# Patient Record
Sex: MALE | Race: WHITE | Employment: FULL TIME | URBAN - NONMETROPOLITAN AREA
[De-identification: names, ages, dates, MRNs, and addresses within clinical notes are randomized per-mention and may not be internally consistent; named-entity substitution may affect disease eponyms.]

---

## 2017-03-07 ENCOUNTER — OFFICE VISIT (OUTPATIENT)
Dept: PRIMARY CARE CLINIC | Age: 37
End: 2017-03-07
Payer: COMMERCIAL

## 2017-03-07 VITALS
RESPIRATION RATE: 16 BRPM | BODY MASS INDEX: 41.75 KG/M2 | DIASTOLIC BLOOD PRESSURE: 78 MMHG | OXYGEN SATURATION: 96 % | WEIGHT: 315 LBS | SYSTOLIC BLOOD PRESSURE: 136 MMHG | HEIGHT: 73 IN | HEART RATE: 80 BPM

## 2017-03-07 DIAGNOSIS — E66.01 MORBID OBESITY, UNSPECIFIED OBESITY TYPE (HCC): ICD-10-CM

## 2017-03-07 DIAGNOSIS — Z13.220 LIPID SCREENING: ICD-10-CM

## 2017-03-07 DIAGNOSIS — K42.9 UMBILICAL HERNIA WITHOUT OBSTRUCTION AND WITHOUT GANGRENE: Primary | ICD-10-CM

## 2017-03-07 DIAGNOSIS — Z23 NEED FOR TDAP VACCINATION: ICD-10-CM

## 2017-03-07 LAB
ALBUMIN SERPL-MCNC: 3.9 G/DL (ref 3.5–5.2)
ALP BLD-CCNC: 76 U/L (ref 40–130)
ALT SERPL-CCNC: 30 U/L (ref 5–41)
ANION GAP SERPL CALCULATED.3IONS-SCNC: 16 MMOL/L (ref 7–19)
AST SERPL-CCNC: 20 U/L (ref 5–40)
BILIRUB SERPL-MCNC: 0.3 MG/DL (ref 0.2–1.2)
BUN BLDV-MCNC: 13 MG/DL (ref 6–20)
CALCIUM SERPL-MCNC: 8.9 MG/DL (ref 8.6–10)
CHLORIDE BLD-SCNC: 103 MMOL/L (ref 98–111)
CHOLESTEROL, TOTAL: 175 MG/DL (ref 160–199)
CO2: 23 MMOL/L (ref 22–29)
CREAT SERPL-MCNC: 0.7 MG/DL (ref 0.5–1.2)
GFR NON-AFRICAN AMERICAN: >60
GLOBULIN: 3.2 G/DL
GLUCOSE BLD-MCNC: 110 MG/DL (ref 74–109)
HDLC SERPL-MCNC: 37 MG/DL (ref 55–121)
LDL CHOLESTEROL CALCULATED: 105 MG/DL
POTASSIUM SERPL-SCNC: 4.1 MMOL/L (ref 3.5–5)
SODIUM BLD-SCNC: 142 MMOL/L (ref 136–145)
TOTAL PROTEIN: 7.1 G/DL (ref 6.6–8.7)
TRIGL SERPL-MCNC: 165 MG/DL (ref 150–199)
TSH SERPL DL<=0.05 MIU/L-ACNC: 1.83 UIU/ML (ref 0.27–4.2)

## 2017-03-07 PROCEDURE — 90715 TDAP VACCINE 7 YRS/> IM: CPT | Performed by: FAMILY MEDICINE

## 2017-03-07 PROCEDURE — 90471 IMMUNIZATION ADMIN: CPT | Performed by: FAMILY MEDICINE

## 2017-03-07 PROCEDURE — 99213 OFFICE O/P EST LOW 20 MIN: CPT | Performed by: FAMILY MEDICINE

## 2017-03-07 ASSESSMENT — ENCOUNTER SYMPTOMS
COLOR CHANGE: 0
ABDOMINAL PAIN: 1
SHORTNESS OF BREATH: 0
COUGH: 0

## 2017-03-13 ENCOUNTER — OFFICE VISIT (OUTPATIENT)
Dept: SURGERY | Age: 37
End: 2017-03-13
Payer: COMMERCIAL

## 2017-03-13 VITALS
BODY MASS INDEX: 42.66 KG/M2 | HEIGHT: 72 IN | WEIGHT: 315 LBS | DIASTOLIC BLOOD PRESSURE: 74 MMHG | SYSTOLIC BLOOD PRESSURE: 124 MMHG | TEMPERATURE: 97.8 F

## 2017-03-13 DIAGNOSIS — K42.9 UMBILICAL HERNIA WITHOUT OBSTRUCTION AND WITHOUT GANGRENE: Primary | ICD-10-CM

## 2017-03-13 PROCEDURE — 99212 OFFICE O/P EST SF 10 MIN: CPT | Performed by: SURGERY

## 2017-03-15 ENCOUNTER — TELEPHONE (OUTPATIENT)
Dept: PRIMARY CARE CLINIC | Age: 37
End: 2017-03-15

## 2017-03-20 ASSESSMENT — ENCOUNTER SYMPTOMS
BACK PAIN: 0
ABDOMINAL PAIN: 1
DIARRHEA: 0
EYE PAIN: 0
COUGH: 0
ABDOMINAL DISTENTION: 0
SHORTNESS OF BREATH: 0
SORE THROAT: 0
NAUSEA: 0

## 2017-06-19 ENCOUNTER — OFFICE VISIT (OUTPATIENT)
Dept: PRIMARY CARE CLINIC | Age: 37
End: 2017-06-19
Payer: COMMERCIAL

## 2017-06-19 VITALS
WEIGHT: 315 LBS | BODY MASS INDEX: 41.75 KG/M2 | HEIGHT: 73 IN | SYSTOLIC BLOOD PRESSURE: 134 MMHG | TEMPERATURE: 97.9 F | HEART RATE: 69 BPM | OXYGEN SATURATION: 96 % | DIASTOLIC BLOOD PRESSURE: 82 MMHG

## 2017-06-19 DIAGNOSIS — K42.9 UMBILICAL HERNIA WITHOUT OBSTRUCTION AND WITHOUT GANGRENE: Primary | ICD-10-CM

## 2017-06-19 DIAGNOSIS — E66.01 MORBID OBESITY, UNSPECIFIED OBESITY TYPE (HCC): ICD-10-CM

## 2017-06-19 PROCEDURE — 99213 OFFICE O/P EST LOW 20 MIN: CPT | Performed by: FAMILY MEDICINE

## 2017-06-19 ASSESSMENT — ENCOUNTER SYMPTOMS
COUGH: 0
SHORTNESS OF BREATH: 0
ABDOMINAL PAIN: 0

## 2017-09-19 ENCOUNTER — OFFICE VISIT (OUTPATIENT)
Dept: PRIMARY CARE CLINIC | Age: 37
End: 2017-09-19
Payer: COMMERCIAL

## 2017-09-19 VITALS
WEIGHT: 315 LBS | OXYGEN SATURATION: 93 % | BODY MASS INDEX: 42.66 KG/M2 | TEMPERATURE: 97.7 F | HEIGHT: 72 IN | DIASTOLIC BLOOD PRESSURE: 82 MMHG | HEART RATE: 78 BPM | SYSTOLIC BLOOD PRESSURE: 124 MMHG

## 2017-09-19 DIAGNOSIS — E66.01 MORBID OBESITY, UNSPECIFIED OBESITY TYPE (HCC): Primary | ICD-10-CM

## 2017-09-19 PROCEDURE — 99213 OFFICE O/P EST LOW 20 MIN: CPT | Performed by: FAMILY MEDICINE

## 2017-09-19 ASSESSMENT — ENCOUNTER SYMPTOMS
COUGH: 0
SHORTNESS OF BREATH: 0

## 2017-12-19 ENCOUNTER — OFFICE VISIT (OUTPATIENT)
Dept: PRIMARY CARE CLINIC | Age: 37
End: 2017-12-19
Payer: COMMERCIAL

## 2017-12-19 VITALS
BODY MASS INDEX: 41.75 KG/M2 | OXYGEN SATURATION: 95 % | SYSTOLIC BLOOD PRESSURE: 136 MMHG | DIASTOLIC BLOOD PRESSURE: 82 MMHG | HEART RATE: 60 BPM | WEIGHT: 315 LBS | TEMPERATURE: 97.4 F | HEIGHT: 73 IN

## 2017-12-19 DIAGNOSIS — K42.9 UMBILICAL HERNIA WITHOUT OBSTRUCTION AND WITHOUT GANGRENE: ICD-10-CM

## 2017-12-19 DIAGNOSIS — E66.01 MORBID OBESITY, UNSPECIFIED OBESITY TYPE (HCC): Primary | ICD-10-CM

## 2017-12-19 PROCEDURE — 99213 OFFICE O/P EST LOW 20 MIN: CPT | Performed by: FAMILY MEDICINE

## 2017-12-19 ASSESSMENT — ENCOUNTER SYMPTOMS
COUGH: 0
SHORTNESS OF BREATH: 0

## 2017-12-19 NOTE — PROGRESS NOTES
Helena Cali is a 44 y.o. male    Chief Complaint   Patient presents with    Follow-up     3 months       HPI  Helena Cali is to the office for evaluation morbid obesity. Patient has been continuing to lose weight with diet. Patient is trying to lose about 60 pounds prior to any surgery for an umbilical hernia repair. presents to the office for evaluation of weight loss. He is currently down 40 pounds. Wt Readings from Last 3 Encounters:   12/19/17 (!) 346 lb 9.6 oz (157.2 kg)   09/19/17 (!) 361 lb 3.2 oz (163.8 kg)   06/19/17 (!) 366 lb 6.4 oz (166.2 kg)       Review of Systems   Constitutional: Negative for chills and fever. Respiratory: Negative for cough and shortness of breath. Cardiovascular: Negative for chest pain and leg swelling. Prior to Admission medications    Not on File       Past Medical History:   Diagnosis Date    Arthritis     both wrist    Bronchitis     10 yrs ago       Past Surgical History:   Procedure Laterality Date    FRACTURE SURGERY      both wrists    HEMORRHOID SURGERY N/A 6/24/2016    HEMORRHOID INCISION AND DRAINAGE performed by Camryn Gomez MD at [address]      left wrist.  Pt was a child       Social History     Social History    Marital status: Single     Spouse name: N/A    Number of children: N/A    Years of education: N/A     Social History Main Topics    Smoking status: Never Smoker    Smokeless tobacco: Never Used    Alcohol use No    Drug use: No    Sexual activity: Not Asked     Other Topics Concern    None     Social History Narrative    None       Physical Exam   Constitutional: He is oriented to person, place, and time. He appears well-developed and well-nourished. No distress. HENT:   Head: Normocephalic and atraumatic. Right Ear: External ear normal.   Left Ear: External ear normal.   Eyes: Conjunctivae are normal. No scleral icterus. Neck: Normal range of motion. Neck supple.    Cardiovascular: Normal

## 2018-01-28 ENCOUNTER — APPOINTMENT (OUTPATIENT)
Dept: CT IMAGING | Age: 38
End: 2018-01-28
Payer: COMMERCIAL

## 2018-01-28 ENCOUNTER — HOSPITAL ENCOUNTER (EMERGENCY)
Age: 38
Discharge: HOME | End: 2018-01-28
Payer: COMMERCIAL

## 2018-01-28 VITALS
HEIGHT: 73 IN | BODY MASS INDEX: 41.75 KG/M2 | OXYGEN SATURATION: 94 % | SYSTOLIC BLOOD PRESSURE: 127 MMHG | RESPIRATION RATE: 16 BRPM | TEMPERATURE: 97.4 F | WEIGHT: 315 LBS | HEART RATE: 64 BPM | DIASTOLIC BLOOD PRESSURE: 79 MMHG

## 2018-01-28 DIAGNOSIS — N20.9 CALCULUS OF UPPER URINARY TRACT: Primary | ICD-10-CM

## 2018-01-28 DIAGNOSIS — N23 RENAL COLIC: ICD-10-CM

## 2018-01-28 LAB
ALBUMIN SERPL-MCNC: 3.9 G/DL (ref 3.5–5.2)
ALP BLD-CCNC: 72 U/L (ref 40–130)
ALT SERPL-CCNC: 18 U/L (ref 5–41)
ANION GAP SERPL CALCULATED.3IONS-SCNC: 11 MMOL/L (ref 7–19)
AST SERPL-CCNC: 12 U/L (ref 5–40)
BASOPHILS ABSOLUTE: 0.1 K/UL (ref 0–0.2)
BASOPHILS RELATIVE PERCENT: 0.6 % (ref 0–1)
BILIRUB SERPL-MCNC: 0.3 MG/DL (ref 0.2–1.2)
BILIRUBIN URINE: NEGATIVE
BLOOD, URINE: NEGATIVE
BUN BLDV-MCNC: 15 MG/DL (ref 6–20)
CALCIUM SERPL-MCNC: 8.8 MG/DL (ref 8.6–10)
CHLORIDE BLD-SCNC: 104 MMOL/L (ref 98–111)
CLARITY: CLEAR
CO2: 27 MMOL/L (ref 22–29)
COLOR: YELLOW
CREAT SERPL-MCNC: 0.8 MG/DL (ref 0.5–1.2)
EOSINOPHILS ABSOLUTE: 0.2 K/UL (ref 0–0.6)
EOSINOPHILS RELATIVE PERCENT: 2.2 % (ref 0–5)
GFR NON-AFRICAN AMERICAN: >60
GLUCOSE BLD-MCNC: 109 MG/DL (ref 74–109)
GLUCOSE URINE: NEGATIVE MG/DL
HCT VFR BLD CALC: 47.3 % (ref 42–52)
HEMOGLOBIN: 15 G/DL (ref 14–18)
KETONES, URINE: NEGATIVE MG/DL
LACTIC ACID: 1.4 MMOL/L (ref 0.5–1.9)
LEUKOCYTE ESTERASE, URINE: NEGATIVE
LYMPHOCYTES ABSOLUTE: 2 K/UL (ref 1.1–4.5)
LYMPHOCYTES RELATIVE PERCENT: 21.1 % (ref 20–40)
MCH RBC QN AUTO: 27.8 PG (ref 27–31)
MCHC RBC AUTO-ENTMCNC: 31.7 G/DL (ref 33–37)
MCV RBC AUTO: 87.6 FL (ref 80–94)
MONOCYTES ABSOLUTE: 0.7 K/UL (ref 0–0.9)
MONOCYTES RELATIVE PERCENT: 7 % (ref 0–10)
NEUTROPHILS ABSOLUTE: 6.4 K/UL (ref 1.5–7.5)
NEUTROPHILS RELATIVE PERCENT: 68.8 % (ref 50–65)
NITRITE, URINE: NEGATIVE
PDW BLD-RTO: 13.3 % (ref 11.5–14.5)
PH UA: 5.5
PLATELET # BLD: 219 K/UL (ref 130–400)
PMV BLD AUTO: 9.7 FL (ref 9.4–12.4)
POTASSIUM SERPL-SCNC: 3.9 MMOL/L (ref 3.5–5)
PROTEIN UA: NEGATIVE MG/DL
RBC # BLD: 5.4 M/UL (ref 4.7–6.1)
SODIUM BLD-SCNC: 142 MMOL/L (ref 136–145)
SPECIFIC GRAVITY UA: >1.045
TOTAL PROTEIN: 6.4 G/DL (ref 6.6–8.7)
UROBILINOGEN, URINE: 0.2 E.U./DL
WBC # BLD: 9.3 K/UL (ref 4.8–10.8)

## 2018-01-28 PROCEDURE — 99284 EMERGENCY DEPT VISIT MOD MDM: CPT | Performed by: NURSE PRACTITIONER

## 2018-01-28 PROCEDURE — 96375 TX/PRO/DX INJ NEW DRUG ADDON: CPT

## 2018-01-28 PROCEDURE — 83605 ASSAY OF LACTIC ACID: CPT

## 2018-01-28 PROCEDURE — 6360000004 HC RX CONTRAST MEDICATION: Performed by: NURSE PRACTITIONER

## 2018-01-28 PROCEDURE — 74177 CT ABD & PELVIS W/CONTRAST: CPT

## 2018-01-28 PROCEDURE — 80053 COMPREHEN METABOLIC PANEL: CPT

## 2018-01-28 PROCEDURE — 81003 URINALYSIS AUTO W/O SCOPE: CPT

## 2018-01-28 PROCEDURE — 36415 COLL VENOUS BLD VENIPUNCTURE: CPT

## 2018-01-28 PROCEDURE — 85025 COMPLETE CBC W/AUTO DIFF WBC: CPT

## 2018-01-28 PROCEDURE — 6360000002 HC RX W HCPCS: Performed by: NURSE PRACTITIONER

## 2018-01-28 PROCEDURE — 2580000003 HC RX 258: Performed by: NURSE PRACTITIONER

## 2018-01-28 PROCEDURE — 99284 EMERGENCY DEPT VISIT MOD MDM: CPT

## 2018-01-28 PROCEDURE — 96374 THER/PROPH/DIAG INJ IV PUSH: CPT

## 2018-01-28 RX ORDER — HYDROCODONE BITARTRATE AND ACETAMINOPHEN 7.5; 325 MG/1; MG/1
1 TABLET ORAL EVERY 6 HOURS PRN
Qty: 12 TABLET | Refills: 0 | Status: SHIPPED | OUTPATIENT
Start: 2018-01-28 | End: 2018-01-31

## 2018-01-28 RX ORDER — TAMSULOSIN HYDROCHLORIDE 0.4 MG/1
0.4 CAPSULE ORAL DAILY
Qty: 10 CAPSULE | Refills: 0 | Status: ON HOLD | OUTPATIENT
Start: 2018-01-28 | End: 2018-02-13

## 2018-01-28 RX ORDER — ONDANSETRON 2 MG/ML
4 INJECTION INTRAMUSCULAR; INTRAVENOUS ONCE
Status: COMPLETED | OUTPATIENT
Start: 2018-01-28 | End: 2018-01-28

## 2018-01-28 RX ORDER — 0.9 % SODIUM CHLORIDE 0.9 %
1000 INTRAVENOUS SOLUTION INTRAVENOUS ONCE
Status: COMPLETED | OUTPATIENT
Start: 2018-01-28 | End: 2018-01-28

## 2018-01-28 RX ORDER — ONDANSETRON 4 MG/1
4 TABLET, ORALLY DISINTEGRATING ORAL EVERY 8 HOURS PRN
Qty: 15 TABLET | Refills: 0 | Status: SHIPPED | OUTPATIENT
Start: 2018-01-28 | End: 2018-02-27

## 2018-01-28 RX ORDER — MORPHINE SULFATE 4 MG/ML
2 INJECTION, SOLUTION INTRAMUSCULAR; INTRAVENOUS ONCE
Status: COMPLETED | OUTPATIENT
Start: 2018-01-28 | End: 2018-01-28

## 2018-01-28 RX ADMIN — IOPAMIDOL 90 ML: 755 INJECTION, SOLUTION INTRAVENOUS at 12:01

## 2018-01-28 RX ADMIN — ONDANSETRON 4 MG: 2 INJECTION INTRAMUSCULAR; INTRAVENOUS at 11:38

## 2018-01-28 RX ADMIN — SODIUM CHLORIDE 1000 ML: 9 INJECTION, SOLUTION INTRAVENOUS at 11:38

## 2018-01-28 RX ADMIN — Medication 2 MG: at 11:39

## 2018-01-28 ASSESSMENT — ENCOUNTER SYMPTOMS
BACK PAIN: 0
VOMITING: 0
RESPIRATORY NEGATIVE: 1
NAUSEA: 1
ABDOMINAL PAIN: 1

## 2018-01-28 ASSESSMENT — PAIN SCALES - GENERAL
PAINLEVEL_OUTOF10: 7
PAINLEVEL_OUTOF10: 7

## 2018-01-28 NOTE — ED PROVIDER NOTES
of a cardiologist.        RADIOLOGY:   Non-plain film images such as CT, Ultrasound and MRI are read by the radiologist. Plain radiographic images are visualized and preliminarily interpreted by the emergency physician with the below findings:        Interpretation per the Radiologist below, if available at the time of this note:    CT ABDOMEN PELVIS W IV CONTRAST Additional Contrast? None   Final Result   1. Moderate right hydronephrosis based on obstructing stone(s) at the   right UPJ. Signed by Dr Bhavya Arcos on 1/28/2018 12:38 PM            ED BEDSIDE ULTRASOUND:   Performed by ED Physician - none    LABS:  Labs Reviewed   CBC WITH AUTO DIFFERENTIAL - Abnormal; Notable for the following:        Result Value    MCHC 31.7 (*)     Neutrophils % 68.8 (*)     All other components within normal limits   COMPREHENSIVE METABOLIC PANEL - Abnormal; Notable for the following: Total Protein 6.4 (*)     All other components within normal limits   URINALYSIS    Narrative:     CHEMISTRY SPECIMEN HEMOLYZED. NOTIFIED [facility] ED TO RECOLLECT.   01/28/2018  11:55 CC   LACTIC ACID, PLASMA    Narrative:     CHEMISTRY SPECIMEN HEMOLYZED. NOTIFIED [facility] ED TO RECOLLECT.   01/28/2018  11:55 CC       All other labs were within normal range or not returned as of this dictation. RE-ASSESSMENT     Pt pain is controlled, he remains hydrated without vomiting and does not appear to have infected urinary tract. Return precautions were discussed with patient. EMERGENCY DEPARTMENT COURSE and DIFFERENTIAL DIAGNOSIS/MDM:   Vitals:    Vitals:    01/28/18 1102 01/28/18 1318   BP: 134/72 127/79   Pulse: 65 64   Resp: 20 16   Temp: 97.4 °F (36.3 °C)    TempSrc: Oral    SpO2: 94%    Weight: (!) 339 lb (153.8 kg)    Height: 6' 1\" (1.854 m)        MDM      CONSULTS:  None    PROCEDURES:  Unless otherwise noted below, none     Procedures    FINAL IMPRESSION      1. Calculus of upper urinary tract    2.  Renal colic

## 2018-01-28 NOTE — ED TRIAGE NOTES
Pt to ED with c/o lower abdominal/groin pain. Pt reports slight swelling to lower abdomen with pain radiating into groin.  Pt denies known injury but reports heavy lifting recently on job

## 2018-01-30 ENCOUNTER — OFFICE VISIT (OUTPATIENT)
Dept: UROLOGY | Age: 38
End: 2018-01-30
Payer: COMMERCIAL

## 2018-01-30 ENCOUNTER — HOSPITAL ENCOUNTER (OUTPATIENT)
Dept: GENERAL RADIOLOGY | Age: 38
Discharge: HOME | End: 2018-01-30
Payer: COMMERCIAL

## 2018-01-30 VITALS
DIASTOLIC BLOOD PRESSURE: 73 MMHG | TEMPERATURE: 97.3 F | SYSTOLIC BLOOD PRESSURE: 144 MMHG | WEIGHT: 315 LBS | BODY MASS INDEX: 41.75 KG/M2 | HEIGHT: 73 IN

## 2018-01-30 DIAGNOSIS — N20.1 RIGHT URETERAL STONE: Primary | ICD-10-CM

## 2018-01-30 DIAGNOSIS — N20.1 RIGHT URETERAL STONE: ICD-10-CM

## 2018-01-30 LAB
APPEARANCE FLUID: NORMAL
BILIRUBIN, POC: NORMAL
BLOOD URINE, POC: NORMAL
CLARITY, POC: CLEAR
COLOR, POC: YELLOW
GLUCOSE URINE, POC: NORMAL
KETONES, POC: NORMAL
LEUKOCYTE EST, POC: NORMAL
NITRITE, POC: NORMAL
PH, POC: 6.5
PROTEIN, POC: NORMAL
SPECIFIC GRAVITY, POC: 1.02
UROBILINOGEN, POC: 0.2

## 2018-01-30 PROCEDURE — 99202 OFFICE O/P NEW SF 15 MIN: CPT | Performed by: PHYSICIAN ASSISTANT

## 2018-01-30 PROCEDURE — 81003 URINALYSIS AUTO W/O SCOPE: CPT | Performed by: PHYSICIAN ASSISTANT

## 2018-01-30 PROCEDURE — 74018 RADEX ABDOMEN 1 VIEW: CPT

## 2018-01-30 ASSESSMENT — ENCOUNTER SYMPTOMS
BLURRED VISION: 0
SINUS PAIN: 0
BACK PAIN: 0
EYE PAIN: 0
WHEEZING: 0
SHORTNESS OF BREATH: 0
ABDOMINAL PAIN: 0
VOMITING: 0

## 2018-01-30 NOTE — PROGRESS NOTES
Alea Gant is a 43 y.o. male who presents today   Chief Complaint   Patient presents with    New Patient     right ureteral stone     Urolithiasis  Patient complains of right abdominal pain without radiation to the groin and testicles. Onset of symptoms was abrupt 2 days ago with stable course since that time. Patient describes the pain as colicky, and was severe in the ER. States today has had almost no pain. The patient has had no nausea and no vomiting and no diaphoresis. There has been no fever or chills. The patient is not complaining of dysuria or frequency. Has had just one other stone episode several years ago. CT scan done at [facility] ER revealed a 1.2 cm x 5 mm stone in the right proximal ureter. Labs done on the 28th revealed normal creatinine no elevation in white blood cell count and his urinalysis was normal. I sent patient for KUB the stone seen in the right proximal ureter is clearly seen on the KUB today. Past Medical History:   Diagnosis Date    Arthritis     both wrist    Bronchitis     10 yrs ago       Past Surgical History:   Procedure Laterality Date    FRACTURE SURGERY      both wrists    HEMORRHOID SURGERY N/A 6/24/2016    HEMORRHOID INCISION AND DRAINAGE performed by Hussein Morris MD at [address] [facility]      left wrist.  Pt was a child       Current Outpatient Prescriptions   Medication Sig Dispense Refill    HYDROcodone-acetaminophen (NORCO) 7.5-325 MG per tablet Take 1 tablet by mouth every 6 hours as needed for Pain for up to 3 days. 12 tablet 0    tamsulosin (FLOMAX) 0.4 MG capsule Take 1 capsule by mouth daily for 10 days 10 capsule 0    ondansetron (ZOFRAN ODT) 4 MG disintegrating tablet Take 1 tablet by mouth every 8 hours as needed for Nausea or Vomiting 15 tablet 0     No current facility-administered medications for this visit.         No Known Allergies    Social History     Social History    Marital status:      Spouse name: N/A    Number of children: N/A    Years of education: N/A     Social History Main Topics    Smoking status: Never Smoker    Smokeless tobacco: Never Used    Alcohol use No    Drug use: No    Sexual activity: Not Asked     Other Topics Concern    None     Social History Narrative    None       Family History   Problem Relation Age of Onset    Cancer Mother 46     colon cancer    Cancer Father      luekemia    Diabetes Father     Other Maternal Grandmother      copd    Other Maternal Grandfather      copd    Heart Disease Paternal Grandmother        REVIEW OF SYSTEMS:  Review of Systems   Constitutional: Negative for malaise/fatigue and weight loss. HENT: Negative for nosebleeds and sinus pain. Eyes: Negative for blurred vision and pain. Respiratory: Negative for shortness of breath and wheezing. Cardiovascular: Negative for palpitations and leg swelling. Gastrointestinal: Negative for abdominal pain and vomiting. Genitourinary: Positive for flank pain and frequency. Negative for dysuria, hematuria and urgency. Musculoskeletal: Negative for back pain and myalgias. Skin: Negative for itching and rash. Neurological: Negative for tremors and sensory change. Endo/Heme/Allergies: Negative for environmental allergies and polydipsia. Psychiatric/Behavioral: Negative for hallucinations. The patient is not nervous/anxious. PHYSICAL EXAM:  BP (!) 144/73   Temp 97.3 °F (36.3 °C) (Temporal)   Ht 6' 1\" (1.854 m)   Wt (!) 357 lb (161.9 kg)   BMI 47.10 kg/m²   Physical Exam   Constitutional: No distress. HENT:   Mouth/Throat: No oropharyngeal exudate. Eyes: Left eye exhibits no discharge. No scleral icterus. Neck: No JVD present. No tracheal deviation present. No thyromegaly present. Cardiovascular: Exam reveals no gallop and no friction rub. Pulmonary/Chest: No stridor. He has no rales. Abdominal: He exhibits no distension and no mass. There is no rebound and no guarding.

## 2018-01-31 ENCOUNTER — PREP FOR PROCEDURE (OUTPATIENT)
Dept: UROLOGY | Age: 38
End: 2018-01-31

## 2018-02-12 ENCOUNTER — HOSPITAL ENCOUNTER (OUTPATIENT)
Dept: PREADMISSION TESTING | Age: 38
Discharge: HOME | End: 2018-02-16
Payer: COMMERCIAL

## 2018-02-12 VITALS — WEIGHT: 315 LBS | HEIGHT: 73 IN | BODY MASS INDEX: 41.75 KG/M2

## 2018-02-12 LAB
ANION GAP SERPL CALCULATED.3IONS-SCNC: 10 MMOL/L (ref 7–19)
APTT: 28.6 SEC (ref 26–36.2)
BASOPHILS ABSOLUTE: 0.1 K/UL (ref 0–0.2)
BASOPHILS RELATIVE PERCENT: 0.7 % (ref 0–1)
BUN BLDV-MCNC: 10 MG/DL (ref 6–20)
CALCIUM SERPL-MCNC: 9.6 MG/DL (ref 8.6–10)
CHLORIDE BLD-SCNC: 103 MMOL/L (ref 98–111)
CO2: 29 MMOL/L (ref 22–29)
COLLAGEN EPINEPHRINE TIME: 171 SEC (ref 84–176)
CREAT SERPL-MCNC: 0.7 MG/DL (ref 0.5–1.2)
EKG P AXIS: 44 DEGREES
EKG P-R INTERVAL: 190 MS
EKG Q-T INTERVAL: 398 MS
EKG QRS DURATION: 102 MS
EKG QTC CALCULATION (BAZETT): 405 MS
EKG T AXIS: 37 DEGREES
EOSINOPHILS ABSOLUTE: 0.3 K/UL (ref 0–0.6)
EOSINOPHILS RELATIVE PERCENT: 3.2 % (ref 0–5)
GFR NON-AFRICAN AMERICAN: >60
GLUCOSE BLD-MCNC: 88 MG/DL (ref 74–109)
HCT VFR BLD CALC: 48.3 % (ref 42–52)
HEMOGLOBIN: 15.3 G/DL (ref 14–18)
INR BLD: 1.11 (ref 0.88–1.18)
LYMPHOCYTES ABSOLUTE: 1.8 K/UL (ref 1.1–4.5)
LYMPHOCYTES RELATIVE PERCENT: 22 % (ref 20–40)
MCH RBC QN AUTO: 28.1 PG (ref 27–31)
MCHC RBC AUTO-ENTMCNC: 31.7 G/DL (ref 33–37)
MCV RBC AUTO: 88.6 FL (ref 80–94)
MONOCYTES ABSOLUTE: 0.7 K/UL (ref 0–0.9)
MONOCYTES RELATIVE PERCENT: 8.2 % (ref 0–10)
NEUTROPHILS ABSOLUTE: 5.3 K/UL (ref 1.5–7.5)
NEUTROPHILS RELATIVE PERCENT: 65.7 % (ref 50–65)
PDW BLD-RTO: 13.5 % (ref 11.5–14.5)
PLATELET # BLD: 230 K/UL (ref 130–400)
PLATELET FUNCTION INTERPRETATION: NORMAL
PMV BLD AUTO: 9.6 FL (ref 9.4–12.4)
POTASSIUM SERPL-SCNC: 4.1 MMOL/L (ref 3.5–5)
PROTHROMBIN TIME: 14.2 SEC (ref 12–14.6)
RBC # BLD: 5.45 M/UL (ref 4.7–6.1)
SODIUM BLD-SCNC: 142 MMOL/L (ref 136–145)
WBC # BLD: 8.1 K/UL (ref 4.8–10.8)

## 2018-02-12 PROCEDURE — 93005 ELECTROCARDIOGRAM TRACING: CPT

## 2018-02-12 PROCEDURE — 85730 THROMBOPLASTIN TIME PARTIAL: CPT

## 2018-02-12 PROCEDURE — 85025 COMPLETE CBC W/AUTO DIFF WBC: CPT

## 2018-02-12 PROCEDURE — 85610 PROTHROMBIN TIME: CPT

## 2018-02-12 PROCEDURE — 80048 BASIC METABOLIC PNL TOTAL CA: CPT

## 2018-02-12 PROCEDURE — 85576 BLOOD PLATELET AGGREGATION: CPT

## 2018-02-12 RX ORDER — CIPROFLOXACIN 2 MG/ML
400 INJECTION, SOLUTION INTRAVENOUS ONCE
Status: CANCELLED | OUTPATIENT
Start: 2018-02-13

## 2018-02-13 ENCOUNTER — HOSPITAL ENCOUNTER (OUTPATIENT)
Age: 38
Setting detail: OUTPATIENT SURGERY
Discharge: HOME | End: 2018-02-13
Attending: UROLOGY | Admitting: UROLOGY
Payer: COMMERCIAL

## 2018-02-13 ENCOUNTER — APPOINTMENT (OUTPATIENT)
Dept: GENERAL RADIOLOGY | Age: 38
End: 2018-02-13
Attending: UROLOGY
Payer: COMMERCIAL

## 2018-02-13 ENCOUNTER — ANESTHESIA EVENT (OUTPATIENT)
Dept: OPERATING ROOM | Age: 38
End: 2018-02-13
Payer: COMMERCIAL

## 2018-02-13 ENCOUNTER — ANESTHESIA (OUTPATIENT)
Dept: OPERATING ROOM | Age: 38
End: 2018-02-13
Payer: COMMERCIAL

## 2018-02-13 VITALS
TEMPERATURE: 98 F | SYSTOLIC BLOOD PRESSURE: 116 MMHG | OXYGEN SATURATION: 91 % | RESPIRATION RATE: 10 BRPM | DIASTOLIC BLOOD PRESSURE: 83 MMHG

## 2018-02-13 VITALS
BODY MASS INDEX: 41.75 KG/M2 | HEART RATE: 62 BPM | TEMPERATURE: 97.2 F | OXYGEN SATURATION: 90 % | SYSTOLIC BLOOD PRESSURE: 126 MMHG | RESPIRATION RATE: 20 BRPM | WEIGHT: 315 LBS | DIASTOLIC BLOOD PRESSURE: 75 MMHG | HEIGHT: 73 IN

## 2018-02-13 DIAGNOSIS — N20.1 RIGHT URETERAL CALCULUS: Primary | ICD-10-CM

## 2018-02-13 LAB
HCT VFR BLD CALC: 50.6 % (ref 42–52)
HEMOGLOBIN: 15.9 G/DL (ref 14–18)

## 2018-02-13 PROCEDURE — 6360000002 HC RX W HCPCS: Performed by: NURSE ANESTHETIST, CERTIFIED REGISTERED

## 2018-02-13 PROCEDURE — 85014 HEMATOCRIT: CPT

## 2018-02-13 PROCEDURE — 36415 COLL VENOUS BLD VENIPUNCTURE: CPT

## 2018-02-13 PROCEDURE — 7100000001 HC PACU RECOVERY - ADDTL 15 MIN: Performed by: UROLOGY

## 2018-02-13 PROCEDURE — 7100000011 HC PHASE II RECOVERY - ADDTL 15 MIN: Performed by: UROLOGY

## 2018-02-13 PROCEDURE — 6360000002 HC RX W HCPCS: Performed by: UROLOGY

## 2018-02-13 PROCEDURE — 2580000003 HC RX 258: Performed by: ANESTHESIOLOGY

## 2018-02-13 PROCEDURE — 3700000001 HC ADD 15 MINUTES (ANESTHESIA): Performed by: UROLOGY

## 2018-02-13 PROCEDURE — 2500000003 HC RX 250 WO HCPCS: Performed by: NURSE ANESTHETIST, CERTIFIED REGISTERED

## 2018-02-13 PROCEDURE — 7100000000 HC PACU RECOVERY - FIRST 15 MIN: Performed by: UROLOGY

## 2018-02-13 PROCEDURE — 74018 RADEX ABDOMEN 1 VIEW: CPT

## 2018-02-13 PROCEDURE — 7100000010 HC PHASE II RECOVERY - FIRST 15 MIN: Performed by: UROLOGY

## 2018-02-13 PROCEDURE — 6370000000 HC RX 637 (ALT 250 FOR IP): Performed by: UROLOGY

## 2018-02-13 PROCEDURE — 50590 FRAGMENTING OF KIDNEY STONE: CPT | Performed by: UROLOGY

## 2018-02-13 PROCEDURE — 85018 HEMOGLOBIN: CPT

## 2018-02-13 PROCEDURE — 3700000000 HC ANESTHESIA ATTENDED CARE: Performed by: UROLOGY

## 2018-02-13 RX ORDER — MORPHINE SULFATE 4 MG/ML
2 INJECTION, SOLUTION INTRAMUSCULAR; INTRAVENOUS EVERY 5 MIN PRN
Status: DISCONTINUED | OUTPATIENT
Start: 2018-02-13 | End: 2018-02-13 | Stop reason: HOSPADM

## 2018-02-13 RX ORDER — SODIUM CHLORIDE 0.9 % (FLUSH) 0.9 %
10 SYRINGE (ML) INJECTION PRN
Status: DISCONTINUED | OUTPATIENT
Start: 2018-02-13 | End: 2018-02-13 | Stop reason: HOSPADM

## 2018-02-13 RX ORDER — HYDROMORPHONE HCL 110MG/55ML
0.25 PATIENT CONTROLLED ANALGESIA SYRINGE INTRAVENOUS EVERY 5 MIN PRN
Status: DISCONTINUED | OUTPATIENT
Start: 2018-02-13 | End: 2018-02-13 | Stop reason: HOSPADM

## 2018-02-13 RX ORDER — SUCCINYLCHOLINE CHLORIDE 20 MG/ML
INJECTION INTRAMUSCULAR; INTRAVENOUS PRN
Status: DISCONTINUED | OUTPATIENT
Start: 2018-02-13 | End: 2018-02-13 | Stop reason: SDUPTHER

## 2018-02-13 RX ORDER — OXYCODONE HYDROCHLORIDE AND ACETAMINOPHEN 5; 325 MG/1; MG/1
2 TABLET ORAL EVERY 4 HOURS PRN
Status: DISCONTINUED | OUTPATIENT
Start: 2018-02-13 | End: 2018-02-13 | Stop reason: HOSPADM

## 2018-02-13 RX ORDER — MORPHINE SULFATE 4 MG/ML
2 INJECTION, SOLUTION INTRAMUSCULAR; INTRAVENOUS EVERY 4 HOURS PRN
Status: DISCONTINUED | OUTPATIENT
Start: 2018-02-13 | End: 2018-02-13 | Stop reason: HOSPADM

## 2018-02-13 RX ORDER — METOCLOPRAMIDE HYDROCHLORIDE 5 MG/ML
10 INJECTION INTRAMUSCULAR; INTRAVENOUS
Status: DISCONTINUED | OUTPATIENT
Start: 2018-02-13 | End: 2018-02-13 | Stop reason: HOSPADM

## 2018-02-13 RX ORDER — SODIUM CHLORIDE 0.9 % (FLUSH) 0.9 %
10 SYRINGE (ML) INJECTION EVERY 12 HOURS SCHEDULED
Status: DISCONTINUED | OUTPATIENT
Start: 2018-02-13 | End: 2018-02-13 | Stop reason: HOSPADM

## 2018-02-13 RX ORDER — LABETALOL HYDROCHLORIDE 5 MG/ML
5 INJECTION, SOLUTION INTRAVENOUS EVERY 10 MIN PRN
Status: DISCONTINUED | OUTPATIENT
Start: 2018-02-13 | End: 2018-02-13 | Stop reason: HOSPADM

## 2018-02-13 RX ORDER — PROMETHAZINE HYDROCHLORIDE 25 MG/ML
6.25 INJECTION, SOLUTION INTRAMUSCULAR; INTRAVENOUS
Status: DISCONTINUED | OUTPATIENT
Start: 2018-02-13 | End: 2018-02-13 | Stop reason: HOSPADM

## 2018-02-13 RX ORDER — FENTANYL CITRATE 50 UG/ML
25 INJECTION, SOLUTION INTRAMUSCULAR; INTRAVENOUS
Status: DISCONTINUED | OUTPATIENT
Start: 2018-02-13 | End: 2018-02-13 | Stop reason: HOSPADM

## 2018-02-13 RX ORDER — MIDAZOLAM HYDROCHLORIDE 1 MG/ML
2 INJECTION INTRAMUSCULAR; INTRAVENOUS
Status: DISCONTINUED | OUTPATIENT
Start: 2018-02-13 | End: 2018-02-13 | Stop reason: HOSPADM

## 2018-02-13 RX ORDER — FENTANYL CITRATE 50 UG/ML
50 INJECTION, SOLUTION INTRAMUSCULAR; INTRAVENOUS
Status: DISCONTINUED | OUTPATIENT
Start: 2018-02-13 | End: 2018-02-13 | Stop reason: HOSPADM

## 2018-02-13 RX ORDER — ROCURONIUM BROMIDE 10 MG/ML
INJECTION, SOLUTION INTRAVENOUS PRN
Status: DISCONTINUED | OUTPATIENT
Start: 2018-02-13 | End: 2018-02-13 | Stop reason: SDUPTHER

## 2018-02-13 RX ORDER — DIPHENHYDRAMINE HYDROCHLORIDE 50 MG/ML
12.5 INJECTION INTRAMUSCULAR; INTRAVENOUS
Status: DISCONTINUED | OUTPATIENT
Start: 2018-02-13 | End: 2018-02-13 | Stop reason: HOSPADM

## 2018-02-13 RX ORDER — ONDANSETRON 2 MG/ML
INJECTION INTRAMUSCULAR; INTRAVENOUS PRN
Status: DISCONTINUED | OUTPATIENT
Start: 2018-02-13 | End: 2018-02-13 | Stop reason: SDUPTHER

## 2018-02-13 RX ORDER — HYDROMORPHONE HCL 110MG/55ML
0.5 PATIENT CONTROLLED ANALGESIA SYRINGE INTRAVENOUS EVERY 5 MIN PRN
Status: DISCONTINUED | OUTPATIENT
Start: 2018-02-13 | End: 2018-02-13 | Stop reason: HOSPADM

## 2018-02-13 RX ORDER — LIDOCAINE HYDROCHLORIDE 10 MG/ML
1 INJECTION, SOLUTION EPIDURAL; INFILTRATION; INTRACAUDAL; PERINEURAL
Status: DISCONTINUED | OUTPATIENT
Start: 2018-02-13 | End: 2018-02-13 | Stop reason: HOSPADM

## 2018-02-13 RX ORDER — TAMSULOSIN HYDROCHLORIDE 0.4 MG/1
0.4 CAPSULE ORAL ONCE
Status: COMPLETED | OUTPATIENT
Start: 2018-02-13 | End: 2018-02-13

## 2018-02-13 RX ORDER — DEXAMETHASONE SODIUM PHOSPHATE 4 MG/ML
INJECTION, SOLUTION INTRA-ARTICULAR; INTRALESIONAL; INTRAMUSCULAR; INTRAVENOUS; SOFT TISSUE PRN
Status: DISCONTINUED | OUTPATIENT
Start: 2018-02-13 | End: 2018-02-13 | Stop reason: SDUPTHER

## 2018-02-13 RX ORDER — HYDRALAZINE HYDROCHLORIDE 20 MG/ML
5 INJECTION INTRAMUSCULAR; INTRAVENOUS EVERY 10 MIN PRN
Status: DISCONTINUED | OUTPATIENT
Start: 2018-02-13 | End: 2018-02-13 | Stop reason: HOSPADM

## 2018-02-13 RX ORDER — PROPOFOL 10 MG/ML
INJECTION, EMULSION INTRAVENOUS PRN
Status: DISCONTINUED | OUTPATIENT
Start: 2018-02-13 | End: 2018-02-13 | Stop reason: SDUPTHER

## 2018-02-13 RX ORDER — SODIUM CHLORIDE, SODIUM LACTATE, POTASSIUM CHLORIDE, CALCIUM CHLORIDE 600; 310; 30; 20 MG/100ML; MG/100ML; MG/100ML; MG/100ML
INJECTION, SOLUTION INTRAVENOUS CONTINUOUS
Status: DISCONTINUED | OUTPATIENT
Start: 2018-02-13 | End: 2018-02-13 | Stop reason: HOSPADM

## 2018-02-13 RX ORDER — SODIUM CHLORIDE 9 MG/ML
INJECTION, SOLUTION INTRAVENOUS CONTINUOUS
Status: DISCONTINUED | OUTPATIENT
Start: 2018-02-13 | End: 2018-02-13 | Stop reason: HOSPADM

## 2018-02-13 RX ORDER — OXYCODONE HYDROCHLORIDE AND ACETAMINOPHEN 5; 325 MG/1; MG/1
1 TABLET ORAL EVERY 4 HOURS PRN
Qty: 30 TABLET | Refills: 0 | Status: SHIPPED | OUTPATIENT
Start: 2018-02-13 | End: 2018-02-20

## 2018-02-13 RX ORDER — SCOLOPAMINE TRANSDERMAL SYSTEM 1 MG/1
1 PATCH, EXTENDED RELEASE TRANSDERMAL
Status: DISCONTINUED | OUTPATIENT
Start: 2018-02-13 | End: 2018-02-13 | Stop reason: HOSPADM

## 2018-02-13 RX ORDER — LIDOCAINE HYDROCHLORIDE 10 MG/ML
INJECTION, SOLUTION INFILTRATION; PERINEURAL PRN
Status: DISCONTINUED | OUTPATIENT
Start: 2018-02-13 | End: 2018-02-13 | Stop reason: SDUPTHER

## 2018-02-13 RX ORDER — TAMSULOSIN HYDROCHLORIDE 0.4 MG/1
0.4 CAPSULE ORAL DAILY
Qty: 14 CAPSULE | Refills: 1 | Status: SHIPPED | OUTPATIENT
Start: 2018-02-13 | End: 2018-02-27

## 2018-02-13 RX ORDER — ONDANSETRON 2 MG/ML
4 INJECTION INTRAMUSCULAR; INTRAVENOUS EVERY 4 HOURS PRN
Status: DISCONTINUED | OUTPATIENT
Start: 2018-02-13 | End: 2018-02-13 | Stop reason: HOSPADM

## 2018-02-13 RX ORDER — MEPERIDINE HYDROCHLORIDE 50 MG/ML
12.5 INJECTION INTRAMUSCULAR; INTRAVENOUS; SUBCUTANEOUS EVERY 5 MIN PRN
Status: DISCONTINUED | OUTPATIENT
Start: 2018-02-13 | End: 2018-02-13 | Stop reason: HOSPADM

## 2018-02-13 RX ORDER — MIDAZOLAM HYDROCHLORIDE 1 MG/ML
INJECTION INTRAMUSCULAR; INTRAVENOUS PRN
Status: DISCONTINUED | OUTPATIENT
Start: 2018-02-13 | End: 2018-02-13 | Stop reason: SDUPTHER

## 2018-02-13 RX ORDER — CIPROFLOXACIN 2 MG/ML
400 INJECTION, SOLUTION INTRAVENOUS ONCE
Status: COMPLETED | OUTPATIENT
Start: 2018-02-13 | End: 2018-02-13

## 2018-02-13 RX ORDER — MORPHINE SULFATE 4 MG/ML
4 INJECTION, SOLUTION INTRAMUSCULAR; INTRAVENOUS EVERY 5 MIN PRN
Status: DISCONTINUED | OUTPATIENT
Start: 2018-02-13 | End: 2018-02-13 | Stop reason: HOSPADM

## 2018-02-13 RX ORDER — FENTANYL CITRATE 50 UG/ML
INJECTION, SOLUTION INTRAMUSCULAR; INTRAVENOUS PRN
Status: DISCONTINUED | OUTPATIENT
Start: 2018-02-13 | End: 2018-02-13 | Stop reason: SDUPTHER

## 2018-02-13 RX ORDER — MORPHINE SULFATE 10 MG/ML
INJECTION, SOLUTION INTRAMUSCULAR; INTRAVENOUS PRN
Status: DISCONTINUED | OUTPATIENT
Start: 2018-02-13 | End: 2018-02-13 | Stop reason: SDUPTHER

## 2018-02-13 RX ADMIN — MORPHINE SULFATE 4 MG: 10 INJECTION INTRAMUSCULAR; INTRAVENOUS; SUBCUTANEOUS at 09:37

## 2018-02-13 RX ADMIN — LIDOCAINE HYDROCHLORIDE 5 ML: 10 INJECTION, SOLUTION INFILTRATION; PERINEURAL at 08:08

## 2018-02-13 RX ADMIN — SODIUM CHLORIDE, SODIUM LACTATE, POTASSIUM CHLORIDE, AND CALCIUM CHLORIDE: 600; 310; 30; 20 INJECTION, SOLUTION INTRAVENOUS at 07:00

## 2018-02-13 RX ADMIN — ROCURONIUM BROMIDE 5 MG: 10 INJECTION INTRAVENOUS at 08:13

## 2018-02-13 RX ADMIN — Medication 120 MG: at 08:08

## 2018-02-13 RX ADMIN — CIPROFLOXACIN 400 MG: 2 INJECTION INTRAVENOUS at 08:14

## 2018-02-13 RX ADMIN — DEXAMETHASONE SODIUM PHOSPHATE 4 MG: 4 INJECTION, SOLUTION INTRAMUSCULAR; INTRAVENOUS at 08:24

## 2018-02-13 RX ADMIN — ROCURONIUM BROMIDE 5 MG: 10 INJECTION INTRAVENOUS at 08:08

## 2018-02-13 RX ADMIN — FENTANYL CITRATE 100 MCG: 50 INJECTION, SOLUTION INTRAMUSCULAR; INTRAVENOUS at 08:07

## 2018-02-13 RX ADMIN — MORPHINE SULFATE 2 MG: 10 INJECTION INTRAMUSCULAR; INTRAVENOUS; SUBCUTANEOUS at 09:07

## 2018-02-13 RX ADMIN — SODIUM CHLORIDE, SODIUM LACTATE, POTASSIUM CHLORIDE, AND CALCIUM CHLORIDE: 600; 310; 30; 20 INJECTION, SOLUTION INTRAVENOUS at 08:48

## 2018-02-13 RX ADMIN — MORPHINE SULFATE 4 MG: 10 INJECTION INTRAMUSCULAR; INTRAVENOUS; SUBCUTANEOUS at 08:21

## 2018-02-13 RX ADMIN — PROPOFOL 170 MG: 10 INJECTION, EMULSION INTRAVENOUS at 08:08

## 2018-02-13 RX ADMIN — TAMSULOSIN HYDROCHLORIDE 0.4 MG: 0.4 CAPSULE ORAL at 10:06

## 2018-02-13 RX ADMIN — ONDANSETRON HYDROCHLORIDE 4 MG: 2 SOLUTION INTRAMUSCULAR; INTRAVENOUS at 09:20

## 2018-02-13 RX ADMIN — MIDAZOLAM HYDROCHLORIDE 2 MG: 1 INJECTION, SOLUTION INTRAMUSCULAR; INTRAVENOUS at 08:00

## 2018-02-13 ASSESSMENT — PAIN - FUNCTIONAL ASSESSMENT: PAIN_FUNCTIONAL_ASSESSMENT: 0-10

## 2018-02-13 ASSESSMENT — ENCOUNTER SYMPTOMS: SHORTNESS OF BREATH: 0

## 2018-02-13 ASSESSMENT — PAIN SCALES - GENERAL
PAINLEVEL_OUTOF10: 0
PAINLEVEL_OUTOF10: 1

## 2018-02-13 NOTE — ANESTHESIA PRE PROCEDURE
Department of Anesthesiology  Preprocedure Note       Name:  Atif Alas   Age:  43 y.o.  :  1980                                          MRN:  [id number]         Date:  2018      Surgeon: Yifan Ordonez):  Cornelio Feliciano MD    Procedure: Procedure(s): HEMORRHOID INCISION AND DRAINAGE    Medications prior to admission:   Prior to Admission medications    Medication Sig Start Date End Date Taking? Authorizing Provider   tamsulosin (FLOMAX) 0.4 MG capsule Take 1 capsule by mouth daily for 10 days 18  VANDANA Enciso   ondansetron (ZOFRAN ODT) 4 MG disintegrating tablet Take 1 tablet by mouth every 8 hours as needed for Nausea or Vomiting 18   VANDANA Enciso       Current medications:    No current facility-administered medications for this visit. No current outpatient prescriptions on file. Facility-Administered Medications Ordered in Other Visits   Medication Dose Route Frequency Provider Last Rate Last Dose    ciprofloxacin (CIPRO) IVPB 400 mg  400 mg Intravenous Once Cornelio Feliciano MD           Allergies:  No Known Allergies    Problem List:    Patient Active Problem List   Diagnosis Code    Thrombosed external hemorrhoid K64.5       Past Medical History:        Diagnosis Date    Arthritis     both wrist    Bronchitis     10 yrs ago    Kidney stone        Past Surgical History:        Procedure Laterality Date    FRACTURE SURGERY      both wrists    HEMORRHOID SURGERY N/A 2016    HEMORRHOID INCISION AND DRAINAGE performed by Prosper Dinero MD at [address]      left wrist.  Pt was a child       Social History:    Social History   Substance Use Topics    Smoking status: Never Smoker    Smokeless tobacco: Never Used    Alcohol use No                                Counseling given: Not Answered      Vital Signs (Current): There were no vitals filed for this visit.                                            BP Readings from Last 3

## 2018-02-13 NOTE — BRIEF OP NOTE
Urology Brief Op Note    Patient Name: Marilee Nicole    : 1980    MRN: [id number]    Pre-operative Diagnosis: RIGHT PROXIMAL URETERAL STONE    Post-operative Diagnosis: Same     Procedure: Procedure(s):right ureter  ESWL EXTRACORPEAL SHOCK WAVE LITHOTRIPSY    Anesthesia: General    Surgeon: Diana Joshi MD    Assistants: Scrub Person First: Karen Felipe    Estimated Blood Loss: * No values recorded between 2018  8:02 AM and 2018  2:51 AM *    Complications: none    Findings: 4,000 sw pwr 1-9 good fragmentation    Specimens:  * No specimens in log *    Disposition/Plan: To PACU in stable condition. Then to [facility] out patient.       Diana Joshi MD  2018  9:59 AM

## 2018-02-13 NOTE — H&P
needed for Nausea or Vomiting 15 tablet 0      No current facility-administered medications for this visit.             No Known Allergies     Social History   Social History      Social History    Marital status:        Spouse name: N/A    Number of children: N/A    Years of education: N/A           Social History Main Topics    Smoking status: Never Smoker    Smokeless tobacco: Never Used    Alcohol use No    Drug use: No    Sexual activity: Not Asked           Other Topics Concern    None          Social History Narrative    None            Family History          Family History   Problem Relation Age of Onset    Cancer Mother 46       colon cancer    Cancer Father         luekemia    Diabetes Father      Other Maternal Grandmother         copd    Other Maternal Grandfather         copd    Heart Disease Paternal Grandmother              REVIEW OF SYSTEMS:  Review of Systems   Constitutional: Negative for malaise/fatigue and weight loss. HENT: Negative for nosebleeds and sinus pain. Eyes: Negative for blurred vision and pain. Respiratory: Negative for shortness of breath and wheezing. Cardiovascular: Negative for palpitations and leg swelling. Gastrointestinal: Negative for abdominal pain and vomiting. Genitourinary: Positive for flank pain and frequency. Negative for dysuria, hematuria and urgency. Musculoskeletal: Negative for back pain and myalgias. Skin: Negative for itching and rash. Neurological: Negative for tremors and sensory change. Endo/Heme/Allergies: Negative for environmental allergies and polydipsia. Psychiatric/Behavioral: Negative for hallucinations. The patient is not nervous/anxious.          PHYSICAL EXAM:  BP (!) 144/73   Temp 97.3 °F (36.3 °C) (Temporal)   Ht 6' 1\" (1.854 m)   Wt (!) 357 lb (161.9 kg)   BMI 47.10 kg/m²   Physical Exam   Constitutional: No distress. HENT:   Mouth/Throat: No oropharyngeal exudate.    Eyes: Left eye exhibits no

## 2018-02-27 ENCOUNTER — OFFICE VISIT (OUTPATIENT)
Dept: UROLOGY | Age: 38
End: 2018-02-27
Payer: COMMERCIAL

## 2018-02-27 ENCOUNTER — HOSPITAL ENCOUNTER (OUTPATIENT)
Dept: GENERAL RADIOLOGY | Age: 38
Discharge: HOME | End: 2018-02-27
Payer: COMMERCIAL

## 2018-02-27 VITALS — TEMPERATURE: 97.1 F | HEIGHT: 72 IN | BODY MASS INDEX: 42.66 KG/M2 | WEIGHT: 315 LBS

## 2018-02-27 DIAGNOSIS — N20.1 RIGHT URETERAL CALCULUS: Primary | ICD-10-CM

## 2018-02-27 DIAGNOSIS — N20.1 RIGHT URETERAL CALCULUS: ICD-10-CM

## 2018-02-27 LAB
APPEARANCE FLUID: NORMAL
BILIRUBIN, POC: NORMAL
BLOOD URINE, POC: NORMAL
CLARITY, POC: CLEAR
COLOR, POC: YELLOW
GLUCOSE URINE, POC: NORMAL
KETONES, POC: NORMAL
LEUKOCYTE EST, POC: NORMAL
NITRITE, POC: NORMAL
PH, POC: 5.5
PROTEIN, POC: NORMAL
SPECIFIC GRAVITY, POC: 1.03
UROBILINOGEN, POC: 0.2

## 2018-02-27 PROCEDURE — 99024 POSTOP FOLLOW-UP VISIT: CPT | Performed by: PHYSICIAN ASSISTANT

## 2018-02-27 PROCEDURE — 74018 RADEX ABDOMEN 1 VIEW: CPT

## 2018-02-27 PROCEDURE — 81003 URINALYSIS AUTO W/O SCOPE: CPT | Performed by: PHYSICIAN ASSISTANT

## 2018-02-27 RX ORDER — TAMSULOSIN HYDROCHLORIDE 0.4 MG/1
0.4 CAPSULE ORAL DAILY
Qty: 30 CAPSULE | Refills: 0 | Status: SHIPPED | OUTPATIENT
Start: 2018-02-27 | End: 2018-04-10 | Stop reason: ALTCHOICE

## 2018-02-27 ASSESSMENT — ENCOUNTER SYMPTOMS
DOUBLE VISION: 0
BACK PAIN: 1
SHORTNESS OF BREATH: 0
SORE THROAT: 0
HEARTBURN: 0
WHEEZING: 0
NAUSEA: 0
BLURRED VISION: 0

## 2018-02-27 NOTE — PROGRESS NOTES
Onset    Cancer Mother 46     colon cancer    Cancer Father      luekemia    Diabetes Father     Other Maternal Grandmother      copd    Other Maternal Grandfather      copd    Heart Disease Paternal Grandmother        REVIEW OF SYSTEMS:  Review of Systems   Constitutional: Negative for chills and fever. HENT: Negative for congestion and sore throat. Eyes: Negative for blurred vision and double vision. Respiratory: Negative for shortness of breath and wheezing. Cardiovascular: Negative for chest pain and palpitations. Gastrointestinal: Negative for heartburn and nausea. Genitourinary: Positive for flank pain. Negative for dysuria, frequency, hematuria and urgency. Musculoskeletal: Positive for back pain. Negative for falls and neck pain. Skin: Negative for itching and rash. Neurological: Negative for dizziness and tingling. Endo/Heme/Allergies: Does not bruise/bleed easily. Psychiatric/Behavioral: The patient does not have insomnia. PHYSICAL EXAM:  Temp 97.1 °F (36.2 °C) (Temporal)   Ht 6' (1.829 m)   Wt (!) 348 lb (157.9 kg)   BMI 47.20 kg/m²   Physical Exam   Constitutional: No distress. HENT:   Mouth/Throat: No oropharyngeal exudate. Eyes: Left eye exhibits no discharge. No scleral icterus. Neck: No JVD present. No tracheal deviation present. No thyromegaly present. Cardiovascular: Exam reveals no gallop and no friction rub. Pulmonary/Chest: No stridor. He has no rales. Abdominal: He exhibits no distension and no mass. There is no rebound and no guarding. Musculoskeletal: He exhibits no edema. Neurological: No cranial nerve deficit. He exhibits normal muscle tone. Coordination normal.   Skin: He is not diaphoretic. No pallor.            DATA:  U/A:    Lab Results   Component Value Date    NITRITE neg 02/27/2018    COLORU yellow 02/27/2018    COLORU YELLOW 01/28/2018    PROTEINU neg 02/27/2018    PROTEINU Negative 01/28/2018    PHUR 5.5 02/27/2018    PHUR 5.5 2018    MUCUS RARE 05/15/2011    BACTERIA 1+ 2011    CLARITYU clear 2018    CLARITYU Clear 2018    SPECGRAV 1.030 2018    SPECGRAV >1.045 2018    LEUKOCYTESUR neg 2018    LEUKOCYTESUR Negative 2018    UROBILINOGEN 0.2 2018    BILIRUBINUR neg 2018    BILIRUBINUR NEGATIVE 2011    BLOODU neg 2018    BLOODU Negative 2018    GLUCOSEU neg 2018    GLUCOSEU Negative 2018           Imagin. Right ureteral calculus  There is a fragment of the originally treated stone still in the box or right ureter. The radiologist measures is 8.9 mm however I feel actual stone is approximately 5 mm. He is doing well from a symptom standpoint he passed multiple fragments so I believe the bulk of the stone has fragmented based on the KUB findings I offered to re-treat with ESWL however he would like to give it more time and see if he could pass any more fragments. He will return in 1 week with KUB. I gave him prescription for tamsulosin to continue. Orders Placed This Encounter   Procedures    XR ABDOMEN (KUB) (SINGLE AP VIEW)     Standing Status:   Future     Standing Expiration Date:   2019     Order Specific Question:   Reason for exam:     Answer:   right proximal ureteral stone.  POCT Urinalysis no Micro     Orders Placed This Encounter   Medications    tamsulosin (FLOMAX) 0.4 MG capsule     Sig: Take 1 capsule by mouth daily     Dispense:  30 capsule     Refill:  0       Plan:  Follow-up one week with KUB.

## 2018-03-07 ENCOUNTER — OFFICE VISIT (OUTPATIENT)
Dept: UROLOGY | Age: 38
End: 2018-03-07
Payer: COMMERCIAL

## 2018-03-07 ENCOUNTER — HOSPITAL ENCOUNTER (OUTPATIENT)
Dept: GENERAL RADIOLOGY | Age: 38
Discharge: HOME | End: 2018-03-07
Payer: COMMERCIAL

## 2018-03-07 VITALS
WEIGHT: 315 LBS | SYSTOLIC BLOOD PRESSURE: 138 MMHG | BODY MASS INDEX: 41.75 KG/M2 | HEART RATE: 100 BPM | HEIGHT: 73 IN | DIASTOLIC BLOOD PRESSURE: 81 MMHG

## 2018-03-07 DIAGNOSIS — N20.1 RIGHT URETERAL CALCULUS: ICD-10-CM

## 2018-03-07 DIAGNOSIS — N20.1 RIGHT URETERAL CALCULUS: Primary | ICD-10-CM

## 2018-03-07 LAB
APPEARANCE FLUID: NORMAL
BILIRUBIN, POC: NORMAL
BLOOD URINE, POC: NORMAL
CLARITY, POC: CLEAR
COLOR, POC: YELLOW
GLUCOSE URINE, POC: NORMAL
KETONES, POC: NORMAL
LEUKOCYTE EST, POC: NORMAL
NITRITE, POC: NORMAL
PH, POC: 7
PROTEIN, POC: NORMAL
SPECIFIC GRAVITY, POC: 1.02
UROBILINOGEN, POC: 0.2

## 2018-03-07 PROCEDURE — 99024 POSTOP FOLLOW-UP VISIT: CPT | Performed by: PHYSICIAN ASSISTANT

## 2018-03-07 PROCEDURE — 74018 RADEX ABDOMEN 1 VIEW: CPT

## 2018-03-07 PROCEDURE — 81003 URINALYSIS AUTO W/O SCOPE: CPT | Performed by: PHYSICIAN ASSISTANT

## 2018-03-07 ASSESSMENT — ENCOUNTER SYMPTOMS
BLURRED VISION: 0
WHEEZING: 0
HEARTBURN: 0
SORE THROAT: 0
NAUSEA: 0
BACK PAIN: 1
SHORTNESS OF BREATH: 0
DOUBLE VISION: 0

## 2018-03-12 ENCOUNTER — HOSPITAL ENCOUNTER (OUTPATIENT)
Dept: PREADMISSION TESTING | Age: 38
Discharge: HOME | End: 2018-03-16
Payer: COMMERCIAL

## 2018-03-12 VITALS — WEIGHT: 315 LBS | BODY MASS INDEX: 41.75 KG/M2 | HEIGHT: 73 IN

## 2018-03-12 LAB
ANION GAP SERPL CALCULATED.3IONS-SCNC: 13 MMOL/L (ref 7–19)
APTT: 28.4 SEC (ref 26–36.2)
BASOPHILS ABSOLUTE: 0.1 K/UL (ref 0–0.2)
BASOPHILS RELATIVE PERCENT: 0.6 % (ref 0–1)
BUN BLDV-MCNC: 15 MG/DL (ref 6–20)
CALCIUM SERPL-MCNC: 9.2 MG/DL (ref 8.6–10)
CHLORIDE BLD-SCNC: 105 MMOL/L (ref 98–111)
CO2: 24 MMOL/L (ref 22–29)
COLLAGEN EPINEPHRINE TIME: 116 SEC (ref 84–176)
CREAT SERPL-MCNC: 0.7 MG/DL (ref 0.5–1.2)
EOSINOPHILS ABSOLUTE: 0.3 K/UL (ref 0–0.6)
EOSINOPHILS RELATIVE PERCENT: 3.8 % (ref 0–5)
GFR NON-AFRICAN AMERICAN: >60
GLUCOSE BLD-MCNC: 100 MG/DL (ref 74–109)
HCT VFR BLD CALC: 46.3 % (ref 42–52)
HEMOGLOBIN: 14.7 G/DL (ref 14–18)
INR BLD: 1.04 (ref 0.88–1.18)
LYMPHOCYTES ABSOLUTE: 2.1 K/UL (ref 1.1–4.5)
LYMPHOCYTES RELATIVE PERCENT: 26.4 % (ref 20–40)
MCH RBC QN AUTO: 28.2 PG (ref 27–31)
MCHC RBC AUTO-ENTMCNC: 31.7 G/DL (ref 33–37)
MCV RBC AUTO: 88.9 FL (ref 80–94)
MONOCYTES ABSOLUTE: 0.7 K/UL (ref 0–0.9)
MONOCYTES RELATIVE PERCENT: 9 % (ref 0–10)
NEUTROPHILS ABSOLUTE: 4.9 K/UL (ref 1.5–7.5)
NEUTROPHILS RELATIVE PERCENT: 59.8 % (ref 50–65)
PDW BLD-RTO: 13.7 % (ref 11.5–14.5)
PLATELET # BLD: 216 K/UL (ref 130–400)
PLATELET FUNCTION INTERPRETATION: NORMAL
PMV BLD AUTO: 9.7 FL (ref 9.4–12.4)
POTASSIUM SERPL-SCNC: 4.1 MMOL/L (ref 3.5–5)
PROTHROMBIN TIME: 13.5 SEC (ref 12–14.6)
RBC # BLD: 5.21 M/UL (ref 4.7–6.1)
SODIUM BLD-SCNC: 142 MMOL/L (ref 136–145)
WBC # BLD: 8.1 K/UL (ref 4.8–10.8)

## 2018-03-12 PROCEDURE — 85610 PROTHROMBIN TIME: CPT

## 2018-03-12 PROCEDURE — 85576 BLOOD PLATELET AGGREGATION: CPT

## 2018-03-12 PROCEDURE — 80048 BASIC METABOLIC PNL TOTAL CA: CPT

## 2018-03-12 PROCEDURE — 85025 COMPLETE CBC W/AUTO DIFF WBC: CPT

## 2018-03-12 PROCEDURE — 85730 THROMBOPLASTIN TIME PARTIAL: CPT

## 2018-03-12 RX ORDER — CIPROFLOXACIN 2 MG/ML
400 INJECTION, SOLUTION INTRAVENOUS ONCE
Status: DISCONTINUED | OUTPATIENT
Start: 2018-03-13 | End: 2018-03-12

## 2018-03-13 ENCOUNTER — ANESTHESIA EVENT (OUTPATIENT)
Dept: OPERATING ROOM | Age: 38
End: 2018-03-13
Payer: COMMERCIAL

## 2018-03-13 ENCOUNTER — APPOINTMENT (OUTPATIENT)
Dept: GENERAL RADIOLOGY | Age: 38
End: 2018-03-13
Attending: UROLOGY
Payer: COMMERCIAL

## 2018-03-13 ENCOUNTER — ANESTHESIA (OUTPATIENT)
Dept: OPERATING ROOM | Age: 38
End: 2018-03-13
Payer: COMMERCIAL

## 2018-03-13 ENCOUNTER — HOSPITAL ENCOUNTER (OUTPATIENT)
Age: 38
Setting detail: OUTPATIENT SURGERY
Discharge: HOME | End: 2018-03-13
Attending: UROLOGY | Admitting: UROLOGY
Payer: COMMERCIAL

## 2018-03-13 VITALS
DIASTOLIC BLOOD PRESSURE: 90 MMHG | HEIGHT: 73 IN | SYSTOLIC BLOOD PRESSURE: 135 MMHG | BODY MASS INDEX: 41.75 KG/M2 | TEMPERATURE: 98.1 F | WEIGHT: 315 LBS | HEART RATE: 66 BPM | RESPIRATION RATE: 15 BRPM | OXYGEN SATURATION: 94 %

## 2018-03-13 VITALS
SYSTOLIC BLOOD PRESSURE: 112 MMHG | DIASTOLIC BLOOD PRESSURE: 65 MMHG | RESPIRATION RATE: 21 BRPM | OXYGEN SATURATION: 96 %

## 2018-03-13 DIAGNOSIS — N20.1 RIGHT URETERAL CALCULUS: Primary | ICD-10-CM

## 2018-03-13 LAB
HCT VFR BLD CALC: 50.2 % (ref 42–52)
HEMOGLOBIN: 16 G/DL (ref 14–18)

## 2018-03-13 PROCEDURE — 3700000000 HC ANESTHESIA ATTENDED CARE: Performed by: UROLOGY

## 2018-03-13 PROCEDURE — 6370000000 HC RX 637 (ALT 250 FOR IP): Performed by: UROLOGY

## 2018-03-13 PROCEDURE — 74018 RADEX ABDOMEN 1 VIEW: CPT

## 2018-03-13 PROCEDURE — 3700000001 HC ADD 15 MINUTES (ANESTHESIA): Performed by: UROLOGY

## 2018-03-13 PROCEDURE — 50590 FRAGMENTING OF KIDNEY STONE: CPT | Performed by: UROLOGY

## 2018-03-13 PROCEDURE — 7100000000 HC PACU RECOVERY - FIRST 15 MIN: Performed by: UROLOGY

## 2018-03-13 PROCEDURE — 6360000002 HC RX W HCPCS: Performed by: UROLOGY

## 2018-03-13 PROCEDURE — 7100000010 HC PHASE II RECOVERY - FIRST 15 MIN: Performed by: UROLOGY

## 2018-03-13 PROCEDURE — 85014 HEMATOCRIT: CPT

## 2018-03-13 PROCEDURE — 7100000011 HC PHASE II RECOVERY - ADDTL 15 MIN: Performed by: UROLOGY

## 2018-03-13 PROCEDURE — 85018 HEMOGLOBIN: CPT

## 2018-03-13 PROCEDURE — 2500000003 HC RX 250 WO HCPCS: Performed by: NURSE ANESTHETIST, CERTIFIED REGISTERED

## 2018-03-13 PROCEDURE — 2580000003 HC RX 258: Performed by: UROLOGY

## 2018-03-13 PROCEDURE — 6360000002 HC RX W HCPCS: Performed by: NURSE ANESTHETIST, CERTIFIED REGISTERED

## 2018-03-13 PROCEDURE — 7100000001 HC PACU RECOVERY - ADDTL 15 MIN: Performed by: UROLOGY

## 2018-03-13 PROCEDURE — 36415 COLL VENOUS BLD VENIPUNCTURE: CPT

## 2018-03-13 RX ORDER — ROCURONIUM BROMIDE 10 MG/ML
INJECTION, SOLUTION INTRAVENOUS PRN
Status: DISCONTINUED | OUTPATIENT
Start: 2018-03-13 | End: 2018-03-13 | Stop reason: SDUPTHER

## 2018-03-13 RX ORDER — HYDROMORPHONE HCL 110MG/55ML
0.5 PATIENT CONTROLLED ANALGESIA SYRINGE INTRAVENOUS EVERY 5 MIN PRN
Status: DISCONTINUED | OUTPATIENT
Start: 2018-03-13 | End: 2018-03-13 | Stop reason: HOSPADM

## 2018-03-13 RX ORDER — HYDRALAZINE HYDROCHLORIDE 20 MG/ML
5 INJECTION INTRAMUSCULAR; INTRAVENOUS EVERY 10 MIN PRN
Status: DISCONTINUED | OUTPATIENT
Start: 2018-03-13 | End: 2018-03-13 | Stop reason: HOSPADM

## 2018-03-13 RX ORDER — MORPHINE SULFATE 4 MG/ML
4 INJECTION, SOLUTION INTRAMUSCULAR; INTRAVENOUS EVERY 5 MIN PRN
Status: DISCONTINUED | OUTPATIENT
Start: 2018-03-13 | End: 2018-03-13 | Stop reason: HOSPADM

## 2018-03-13 RX ORDER — DIPHENHYDRAMINE HYDROCHLORIDE 50 MG/ML
12.5 INJECTION INTRAMUSCULAR; INTRAVENOUS
Status: DISCONTINUED | OUTPATIENT
Start: 2018-03-13 | End: 2018-03-13 | Stop reason: HOSPADM

## 2018-03-13 RX ORDER — CIPROFLOXACIN 2 MG/ML
400 INJECTION, SOLUTION INTRAVENOUS ONCE
Status: COMPLETED | OUTPATIENT
Start: 2018-03-13 | End: 2018-03-13

## 2018-03-13 RX ORDER — PROPOFOL 10 MG/ML
INJECTION, EMULSION INTRAVENOUS PRN
Status: DISCONTINUED | OUTPATIENT
Start: 2018-03-13 | End: 2018-03-13 | Stop reason: SDUPTHER

## 2018-03-13 RX ORDER — ONDANSETRON 2 MG/ML
4 INJECTION INTRAMUSCULAR; INTRAVENOUS EVERY 4 HOURS PRN
Status: DISCONTINUED | OUTPATIENT
Start: 2018-03-13 | End: 2018-03-13 | Stop reason: HOSPADM

## 2018-03-13 RX ORDER — HYDROMORPHONE HCL 110MG/55ML
0.25 PATIENT CONTROLLED ANALGESIA SYRINGE INTRAVENOUS EVERY 5 MIN PRN
Status: DISCONTINUED | OUTPATIENT
Start: 2018-03-13 | End: 2018-03-13 | Stop reason: HOSPADM

## 2018-03-13 RX ORDER — ONDANSETRON 2 MG/ML
INJECTION INTRAMUSCULAR; INTRAVENOUS PRN
Status: DISCONTINUED | OUTPATIENT
Start: 2018-03-13 | End: 2018-03-13 | Stop reason: SDUPTHER

## 2018-03-13 RX ORDER — MORPHINE SULFATE 4 MG/ML
2 INJECTION, SOLUTION INTRAMUSCULAR; INTRAVENOUS EVERY 5 MIN PRN
Status: DISCONTINUED | OUTPATIENT
Start: 2018-03-13 | End: 2018-03-13 | Stop reason: HOSPADM

## 2018-03-13 RX ORDER — PROMETHAZINE HYDROCHLORIDE 25 MG/ML
6.25 INJECTION, SOLUTION INTRAMUSCULAR; INTRAVENOUS
Status: DISCONTINUED | OUTPATIENT
Start: 2018-03-13 | End: 2018-03-13 | Stop reason: HOSPADM

## 2018-03-13 RX ORDER — SODIUM CHLORIDE, SODIUM LACTATE, POTASSIUM CHLORIDE, CALCIUM CHLORIDE 600; 310; 30; 20 MG/100ML; MG/100ML; MG/100ML; MG/100ML
INJECTION, SOLUTION INTRAVENOUS CONTINUOUS
Status: DISCONTINUED | OUTPATIENT
Start: 2018-03-13 | End: 2018-03-13 | Stop reason: HOSPADM

## 2018-03-13 RX ORDER — LABETALOL HYDROCHLORIDE 5 MG/ML
5 INJECTION, SOLUTION INTRAVENOUS EVERY 10 MIN PRN
Status: DISCONTINUED | OUTPATIENT
Start: 2018-03-13 | End: 2018-03-13 | Stop reason: HOSPADM

## 2018-03-13 RX ORDER — OXYCODONE HYDROCHLORIDE AND ACETAMINOPHEN 5; 325 MG/1; MG/1
1 TABLET ORAL EVERY 4 HOURS PRN
Qty: 30 TABLET | Refills: 0 | Status: SHIPPED | OUTPATIENT
Start: 2018-03-13 | End: 2018-03-20

## 2018-03-13 RX ORDER — TAMSULOSIN HYDROCHLORIDE 0.4 MG/1
0.4 CAPSULE ORAL ONCE
Status: COMPLETED | OUTPATIENT
Start: 2018-03-13 | End: 2018-03-13

## 2018-03-13 RX ORDER — MIDAZOLAM HYDROCHLORIDE 1 MG/ML
INJECTION INTRAMUSCULAR; INTRAVENOUS PRN
Status: DISCONTINUED | OUTPATIENT
Start: 2018-03-13 | End: 2018-03-13 | Stop reason: SDUPTHER

## 2018-03-13 RX ORDER — METOCLOPRAMIDE HYDROCHLORIDE 5 MG/ML
10 INJECTION INTRAMUSCULAR; INTRAVENOUS
Status: DISCONTINUED | OUTPATIENT
Start: 2018-03-13 | End: 2018-03-13 | Stop reason: HOSPADM

## 2018-03-13 RX ORDER — ENALAPRILAT 2.5 MG/2ML
1.25 INJECTION INTRAVENOUS
Status: DISCONTINUED | OUTPATIENT
Start: 2018-03-13 | End: 2018-03-13 | Stop reason: HOSPADM

## 2018-03-13 RX ORDER — DEXAMETHASONE SODIUM PHOSPHATE 10 MG/ML
INJECTION INTRAMUSCULAR; INTRAVENOUS PRN
Status: DISCONTINUED | OUTPATIENT
Start: 2018-03-13 | End: 2018-03-13 | Stop reason: SDUPTHER

## 2018-03-13 RX ORDER — FENTANYL CITRATE 50 UG/ML
INJECTION, SOLUTION INTRAMUSCULAR; INTRAVENOUS PRN
Status: DISCONTINUED | OUTPATIENT
Start: 2018-03-13 | End: 2018-03-13 | Stop reason: SDUPTHER

## 2018-03-13 RX ORDER — MEPERIDINE HYDROCHLORIDE 50 MG/ML
12.5 INJECTION INTRAMUSCULAR; INTRAVENOUS; SUBCUTANEOUS EVERY 5 MIN PRN
Status: DISCONTINUED | OUTPATIENT
Start: 2018-03-13 | End: 2018-03-13 | Stop reason: HOSPADM

## 2018-03-13 RX ORDER — SODIUM CHLORIDE 9 MG/ML
INJECTION, SOLUTION INTRAVENOUS CONTINUOUS
Status: DISCONTINUED | OUTPATIENT
Start: 2018-03-13 | End: 2018-03-13 | Stop reason: HOSPADM

## 2018-03-13 RX ORDER — OXYCODONE HYDROCHLORIDE AND ACETAMINOPHEN 5; 325 MG/1; MG/1
2 TABLET ORAL EVERY 4 HOURS PRN
Status: DISCONTINUED | OUTPATIENT
Start: 2018-03-13 | End: 2018-03-13 | Stop reason: HOSPADM

## 2018-03-13 RX ORDER — LIDOCAINE HYDROCHLORIDE 10 MG/ML
INJECTION, SOLUTION EPIDURAL; INFILTRATION; INTRACAUDAL; PERINEURAL PRN
Status: DISCONTINUED | OUTPATIENT
Start: 2018-03-13 | End: 2018-03-13 | Stop reason: SDUPTHER

## 2018-03-13 RX ORDER — MORPHINE SULFATE 4 MG/ML
4 INJECTION, SOLUTION INTRAMUSCULAR; INTRAVENOUS EVERY 4 HOURS PRN
Status: DISCONTINUED | OUTPATIENT
Start: 2018-03-13 | End: 2018-03-13 | Stop reason: HOSPADM

## 2018-03-13 RX ADMIN — LIDOCAINE HYDROCHLORIDE 50 MG: 10 INJECTION, SOLUTION EPIDURAL; INFILTRATION; INTRACAUDAL; PERINEURAL at 10:02

## 2018-03-13 RX ADMIN — CIPROFLOXACIN 400 MG: 2 INJECTION INTRAVENOUS at 10:03

## 2018-03-13 RX ADMIN — ROCURONIUM BROMIDE 30 MG: 10 INJECTION INTRAVENOUS at 10:07

## 2018-03-13 RX ADMIN — PROPOFOL 200 MG: 10 INJECTION, EMULSION INTRAVENOUS at 10:02

## 2018-03-13 RX ADMIN — TAMSULOSIN HYDROCHLORIDE 0.4 MG: 0.4 CAPSULE ORAL at 11:49

## 2018-03-13 RX ADMIN — SUGAMMADEX 300 MG: 100 INJECTION, SOLUTION INTRAVENOUS at 11:20

## 2018-03-13 RX ADMIN — SODIUM CHLORIDE, SODIUM LACTATE, POTASSIUM CHLORIDE, AND CALCIUM CHLORIDE: 600; 310; 30; 20 INJECTION, SOLUTION INTRAVENOUS at 09:10

## 2018-03-13 RX ADMIN — MIDAZOLAM HYDROCHLORIDE 2 MG: 1 INJECTION, SOLUTION INTRAMUSCULAR; INTRAVENOUS at 09:55

## 2018-03-13 RX ADMIN — ONDANSETRON HYDROCHLORIDE 4 MG: 2 SOLUTION INTRAMUSCULAR; INTRAVENOUS at 10:51

## 2018-03-13 RX ADMIN — OXYCODONE HYDROCHLORIDE AND ACETAMINOPHEN 2 TABLET: 5; 325 TABLET ORAL at 12:40

## 2018-03-13 RX ADMIN — DEXAMETHASONE SODIUM PHOSPHATE 10 MG: 10 INJECTION INTRAMUSCULAR; INTRAVENOUS at 10:12

## 2018-03-13 RX ADMIN — FENTANYL CITRATE 100 MCG: 50 INJECTION, SOLUTION INTRAMUSCULAR; INTRAVENOUS at 10:00

## 2018-03-13 RX ADMIN — ROCURONIUM BROMIDE 20 MG: 10 INJECTION INTRAVENOUS at 11:13

## 2018-03-13 ASSESSMENT — PAIN SCALES - GENERAL
PAINLEVEL_OUTOF10: 6
PAINLEVEL_OUTOF10: 3
PAINLEVEL_OUTOF10: 0

## 2018-03-13 ASSESSMENT — PAIN DESCRIPTION - PAIN TYPE: TYPE: ACUTE PAIN

## 2018-03-13 ASSESSMENT — PAIN DESCRIPTION - DESCRIPTORS: DESCRIPTORS: ACHING

## 2018-03-13 ASSESSMENT — ENCOUNTER SYMPTOMS: SHORTNESS OF BREATH: 0

## 2018-03-13 ASSESSMENT — PAIN DESCRIPTION - LOCATION: LOCATION: ABDOMEN

## 2018-03-13 NOTE — H&P
Progress Notes  Encounter Date: 3/7/2018  Josefina Lorenzo PA-C   Physician Assistant   Expand All Collapse All    []Hide copied text  Marquez Machuca is a 43 y.o. male who presents today        Chief Complaint   Patient presents with    Follow-up       I'm here for a f/u for a right ureteral calculus with KUB      Follow up ESWL:  Patient is a 65-year-old gentleman who was found to have a large oval stone in the proximal right ureter. He had ESWL 02/13/2018. He was treated with about 4000 shocks initially and the stone was treated at a rate of 6 up to level of 8 for 1500 shock waves and there was some fragmentation of the stone. The stone appeared to fragment and spread out after the procedure. He has some mild pain in his back however he is doing much better But has had occasional increased pain on the right side worries had to take pain medicine.  The last office visit is KUB showed there was some remaining fragments in the right proximal ureter I gave him a little time to see if these would pass however on the KUB today there is unchanged approximate 5 mm stone and a 2 mm stone above that.     Past Medical History   Past Medical History:   Diagnosis Date    Arthritis       both wrist    Bronchitis       10 yrs ago    Kidney stone              Past Surgical History         Past Surgical History:   Procedure Laterality Date    FRACTURE SURGERY         both wrists    HEMORRHOID SURGERY N/A 6/24/2016     HEMORRHOID INCISION AND DRAINAGE performed by Vilma Cullen MD at [address] [place] [address] ESWL Right 2/13/2018     ESWL EXTRACORPEAL SHOCK WAVE LITHOTRIPSY performed by Troy Denise MD at [address] [place] [address] left wrist.  Pt was a child            Current Facility-Administered Medications          Current Outpatient Prescriptions   Medication Sig Dispense Refill    tamsulosin (FLOMAX) 0.4 MG capsule Take 1 capsule by mouth daily 30 capsule 0      No current Exam reveals no gallop and no friction rub. Pulmonary/Chest: No stridor. He has no rales. Abdominal: He exhibits no distension and no mass. There is no rebound and no guarding. Musculoskeletal: He exhibits no edema. Neurological: No cranial nerve deficit. He exhibits normal muscle tone. Coordination normal.   Skin: He is not diaphoretic. No pallor.               DATA:  U/A:          Lab Results   Component Value Date     NITRITE neg 2018     COLORU yellow 2018     COLORU YELLOW 2018     PROTEINU neg 2018     PROTEINU Negative 2018     PHUR 5.5 2018     PHUR 5.5 2018     MUCUS RARE 05/15/2011     BACTERIA 1+ 2011     CLARITYU clear 2018     CLARITYU Clear 2018     SPECGRAV 1.030 2018     SPECGRAV >1.045 2018     LEUKOCYTESUR neg 2018     LEUKOCYTESUR Negative 2018     UROBILINOGEN 0.2 2018     BILIRUBINUR neg 2018     BILIRUBINUR NEGATIVE 2011     BLOODU neg 2018     BLOODU Negative 2018     GLUCOSEU neg 2018     GLUCOSEU Negative 2018             Imagin. Right ureteral calculus  There is still unchanged stone fragments in the right proximal ureter on the KUB today. He has had to take pain medicine on a few occasions since I saw him last he is doing fine today. After discussion I think he needs a repeat ESWL which we will schedule him for. There there is a 4.98 mm stone and adjacent to that is a 3 mm stone. I again went over medications he should not take prior to having ESWL which include aspirin Aleve ibuprofen or any anti-inflammatory medicine. She also avoid Excedrin Migraine.  Can take his oral pain medicine or Tylenol.        No orders of the defined types were placed in this encounter.       Encounter Medications    No orders of the defined types were placed in this encounter.         Plan:  We'll schedule patient for right ESWL of the remaining right proximal ureteral stones. Office Visit on 3/7/2018            Revision History            Detailed Report           Note shared with patient   Progress Notes Info     Author Note Status Last Update User   Randal Prado PA-C Signed Randal Prado PA-C   Last Update Date/Time: 3/7/2018 11:38 AM   Chart Review Routing History     Routing history could not be found for this note. This is because the note has never been routed or because communication record creation was suppressed.

## 2018-03-14 ENCOUNTER — TELEPHONE (OUTPATIENT)
Dept: UROLOGY | Age: 38
End: 2018-03-14

## 2018-03-19 ENCOUNTER — TELEPHONE (OUTPATIENT)
Dept: UROLOGY | Age: 38
End: 2018-03-19

## 2018-03-19 NOTE — TELEPHONE ENCOUNTER
Patient wife called wanting to know about paper work she had dropped of for patients release from work for [employer]. Said we were supposed to fax them and they had not been received. Please call.

## 2018-03-20 ENCOUNTER — OFFICE VISIT (OUTPATIENT)
Dept: PRIMARY CARE CLINIC | Age: 38
End: 2018-03-20
Payer: COMMERCIAL

## 2018-03-20 VITALS
OXYGEN SATURATION: 96 % | HEART RATE: 74 BPM | WEIGHT: 315 LBS | HEIGHT: 73 IN | BODY MASS INDEX: 41.75 KG/M2 | TEMPERATURE: 97.4 F | DIASTOLIC BLOOD PRESSURE: 78 MMHG | SYSTOLIC BLOOD PRESSURE: 126 MMHG

## 2018-03-20 DIAGNOSIS — K42.9 UMBILICAL HERNIA WITHOUT OBSTRUCTION AND WITHOUT GANGRENE: Primary | ICD-10-CM

## 2018-03-20 DIAGNOSIS — E66.01 MORBID OBESITY, UNSPECIFIED OBESITY TYPE (HCC): ICD-10-CM

## 2018-03-20 PROCEDURE — 99213 OFFICE O/P EST LOW 20 MIN: CPT | Performed by: FAMILY MEDICINE

## 2018-03-20 ASSESSMENT — ENCOUNTER SYMPTOMS
ABDOMINAL PAIN: 1
COUGH: 0
SHORTNESS OF BREATH: 0
COLOR CHANGE: 0

## 2018-03-20 ASSESSMENT — PATIENT HEALTH QUESTIONNAIRE - PHQ9
SUM OF ALL RESPONSES TO PHQ9 QUESTIONS 1 & 2: 0
2. FEELING DOWN, DEPRESSED OR HOPELESS: 0
1. LITTLE INTEREST OR PLEASURE IN DOING THINGS: 0
SUM OF ALL RESPONSES TO PHQ QUESTIONS 1-9: 0

## 2018-03-27 ENCOUNTER — HOSPITAL ENCOUNTER (OUTPATIENT)
Dept: GENERAL RADIOLOGY | Age: 38
Discharge: HOME | End: 2018-03-27
Payer: COMMERCIAL

## 2018-03-27 ENCOUNTER — OFFICE VISIT (OUTPATIENT)
Dept: UROLOGY | Age: 38
End: 2018-03-27
Payer: COMMERCIAL

## 2018-03-27 VITALS
HEIGHT: 73 IN | SYSTOLIC BLOOD PRESSURE: 130 MMHG | WEIGHT: 315 LBS | DIASTOLIC BLOOD PRESSURE: 79 MMHG | TEMPERATURE: 96.4 F | BODY MASS INDEX: 41.75 KG/M2 | HEART RATE: 73 BPM

## 2018-03-27 DIAGNOSIS — N20.1 RIGHT URETERAL CALCULUS: ICD-10-CM

## 2018-03-27 DIAGNOSIS — N20.1 RIGHT URETERAL CALCULUS: Primary | ICD-10-CM

## 2018-03-27 LAB
APPEARANCE FLUID: CLEAR
BILIRUBIN, POC: NORMAL
BLOOD URINE, POC: 7
CLARITY, POC: NORMAL
COLOR, POC: NORMAL
GLUCOSE URINE, POC: NORMAL
KETONES, POC: 1.02
LEUKOCYTE EST, POC: NORMAL
NITRITE, POC: NORMAL
PH, POC: NORMAL
PROTEIN, POC: NORMAL
SPECIFIC GRAVITY, POC: NORMAL
UROBILINOGEN, POC: 0.2

## 2018-03-27 PROCEDURE — 99024 POSTOP FOLLOW-UP VISIT: CPT | Performed by: PHYSICIAN ASSISTANT

## 2018-03-27 PROCEDURE — 74018 RADEX ABDOMEN 1 VIEW: CPT

## 2018-03-27 PROCEDURE — 81003 URINALYSIS AUTO W/O SCOPE: CPT | Performed by: PHYSICIAN ASSISTANT

## 2018-03-27 ASSESSMENT — ENCOUNTER SYMPTOMS
VOMITING: 0
BACK PAIN: 0
WHEEZING: 0
EYE PAIN: 0
BLURRED VISION: 0
SINUS PAIN: 0
ABDOMINAL PAIN: 0
SHORTNESS OF BREATH: 0

## 2018-03-27 NOTE — PROGRESS NOTES
Atif Alas is a 43 y.o. male who presents today   Chief Complaint   Patient presents with    Nephrolithiasis     Had ESWL     Follow-up ESWL right proximal ureteral stone:  Patient is a 50-year-old male who had undergone lithotripsy 02/13/2018. KUB showed stone was partially fragmented there is still about a 5 mm fragment remaining in the right proximal ureter so he had repeat ESWL 03/13/18. The op note reported good fragmentation. The patient states he passed several multiple small fragments. He has no symptoms no pain fever chills or gross hematuria. He got KUB prior to the appointment today. Past Medical History:   Diagnosis Date    Arthritis     both wrist    Bronchitis     10 yrs ago    Kidney stone        Past Surgical History:   Procedure Laterality Date    FRACTURE SURGERY      both wrists    HEMORRHOID SURGERY N/A 6/24/2016    HEMORRHOID INCISION AND DRAINAGE performed by Prosper Dinero MD at [address] [place] [address] ESWL Right 2/13/2018    ESWL EXTRACORPEAL SHOCK WAVE LITHOTRIPSY performed by Cornelio Feliciano MD at [address] [place] [address] ESWL Right 3/13/2018    ESWL [address] LITHOTRIPSY performed by Cornelio Feliciano MD at [address]      left wrist.  Pt was a child       Current Outpatient Prescriptions   Medication Sig Dispense Refill    tamsulosin (FLOMAX) 0.4 MG capsule Take 1 capsule by mouth daily 30 capsule 0     No current facility-administered medications for this visit.         No Known Allergies    Social History     Social History    Marital status:      Spouse name: N/A    Number of children: N/A    Years of education: N/A     Social History Main Topics    Smoking status: Never Smoker    Smokeless tobacco: Never Used    Alcohol use No    Drug use: No    Sexual activity: Not Asked     Other Topics Concern    None     Social History Narrative    None       Family History   Problem Relation Age of Onset

## 2018-04-10 ENCOUNTER — OFFICE VISIT (OUTPATIENT)
Dept: SURGERY | Age: 38
End: 2018-04-10
Payer: COMMERCIAL

## 2018-04-10 VITALS
BODY MASS INDEX: 41.75 KG/M2 | HEIGHT: 73 IN | WEIGHT: 315 LBS | DIASTOLIC BLOOD PRESSURE: 74 MMHG | TEMPERATURE: 97.9 F | SYSTOLIC BLOOD PRESSURE: 126 MMHG

## 2018-04-10 DIAGNOSIS — K42.9 UMBILICAL HERNIA WITHOUT OBSTRUCTION AND WITHOUT GANGRENE: Primary | ICD-10-CM

## 2018-04-10 PROCEDURE — 99214 OFFICE O/P EST MOD 30 MIN: CPT | Performed by: PHYSICIAN ASSISTANT

## 2018-04-20 ENCOUNTER — HOSPITAL ENCOUNTER (OUTPATIENT)
Dept: PREADMISSION TESTING | Age: 38
Discharge: HOME | End: 2018-04-24
Payer: COMMERCIAL

## 2018-04-20 VITALS — WEIGHT: 315 LBS | HEIGHT: 73 IN | BODY MASS INDEX: 41.75 KG/M2

## 2018-04-20 PROCEDURE — 87070 CULTURE OTHR SPECIMN AEROBIC: CPT

## 2018-04-21 LAB — MRSA CULTURE ONLY: NORMAL

## 2018-04-30 ENCOUNTER — PREP FOR PROCEDURE (OUTPATIENT)
Dept: SURGERY | Age: 38
End: 2018-04-30

## 2018-04-30 RX ORDER — SODIUM CHLORIDE 0.9 % (FLUSH) 0.9 %
10 SYRINGE (ML) INJECTION PRN
Status: CANCELLED | OUTPATIENT
Start: 2018-04-30

## 2018-04-30 RX ORDER — SODIUM CHLORIDE 0.9 % (FLUSH) 0.9 %
10 SYRINGE (ML) INJECTION EVERY 12 HOURS SCHEDULED
Status: CANCELLED | OUTPATIENT
Start: 2018-04-30

## 2018-04-30 RX ORDER — SODIUM CHLORIDE, SODIUM LACTATE, POTASSIUM CHLORIDE, CALCIUM CHLORIDE 600; 310; 30; 20 MG/100ML; MG/100ML; MG/100ML; MG/100ML
INJECTION, SOLUTION INTRAVENOUS CONTINUOUS
Status: CANCELLED | OUTPATIENT
Start: 2018-04-30

## 2018-04-30 ASSESSMENT — ENCOUNTER SYMPTOMS
SHORTNESS OF BREATH: 0
NAUSEA: 0
ABDOMINAL DISTENTION: 0
DIARRHEA: 0
CONSTIPATION: 0
ALLERGIC/IMMUNOLOGIC NEGATIVE: 1
APNEA: 0
WHEEZING: 0
BACK PAIN: 1
VOMITING: 0
ABDOMINAL PAIN: 1
EYES NEGATIVE: 1

## 2018-05-07 ENCOUNTER — ANESTHESIA (OUTPATIENT)
Dept: OPERATING ROOM | Age: 38
End: 2018-05-07
Payer: COMMERCIAL

## 2018-05-07 ENCOUNTER — HOSPITAL ENCOUNTER (OUTPATIENT)
Age: 38
Setting detail: OUTPATIENT SURGERY
Discharge: HOME | End: 2018-05-07
Attending: SURGERY | Admitting: SURGERY
Payer: COMMERCIAL

## 2018-05-07 ENCOUNTER — ANESTHESIA EVENT (OUTPATIENT)
Dept: OPERATING ROOM | Age: 38
End: 2018-05-07
Payer: COMMERCIAL

## 2018-05-07 VITALS
TEMPERATURE: 97.2 F | OXYGEN SATURATION: 95 % | RESPIRATION RATE: 11 BRPM | DIASTOLIC BLOOD PRESSURE: 92 MMHG | SYSTOLIC BLOOD PRESSURE: 124 MMHG

## 2018-05-07 VITALS
BODY MASS INDEX: 41.75 KG/M2 | RESPIRATION RATE: 16 BRPM | HEART RATE: 71 BPM | OXYGEN SATURATION: 99 % | SYSTOLIC BLOOD PRESSURE: 142 MMHG | DIASTOLIC BLOOD PRESSURE: 78 MMHG | TEMPERATURE: 97.8 F | HEIGHT: 73 IN | WEIGHT: 315 LBS

## 2018-05-07 PROBLEM — K42.9 UMBILICAL HERNIA WITHOUT OBSTRUCTION AND WITHOUT GANGRENE: Status: ACTIVE | Noted: 2018-05-07

## 2018-05-07 PROCEDURE — 7100000011 HC PHASE II RECOVERY - ADDTL 15 MIN: Performed by: SURGERY

## 2018-05-07 PROCEDURE — 2500000003 HC RX 250 WO HCPCS: Performed by: NURSE ANESTHETIST, CERTIFIED REGISTERED

## 2018-05-07 PROCEDURE — 2720000010 HC SURG SUPPLY STERILE: Performed by: SURGERY

## 2018-05-07 PROCEDURE — 6360000002 HC RX W HCPCS: Performed by: NURSE ANESTHETIST, CERTIFIED REGISTERED

## 2018-05-07 PROCEDURE — 2580000003 HC RX 258: Performed by: PHYSICIAN ASSISTANT

## 2018-05-07 PROCEDURE — 3700000001 HC ADD 15 MINUTES (ANESTHESIA): Performed by: SURGERY

## 2018-05-07 PROCEDURE — 49652 PR LAP, VENTRAL HERNIA REPAIR,REDUCIBLE: CPT | Performed by: SURGERY

## 2018-05-07 PROCEDURE — 99999 PR OFFICE/OUTPT VISIT,PROCEDURE ONLY: CPT | Performed by: PHYSICIAN ASSISTANT

## 2018-05-07 PROCEDURE — 7100000001 HC PACU RECOVERY - ADDTL 15 MIN: Performed by: SURGERY

## 2018-05-07 PROCEDURE — 3700000000 HC ANESTHESIA ATTENDED CARE: Performed by: SURGERY

## 2018-05-07 PROCEDURE — 2720000001 HC MISC SURG SUPPLY STERILE $51-500: Performed by: SURGERY

## 2018-05-07 PROCEDURE — 3600000014 HC SURGERY LEVEL 4 ADDTL 15MIN: Performed by: SURGERY

## 2018-05-07 PROCEDURE — 6370000000 HC RX 637 (ALT 250 FOR IP): Performed by: SURGERY

## 2018-05-07 PROCEDURE — 3600000004 HC SURGERY LEVEL 4 BASE: Performed by: SURGERY

## 2018-05-07 PROCEDURE — 7100000010 HC PHASE II RECOVERY - FIRST 15 MIN: Performed by: SURGERY

## 2018-05-07 PROCEDURE — 7100000000 HC PACU RECOVERY - FIRST 15 MIN: Performed by: SURGERY

## 2018-05-07 PROCEDURE — C1781 MESH (IMPLANTABLE): HCPCS | Performed by: SURGERY

## 2018-05-07 PROCEDURE — 2500000003 HC RX 250 WO HCPCS: Performed by: SURGERY

## 2018-05-07 DEVICE — MESH HERN W12XL15CM OPN TISS SEPARATING PHYSIOMESH: Type: IMPLANTABLE DEVICE | Site: ABDOMEN | Status: FUNCTIONAL

## 2018-05-07 RX ORDER — MIDAZOLAM HYDROCHLORIDE 1 MG/ML
2 INJECTION INTRAMUSCULAR; INTRAVENOUS
Status: DISCONTINUED | OUTPATIENT
Start: 2018-05-07 | End: 2018-05-07 | Stop reason: HOSPADM

## 2018-05-07 RX ORDER — MEPERIDINE HYDROCHLORIDE 50 MG/ML
12.5 INJECTION INTRAMUSCULAR; INTRAVENOUS; SUBCUTANEOUS EVERY 5 MIN PRN
Status: DISCONTINUED | OUTPATIENT
Start: 2018-05-07 | End: 2018-05-07 | Stop reason: HOSPADM

## 2018-05-07 RX ORDER — HYDRALAZINE HYDROCHLORIDE 20 MG/ML
5 INJECTION INTRAMUSCULAR; INTRAVENOUS EVERY 10 MIN PRN
Status: DISCONTINUED | OUTPATIENT
Start: 2018-05-07 | End: 2018-05-07 | Stop reason: HOSPADM

## 2018-05-07 RX ORDER — FENTANYL CITRATE 50 UG/ML
INJECTION, SOLUTION INTRAMUSCULAR; INTRAVENOUS PRN
Status: DISCONTINUED | OUTPATIENT
Start: 2018-05-07 | End: 2018-05-07 | Stop reason: SDUPTHER

## 2018-05-07 RX ORDER — PROMETHAZINE HYDROCHLORIDE 25 MG/ML
6.25 INJECTION, SOLUTION INTRAMUSCULAR; INTRAVENOUS
Status: DISCONTINUED | OUTPATIENT
Start: 2018-05-07 | End: 2018-05-07 | Stop reason: HOSPADM

## 2018-05-07 RX ORDER — ROCURONIUM BROMIDE 10 MG/ML
INJECTION, SOLUTION INTRAVENOUS PRN
Status: DISCONTINUED | OUTPATIENT
Start: 2018-05-07 | End: 2018-05-07 | Stop reason: SDUPTHER

## 2018-05-07 RX ORDER — SODIUM CHLORIDE 0.9 % (FLUSH) 0.9 %
10 SYRINGE (ML) INJECTION EVERY 12 HOURS SCHEDULED
Status: DISCONTINUED | OUTPATIENT
Start: 2018-05-07 | End: 2018-05-07 | Stop reason: HOSPADM

## 2018-05-07 RX ORDER — HYDROCODONE BITARTRATE AND ACETAMINOPHEN 5; 325 MG/1; MG/1
1-2 TABLET ORAL EVERY 4 HOURS PRN
Qty: 30 TABLET | Refills: 0 | Status: SHIPPED | OUTPATIENT
Start: 2018-05-07 | End: 2018-05-21

## 2018-05-07 RX ORDER — SCOLOPAMINE TRANSDERMAL SYSTEM 1 MG/1
1 PATCH, EXTENDED RELEASE TRANSDERMAL ONCE
Status: DISCONTINUED | OUTPATIENT
Start: 2018-05-07 | End: 2018-05-07 | Stop reason: HOSPADM

## 2018-05-07 RX ORDER — MIDAZOLAM HYDROCHLORIDE 1 MG/ML
INJECTION INTRAMUSCULAR; INTRAVENOUS PRN
Status: DISCONTINUED | OUTPATIENT
Start: 2018-05-07 | End: 2018-05-07 | Stop reason: SDUPTHER

## 2018-05-07 RX ORDER — HYDROMORPHONE HCL IN 0.9% NACL 0.5 MG/ML
0.5 SYRINGE (ML) INTRAVENOUS EVERY 5 MIN PRN
Status: DISCONTINUED | OUTPATIENT
Start: 2018-05-07 | End: 2018-05-07 | Stop reason: HOSPADM

## 2018-05-07 RX ORDER — PROPOFOL 10 MG/ML
INJECTION, EMULSION INTRAVENOUS PRN
Status: DISCONTINUED | OUTPATIENT
Start: 2018-05-07 | End: 2018-05-07 | Stop reason: SDUPTHER

## 2018-05-07 RX ORDER — DEXAMETHASONE SODIUM PHOSPHATE 10 MG/ML
INJECTION INTRAMUSCULAR; INTRAVENOUS PRN
Status: DISCONTINUED | OUTPATIENT
Start: 2018-05-07 | End: 2018-05-07 | Stop reason: SDUPTHER

## 2018-05-07 RX ORDER — LIDOCAINE HYDROCHLORIDE 10 MG/ML
1 INJECTION, SOLUTION EPIDURAL; INFILTRATION; INTRACAUDAL; PERINEURAL
Status: DISCONTINUED | OUTPATIENT
Start: 2018-05-07 | End: 2018-05-07 | Stop reason: HOSPADM

## 2018-05-07 RX ORDER — MORPHINE SULFATE 4 MG/ML
4 INJECTION, SOLUTION INTRAMUSCULAR; INTRAVENOUS
Status: DISCONTINUED | OUTPATIENT
Start: 2018-05-07 | End: 2018-05-07 | Stop reason: HOSPADM

## 2018-05-07 RX ORDER — HYDROCODONE BITARTRATE AND ACETAMINOPHEN 5; 325 MG/1; MG/1
2 TABLET ORAL PRN
Status: COMPLETED | OUTPATIENT
Start: 2018-05-07 | End: 2018-05-07

## 2018-05-07 RX ORDER — SODIUM CHLORIDE, SODIUM LACTATE, POTASSIUM CHLORIDE, CALCIUM CHLORIDE 600; 310; 30; 20 MG/100ML; MG/100ML; MG/100ML; MG/100ML
INJECTION, SOLUTION INTRAVENOUS CONTINUOUS
Status: DISCONTINUED | OUTPATIENT
Start: 2018-05-07 | End: 2018-05-07 | Stop reason: HOSPADM

## 2018-05-07 RX ORDER — SODIUM CHLORIDE 0.9 % (FLUSH) 0.9 %
10 SYRINGE (ML) INJECTION PRN
Status: DISCONTINUED | OUTPATIENT
Start: 2018-05-07 | End: 2018-05-07 | Stop reason: HOSPADM

## 2018-05-07 RX ORDER — ENALAPRILAT 2.5 MG/2ML
1.25 INJECTION INTRAVENOUS
Status: DISCONTINUED | OUTPATIENT
Start: 2018-05-07 | End: 2018-05-07 | Stop reason: HOSPADM

## 2018-05-07 RX ORDER — MORPHINE SULFATE 4 MG/ML
4 INJECTION, SOLUTION INTRAMUSCULAR; INTRAVENOUS EVERY 5 MIN PRN
Status: DISCONTINUED | OUTPATIENT
Start: 2018-05-07 | End: 2018-05-07 | Stop reason: HOSPADM

## 2018-05-07 RX ORDER — HYDROMORPHONE HCL IN 0.9% NACL 0.5 MG/ML
0.25 SYRINGE (ML) INTRAVENOUS EVERY 5 MIN PRN
Status: DISCONTINUED | OUTPATIENT
Start: 2018-05-07 | End: 2018-05-07 | Stop reason: HOSPADM

## 2018-05-07 RX ORDER — MORPHINE SULFATE 4 MG/ML
2 INJECTION, SOLUTION INTRAMUSCULAR; INTRAVENOUS EVERY 5 MIN PRN
Status: DISCONTINUED | OUTPATIENT
Start: 2018-05-07 | End: 2018-05-07 | Stop reason: HOSPADM

## 2018-05-07 RX ORDER — LABETALOL HYDROCHLORIDE 5 MG/ML
5 INJECTION, SOLUTION INTRAVENOUS EVERY 10 MIN PRN
Status: DISCONTINUED | OUTPATIENT
Start: 2018-05-07 | End: 2018-05-07 | Stop reason: HOSPADM

## 2018-05-07 RX ORDER — METOCLOPRAMIDE HYDROCHLORIDE 5 MG/ML
10 INJECTION INTRAMUSCULAR; INTRAVENOUS
Status: DISCONTINUED | OUTPATIENT
Start: 2018-05-07 | End: 2018-05-07 | Stop reason: HOSPADM

## 2018-05-07 RX ORDER — KETOROLAC TROMETHAMINE 30 MG/ML
INJECTION, SOLUTION INTRAMUSCULAR; INTRAVENOUS PRN
Status: DISCONTINUED | OUTPATIENT
Start: 2018-05-07 | End: 2018-05-07 | Stop reason: SDUPTHER

## 2018-05-07 RX ORDER — FENTANYL CITRATE 50 UG/ML
50 INJECTION, SOLUTION INTRAMUSCULAR; INTRAVENOUS
Status: DISCONTINUED | OUTPATIENT
Start: 2018-05-07 | End: 2018-05-07 | Stop reason: HOSPADM

## 2018-05-07 RX ORDER — DIPHENHYDRAMINE HYDROCHLORIDE 50 MG/ML
12.5 INJECTION INTRAMUSCULAR; INTRAVENOUS
Status: DISCONTINUED | OUTPATIENT
Start: 2018-05-07 | End: 2018-05-07 | Stop reason: HOSPADM

## 2018-05-07 RX ORDER — LIDOCAINE HYDROCHLORIDE 10 MG/ML
INJECTION, SOLUTION EPIDURAL; INFILTRATION; INTRACAUDAL; PERINEURAL PRN
Status: DISCONTINUED | OUTPATIENT
Start: 2018-05-07 | End: 2018-05-07 | Stop reason: SDUPTHER

## 2018-05-07 RX ORDER — HYDROCODONE BITARTRATE AND ACETAMINOPHEN 5; 325 MG/1; MG/1
1 TABLET ORAL PRN
Status: COMPLETED | OUTPATIENT
Start: 2018-05-07 | End: 2018-05-07

## 2018-05-07 RX ORDER — BUPIVACAINE HYDROCHLORIDE 2.5 MG/ML
INJECTION, SOLUTION INFILTRATION; PERINEURAL PRN
Status: DISCONTINUED | OUTPATIENT
Start: 2018-05-07 | End: 2018-05-07 | Stop reason: HOSPADM

## 2018-05-07 RX ORDER — ONDANSETRON 2 MG/ML
INJECTION INTRAMUSCULAR; INTRAVENOUS PRN
Status: DISCONTINUED | OUTPATIENT
Start: 2018-05-07 | End: 2018-05-07 | Stop reason: SDUPTHER

## 2018-05-07 RX ADMIN — SODIUM CHLORIDE, SODIUM LACTATE, POTASSIUM CHLORIDE, AND CALCIUM CHLORIDE: 600; 310; 30; 20 INJECTION, SOLUTION INTRAVENOUS at 13:36

## 2018-05-07 RX ADMIN — KETOROLAC TROMETHAMINE 30 MG: 30 INJECTION, SOLUTION INTRAMUSCULAR at 14:45

## 2018-05-07 RX ADMIN — ROCURONIUM BROMIDE 50 MG: 10 INJECTION INTRAVENOUS at 13:40

## 2018-05-07 RX ADMIN — ROCURONIUM BROMIDE 50 MG: 10 INJECTION INTRAVENOUS at 14:10

## 2018-05-07 RX ADMIN — PROPOFOL 200 MG: 10 INJECTION, EMULSION INTRAVENOUS at 13:40

## 2018-05-07 RX ADMIN — DEXAMETHASONE SODIUM PHOSPHATE 10 MG: 10 INJECTION INTRAMUSCULAR; INTRAVENOUS at 13:45

## 2018-05-07 RX ADMIN — MIDAZOLAM HYDROCHLORIDE 2 MG: 1 INJECTION, SOLUTION INTRAMUSCULAR; INTRAVENOUS at 13:36

## 2018-05-07 RX ADMIN — FENTANYL CITRATE 100 MCG: 50 INJECTION, SOLUTION INTRAMUSCULAR; INTRAVENOUS at 13:40

## 2018-05-07 RX ADMIN — SODIUM CHLORIDE, SODIUM LACTATE, POTASSIUM CHLORIDE, AND CALCIUM CHLORIDE: 600; 310; 30; 20 INJECTION, SOLUTION INTRAVENOUS at 10:25

## 2018-05-07 RX ADMIN — FENTANYL CITRATE 50 MCG: 50 INJECTION, SOLUTION INTRAMUSCULAR; INTRAVENOUS at 14:35

## 2018-05-07 RX ADMIN — LIDOCAINE HYDROCHLORIDE 5 ML: 10 INJECTION, SOLUTION EPIDURAL; INFILTRATION; INTRACAUDAL; PERINEURAL at 13:40

## 2018-05-07 RX ADMIN — SODIUM CHLORIDE, SODIUM LACTATE, POTASSIUM CHLORIDE, AND CALCIUM CHLORIDE: 600; 310; 30; 20 INJECTION, SOLUTION INTRAVENOUS at 14:30

## 2018-05-07 RX ADMIN — HYDROCODONE BITARTRATE AND ACETAMINOPHEN 2 TABLET: 5; 325 TABLET ORAL at 15:58

## 2018-05-07 RX ADMIN — ONDANSETRON HYDROCHLORIDE 4 MG: 2 SOLUTION INTRAMUSCULAR; INTRAVENOUS at 14:52

## 2018-05-07 RX ADMIN — SUGAMMADEX 200 MG: 100 INJECTION, SOLUTION INTRAVENOUS at 14:55

## 2018-05-07 RX ADMIN — HYDROMORPHONE HYDROCHLORIDE 0.5 MG: 1 INJECTION, SOLUTION INTRAMUSCULAR; INTRAVENOUS; SUBCUTANEOUS at 15:00

## 2018-05-07 ASSESSMENT — PAIN DESCRIPTION - PROGRESSION: CLINICAL_PROGRESSION: GRADUALLY IMPROVING

## 2018-05-07 ASSESSMENT — LIFESTYLE VARIABLES: SMOKING_STATUS: 0

## 2018-05-07 ASSESSMENT — PAIN DESCRIPTION - PAIN TYPE
TYPE: SURGICAL PAIN
TYPE: SURGICAL PAIN

## 2018-05-07 ASSESSMENT — PAIN SCALES - GENERAL
PAINLEVEL_OUTOF10: 0
PAINLEVEL_OUTOF10: 9
PAINLEVEL_OUTOF10: 6

## 2018-05-07 ASSESSMENT — PAIN DESCRIPTION - LOCATION: LOCATION: ABDOMEN

## 2018-05-07 ASSESSMENT — PAIN DESCRIPTION - DESCRIPTORS: DESCRIPTORS: DULL;SORE

## 2018-05-24 ENCOUNTER — OFFICE VISIT (OUTPATIENT)
Dept: SURGERY | Age: 38
End: 2018-05-24

## 2018-05-24 VITALS
SYSTOLIC BLOOD PRESSURE: 108 MMHG | BODY MASS INDEX: 41.75 KG/M2 | TEMPERATURE: 98.7 F | DIASTOLIC BLOOD PRESSURE: 68 MMHG | WEIGHT: 315 LBS | HEIGHT: 73 IN

## 2018-05-24 DIAGNOSIS — Z87.19 S/P HERNIA SURGERY: Primary | ICD-10-CM

## 2018-05-24 DIAGNOSIS — Z98.890 S/P HERNIA SURGERY: Primary | ICD-10-CM

## 2018-05-24 PROCEDURE — 99024 POSTOP FOLLOW-UP VISIT: CPT | Performed by: PHYSICIAN ASSISTANT

## 2018-06-19 ENCOUNTER — OFFICE VISIT (OUTPATIENT)
Dept: SURGERY | Age: 38
End: 2018-06-19

## 2018-06-19 VITALS
BODY MASS INDEX: 41.75 KG/M2 | HEIGHT: 73 IN | TEMPERATURE: 97.4 F | WEIGHT: 315 LBS | SYSTOLIC BLOOD PRESSURE: 116 MMHG | DIASTOLIC BLOOD PRESSURE: 76 MMHG

## 2018-06-19 DIAGNOSIS — Z87.19 S/P HERNIA SURGERY: Primary | ICD-10-CM

## 2018-06-19 DIAGNOSIS — Z98.890 S/P HERNIA SURGERY: Primary | ICD-10-CM

## 2018-06-19 PROCEDURE — 99024 POSTOP FOLLOW-UP VISIT: CPT | Performed by: PHYSICIAN ASSISTANT

## 2018-06-27 ENCOUNTER — HOSPITAL ENCOUNTER (OUTPATIENT)
Dept: GENERAL RADIOLOGY | Age: 38
Discharge: HOME | End: 2018-06-27
Payer: COMMERCIAL

## 2018-06-27 ENCOUNTER — OFFICE VISIT (OUTPATIENT)
Dept: UROLOGY | Age: 38
End: 2018-06-27
Payer: COMMERCIAL

## 2018-06-27 VITALS — WEIGHT: 315 LBS | TEMPERATURE: 97.7 F | BODY MASS INDEX: 41.75 KG/M2 | HEIGHT: 73 IN

## 2018-06-27 DIAGNOSIS — N20.1 RIGHT URETERAL CALCULUS: ICD-10-CM

## 2018-06-27 DIAGNOSIS — N20.1 RIGHT URETERAL CALCULUS: Primary | ICD-10-CM

## 2018-06-27 LAB
APPEARANCE FLUID: CLEAR
BILIRUBIN, POC: NORMAL
BLOOD URINE, POC: NORMAL
CLARITY, POC: NORMAL
COLOR, POC: NORMAL
GLUCOSE URINE, POC: NORMAL
KETONES, POC: NORMAL
LEUKOCYTE EST, POC: NORMAL
NITRITE, POC: NORMAL
PH, POC: 6
PROTEIN, POC: NORMAL
SPECIFIC GRAVITY, POC: 1.03
UROBILINOGEN, POC: 0.2

## 2018-06-27 PROCEDURE — 74018 RADEX ABDOMEN 1 VIEW: CPT

## 2018-06-27 PROCEDURE — 81003 URINALYSIS AUTO W/O SCOPE: CPT | Performed by: PHYSICIAN ASSISTANT

## 2018-06-27 PROCEDURE — 99213 OFFICE O/P EST LOW 20 MIN: CPT | Performed by: PHYSICIAN ASSISTANT

## 2018-06-27 ASSESSMENT — ENCOUNTER SYMPTOMS
SHORTNESS OF BREATH: 0
NAUSEA: 0
BLURRED VISION: 0
HEARTBURN: 0
SORE THROAT: 0
DOUBLE VISION: 0
WHEEZING: 0

## 2018-07-10 ENCOUNTER — HOSPITAL ENCOUNTER (OUTPATIENT)
Dept: CT IMAGING | Age: 38
Discharge: HOME | End: 2018-07-10
Payer: COMMERCIAL

## 2018-07-10 DIAGNOSIS — N20.1 RIGHT URETERAL CALCULUS: ICD-10-CM

## 2018-07-10 PROCEDURE — 74176 CT ABD & PELVIS W/O CONTRAST: CPT

## 2018-07-17 ENCOUNTER — HOSPITAL ENCOUNTER (OUTPATIENT)
Dept: GENERAL RADIOLOGY | Age: 38
Discharge: HOME | End: 2018-07-17
Payer: COMMERCIAL

## 2018-07-17 ENCOUNTER — OFFICE VISIT (OUTPATIENT)
Dept: UROLOGY | Age: 38
End: 2018-07-17
Payer: COMMERCIAL

## 2018-07-17 VITALS
DIASTOLIC BLOOD PRESSURE: 78 MMHG | TEMPERATURE: 97.3 F | WEIGHT: 315 LBS | HEIGHT: 73 IN | SYSTOLIC BLOOD PRESSURE: 136 MMHG | BODY MASS INDEX: 41.75 KG/M2 | HEART RATE: 90 BPM | OXYGEN SATURATION: 99 %

## 2018-07-17 DIAGNOSIS — R10.9 RIGHT FLANK PAIN: ICD-10-CM

## 2018-07-17 DIAGNOSIS — N20.1 RIGHT URETERAL CALCULUS: ICD-10-CM

## 2018-07-17 DIAGNOSIS — N20.1 RIGHT URETERAL CALCULUS: Primary | ICD-10-CM

## 2018-07-17 PROCEDURE — 99214 OFFICE O/P EST MOD 30 MIN: CPT | Performed by: PHYSICIAN ASSISTANT

## 2018-07-17 PROCEDURE — 74018 RADEX ABDOMEN 1 VIEW: CPT

## 2018-07-17 RX ORDER — HYDROCODONE BITARTRATE AND ACETAMINOPHEN 7.5; 325 MG/1; MG/1
1 TABLET ORAL EVERY 8 HOURS PRN
Qty: 21 TABLET | Refills: 0 | Status: SHIPPED | OUTPATIENT
Start: 2018-07-17 | End: 2018-07-24

## 2018-07-17 ASSESSMENT — ENCOUNTER SYMPTOMS
EYE PAIN: 0
BACK PAIN: 0
BLURRED VISION: 0
VOMITING: 0
SINUS PAIN: 0
SHORTNESS OF BREATH: 0
ABDOMINAL PAIN: 0
WHEEZING: 0

## 2018-07-23 ENCOUNTER — HOSPITAL ENCOUNTER (OUTPATIENT)
Dept: PREADMISSION TESTING | Age: 38
Discharge: HOME | End: 2018-07-27
Payer: COMMERCIAL

## 2018-07-23 VITALS — HEIGHT: 73 IN | BODY MASS INDEX: 41.75 KG/M2 | WEIGHT: 315 LBS

## 2018-07-23 LAB
ANION GAP SERPL CALCULATED.3IONS-SCNC: 12 MMOL/L (ref 7–19)
APTT: 28.4 SEC (ref 26–36.2)
BASOPHILS ABSOLUTE: 0.1 K/UL (ref 0–0.2)
BASOPHILS RELATIVE PERCENT: 0.7 % (ref 0–1)
BUN BLDV-MCNC: 9 MG/DL (ref 6–20)
CALCIUM SERPL-MCNC: 9.2 MG/DL (ref 8.6–10)
CHLORIDE BLD-SCNC: 106 MMOL/L (ref 98–111)
CO2: 25 MMOL/L (ref 22–29)
COLLAGEN ADENOSINE-5'-DIPHOSPHATE (ADP) TIME: 81 SEC (ref 51–124)
COLLAGEN EPINEPHRINE TIME: 228 SEC (ref 84–176)
CREAT SERPL-MCNC: 0.7 MG/DL (ref 0.5–1.2)
EOSINOPHILS ABSOLUTE: 0.3 K/UL (ref 0–0.6)
EOSINOPHILS RELATIVE PERCENT: 3.7 % (ref 0–5)
GFR NON-AFRICAN AMERICAN: >60
GLUCOSE BLD-MCNC: 95 MG/DL (ref 74–109)
HCT VFR BLD CALC: 47.9 % (ref 42–52)
HEMOGLOBIN: 15.3 G/DL (ref 14–18)
INR BLD: 1.11 (ref 0.88–1.18)
LYMPHOCYTES ABSOLUTE: 1.8 K/UL (ref 1.1–4.5)
LYMPHOCYTES RELATIVE PERCENT: 24.2 % (ref 20–40)
MCH RBC QN AUTO: 28.5 PG (ref 27–31)
MCHC RBC AUTO-ENTMCNC: 31.9 G/DL (ref 33–37)
MCV RBC AUTO: 89.2 FL (ref 80–94)
MONOCYTES ABSOLUTE: 0.5 K/UL (ref 0–0.9)
MONOCYTES RELATIVE PERCENT: 7.2 % (ref 0–10)
NEUTROPHILS ABSOLUTE: 4.7 K/UL (ref 1.5–7.5)
NEUTROPHILS RELATIVE PERCENT: 63.5 % (ref 50–65)
PDW BLD-RTO: 13.8 % (ref 11.5–14.5)
PLATELET # BLD: 228 K/UL (ref 130–400)
PLATELET FUNCTION INTERPRETATION: ABNORMAL
PMV BLD AUTO: 10 FL (ref 9.4–12.4)
POTASSIUM SERPL-SCNC: 4 MMOL/L (ref 3.5–5)
PROTHROMBIN TIME: 14.2 SEC (ref 12–14.6)
RBC # BLD: 5.37 M/UL (ref 4.7–6.1)
SODIUM BLD-SCNC: 143 MMOL/L (ref 136–145)
WBC # BLD: 7.5 K/UL (ref 4.8–10.8)

## 2018-07-23 PROCEDURE — 85576 BLOOD PLATELET AGGREGATION: CPT

## 2018-07-23 PROCEDURE — 85730 THROMBOPLASTIN TIME PARTIAL: CPT

## 2018-07-23 PROCEDURE — 85610 PROTHROMBIN TIME: CPT

## 2018-07-23 PROCEDURE — 85025 COMPLETE CBC W/AUTO DIFF WBC: CPT

## 2018-07-23 PROCEDURE — 80048 BASIC METABOLIC PNL TOTAL CA: CPT

## 2018-07-23 PROCEDURE — 93005 ELECTROCARDIOGRAM TRACING: CPT

## 2018-07-23 RX ORDER — CIPROFLOXACIN 2 MG/ML
400 INJECTION, SOLUTION INTRAVENOUS ONCE
Status: CANCELLED | OUTPATIENT
Start: 2018-07-24

## 2018-07-24 ENCOUNTER — ANESTHESIA (OUTPATIENT)
Dept: OPERATING ROOM | Age: 38
End: 2018-07-24
Payer: COMMERCIAL

## 2018-07-24 ENCOUNTER — APPOINTMENT (OUTPATIENT)
Dept: GENERAL RADIOLOGY | Age: 38
End: 2018-07-24
Attending: UROLOGY
Payer: COMMERCIAL

## 2018-07-24 ENCOUNTER — TELEPHONE (OUTPATIENT)
Dept: UROLOGY | Age: 38
End: 2018-07-24

## 2018-07-24 ENCOUNTER — HOSPITAL ENCOUNTER (OUTPATIENT)
Age: 38
Setting detail: OUTPATIENT SURGERY
Discharge: HOME | End: 2018-07-24
Attending: UROLOGY | Admitting: UROLOGY
Payer: COMMERCIAL

## 2018-07-24 ENCOUNTER — ANESTHESIA EVENT (OUTPATIENT)
Dept: OPERATING ROOM | Age: 38
End: 2018-07-24
Payer: COMMERCIAL

## 2018-07-24 VITALS
SYSTOLIC BLOOD PRESSURE: 127 MMHG | TEMPERATURE: 96.5 F | RESPIRATION RATE: 19 BRPM | OXYGEN SATURATION: 94 % | DIASTOLIC BLOOD PRESSURE: 80 MMHG

## 2018-07-24 VITALS
BODY MASS INDEX: 41.75 KG/M2 | RESPIRATION RATE: 14 BRPM | OXYGEN SATURATION: 91 % | WEIGHT: 315 LBS | TEMPERATURE: 97 F | HEIGHT: 73 IN | HEART RATE: 72 BPM | SYSTOLIC BLOOD PRESSURE: 152 MMHG | DIASTOLIC BLOOD PRESSURE: 76 MMHG

## 2018-07-24 DIAGNOSIS — N13.2 HYDRONEPHROSIS WITH RENAL AND URETERAL CALCULUS OBSTRUCTION: ICD-10-CM

## 2018-07-24 DIAGNOSIS — N20.1 RIGHT URETERAL CALCULUS: ICD-10-CM

## 2018-07-24 DIAGNOSIS — G89.18 POSTOPERATIVE PAIN: Primary | ICD-10-CM

## 2018-07-24 PROBLEM — N13.5 URETERAL OBSTRUCTION, RIGHT: Status: ACTIVE | Noted: 2018-07-24

## 2018-07-24 LAB
COLLAGEN EPINEPHRINE TIME: 132 SEC (ref 84–176)
HCT VFR BLD CALC: 52.4 % (ref 42–52)
HEMOGLOBIN: 15.9 G/DL (ref 14–18)
PLATELET FUNCTION INTERPRETATION: NORMAL

## 2018-07-24 PROCEDURE — 52351 CYSTOURETERO & OR PYELOSCOPE: CPT | Performed by: UROLOGY

## 2018-07-24 PROCEDURE — 3700000000 HC ANESTHESIA ATTENDED CARE: Performed by: UROLOGY

## 2018-07-24 PROCEDURE — C1887 CATHETER, GUIDING: HCPCS | Performed by: UROLOGY

## 2018-07-24 PROCEDURE — 6360000002 HC RX W HCPCS

## 2018-07-24 PROCEDURE — 7100000000 HC PACU RECOVERY - FIRST 15 MIN: Performed by: UROLOGY

## 2018-07-24 PROCEDURE — 50590 FRAGMENTING OF KIDNEY STONE: CPT | Performed by: UROLOGY

## 2018-07-24 PROCEDURE — 6360000002 HC RX W HCPCS: Performed by: ANESTHESIOLOGY

## 2018-07-24 PROCEDURE — 2720000000 HC MISC SURG SUPPLY STERILE $0-50: Performed by: UROLOGY

## 2018-07-24 PROCEDURE — 52332 CYSTOSCOPY AND TREATMENT: CPT | Performed by: UROLOGY

## 2018-07-24 PROCEDURE — 7100000001 HC PACU RECOVERY - ADDTL 15 MIN: Performed by: UROLOGY

## 2018-07-24 PROCEDURE — C1769 GUIDE WIRE: HCPCS | Performed by: UROLOGY

## 2018-07-24 PROCEDURE — 6370000000 HC RX 637 (ALT 250 FOR IP): Performed by: UROLOGY

## 2018-07-24 PROCEDURE — 74018 RADEX ABDOMEN 1 VIEW: CPT

## 2018-07-24 PROCEDURE — 36415 COLL VENOUS BLD VENIPUNCTURE: CPT

## 2018-07-24 PROCEDURE — 3700000001 HC ADD 15 MINUTES (ANESTHESIA): Performed by: UROLOGY

## 2018-07-24 PROCEDURE — 2709999900 HC NON-CHARGEABLE SUPPLY: Performed by: UROLOGY

## 2018-07-24 PROCEDURE — 85014 HEMATOCRIT: CPT

## 2018-07-24 PROCEDURE — 85018 HEMOGLOBIN: CPT

## 2018-07-24 PROCEDURE — 7100000010 HC PHASE II RECOVERY - FIRST 15 MIN: Performed by: UROLOGY

## 2018-07-24 PROCEDURE — 2580000003 HC RX 258: Performed by: ANESTHESIOLOGY

## 2018-07-24 PROCEDURE — C1758 CATHETER, URETERAL: HCPCS | Performed by: UROLOGY

## 2018-07-24 PROCEDURE — 85576 BLOOD PLATELET AGGREGATION: CPT

## 2018-07-24 PROCEDURE — C2617 STENT, NON-COR, TEM W/O DEL: HCPCS | Performed by: UROLOGY

## 2018-07-24 PROCEDURE — 2500000003 HC RX 250 WO HCPCS

## 2018-07-24 PROCEDURE — 6370000000 HC RX 637 (ALT 250 FOR IP): Performed by: ANESTHESIOLOGY

## 2018-07-24 PROCEDURE — 7100000011 HC PHASE II RECOVERY - ADDTL 15 MIN: Performed by: UROLOGY

## 2018-07-24 PROCEDURE — 99999 PR OFFICE/OUTPT VISIT,PROCEDURE ONLY: CPT | Performed by: UROLOGY

## 2018-07-24 PROCEDURE — C1726 CATH, BAL DIL, NON-VASCULAR: HCPCS | Performed by: UROLOGY

## 2018-07-24 PROCEDURE — 3600000014 HC SURGERY LEVEL 4 ADDTL 15MIN: Performed by: UROLOGY

## 2018-07-24 PROCEDURE — 3600000004 HC SURGERY LEVEL 4 BASE: Performed by: UROLOGY

## 2018-07-24 PROCEDURE — 6360000002 HC RX W HCPCS: Performed by: UROLOGY

## 2018-07-24 DEVICE — URETERAL STENT
Type: IMPLANTABLE DEVICE | Site: URETER | Status: FUNCTIONAL
Brand: POLARIS™ ULTRA

## 2018-07-24 RX ORDER — SUCCINYLCHOLINE CHLORIDE 20 MG/ML
INJECTION INTRAMUSCULAR; INTRAVENOUS PRN
Status: DISCONTINUED | OUTPATIENT
Start: 2018-07-24 | End: 2018-07-24 | Stop reason: SDUPTHER

## 2018-07-24 RX ORDER — MIDAZOLAM HYDROCHLORIDE 1 MG/ML
2 INJECTION INTRAMUSCULAR; INTRAVENOUS
Status: DISCONTINUED | OUTPATIENT
Start: 2018-07-24 | End: 2018-07-24 | Stop reason: HOSPADM

## 2018-07-24 RX ORDER — HYDROMORPHONE HCL IN 0.9% NACL 0.5 MG/ML
0.25 SYRINGE (ML) INTRAVENOUS EVERY 5 MIN PRN
Status: DISCONTINUED | OUTPATIENT
Start: 2018-07-24 | End: 2018-07-24 | Stop reason: HOSPADM

## 2018-07-24 RX ORDER — SODIUM CHLORIDE 0.9 % (FLUSH) 0.9 %
10 SYRINGE (ML) INJECTION EVERY 12 HOURS SCHEDULED
Status: DISCONTINUED | OUTPATIENT
Start: 2018-07-24 | End: 2018-07-24 | Stop reason: HOSPADM

## 2018-07-24 RX ORDER — MORPHINE SULFATE 4 MG/ML
4 INJECTION, SOLUTION INTRAMUSCULAR; INTRAVENOUS
Status: DISCONTINUED | OUTPATIENT
Start: 2018-07-24 | End: 2018-07-24 | Stop reason: HOSPADM

## 2018-07-24 RX ORDER — PROPOFOL 10 MG/ML
INJECTION, EMULSION INTRAVENOUS PRN
Status: DISCONTINUED | OUTPATIENT
Start: 2018-07-24 | End: 2018-07-24 | Stop reason: SDUPTHER

## 2018-07-24 RX ORDER — TAMSULOSIN HYDROCHLORIDE 0.4 MG/1
0.4 CAPSULE ORAL DAILY
Qty: 14 CAPSULE | Refills: 1 | Status: ON HOLD | OUTPATIENT
Start: 2018-07-24 | End: 2018-08-07

## 2018-07-24 RX ORDER — ONDANSETRON 2 MG/ML
4 INJECTION INTRAMUSCULAR; INTRAVENOUS EVERY 4 HOURS PRN
Status: DISCONTINUED | OUTPATIENT
Start: 2018-07-24 | End: 2018-07-24 | Stop reason: HOSPADM

## 2018-07-24 RX ORDER — HYDRALAZINE HYDROCHLORIDE 20 MG/ML
5 INJECTION INTRAMUSCULAR; INTRAVENOUS EVERY 10 MIN PRN
Status: DISCONTINUED | OUTPATIENT
Start: 2018-07-24 | End: 2018-07-24 | Stop reason: HOSPADM

## 2018-07-24 RX ORDER — DIPHENHYDRAMINE HYDROCHLORIDE 50 MG/ML
12.5 INJECTION INTRAMUSCULAR; INTRAVENOUS
Status: DISCONTINUED | OUTPATIENT
Start: 2018-07-24 | End: 2018-07-24 | Stop reason: HOSPADM

## 2018-07-24 RX ORDER — SODIUM CHLORIDE, SODIUM LACTATE, POTASSIUM CHLORIDE, CALCIUM CHLORIDE 600; 310; 30; 20 MG/100ML; MG/100ML; MG/100ML; MG/100ML
INJECTION, SOLUTION INTRAVENOUS CONTINUOUS
Status: DISCONTINUED | OUTPATIENT
Start: 2018-07-24 | End: 2018-07-24 | Stop reason: HOSPADM

## 2018-07-24 RX ORDER — HYDROMORPHONE HCL IN 0.9% NACL 0.5 MG/ML
0.5 SYRINGE (ML) INTRAVENOUS EVERY 5 MIN PRN
Status: DISCONTINUED | OUTPATIENT
Start: 2018-07-24 | End: 2018-07-24 | Stop reason: HOSPADM

## 2018-07-24 RX ORDER — TAMSULOSIN HYDROCHLORIDE 0.4 MG/1
0.4 CAPSULE ORAL ONCE
Status: COMPLETED | OUTPATIENT
Start: 2018-07-24 | End: 2018-07-24

## 2018-07-24 RX ORDER — CIPROFLOXACIN 500 MG/1
500 TABLET, FILM COATED ORAL 2 TIMES DAILY
Qty: 6 TABLET | Refills: 0 | Status: SHIPPED | OUTPATIENT
Start: 2018-07-24 | End: 2018-07-27

## 2018-07-24 RX ORDER — FENTANYL CITRATE 50 UG/ML
INJECTION, SOLUTION INTRAMUSCULAR; INTRAVENOUS PRN
Status: DISCONTINUED | OUTPATIENT
Start: 2018-07-24 | End: 2018-07-24 | Stop reason: SDUPTHER

## 2018-07-24 RX ORDER — MIDAZOLAM HYDROCHLORIDE 1 MG/ML
INJECTION INTRAMUSCULAR; INTRAVENOUS PRN
Status: DISCONTINUED | OUTPATIENT
Start: 2018-07-24 | End: 2018-07-24 | Stop reason: SDUPTHER

## 2018-07-24 RX ORDER — DEXAMETHASONE SODIUM PHOSPHATE 10 MG/ML
INJECTION INTRAMUSCULAR; INTRAVENOUS PRN
Status: DISCONTINUED | OUTPATIENT
Start: 2018-07-24 | End: 2018-07-24 | Stop reason: SDUPTHER

## 2018-07-24 RX ORDER — SCOLOPAMINE TRANSDERMAL SYSTEM 1 MG/1
1 PATCH, EXTENDED RELEASE TRANSDERMAL ONCE
Status: DISCONTINUED | OUTPATIENT
Start: 2018-07-24 | End: 2018-07-24 | Stop reason: HOSPADM

## 2018-07-24 RX ORDER — LIDOCAINE HYDROCHLORIDE 10 MG/ML
1 INJECTION, SOLUTION EPIDURAL; INFILTRATION; INTRACAUDAL; PERINEURAL
Status: DISCONTINUED | OUTPATIENT
Start: 2018-07-24 | End: 2018-07-24 | Stop reason: HOSPADM

## 2018-07-24 RX ORDER — LABETALOL HYDROCHLORIDE 5 MG/ML
5 INJECTION, SOLUTION INTRAVENOUS EVERY 10 MIN PRN
Status: DISCONTINUED | OUTPATIENT
Start: 2018-07-24 | End: 2018-07-24 | Stop reason: HOSPADM

## 2018-07-24 RX ORDER — SODIUM CHLORIDE 0.9 % (FLUSH) 0.9 %
10 SYRINGE (ML) INJECTION PRN
Status: DISCONTINUED | OUTPATIENT
Start: 2018-07-24 | End: 2018-07-24 | Stop reason: HOSPADM

## 2018-07-24 RX ORDER — PHENAZOPYRIDINE HYDROCHLORIDE 100 MG/1
100 TABLET, FILM COATED ORAL 3 TIMES DAILY PRN
Qty: 30 TABLET | Refills: 1 | Status: ON HOLD | OUTPATIENT
Start: 2018-07-24 | End: 2018-08-07

## 2018-07-24 RX ORDER — OXYCODONE HYDROCHLORIDE AND ACETAMINOPHEN 5; 325 MG/1; MG/1
2 TABLET ORAL EVERY 4 HOURS PRN
Status: DISCONTINUED | OUTPATIENT
Start: 2018-07-24 | End: 2018-07-24 | Stop reason: HOSPADM

## 2018-07-24 RX ORDER — MORPHINE SULFATE 1 MG/ML
2 INJECTION, SOLUTION EPIDURAL; INTRATHECAL; INTRAVENOUS EVERY 5 MIN PRN
Status: DISCONTINUED | OUTPATIENT
Start: 2018-07-24 | End: 2018-07-24 | Stop reason: HOSPADM

## 2018-07-24 RX ORDER — PHENAZOPYRIDINE HYDROCHLORIDE 100 MG/1
100 TABLET, FILM COATED ORAL ONCE
Status: COMPLETED | OUTPATIENT
Start: 2018-07-24 | End: 2018-07-24

## 2018-07-24 RX ORDER — ROCURONIUM BROMIDE 10 MG/ML
INJECTION, SOLUTION INTRAVENOUS PRN
Status: DISCONTINUED | OUTPATIENT
Start: 2018-07-24 | End: 2018-07-24 | Stop reason: SDUPTHER

## 2018-07-24 RX ORDER — ATROPA BELLADONNA AND OPIUM 16.2; 6 MG/1; MG/1
30 SUPPOSITORY RECTAL ONCE
Status: COMPLETED | OUTPATIENT
Start: 2018-07-24 | End: 2018-07-24

## 2018-07-24 RX ORDER — MEPERIDINE HYDROCHLORIDE 50 MG/ML
12.5 INJECTION INTRAMUSCULAR; INTRAVENOUS; SUBCUTANEOUS EVERY 5 MIN PRN
Status: DISCONTINUED | OUTPATIENT
Start: 2018-07-24 | End: 2018-07-24 | Stop reason: HOSPADM

## 2018-07-24 RX ORDER — ONDANSETRON 2 MG/ML
INJECTION INTRAMUSCULAR; INTRAVENOUS PRN
Status: DISCONTINUED | OUTPATIENT
Start: 2018-07-24 | End: 2018-07-24 | Stop reason: SDUPTHER

## 2018-07-24 RX ORDER — GLYCOPYRROLATE 0.2 MG/ML
INJECTION INTRAMUSCULAR; INTRAVENOUS PRN
Status: DISCONTINUED | OUTPATIENT
Start: 2018-07-24 | End: 2018-07-24 | Stop reason: SDUPTHER

## 2018-07-24 RX ORDER — CIPROFLOXACIN 2 MG/ML
400 INJECTION, SOLUTION INTRAVENOUS ONCE
Status: COMPLETED | OUTPATIENT
Start: 2018-07-24 | End: 2018-07-24

## 2018-07-24 RX ORDER — LIDOCAINE HYDROCHLORIDE 10 MG/ML
INJECTION, SOLUTION EPIDURAL; INFILTRATION; INTRACAUDAL; PERINEURAL PRN
Status: DISCONTINUED | OUTPATIENT
Start: 2018-07-24 | End: 2018-07-24 | Stop reason: SDUPTHER

## 2018-07-24 RX ORDER — PROMETHAZINE HYDROCHLORIDE 25 MG/ML
6.25 INJECTION, SOLUTION INTRAMUSCULAR; INTRAVENOUS
Status: DISCONTINUED | OUTPATIENT
Start: 2018-07-24 | End: 2018-07-24 | Stop reason: HOSPADM

## 2018-07-24 RX ORDER — FENTANYL CITRATE 50 UG/ML
50 INJECTION, SOLUTION INTRAMUSCULAR; INTRAVENOUS
Status: DISCONTINUED | OUTPATIENT
Start: 2018-07-24 | End: 2018-07-24 | Stop reason: HOSPADM

## 2018-07-24 RX ORDER — HYDROMORPHONE HCL IN 0.9% NACL 0.5 MG/ML
1 SYRINGE (ML) INTRAVENOUS
Status: DISCONTINUED | OUTPATIENT
Start: 2018-07-24 | End: 2018-07-24 | Stop reason: HOSPADM

## 2018-07-24 RX ORDER — OXYCODONE AND ACETAMINOPHEN 10; 325 MG/1; MG/1
1 TABLET ORAL EVERY 4 HOURS PRN
Qty: 30 TABLET | Refills: 0 | Status: SHIPPED | OUTPATIENT
Start: 2018-07-24 | End: 2018-08-03

## 2018-07-24 RX ORDER — SODIUM CHLORIDE 9 MG/ML
INJECTION, SOLUTION INTRAVENOUS CONTINUOUS
Status: DISCONTINUED | OUTPATIENT
Start: 2018-07-24 | End: 2018-07-24 | Stop reason: HOSPADM

## 2018-07-24 RX ORDER — MORPHINE SULFATE 1 MG/ML
4 INJECTION, SOLUTION EPIDURAL; INTRATHECAL; INTRAVENOUS EVERY 5 MIN PRN
Status: DISCONTINUED | OUTPATIENT
Start: 2018-07-24 | End: 2018-07-24 | Stop reason: HOSPADM

## 2018-07-24 RX ORDER — METOCLOPRAMIDE HYDROCHLORIDE 5 MG/ML
10 INJECTION INTRAMUSCULAR; INTRAVENOUS
Status: DISCONTINUED | OUTPATIENT
Start: 2018-07-24 | End: 2018-07-24 | Stop reason: HOSPADM

## 2018-07-24 RX ADMIN — FENTANYL CITRATE 50 MCG: 50 INJECTION INTRAMUSCULAR; INTRAVENOUS at 09:33

## 2018-07-24 RX ADMIN — ONDANSETRON HYDROCHLORIDE 4 MG: 2 INJECTION, SOLUTION INTRAMUSCULAR; INTRAVENOUS at 08:10

## 2018-07-24 RX ADMIN — FENTANYL CITRATE 100 MCG: 50 INJECTION INTRAMUSCULAR; INTRAVENOUS at 07:53

## 2018-07-24 RX ADMIN — ATROPA BELLADONNA AND OPIUM 60 MG: 16.2; 6 SUPPOSITORY RECTAL at 11:22

## 2018-07-24 RX ADMIN — MEPERIDINE HYDROCHLORIDE 50 MG: 50 INJECTION, SOLUTION INTRAMUSCULAR; INTRAVENOUS; SUBCUTANEOUS at 11:18

## 2018-07-24 RX ADMIN — ROCURONIUM BROMIDE 20 MG: 10 INJECTION INTRAVENOUS at 09:21

## 2018-07-24 RX ADMIN — LIDOCAINE HYDROCHLORIDE 50 MG: 10 INJECTION, SOLUTION EPIDURAL; INFILTRATION; INTRACAUDAL; PERINEURAL at 07:57

## 2018-07-24 RX ADMIN — Medication 140 MG: at 07:57

## 2018-07-24 RX ADMIN — GLYCOPYRROLATE 0.2 MG: 0.2 INJECTION, SOLUTION INTRAMUSCULAR; INTRAVENOUS at 09:41

## 2018-07-24 RX ADMIN — CIPROFLOXACIN 400 MG: 2 INJECTION INTRAVENOUS at 08:05

## 2018-07-24 RX ADMIN — PROPOFOL 50 MG: 10 INJECTION, EMULSION INTRAVENOUS at 10:13

## 2018-07-24 RX ADMIN — NEOSTIGMINE METHYLSULFATE 1 MG: 1 INJECTION, SOLUTION INTRAMUSCULAR; INTRAVENOUS; SUBCUTANEOUS at 09:50

## 2018-07-24 RX ADMIN — SODIUM CHLORIDE, SODIUM LACTATE, POTASSIUM CHLORIDE, AND CALCIUM CHLORIDE: 600; 310; 30; 20 INJECTION, SOLUTION INTRAVENOUS at 06:50

## 2018-07-24 RX ADMIN — OXYCODONE HYDROCHLORIDE AND ACETAMINOPHEN 2 TABLET: 5; 325 TABLET ORAL at 12:05

## 2018-07-24 RX ADMIN — PROPOFOL 160 MG: 10 INJECTION, EMULSION INTRAVENOUS at 07:57

## 2018-07-24 RX ADMIN — GLYCOPYRROLATE 0.2 MG: 0.2 INJECTION, SOLUTION INTRAMUSCULAR; INTRAVENOUS at 09:55

## 2018-07-24 RX ADMIN — FENTANYL CITRATE 50 MCG: 50 INJECTION INTRAMUSCULAR; INTRAVENOUS at 10:25

## 2018-07-24 RX ADMIN — PHENAZOPYRIDINE HYDROCHLORIDE 100 MG: 100 TABLET ORAL at 11:42

## 2018-07-24 RX ADMIN — NEOSTIGMINE METHYLSULFATE 1 MG: 1 INJECTION, SOLUTION INTRAMUSCULAR; INTRAVENOUS; SUBCUTANEOUS at 09:46

## 2018-07-24 RX ADMIN — TAMSULOSIN HYDROCHLORIDE 0.4 MG: 0.4 CAPSULE ORAL at 11:42

## 2018-07-24 RX ADMIN — FENTANYL CITRATE 50 MCG: 50 INJECTION INTRAMUSCULAR; INTRAVENOUS at 10:12

## 2018-07-24 RX ADMIN — DEXAMETHASONE SODIUM PHOSPHATE 10 MG: 10 INJECTION INTRAMUSCULAR; INTRAVENOUS at 08:10

## 2018-07-24 RX ADMIN — NEOSTIGMINE METHYLSULFATE 1 MG: 1 INJECTION, SOLUTION INTRAMUSCULAR; INTRAVENOUS; SUBCUTANEOUS at 09:43

## 2018-07-24 RX ADMIN — ROCURONIUM BROMIDE 30 MG: 10 INJECTION INTRAVENOUS at 08:07

## 2018-07-24 RX ADMIN — MIDAZOLAM 2 MG: 1 INJECTION INTRAMUSCULAR; INTRAVENOUS at 07:49

## 2018-07-24 RX ADMIN — GLYCOPYRROLATE 0.2 MG: 0.2 INJECTION, SOLUTION INTRAMUSCULAR; INTRAVENOUS at 09:48

## 2018-07-24 RX ADMIN — SODIUM CHLORIDE, SODIUM LACTATE, POTASSIUM CHLORIDE, AND CALCIUM CHLORIDE: 600; 310; 30; 20 INJECTION, SOLUTION INTRAVENOUS at 10:22

## 2018-07-24 ASSESSMENT — PAIN SCALES - GENERAL
PAINLEVEL_OUTOF10: 3
PAINLEVEL_OUTOF10: 9

## 2018-07-24 ASSESSMENT — PAIN - FUNCTIONAL ASSESSMENT: PAIN_FUNCTIONAL_ASSESSMENT: 0-10

## 2018-07-24 ASSESSMENT — LIFESTYLE VARIABLES: SMOKING_STATUS: 0

## 2018-07-24 ASSESSMENT — ENCOUNTER SYMPTOMS: SHORTNESS OF BREATH: 0

## 2018-07-24 NOTE — OP NOTE
stone  fragments and was not having significant pain until 06/27/2018 where a KUB  and subsequently, a CT scan showed two stones that were adjacent to each  other at the level of L3 transverse process, thought to be residual stone  fragments from his previous shock wave. Therefore, we elected to attempt  to treat him again with shock wave lithotripsy. The risks and  complications of the procedure were discussed with him including the risk  of failure to fragment the stone and need for subsequent adjuvant or repeat  procedures. We also discussed that if the stones do not fragment well that  I will have to put up a stent and then we will have to approach these  endoscopically at a later date, after some dilation with the stent. He  understood and agreed to proceed. DESCRIPTION OF PROCEDURE:  The patient was brought to the operating room,  underwent general anesthetic. He was placed on the [facility] lithotripsy unit  in the supine position. Coupling was performed with the shock head. Biplanar fluoroscopy was used, the stones could be seen on the margin of  the psoas muscle, at L3. They were focused on the focal point. The  patient received the maximum amount of shock waves, which was 4000 shock  waves up to a power level of 9. There was really no change in the stones  whatsoever. At this point, I felt that a stent was necessary, so he was placed in  lithotomy position. His genitalia was prepped and draped in routine  sterile fashion. A 22-Georgian cystoscope was inserted in the meatus and  this is advanced under direct vision into the patient's bladder. The right  ureteral orifice identified. This was intubated with a 0.035 Sensor tip  guidewire and this was advanced under fluoroscopic guidance up to the level  of the stone. I could not get the guidewire to go past the stone. Therefore, I did ureteral catheterization and passed ureteral catheter over  the guidewire up to the level of the stone.   This see a dilated kidney with severe hydronephrosis. I unfortunately,  because of where the stone was, I wanted to make sure that I had not  extravasated the ureter, so I pulled the catheter back down to the ureter,  injected contrast, there was no extravasation but once I had done this, I  could not get the guidewire to go back up again, so I eventually was able  to manipulate this with a Glidewire and get a Glidewire up into the kidney. The 70 cm 5-Citizen of [place] catheter was too short to go through the curvature to  straighten out the guidewire, so I had a use a 4-Citizen of [place] angiographic angled  catheter and I was able to get this catheter up over the Glidewire up into  the renal pelvis again. At this point then I removed the wire and  confirmed that I had hydronephrotic drip through the angiographic catheter  and I injected contrast, indeed I was within the lumen of the renal pelvis. I then replaced the Glidewire with the Sensor tip guidewire since it was a  little bit more stiff and this was coiled up in the renal pelvis. The  angiographic catheter was removed. The angiographic catheter was removed. Then over the guidewire, which was now still coiled up in the renal pelvis,  I was able to pass a 5-Citizen of [place] by 28 cm right double-J ureteral stent. The  proximal end coiled in the renal pelvis and the distal end coiled in the  bladder. I did not leave a string on the end of the stent. At this point,  the patient's bladder was emptied, the scope was removed, and the procedure  was terminated. He will need at least 2 weeks of passive dilation with the stent in the  hopes that we can get up to the stone with a flexible ureteroscope and  perform laser lithotripsy to relieve the obstruction from this impacted  stone.   This will also allow, hopefully, some of the edema to resolve so  the stone can be easily visualized and allow any trauma that occurred to  heal.        Pattie Liriano MD    D: 07/24/2018 11:46:33      T: 07/24/2018 11:51:40     [email or web address]  Job#: [id number]     Doc#: [id number]    CC:

## 2018-07-24 NOTE — H&P
Current Facility-Administered Medications          Current Outpatient Prescriptions   Medication Sig Dispense Refill    HYDROcodone-acetaminophen (NORCO) 7.5-325 MG per tablet Take 1 tablet by mouth every 8 hours as needed for Pain for up to 7 days. . 21 tablet 0      No current facility-administered medications for this visit.             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 HENT: Negative for nosebleeds and sinus pain. Eyes: Negative for blurred vision and pain. Respiratory: Negative for shortness of breath and wheezing. Cardiovascular: Negative for palpitations and leg swelling. Gastrointestinal: Negative for abdominal pain and vomiting. Genitourinary: Negative for dysuria, flank pain, frequency, hematuria and urgency. Musculoskeletal: Negative for back pain and myalgias. Skin: Negative for itching and rash. Neurological: Negative for tremors and sensory change. Endo/Heme/Allergies: Negative for environmental allergies and polydipsia. Psychiatric/Behavioral: Negative for hallucinations. The patient is not nervous/anxious.       PHYSICAL EXAM:  /78 (Site: Left Arm, Position: Sitting, Cuff Size: Medium Adult)   Pulse 90   Temp 97.3 °F (36.3 °C) (Temporal)   Ht 6' 1\" (1.854 m)   Wt (!) 363 lb (164.7 kg)   SpO2 99%   BMI 47.89 kg/m²   Physical Exam   Constitutional: No distress. HENT:   Mouth/Throat: No oropharyngeal exudate. Eyes: Left eye exhibits no discharge. No scleral icterus. Neck: No JVD present. No tracheal deviation present. No thyromegaly present. Cardiovascular: Exam reveals no gallop and no friction rub. Pulmonary/Chest: No stridor. He has no rales. Abdominal: He exhibits no distension and no mass. There is no rebound and no guarding. Musculoskeletal: He exhibits no edema. Neurological: No cranial nerve deficit. He exhibits normal muscle tone. Coordination normal.   Skin: He is not diaphoretic. No pallor.               DATA:  U/A:          Lab Results   Component Value Date     NITRITE neg 06/27/2018     COLORU yellow 03/07/2018     COLORU YELLOW 01/28/2018     PROTEINU neg 06/27/2018     PROTEINU Negative 01/28/2018     PHUR 6.0 06/27/2018     PHUR 5.5 01/28/2018     MUCUS RARE 05/15/2011     BACTERIA 1+ 08/14/2011     CLARITYU neg 03/27/2018     CLARITYU Clear 01/28/2018     SPECGRAV 1.030 06/27/2018     SPECGRAV >1.045 01/28/2018     LEUKOCYTESUR neg 06/27/2018     LEUKOCYTESUR Negative 01/28/2018     UROBILINOGEN 0.2 01/28/2018     BILIRUBINUR neg 06/27/2018     BILIRUBINUR NEGATIVE 08/14/2011     BLOODU trace--intact 06/27/2018     BLOODU Negative 01/28/2018     GLUCOSEU neg 06/27/2018     GLUCOSEU Negative 01/28/2018            Imaging:  Findings:   Chest   Incidental scanning through the lower chest demonstrates minimal areas   of atelectasis. Several calcified or partially calcified granulomas   noted. Abdomen   Hepatic steatosis. Mild splenomegaly, greatest axial dimension of the   spleen is 14 cm. Gallbladder is present. No adrenal nodule. No   pancreatic abnormality.    There are

## 2018-07-26 LAB
EKG P AXIS: 53 DEGREES
EKG P-R INTERVAL: 180 MS
EKG Q-T INTERVAL: 372 MS
EKG QRS DURATION: 98 MS
EKG QTC CALCULATION (BAZETT): 401 MS
EKG T AXIS: 48 DEGREES

## 2018-07-31 ENCOUNTER — TELEPHONE (OUTPATIENT)
Dept: UROLOGY | Age: 38
End: 2018-07-31

## 2018-08-02 ENCOUNTER — TELEPHONE (OUTPATIENT)
Dept: UROLOGY | Age: 38
End: 2018-08-02

## 2018-08-07 ENCOUNTER — ANESTHESIA EVENT (OUTPATIENT)
Dept: OPERATING ROOM | Age: 38
End: 2018-08-07
Payer: COMMERCIAL

## 2018-08-07 ENCOUNTER — APPOINTMENT (OUTPATIENT)
Dept: GENERAL RADIOLOGY | Age: 38
End: 2018-08-07
Attending: UROLOGY
Payer: COMMERCIAL

## 2018-08-07 ENCOUNTER — ANESTHESIA (OUTPATIENT)
Dept: OPERATING ROOM | Age: 38
End: 2018-08-07
Payer: COMMERCIAL

## 2018-08-07 ENCOUNTER — HOSPITAL ENCOUNTER (OUTPATIENT)
Age: 38
Setting detail: OUTPATIENT SURGERY
Discharge: HOME | End: 2018-08-07
Attending: UROLOGY | Admitting: UROLOGY
Payer: COMMERCIAL

## 2018-08-07 VITALS
DIASTOLIC BLOOD PRESSURE: 62 MMHG | OXYGEN SATURATION: 97 % | RESPIRATION RATE: 18 BRPM | SYSTOLIC BLOOD PRESSURE: 118 MMHG

## 2018-08-07 VITALS
BODY MASS INDEX: 41.75 KG/M2 | HEART RATE: 83 BPM | OXYGEN SATURATION: 99 % | HEIGHT: 73 IN | TEMPERATURE: 98 F | SYSTOLIC BLOOD PRESSURE: 147 MMHG | DIASTOLIC BLOOD PRESSURE: 86 MMHG | WEIGHT: 315 LBS | RESPIRATION RATE: 16 BRPM

## 2018-08-07 DIAGNOSIS — N20.1 RIGHT URETERAL CALCULUS: Primary | ICD-10-CM

## 2018-08-07 DIAGNOSIS — G89.18 POSTOPERATIVE PAIN: ICD-10-CM

## 2018-08-07 PROCEDURE — 6360000002 HC RX W HCPCS: Performed by: ANESTHESIOLOGY

## 2018-08-07 PROCEDURE — 6360000002 HC RX W HCPCS: Performed by: UROLOGY

## 2018-08-07 PROCEDURE — 6370000000 HC RX 637 (ALT 250 FOR IP): Performed by: ANESTHESIOLOGY

## 2018-08-07 PROCEDURE — 3600000004 HC SURGERY LEVEL 4 BASE: Performed by: UROLOGY

## 2018-08-07 PROCEDURE — 6360000002 HC RX W HCPCS: Performed by: NURSE ANESTHETIST, CERTIFIED REGISTERED

## 2018-08-07 PROCEDURE — 7100000011 HC PHASE II RECOVERY - ADDTL 15 MIN: Performed by: UROLOGY

## 2018-08-07 PROCEDURE — 2500000003 HC RX 250 WO HCPCS: Performed by: NURSE ANESTHETIST, CERTIFIED REGISTERED

## 2018-08-07 PROCEDURE — 7100000010 HC PHASE II RECOVERY - FIRST 15 MIN: Performed by: UROLOGY

## 2018-08-07 PROCEDURE — 2580000003 HC RX 258: Performed by: ANESTHESIOLOGY

## 2018-08-07 PROCEDURE — 3600000014 HC SURGERY LEVEL 4 ADDTL 15MIN: Performed by: UROLOGY

## 2018-08-07 PROCEDURE — C1773 RET DEV, INSERTABLE: HCPCS | Performed by: UROLOGY

## 2018-08-07 PROCEDURE — C1894 INTRO/SHEATH, NON-LASER: HCPCS | Performed by: UROLOGY

## 2018-08-07 PROCEDURE — 3209999900 FLUORO FOR SURGICAL PROCEDURES

## 2018-08-07 PROCEDURE — 3700000000 HC ANESTHESIA ATTENDED CARE: Performed by: UROLOGY

## 2018-08-07 PROCEDURE — 7100000000 HC PACU RECOVERY - FIRST 15 MIN: Performed by: UROLOGY

## 2018-08-07 PROCEDURE — C1769 GUIDE WIRE: HCPCS | Performed by: UROLOGY

## 2018-08-07 PROCEDURE — C2617 STENT, NON-COR, TEM W/O DEL: HCPCS | Performed by: UROLOGY

## 2018-08-07 PROCEDURE — 2709999900 HC NON-CHARGEABLE SUPPLY: Performed by: UROLOGY

## 2018-08-07 PROCEDURE — 6370000000 HC RX 637 (ALT 250 FOR IP): Performed by: UROLOGY

## 2018-08-07 PROCEDURE — 88300 SURGICAL PATH GROSS: CPT

## 2018-08-07 PROCEDURE — C1758 CATHETER, URETERAL: HCPCS | Performed by: UROLOGY

## 2018-08-07 PROCEDURE — 7100000001 HC PACU RECOVERY - ADDTL 15 MIN: Performed by: UROLOGY

## 2018-08-07 PROCEDURE — 82360 CALCULUS ASSAY QUANT: CPT

## 2018-08-07 PROCEDURE — 3700000001 HC ADD 15 MINUTES (ANESTHESIA): Performed by: UROLOGY

## 2018-08-07 PROCEDURE — 52356 CYSTO/URETERO W/LITHOTRIPSY: CPT | Performed by: UROLOGY

## 2018-08-07 PROCEDURE — 2720000010 HC SURG SUPPLY STERILE: Performed by: UROLOGY

## 2018-08-07 DEVICE — URETERAL STENT
Type: IMPLANTABLE DEVICE | Site: URETER | Status: FUNCTIONAL
Brand: POLARIS™ ULTRA

## 2018-08-07 RX ORDER — SODIUM CHLORIDE, SODIUM LACTATE, POTASSIUM CHLORIDE, CALCIUM CHLORIDE 600; 310; 30; 20 MG/100ML; MG/100ML; MG/100ML; MG/100ML
INJECTION, SOLUTION INTRAVENOUS CONTINUOUS
Status: DISCONTINUED | OUTPATIENT
Start: 2018-08-07 | End: 2018-08-07 | Stop reason: HOSPADM

## 2018-08-07 RX ORDER — HYDROMORPHONE HCL IN 0.9% NACL 0.5 MG/ML
0.25 SYRINGE (ML) INTRAVENOUS EVERY 5 MIN PRN
Status: DISCONTINUED | OUTPATIENT
Start: 2018-08-07 | End: 2018-08-07 | Stop reason: HOSPADM

## 2018-08-07 RX ORDER — LABETALOL HYDROCHLORIDE 5 MG/ML
5 INJECTION, SOLUTION INTRAVENOUS EVERY 10 MIN PRN
Status: DISCONTINUED | OUTPATIENT
Start: 2018-08-07 | End: 2018-08-07 | Stop reason: HOSPADM

## 2018-08-07 RX ORDER — CIPROFLOXACIN 2 MG/ML
400 INJECTION, SOLUTION INTRAVENOUS ONCE
Status: COMPLETED | OUTPATIENT
Start: 2018-08-07 | End: 2018-08-07

## 2018-08-07 RX ORDER — ONDANSETRON 2 MG/ML
INJECTION INTRAMUSCULAR; INTRAVENOUS PRN
Status: DISCONTINUED | OUTPATIENT
Start: 2018-08-07 | End: 2018-08-07 | Stop reason: SDUPTHER

## 2018-08-07 RX ORDER — MIDAZOLAM HYDROCHLORIDE 1 MG/ML
INJECTION INTRAMUSCULAR; INTRAVENOUS PRN
Status: DISCONTINUED | OUTPATIENT
Start: 2018-08-07 | End: 2018-08-07 | Stop reason: SDUPTHER

## 2018-08-07 RX ORDER — SODIUM CHLORIDE 9 MG/ML
INJECTION, SOLUTION INTRAVENOUS CONTINUOUS
Status: DISCONTINUED | OUTPATIENT
Start: 2018-08-07 | End: 2018-08-07 | Stop reason: HOSPADM

## 2018-08-07 RX ORDER — FENTANYL CITRATE 50 UG/ML
50 INJECTION, SOLUTION INTRAMUSCULAR; INTRAVENOUS
Status: DISCONTINUED | OUTPATIENT
Start: 2018-08-07 | End: 2018-08-07 | Stop reason: HOSPADM

## 2018-08-07 RX ORDER — ONDANSETRON 2 MG/ML
4 INJECTION INTRAMUSCULAR; INTRAVENOUS EVERY 4 HOURS PRN
Status: DISCONTINUED | OUTPATIENT
Start: 2018-08-07 | End: 2018-08-07 | Stop reason: HOSPADM

## 2018-08-07 RX ORDER — FENTANYL CITRATE 50 UG/ML
INJECTION, SOLUTION INTRAMUSCULAR; INTRAVENOUS PRN
Status: DISCONTINUED | OUTPATIENT
Start: 2018-08-07 | End: 2018-08-07 | Stop reason: SDUPTHER

## 2018-08-07 RX ORDER — LIDOCAINE HYDROCHLORIDE 10 MG/ML
INJECTION, SOLUTION EPIDURAL; INFILTRATION; INTRACAUDAL; PERINEURAL PRN
Status: DISCONTINUED | OUTPATIENT
Start: 2018-08-07 | End: 2018-08-07 | Stop reason: SDUPTHER

## 2018-08-07 RX ORDER — MORPHINE SULFATE 4 MG/ML
2 INJECTION, SOLUTION INTRAMUSCULAR; INTRAVENOUS EVERY 4 HOURS PRN
Status: DISCONTINUED | OUTPATIENT
Start: 2018-08-07 | End: 2018-08-07 | Stop reason: HOSPADM

## 2018-08-07 RX ORDER — DIPHENHYDRAMINE HYDROCHLORIDE 50 MG/ML
12.5 INJECTION INTRAMUSCULAR; INTRAVENOUS
Status: DISCONTINUED | OUTPATIENT
Start: 2018-08-07 | End: 2018-08-07 | Stop reason: HOSPADM

## 2018-08-07 RX ORDER — LIDOCAINE HYDROCHLORIDE 10 MG/ML
1 INJECTION, SOLUTION EPIDURAL; INFILTRATION; INTRACAUDAL; PERINEURAL
Status: DISCONTINUED | OUTPATIENT
Start: 2018-08-07 | End: 2018-08-07 | Stop reason: HOSPADM

## 2018-08-07 RX ORDER — TAMSULOSIN HYDROCHLORIDE 0.4 MG/1
0.4 CAPSULE ORAL ONCE
Status: COMPLETED | OUTPATIENT
Start: 2018-08-07 | End: 2018-08-07

## 2018-08-07 RX ORDER — PROMETHAZINE HYDROCHLORIDE 25 MG/ML
6.25 INJECTION, SOLUTION INTRAMUSCULAR; INTRAVENOUS
Status: DISCONTINUED | OUTPATIENT
Start: 2018-08-07 | End: 2018-08-07 | Stop reason: HOSPADM

## 2018-08-07 RX ORDER — ENALAPRILAT 2.5 MG/2ML
1.25 INJECTION INTRAVENOUS
Status: DISCONTINUED | OUTPATIENT
Start: 2018-08-07 | End: 2018-08-07 | Stop reason: HOSPADM

## 2018-08-07 RX ORDER — SULFAMETHOXAZOLE AND TRIMETHOPRIM 800; 160 MG/1; MG/1
1 TABLET ORAL 2 TIMES DAILY
Qty: 20 TABLET | Refills: 0 | Status: SHIPPED | OUTPATIENT
Start: 2018-08-07 | End: 2018-08-17

## 2018-08-07 RX ORDER — HYDROCODONE BITARTRATE AND ACETAMINOPHEN 7.5; 325 MG/1; MG/1
1 TABLET ORAL EVERY 8 HOURS PRN
COMMUNITY
End: 2018-09-26

## 2018-08-07 RX ORDER — ATROPA BELLADONNA AND OPIUM 16.2; 6 MG/1; MG/1
SUPPOSITORY RECTAL PRN
Status: DISCONTINUED | OUTPATIENT
Start: 2018-08-07 | End: 2018-08-07 | Stop reason: HOSPADM

## 2018-08-07 RX ORDER — MIDAZOLAM HYDROCHLORIDE 1 MG/ML
2 INJECTION INTRAMUSCULAR; INTRAVENOUS
Status: DISCONTINUED | OUTPATIENT
Start: 2018-08-07 | End: 2018-08-07 | Stop reason: HOSPADM

## 2018-08-07 RX ORDER — OXYCODONE HYDROCHLORIDE AND ACETAMINOPHEN 5; 325 MG/1; MG/1
2 TABLET ORAL EVERY 4 HOURS PRN
Status: DISCONTINUED | OUTPATIENT
Start: 2018-08-07 | End: 2018-08-07 | Stop reason: HOSPADM

## 2018-08-07 RX ORDER — SCOLOPAMINE TRANSDERMAL SYSTEM 1 MG/1
1 PATCH, EXTENDED RELEASE TRANSDERMAL ONCE
Status: DISCONTINUED | OUTPATIENT
Start: 2018-08-07 | End: 2018-08-07 | Stop reason: HOSPADM

## 2018-08-07 RX ORDER — OXYCODONE HYDROCHLORIDE AND ACETAMINOPHEN 5; 325 MG/1; MG/1
1 TABLET ORAL EVERY 4 HOURS PRN
Qty: 30 TABLET | Refills: 0 | Status: SHIPPED | OUTPATIENT
Start: 2018-08-07 | End: 2018-08-14

## 2018-08-07 RX ORDER — PHENAZOPYRIDINE HYDROCHLORIDE 100 MG/1
100 TABLET, FILM COATED ORAL ONCE
Status: COMPLETED | OUTPATIENT
Start: 2018-08-07 | End: 2018-08-07

## 2018-08-07 RX ORDER — PHENAZOPYRIDINE HYDROCHLORIDE 100 MG/1
100 TABLET, FILM COATED ORAL 3 TIMES DAILY PRN
Qty: 30 TABLET | Refills: 1 | Status: SHIPPED | OUTPATIENT
Start: 2018-08-07 | End: 2018-09-26

## 2018-08-07 RX ORDER — APREPITANT 40 MG/1
40 CAPSULE ORAL ONCE
Status: COMPLETED | OUTPATIENT
Start: 2018-08-07 | End: 2018-08-07

## 2018-08-07 RX ORDER — SULFAMETHOXAZOLE AND TRIMETHOPRIM 800; 160 MG/1; MG/1
1 TABLET ORAL 2 TIMES DAILY
Qty: 20 TABLET | Refills: 0 | Status: SHIPPED | OUTPATIENT
Start: 2018-08-07 | End: 2018-08-07

## 2018-08-07 RX ORDER — FENTANYL CITRATE 50 UG/ML
25 INJECTION, SOLUTION INTRAMUSCULAR; INTRAVENOUS
Status: DISCONTINUED | OUTPATIENT
Start: 2018-08-07 | End: 2018-08-07 | Stop reason: HOSPADM

## 2018-08-07 RX ORDER — SODIUM CHLORIDE 0.9 % (FLUSH) 0.9 %
10 SYRINGE (ML) INJECTION PRN
Status: DISCONTINUED | OUTPATIENT
Start: 2018-08-07 | End: 2018-08-07 | Stop reason: HOSPADM

## 2018-08-07 RX ORDER — DEXAMETHASONE SODIUM PHOSPHATE 10 MG/ML
INJECTION INTRAMUSCULAR; INTRAVENOUS PRN
Status: DISCONTINUED | OUTPATIENT
Start: 2018-08-07 | End: 2018-08-07 | Stop reason: SDUPTHER

## 2018-08-07 RX ORDER — KETOROLAC TROMETHAMINE 30 MG/ML
15 INJECTION, SOLUTION INTRAMUSCULAR; INTRAVENOUS ONCE
Status: DISCONTINUED | OUTPATIENT
Start: 2018-08-07 | End: 2018-08-07 | Stop reason: HOSPADM

## 2018-08-07 RX ORDER — HYDRALAZINE HYDROCHLORIDE 20 MG/ML
5 INJECTION INTRAMUSCULAR; INTRAVENOUS EVERY 10 MIN PRN
Status: DISCONTINUED | OUTPATIENT
Start: 2018-08-07 | End: 2018-08-07 | Stop reason: HOSPADM

## 2018-08-07 RX ORDER — SUCCINYLCHOLINE CHLORIDE 20 MG/ML
INJECTION INTRAMUSCULAR; INTRAVENOUS PRN
Status: DISCONTINUED | OUTPATIENT
Start: 2018-08-07 | End: 2018-08-07 | Stop reason: SDUPTHER

## 2018-08-07 RX ORDER — PROPOFOL 10 MG/ML
INJECTION, EMULSION INTRAVENOUS PRN
Status: DISCONTINUED | OUTPATIENT
Start: 2018-08-07 | End: 2018-08-07 | Stop reason: SDUPTHER

## 2018-08-07 RX ORDER — TAMSULOSIN HYDROCHLORIDE 0.4 MG/1
0.4 CAPSULE ORAL DAILY
Qty: 14 CAPSULE | Refills: 1 | Status: SHIPPED | OUTPATIENT
Start: 2018-08-07 | End: 2018-09-26

## 2018-08-07 RX ORDER — ROCURONIUM BROMIDE 10 MG/ML
INJECTION, SOLUTION INTRAVENOUS PRN
Status: DISCONTINUED | OUTPATIENT
Start: 2018-08-07 | End: 2018-08-07 | Stop reason: SDUPTHER

## 2018-08-07 RX ORDER — MEPERIDINE HYDROCHLORIDE 50 MG/ML
12.5 INJECTION INTRAMUSCULAR; INTRAVENOUS; SUBCUTANEOUS EVERY 5 MIN PRN
Status: DISCONTINUED | OUTPATIENT
Start: 2018-08-07 | End: 2018-08-07 | Stop reason: HOSPADM

## 2018-08-07 RX ORDER — TAMSULOSIN HYDROCHLORIDE 0.4 MG/1
0.4 CAPSULE ORAL DAILY
Qty: 14 CAPSULE | Refills: 1 | Status: SHIPPED | OUTPATIENT
Start: 2018-08-07 | End: 2018-08-07 | Stop reason: HOSPADM

## 2018-08-07 RX ORDER — SODIUM CHLORIDE 0.9 % (FLUSH) 0.9 %
10 SYRINGE (ML) INJECTION EVERY 12 HOURS SCHEDULED
Status: DISCONTINUED | OUTPATIENT
Start: 2018-08-07 | End: 2018-08-07 | Stop reason: HOSPADM

## 2018-08-07 RX ORDER — HYDROMORPHONE HCL IN 0.9% NACL 0.5 MG/ML
0.5 SYRINGE (ML) INTRAVENOUS EVERY 5 MIN PRN
Status: DISCONTINUED | OUTPATIENT
Start: 2018-08-07 | End: 2018-08-07 | Stop reason: HOSPADM

## 2018-08-07 RX ORDER — METOCLOPRAMIDE HYDROCHLORIDE 5 MG/ML
10 INJECTION INTRAMUSCULAR; INTRAVENOUS
Status: DISCONTINUED | OUTPATIENT
Start: 2018-08-07 | End: 2018-08-07 | Stop reason: HOSPADM

## 2018-08-07 RX ORDER — MORPHINE SULFATE 1 MG/ML
4 INJECTION, SOLUTION EPIDURAL; INTRATHECAL; INTRAVENOUS EVERY 5 MIN PRN
Status: DISCONTINUED | OUTPATIENT
Start: 2018-08-07 | End: 2018-08-07 | Stop reason: HOSPADM

## 2018-08-07 RX ORDER — MORPHINE SULFATE 1 MG/ML
2 INJECTION, SOLUTION EPIDURAL; INTRATHECAL; INTRAVENOUS EVERY 5 MIN PRN
Status: DISCONTINUED | OUTPATIENT
Start: 2018-08-07 | End: 2018-08-07 | Stop reason: HOSPADM

## 2018-08-07 RX ADMIN — ROCURONIUM BROMIDE 5 MG: 10 INJECTION INTRAVENOUS at 15:29

## 2018-08-07 RX ADMIN — SUGAMMADEX 500 MG: 100 INJECTION, SOLUTION INTRAVENOUS at 16:25

## 2018-08-07 RX ADMIN — LIDOCAINE HYDROCHLORIDE 50 MG: 10 INJECTION, SOLUTION EPIDURAL; INFILTRATION; INTRACAUDAL; PERINEURAL at 15:32

## 2018-08-07 RX ADMIN — PROPOFOL 200 MG: 10 INJECTION, EMULSION INTRAVENOUS at 15:32

## 2018-08-07 RX ADMIN — CIPROFLOXACIN 400 MG: 2 INJECTION INTRAVENOUS at 15:37

## 2018-08-07 RX ADMIN — TAMSULOSIN HYDROCHLORIDE 0.4 MG: 0.4 CAPSULE ORAL at 17:03

## 2018-08-07 RX ADMIN — OXYCODONE HYDROCHLORIDE AND ACETAMINOPHEN 2 TABLET: 5; 325 TABLET ORAL at 17:03

## 2018-08-07 RX ADMIN — DEXAMETHASONE SODIUM PHOSPHATE 10 MG: 10 INJECTION INTRAMUSCULAR; INTRAVENOUS at 15:38

## 2018-08-07 RX ADMIN — ROCURONIUM BROMIDE 20 MG: 10 INJECTION INTRAVENOUS at 16:23

## 2018-08-07 RX ADMIN — SODIUM CHLORIDE, POTASSIUM CHLORIDE, SODIUM LACTATE AND CALCIUM CHLORIDE: 600; 310; 30; 20 INJECTION, SOLUTION INTRAVENOUS at 14:08

## 2018-08-07 RX ADMIN — APREPITANT 40 MG: 40 CAPSULE ORAL at 14:08

## 2018-08-07 RX ADMIN — FENTANYL CITRATE 50 MCG: 50 INJECTION INTRAMUSCULAR; INTRAVENOUS at 16:07

## 2018-08-07 RX ADMIN — PHENAZOPYRIDINE HYDROCHLORIDE 100 MG: 100 TABLET ORAL at 17:04

## 2018-08-07 RX ADMIN — ONDANSETRON HYDROCHLORIDE 4 MG: 2 INJECTION, SOLUTION INTRAMUSCULAR; INTRAVENOUS at 16:25

## 2018-08-07 RX ADMIN — FENTANYL CITRATE 50 MCG: 50 INJECTION INTRAMUSCULAR; INTRAVENOUS at 15:53

## 2018-08-07 RX ADMIN — ROCURONIUM BROMIDE 45 MG: 10 INJECTION INTRAVENOUS at 15:40

## 2018-08-07 RX ADMIN — MIDAZOLAM 2 MG: 1 INJECTION INTRAMUSCULAR; INTRAVENOUS at 15:23

## 2018-08-07 RX ADMIN — Medication 200 MG: at 15:32

## 2018-08-07 ASSESSMENT — LIFESTYLE VARIABLES: SMOKING_STATUS: 0

## 2018-08-07 ASSESSMENT — PAIN DESCRIPTION - PROGRESSION: CLINICAL_PROGRESSION: NOT CHANGED

## 2018-08-07 ASSESSMENT — PAIN DESCRIPTION - FREQUENCY: FREQUENCY: CONTINUOUS

## 2018-08-07 ASSESSMENT — PAIN DESCRIPTION - DESCRIPTORS: DESCRIPTORS: ACHING

## 2018-08-07 ASSESSMENT — PAIN SCALES - GENERAL
PAINLEVEL_OUTOF10: 8
PAINLEVEL_OUTOF10: 5

## 2018-08-07 ASSESSMENT — PAIN - FUNCTIONAL ASSESSMENT: PAIN_FUNCTIONAL_ASSESSMENT: 0-10

## 2018-08-07 ASSESSMENT — PAIN DESCRIPTION - PAIN TYPE: TYPE: SURGICAL PAIN;CHRONIC PAIN

## 2018-08-07 ASSESSMENT — ENCOUNTER SYMPTOMS: SHORTNESS OF BREATH: 0

## 2018-08-07 ASSESSMENT — PAIN DESCRIPTION - ONSET: ONSET: AWAKENED FROM SLEEP

## 2018-08-07 NOTE — ANESTHESIA PRE PROCEDURE
EXTRACORPEAL SHOCK WAVE LITHOTRIPSY performed by Isabella Najera MD at [address] [place] [address] ESWL Right 3/13/2018    ESWL [address] LITHOTRIPSY performed by Isabella Najera MD at [address] [place] [address] ESWL Right 7/24/2018    ESWL [address] LITHOTRIPSY performed by Isabella Najera MD at [facility] [address], [address] N/A 0/4/1784    HERNIA UMBILICAL REPAIR LAPAROSCOPIC performed by Antonio Rocha MD at [address]      left wrist.  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                                Counseling given: Not Answered      Vital Signs (Current):   Vitals:    08/03/18 1114 08/07/18 1318   BP:  135/75   Pulse:  87   Resp:  12   Temp:  98.2 °F (36.8 °C)   TempSrc:  Tympanic   SpO2:  95%   Weight: (!) 358 lb (162.4 kg) (!) 358 lb (162.4 kg)   Height: 6' 1\" (1.854 m) 6' 1\" (1.854 m)                                              BP Readings from Last 3 Encounters:   08/07/18 135/75   07/24/18 (!) 152/76   07/24/18 127/80       NPO Status: Time of last liquid consumption: 2300                        Time of last solid consumption: 2300                        Date of last liquid consumption: 08/06/18                        Date of last solid food consumption: 08/06/18    BMI:   Wt Readings from Last 3 Encounters:   08/07/18 (!) 358 lb (162.4 kg)   07/23/18 (!) 358 lb (162.4 kg)   07/24/18 (!) 358 lb (162.4 kg)     Body mass index is 47.23 kg/m².     CBC:   Lab Results   Component Value Date    WBC 7.5 07/23/2018    RBC 5.37 07/23/2018    HGB 15.9 07/24/2018    HCT 52.4 07/24/2018    MCV 89.2 07/23/2018    RDW 13.8 07/23/2018     07/23/2018       CMP:   Lab Results   Component Value Date     07/23/2018     08/14/2011    K 4.0 07/23/2018    K 4.5 08/14/2011     07/23/2018     08/14/2011    CO2 25 07/23/2018    BUN 9 07/23/2018    CREATININE 0.7 07/23/2018    CREATININE 0.8 08/14/2011    LABGLOM >60 07/23/2018    GLUCOSE 95 07/23/2018    PROT 6.4 01/28/2018    CALCIUM 9.2 07/23/2018    BILITOT 0.3 01/28/2018    ALKPHOS 72 01/28/2018    AST 12 01/28/2018    ALT 18 01/28/2018       POC Tests: No results for input(s): POCGLU, POCNA, POCK, POCCL, POCBUN, POCHEMO, POCHCT in the last 72 hours. Coags:   Lab Results   Component Value Date    PROTIME 14.2 07/23/2018    INR 1.11 07/23/2018    APTT 28.4 07/23/2018       HCG (If Applicable): No results found for: PREGTESTUR, PREGSERUM, HCG, HCGQUANT     ABGs: No results found for: PHART, PO2ART, BKW8VEC, TTR7IES, BEART, U2PCEBPO     Type & Screen (If Applicable):  No results found for: LABABO, [address]    Anesthesia Evaluation  Patient summary reviewed and Nursing notes reviewed no history of anesthetic complications:   Airway: Mallampati: II  TM distance: >3 FB   Neck ROM: full  Mouth opening: > = 3 FB Dental: normal exam         Pulmonary:Negative Pulmonary ROS and normal exam  breath sounds clear to auscultation  (+) sleep apnea:      (-) shortness of breath and not a current smoker          Patient did not smoke on day of surgery. Cardiovascular:        (-) hypertension, CAD,  angina and  CHF    NYHA Classification: I  ECG reviewed  Rhythm: regular  Rate: normal           Beta Blocker:  Not on Beta Blocker         Neuro/Psych:   Negative Neuro/Psych ROS     (-) seizures, CVA and depression/anxiety            GI/Hepatic/Renal: Neg GI/Hepatic/Renal ROS  (+) renal disease: kidney stones, morbid obesity     (-) hiatal hernia and GERD       Endo/Other: Negative Endo/Other ROS             Pt had PAT visit. Abdominal:       Abdomen: soft.     Vascular:                                          Anesthesia Plan      general     ASA 3     (Iv zofran within 30 min of closing

## 2018-08-07 NOTE — H&P (VIEW-ONLY)
PHYSICAL EXAM:  /78 (Site: Left Arm, Position: Sitting, Cuff Size: Medium Adult)   Pulse 90   Temp 97.3 °F (36.3 °C) (Temporal)   Ht 6' 1\" (1.854 m)   Wt (!) 363 lb (164.7 kg)   SpO2 99%   BMI 47.89 kg/m²   Physical Exam   Constitutional: No distress. HENT:   Mouth/Throat: No oropharyngeal exudate. Eyes: Left eye exhibits no discharge. No scleral icterus. Neck: No JVD present. No tracheal deviation present. No thyromegaly present. Cardiovascular: Exam reveals no gallop and no friction rub. Pulmonary/Chest: No stridor. He has no rales. Abdominal: He exhibits no distension and no mass. There is no rebound and no guarding. Musculoskeletal: He exhibits no edema. Neurological: No cranial nerve deficit. He exhibits normal muscle tone. Coordination normal.   Skin: He is not diaphoretic. No pallor.               DATA:  U/A:          Lab Results   Component Value Date     NITRITE neg 06/27/2018     COLORU yellow 03/07/2018     COLORU YELLOW 01/28/2018     PROTEINU neg 06/27/2018     PROTEINU Negative 01/28/2018     PHUR 6.0 06/27/2018     PHUR 5.5 01/28/2018     MUCUS RARE 05/15/2011     BACTERIA 1+ 08/14/2011     CLARITYU neg 03/27/2018     CLARITYU Clear 01/28/2018     SPECGRAV 1.030 06/27/2018     SPECGRAV >1.045 01/28/2018     LEUKOCYTESUR neg 06/27/2018     LEUKOCYTESUR Negative 01/28/2018     UROBILINOGEN 0.2 01/28/2018     BILIRUBINUR neg 06/27/2018     BILIRUBINUR NEGATIVE 08/14/2011     BLOODU trace--intact 06/27/2018     BLOODU Negative 01/28/2018     GLUCOSEU neg 06/27/2018     GLUCOSEU Negative 01/28/2018            Imaging:  Findings:   Chest   Incidental scanning through the lower chest demonstrates minimal areas   of atelectasis. Several calcified or partially calcified granulomas   noted. Abdomen   Hepatic steatosis. Mild splenomegaly, greatest axial dimension of the   spleen is 14 cm. Gallbladder is present. No adrenal nodule. No   pancreatic abnormality.    There are no left-sided urinary tract stones. There is severe right   hydronephrosis with 2 adjacent stones in the proximal right ureter   (essentially at the UPJ). Aggregate dimension is 8 x 5 mm. There is a   4 mm stone in the inferior major calyx of the right kidney. No bowel obstruction or acute inflammatory process. Colonic   diverticulosis without diverticulitis. Normal caliber abdominal aorta. Normal appendix. No significant lymphadenopathy. There is an enlarged   portacaval lymph node however unchanged from 2011 therefore benign. Pelvis   Urinary bladder nondistended without intraluminal stone. Prostate   calcification. Tiny fat-containing inguinal hernias. Degenerative changes throughout the spine.      FINDINGS: 2 calcifications are stable in position along the right L3   transverse process. Each calcification measuring approximately 3 mm. These correspond to proximal right ureteral stones based on CT exam of   7/10/2018. A 3-4 mm punctate nonobstructing stone is seen in the   inferior pole of the right kidney. No new pathologic aspirations are   visualized. There is nonobstructive bowel gas pattern. There is no   organomegaly.            1. Right ureteral calculus  There are 2 adjacent 3-4 mm calcification seen lateral to the transverse process of L3. These are amenable to ESWL done this would be his 3rd shockwave lithotripsy hopefully this will break the stones up completely he has been having more pain this past week. We'll schedule him for ESWL this coming Thursday.     2. Right flank pain  Pain related to #1.  His pain is very calm increased in severity this past week we'll go ahead and give him something for pain up until he gets his ESWL        No orders of the defined types were placed in this encounter.     Encounter Medications         Orders Placed This Encounter   Medications    HYDROcodone-acetaminophen (NORCO) 7.5-325 MG per tablet       Sig: Take 1 tablet by mouth every 8 hours as needed for Pain for up to 7 days. .       Dispense:  21 tablet       Refill:  0         Plan: Will be scheduled for right ESWL this Thursday with Dr. Filiberto Hensley.                Office Visit on 7/17/2018            Revision History            Detailed Report           Note shared with patient   Progress Notes Info     Author Note Status Last Update User   Romana Floras, PA-C Signed Romana Floras, PA-C   Last Update Date/Time: 7/17/2018 11:45 AM   Chart Review Routing History     Routing history could not be found for this note. This is because the note has never been routed or because communication record creation was suppressed.

## 2018-08-08 NOTE — OP NOTE
pelvis and distal end coiled in  the bladder. We will leave the stent in place for 10 days and we will have  him see Silvia Michaels, my PA, for stent removal.  The patient does have a  string. He will follow up to see me in 6 weeks with a renal ultrasound.         Miroslava Conner MD    D: 08/07/2018 17:45:02      T: 08/07/2018 17:47:04     [email or web address]  Job#: [id number]     Doc#: [id number]    CC:

## 2018-08-13 LAB
CALCULI COMPOSITION: NORMAL
MASS: 14 MG
STONE DESCRIPTION: NORMAL
STONE NUMBER: 2
STONE SIZE: NORMAL MM

## 2018-08-16 ENCOUNTER — OFFICE VISIT (OUTPATIENT)
Dept: UROLOGY | Age: 38
End: 2018-08-16
Payer: COMMERCIAL

## 2018-08-16 VITALS — WEIGHT: 315 LBS | BODY MASS INDEX: 41.75 KG/M2 | HEIGHT: 73 IN | TEMPERATURE: 98.7 F

## 2018-08-16 DIAGNOSIS — N20.1 RIGHT URETERAL CALCULUS: ICD-10-CM

## 2018-08-16 DIAGNOSIS — Z46.6 ENCOUNTER FOR REMOVAL OF URETERAL STENT: Primary | ICD-10-CM

## 2018-08-16 LAB
BACTERIA URINE, POC: ABNORMAL
BILIRUBIN URINE: 1 MG/DL
BLOOD, URINE: POSITIVE
CASTS URINE, POC: ABNORMAL
CLARITY: ABNORMAL
COLOR: ABNORMAL
CRYSTALS URINE, POC: ABNORMAL
EPI CELLS URINE, POC: ABNORMAL
GLUCOSE URINE: ABNORMAL
KETONES, URINE: NEGATIVE
LEUKOCYTE EST, POC: POSITIVE
NITRITE, URINE: POSITIVE
PH UA: 5 (ref 4.5–8)
PROTEIN UA: POSITIVE
RBC URINE, POC: ABNORMAL
SPECIFIC GRAVITY UA: 1.03 (ref 1–1.03)
UROBILINOGEN, URINE: NORMAL
WBC URINE, POC: ABNORMAL
YEAST URINE, POC: ABNORMAL

## 2018-08-16 PROCEDURE — 99024 POSTOP FOLLOW-UP VISIT: CPT | Performed by: NURSE PRACTITIONER

## 2018-08-16 PROCEDURE — 81001 URINALYSIS AUTO W/SCOPE: CPT | Performed by: NURSE PRACTITIONER

## 2018-08-16 ASSESSMENT — ENCOUNTER SYMPTOMS
SHORTNESS OF BREATH: 0
COUGH: 0

## 2018-08-16 NOTE — PROGRESS NOTES
rate.    Pulmonary/Chest: Effort normal. No respiratory distress. Abdominal: Soft. Musculoskeletal: Normal range of motion. Neurological: He is alert and oriented to person, place, and time. Skin: Skin is warm and dry. Psychiatric: He has a normal mood and affect. His behavior is normal. Judgment and thought content normal.   Vitals reviewed. Temp 98.7 °F (37.1 °C) (Temporal)   Ht 6' 1\" (1.854 m)   Wt (!) 359 lb (162.8 kg)   BMI 47.36 kg/m²       DATA:    Results for orders placed or performed in visit on 08/16/18   POCT Urinalysis Dipstick w/ Micro (Auto)   Result Value Ref Range    Color, UA Orange     Clarity, UA Cloudy (A) Clear    Glucose, Ur neg     Bilirubin Urine 1 mg/dL    Ketones, Urine Negative     Specific Gravity, UA 1.030 1.005 - 1.030    Blood, Urine Positive     pH, UA 5.0 4.5 - 8.0    Protein, UA Positive (A) Negative    Nitrite, Urine Positive     Leukocytes, UA positive     Urobilinogen, Urine Normal     rbc urine, poc      wbc urine, poc      bacteria urine, poc      yeast urine, poc      casts urine, poc      epi cells urine, poc      crystals urine, poc           Assessment/ Plan       Diagnosis Orders   1. Encounter for removal of ureteral stent     2. Right ureteral calculus  POCT Urinalysis Dipstick w/ Micro (Auto)   Ureteral stent removed without difficulty, patient is to follow up after renal ultrasound with Dr. Eyad aKpadia. Discussed use, benefit, and side effects of prescribed medications. All patient questions answered. Pt voiced understanding. Patient agreed with treatment plan. Follow up With Dr. Eyad Kapadia in four weeks.     Electronically signed by VANDANA Castaneda on 8/16/2018 at 5:17 PM

## 2018-08-28 ENCOUNTER — TELEPHONE (OUTPATIENT)
Dept: UROLOGY | Age: 38
End: 2018-08-28

## 2018-09-25 ENCOUNTER — HOSPITAL ENCOUNTER (OUTPATIENT)
Dept: ULTRASOUND IMAGING | Age: 38
Discharge: HOME | End: 2018-09-25
Payer: COMMERCIAL

## 2018-09-25 DIAGNOSIS — N20.1 RIGHT URETERAL CALCULUS: ICD-10-CM

## 2018-09-25 PROCEDURE — 76770 US EXAM ABDO BACK WALL COMP: CPT

## 2018-09-26 ENCOUNTER — OFFICE VISIT (OUTPATIENT)
Dept: UROLOGY | Age: 38
End: 2018-09-26
Payer: COMMERCIAL

## 2018-09-26 ENCOUNTER — OFFICE VISIT (OUTPATIENT)
Dept: PRIMARY CARE CLINIC | Age: 38
End: 2018-09-26
Payer: COMMERCIAL

## 2018-09-26 VITALS
TEMPERATURE: 97.5 F | DIASTOLIC BLOOD PRESSURE: 82 MMHG | OXYGEN SATURATION: 96 % | WEIGHT: 315 LBS | BODY MASS INDEX: 42.66 KG/M2 | HEART RATE: 82 BPM | SYSTOLIC BLOOD PRESSURE: 120 MMHG | HEIGHT: 72 IN

## 2018-09-26 VITALS — BODY MASS INDEX: 41.75 KG/M2 | HEIGHT: 73 IN | WEIGHT: 315 LBS | TEMPERATURE: 97.6 F

## 2018-09-26 DIAGNOSIS — M25.512 ACUTE PAIN OF BOTH SHOULDERS: Primary | ICD-10-CM

## 2018-09-26 DIAGNOSIS — M25.511 ACUTE PAIN OF BOTH SHOULDERS: Primary | ICD-10-CM

## 2018-09-26 DIAGNOSIS — Z23 NEEDS FLU SHOT: ICD-10-CM

## 2018-09-26 DIAGNOSIS — E66.01 MORBID OBESITY, UNSPECIFIED OBESITY TYPE (HCC): ICD-10-CM

## 2018-09-26 DIAGNOSIS — N20.1 RIGHT URETERAL CALCULUS: Primary | ICD-10-CM

## 2018-09-26 LAB
BACTERIA URINE, POC: ABNORMAL
BILIRUBIN URINE: 0 MG/DL
BLOOD, URINE: POSITIVE
CASTS URINE, POC: ABNORMAL
CLARITY: ABNORMAL
COLOR: YELLOW
CRYSTALS URINE, POC: ABNORMAL
EPI CELLS URINE, POC: ABNORMAL
GLUCOSE URINE: 100
KETONES, URINE: NEGATIVE
LEUKOCYTE EST, POC: ABNORMAL
NITRITE, URINE: POSITIVE
PH UA: 7 (ref 4.5–8)
PROTEIN UA: NEGATIVE
RBC URINE, POC: ABNORMAL
SPECIFIC GRAVITY UA: 1.01 (ref 1–1.03)
UROBILINOGEN, URINE: NORMAL
WBC URINE, POC: ABNORMAL
YEAST URINE, POC: ABNORMAL

## 2018-09-26 PROCEDURE — 99024 POSTOP FOLLOW-UP VISIT: CPT | Performed by: UROLOGY

## 2018-09-26 PROCEDURE — 90686 IIV4 VACC NO PRSV 0.5 ML IM: CPT | Performed by: FAMILY MEDICINE

## 2018-09-26 PROCEDURE — 81000 URINALYSIS NONAUTO W/SCOPE: CPT | Performed by: UROLOGY

## 2018-09-26 PROCEDURE — 99213 OFFICE O/P EST LOW 20 MIN: CPT | Performed by: FAMILY MEDICINE

## 2018-09-26 PROCEDURE — 90471 IMMUNIZATION ADMIN: CPT | Performed by: FAMILY MEDICINE

## 2018-09-26 RX ORDER — MELOXICAM 15 MG/1
15 TABLET ORAL DAILY
Qty: 30 TABLET | Refills: 3 | Status: SHIPPED | OUTPATIENT
Start: 2018-09-26 | End: 2019-03-27 | Stop reason: SDUPTHER

## 2018-09-26 ASSESSMENT — ENCOUNTER SYMPTOMS
HEARTBURN: 0
COUGH: 0
WHEEZING: 0
SORE THROAT: 0
BLURRED VISION: 0
SHORTNESS OF BREATH: 0
SHORTNESS OF BREATH: 0
DOUBLE VISION: 0
NAUSEA: 0

## 2018-09-26 NOTE — LETTER
[facility]  [address] [place] [address] Drive, [address]  [address] [place] [address] Road  Phone: [phone]  Fax: [phone]    Brad Reyes MD        September 26, 2018     Daiana Reilly MD  [facility] [address] [place] [address] [facility] [address]      Patient: Neyda Peoples   MR Number: [id number]   YOB: 1980   Date of Visit: 9/26/2018       Dear Provider: Thank you for referring Lenny Johnson to me for evaluation. Below are the relevant portions of my assessment and plan of care. If you have questions, please do not hesitate to call me. I look forward to following [facility] along with you.     Sincerely,        Brad Reyes MD

## 2018-09-26 NOTE — PROGRESS NOTES
Marquez Rubio is a 43 y.o. male    Chief Complaint   Patient presents with    Follow-up     6 months    Obesity       HPI  Marquez Rubio presents to the office for follow-up of morbid obesity. Patient states he has not been following his diet as previously. Patient has noticed an increase in his weight. Patient is not exercising on a regular basis. Patient also complains of bilateral shoulder pain. Patient currently works at [employer] and does a lot of heavy lifting. Patient states his shoulder pain occurs occasionally. Patient will usually take ibuprofen for pain relief. This is been an ongoing issue for the past several months. Patient denies any decreased range of motion. Review of Systems   Constitutional: Negative for chills and fever. Respiratory: Negative for cough and shortness of breath. Cardiovascular: Negative for chest pain and leg swelling. Prior to Admission medications    Medication Sig Start Date End Date Taking?  Authorizing Provider   meloxicam (MOBIC) 15 MG tablet Take 1 tablet by mouth daily 9/26/18  Yes Raenell Fothergill, MD       Past Medical History:   Diagnosis Date    Arthritis     both wrist    Bronchitis     10 yrs ago    Kidney stone     recurrent    Sleep apnea     untested    Umbilical hernia        Past Surgical History:   Procedure Laterality Date    CYSTOSCOPY Right 7/24/2018    CYSTOSCOPY/ URETEROSCOPY; INSERTION DOUBLE J STENT/  URETERAL performed by Alva Leija MD at [address] [place] [address]      both wrists    HEMORRHOID SURGERY N/A 6/24/2016    HEMORRHOID INCISION AND DRAINAGE performed by Yasmin Guadalupe MD at [address] [place] [address] W/LITHOTRIPSY Right 8/7/2018    URETEROSCOPY LASER LITHOTRIPSY STONE EXTRACTION performed by Alva Leija MD at [facility] Right 8/7/2018    STENT PLACEMENT performed by Alva Leija MD at [address] [place] [address] ESWL Right

## 2018-09-26 NOTE — PROGRESS NOTES
Tra Koo is a 43 y.o. male who presents today   Chief Complaint   Patient presents with    Follow-up     I'm here for my 6 week post op f/u with renal US for kidney stones     Ureteral calculus:  Patient is here today for a ureteral calculus which was first noted approximately 2 month(s) ago. Patient had a right proximal ureteral calculus that failed ESWL subscleral require right ureteroscopy laser lithotripsy and stent placement done 08/07/18. His stent was removed on August 16 he now comes in for follow-up ultrasound he's doing well. Location: right upper ureter  Recently the ureteral calculus symptoms: are improving  Current medical Rx for ureteral calculus: none  Flank pain? No, bilateral  Hematuria? None  Passed recent calculus?  No  Personal History of Kidney Stones: yes - he says he'll have these in spurts incurring over last 4-5 years  Stone composition: calcium oxalate and calcium phosphate  Family History of Kidney Stones: no    Lab Results   Component Value Date    CALCIUM 9.2 07/23/2018    PHOS 3.1 06/26/2016     Lab Results   Component Value Date     07/23/2018    K 4.0 07/23/2018     07/23/2018    CO2 25 07/23/2018    BUN 9 07/23/2018    CREATININE 0.7 07/23/2018           Past Medical History:   Diagnosis Date    Arthritis     both wrist    Bronchitis     10 yrs ago    Kidney stone     recurrent    Sleep apnea     untested    Umbilical hernia        Past Surgical History:   Procedure Laterality Date    CYSTOSCOPY Right 7/24/2018    CYSTOSCOPY/ URETEROSCOPY; INSERTION DOUBLE J STENT/  URETERAL performed by Zena Luna MD at [address] [place] [address]      both wrists    HEMORRHOID SURGERY N/A 6/24/2016    HEMORRHOID INCISION AND DRAINAGE performed by Mariya Irby MD at [place] [address] CYSTO/URETERO/PYELOSCOPY W/LITHOTRIPSY Right 8/7/2018    URETEROSCOPY LASER LITHOTRIPSY STONE EXTRACTION performed by Zena Luna MD at [facility] palpitations. Gastrointestinal: Negative for heartburn and nausea. Genitourinary: Negative for dysuria, flank pain, frequency, hematuria and urgency. Musculoskeletal: Negative for falls and neck pain. Skin: Negative for itching and rash. Neurological: Negative for dizziness and tingling. Endo/Heme/Allergies: Does not bruise/bleed easily. Psychiatric/Behavioral: The patient does not have insomnia. PHYSICAL EXAM:  Temp 97.6 °F (36.4 °C) (Temporal)   Ht 6' 1\" (1.854 m)   Wt (!) 370 lb (167.8 kg)   BMI 48.82 kg/m²   Physical Exam   Constitutional: He is oriented to person, place, and time. He appears well-developed and well-nourished. No distress. HENT:   Head: Normocephalic and atraumatic. Nose: Nose normal.   Eyes: Pupils are equal, round, and reactive to light. Conjunctivae and EOM are normal. No scleral icterus. Neck: Normal range of motion. Neck supple. No tracheal deviation present. Cardiovascular: Normal rate, regular rhythm and intact distal pulses. Pulmonary/Chest: Effort normal and breath sounds normal. No stridor. He exhibits no tenderness. Abdominal: Soft. Bowel sounds are normal. He exhibits no distension and no mass. There is no tenderness. Musculoskeletal: Normal range of motion. He exhibits no edema or tenderness. Lymphadenopathy:     He has no cervical adenopathy. Neurological: He is alert and oriented to person, place, and time. Skin: Skin is warm and dry. No erythema. Psychiatric: He has a normal mood and affect.  His behavior is normal. Judgment normal.           DATA:  BMP:    Lab Results   Component Value Date     07/23/2018     08/14/2011    K 4.0 07/23/2018    K 4.5 08/14/2011     07/23/2018     08/14/2011    CO2 25 07/23/2018    BUN 9 07/23/2018    LABALBU 3.9 01/28/2018    CREATININE 0.7 07/23/2018    CREATININE 0.8 08/14/2011    CALCIUM 9.2 07/23/2018    LABGLOM >60 07/23/2018    GLUCOSE 95 07/23/2018     Results for orders placed or performed in visit on 09/26/18   POC Urine with Microscopic   Result Value Ref Range    Color, UA Yellow     Clarity, UA Cloudy (A) Clear    Glucose, Ur 100     Bilirubin Urine 0 mg/dL    Ketones, Urine Negative     Specific Gravity, UA 1.015 1.005 - 1.030    Blood, Urine Positive     pH, UA 7.0 4.5 - 8.0    Protein, UA Negative Negative    Nitrite, Urine Positive     Leukocytes, UA small     Urobilinogen, Urine Normal     rbc urine, poc      wbc urine, poc      bacteria urine, poc      yeast urine, poc      casts urine, poc      epi cells urine, poc      crystals urine, poc          IMAGING:   Follow-up renal ultrasound done 09/25/18 shows normal kidneys bilaterally no hydronephrosis no stones no masses    1. Right ureteral calculus  Resolved. Patient doing well he'll follow-up as needed he was given a stone diet sheet  - POC Urine with Microscopic      Orders Placed This Encounter   Procedures    POC Urine with Microscopic        Return if symptoms worsen or fail to improve.

## 2018-10-25 ENCOUNTER — OFFICE VISIT (OUTPATIENT)
Dept: PRIMARY CARE CLINIC | Age: 38
End: 2018-10-25
Payer: COMMERCIAL

## 2018-10-25 VITALS
DIASTOLIC BLOOD PRESSURE: 85 MMHG | HEIGHT: 72 IN | OXYGEN SATURATION: 97 % | BODY MASS INDEX: 42.66 KG/M2 | WEIGHT: 315 LBS | TEMPERATURE: 97 F | HEART RATE: 78 BPM | SYSTOLIC BLOOD PRESSURE: 139 MMHG

## 2018-10-25 DIAGNOSIS — M79.642 BILATERAL HAND PAIN: ICD-10-CM

## 2018-10-25 DIAGNOSIS — M79.89 SWELLING OF BOTH HANDS: ICD-10-CM

## 2018-10-25 DIAGNOSIS — G56.03 BILATERAL CARPAL TUNNEL SYNDROME: Primary | ICD-10-CM

## 2018-10-25 DIAGNOSIS — M79.641 BILATERAL HAND PAIN: ICD-10-CM

## 2018-10-25 PROCEDURE — 99213 OFFICE O/P EST LOW 20 MIN: CPT | Performed by: NURSE PRACTITIONER

## 2018-10-25 RX ORDER — METHYLPREDNISOLONE 4 MG/1
TABLET ORAL
Qty: 1 KIT | Refills: 0 | Status: SHIPPED | OUTPATIENT
Start: 2018-10-25 | End: 2018-10-31

## 2018-10-25 NOTE — PROGRESS NOTES
LITHOTRIPSY performed by Tanja Bergeron MD at [facility] ESWL Right 7/24/2018    ESWL [address] LITHOTRIPSY performed by Tanja Bergeron MD at [address], [address] N/A 6/8/1953    HERNIA UMBILICAL REPAIR LAPAROSCOPIC performed by Gill Walker MD at [address]      left wrist.  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 Alcohol use No        Current Outpatient Prescriptions   Medication Sig Dispense Refill    methylPREDNISolone (MEDROL DOSEPACK) 4 MG tablet Take by mouth. 1 kit 0    meloxicam (MOBIC) 15 MG tablet Take 1 tablet by mouth daily (Patient taking differently: Take 15 mg by mouth daily as needed ) 30 tablet 3     No current facility-administered medications for this visit. No Known Allergies    Family History   Problem Relation Age of Onset   [facility] Cancer Mother 46        colon cancer    Cancer Father         luekemia    Diabetes Father     Other Maternal Grandmother         copd    Other Maternal Grandfather         copd    Heart Disease Paternal Grandmother              Review of Systems   Constitutional: Negative for fever. Musculoskeletal: Positive for arthralgias and joint swelling. Swollen hands, pain in hands   Neurological: Positive for numbness (in hands- and tingling). Objective:     Physical Exam   Constitutional: He is oriented to person, place, and time. He appears well-developed and well-nourished. HENT:   Head: Normocephalic and atraumatic. Eyes: Pupils are equal, round, and reactive to light. EOM are normal.   Neck: Normal range of motion. Neck supple. Cardiovascular: Normal rate, regular rhythm, normal heart sounds and intact distal pulses. No murmur heard. Pulmonary/Chest: Effort normal and breath sounds normal. No respiratory distress.

## 2018-12-04 ENCOUNTER — OFFICE VISIT (OUTPATIENT)
Dept: PRIMARY CARE CLINIC | Age: 38
End: 2018-12-04
Payer: COMMERCIAL

## 2018-12-04 ENCOUNTER — TELEPHONE (OUTPATIENT)
Dept: PRIMARY CARE CLINIC | Age: 38
End: 2018-12-04

## 2018-12-04 VITALS
OXYGEN SATURATION: 98 % | BODY MASS INDEX: 42.66 KG/M2 | SYSTOLIC BLOOD PRESSURE: 132 MMHG | WEIGHT: 315 LBS | HEART RATE: 75 BPM | DIASTOLIC BLOOD PRESSURE: 76 MMHG | HEIGHT: 72 IN | TEMPERATURE: 97.1 F

## 2018-12-04 DIAGNOSIS — R20.0 NUMBNESS OF FINGERS OF BOTH HANDS: Primary | ICD-10-CM

## 2018-12-04 DIAGNOSIS — R20.0 NUMBNESS OF FINGERS OF BOTH HANDS: ICD-10-CM

## 2018-12-04 LAB
MAGNESIUM: 2.1 MG/DL (ref 1.6–2.6)
TSH REFLEX FT4: 1.52 UIU/ML (ref 0.35–5.5)
VITAMIN B-12: 317 PG/ML (ref 211–946)

## 2018-12-04 PROCEDURE — 99213 OFFICE O/P EST LOW 20 MIN: CPT | Performed by: NURSE PRACTITIONER

## 2018-12-04 NOTE — PROGRESS NOTES
exhibits normal range of motion and no tenderness. Arms:  Lymphadenopathy:     He has no cervical adenopathy. Neurological: He is alert and oriented to person, place, and time. Phalens positive, tinels negative. No radiation of pain, numbness or tingling up either arm. Skin: Skin is warm and dry. No rash noted. Psychiatric: He has a normal mood and affect. His behavior is normal. Thought content normal.   Nursing note and vitals reviewed. /76   Pulse 75   Temp 97.1 °F (36.2 °C) (Temporal)   Ht 6' (1.829 m)   Wt (!) 383 lb (173.7 kg)   SpO2 98%   BMI 51.94 kg/m²     Assessment:      Diagnosis Orders   1. Numbness of fingers of both hands  Vitamin B12    TSH WITH REFLEX TO FT4    Magnesium    Nerve conduction test    EMG       No results found for this visit on 12/04/18. Plan:     Labs ordered. Will call patient with results. EMG/NCT ordered to determine neuropathy, carpal tunnel etiology. Return if symptoms worsen or fail to improve, for after NCS/EMG. Orders Placed This Encounter   Procedures    Vitamin B12     Standing Status:   Future     Number of Occurrences:   1     Standing Expiration Date:   12/4/2019    TSH WITH REFLEX TO FT4     Standing Status:   Future     Number of Occurrences:   1     Standing Expiration Date:   12/4/2019    Magnesium     Standing Status:   Future     Number of Occurrences:   1     Standing Expiration Date:   12/4/2019    Nerve conduction test     Standing Status:   Future     Standing Expiration Date:   2/2/2019    EMG     Standing Status:   Future     Number of Occurrences:   1     Standing Expiration Date:   2/2/2019     Order Specific Question:   Which body part? Answer:   Upper extremities, bilateral       No orders of the defined types were placed in this encounter. Patient offered educational materials - see patient instructions. Discussed use, benefit, and side effectsof prescribed medications.   All patient questions answered. Pt voiced understanding. Reviewed health maintenance. Instructed to continue current medications, diet and exercise. Patient agreed with treatment plan. Followup as directed.            Electronically signed by VANDANA Claros on 12/6/2018 at 12:46 PM

## 2018-12-05 ENCOUNTER — HOSPITAL ENCOUNTER (OUTPATIENT)
Dept: NEUROLOGY | Age: 38
Discharge: HOME | End: 2018-12-05
Payer: COMMERCIAL

## 2018-12-05 DIAGNOSIS — R20.0 NUMBNESS OF FINGERS OF BOTH HANDS: ICD-10-CM

## 2018-12-05 PROCEDURE — 95911 NRV CNDJ TEST 9-10 STUDIES: CPT

## 2018-12-05 PROCEDURE — 95886 MUSC TEST DONE W/N TEST COMP: CPT

## 2018-12-05 PROCEDURE — 95911 NRV CNDJ TEST 9-10 STUDIES: CPT | Performed by: PSYCHIATRY & NEUROLOGY

## 2018-12-05 PROCEDURE — 95886 MUSC TEST DONE W/N TEST COMP: CPT | Performed by: PSYCHIATRY & NEUROLOGY

## 2018-12-06 ENCOUNTER — TELEPHONE (OUTPATIENT)
Dept: PRIMARY CARE CLINIC | Age: 38
End: 2018-12-06

## 2019-01-11 ENCOUNTER — OFFICE VISIT (OUTPATIENT)
Dept: URGENT CARE | Age: 39
End: 2019-01-11
Payer: COMMERCIAL

## 2019-01-11 ENCOUNTER — HOSPITAL ENCOUNTER (OUTPATIENT)
Dept: GENERAL RADIOLOGY | Age: 39
Discharge: HOME | End: 2019-01-11
Payer: COMMERCIAL

## 2019-01-11 VITALS
HEIGHT: 73 IN | TEMPERATURE: 97.6 F | OXYGEN SATURATION: 96 % | RESPIRATION RATE: 20 BRPM | HEART RATE: 73 BPM | BODY MASS INDEX: 41.75 KG/M2 | WEIGHT: 315 LBS | SYSTOLIC BLOOD PRESSURE: 137 MMHG | DIASTOLIC BLOOD PRESSURE: 84 MMHG

## 2019-01-11 DIAGNOSIS — N39.0 URINARY TRACT INFECTION WITH HEMATURIA, SITE UNSPECIFIED: Primary | ICD-10-CM

## 2019-01-11 DIAGNOSIS — M54.42 ACUTE MIDLINE LOW BACK PAIN WITH BILATERAL SCIATICA: ICD-10-CM

## 2019-01-11 DIAGNOSIS — R31.9 URINARY TRACT INFECTION WITH HEMATURIA, SITE UNSPECIFIED: Primary | ICD-10-CM

## 2019-01-11 DIAGNOSIS — M54.41 ACUTE MIDLINE LOW BACK PAIN WITH BILATERAL SCIATICA: ICD-10-CM

## 2019-01-11 LAB
APPEARANCE FLUID: ABNORMAL
BILIRUBIN, POC: NEGATIVE
BLOOD URINE, POC: ABNORMAL
CLARITY, POC: CLEAR
COLOR, POC: YELLOW
GLUCOSE URINE, POC: NEGATIVE
KETONES, POC: NEGATIVE
LEUKOCYTE EST, POC: ABNORMAL
NITRITE, POC: POSITIVE
PH, POC: 6
PROTEIN, POC: NEGATIVE
SPECIFIC GRAVITY, POC: >=1.03
UROBILINOGEN, POC: 0.2

## 2019-01-11 PROCEDURE — 81002 URINALYSIS NONAUTO W/O SCOPE: CPT | Performed by: NURSE PRACTITIONER

## 2019-01-11 PROCEDURE — 99213 OFFICE O/P EST LOW 20 MIN: CPT | Performed by: NURSE PRACTITIONER

## 2019-01-11 PROCEDURE — 74018 RADEX ABDOMEN 1 VIEW: CPT

## 2019-01-11 RX ORDER — SULFAMETHOXAZOLE AND TRIMETHOPRIM 800; 160 MG/1; MG/1
1 TABLET ORAL 2 TIMES DAILY
Qty: 20 TABLET | Refills: 0 | Status: SHIPPED | OUTPATIENT
Start: 2019-01-11 | End: 2019-01-14 | Stop reason: ALTCHOICE

## 2019-01-11 RX ORDER — PHENAZOPYRIDINE HYDROCHLORIDE 100 MG/1
100 TABLET, FILM COATED ORAL 3 TIMES DAILY PRN
Qty: 12 TABLET | Refills: 0 | Status: SHIPPED | OUTPATIENT
Start: 2019-01-11 | End: 2019-07-11 | Stop reason: ALTCHOICE

## 2019-01-11 ASSESSMENT — ENCOUNTER SYMPTOMS
RESPIRATORY NEGATIVE: 1
BACK PAIN: 1
GASTROINTESTINAL NEGATIVE: 1

## 2019-01-13 LAB
ORGANISM: ABNORMAL
URINE CULTURE, ROUTINE: ABNORMAL
URINE CULTURE, ROUTINE: ABNORMAL

## 2019-01-14 ENCOUNTER — HOSPITAL ENCOUNTER (OUTPATIENT)
Dept: CT IMAGING | Age: 39
Discharge: HOME | End: 2019-01-14
Payer: COMMERCIAL

## 2019-01-14 ENCOUNTER — OFFICE VISIT (OUTPATIENT)
Dept: PRIMARY CARE CLINIC | Age: 39
End: 2019-01-14
Payer: COMMERCIAL

## 2019-01-14 VITALS
SYSTOLIC BLOOD PRESSURE: 120 MMHG | HEART RATE: 80 BPM | DIASTOLIC BLOOD PRESSURE: 82 MMHG | HEIGHT: 72 IN | TEMPERATURE: 97.6 F | BODY MASS INDEX: 42.66 KG/M2 | WEIGHT: 315 LBS | OXYGEN SATURATION: 90 %

## 2019-01-14 DIAGNOSIS — R10.9 LEFT FLANK PAIN: Primary | ICD-10-CM

## 2019-01-14 DIAGNOSIS — Z87.442 HISTORY OF KIDNEY STONES: ICD-10-CM

## 2019-01-14 DIAGNOSIS — N30.00 ACUTE CYSTITIS WITHOUT HEMATURIA: ICD-10-CM

## 2019-01-14 DIAGNOSIS — R10.9 LEFT FLANK PAIN: ICD-10-CM

## 2019-01-14 PROCEDURE — 99213 OFFICE O/P EST LOW 20 MIN: CPT | Performed by: FAMILY MEDICINE

## 2019-01-14 PROCEDURE — 74150 CT ABDOMEN W/O CONTRAST: CPT

## 2019-01-14 RX ORDER — NITROFURANTOIN 25; 75 MG/1; MG/1
100 CAPSULE ORAL 2 TIMES DAILY
Qty: 20 CAPSULE | Refills: 0 | Status: SHIPPED | OUTPATIENT
Start: 2019-01-14 | End: 2019-01-24

## 2019-01-14 RX ORDER — NITROFURANTOIN 25; 75 MG/1; MG/1
100 CAPSULE ORAL 2 TIMES DAILY
Qty: 20 CAPSULE | Refills: 0 | Status: SHIPPED | OUTPATIENT
Start: 2019-01-14 | End: 2019-01-14

## 2019-01-14 ASSESSMENT — ENCOUNTER SYMPTOMS
SHORTNESS OF BREATH: 0
COUGH: 0
BACK PAIN: 1

## 2019-01-15 ENCOUNTER — TELEPHONE (OUTPATIENT)
Dept: PRIMARY CARE CLINIC | Age: 39
End: 2019-01-15

## 2019-03-27 ENCOUNTER — OFFICE VISIT (OUTPATIENT)
Dept: PRIMARY CARE CLINIC | Age: 39
End: 2019-03-27
Payer: COMMERCIAL

## 2019-03-27 VITALS
DIASTOLIC BLOOD PRESSURE: 88 MMHG | HEIGHT: 72 IN | HEART RATE: 84 BPM | OXYGEN SATURATION: 96 % | BODY MASS INDEX: 42.66 KG/M2 | WEIGHT: 315 LBS | SYSTOLIC BLOOD PRESSURE: 138 MMHG | RESPIRATION RATE: 21 BRPM | TEMPERATURE: 98.4 F

## 2019-03-27 DIAGNOSIS — M25.511 ACUTE PAIN OF BOTH SHOULDERS: ICD-10-CM

## 2019-03-27 DIAGNOSIS — M25.512 ACUTE PAIN OF BOTH SHOULDERS: ICD-10-CM

## 2019-03-27 DIAGNOSIS — E66.01 MORBID OBESITY, UNSPECIFIED OBESITY TYPE (HCC): Primary | ICD-10-CM

## 2019-03-27 PROCEDURE — 99213 OFFICE O/P EST LOW 20 MIN: CPT | Performed by: FAMILY MEDICINE

## 2019-03-27 RX ORDER — MELOXICAM 15 MG/1
15 TABLET ORAL DAILY
Qty: 30 TABLET | Refills: 3 | Status: SHIPPED | OUTPATIENT
Start: 2019-03-27 | End: 2020-01-06 | Stop reason: SDUPTHER

## 2019-03-27 ASSESSMENT — PATIENT HEALTH QUESTIONNAIRE - PHQ9
SUM OF ALL RESPONSES TO PHQ9 QUESTIONS 1 & 2: 0
2. FEELING DOWN, DEPRESSED OR HOPELESS: 0
SUM OF ALL RESPONSES TO PHQ QUESTIONS 1-9: 0
1. LITTLE INTEREST OR PLEASURE IN DOING THINGS: 0
SUM OF ALL RESPONSES TO PHQ QUESTIONS 1-9: 0

## 2019-03-27 ASSESSMENT — ENCOUNTER SYMPTOMS
COUGH: 0
SHORTNESS OF BREATH: 0
COLOR CHANGE: 0

## 2019-04-03 ENCOUNTER — TELEPHONE (OUTPATIENT)
Dept: PRIMARY CARE CLINIC | Age: 39
End: 2019-04-03

## 2019-04-03 NOTE — TELEPHONE ENCOUNTER
Patients wife called and reports forms from [employer] from January are not filled out where patient was approved,they told him \" said he didn't need to be off work\"  Reviewed and forwarded to Es Patel

## 2019-04-16 ENCOUNTER — TELEPHONE (OUTPATIENT)
Dept: PRIMARY CARE CLINIC | Age: 39
End: 2019-04-16

## 2019-04-16 NOTE — TELEPHONE ENCOUNTER
Pt's wife called stated that she was wondering if anything had been done with the [facility] paperwork.  Please contact pt's wife and advise

## 2019-05-01 ENCOUNTER — TELEPHONE (OUTPATIENT)
Dept: PRIMARY CARE CLINIC | Age: 39
End: 2019-05-01

## 2019-05-01 NOTE — TELEPHONE ENCOUNTER
Pt called stated that the company called stating that his [facility] paperwork is not completed corrected.  Advised pt that the company can contact the office to see which section is not filled out correctly

## 2019-07-08 ENCOUNTER — APPOINTMENT (OUTPATIENT)
Dept: GENERAL RADIOLOGY | Age: 39
End: 2019-07-08
Payer: COMMERCIAL

## 2019-07-08 ENCOUNTER — APPOINTMENT (OUTPATIENT)
Dept: CT IMAGING | Age: 39
End: 2019-07-08
Payer: COMMERCIAL

## 2019-07-08 ENCOUNTER — HOSPITAL ENCOUNTER (OUTPATIENT)
Age: 39
Setting detail: OBSERVATION
Discharge: HOME | End: 2019-07-09
Attending: EMERGENCY MEDICINE | Admitting: HOSPITALIST
Payer: COMMERCIAL

## 2019-07-08 DIAGNOSIS — N39.0 URINARY TRACT INFECTION WITHOUT HEMATURIA, SITE UNSPECIFIED: ICD-10-CM

## 2019-07-08 DIAGNOSIS — R53.1 GENERAL WEAKNESS: Primary | ICD-10-CM

## 2019-07-08 PROBLEM — R40.4 TRANSIENT ALTERATION OF AWARENESS: Status: ACTIVE | Noted: 2019-07-08

## 2019-07-08 LAB
ALBUMIN SERPL-MCNC: 3.6 G/DL (ref 3.5–5.2)
ALP BLD-CCNC: 82 U/L (ref 40–130)
ALT SERPL-CCNC: 33 U/L (ref 5–41)
ANION GAP SERPL CALCULATED.3IONS-SCNC: 11 MMOL/L (ref 7–19)
AST SERPL-CCNC: 23 U/L (ref 5–40)
BACTERIA: ABNORMAL /HPF
BILIRUB SERPL-MCNC: <0.2 MG/DL (ref 0.2–1.2)
BILIRUBIN URINE: NEGATIVE
BLOOD, URINE: NEGATIVE
BUN BLDV-MCNC: 10 MG/DL (ref 6–20)
CALCIUM SERPL-MCNC: 8.9 MG/DL (ref 8.6–10)
CHLORIDE BLD-SCNC: 104 MMOL/L (ref 98–111)
CLARITY: ABNORMAL
CO2: 25 MMOL/L (ref 22–29)
COLOR: YELLOW
CREAT SERPL-MCNC: 0.6 MG/DL (ref 0.5–1.2)
EPITHELIAL CELLS, UA: 1 /HPF (ref 0–5)
GFR NON-AFRICAN AMERICAN: >60
GLUCOSE BLD-MCNC: 131 MG/DL (ref 74–109)
GLUCOSE URINE: NEGATIVE MG/DL
HCT VFR BLD CALC: 45.6 % (ref 42–52)
HEMOGLOBIN: 14.8 G/DL (ref 14–18)
HYALINE CASTS: 17 /HPF (ref 0–8)
KETONES, URINE: NEGATIVE MG/DL
LEUKOCYTE ESTERASE, URINE: ABNORMAL
MCH RBC QN AUTO: 29.4 PG (ref 27–31)
MCHC RBC AUTO-ENTMCNC: 32.5 G/DL (ref 33–37)
MCV RBC AUTO: 90.7 FL (ref 80–94)
NITRITE, URINE: POSITIVE
PDW BLD-RTO: 13.8 % (ref 11.5–14.5)
PH UA: 6 (ref 5–8)
PLATELET # BLD: 188 K/UL (ref 130–400)
PMV BLD AUTO: 9.7 FL (ref 9.4–12.4)
POTASSIUM SERPL-SCNC: 4.3 MMOL/L (ref 3.5–5)
PROTEIN UA: NEGATIVE MG/DL
RBC # BLD: 5.03 M/UL (ref 4.7–6.1)
RBC UA: 1 /HPF (ref 0–4)
SODIUM BLD-SCNC: 140 MMOL/L (ref 136–145)
SPECIFIC GRAVITY UA: 1.03 (ref 1–1.03)
TOTAL CK: 145 U/L (ref 39–308)
TOTAL PROTEIN: 6.8 G/DL (ref 6.6–8.7)
TROPONIN: <0.01 NG/ML (ref 0–0.03)
URINE REFLEX TO CULTURE: YES
UROBILINOGEN, URINE: 0.2 E.U./DL
WBC # BLD: 11.7 K/UL (ref 4.8–10.8)
WBC UA: 31 /HPF (ref 0–5)

## 2019-07-08 PROCEDURE — 87086 URINE CULTURE/COLONY COUNT: CPT

## 2019-07-08 PROCEDURE — G0378 HOSPITAL OBSERVATION PER HR: HCPCS

## 2019-07-08 PROCEDURE — 96372 THER/PROPH/DIAG INJ SC/IM: CPT

## 2019-07-08 PROCEDURE — 87040 BLOOD CULTURE FOR BACTERIA: CPT

## 2019-07-08 PROCEDURE — 84484 ASSAY OF TROPONIN QUANT: CPT

## 2019-07-08 PROCEDURE — 2580000003 HC RX 258: Performed by: EMERGENCY MEDICINE

## 2019-07-08 PROCEDURE — 6360000002 HC RX W HCPCS: Performed by: INTERNAL MEDICINE

## 2019-07-08 PROCEDURE — 82550 ASSAY OF CK (CPK): CPT

## 2019-07-08 PROCEDURE — 99220 PR INITIAL OBSERVATION CARE/DAY 70 MINUTES: CPT | Performed by: INTERNAL MEDICINE

## 2019-07-08 PROCEDURE — 87186 SC STD MICRODIL/AGAR DIL: CPT

## 2019-07-08 PROCEDURE — 70450 CT HEAD/BRAIN W/O DYE: CPT

## 2019-07-08 PROCEDURE — 96365 THER/PROPH/DIAG IV INF INIT: CPT

## 2019-07-08 PROCEDURE — 36415 COLL VENOUS BLD VENIPUNCTURE: CPT

## 2019-07-08 PROCEDURE — 80053 COMPREHEN METABOLIC PANEL: CPT

## 2019-07-08 PROCEDURE — 85027 COMPLETE CBC AUTOMATED: CPT

## 2019-07-08 PROCEDURE — 71046 X-RAY EXAM CHEST 2 VIEWS: CPT

## 2019-07-08 PROCEDURE — 99285 EMERGENCY DEPT VISIT HI MDM: CPT | Performed by: EMERGENCY MEDICINE

## 2019-07-08 PROCEDURE — 93005 ELECTROCARDIOGRAM TRACING: CPT

## 2019-07-08 PROCEDURE — 81001 URINALYSIS AUTO W/SCOPE: CPT

## 2019-07-08 PROCEDURE — 99285 EMERGENCY DEPT VISIT HI MDM: CPT

## 2019-07-08 PROCEDURE — 2580000003 HC RX 258: Performed by: INTERNAL MEDICINE

## 2019-07-08 PROCEDURE — 6360000002 HC RX W HCPCS: Performed by: EMERGENCY MEDICINE

## 2019-07-08 RX ORDER — SODIUM CHLORIDE 9 MG/ML
INJECTION, SOLUTION INTRAVENOUS CONTINUOUS
Status: DISCONTINUED | OUTPATIENT
Start: 2019-07-08 | End: 2019-07-09 | Stop reason: HOSPADM

## 2019-07-08 RX ORDER — ACETAMINOPHEN 325 MG/1
650 TABLET ORAL EVERY 4 HOURS PRN
Status: DISCONTINUED | OUTPATIENT
Start: 2019-07-08 | End: 2019-07-09 | Stop reason: HOSPADM

## 2019-07-08 RX ORDER — ONDANSETRON 2 MG/ML
4 INJECTION INTRAMUSCULAR; INTRAVENOUS EVERY 6 HOURS PRN
Status: DISCONTINUED | OUTPATIENT
Start: 2019-07-08 | End: 2019-07-09 | Stop reason: HOSPADM

## 2019-07-08 RX ORDER — 0.9 % SODIUM CHLORIDE 0.9 %
1000 INTRAVENOUS SOLUTION INTRAVENOUS ONCE
Status: COMPLETED | OUTPATIENT
Start: 2019-07-08 | End: 2019-07-08

## 2019-07-08 RX ORDER — SODIUM CHLORIDE 0.9 % (FLUSH) 0.9 %
10 SYRINGE (ML) INJECTION PRN
Status: DISCONTINUED | OUTPATIENT
Start: 2019-07-08 | End: 2019-07-09 | Stop reason: HOSPADM

## 2019-07-08 RX ORDER — SODIUM CHLORIDE 0.9 % (FLUSH) 0.9 %
10 SYRINGE (ML) INJECTION EVERY 12 HOURS SCHEDULED
Status: DISCONTINUED | OUTPATIENT
Start: 2019-07-08 | End: 2019-07-09 | Stop reason: HOSPADM

## 2019-07-08 RX ADMIN — ENOXAPARIN SODIUM 40 MG: 40 INJECTION SUBCUTANEOUS at 22:01

## 2019-07-08 RX ADMIN — MEROPENEM 1 G: 1 INJECTION, POWDER, FOR SOLUTION INTRAVENOUS at 18:42

## 2019-07-08 RX ADMIN — Medication 10 ML: at 22:01

## 2019-07-08 RX ADMIN — SODIUM CHLORIDE: 9 INJECTION, SOLUTION INTRAVENOUS at 22:00

## 2019-07-08 RX ADMIN — SODIUM CHLORIDE 1000 ML: 9 INJECTION, SOLUTION INTRAVENOUS at 16:17

## 2019-07-08 ASSESSMENT — PAIN SCALES - GENERAL: PAINLEVEL_OUTOF10: 0

## 2019-07-08 ASSESSMENT — VISUAL ACUITY
OU: 20
OS: 20
OD: 20

## 2019-07-08 ASSESSMENT — ENCOUNTER SYMPTOMS
EYE PAIN: 0
DIARRHEA: 0
VOMITING: 0
ABDOMINAL PAIN: 0
SHORTNESS OF BREATH: 0

## 2019-07-08 NOTE — ED NOTES
Bed: 19  Expected date:   Expected time:   Means of arrival:   Comments:  roel Christie RN  07/08/19 2428

## 2019-07-08 NOTE — ED PROVIDER NOTES
Previous Medications    MELOXICAM (MOBIC) 15 MG TABLET    Take 1 tablet by mouth daily    PHENAZOPYRIDINE (PYRIDIUM) 100 MG TABLET    Take 1 tablet by mouth 3 times daily as needed for Pain       ALLERGIES     Patient has no known allergies.     FAMILY HISTORY       Family History   Problem Relation Age of Onset    Cancer Mother 46        colon cancer    Cancer Father         luekemia    Diabetes Father     Other Maternal Grandmother         copd    Other Maternal Grandfather         copd    Heart Disease Paternal Grandmother           SOCIAL HISTORY       Social History     Socioeconomic History    Marital status:      Spouse name: None    Number of children: None    Years of education: None    Highest education level: None   Occupational History    None   Social Needs    Financial resource strain: None    Food insecurity:     Worry: None     Inability: None    Transportation needs:     Medical: None     Non-medical: None   Tobacco Use    Smoking status: Never Smoker    Smokeless tobacco: Never Used    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[facility]   Substance and Sexual Activity    Alcohol use: No    Drug use: No    Sexual activity: None   Lifestyle    Physical activity:     Days per week: None     Minutes per session: None    Stress: None   Relationships    Social connections:     Talks on phone: None     Gets together: None     Attends Mandaeism service: None     Active member of club or organization: None     Attends meetings of clubs or organizations: None     Relationship status: None    Intimate partner violence:     Fear of current or ex partner: None     Emotionally abused: None     Physically abused: None     Forced sexual activity: None   Other Topics Concern    None   Social History Narrative    None       SCREENINGS    Dejuan Coma Scale  Eye Opening: Spontaneous  Best Verbal Response: Oriented  Best Motor Response: Obeys commands  [place] Coma Scale Score: 15

## 2019-07-09 VITALS
DIASTOLIC BLOOD PRESSURE: 86 MMHG | OXYGEN SATURATION: 93 % | TEMPERATURE: 98.2 F | SYSTOLIC BLOOD PRESSURE: 133 MMHG | BODY MASS INDEX: 42.66 KG/M2 | HEART RATE: 70 BPM | RESPIRATION RATE: 22 BRPM | WEIGHT: 315 LBS | HEIGHT: 72 IN

## 2019-07-09 LAB
ANION GAP SERPL CALCULATED.3IONS-SCNC: 12 MMOL/L (ref 7–19)
BASOPHILS ABSOLUTE: 0.1 K/UL (ref 0–0.2)
BASOPHILS RELATIVE PERCENT: 0.8 % (ref 0–1)
BUN BLDV-MCNC: 7 MG/DL (ref 6–20)
CALCIUM SERPL-MCNC: 8.3 MG/DL (ref 8.6–10)
CHLORIDE BLD-SCNC: 105 MMOL/L (ref 98–111)
CO2: 21 MMOL/L (ref 22–29)
CREAT SERPL-MCNC: 0.6 MG/DL (ref 0.5–1.2)
EOSINOPHILS ABSOLUTE: 0.4 K/UL (ref 0–0.6)
EOSINOPHILS RELATIVE PERCENT: 4.5 % (ref 0–5)
GFR NON-AFRICAN AMERICAN: >60
GLUCOSE BLD-MCNC: 183 MG/DL (ref 74–109)
HCT VFR BLD CALC: 45.9 % (ref 42–52)
HEMOGLOBIN: 14.5 G/DL (ref 14–18)
LYMPHOCYTES ABSOLUTE: 2.1 K/UL (ref 1.1–4.5)
LYMPHOCYTES RELATIVE PERCENT: 26 % (ref 20–40)
MCH RBC QN AUTO: 29.2 PG (ref 27–31)
MCHC RBC AUTO-ENTMCNC: 31.6 G/DL (ref 33–37)
MCV RBC AUTO: 92.5 FL (ref 80–94)
MONOCYTES ABSOLUTE: 0.4 K/UL (ref 0–0.9)
MONOCYTES RELATIVE PERCENT: 5.1 % (ref 0–10)
NEUTROPHILS ABSOLUTE: 5.2 K/UL (ref 1.5–7.5)
NEUTROPHILS RELATIVE PERCENT: 63.1 % (ref 50–65)
PDW BLD-RTO: 14.1 % (ref 11.5–14.5)
PLATELET # BLD: 195 K/UL (ref 130–400)
PMV BLD AUTO: 9.7 FL (ref 9.4–12.4)
POTASSIUM REFLEX MAGNESIUM: 3.9 MMOL/L (ref 3.5–5)
RBC # BLD: 4.96 M/UL (ref 4.7–6.1)
SODIUM BLD-SCNC: 138 MMOL/L (ref 136–145)
WBC # BLD: 8.2 K/UL (ref 4.8–10.8)

## 2019-07-09 PROCEDURE — 80048 BASIC METABOLIC PNL TOTAL CA: CPT

## 2019-07-09 PROCEDURE — 2580000003 HC RX 258: Performed by: INTERNAL MEDICINE

## 2019-07-09 PROCEDURE — 85025 COMPLETE CBC W/AUTO DIFF WBC: CPT

## 2019-07-09 PROCEDURE — 99217 PR OBSERVATION CARE DISCHARGE MANAGEMENT: CPT | Performed by: HOSPITALIST

## 2019-07-09 PROCEDURE — 96366 THER/PROPH/DIAG IV INF ADDON: CPT

## 2019-07-09 PROCEDURE — 36415 COLL VENOUS BLD VENIPUNCTURE: CPT

## 2019-07-09 PROCEDURE — 6360000002 HC RX W HCPCS: Performed by: INTERNAL MEDICINE

## 2019-07-09 PROCEDURE — G0378 HOSPITAL OBSERVATION PER HR: HCPCS

## 2019-07-09 RX ORDER — CEFDINIR 300 MG/1
300 CAPSULE ORAL 2 TIMES DAILY
Qty: 14 CAPSULE | Refills: 0 | Status: SHIPPED | OUTPATIENT
Start: 2019-07-09 | End: 2019-07-11

## 2019-07-09 RX ADMIN — MEROPENEM 1 G: 1 INJECTION, POWDER, FOR SOLUTION INTRAVENOUS at 03:44

## 2019-07-09 RX ADMIN — SODIUM CHLORIDE: 9 INJECTION, SOLUTION INTRAVENOUS at 08:55

## 2019-07-09 RX ADMIN — MEROPENEM 1 G: 1 INJECTION, POWDER, FOR SOLUTION INTRAVENOUS at 10:47

## 2019-07-09 NOTE — H&P
rate and rhythm, S1, S2 normal, no murmur  Abdomen:soft, non-tender, non-distended, +BS, no guarding/rebound  Extremities: No edema, no cyanosis, no deformities  Skin: Skin color, texture, turgor normal. No rashes or lesions  Lymphatic: No palpable lymph node enlargment  Neurologic: Alert and oriented X 3, CN II-XII intact, no focal deficits   Psychiatric: Normal mood/affect      Diagnostic Data:  CBC:  Recent Labs     07/08/19  1557   WBC 11.7*   HGB 14.8   HCT 45.6        BMP:  Recent Labs     07/08/19  1557      K 4.3      CO2 25   BUN 10   CREATININE 0.6   CALCIUM 8.9     Recent Labs     07/08/19  1557   AST 23   ALT 33   BILITOT <0.2   ALKPHOS 82     Cardiac Enzymes:   Recent Labs     07/08/19  1557   CKTOTAL 145   TROPONINI <0.01     Urinalysis:  Lab Results   Component Value Date    NITRU POSITIVE 07/08/2019    WBCUA 31 07/08/2019    BACTERIA 4+ 07/08/2019    RBCUA 1 07/08/2019    BLOODU Negative 07/08/2019    SPECGRAV 1.026 07/08/2019    GLUCOSEU Negative 07/08/2019     RAD:    CT Head WO Contrast [[id number]] Resulted: 07/08/19 1700     Order Status: Completed Updated: 07/08/19 1702     Narrative:       Examination. CT HEAD WO CONTRAST  History: Onset of slurred speech which has resolved. DLP: 855 mGycm. The CT scan of the head is performed without intravenous contrast  enhancement. The images are acquired in axial plane with subsequent reconstruction  in coronal and sagittal plane. There is no previous study for comparison. There is no evidence of mass or midline shift. There is no evidence of  intracranial hemorrhage or hematoma. The ventricles, the basal cistern  and the cortical sulci are normal. There is a normal gray-white matter  differentiation. The images reviewed in bone window show no acute bony abnormality. The  visualized paranasal sinuses and mastoid air cells are normal.     Impression:       A negative unenhanced CT scan of the head.   The above findings are recorded

## 2019-07-09 NOTE — PROGRESS NOTES
Gustavo Perez arrived to room # 313. Presented with: UTI  Mental Status: Patient is oriented, alert, coherent, logical, thought processes intact and able to concentrate and follow conversation. Vitals:    07/08/19 1924   BP: 134/87   Pulse: 66   Resp: 18   Temp: 97.4 °F (36.3 °C)   SpO2: 98%     Patient safety contract and falls prevention contract reviewed with patient Yes. Oriented Patient and Family to room. Call light within reach. Yes.   Needs, issues or concerns expressed at this time: no.      Electronically signed by Cristy Espino RN on 7/8/2019 at 7:35 PM

## 2019-07-09 NOTE — DISCHARGE SUMMARY
Physician Discharge Summary     Patient ID:  Clyde Cervantes  [id number]  69 y.o.  1980    Admit date: 7/8/2019    Discharge date and time: 7/9/2-10     Admitting Physician: [employer], MD     Discharge Physician: [employer] MD    Discharge Diagnoses:     E coli UTI - on Omnicef- follow up cultures as OP  Acute encephalopathy due to infection -resolved     Discharged Condition: good    Indication for Admission: [address] Course:     36 yo male with history of ESBL uti presented with confusion which occurred after pt was outside for sometime working on a window unit for his house. Reportedly tried to face time his wife and appeared confused and was slurring some words. This all resolved when pt taken out of the heat. In ER pt was found to have wbc of 11k and abnormal u/a. He was started on IV Merrem. Today he feels good and wants to go home. His urine cultures grew E coli. Final cultures are pending and he will follow on them with his PCP in 1-2 days OP. Consults: none    Significant Diagnostic Studies:     CT Head WO Contrast [[id number]] Resulted: 07/08/19 1700      Order Status: Completed Updated: 07/08/19 1702     Narrative:       Examination. CT HEAD WO CONTRAST  History: Onset of slurred speech which has resolved. DLP: 855 mGycm. The CT scan of the head is performed without intravenous contrast  enhancement. The images are acquired in axial plane with subsequent reconstruction  in coronal and sagittal plane. There is no previous study for comparison. There is no evidence of mass or midline shift. There is no evidence of  intracranial hemorrhage or hematoma. The ventricles, the basal cistern  and the cortical sulci are normal. There is a normal gray-white matter  differentiation. The images reviewed in bone window show no acute bony abnormality.  The  visualized paranasal sinuses and mastoid air cells are normal.     Impression:       A negative unenhanced CT scan of the head.  The above findings are recorded on a digital voice clip in PACS. Signed by Dr Tiara Paez on 7/8/2019 5:00 PM     XR CHEST STANDARD (2 VW) [[id number]] Resulted: 07/08/19 1639     Order Status: Completed Specimen: Chest Updated: 07/08/19 1641     Narrative:       HISTORY: Generalized haziness, near syncope   CXR: 2 views of the chest are obtained. COMPARISON: 9/8/2014. FINDINGS:   There is a diminished level of inspiration with no focal consolidation  or edema. The cardiomediastinal contours are normal. There is no  pleural effusion or pneumothorax. Mild degenerative change of the  thoracic spine.     Impression:       1. No acute radiographic cardiopulmonary process. Signed by Dr Jaz Fraser on 7/8/2019 4:39 PM             Discharge Exam:      Objective:   Vitals: /86   Pulse 70   Temp 98.2 °F (36.8 °C) (Axillary)   Resp 22   Ht 6' (1.829 m)   Wt (!) 375 lb 5 oz (170.2 kg)   SpO2 93%   BMI 50.90 kg/m²   General appearance: alert, appears stated age and cooperative  Skin: Skin color, texture, turgor normal.   HEENT: Head: Normocephalic, no lesions, without obvious abnormality.   Neck: no adenopathy, no carotid bruit, no JVD and supple, symmetrical, trachea midline  Lungs: clear to auscultation bilaterally  Heart: regular rate and rhythm, S1, S2 normal, no murmur, click, rub or gallop  Abdomen: soft, non-tender; bowel sounds normal; no masses,  no organomegaly  Extremities: extremities normal, atraumatic, no cyanosis or edema  Lymphatic: No significant lymph node enlargement papable  Neurologic: Mental status: Alert, oriented, thought content appropriate    Discharge Medications:       Owen Schwartz   Home Medication Instructions CJO:[id number]    Printed on:07/09/19 1645   Medication Information                      cefdinir (OMNICEF) 300 MG capsule  Take 1 capsule by mouth 2 times daily for 7 days             meloxicam (MOBIC) 15 MG tablet  Take 1 tablet by mouth daily

## 2019-07-09 NOTE — PROGRESS NOTES
4 Eyes Skin Assessment    Jae Koenig is being assessed upon: Admission    I agree that I, Antwan Rico, along with [employer] (either 2 RN's or 1 LPN and 1 RN) have performed a thorough Head to Toe Skin Assessment on the patient. ALL assessment sites listed below have been assessed. Areas assessed by both nurses:     [x]   Head, Face, and Ears   [x]   Shoulders, Back, and Chest  [x]   Arms, Elbows, and Hands   [x]   Coccyx, Sacrum, and Ischium  [x]   Legs, Feet, and Heels    Does the Patient have Skin Breakdown?  No    Kailash Prevention initiated: No  Wound Care Orders initiated: No    [facility] nurse consulted for Pressure Injury (Stage 3,4, Unstageable, DTI, NWPT, and Complex wounds) and New or Established Ostomies: No        Primary Nurse eSignature: Antwan Rico RN on 7/8/2019 at 10:30 PM      Co-Signer eSignature: Electronically signed by Balta Clemente LPN on 8/3/09 at 79:07 PM

## 2019-07-10 LAB
EKG P AXIS: 59 DEGREES
EKG P-R INTERVAL: 186 MS
EKG Q-T INTERVAL: 388 MS
EKG QRS DURATION: 104 MS
EKG QTC CALCULATION (BAZETT): 409 MS
EKG T AXIS: 48 DEGREES

## 2019-07-11 ENCOUNTER — OFFICE VISIT (OUTPATIENT)
Dept: PRIMARY CARE CLINIC | Age: 39
End: 2019-07-11
Payer: COMMERCIAL

## 2019-07-11 VITALS
SYSTOLIC BLOOD PRESSURE: 130 MMHG | HEART RATE: 70 BPM | HEIGHT: 72 IN | BODY MASS INDEX: 42.66 KG/M2 | OXYGEN SATURATION: 96 % | DIASTOLIC BLOOD PRESSURE: 68 MMHG | TEMPERATURE: 98.8 F | WEIGHT: 315 LBS

## 2019-07-11 DIAGNOSIS — N39.0 URINARY TRACT INFECTION WITH HEMATURIA, SITE UNSPECIFIED: Primary | ICD-10-CM

## 2019-07-11 DIAGNOSIS — R73.9 HYPERGLYCEMIA: ICD-10-CM

## 2019-07-11 DIAGNOSIS — R31.9 URINARY TRACT INFECTION WITH HEMATURIA, SITE UNSPECIFIED: Primary | ICD-10-CM

## 2019-07-11 DIAGNOSIS — E66.01 MORBID OBESITY, UNSPECIFIED OBESITY TYPE (HCC): ICD-10-CM

## 2019-07-11 LAB
ORGANISM: ABNORMAL
URINE CULTURE, ROUTINE: ABNORMAL
URINE CULTURE, ROUTINE: ABNORMAL

## 2019-07-11 PROCEDURE — 1111F DSCHRG MED/CURRENT MED MERGE: CPT | Performed by: FAMILY MEDICINE

## 2019-07-11 PROCEDURE — 99495 TRANSJ CARE MGMT MOD F2F 14D: CPT | Performed by: FAMILY MEDICINE

## 2019-07-11 RX ORDER — NITROFURANTOIN 25; 75 MG/1; MG/1
100 CAPSULE ORAL 2 TIMES DAILY
Qty: 20 CAPSULE | Refills: 0 | Status: SHIPPED | OUTPATIENT
Start: 2019-07-11 | End: 2019-07-21

## 2019-07-11 ASSESSMENT — ENCOUNTER SYMPTOMS
SHORTNESS OF BREATH: 0
COUGH: 0

## 2019-07-13 LAB
BLOOD CULTURE, ROUTINE: NORMAL
CULTURE, BLOOD 2: NORMAL

## 2019-07-18 DIAGNOSIS — R73.9 HYPERGLYCEMIA: ICD-10-CM

## 2019-07-18 LAB
ALBUMIN SERPL-MCNC: 4.2 G/DL (ref 3.5–5.2)
ALP BLD-CCNC: 92 U/L (ref 40–130)
ALT SERPL-CCNC: 27 U/L (ref 5–41)
ANION GAP SERPL CALCULATED.3IONS-SCNC: 9 MMOL/L (ref 7–19)
AST SERPL-CCNC: 16 U/L (ref 5–40)
BILIRUB SERPL-MCNC: 0.3 MG/DL (ref 0.2–1.2)
BUN BLDV-MCNC: 11 MG/DL (ref 6–20)
CALCIUM SERPL-MCNC: 9.3 MG/DL (ref 8.6–10)
CHLORIDE BLD-SCNC: 107 MMOL/L (ref 98–111)
CO2: 29 MMOL/L (ref 22–29)
CREAT SERPL-MCNC: 0.7 MG/DL (ref 0.5–1.2)
GFR NON-AFRICAN AMERICAN: >60
GLUCOSE BLD-MCNC: 124 MG/DL (ref 74–109)
HBA1C MFR BLD: 6.4 % (ref 4–6)
POTASSIUM SERPL-SCNC: 4.4 MMOL/L (ref 3.5–5)
SODIUM BLD-SCNC: 145 MMOL/L (ref 136–145)
TOTAL PROTEIN: 7.4 G/DL (ref 6.6–8.7)

## 2019-07-24 ENCOUNTER — TELEPHONE (OUTPATIENT)
Dept: PRIMARY CARE CLINIC | Age: 39
End: 2019-07-24

## 2019-08-07 PROBLEM — N39.0 UTI (URINARY TRACT INFECTION): Status: RESOLVED | Noted: 2019-07-08 | Resolved: 2019-08-07

## 2019-08-15 ENCOUNTER — OFFICE VISIT (OUTPATIENT)
Dept: PRIMARY CARE CLINIC | Age: 39
End: 2019-08-15
Payer: COMMERCIAL

## 2019-08-15 VITALS
DIASTOLIC BLOOD PRESSURE: 82 MMHG | SYSTOLIC BLOOD PRESSURE: 124 MMHG | BODY MASS INDEX: 42.66 KG/M2 | OXYGEN SATURATION: 96 % | TEMPERATURE: 97.2 F | HEART RATE: 82 BPM | HEIGHT: 72 IN | WEIGHT: 315 LBS

## 2019-08-15 DIAGNOSIS — R73.03 PREDIABETES: Primary | ICD-10-CM

## 2019-08-15 DIAGNOSIS — M19.90 ARTHRITIS: ICD-10-CM

## 2019-08-15 DIAGNOSIS — G56.03 BILATERAL CARPAL TUNNEL SYNDROME: ICD-10-CM

## 2019-08-15 LAB — HBA1C MFR BLD: 6.4 %

## 2019-08-15 PROCEDURE — 83036 HEMOGLOBIN GLYCOSYLATED A1C: CPT | Performed by: NURSE PRACTITIONER

## 2019-08-15 PROCEDURE — 99214 OFFICE O/P EST MOD 30 MIN: CPT | Performed by: NURSE PRACTITIONER

## 2019-08-15 ASSESSMENT — ENCOUNTER SYMPTOMS
RESPIRATORY NEGATIVE: 1
EYES NEGATIVE: 1
GASTROINTESTINAL NEGATIVE: 1

## 2019-08-15 NOTE — PROGRESS NOTES
performed by Rita Sharma MD at [facility] ESWL Right 7/24/2018    ESWL EXTRACORPEAL SHOCK WAVE LITHOTRIPSY performed by Rita Sharma MD at [facility] [address] [facility], [address] N/A 0/0/0244    HERNIA UMBILICAL REPAIR LAPAROSCOPIC performed by Aleah Anderson MD at [address]      left wrist.  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[address]   Substance Use Topics    Alcohol use: No        Current Outpatient Medications   Medication Sig Dispense Refill    metFORMIN (GLUCOPHAGE) 500 MG tablet Take 1 tablet by mouth 2 times daily (with meals) 180 tablet 1    meloxicam (MOBIC) 15 MG tablet Take 1 tablet by mouth daily 30 tablet 3     No current facility-administered medications for this visit. No Known Allergies    Family History   Problem Relation Age of Onset   [facility] Cancer Mother 46        colon cancer    Cancer Father         luekemia    Diabetes Father     Other Maternal Grandmother         copd    Other Maternal Grandfather         copd    Heart Disease Paternal Grandmother          Subjective:      Review of Systems   Constitutional: Negative. HENT: Negative. Eyes: Negative. Respiratory: Negative. Cardiovascular: Negative. Gastrointestinal: Negative. Endocrine: Negative. Genitourinary: Negative. Musculoskeletal: Negative. Skin: Negative. Neurological: Negative. Hematological: Negative. Psychiatric/Behavioral: Negative. Objective:     Physical Exam   Constitutional: He is oriented to person, place, and time. Vital signs are normal. He appears well-developed and well-nourished. HENT:   Head: Normocephalic and atraumatic.    Right Ear: Hearing, tympanic membrane, external ear and ear canal normal.   Left Ear: Hearing, tympanic membrane, external ear and ear canal normal.   Nose:

## 2019-09-23 ENCOUNTER — OFFICE VISIT (OUTPATIENT)
Dept: PRIMARY CARE CLINIC | Age: 39
End: 2019-09-23
Payer: COMMERCIAL

## 2019-09-23 VITALS — SYSTOLIC BLOOD PRESSURE: 124 MMHG | HEART RATE: 85 BPM | DIASTOLIC BLOOD PRESSURE: 62 MMHG | OXYGEN SATURATION: 94 %

## 2019-09-23 DIAGNOSIS — M54.32 BILATERAL SCIATICA: Primary | ICD-10-CM

## 2019-09-23 DIAGNOSIS — E66.01 MORBID OBESITY (HCC): ICD-10-CM

## 2019-09-23 DIAGNOSIS — M54.31 BILATERAL SCIATICA: Primary | ICD-10-CM

## 2019-09-23 PROCEDURE — 99213 OFFICE O/P EST LOW 20 MIN: CPT | Performed by: NURSE PRACTITIONER

## 2019-09-23 PROCEDURE — 96372 THER/PROPH/DIAG INJ SC/IM: CPT | Performed by: NURSE PRACTITIONER

## 2019-09-23 PROCEDURE — 99401 PREV MED CNSL INDIV APPRX 15: CPT | Performed by: NURSE PRACTITIONER

## 2019-09-23 RX ORDER — METHYLPREDNISOLONE ACETATE 80 MG/ML
80 INJECTION, SUSPENSION INTRA-ARTICULAR; INTRALESIONAL; INTRAMUSCULAR; SOFT TISSUE ONCE
Status: COMPLETED | OUTPATIENT
Start: 2019-09-23 | End: 2019-09-23

## 2019-09-23 RX ORDER — NAPROXEN 500 MG/1
500 TABLET ORAL 2 TIMES DAILY PRN
Qty: 60 TABLET | Refills: 0 | Status: SHIPPED | OUTPATIENT
Start: 2019-09-23 | End: 2019-11-15

## 2019-09-23 RX ORDER — TIZANIDINE 4 MG/1
4 TABLET ORAL NIGHTLY PRN
Qty: 10 TABLET | Refills: 0 | Status: SHIPPED | OUTPATIENT
Start: 2019-09-23 | End: 2020-06-10 | Stop reason: ALTCHOICE

## 2019-09-23 RX ADMIN — METHYLPREDNISOLONE ACETATE 80 MG: 80 INJECTION, SUSPENSION INTRA-ARTICULAR; INTRALESIONAL; INTRAMUSCULAR; SOFT TISSUE at 09:03

## 2019-09-23 ASSESSMENT — ENCOUNTER SYMPTOMS
COLOR CHANGE: 0
NAUSEA: 0
RHINORRHEA: 0
VOICE CHANGE: 0
PHOTOPHOBIA: 0
SHORTNESS OF BREATH: 0
BACK PAIN: 1
COUGH: 0
VOMITING: 0

## 2019-09-23 NOTE — PATIENT INSTRUCTIONS
1. Steroid injection today. 2. Tizanidine as needed for back pain/muscle spasms. 3. Naproxen 500 mg twice daily; take with food. 4. Follow-up if symptoms worsen or fail to improve. 5. Diet and weight loss.

## 2019-09-23 NOTE — PROGRESS NOTES
1 tablet by mouth daily 30 tablet 3     Current Facility-Administered Medications   Medication Dose Route Frequency Provider Last Rate Last Dose    methylPREDNISolone acetate (DEPO-MEDROL) injection 80 mg  80 mg Intramuscular Once Nav He, APRNANCY         No Known Allergies    Health Maintenance   Topic Date Due    HIV screen  01/04/1994    Flu vaccine (1) 09/01/2019    A1C test (Diabetic or Prediabetic)  08/15/2020    Lipid screen  03/07/2022    DTaP/Tdap/Td vaccine (2 - Td) 03/07/2027    Pneumococcal 0-64 years Vaccine  Aged Out       Subjective:      Review of Systems   Constitutional: Negative for chills and fever. HENT: Negative for ear pain, hearing loss, rhinorrhea and voice change. Eyes: Negative for photophobia and visual disturbance. Respiratory: Negative for cough and shortness of breath. Cardiovascular: Negative for chest pain and palpitations. Gastrointestinal: Negative for nausea and vomiting. Endocrine: Negative. Negative for cold intolerance and heat intolerance. Genitourinary: Negative for difficulty urinating and flank pain. Musculoskeletal: Positive for back pain. Negative for neck pain. Skin: Negative for color change and rash. Allergic/Immunologic: Negative for environmental allergies and food allergies. Neurological: Negative for dizziness, speech difficulty and headaches. Hematological: Does not bruise/bleed easily. Psychiatric/Behavioral: Negative for sleep disturbance and suicidal ideas. Objective:     Physical Exam   Constitutional: He is oriented to person, place, and time. He appears well-developed and well-nourished. Morbid obesity   HENT:   Head: Atraumatic. Right Ear: External ear normal.   Left Ear: External ear normal.   Nose: Nose normal.   Mouth/Throat: Oropharynx is clear and moist.   Eyes: Pupils are equal, round, and reactive to light. Conjunctivae are normal.   Neck: Normal range of motion. Neck supple.    Cardiovascular:

## 2019-11-15 ENCOUNTER — OFFICE VISIT (OUTPATIENT)
Dept: PRIMARY CARE CLINIC | Age: 39
End: 2019-11-15
Payer: COMMERCIAL

## 2019-11-15 VITALS
SYSTOLIC BLOOD PRESSURE: 138 MMHG | DIASTOLIC BLOOD PRESSURE: 88 MMHG | WEIGHT: 315 LBS | HEIGHT: 72 IN | OXYGEN SATURATION: 96 % | RESPIRATION RATE: 17 BRPM | HEART RATE: 71 BPM | BODY MASS INDEX: 42.66 KG/M2 | TEMPERATURE: 97.7 F

## 2019-11-15 DIAGNOSIS — M54.32 BILATERAL SCIATICA: ICD-10-CM

## 2019-11-15 DIAGNOSIS — B07.9 WART ON THUMB: ICD-10-CM

## 2019-11-15 DIAGNOSIS — R73.03 PREDIABETES: Primary | ICD-10-CM

## 2019-11-15 DIAGNOSIS — M54.31 BILATERAL SCIATICA: ICD-10-CM

## 2019-11-15 DIAGNOSIS — M19.90 ARTHRITIS: ICD-10-CM

## 2019-11-15 LAB — HBA1C MFR BLD: 5.9 %

## 2019-11-15 PROCEDURE — 99214 OFFICE O/P EST MOD 30 MIN: CPT | Performed by: NURSE PRACTITIONER

## 2019-11-15 PROCEDURE — 83036 HEMOGLOBIN GLYCOSYLATED A1C: CPT | Performed by: NURSE PRACTITIONER

## 2019-11-15 ASSESSMENT — ENCOUNTER SYMPTOMS
GASTROINTESTINAL NEGATIVE: 1
EYES NEGATIVE: 1
RESPIRATORY NEGATIVE: 1
BACK PAIN: 1

## 2020-03-23 ENCOUNTER — HOSPITAL ENCOUNTER (EMERGENCY)
Age: 40
Discharge: HOME | End: 2020-03-24
Attending: EMERGENCY MEDICINE
Payer: COMMERCIAL

## 2020-03-23 LAB
ALBUMIN SERPL-MCNC: 4.3 G/DL (ref 3.5–5.2)
ALP BLD-CCNC: 100 U/L (ref 40–130)
ALT SERPL-CCNC: 37 U/L (ref 5–41)
AMYLASE: 24 U/L (ref 28–100)
ANION GAP SERPL CALCULATED.3IONS-SCNC: 12 MMOL/L (ref 7–19)
AST SERPL-CCNC: 33 U/L (ref 5–40)
BACTERIA: ABNORMAL /HPF
BASOPHILS ABSOLUTE: 0.1 K/UL (ref 0–0.2)
BASOPHILS RELATIVE PERCENT: 0.7 % (ref 0–1)
BILIRUB SERPL-MCNC: <0.2 MG/DL (ref 0.2–1.2)
BILIRUBIN URINE: NEGATIVE
BLOOD, URINE: NEGATIVE
BUN BLDV-MCNC: 10 MG/DL (ref 6–20)
CALCIUM SERPL-MCNC: 9.3 MG/DL (ref 8.6–10)
CHLORIDE BLD-SCNC: 103 MMOL/L (ref 98–111)
CLARITY: ABNORMAL
CO2: 26 MMOL/L (ref 22–29)
COLOR: YELLOW
CREAT SERPL-MCNC: 0.8 MG/DL (ref 0.5–1.2)
EOSINOPHILS ABSOLUTE: 0.4 K/UL (ref 0–0.6)
EOSINOPHILS RELATIVE PERCENT: 3.9 % (ref 0–5)
EPITHELIAL CELLS, UA: 1 /HPF (ref 0–5)
GFR NON-AFRICAN AMERICAN: >60
GLUCOSE BLD-MCNC: 112 MG/DL (ref 74–109)
GLUCOSE URINE: NEGATIVE MG/DL
HCT VFR BLD CALC: 50 % (ref 42–52)
HEMOGLOBIN: 16.4 G/DL (ref 14–18)
HYALINE CASTS: 5 /HPF (ref 0–8)
IMMATURE GRANULOCYTES #: 0 K/UL
KETONES, URINE: NEGATIVE MG/DL
LEUKOCYTE ESTERASE, URINE: NEGATIVE
LIPASE: 18 U/L (ref 13–60)
LYMPHOCYTES ABSOLUTE: 3 K/UL (ref 1.1–4.5)
LYMPHOCYTES RELATIVE PERCENT: 27.5 % (ref 20–40)
MCH RBC QN AUTO: 29.3 PG (ref 27–31)
MCHC RBC AUTO-ENTMCNC: 32.8 G/DL (ref 33–37)
MCV RBC AUTO: 89.4 FL (ref 80–94)
MONOCYTES ABSOLUTE: 0.8 K/UL (ref 0–0.9)
MONOCYTES RELATIVE PERCENT: 7.6 % (ref 0–10)
NEUTROPHILS ABSOLUTE: 6.5 K/UL (ref 1.5–7.5)
NEUTROPHILS RELATIVE PERCENT: 59.9 % (ref 50–65)
NITRITE, URINE: POSITIVE
PDW BLD-RTO: 13.8 % (ref 11.5–14.5)
PH UA: 6.5 (ref 5–8)
PLATELET # BLD: 272 K/UL (ref 130–400)
PMV BLD AUTO: 9.8 FL (ref 9.4–12.4)
POTASSIUM REFLEX MAGNESIUM: 4.9 MMOL/L (ref 3.5–5)
PROTEIN UA: NEGATIVE MG/DL
RBC # BLD: 5.59 M/UL (ref 4.7–6.1)
RBC UA: 1 /HPF (ref 0–4)
SODIUM BLD-SCNC: 141 MMOL/L (ref 136–145)
SPECIFIC GRAVITY UA: 1.02 (ref 1–1.03)
TOTAL PROTEIN: 8 G/DL (ref 6.6–8.7)
URINE REFLEX TO CULTURE: YES
UROBILINOGEN, URINE: 0.2 E.U./DL
WBC # BLD: 10.9 K/UL (ref 4.8–10.8)
WBC UA: 11 /HPF (ref 0–5)

## 2020-03-23 PROCEDURE — 85025 COMPLETE CBC W/AUTO DIFF WBC: CPT

## 2020-03-23 PROCEDURE — 2500000003 HC RX 250 WO HCPCS: Performed by: EMERGENCY MEDICINE

## 2020-03-23 PROCEDURE — 6360000002 HC RX W HCPCS: Performed by: EMERGENCY MEDICINE

## 2020-03-23 PROCEDURE — 2580000003 HC RX 258: Performed by: EMERGENCY MEDICINE

## 2020-03-23 PROCEDURE — 96374 THER/PROPH/DIAG INJ IV PUSH: CPT

## 2020-03-23 PROCEDURE — 36415 COLL VENOUS BLD VENIPUNCTURE: CPT

## 2020-03-23 PROCEDURE — 99284 EMERGENCY DEPT VISIT MOD MDM: CPT

## 2020-03-23 PROCEDURE — 80053 COMPREHEN METABOLIC PANEL: CPT

## 2020-03-23 PROCEDURE — 83690 ASSAY OF LIPASE: CPT

## 2020-03-23 PROCEDURE — 96375 TX/PRO/DX INJ NEW DRUG ADDON: CPT

## 2020-03-23 PROCEDURE — 82150 ASSAY OF AMYLASE: CPT

## 2020-03-23 RX ORDER — ONDANSETRON 2 MG/ML
4 INJECTION INTRAMUSCULAR; INTRAVENOUS ONCE
Status: COMPLETED | OUTPATIENT
Start: 2020-03-23 | End: 2020-03-23

## 2020-03-23 RX ORDER — ONDANSETRON 8 MG/1
8 TABLET, ORALLY DISINTEGRATING ORAL EVERY 8 HOURS PRN
Qty: 15 TABLET | Refills: 0 | Status: SHIPPED | OUTPATIENT
Start: 2020-03-23 | End: 2020-09-19

## 2020-03-23 RX ORDER — 0.9 % SODIUM CHLORIDE 0.9 %
1000 INTRAVENOUS SOLUTION INTRAVENOUS ONCE
Status: COMPLETED | OUTPATIENT
Start: 2020-03-23 | End: 2020-03-24

## 2020-03-23 RX ADMIN — ONDANSETRON 4 MG: 2 INJECTION INTRAMUSCULAR; INTRAVENOUS at 23:31

## 2020-03-23 RX ADMIN — SODIUM CHLORIDE 1000 ML: 9 INJECTION, SOLUTION INTRAVENOUS at 22:17

## 2020-03-23 RX ADMIN — FAMOTIDINE 40 MG: 10 INJECTION, SOLUTION INTRAVENOUS at 23:31

## 2020-03-23 ASSESSMENT — ENCOUNTER SYMPTOMS
ABDOMINAL PAIN: 1
ABDOMINAL DISTENTION: 0
VOMITING: 0
APNEA: 0
BLOOD IN STOOL: 0
SHORTNESS OF BREATH: 0
CONSTIPATION: 0
VOICE CHANGE: 0
SINUS PRESSURE: 0
NAUSEA: 1
FACIAL SWELLING: 0
CHOKING: 0
DIARRHEA: 1
SORE THROAT: 0
EYE DISCHARGE: 0

## 2020-03-24 VITALS
WEIGHT: 315 LBS | HEART RATE: 80 BPM | OXYGEN SATURATION: 93 % | HEIGHT: 72 IN | BODY MASS INDEX: 42.66 KG/M2 | DIASTOLIC BLOOD PRESSURE: 80 MMHG | RESPIRATION RATE: 17 BRPM | TEMPERATURE: 98.7 F | SYSTOLIC BLOOD PRESSURE: 133 MMHG

## 2020-03-24 PROCEDURE — 87186 SC STD MICRODIL/AGAR DIL: CPT

## 2020-03-24 PROCEDURE — 87086 URINE CULTURE/COLONY COUNT: CPT

## 2020-03-24 PROCEDURE — 81001 URINALYSIS AUTO W/SCOPE: CPT

## 2020-03-24 NOTE — ED NOTES
Pt given urinal and encouraged to provide specimen at this time. Pt sitting in bed playing on phone, no acute distress noted at this time.      Natasha Rosenberg RN  03/23/20 [id number]

## 2020-03-24 NOTE — ED PROVIDER NOTES
was a child         CURRENT MEDICATIONS       Previous Medications    MELOXICAM (MOBIC) 15 MG TABLET    Take 1 tablet by mouth daily    METFORMIN (GLUCOPHAGE) 500 MG TABLET    Take 1 tablet by mouth 2 times daily (with meals)    SALICYLIC ACID-LACTIC ACID 17 % EXTERNAL SOLUTION    Apply topically. TIZANIDINE (ZANAFLEX) 4 MG TABLET    Take 1 tablet by mouth nightly as needed (back pain)       ALLERGIES     Patient has no known allergies.     FAMILY HISTORY       Family History   Problem Relation Age of Onset    Cancer Mother 46        colon cancer    Cancer Father         luekemia    Diabetes Father     Other Maternal Grandmother         copd    Other Maternal Grandfather         copd    Heart Disease Paternal Grandmother           SOCIAL HISTORY       Social History     Socioeconomic History    Marital status:      Spouse name: None    Number of children: None    Years of education: None    Highest education level: None   Occupational History    None   Social Needs    Financial resource strain: None    Food insecurity     Worry: None     Inability: None    Transportation needs     Medical: None     Non-medical: None   Tobacco Use    Smoking status: Never Smoker    Smokeless tobacco: Never Used    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[employer]   Substance and Sexual Activity    Alcohol use: No    Drug use: No    Sexual activity: Yes     Partners: Female   Lifestyle    Physical activity     Days per week: None     Minutes per session: None    Stress: None   Relationships    Social connections     Talks on phone: None     Gets together: None     Attends Restoration service: None     Active member of club or organization: None     Attends meetings of clubs or organizations: None     Relationship status: None    Intimate partner violence     Fear of current or ex partner: None     Emotionally abused: None     Physically abused: None     Forced sexual activity: None   Other Topics Concern    None   Social History Narrative    None       SCREENINGS             PHYSICAL EXAM    (up to 7 for level 4, 8 or more for level 5)     ED Triage Vitals [03/23/20 2152]   BP Temp Temp src Pulse Resp SpO2 Height Weight   (!) 155/97 98.7 °F (37.1 °C) -- 80 17 93 % 6' (1.829 m) (!) 375 lb (170.1 kg)       Physical Exam  Vitals signs and nursing note reviewed. Constitutional:       General: He is not in acute distress. Appearance: He is well-developed. HENT:      Head: Normocephalic and atraumatic. Right Ear: External ear normal.      Left Ear: External ear normal.      Nose: Nose normal.      Mouth/Throat:      Mouth: Mucous membranes are moist.      Pharynx: Oropharynx is clear. Eyes:      General: No scleral icterus. Conjunctiva/sclera: Conjunctivae normal.      Pupils: Pupils are equal, round, and reactive to light. Neck:      Musculoskeletal: Normal range of motion and neck supple. Cardiovascular:      Rate and Rhythm: Normal rate and regular rhythm. Heart sounds: Normal heart sounds. No murmur. Pulmonary:      Effort: Pulmonary effort is normal.      Breath sounds: Normal breath sounds. Abdominal:      General: Bowel sounds are normal.      Palpations: Abdomen is soft. Tenderness: There is abdominal tenderness (He has some mild discomfort in his large abdomen in all 4 quadrants. ). There is no guarding or rebound. Hernia: No hernia is present. Musculoskeletal: Normal range of motion. Skin:     General: Skin is warm and dry. Neurological:      General: No focal deficit present. Mental Status: He is alert and oriented to person, place, and time.    Psychiatric:         Mood and Affect: Mood normal.         Behavior: Behavior normal.         DIAGNOSTIC RESULTS     EKG: All EKG's are interpreted by the Emergency Department Physician who either signs or Co-signs this chart in the absence of a cardiologist.        RADIOLOGY:   Non-plain film images such as CT, negative for leukocyte esterase. Patient is asymptomatic. We will do a culture before starting antibiotics. He had a history of urinary tract infection but he has also had several instrumentations for kidney stone treatment around that time. He is advised to follow-up if he ever become symptomatic we will follow-up with the culture. Which if it is positive then we will call in some antibiotics. CONSULTS:  None    PROCEDURES:  Unless otherwise notedbelow, none     Procedures    FINAL IMPRESSION     1. Nausea    2.  Diarrhea, unspecified type          DISPOSITION/PLAN   DISPOSITION Discharge - Pending Orders Complete 03/23/2020 11:48:49 PM      PATIENT REFERRED TO:  @FUP@    DISCHARGE MEDICATIONS:  New Prescriptions    ONDANSETRON (ZOFRAN-ODT) 8 MG TBDP DISINTEGRATING TABLET    Take 1 tablet by mouth every 8 hours as needed for Nausea or Vomiting          (Please note that portions of this note were completed with a voice recognition program.  Efforts were made to edit the dictations butoccasionally words are mis-transcribed.)    Carole Tate MD (electronically signed)  AttendingEmergency Physician          Alison Vital MD  03/24/20 8402

## 2020-03-26 LAB
ORGANISM: ABNORMAL
URINE CULTURE, ROUTINE: ABNORMAL
URINE CULTURE, ROUTINE: ABNORMAL

## 2020-06-10 ENCOUNTER — OFFICE VISIT (OUTPATIENT)
Dept: PRIMARY CARE CLINIC | Age: 40
End: 2020-06-10
Payer: COMMERCIAL

## 2020-06-10 VITALS
OXYGEN SATURATION: 98 % | HEART RATE: 76 BPM | HEIGHT: 72 IN | DIASTOLIC BLOOD PRESSURE: 82 MMHG | BODY MASS INDEX: 42.66 KG/M2 | TEMPERATURE: 98.5 F | SYSTOLIC BLOOD PRESSURE: 130 MMHG | WEIGHT: 315 LBS

## 2020-06-10 PROCEDURE — 99396 PREV VISIT EST AGE 40-64: CPT | Performed by: NURSE PRACTITIONER

## 2020-06-10 RX ORDER — TIZANIDINE 4 MG/1
4 TABLET ORAL NIGHTLY PRN
Qty: 30 TABLET | Refills: 1 | Status: SHIPPED | OUTPATIENT
Start: 2020-06-10 | End: 2021-04-14 | Stop reason: SDUPTHER

## 2020-06-10 ASSESSMENT — PATIENT HEALTH QUESTIONNAIRE - PHQ9
1. LITTLE INTEREST OR PLEASURE IN DOING THINGS: 0
SUM OF ALL RESPONSES TO PHQ QUESTIONS 1-9: 0
2. FEELING DOWN, DEPRESSED OR HOPELESS: 0
SUM OF ALL RESPONSES TO PHQ QUESTIONS 1-9: 0
SUM OF ALL RESPONSES TO PHQ9 QUESTIONS 1 & 2: 0

## 2020-06-10 NOTE — PROGRESS NOTES
[facility] PRIMARY [facility]ARE  [address] [address]  [address] [address]  Dept: [phone]  Dept Fax: [phone]  Loc: [phone]    Kayla Heck is a 38 y.o. male who presents today for his medical conditions/complaints as noted below. Kayla Child is c/o of Annual Exam and Hand Pain (Still having problems with pain and numbness in both hands)      Chief Complaint   Patient presents with    Annual Exam    Hand Pain     Still having problems with pain and numbness in both hands       HPI:     HPI   Patient here for annual physical exam.  He has been taking all of the meds as prescribed. He does reports having worsening bilateral hand numbness. He did have nerve conduction study in the past and was told he had severe carpal tunnel. He is needing labs.       Past Medical History:   Diagnosis Date    Arthritis     both wrist    Bilateral carpal tunnel syndrome 8/15/2019    Bronchitis     10 yrs ago    Kidney stone     recurrent    Sleep apnea     untested    Umbilical hernia         Past Surgical History:   Procedure Laterality Date    CYSTOSCOPY Right 7/24/2018    CYSTOSCOPY/ URETEROSCOPY; INSERTION DOUBLE J STENT/  URETERAL performed by Sriram Sánchez MD at [address] [place] [address]      both wrists    HEMORRHOID SURGERY N/A 6/24/2016    HEMORRHOID INCISION AND DRAINAGE performed by Migdalia Osborn MD at [facility] CYSTO/URETERO/PYELOSCOPY W/LITHOTRIPSY Right 8/7/2018    URETEROSCOPY LASER LITHOTRIPSY STONE EXTRACTION performed by Sriram Sánchez MD at [address] [place] [address] Right 8/7/2018    STENT PLACEMENT performed by Sriram Sánchez MD at [address] [place] [address] ESWL Right 2/13/2018    ESWL EXTRACORPEAL SHOCK WAVE LITHOTRIPSY performed by Sriram Sánchez MD at [address] [place] [address] ESWL Right 3/13/2018    ESWL [address] LITHOTRIPSY performed by Sriram Sánchez Normal appearance. He is well-developed. Comments: obese   HENT:      Head: Normocephalic and atraumatic. Right Ear: Hearing, tympanic membrane, ear canal and external ear normal.      Left Ear: Hearing, tympanic membrane, ear canal and external ear normal.      Nose: Nose normal.      Mouth/Throat:      Pharynx: Uvula midline. Eyes:      General: Lids are normal.      Conjunctiva/sclera: Conjunctivae normal.      Pupils: Pupils are equal, round, and reactive to light. Neck:      Musculoskeletal: Normal range of motion and neck supple. Thyroid: No thyroid mass or thyromegaly. Trachea: Trachea normal.   Cardiovascular:      Rate and Rhythm: Normal rate and regular rhythm. Heart sounds: Normal heart sounds. Pulmonary:      Effort: Pulmonary effort is normal.      Breath sounds: Normal breath sounds. Abdominal:      General: Bowel sounds are normal.      Palpations: Abdomen is soft. Musculoskeletal:      Cervical back: Normal. He exhibits normal range of motion and no tenderness. Thoracic back: Normal. He exhibits normal range of motion and no tenderness. Lumbar back: He exhibits decreased range of motion and tenderness. Arms:    Skin:     General: Skin is warm and dry. Neurological:      Mental Status: He is alert and oriented to person, place, and time. Psychiatric:         Speech: Speech normal.         Behavior: Behavior normal.         Thought Content: Thought content normal.         Judgment: Judgment normal.         /82   Pulse 76   Temp 98.5 °F (36.9 °C)   Ht 6' (1.829 m)   Wt (!) 391 lb (177.4 kg)   SpO2 98%   BMI 53.03 kg/m²     Assessment:      Diagnosis Orders   1. Annual physical exam     2. Prediabetes  CBC Auto Differential    Comprehensive Metabolic Panel    Hemoglobin A1C    Lipid, Fasting   3. Morbid obesity ([address]     4. Bilateral carpal tunnel syndrome  [address] [facility], [place]   5.  Strain of lumbar region, subsequent encounter tiZANidine (ZANAFLEX) 4 MG tablet       No results found for this visit on 06/10/20. Plan:     Referral to neuro for carpal tunnel. zanaflex for lumbar strain. Labs ordered - we will call with these results. Return in about 6 months (around 12/10/2020) for Follow up chronic conditions. Orders Placed This Encounter   Procedures    CBC Auto Differential     Standing Status:   Future     Standing Expiration Date:   6/10/2021    Comprehensive Metabolic Panel     Standing Status:   Future     Standing Expiration Date:   6/10/2021    Hemoglobin A1C     Standing Status:   Future     Standing Expiration Date:   6/10/2021    Lipid, Fasting     Standing Status:   Future     Standing Expiration Date:   6/10/2021   [facility], [place]     Referral Priority:   Routine     Referral Type:   Eval and Treat     Referral Reason:   Specialty Services Required     Requested Specialty:   Neurology     Number of Visits Requested:   1       Orders Placed This Encounter   Medications    tiZANidine (ZANAFLEX) 4 MG tablet     Sig: Take 1 tablet by mouth nightly as needed (spasm)     Dispense:  30 tablet     Refill:  1        Patient offered educational handouts and has had all questions answered. Patient voices understanding and agrees to plans along with risks and benefits of plan. Patient is instructed to continue prior meds, diet, and exercise plans as instructed. Patient agrees to follow up as instructed and sooner if needed. Patient agrees to go to ER if condition becomes emergent. EMR Dragon/transcription disclaimer: Some of this encounter note is an electronic transcription/translation of spoken language to printed text. The electronic translation of spoken language may permit erroneous, or at times, nonsensical words or phrases to be inadvertently transcribed.  Although I have reviewed the note for such errors, some may still exist.    Electronically signed by VANDANA Hathaway on 6/11/2020 at 10:51

## 2020-06-11 ASSESSMENT — ENCOUNTER SYMPTOMS
GASTROINTESTINAL NEGATIVE: 1
RESPIRATORY NEGATIVE: 1
EYES NEGATIVE: 1

## 2020-06-16 DIAGNOSIS — R73.03 PREDIABETES: ICD-10-CM

## 2020-06-16 LAB
ALBUMIN SERPL-MCNC: 4 G/DL (ref 3.5–5.2)
ALP BLD-CCNC: 86 U/L (ref 40–130)
ALT SERPL-CCNC: 39 U/L (ref 5–41)
ANION GAP SERPL CALCULATED.3IONS-SCNC: 9 MMOL/L (ref 7–19)
AST SERPL-CCNC: 29 U/L (ref 5–40)
BASOPHILS ABSOLUTE: 0.1 K/UL (ref 0–0.2)
BASOPHILS RELATIVE PERCENT: 0.8 % (ref 0–1)
BILIRUB SERPL-MCNC: 0.5 MG/DL (ref 0.2–1.2)
BUN BLDV-MCNC: 9 MG/DL (ref 6–20)
CALCIUM SERPL-MCNC: 9.3 MG/DL (ref 8.6–10)
CHLORIDE BLD-SCNC: 106 MMOL/L (ref 98–111)
CHOLESTEROL, FASTING: 187 MG/DL (ref 160–199)
CO2: 26 MMOL/L (ref 22–29)
CREAT SERPL-MCNC: 0.7 MG/DL (ref 0.5–1.2)
EOSINOPHILS ABSOLUTE: 0.3 K/UL (ref 0–0.6)
EOSINOPHILS RELATIVE PERCENT: 3.6 % (ref 0–5)
GFR NON-AFRICAN AMERICAN: >60
GLUCOSE BLD-MCNC: 101 MG/DL (ref 74–109)
HBA1C MFR BLD: 6.4 % (ref 4–6)
HCT VFR BLD CALC: 48.4 % (ref 42–52)
HDLC SERPL-MCNC: 36 MG/DL (ref 55–121)
HEMOGLOBIN: 15.4 G/DL (ref 14–18)
IMMATURE GRANULOCYTES #: 0 K/UL
LDL CHOLESTEROL CALCULATED: 126 MG/DL
LYMPHOCYTES ABSOLUTE: 2.6 K/UL (ref 1.1–4.5)
LYMPHOCYTES RELATIVE PERCENT: 29.1 % (ref 20–40)
MCH RBC QN AUTO: 29 PG (ref 27–31)
MCHC RBC AUTO-ENTMCNC: 31.8 G/DL (ref 33–37)
MCV RBC AUTO: 91.1 FL (ref 80–94)
MONOCYTES ABSOLUTE: 0.7 K/UL (ref 0–0.9)
MONOCYTES RELATIVE PERCENT: 8.2 % (ref 0–10)
NEUTROPHILS ABSOLUTE: 5.1 K/UL (ref 1.5–7.5)
NEUTROPHILS RELATIVE PERCENT: 58 % (ref 50–65)
PDW BLD-RTO: 13.4 % (ref 11.5–14.5)
PLATELET # BLD: 238 K/UL (ref 130–400)
PMV BLD AUTO: 9.9 FL (ref 9.4–12.4)
POTASSIUM SERPL-SCNC: 3.9 MMOL/L (ref 3.5–5)
RBC # BLD: 5.31 M/UL (ref 4.7–6.1)
SODIUM BLD-SCNC: 141 MMOL/L (ref 136–145)
TOTAL PROTEIN: 7.2 G/DL (ref 6.6–8.7)
TRIGLYCERIDE, FASTING: 125 MG/DL (ref 0–149)
WBC # BLD: 8.8 K/UL (ref 4.8–10.8)

## 2020-06-25 ENCOUNTER — OFFICE VISIT (OUTPATIENT)
Dept: NEUROSURGERY | Age: 40
End: 2020-06-25
Payer: COMMERCIAL

## 2020-06-25 VITALS
HEART RATE: 81 BPM | BODY MASS INDEX: 42.66 KG/M2 | HEIGHT: 72 IN | DIASTOLIC BLOOD PRESSURE: 79 MMHG | SYSTOLIC BLOOD PRESSURE: 136 MMHG | WEIGHT: 315 LBS

## 2020-06-25 PROCEDURE — 99204 OFFICE O/P NEW MOD 45 MIN: CPT | Performed by: NURSE PRACTITIONER

## 2020-06-25 NOTE — PROGRESS NOTES
Talks on phone: Not on file     Gets together: Not on file     Attends Jain service: Not on file     Active member of club or organization: Not on file     Attends meetings of clubs or organizations: Not on file     Relationship status: Not on file    Intimate partner violence     Fear of current or ex partner: Not on file     Emotionally abused: Not on file     Physically abused: Not on file     Forced sexual activity: Not on file   Other Topics Concern    Not on file   Social History Narrative    Not on file       Current Outpatient Medications   Medication Sig Dispense Refill    tiZANidine (ZANAFLEX) 4 MG tablet Take 1 tablet by mouth nightly as needed (spasm) 30 tablet 1    ondansetron (ZOFRAN-ODT) 8 MG TBDP disintegrating tablet Take 1 tablet by mouth every 8 hours as needed for Nausea or Vomiting 15 tablet 0    meloxicam (MOBIC) 15 MG tablet Take 1 tablet by mouth daily 30 tablet 5    metFORMIN (GLUCOPHAGE) 500 MG tablet Take 1 tablet by mouth 2 times daily (with meals) 180 tablet 1     No current facility-administered medications for this visit. No Known Allergies      REVIEW OF SYSTEMS  Constitutional: []? Fever []? Sweat []? Chills []? Recent Injury [x]? Denies all unless marked  HEENT:[]? Headache  []? Head Injury/Hearing Loss  []? Sore Throat  []? Ear Ache/Dizziness  [x]? Denies all unless marked  Spine:  []? Neck pain  []? Back pain  []? Sciaticia  [x]? Denies all unless marked  Cardiovascular:[]? Heart Disease []? Chest Pain []? Palpitations  [x]? Denies all unless marked  Pulmonary: []? Shortness of Breath []? Cough   [x]? Denies all unless marke  Gastrointestinal: []? Nausea  []? Vomiting  []? Abdominal Pain  []? Constipation  []? Diarrhea  []? Dark Bloody Stools  [x]? Denies all unless marked  Psychiatric/Behavioral:[]? Depression []? Anxiety [x]? Denies all unless marked  Genitourinary:   []? Frequency  []? Urgency  []? Incontinence []? Pain with Urination  [x]?  Denies all unless marked  Extremities: []? Pain  [x]? Swelling  [x]? Denies all unless marked  Musculoskeletal: []? Muscle Pain  []? Joint Pain  []? Arthritis []? Muscle Cramps []? Muscle Twitches  [x]? Denies all unless marked  Sleep: []? Insomnia []? Snoring []? Restless Legs []? Sleep Apnea  []? Daytime Sleepiness  [x]? Denies all unless marked  Skin:[]? Rash []? Skin Discoloration [x]? Denies all unless marked   Neurological: []? Visual Disturbance/Memory Loss []? Loss of Balance []? Slurred Speech/Weakness []? Seizures  []? Vertigo/Dizziness [x]? Denies all unless marked    The MA has completed the ROS with the patient. I have reviewed it in its' entirety with the patient and agree with the documentation. PHYSICAL EXAM  /79   Pulse 81   Ht 6' (1.829 m)   Wt (!) 390 lb (176.9 kg)   BMI 52.89 kg/m²       Constitutional - No acute distress    HEENT- Conjunctiva normal.  No scars, masses, or lesions over external nose or ears, no neck masses noted, no jugular vein distension, no bruit  Cardiac- Regular rate and rhythm  Pulmonary- Good expansion, normal effort without use of accessory muscles  Musculoskeletal - No significant wasting of muscles noted, no bony deformities  Extremities - No clubbing, cyanosis or edema  Skin - Warm, dry, and intact. No rash, erythema, or pallor  Psychiatric - Mood, affect, and behavior appear normal      NEUROLOGICAL EXAM     Mental status   [x] Awake, alert, oriented   [x]Affect attention and concentration appear appropriate  [x]Recent and remote memory appears unremarkable  [x]Speech normal without dysarthria or aphasia, comprehension and repetition intact.    COMMENTS:    Cranial Nerves [x]No VF deficit to confrontation,  no papilledema on fundoscopic exam.  [x]PERRLA, EOMI, no nystagmus, conjugate eye movements, no ptosis  [x]Face symmetric  [x]Facial sensation intact  [x]Tongue midline no atrophy or fasciculations present  [x]Palate midline, hearing to finger rub normal of carpal tunnel syndrome. Exam today is consistent with carpal tunnel, will plan to repeat NCS to get more recent testing. Discussed neurosurgery referral for surgical opinion pending these results and patient would like to proceed with this. He denies neck pain so cervical spine imaging felt low yield. ICD-10-CM    1. Numbness and tingling in both hands R20.0 Nerve Conduction Test with EMG    R20.2    2. Bilateral carpal tunnel syndrome G56.03      PLAN:  1. NCS/EMG BUE  2. Continue with bilateral carpal tunnel wrist splints   3. Pending NCS, consider neurosurgery opinion   4. Follow up after NCS, sooner with any worsening     VANDANA Cavazos    Note:  A total of >50% (>23 minutes) of 45 minutes was spent discussing the pathophysiology and treatment and/or coordination of care of the above diagnoses. This dictation was generated by voice recognition computer software. Although all attempts are made to edit the dictation for accuracy, there may be errors in the transcription that are not intended.

## 2020-07-02 ENCOUNTER — HOSPITAL ENCOUNTER (OUTPATIENT)
Dept: NEUROLOGY | Age: 40
Discharge: HOME | End: 2020-07-02
Payer: COMMERCIAL

## 2020-07-02 PROCEDURE — 95886 MUSC TEST DONE W/N TEST COMP: CPT

## 2020-07-02 PROCEDURE — 95911 NRV CNDJ TEST 9-10 STUDIES: CPT | Performed by: PSYCHIATRY & NEUROLOGY

## 2020-07-02 PROCEDURE — 95911 NRV CNDJ TEST 9-10 STUDIES: CPT

## 2020-07-02 PROCEDURE — 95886 MUSC TEST DONE W/N TEST COMP: CPT | Performed by: PSYCHIATRY & NEUROLOGY

## 2020-07-08 ENCOUNTER — OFFICE VISIT (OUTPATIENT)
Dept: NEUROSURGERY | Age: 40
End: 2020-07-08
Payer: COMMERCIAL

## 2020-07-08 VITALS
HEIGHT: 72 IN | HEART RATE: 83 BPM | SYSTOLIC BLOOD PRESSURE: 136 MMHG | WEIGHT: 315 LBS | BODY MASS INDEX: 42.66 KG/M2 | DIASTOLIC BLOOD PRESSURE: 84 MMHG

## 2020-07-08 PROCEDURE — 99213 OFFICE O/P EST LOW 20 MIN: CPT | Performed by: NURSE PRACTITIONER

## 2020-07-08 NOTE — PROGRESS NOTES
[facility] Neurology Office Note      Patient:   Bereket Peter  MR#:    [id number]  Account Number:                         YOB: 1980  Date of Evaluation:  7/8/2020  Time of Note:                          9:33 AM  Primary/Referring Physician:  VANDANA Small   Consulting Physician:  VANDANA Levy    FOLLOW UP    Chief Complaint   Patient presents with    Follow-up    Results     NCS results       HISTORY OF PRESENT ILLNESS    Bereket Peter is a 38y.o. year old male here for follow up of BUE numbness, tingling and swelling. No change in symptoms. He has noted over the past 4-5 months worsening of symptoms, more constant in nature. He notes numbness and pain in his right thumb, 1st and 2nd digit with some radiation of pain into the elbow at times. On the left hand he notes numbness in his whole hand except for 5th digit. Symptoms do wake him up at night. He does note some weakness, dropping items at times. 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 He has worn wrist splints for carpal tunnel, initially helps with symptoms but no longer beneficial. He denies neck pain. Recent NCS with bilateral CTS. No other complaints today.      Past Medical History:   Diagnosis Date    Arthritis     both wrist    Bilateral carpal tunnel syndrome 8/15/2019    Bronchitis     10 yrs ago    Kidney stone     recurrent    Sleep apnea     untested    Umbilical hernia        Past Surgical History:   Procedure Laterality Date    CYSTOSCOPY Right 7/24/2018    CYSTOSCOPY/ URETEROSCOPY; INSERTION DOUBLE J STENT/  URETERAL performed by Andrea Ivy MD at [address] [place] [address]      both wrists    HEMORRHOID SURGERY N/A 6/24/2016    HEMORRHOID INCISION AND DRAINAGE performed by Laurence eMneses MD at [facility] [address] CYSTO/URETERO/PYELOSCOPY W/LITHOTRIPSY Right 8/7/2018    URETEROSCOPY LASER LITHOTRIPSY STONE EXTRACTION performed by Aimee Collado Sushila Mina MD at [facility] URETERAL STENT Right 8/7/2018    STENT PLACEMENT performed by Yonathan Prajapati MD at [address] [facility] ESWL Right 2/13/2018    ESWL [address] LITHOTRIPSY performed by Yonathan Prajapati MD at [address] [facility] ESWL Right 3/13/2018    ESWL [address] LITHOTRIPSY performed by Yonathan Prajapati MD at [address] [facility] ESWL Right 7/24/2018    ESWL [address] LITHOTRIPSY performed by Yonathan Prajapati MD at [address], [address] N/A 3/8/2792    HERNIA UMBILICAL REPAIR LAPAROSCOPIC performed by Jack Russo MD at [address]      left wrist.  Pt was a child       Family History   Problem Relation Age of Onset    Cancer Mother 46        colon cancer    Cancer Father         luekemia    Diabetes Father     Other Maternal Grandmother         copd    Other Maternal Grandfather         copd    Heart Disease Paternal Grandmother        Social History     Socioeconomic History    Marital status:      Spouse name: Not on file    Number of children: Not on file    Years of education: Not on file    Highest education level: Not on file   Occupational History    Not on file   Social Needs    Financial resource strain: Not on file    Food insecurity     Worry: Not on file     Inability: Not on file    Transportation needs     Medical: Not on file     Non-medical: Not on file   Tobacco Use    Smoking status: Never Smoker    Smokeless tobacco: Never Used    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[address] and Sexual Activity    Alcohol use: No    Drug use: No    Sexual activity: Yes     Partners: Female   Lifestyle    Physical activity     Days per week: Not on file     Minutes per session: Not on file    Stress: Not on file   Relationships    Social connections     Talks on phone: Not on file     Gets together: Not on file     Attends Zoroastrian service: Not on file     Active member of club or organization: Not on file     Attends meetings of clubs or organizations: Not on file     Relationship status: Not on file    Intimate partner violence     Fear of current or ex partner: Not on file     Emotionally abused: Not on file     Physically abused: Not on file     Forced sexual activity: Not on file   Other Topics Concern    Not on file   Social History Narrative    Not on file       Current Outpatient Medications   Medication Sig Dispense Refill    tiZANidine (ZANAFLEX) 4 MG tablet Take 1 tablet by mouth nightly as needed (spasm) 30 tablet 1    ondansetron (ZOFRAN-ODT) 8 MG TBDP disintegrating tablet Take 1 tablet by mouth every 8 hours as needed for Nausea or Vomiting 15 tablet 0    meloxicam (MOBIC) 15 MG tablet Take 1 tablet by mouth daily 30 tablet 5    metFORMIN (GLUCOPHAGE) 500 MG tablet Take 1 tablet by mouth 2 times daily (with meals) 180 tablet 1     No current facility-administered medications for this visit. No Known Allergies      REVIEW OF SYSTEMS  Constitutional: []? Fever []? Sweat []? Chills []? Recent Injury [x]? Denies all unless marked  HEENT:[]? Headache  []? Head Injury/Hearing Loss  []? Sore Throat  []? Ear Ache/Dizziness  [x]? Denies all unless marked  Spine:  []? Neck pain  []? Back pain  []? Sciaticia  [x]? Denies all unless marked  Cardiovascular:[]? Heart Disease []? Chest Pain []? Palpitations  [x]? Denies all unless marked  Pulmonary: []? Shortness of Breath []? Cough   [x]? Denies all unless marke  Gastrointestinal: []? Nausea  []? Vomiting  []? Abdominal Pain  []? Constipation  []? Diarrhea  []? Dark Bloody Stools  [x]? Denies all unless marked  Psychiatric/Behavioral:[]? Depression []? Anxiety [x]? Denies all unless marked  Genitourinary:   []? Frequency  []? Urgency  []? Incontinence []? Pain with Urination  [x]? Denies all unless marked  Extremities: []? Pain  []? Swelling  [x]? Denies all unless marked  Musculoskeletal: []? Muscle Pain  []? Joint Pain  []? Arthritis []? Muscle Cramps []? Muscle Twitches  [x]? Denies all unless marked  Sleep: []? Insomnia []? Snoring []? Restless Legs []? Sleep Apnea  []? Daytime Sleepiness  [x]? Denies all unless marked  Skin:[]? Rash []? Skin Discoloration [x]? Denies all unless marked   Neurological: []? Visual Disturbance/Memory Loss []? Loss of Balance []? Slurred Speech/Weakness []? Seizures  []? Vertigo/Dizziness [x]? Denies all unless marked    The MA has completed the ROS with the patient. I have reviewed it in its' entirety with the patient and agree with the documentation. PHYSICAL EXAM  /84   Pulse 83   Ht 6' (1.829 m)   Wt (!) 390 lb (176.9 kg)   BMI 52.89 kg/m²       Constitutional - No acute distress    HEENT- Conjunctiva normal.  No scars, masses, or lesions over external nose or ears, no neck masses noted, no jugular vein distension, no bruit  Cardiac- Regular rate and rhythm  Pulmonary- Good expansion, normal effort without use of accessory muscles  Musculoskeletal - No significant wasting of muscles noted, no bony deformities  Extremities - No clubbing, cyanosis or edema  Skin - Warm, dry, and intact. No rash, erythema, or pallor  Psychiatric - Mood, affect, and behavior appear normal      NEUROLOGICAL EXAM     Mental status   [x] Awake, alert, oriented   [x]Affect attention and concentration appear appropriate  [x]Recent and remote memory appears unremarkable  [x]Speech normal without dysarthria or aphasia, comprehension and repetition intact.    COMMENTS:    Cranial Nerves [x]No VF deficit to confrontation,  no papilledema on fundoscopic exam.  [x]PERRLA, EOMI, no nystagmus, conjugate eye movements, no ptosis  [x]Face symmetric  [x]Facial sensation intact  [x]Tongue midline no atrophy or fasciculations present  [x]Palate midline, hearing to finger rub normal bilaterally  [x]Shoulder shrug and SCM testing normal

## 2020-08-12 ENCOUNTER — TELEPHONE (OUTPATIENT)
Dept: NEUROSURGERY | Age: 40
End: 2020-08-12

## 2020-08-12 ENCOUNTER — TELEPHONE (OUTPATIENT)
Dept: PRIMARY CARE CLINIC | Age: 40
End: 2020-08-12

## 2020-08-12 ENCOUNTER — OFFICE VISIT (OUTPATIENT)
Dept: NEUROSURGERY | Age: 40
End: 2020-08-12
Payer: COMMERCIAL

## 2020-08-12 VITALS
DIASTOLIC BLOOD PRESSURE: 88 MMHG | HEART RATE: 78 BPM | WEIGHT: 315 LBS | HEIGHT: 72 IN | SYSTOLIC BLOOD PRESSURE: 133 MMHG | BODY MASS INDEX: 42.66 KG/M2

## 2020-08-12 PROCEDURE — 99203 OFFICE O/P NEW LOW 30 MIN: CPT | Performed by: NEUROLOGICAL SURGERY

## 2020-08-12 NOTE — H&P
ESWL Right 3/13/2018    ESWL EXTRACORPEAL SHOCK WAVE LITHOTRIPSY performed by Raheem Helton MD at [address] [place] [address] ESWL Right 7/24/2018    ESWL EXTRACORPEAL SHOCK WAVE LITHOTRIPSY performed by Raheem Helton MD at [address] [address], VENTRAL HERNIA REPAIR,REDUCIBLE N/A 5/3/0032    HERNIA UMBILICAL REPAIR LAPAROSCOPIC performed by Shani Castillo MD at [address]      left wrist.  Pt was a child         Current Outpatient Medications:     metFORMIN (GLUCOPHAGE) 500 MG tablet, Take 1 tablet by mouth 2 times daily (with meals), Disp: 180 tablet, Rfl: 1    tiZANidine (ZANAFLEX) 4 MG tablet, Take 1 tablet by mouth nightly as needed (spasm), Disp: 30 tablet, Rfl: 1    ondansetron (ZOFRAN-ODT) 8 MG TBDP disintegrating tablet, Take 1 tablet by mouth every 8 hours as needed for Nausea or Vomiting, Disp: 15 tablet, Rfl: 0    meloxicam (MOBIC) 15 MG tablet, Take 1 tablet by mouth daily, Disp: 30 tablet, Rfl: 5    Allergies:  Patient has no known allergies. 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 HENT: Negative. Eyes: Negative. Respiratory: Negative. Cardiovascular: Negative. Gastrointestinal: Negative. Genitourinary: Negative. Musculoskeletal: Positive for joint pain. Skin: Negative. Neurological: Negative. Endo/Heme/Allergies: Negative.     Psychiatric/Behavioral: Negative.         Review of Systems was obtained by the medical assistant and reviewed by myself. PHYSICAL EXAM:    Vitals:    08/12/20 0859   BP: 133/88   Pulse: 78       Constitutional: Obese, no apparent distress. Eyes - conjunctiva normal.  Pupils react to light  Ear, nose,throat -Normal pinna and tragus, No scars, or lesions over external nose or ears, no obvious atrophy of tongue  Neck- symmetric, trachea midline, no jugular vein distension  Respiration- chest wall symmetric, normal effort without use of accessory muscles  Musculoskeletal - no significant wasting of muscles noted, no bony deformities, symmetric bulk  Extremities- no clubbing, cyanosis or edema  Skin - warm, dry, and intact. No rash,erythema, or pallor. Psychiatric - mood, affect, and behavior appear normal.       NEUROLOGIC EXAM:    MENTAL STATUS: Alert, oriented, thought content appropriate    CRANIAL NERVES: PERRL, EOMI, symmetric facies, tongue midline    MOTOR:     Right Upper Extremity:    Deltoid: 5/5  Biceps: 5/5  Triceps: 5/5  Wrist Extension: 5/5  Finger Abduction: 5/5  APB: 5/5    Left Upper Extremity:    Deltoid: 5/5  Biceps: 5/5  Triceps: 5/5  Wrist Extension: 5/5  Finger Abduction: 5/5  APB: 4+/5    Right Lower Extremity:    Hip Flexion: 5/5  Knee Extension: 5/5  Ankle Plantarflexion: 5/5  Ankle Dorsiflexion: 5/5      Left Lower Extremity:    Hip Flexion: 5/5  Knee Extension: 5/5  Ankle Plantarflexion: 5/5  Ankle Dorsiflexion: 5/5      SOMATOSENSORY:     Right Upper Extremity: Decreased to light touch and pinprick throughout the right hand, there is splitting of the ring finger  Left Upper Extremity: Decreased to light touch and pinprick throughout the left hand. There is splitting of the ring finger.   Right Lower Extremity: normal light touch sensation  Left Lower Extremity: normal light touch sensation      REFLEXES:    Biceps: 2+  Patella: 2+    Raygoza's: Negative      COORDINATION and GAIT:    Gait: Normal      DATA:    Lab Results   Component Value Date    WBC 8.8 06/16/2020    HGB 15.4 06/16/2020    HCT 48.4 06/16/2020    MCV 91.1 06/16/2020     06/16/2020     Lab Results   Component Value Date     06/16/2020    K 3.9 06/16/2020     06/16/2020    CO2 26 06/16/2020    BUN 9 06/16/2020    CREATININE 0.7 06/16/2020    GLUCOSE 101 06/16/2020    CALCIUM 9.3 06/16/2020    PROT 7.2 06/16/2020    LABALBU 4.0 06/16/2020    BILITOT 0.5 06/16/2020    ALKPHOS 86 06/16/2020    AST 29 06/16/2020    ALT 39 06/16/2020    LABGLOM >60 06/16/2020    GLOB 3.2 03/07/2017     Lab Results   Component Value Date    INR 1.11 07/23/2018    INR 1.04 03/12/2018    INR 1.11 02/12/2018    PROTIME 14.2 07/23/2018    PROTIME 13.5 03/12/2018    PROTIME 14.2 02/12/2018           ASSESSMENT AND PLAN:  This is a 38 y.o. male  who presents for neurosurgical evaluation of bilateral carpal tunnel syndrome, left worse than right. He has failed conservative treatment with wrist splints and has both subjective and objective weakness and sensory loss on physical exam.  His EMG confirms severe left and moderate right median nerve entrapment at the wrist.  Given the progressive nature of his symptoms and failure of alternative treatments, I have recommended that we proceed with a left carpal tunnel release to address his most significantly-effected side first. The carpal tunnel release procedure was explained to the patient in detail, including alternatives to surgery and risks of nerve injury, bleeding, infection and poor wound healing. All questions were answered to the patient's stated satisfaction and they requested that we proceed with surgery. The patient was counseled at length about the risks of brian Covid-19 during their perioperative period and any recovery window from their procedure. The patient was made aware that brian Covid-19  may worsen their prognosis for recovering from their procedure  and lend to a higher morbidity and/or mortality risk.   All material risks, benefits, and reasonable alternatives including postponing the procedure were discussed. The patient does wish to proceed with the procedure at this time.                   Nila Waters MD

## 2020-08-12 NOTE — TELEPHONE ENCOUNTER
Estephanie called,patient scheduled for surgery on 8-23 and was told to call office to see how long he had to be off his medicines prior to his surgery.  Please advise

## 2020-08-12 NOTE — H&P (VIEW-ONLY)
[id number] [facility] Neurosurgery  History and Physical        Chief Complaint   Patient presents with    Consultation     carpal tunnel        HISTORY OF PRESENT ILLNESS:      The patient is a 38 y.o. male who presents for neurosurgical evaluation of bilateral carpal tunnel syndrome. He describes numbness, weakness and throbbing in both hands that has been getting worse for several years. 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 He also notes that he has started dropping things unintentionally. He also states that he will awaken at night with throbbing and numbness in his hands. He has seen neurology and been given wrist splints but these haven't helped. He recently underwent an EMG/NCS with Dr. Julio Bain and severe left and moderate right median nerve entrapment at the wrist was noted as well as a mild left ulnar neuropathy.        MEDICAL HISTORY:             Past Medical History:   Diagnosis Date    Arthritis     both wrist    Bilateral carpal tunnel syndrome 8/15/2019    Bronchitis     10 yrs ago    Kidney stone     recurrent    Sleep apnea     untested    Umbilical hernia        Past Surgical History:   Procedure Laterality Date    CYSTOSCOPY Right 7/24/2018    CYSTOSCOPY/ URETEROSCOPY; INSERTION DOUBLE J STENT/  URETERAL performed by Sigmund Fothergill, MD at [address] [place] [address]      both wrists    HEMORRHOID SURGERY N/A 6/24/2016    HEMORRHOID INCISION AND DRAINAGE performed by Joseline Jo MD at [address] CYSTO/URETERO/PYELOSCOPY W/LITHOTRIPSY Right 8/7/2018    URETEROSCOPY LASER LITHOTRIPSY STONE EXTRACTION performed by Sigmund Fothergill, [place] at [facility] [place] [place] Right 8/7/2018    STENT PLACEMENT performed by Sigmund Fothergill, MD at [address] [place] [address] ESWL Right 2/13/2018    ESWL EXTRACORPEAL SHOCK WAVE LITHOTRIPSY performed by Sigmund Fothergill, MD at [address] [place] [address] ESWL Right 3/13/2018    ESWL EXTRACORPEAL SHOCK WAVE LITHOTRIPSY performed by Dayana Wallace MD at [address] [place] [address] ESWL Right 7/24/2018    ESWL EXTRACORPEAL SHOCK WAVE LITHOTRIPSY performed by Dayana Wallace MD at [facility] [address], VENTRAL HERNIA REPAIR,REDUCIBLE N/A 9/8/2985    HERNIA UMBILICAL REPAIR LAPAROSCOPIC performed by Benson Murray MD at [address]      left wrist.  Pt was a child         Current Outpatient Medications:     metFORMIN (GLUCOPHAGE) 500 MG tablet, Take 1 tablet by mouth 2 times daily (with meals), Disp: 180 tablet, Rfl: 1    tiZANidine (ZANAFLEX) 4 MG tablet, Take 1 tablet by mouth nightly as needed (spasm), Disp: 30 tablet, Rfl: 1    ondansetron (ZOFRAN-ODT) 8 MG TBDP disintegrating tablet, Take 1 tablet by mouth every 8 hours as needed for Nausea or Vomiting, Disp: 15 tablet, Rfl: 0    meloxicam (MOBIC) 15 MG tablet, Take 1 tablet by mouth daily, Disp: 30 tablet, Rfl: 5    Allergies:  Patient has no known allergies. 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 HENT: Negative. Eyes: Negative. Respiratory: Negative. Cardiovascular: Negative. Gastrointestinal: Negative. Genitourinary: Negative. Musculoskeletal: Positive for joint pain. Skin: Negative. Neurological: Negative. Endo/Heme/Allergies: Negative.     Psychiatric/Behavioral: Negative.         Review of Systems was obtained by the medical assistant and reviewed by myself. PHYSICAL EXAM:    Vitals:    08/12/20 0859   BP: 133/88   Pulse: 78       Constitutional: Obese, no apparent distress. Eyes - conjunctiva normal.  Pupils react to light  Ear, nose,throat -Normal pinna and tragus, No scars, or lesions over external nose or ears, no obvious atrophy of tongue  Neck- symmetric, trachea midline, no jugular vein distension  Respiration- chest wall symmetric, normal effort without use of accessory muscles  Musculoskeletal - no significant wasting of muscles noted, no bony deformities, symmetric bulk  Extremities- no clubbing, cyanosis or edema  Skin - warm, dry, and intact. No rash,erythema, or pallor. Psychiatric - mood, affect, and behavior appear normal.       NEUROLOGIC EXAM:    MENTAL STATUS: Alert, oriented, thought content appropriate    CRANIAL NERVES: PERRL, EOMI, symmetric facies, tongue midline    MOTOR:     Right Upper Extremity:    Deltoid: 5/5  Biceps: 5/5  Triceps: 5/5  Wrist Extension: 5/5  Finger Abduction: 5/5  APB: 5/5    Left Upper Extremity:    Deltoid: 5/5  Biceps: 5/5  Triceps: 5/5  Wrist Extension: 5/5  Finger Abduction: 5/5  APB: 4+/5    Right Lower Extremity:    Hip Flexion: 5/5  Knee Extension: 5/5  Ankle Plantarflexion: 5/5  Ankle Dorsiflexion: 5/5      Left Lower Extremity:    Hip Flexion: 5/5  Knee Extension: 5/5  Ankle Plantarflexion: 5/5  Ankle Dorsiflexion: 5/5      SOMATOSENSORY:     Right Upper Extremity: Decreased to light touch and pinprick throughout the right hand, there is splitting of the ring finger  Left Upper Extremity: Decreased to light touch and pinprick throughout the left hand. There is splitting of the ring finger.   Right Lower Extremity: normal light touch sensation  Left Lower Extremity: normal light touch sensation      REFLEXES:    Biceps: 2+  Patella: 2+    Raygoza's: Negative      COORDINATION and GAIT:    Gait: Normal      DATA:    Lab Results   Component Value Date    WBC 8.8 06/16/2020    HGB 15.4 06/16/2020    HCT 48.4 06/16/2020    MCV 91.1 06/16/2020     06/16/2020     Lab Results   Component Value Date     06/16/2020    K 3.9 06/16/2020     06/16/2020    CO2 26 06/16/2020    BUN 9 06/16/2020    CREATININE 0.7 06/16/2020    GLUCOSE 101 06/16/2020    CALCIUM 9.3 06/16/2020    PROT 7.2 06/16/2020    LABALBU 4.0 06/16/2020    BILITOT 0.5 06/16/2020    ALKPHOS 86 06/16/2020    AST 29 06/16/2020    ALT 39 06/16/2020    LABGLOM >60 06/16/2020    GLOB 3.2 03/07/2017     Lab Results   Component Value Date    INR 1.11 07/23/2018    INR 1.04 03/12/2018    INR 1.11 02/12/2018    PROTIME 14.2 07/23/2018    PROTIME 13.5 03/12/2018    PROTIME 14.2 02/12/2018           ASSESSMENT AND PLAN:  This is a 38 y.o. male  who presents for neurosurgical evaluation of bilateral carpal tunnel syndrome, left worse than right. He has failed conservative treatment with wrist splints and has both subjective and objective weakness and sensory loss on physical exam.  His EMG confirms severe left and moderate right median nerve entrapment at the wrist.  Given the progressive nature of his symptoms and failure of alternative treatments, I have recommended that we proceed with a left carpal tunnel release to address his most significantly-effected side first. The carpal tunnel release procedure was explained to the patient in detail, including alternatives to surgery and risks of nerve injury, bleeding, infection and poor wound healing. All questions were answered to the patient's stated satisfaction and they requested that we proceed with surgery. The patient was counseled at length about the risks of brian Covid-19 during their perioperative period and any recovery window from their procedure. The patient was made aware that brian Covid-19  may worsen their prognosis for recovering from their procedure  and lend to a higher morbidity and/or mortality risk.   All material risks, benefits, and reasonable alternatives including postponing the procedure were discussed. The patient does wish to proceed with the procedure at this time.                   Hua Rai MD

## 2020-08-12 NOTE — PROGRESS NOTES
Right 3/13/2018    ESWL EXTRACORPEAL SHOCK WAVE LITHOTRIPSY performed by Uday Cali MD at [address] [place] [address] ESWL Right 7/24/2018    ESWL EXTRACORPEAL SHOCK WAVE LITHOTRIPSY performed by Uday Cali MD at [address] [address], VENTRAL HERNIA REPAIR,REDUCIBLE N/A 9/9/4656    HERNIA UMBILICAL REPAIR LAPAROSCOPIC performed by Dc Kay MD at [address]      left wrist.  Pt was a child         Current Outpatient Medications:     metFORMIN (GLUCOPHAGE) 500 MG tablet, Take 1 tablet by mouth 2 times daily (with meals), Disp: 180 tablet, Rfl: 1    tiZANidine (ZANAFLEX) 4 MG tablet, Take 1 tablet by mouth nightly as needed (spasm), Disp: 30 tablet, Rfl: 1    ondansetron (ZOFRAN-ODT) 8 MG TBDP disintegrating tablet, Take 1 tablet by mouth every 8 hours as needed for Nausea or Vomiting, Disp: 15 tablet, Rfl: 0    meloxicam (MOBIC) 15 MG tablet, Take 1 tablet by mouth daily, Disp: 30 tablet, Rfl: 5    Allergies:  Patient has no known allergies. 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 HENT: Negative. Eyes: Negative. Respiratory: Negative. Cardiovascular: Negative. Gastrointestinal: Negative. Genitourinary: Negative. Musculoskeletal: Positive for joint pain. Skin: Negative. Neurological: Negative. Endo/Heme/Allergies: Negative.     Psychiatric/Behavioral: Negative.         Review of Systems was obtained by the medical assistant and reviewed by myself. PHYSICAL EXAM:    Vitals:    08/12/20 0859   BP: 133/88   Pulse: 78       Constitutional: Obese, no apparent distress. Eyes - conjunctiva normal.  Pupils react to light  Ear, nose,throat -Normal pinna and tragus, No scars, or lesions over external nose or ears, no obvious atrophy of tongue  Neck- symmetric, trachea midline, no jugular vein distension  Respiration- chest wall symmetric, normal effort without use of accessory muscles  Musculoskeletal - no significant wasting of muscles noted, no bony deformities, symmetric bulk  Extremities- no clubbing, cyanosis or edema  Skin - warm, dry, and intact. No rash,erythema, or pallor. Psychiatric - mood, affect, and behavior appear normal.       NEUROLOGIC EXAM:    MENTAL STATUS: Alert, oriented, thought content appropriate    CRANIAL NERVES: PERRL, EOMI, symmetric facies, tongue midline    MOTOR:     Right Upper Extremity:    Deltoid: 5/5  Biceps: 5/5  Triceps: 5/5  Wrist Extension: 5/5  Finger Abduction: 5/5  APB: 5/5    Left Upper Extremity:    Deltoid: 5/5  Biceps: 5/5  Triceps: 5/5  Wrist Extension: 5/5  Finger Abduction: 5/5  APB: 4+/5    Right Lower Extremity:    Hip Flexion: 5/5  Knee Extension: 5/5  Ankle Plantarflexion: 5/5  Ankle Dorsiflexion: 5/5      Left Lower Extremity:    Hip Flexion: 5/5  Knee Extension: 5/5  Ankle Plantarflexion: 5/5  Ankle Dorsiflexion: 5/5      SOMATOSENSORY:     Right Upper Extremity: Decreased to light touch and pinprick throughout the right hand, there is splitting of the ring finger  Left Upper Extremity: Decreased to light touch and pinprick throughout the left hand. There is splitting of the ring finger.   Right Lower Extremity: normal light touch sensation  Left Lower Extremity: normal light touch sensation      REFLEXES:    Biceps: 2+  Patella: 2+    Raygoza's: Negative      COORDINATION and GAIT:    Gait: Normal      DATA:    Lab Results   Component Value Date    WBC 8.8 06/16/2020    HGB 15.4 06/16/2020    HCT 48.4 06/16/2020    MCV 91.1 06/16/2020     06/16/2020     Lab Results   Component Value Date     06/16/2020    K 3.9 06/16/2020     06/16/2020    CO2 26 06/16/2020    BUN 9 06/16/2020    CREATININE 0.7 06/16/2020    GLUCOSE 101 06/16/2020    CALCIUM 9.3 06/16/2020    PROT 7.2 06/16/2020    LABALBU 4.0 06/16/2020    BILITOT 0.5 06/16/2020    ALKPHOS 86 06/16/2020    AST 29 06/16/2020    ALT 39 06/16/2020    LABGLOM >60 06/16/2020    GLOB 3.2 03/07/2017     Lab Results   Component Value Date    INR 1.11 07/23/2018    INR 1.04 03/12/2018    INR 1.11 02/12/2018    PROTIME 14.2 07/23/2018    PROTIME 13.5 03/12/2018    PROTIME 14.2 02/12/2018           ASSESSMENT AND PLAN:  This is a 38 y.o. male  who presents for neurosurgical evaluation of bilateral carpal tunnel syndrome, left worse than right. He has failed conservative treatment with wrist splints and has both subjective and objective weakness and sensory loss on physical exam.  His EMG confirms severe left and moderate right median nerve entrapment at the wrist.  Given the progressive nature of his symptoms and failure of alternative treatments, I have recommended that we proceed with a left carpal tunnel release to address his most significantly-effected side first. The carpal tunnel release procedure was explained to the patient in detail, including alternatives to surgery and risks of nerve injury, bleeding, infection and poor wound healing. All questions were answered to the patient's stated satisfaction and they requested that we proceed with surgery. The patient was counseled at length about the risks of brian Covid-19 during their perioperative period and any recovery window from their procedure. The patient was made aware that brian Covid-19  may worsen their prognosis for recovering from their procedure  and lend to a higher morbidity and/or mortality risk.   All material risks,

## 2020-08-18 ENCOUNTER — HOSPITAL ENCOUNTER (OUTPATIENT)
Dept: PREADMISSION TESTING | Age: 40
Discharge: HOME | End: 2020-08-22
Payer: COMMERCIAL

## 2020-08-18 VITALS — HEIGHT: 72 IN | BODY MASS INDEX: 42.66 KG/M2 | WEIGHT: 315 LBS

## 2020-08-18 LAB
ALBUMIN SERPL-MCNC: 4 G/DL (ref 3.5–5.2)
ALP BLD-CCNC: 83 U/L (ref 40–130)
ALT SERPL-CCNC: 43 U/L (ref 5–41)
ANION GAP SERPL CALCULATED.3IONS-SCNC: 9 MMOL/L (ref 7–19)
AST SERPL-CCNC: 30 U/L (ref 5–40)
BASOPHILS ABSOLUTE: 0.1 K/UL (ref 0–0.2)
BASOPHILS RELATIVE PERCENT: 0.8 % (ref 0–1)
BILIRUB SERPL-MCNC: 0.3 MG/DL (ref 0.2–1.2)
BUN BLDV-MCNC: 11 MG/DL (ref 6–20)
CALCIUM SERPL-MCNC: 8.9 MG/DL (ref 8.6–10)
CHLORIDE BLD-SCNC: 106 MMOL/L (ref 98–111)
CO2: 27 MMOL/L (ref 22–29)
CREAT SERPL-MCNC: 0.7 MG/DL (ref 0.5–1.2)
EKG P AXIS: 48 DEGREES
EKG P-R INTERVAL: 176 MS
EKG Q-T INTERVAL: 378 MS
EKG QRS DURATION: 100 MS
EKG QTC CALCULATION (BAZETT): 404 MS
EKG T AXIS: 38 DEGREES
EOSINOPHILS ABSOLUTE: 0.3 K/UL (ref 0–0.6)
EOSINOPHILS RELATIVE PERCENT: 3.9 % (ref 0–5)
GFR AFRICAN AMERICAN: >59
GFR NON-AFRICAN AMERICAN: >60
GLUCOSE BLD-MCNC: 121 MG/DL (ref 74–109)
HCT VFR BLD CALC: 48.5 % (ref 42–52)
HEMOGLOBIN: 15.3 G/DL (ref 14–18)
IMMATURE GRANULOCYTES #: 0 K/UL
LYMPHOCYTES ABSOLUTE: 2 K/UL (ref 1.1–4.5)
LYMPHOCYTES RELATIVE PERCENT: 24.8 % (ref 20–40)
MCH RBC QN AUTO: 28.4 PG (ref 27–31)
MCHC RBC AUTO-ENTMCNC: 31.5 G/DL (ref 33–37)
MCV RBC AUTO: 90.1 FL (ref 80–94)
MONOCYTES ABSOLUTE: 0.6 K/UL (ref 0–0.9)
MONOCYTES RELATIVE PERCENT: 7.9 % (ref 0–10)
NEUTROPHILS ABSOLUTE: 5 K/UL (ref 1.5–7.5)
NEUTROPHILS RELATIVE PERCENT: 62.1 % (ref 50–65)
PDW BLD-RTO: 13.4 % (ref 11.5–14.5)
PLATELET # BLD: 225 K/UL (ref 130–400)
PMV BLD AUTO: 10.2 FL (ref 9.4–12.4)
POTASSIUM REFLEX MAGNESIUM: 4.2 MMOL/L (ref 3.5–5)
RBC # BLD: 5.38 M/UL (ref 4.7–6.1)
SODIUM BLD-SCNC: 142 MMOL/L (ref 136–145)
TOTAL PROTEIN: 7.1 G/DL (ref 6.6–8.7)
WBC # BLD: 8 K/UL (ref 4.8–10.8)

## 2020-08-18 PROCEDURE — 93010 ELECTROCARDIOGRAM REPORT: CPT | Performed by: INTERNAL MEDICINE

## 2020-08-18 PROCEDURE — 93005 ELECTROCARDIOGRAM TRACING: CPT | Performed by: ANESTHESIOLOGY

## 2020-08-18 PROCEDURE — 80053 COMPREHEN METABOLIC PANEL: CPT

## 2020-08-18 PROCEDURE — 85025 COMPLETE CBC W/AUTO DIFF WBC: CPT

## 2020-08-20 LAB — SARS-COV-2, NAA: NOT DETECTED

## 2020-08-21 ENCOUNTER — ANESTHESIA EVENT (OUTPATIENT)
Dept: OPERATING ROOM | Age: 40
End: 2020-08-21
Payer: COMMERCIAL

## 2020-08-21 ENCOUNTER — HOSPITAL ENCOUNTER (OUTPATIENT)
Age: 40
Setting detail: OUTPATIENT SURGERY
Discharge: HOME | End: 2020-08-21
Attending: NEUROLOGICAL SURGERY | Admitting: NEUROLOGICAL SURGERY
Payer: COMMERCIAL

## 2020-08-21 ENCOUNTER — ANESTHESIA (OUTPATIENT)
Dept: OPERATING ROOM | Age: 40
End: 2020-08-21
Payer: COMMERCIAL

## 2020-08-21 VITALS
TEMPERATURE: 96.8 F | RESPIRATION RATE: 3 BRPM | DIASTOLIC BLOOD PRESSURE: 103 MMHG | OXYGEN SATURATION: 92 % | SYSTOLIC BLOOD PRESSURE: 196 MMHG

## 2020-08-21 VITALS
DIASTOLIC BLOOD PRESSURE: 78 MMHG | BODY MASS INDEX: 42.66 KG/M2 | OXYGEN SATURATION: 95 % | WEIGHT: 315 LBS | SYSTOLIC BLOOD PRESSURE: 153 MMHG | RESPIRATION RATE: 14 BRPM | TEMPERATURE: 97.4 F | HEART RATE: 70 BPM | HEIGHT: 72 IN

## 2020-08-21 LAB
GLUCOSE BLD-MCNC: 170 MG/DL (ref 70–99)
PERFORMED ON: ABNORMAL

## 2020-08-21 PROCEDURE — 2500000003 HC RX 250 WO HCPCS: Performed by: NEUROLOGICAL SURGERY

## 2020-08-21 PROCEDURE — 7100000011 HC PHASE II RECOVERY - ADDTL 15 MIN: Performed by: NEUROLOGICAL SURGERY

## 2020-08-21 PROCEDURE — 7100000000 HC PACU RECOVERY - FIRST 15 MIN: Performed by: NEUROLOGICAL SURGERY

## 2020-08-21 PROCEDURE — 2580000003 HC RX 258: Performed by: NEUROLOGICAL SURGERY

## 2020-08-21 PROCEDURE — 6360000002 HC RX W HCPCS: Performed by: NURSE ANESTHETIST, CERTIFIED REGISTERED

## 2020-08-21 PROCEDURE — 7100000010 HC PHASE II RECOVERY - FIRST 15 MIN: Performed by: NEUROLOGICAL SURGERY

## 2020-08-21 PROCEDURE — 82947 ASSAY GLUCOSE BLOOD QUANT: CPT

## 2020-08-21 PROCEDURE — 3600000013 HC SURGERY LEVEL 3 ADDTL 15MIN: Performed by: NEUROLOGICAL SURGERY

## 2020-08-21 PROCEDURE — 64721 CARPAL TUNNEL SURGERY: CPT | Performed by: NEUROLOGICAL SURGERY

## 2020-08-21 PROCEDURE — 2500000003 HC RX 250 WO HCPCS: Performed by: NURSE ANESTHETIST, CERTIFIED REGISTERED

## 2020-08-21 PROCEDURE — 2580000003 HC RX 258: Performed by: NURSE ANESTHETIST, CERTIFIED REGISTERED

## 2020-08-21 PROCEDURE — 3700000000 HC ANESTHESIA ATTENDED CARE: Performed by: NEUROLOGICAL SURGERY

## 2020-08-21 PROCEDURE — 6360000002 HC RX W HCPCS: Performed by: NEUROLOGICAL SURGERY

## 2020-08-21 PROCEDURE — 3700000001 HC ADD 15 MINUTES (ANESTHESIA): Performed by: NEUROLOGICAL SURGERY

## 2020-08-21 PROCEDURE — 6370000000 HC RX 637 (ALT 250 FOR IP)

## 2020-08-21 PROCEDURE — 7100000001 HC PACU RECOVERY - ADDTL 15 MIN: Performed by: NEUROLOGICAL SURGERY

## 2020-08-21 PROCEDURE — 2709999900 HC NON-CHARGEABLE SUPPLY: Performed by: NEUROLOGICAL SURGERY

## 2020-08-21 PROCEDURE — 3600000003 HC SURGERY LEVEL 3 BASE: Performed by: NEUROLOGICAL SURGERY

## 2020-08-21 RX ORDER — LIDOCAINE HYDROCHLORIDE 10 MG/ML
INJECTION, SOLUTION EPIDURAL; INFILTRATION; INTRACAUDAL; PERINEURAL PRN
Status: DISCONTINUED | OUTPATIENT
Start: 2020-08-21 | End: 2020-08-21 | Stop reason: SDUPTHER

## 2020-08-21 RX ORDER — MORPHINE SULFATE 4 MG/ML
2 INJECTION, SOLUTION INTRAMUSCULAR; INTRAVENOUS EVERY 5 MIN PRN
Status: DISCONTINUED | OUTPATIENT
Start: 2020-08-21 | End: 2020-08-21 | Stop reason: HOSPADM

## 2020-08-21 RX ORDER — METOCLOPRAMIDE HYDROCHLORIDE 5 MG/ML
10 INJECTION INTRAMUSCULAR; INTRAVENOUS
Status: DISCONTINUED | OUTPATIENT
Start: 2020-08-21 | End: 2020-08-21 | Stop reason: HOSPADM

## 2020-08-21 RX ORDER — PROPOFOL 10 MG/ML
INJECTION, EMULSION INTRAVENOUS PRN
Status: DISCONTINUED | OUTPATIENT
Start: 2020-08-21 | End: 2020-08-21 | Stop reason: SDUPTHER

## 2020-08-21 RX ORDER — HYDROMORPHONE HYDROCHLORIDE 1 MG/ML
0.5 INJECTION, SOLUTION INTRAMUSCULAR; INTRAVENOUS; SUBCUTANEOUS EVERY 5 MIN PRN
Status: DISCONTINUED | OUTPATIENT
Start: 2020-08-21 | End: 2020-08-21 | Stop reason: HOSPADM

## 2020-08-21 RX ORDER — PROMETHAZINE HYDROCHLORIDE 25 MG/ML
6.25 INJECTION, SOLUTION INTRAMUSCULAR; INTRAVENOUS
Status: DISCONTINUED | OUTPATIENT
Start: 2020-08-21 | End: 2020-08-21 | Stop reason: HOSPADM

## 2020-08-21 RX ORDER — BUPIVACAINE HYDROCHLORIDE AND EPINEPHRINE 5; 5 MG/ML; UG/ML
INJECTION, SOLUTION EPIDURAL; INTRACAUDAL; PERINEURAL PRN
Status: DISCONTINUED | OUTPATIENT
Start: 2020-08-21 | End: 2020-08-21 | Stop reason: ALTCHOICE

## 2020-08-21 RX ORDER — MORPHINE SULFATE 4 MG/ML
4 INJECTION, SOLUTION INTRAMUSCULAR; INTRAVENOUS EVERY 5 MIN PRN
Status: DISCONTINUED | OUTPATIENT
Start: 2020-08-21 | End: 2020-08-21 | Stop reason: HOSPADM

## 2020-08-21 RX ORDER — ENALAPRILAT 2.5 MG/2ML
1.25 INJECTION INTRAVENOUS
Status: DISCONTINUED | OUTPATIENT
Start: 2020-08-21 | End: 2020-08-21 | Stop reason: HOSPADM

## 2020-08-21 RX ORDER — HYDROCODONE BITARTRATE AND ACETAMINOPHEN 5; 325 MG/1; MG/1
1 TABLET ORAL EVERY 6 HOURS PRN
Status: CANCELLED | OUTPATIENT
Start: 2020-08-21

## 2020-08-21 RX ORDER — LABETALOL HYDROCHLORIDE 5 MG/ML
5 INJECTION, SOLUTION INTRAVENOUS EVERY 10 MIN PRN
Status: DISCONTINUED | OUTPATIENT
Start: 2020-08-21 | End: 2020-08-21 | Stop reason: HOSPADM

## 2020-08-21 RX ORDER — MEPERIDINE HYDROCHLORIDE 50 MG/ML
12.5 INJECTION INTRAMUSCULAR; INTRAVENOUS; SUBCUTANEOUS EVERY 5 MIN PRN
Status: DISCONTINUED | OUTPATIENT
Start: 2020-08-21 | End: 2020-08-21 | Stop reason: HOSPADM

## 2020-08-21 RX ORDER — HYDRALAZINE HYDROCHLORIDE 20 MG/ML
5 INJECTION INTRAMUSCULAR; INTRAVENOUS EVERY 10 MIN PRN
Status: DISCONTINUED | OUTPATIENT
Start: 2020-08-21 | End: 2020-08-21 | Stop reason: HOSPADM

## 2020-08-21 RX ORDER — SODIUM CHLORIDE, SODIUM LACTATE, POTASSIUM CHLORIDE, CALCIUM CHLORIDE 600; 310; 30; 20 MG/100ML; MG/100ML; MG/100ML; MG/100ML
INJECTION, SOLUTION INTRAVENOUS CONTINUOUS PRN
Status: DISCONTINUED | OUTPATIENT
Start: 2020-08-21 | End: 2020-08-21 | Stop reason: SDUPTHER

## 2020-08-21 RX ORDER — HYDROCODONE BITARTRATE AND ACETAMINOPHEN 5; 325 MG/1; MG/1
TABLET ORAL
Status: COMPLETED
Start: 2020-08-21 | End: 2020-08-21

## 2020-08-21 RX ORDER — LIDOCAINE HYDROCHLORIDE AND EPINEPHRINE BITARTRATE 20; .01 MG/ML; MG/ML
INJECTION, SOLUTION SUBCUTANEOUS PRN
Status: DISCONTINUED | OUTPATIENT
Start: 2020-08-21 | End: 2020-08-21 | Stop reason: ALTCHOICE

## 2020-08-21 RX ORDER — HYDROMORPHONE HYDROCHLORIDE 1 MG/ML
0.25 INJECTION, SOLUTION INTRAMUSCULAR; INTRAVENOUS; SUBCUTANEOUS EVERY 5 MIN PRN
Status: DISCONTINUED | OUTPATIENT
Start: 2020-08-21 | End: 2020-08-21 | Stop reason: HOSPADM

## 2020-08-21 RX ORDER — ONDANSETRON 2 MG/ML
INJECTION INTRAMUSCULAR; INTRAVENOUS PRN
Status: DISCONTINUED | OUTPATIENT
Start: 2020-08-21 | End: 2020-08-21 | Stop reason: SDUPTHER

## 2020-08-21 RX ORDER — DEXAMETHASONE SODIUM PHOSPHATE 10 MG/ML
INJECTION, SOLUTION INTRAMUSCULAR; INTRAVENOUS PRN
Status: DISCONTINUED | OUTPATIENT
Start: 2020-08-21 | End: 2020-08-21 | Stop reason: SDUPTHER

## 2020-08-21 RX ORDER — DIPHENHYDRAMINE HYDROCHLORIDE 50 MG/ML
12.5 INJECTION INTRAMUSCULAR; INTRAVENOUS
Status: DISCONTINUED | OUTPATIENT
Start: 2020-08-21 | End: 2020-08-21 | Stop reason: HOSPADM

## 2020-08-21 RX ORDER — FENTANYL CITRATE 50 UG/ML
INJECTION, SOLUTION INTRAMUSCULAR; INTRAVENOUS PRN
Status: DISCONTINUED | OUTPATIENT
Start: 2020-08-21 | End: 2020-08-21 | Stop reason: SDUPTHER

## 2020-08-21 RX ORDER — MIDAZOLAM HYDROCHLORIDE 1 MG/ML
INJECTION INTRAMUSCULAR; INTRAVENOUS PRN
Status: DISCONTINUED | OUTPATIENT
Start: 2020-08-21 | End: 2020-08-21 | Stop reason: SDUPTHER

## 2020-08-21 RX ORDER — HYDROCODONE BITARTRATE AND ACETAMINOPHEN 5; 325 MG/1; MG/1
1-2 TABLET ORAL EVERY 6 HOURS PRN
Qty: 18 TABLET | Refills: 0 | Status: SHIPPED | OUTPATIENT
Start: 2020-08-21 | End: 2020-08-24

## 2020-08-21 RX ADMIN — FENTANYL CITRATE 25 MCG: 50 INJECTION INTRAMUSCULAR; INTRAVENOUS at 07:48

## 2020-08-21 RX ADMIN — HYDROCODONE BITARTRATE AND ACETAMINOPHEN 1 TABLET: 5; 325 TABLET ORAL at 09:41

## 2020-08-21 RX ADMIN — LIDOCAINE HYDROCHLORIDE 80 MG: 10 INJECTION, SOLUTION EPIDURAL; INFILTRATION; INTRACAUDAL; PERINEURAL at 07:30

## 2020-08-21 RX ADMIN — SODIUM CHLORIDE, SODIUM LACTATE, POTASSIUM CHLORIDE, AND CALCIUM CHLORIDE: 600; 310; 30; 20 INJECTION, SOLUTION INTRAVENOUS at 07:24

## 2020-08-21 RX ADMIN — FENTANYL CITRATE 50 MCG: 50 INJECTION INTRAMUSCULAR; INTRAVENOUS at 07:30

## 2020-08-21 RX ADMIN — MIDAZOLAM 2 MG: 1 INJECTION INTRAMUSCULAR; INTRAVENOUS at 07:24

## 2020-08-21 RX ADMIN — FENTANYL CITRATE 25 MCG: 50 INJECTION INTRAMUSCULAR; INTRAVENOUS at 08:04

## 2020-08-21 RX ADMIN — CEFAZOLIN SODIUM 3 G: 10 INJECTION, POWDER, FOR SOLUTION INTRAVENOUS at 07:39

## 2020-08-21 RX ADMIN — DEXAMETHASONE SODIUM PHOSPHATE 10 MG: 10 INJECTION, SOLUTION INTRAMUSCULAR; INTRAVENOUS at 07:40

## 2020-08-21 RX ADMIN — MORPHINE SULFATE 4 MG: 4 INJECTION, SOLUTION INTRAMUSCULAR; INTRAVENOUS at 08:43

## 2020-08-21 RX ADMIN — ONDANSETRON HYDROCHLORIDE 4 MG: 2 INJECTION, SOLUTION INTRAMUSCULAR; INTRAVENOUS at 07:40

## 2020-08-21 RX ADMIN — PROPOFOL 300 MG: 10 INJECTION, EMULSION INTRAVENOUS at 07:30

## 2020-08-21 ASSESSMENT — LIFESTYLE VARIABLES: SMOKING_STATUS: 0

## 2020-08-21 ASSESSMENT — PAIN SCALES - GENERAL
PAINLEVEL_OUTOF10: 7
PAINLEVEL_OUTOF10: 3

## 2020-08-21 ASSESSMENT — PAIN DESCRIPTION - PAIN TYPE: TYPE: SURGICAL PAIN

## 2020-08-21 ASSESSMENT — PAIN DESCRIPTION - ORIENTATION: ORIENTATION: LEFT

## 2020-08-21 ASSESSMENT — PAIN DESCRIPTION - DESCRIPTORS: DESCRIPTORS: THROBBING

## 2020-08-21 NOTE — INTERVAL H&P NOTE
NEUROSURGERY    I have seen and evaluated the patient. There has been no change from their previous H&P. Will proceed with surgery as planned.

## 2020-08-21 NOTE — OP NOTE
NEUROSURGICAL OPERATIVE REPORT    DATE OF PROCEDURE:  8/21/2020    ATTENDING SURGEON:  Ruby King MD, PhD    PREOPERATIVE DIAGNOSIS:  Left carpal tunnel syndrome. POSTOPERATIVE DIAGNOSIS:  Left carpal tunnel syndrome. PROCEDURE PERFORMED:  Left open carpal tunnel release. INDICATIONS:  Symptomatic left carpal tunnel syndrome. ESTIMATED BLOOD LOSS:   <10 mL    PROCEDURE IN DETAIL:  The patient was identified in the preoperative area, consent for surgery was confirmed, and the site for surgery was marked. The patient was transferred to the  operating room by the Anesthesia team where general anesthesia was induced through an LMA. The left hand and arm was then prepped and draped in the usual aseptic fashion. A team pause was held according to the preformatted script, all were in agreement and the decision was made to proceed with surgery. The incision was planned from the distal palmar crease to the top of the thenar eminence in line with the median border of the ring finger on the left hand. This incision was infiltrated with local anesthetic. The incision was opened sharply with a skin knife and carried down into the palmar fat, self-retaining retractors were placed and the palmaris brevis was divided sharply until we were able to identify the transverse carpal ligament. The ligament was noted to be quite hypertrophic and thickenedA 15-blade was then used to divide the transverse carpal ligament in the distal portion. A Penfield dissector was then placed below the ligament to protect the median nerve and the remainder of the ligament was opened proximally with Metzenbaum scissors. Once the ligament had been completely divided, we inspected the nerve and noted that it was well-decompressed and there were no palpable compression proximally or distally. The nerve was noted to be edematous and thinned but intact.   The  self-retaining retractors were removed and bleeding from the palmaris brevis was controlled with bipolar cautery. Once we had hemostasis, we irrigated the wound, confirmed hemostasis, turned attention to closure. The palmaris was reapproximated with several inverted interrupted 3-0 Vicryl sutures and the skin was then closed with a running locking 3-0 Nylon stitch. The wound was then cleaned and dried. A xeroform was then used to cover the sutures and the had was dressed with fluffs, Kerlex and an Ace wrap. Once the site was dressed, the patient was allowed to emerge from anesthesia and was transferred to recovery. All sponge, needle and instrument counts were correct at the end of the procedure and there were no apparent complications.

## 2020-08-21 NOTE — DISCHARGE INSTR - ACTIVITY
Learning About COVID-19 and Social Distancing  What is it? Social distancing means putting space between yourself and other people. The recommended distance is 6 feet, or about 2 meters. This also means staying away from any place where people may gather, such as garza or other public gathering places. Why is it important? Social distancing is the best way to reduce the spread of COVID-19. This virus seems to spread from person to person through droplets from coughing and sneezing. So if you keep your distance from others, you're less likely to get it or spread it. And social distancing is important for everyone, not just those who are at high risk of infection, like older people. You might have the virus but not have symptoms. You could then give the infection to someone you come into contact with. How is it done? Putting 6 feet, or about 2 meters, between you and other people is the recommended distance. Also stay away from any place where people may gather, such as garza or other public gathering places. So if possible:  · Work from home, and keep your kids at home. · Don't travel if you don't have to. And avoid public transportation, ride-shares, and taxis unless you have no choice. · Limit shopping to essentials, like food and medicines. · Wear a cloth face cover if you have to go to a public place like the grocery store or pharmacy. · Don't eat in restaurants. (You can still get takeout or food deliveries.)  · Avoid crowds and busy places. Follow stay-at-home orders or other directions for your area. Current as of: May 8, 2020               Content Version: 12.5  © 2006-2020 [employer]. Care instructions adapted under license by [facility] ([facility]). If you have questions about a medical condition or this instruction, always ask your healthcare professional. [address] any warranty or liability for your use of this information.

## 2020-08-21 NOTE — ANESTHESIA POSTPROCEDURE EVALUATION
Department of Anesthesiology  Postprocedure Note    Patient: Mandie Hernández  MRN: [id number]  YOB: 1980  Date of evaluation: 8/21/2020  Time:  8:32 AM     Procedure Summary     Date:  08/21/20 Room / Location:  [address]    Anesthesia Start:  5228 Anesthesia Stop:  9719    Procedure:  LEFT CARPAL TUNNEL RELEASE (Left ) Diagnosis:  (WRIST PAIN)    Surgeon:  Girish Bearden MD Responsible Provider:  VANDANA Pearce CRNA    Anesthesia Type:  general ASA Status:  2          Anesthesia Type: general    Guzman Phase I: Guzman Score: 10    Guzman Phase II:      Last vitals: Reviewed and per EMR flowsheets.        Anesthesia Post Evaluation    Patient location during evaluation: PACU  Patient participation: complete - patient participated  Level of consciousness: awake  Pain score: 0  Airway patency: patent  Nausea & Vomiting: no nausea and no vomiting  Complications: no  Cardiovascular status: blood pressure returned to baseline and hemodynamically stable  Respiratory status: acceptable, nonlabored ventilation, face mask, oral airway and spontaneous ventilation

## 2020-08-21 NOTE — ANESTHESIA PRE PROCEDURE
Department of Anesthesiology  Preprocedure Note       Name:  Mandie Hernández   Age:  38 y.o.  :  1980                                          MRN:  [id number]         Date:  2020      Surgeon: Maggie Caruso):  Girish Bearden MD    Procedure: Procedure(s):  LEFT CARPAL TUNNEL RELEASE    Medications prior to admission:   Prior to Admission medications    Medication Sig Start Date End Date Taking? Authorizing Provider   tiZANidine (ZANAFLEX) 4 MG tablet Take 1 tablet by mouth nightly as needed (spasm) 6/10/20  Yes Dellie Prima, APRN   meloxicam (MOBIC) 15 MG tablet Take 1 tablet by mouth daily 20  Yes Dellie Prima, APRN   metFORMIN (GLUCOPHAGE) 500 MG tablet Take 1 tablet by mouth 2 times daily (with meals) 20   Dellie Prima, APRN   ondansetron (ZOFRAN-ODT) 8 MG TBDP disintegrating tablet Take 1 tablet by mouth every 8 hours as needed for Nausea or Vomiting 3/23/20   Ricky Ziegler MD       Current medications:    No current facility-administered medications for this encounter.         Allergies:  No Known Allergies    Problem List:    Patient Active Problem List   Diagnosis Code    Thrombosed external hemorrhoid K64.5    Right ureteral calculus Y97.6    Umbilical hernia without obstruction and without gangrene K42.9    Hydronephrosis with renal and ureteral calculus obstruction N13.2    Ureteral obstruction, right N13.5    Transient alteration of awareness R40.4    Prediabetes R73.03    Bilateral carpal tunnel syndrome G56.03    Arthritis M19.90    Numbness and tingling in both hands R20.0, R20.2       Past Medical History:        Diagnosis Date    Arthritis     both wrist    Bilateral carpal tunnel syndrome 8/15/2019    Bronchitis     10 yrs ago    Diabetes mellitus ([facility] [address]     Kidney stone     recurrent    Sleep apnea     untested    Umbilical hernia        Past Surgical History:        Procedure Laterality Date    CYSTOSCOPY Right 2018    CYSTOSCOPY/ URETEROSCOPY; INSERTION DOUBLE J STENT/  URETERAL performed by Yonathan Prajapati MD at [facility]      both wrists    [address] N/A 6/24/2016    HEMORRHOID INCISION AND DRAINAGE performed by dEilma Singh MD at [facility] W/LITHOTRIPSY Right 8/7/2018    URETEROSCOPY LASER LITHOTRIPSY STONE EXTRACTION performed by [facility]onathan Prajapati MD [facility] [address] [facility] Right 8/7/2018    STENT PLACEMENT performed by Yonathan Prajapati MD at [facility] ESWL Right 2/13/2018    ESWL EXTRACORPEAL SHOCK WAVE LITHOTRIPSY performed by Yonathan Prajapati MD at [facility]SW[facility] Right 3/13/2018    ESWL [address] LITHOTRIPSY performed by Yonathan Prajapati MD at [facility] ESW[facility] Right 7/24/2018    ESWL [address] LITHOTRIPSY performed by Yonathan Prajapati MD at [address], [address] N/A 4/7/2789    HERNIA UMBILICAL REPAIR LAPAROSCOPIC performed by Jack Russo MD at [facility]      left wrist.  Pt was a child       Social History:    Social History     Tobacco Use    Smoking status: Never Smoker    Smokeless tobacco: Never Used    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[employer]   Substance Use Topics    Alcohol use: Yes     Comment: [id number] [facility]                                Counseling given: Not Answered  Comment: Unload trucks at [address] [facility] Signs (Current):   Vitals:    08/21/20 0622   BP: (!) 142/90   Pulse: 80   Resp: 14   Temp: 97.3 °F (36.3 °C)   TempSrc: Tympanic   SpO2: 96%   Weight: (!) 370 lb (167.8 kg)   Height: 6' (1.829 m)                                              BP Readings from Last 3 Encounters:   08/21/20 (!) 142/90   08/12/20 133/88   07/08/20 136/84       NPO Status: Time of last liquid consumption: 2300                        Time of last solid consumption: 2300                        Date of last liquid consumption: 08/20/20                        Date of last solid food consumption: 08/20/20    BMI:   Wt Readings from Last 3 Encounters:   08/21/20 (!) 370 lb (167.8 kg)   08/18/20 (!) 370 lb (167.8 kg)   08/12/20 (!) 370 lb (167.8 kg)     Body mass index is 50.18 kg/m². CBC:   Lab Results   Component Value Date    WBC 8.0 08/18/2020    RBC 5.38 08/18/2020    HGB 15.3 08/18/2020    HCT 48.5 08/18/2020    MCV 90.1 08/18/2020    RDW 13.4 08/18/2020     08/18/2020       CMP:   Lab Results   Component Value Date     08/18/2020     08/14/2011    K 4.2 08/18/2020    K 4.5 08/14/2011     08/18/2020     08/14/2011    CO2 27 08/18/2020    BUN 11 08/18/2020    CREATININE 0.7 08/18/2020    CREATININE 0.8 08/14/2011    GFRAA >59 08/18/2020    LABGLOM >60 08/18/2020    GLUCOSE 121 08/18/2020    PROT 7.1 08/18/2020    CALCIUM 8.9 08/18/2020    BILITOT 0.3 08/18/2020    ALKPHOS 83 08/18/2020    AST 30 08/18/2020    ALT 43 08/18/2020       POC Tests: No results for input(s): POCGLU, POCNA, POCK, POCCL, POCBUN, POCHEMO, POCHCT in the last 72 hours.     Coags:   Lab Results   Component Value Date    PROTIME 14.2 07/23/2018    INR 1.11 07/23/2018    APTT 28.4 07/23/2018       HCG (If Applicable): No results found for: PREGTESTUR, PREGSERUM, HCG, HCGQUANT     ABGs: No results found for: PHART, PO2ART, WHD8GZL, UMA9LZG, BEART, N6ARSFLI     Type & Screen (If Applicable):  No results found for: LABABO, LABRH    Drug/Infectious Status (If Applicable):  No results found for: HIV, HEPCAB    COVID-19 Screening (If Applicable):   Lab Results   Component Value Date    COVID19 NOT DETECTED 08/18/2020         Anesthesia Evaluation  Patient summary reviewed and Nursing notes reviewed no history of anesthetic complications:   Airway: Mallampati: II  TM distance: >3 FB   Neck ROM: full  Mouth opening: > = 3 FB Dental:          Pulmonary:normal exam        (-) sleep apnea and not a current smoker                           Cardiovascular:          ECG reviewed               Beta Blocker:  Not on Beta Blocker         Neuro/Psych:      (-) seizures and CVA           GI/Hepatic/Renal:   (+) GERD: well controlled, renal disease: kidney stones,           Endo/Other:        (-) diabetes mellitus                ROS comment: Prediabetic per patient Abdominal:           Vascular: negative vascular ROS. Anesthesia Plan      general     ASA 2       Induction: intravenous. MIPS: Postoperative opioids intended and Prophylactic antiemetics administered. Anesthetic plan and risks discussed with patient. Plan discussed with CRNA.                   Pedro Miles MD   8/21/2020

## 2020-09-02 ENCOUNTER — OFFICE VISIT (OUTPATIENT)
Dept: NEUROSURGERY | Age: 40
End: 2020-09-02

## 2020-09-02 VITALS
HEART RATE: 88 BPM | SYSTOLIC BLOOD PRESSURE: 138 MMHG | HEIGHT: 72 IN | WEIGHT: 315 LBS | DIASTOLIC BLOOD PRESSURE: 90 MMHG | BODY MASS INDEX: 42.66 KG/M2

## 2020-09-02 PROBLEM — Z98.890 S/P CARPAL TUNNEL RELEASE: Status: ACTIVE | Noted: 2020-09-02

## 2020-09-02 PROCEDURE — 99024 POSTOP FOLLOW-UP VISIT: CPT | Performed by: NEUROLOGICAL SURGERY

## 2020-09-02 NOTE — PROGRESS NOTES
NEUROSURGERY POSTOPERATIVE FOLLOW UP NOTE      Chief Complaint:   Chief Complaint   Patient presents with    Follow-up         Date of Surgery: 8/21/2020    Procedure Performed: Left carpal tunnel release      Interval Update:  First postoperative visit after uncomplicated left carpal tunnel release. He is doing well. He denies any incisional concerns. He has been compliant with his activity restrictions. HPI: The patient is a 38 y.o. male who presents for neurosurgical evaluation of bilateral carpal tunnel syndrome. He describes numbness, weakness and throbbing in both hands that has been getting worse for several years. 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 He also notes that he has started dropping things unintentionally. He also states that he will awaken at night with throbbing and numbness in his hands. He has seen neurology and been given wrist splints but these haven't helped. He recently underwent an EMG/NCS with Dr. 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 Objective:    BP (!) 138/90   Pulse 88   Ht 6' (1.829 m)   Wt (!) 388 lb (176 kg)   BMI 52.62 kg/m²         Physical Exam:    General: alert, cooperative, no distress  Cardiorespiratory: unlabored breathing  Wound: Nylon sutures intact. Wound edges well-approximated, no erythema, fluctuance or drainage. Neurologic Exam:    Mental Status: Alert, oriented, thought content appropriate  Cranial Nerves: PERRL, EOMI, symmetric facies, tongue midline  Motor: Decreased  strength and limited mobility on L  Somatosensory: Decreased to light touch in the median distribution on L (as preop). Assessment and Plan:  39 y/o M doing well s/p left carpal tunnel release on 8/21. His incision is healing well and his sutures were removed.   I recommended that he keep his incision covered with a band-aid type dressing for another two weeks. I also provided him a stress ball to use to help regain the dexterity in his left hand. I will plan to see him again in one month for another follow up visit.         Electronically signed by Maurisio Bueno MD on 9/2/2020 at 9:27 AM

## 2020-09-16 ENCOUNTER — VIRTUAL VISIT (OUTPATIENT)
Dept: PRIMARY CARE CLINIC | Age: 40
End: 2020-09-16
Payer: COMMERCIAL

## 2020-09-16 PROCEDURE — 99214 OFFICE O/P EST MOD 30 MIN: CPT | Performed by: STUDENT IN AN ORGANIZED HEALTH CARE EDUCATION/TRAINING PROGRAM

## 2020-09-16 RX ORDER — ACETAMINOPHEN 500 MG
1000 TABLET ORAL 3 TIMES DAILY
Qty: 60 TABLET | Refills: 0 | Status: SHIPPED | OUTPATIENT
Start: 2020-09-16 | End: 2021-02-25

## 2020-09-16 ASSESSMENT — ENCOUNTER SYMPTOMS
NAUSEA: 0
ABDOMINAL PAIN: 0
COUGH: 0
RHINORRHEA: 0
SHORTNESS OF BREATH: 0
EYE DISCHARGE: 0
EYE PAIN: 0
SORE THROAT: 0
DIARRHEA: 0

## 2020-09-16 NOTE — PROGRESS NOTES
2020    TELEHEALTH EVALUATION -- Audio/Visual (During [id number] public health emergency)    HPI:    Omega Talavera (:  1980) has requested an audio/video evaluation for the following concern(s):    L leg pain  · Onset 3-4 days ago, cramping, dull pain  · Feels in calf w/ radiation into back of knee and thigh  · Has used warming pad that has helped some, along with hydrocodone he was given after carpal tunnel release surgery on . Feels hand is progressing well. · Worse with getting up and walking  · Feels L leg more swollen than R  · No trauma or extended period of immobilization   · No fever, chills or any other constitutional sx      Review of Systems   Constitutional: Negative for chills, fever and unexpected weight change. HENT: Negative for congestion, ear discharge, ear pain, rhinorrhea and sore throat. Eyes: Negative for pain and discharge. Respiratory: Negative for cough and shortness of breath. Cardiovascular: Negative for chest pain. Gastrointestinal: Negative for abdominal pain, diarrhea and nausea. Genitourinary: Negative for dysuria and hematuria. Musculoskeletal: Negative for arthralgias and joint swelling. Skin: Negative for rash. Neurological: Negative for weakness, numbness and headaches. Psychiatric/Behavioral: Negative for dysphoric mood. The patient is not nervous/anxious. Prior to Visit Medications    Medication Sig Taking?  Authorizing Provider   acetaminophen (APAP EXTRA STRENGTH) 500 MG tablet Take 2 tablets by mouth 3 times daily for 10 days Yes Angelito Frost MD   metFORMIN (GLUCOPHAGE) 500 MG tablet Take 1 tablet by mouth 2 times daily (with meals)  VANDANA Soriano   tiZANidine (ZANAFLEX) 4 MG tablet Take 1 tablet by mouth nightly as needed (spasm)  VANDANA Soriano   ondansetron (ZOFRAN-ODT) 8 MG TBDP disintegrating tablet Take 1 tablet by mouth every 8 hours as needed for Nausea or Vomiting  Kennedi Hood MD   meloxicam (MOBIC) 15 MG tablet Take 1 tablet by mouth daily  VANDANA Velasquez       Social History     Tobacco Use    Smoking status: Never Smoker    Smokeless tobacco: Never Used    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[employer]   Substance Use Topics    Alcohol use: Yes     Comment: [id number]    Drug use: No        No Known Allergies,   Past Medical History:   Diagnosis Date    Arthritis     both wrist    Bilateral carpal tunnel syndrome 8/15/2019    Bronchitis     10 yrs ago    Diabetes mellitus ([address]     Kidney stone     recurrent    Sleep apnea     untested    Umbilical hernia    ,   Past Surgical History:   Procedure Laterality Date    CARPAL TUNNEL RELEASE Left 8/21/2020    LEFT CARPAL TUNNEL RELEASE performed by Stacey Palacios MD at [facility] Right 7/24/2018    CYSTOSCOPY/ URETEROSCOPY; INSERTION DOUBLE J STENT/  URETERAL performed by Annamarie Higuera MD at [facility]      both wrists    [address] N/A 6/24/2016    HEMORRHOID INCISION AND DRAINAGE performed by Shari Bedoya MD at [facility] Right 8/7/2018    URETEROSCOPY LASER LITHOTRIPSY STONE EXTRACTION performed by Annamarie Higuera MD at [facility] Right 8/7/2018    STENT PLACEMENT performed by Annamarie Higuera MD at [address] [place] [address] ESWL Right 2/13/2018    ESWL EXTRACORPEAL SHOCK WAVE LITHOTRIPSY performed by Annamarie Higuera MD at [address] [place] [address] ESWL Right 3/13/2018    ESWL [address] LITHOTRIPSY performed by Annamarie Higuera MD at [address] [place] [address] ESWL Right 7/24/2018    ESWL [address] LITHOTRIPSY performed by Annamarie Higuera MD at [address] [place] [address], [facility] N/A 9/4/3032    HERNIA UMBILICAL REPAIR LAPAROSCOPIC performed by Malgorzata Severino MD at [address] [place] [address]      left wrist.  Pt was a child   ,   Social History Tobacco Use    Smoking status: Never Smoker    Smokeless tobacco: Never Used    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   Substance Use Topics    Alcohol use: Yes     Comment: OCC    Drug use: No   ,   Family History   Problem Relation Age of Onset    Cancer Mother 46        colon cancer    Cancer Father         luekemia    Diabetes Father     Other Maternal Grandmother         copd    Other Maternal Grandfather         copd    Heart Disease Paternal Grandmother    ,   Immunization History   Administered Date(s) Administered    Influenza Virus Vaccine 10/06/2014    Influenza, Wesley Henderson, IM, PF (6 mo and older Fluzone, Flulaval, Fluarix, and 3 yrs and older Afluria) 09/26/2018    Tdap (Boostrix, Adacel) 03/07/2017   ,   Health Maintenance   Topic Date Due    HIV screen  01/04/1994    Flu vaccine (1) 09/01/2020    A1C test (Diabetic or Prediabetic)  06/16/2021    Lipid screen  06/16/2025    DTaP/Tdap/Td vaccine (2 - Td) 03/07/2027    Hepatitis A vaccine  Aged Out    Hepatitis B vaccine  Aged Out    Hib vaccine  Aged Out    Meningococcal (ACWY) vaccine  Aged Out    Pneumococcal 0-64 years Vaccine  Aged Out       PHYSICAL EXAMINATION:  [ INSTRUCTIONS:  \"[x]\" Indicates a positive item  \"[]\" Indicates a negative item  -- DELETE ALL ITEMS NOT EXAMINED]  Vital Signs: (As obtained by patient/caregiver or practitioner observation)    Blood pressure-  Heart rate-    Respiratory rate-    Temperature-  Pulse oximetry-     Constitutional: [x] Appears well-developed and well-nourished [x] No apparent distress      [] Abnormal-   Mental status  [x] Alert and awake  [x] Oriented to person/place/time [x]Able to follow commands      Eyes:  EOM    [x]  Normal  [] Abnormal-  Sclera  [x]  Normal  [] Abnormal -         Discharge [x]  None visible  [] Abnormal -    HENT:   [x] Normocephalic, atraumatic.   [] Abnormal   [] Mouth/Throat: Mucous membranes are moist.     External Ears [x] Normal  [] Abnormal-     Neck: [] No visualized mass     Pulmonary/Chest: [x] Respiratory effort normal.  [x] No visualized signs of difficulty breathing or respiratory distress        [] Abnormal-      Musculoskeletal:   [x] Normal gait with no signs of ataxia         [] Normal range of motion of neck        [] Abnormal-       Neurological:        [x] No Facial Asymmetry (Cranial nerve 7 motor function) (limited exam to video visit)          [x] No gaze palsy        [] Abnormal-         Skin:        [] No significant exanthematous lesions or discoloration noted on facial skin         [] Abnormal- Difficult to assess skin color L leg vs R. LLE appears mildly more swollen vs R and is tender all along posterior aspect when palpated by wife, who states feels more tense than R side           Psychiatric:       [x] Normal Affect [x] No Hallucinations        [] Abnormal-     Other pertinent observable physical exam findings-     ASSESSMENT/PLAN:  1. Pain of left calf  -Need to r/o DVT, wells dvt score 2-3. Difficult to accurately assess via camera given picture quality. Other likely possibility is msk strain. Continue warm pad, mobic. Start zanaflex in evening, previously prescribed. - US DUP LOWER EXTREMITY LEFT BUDDY; Future  - acetaminophen (APAP EXTRA STRENGTH) 500 MG tablet; Take 2 tablets by mouth 3 times daily for 10 days  Dispense: 60 tablet; Refill: 0      Return in about 1 week (around 9/23/2020) for L calf pain f/u. Mary Escalera is a 38 y.o. male being evaluated by a Virtual Visit (video visit) encounter to address concerns as mentioned above. A caregiver was present when appropriate. Due to this being a TeleHealth encounter (During [facility] public health emergency), evaluation of the following organ systems was limited: Vitals/Constitutional/EENT/Resp/CV/GI//MS/Neuro/Skin/Heme-Lymph-Imm.   Pursuant to the emergency declaration under the [facility] waiver authority and the Coronavirus Preparedness and Response Supplemental Appropriations Act, this Virtual Visit was conducted with patient's (and/or legal guardian's) consent, to reduce the patient's risk of exposure to COVID-19 and provide necessary medical care. The patient (and/or legal guardian) has also been advised to contact this office for worsening conditions or problems, and seek emergency medical treatment and/or call 911 if deemed necessary. Patient identification was verified at the start of the visit: Yes    Total time spent on this encounter: Not billed by time    Services were provided through a video synchronous discussion virtually to substitute for in-person clinic visit. Patient and provider were located at their individual homes. --Angelito Frost MD on 9/16/2020 at 10:28 AM    An electronic signature was used to authenticate this note.

## 2020-09-19 ENCOUNTER — HOSPITAL ENCOUNTER (INPATIENT)
Age: 40
LOS: 9 days | Discharge: HOME | DRG: 166 | End: 2020-09-28
Attending: EMERGENCY MEDICINE | Admitting: HOSPITALIST
Payer: COMMERCIAL

## 2020-09-19 ENCOUNTER — TELEPHONE (OUTPATIENT)
Dept: URGENT CARE | Age: 40
End: 2020-09-19

## 2020-09-19 ENCOUNTER — APPOINTMENT (OUTPATIENT)
Dept: CT IMAGING | Age: 40
DRG: 166 | End: 2020-09-19
Payer: COMMERCIAL

## 2020-09-19 PROBLEM — I26.99 ACUTE PULMONARY EMBOLISM (HCC): Status: ACTIVE | Noted: 2020-09-19

## 2020-09-19 PROBLEM — I82.402 LEG DVT (DEEP VENOUS THROMBOEMBOLISM), ACUTE, LEFT (HCC): Status: ACTIVE | Noted: 2020-09-19

## 2020-09-19 LAB
ABO/RH: NORMAL
ALBUMIN SERPL-MCNC: 4.1 G/DL (ref 3.5–5.2)
ALP BLD-CCNC: 99 U/L (ref 40–130)
ALT SERPL-CCNC: 32 U/L (ref 5–41)
ANION GAP SERPL CALCULATED.3IONS-SCNC: 12 MMOL/L (ref 7–19)
ANTIBODY SCREEN: NORMAL
APTT: 26.6 SEC (ref 26–36.2)
AST SERPL-CCNC: 21 U/L (ref 5–40)
BASOPHILS ABSOLUTE: 0.1 K/UL (ref 0–0.2)
BASOPHILS RELATIVE PERCENT: 0.6 % (ref 0–1)
BILIRUB SERPL-MCNC: 0.3 MG/DL (ref 0.2–1.2)
BUN BLDV-MCNC: 8 MG/DL (ref 6–20)
CALCIUM SERPL-MCNC: 9.5 MG/DL (ref 8.6–10)
CHLORIDE BLD-SCNC: 97 MMOL/L (ref 98–111)
CO2: 27 MMOL/L (ref 22–29)
CREAT SERPL-MCNC: 0.6 MG/DL (ref 0.5–1.2)
EOSINOPHILS ABSOLUTE: 0.6 K/UL (ref 0–0.6)
EOSINOPHILS RELATIVE PERCENT: 5.5 % (ref 0–5)
GFR AFRICAN AMERICAN: >59
GFR NON-AFRICAN AMERICAN: >60
GLUCOSE BLD-MCNC: 240 MG/DL (ref 70–99)
GLUCOSE BLD-MCNC: 244 MG/DL (ref 74–109)
HCT VFR BLD CALC: 46.6 % (ref 42–52)
HEMOGLOBIN: 15.4 G/DL (ref 14–18)
IMMATURE GRANULOCYTES #: 0.1 K/UL
INR BLD: 1.11 (ref 0.88–1.18)
LYMPHOCYTES ABSOLUTE: 1.9 K/UL (ref 1.1–4.5)
LYMPHOCYTES RELATIVE PERCENT: 19.4 % (ref 20–40)
MCH RBC QN AUTO: 28.8 PG (ref 27–31)
MCHC RBC AUTO-ENTMCNC: 33 G/DL (ref 33–37)
MCV RBC AUTO: 87.1 FL (ref 80–94)
MONOCYTES ABSOLUTE: 0.9 K/UL (ref 0–0.9)
MONOCYTES RELATIVE PERCENT: 8.9 % (ref 0–10)
NEUTROPHILS ABSOLUTE: 6.5 K/UL (ref 1.5–7.5)
NEUTROPHILS RELATIVE PERCENT: 65.1 % (ref 50–65)
PDW BLD-RTO: 13.3 % (ref 11.5–14.5)
PERFORMED ON: ABNORMAL
PLATELET # BLD: 179 K/UL (ref 130–400)
PMV BLD AUTO: 9.9 FL (ref 9.4–12.4)
POTASSIUM REFLEX MAGNESIUM: 4 MMOL/L (ref 3.5–5)
PRO-BNP: <5 PG/ML (ref 0–450)
PROTHROMBIN TIME: 14.3 SEC (ref 12–14.6)
RBC # BLD: 5.35 M/UL (ref 4.7–6.1)
SODIUM BLD-SCNC: 136 MMOL/L (ref 136–145)
TOTAL PROTEIN: 7.4 G/DL (ref 6.6–8.7)
TROPONIN: <0.01 NG/ML (ref 0–0.03)
WBC # BLD: 10 K/UL (ref 4.8–10.8)

## 2020-09-19 PROCEDURE — 96365 THER/PROPH/DIAG IV INF INIT: CPT

## 2020-09-19 PROCEDURE — 93005 ELECTROCARDIOGRAM TRACING: CPT | Performed by: PHYSICIAN ASSISTANT

## 2020-09-19 PROCEDURE — 85613 RUSSELL VIPER VENOM DILUTED: CPT

## 2020-09-19 PROCEDURE — 84484 ASSAY OF TROPONIN QUANT: CPT

## 2020-09-19 PROCEDURE — 99283 EMERGENCY DEPT VISIT LOW MDM: CPT

## 2020-09-19 PROCEDURE — 86901 BLOOD TYPING SEROLOGIC RH(D): CPT

## 2020-09-19 PROCEDURE — 99999 PR OFFICE/OUTPT VISIT,PROCEDURE ONLY: CPT | Performed by: PHYSICIAN ASSISTANT

## 2020-09-19 PROCEDURE — 85610 PROTHROMBIN TIME: CPT

## 2020-09-19 PROCEDURE — 80053 COMPREHEN METABOLIC PANEL: CPT

## 2020-09-19 PROCEDURE — 83880 ASSAY OF NATRIURETIC PEPTIDE: CPT

## 2020-09-19 PROCEDURE — 85730 THROMBOPLASTIN TIME PARTIAL: CPT

## 2020-09-19 PROCEDURE — 36415 COLL VENOUS BLD VENIPUNCTURE: CPT

## 2020-09-19 PROCEDURE — 86900 BLOOD TYPING SEROLOGIC ABO: CPT

## 2020-09-19 PROCEDURE — 6360000002 HC RX W HCPCS: Performed by: PHYSICIAN ASSISTANT

## 2020-09-19 PROCEDURE — 96375 TX/PRO/DX INJ NEW DRUG ADDON: CPT

## 2020-09-19 PROCEDURE — 1210000000 HC MED SURG R&B

## 2020-09-19 PROCEDURE — 93971 EXTREMITY STUDY: CPT

## 2020-09-19 PROCEDURE — 71275 CT ANGIOGRAPHY CHEST: CPT

## 2020-09-19 PROCEDURE — 99282 EMERGENCY DEPT VISIT SF MDM: CPT

## 2020-09-19 PROCEDURE — 86850 RBC ANTIBODY SCREEN: CPT

## 2020-09-19 PROCEDURE — 6360000004 HC RX CONTRAST MEDICATION: Performed by: PHYSICIAN ASSISTANT

## 2020-09-19 PROCEDURE — 85025 COMPLETE CBC W/AUTO DIFF WBC: CPT

## 2020-09-19 RX ORDER — HEPARIN SODIUM 10000 [USP'U]/100ML
12.18 INJECTION, SOLUTION INTRAVENOUS CONTINUOUS
Status: DISCONTINUED | OUTPATIENT
Start: 2020-09-19 | End: 2020-09-20

## 2020-09-19 RX ORDER — POLYETHYLENE GLYCOL 3350 17 G/17G
17 POWDER, FOR SOLUTION ORAL DAILY PRN
Status: DISCONTINUED | OUTPATIENT
Start: 2020-09-19 | End: 2020-09-28 | Stop reason: HOSPADM

## 2020-09-19 RX ORDER — HEPARIN SODIUM 1000 [USP'U]/ML
5000 INJECTION, SOLUTION INTRAVENOUS; SUBCUTANEOUS PRN
Status: DISCONTINUED | OUTPATIENT
Start: 2020-09-19 | End: 2020-09-20

## 2020-09-19 RX ORDER — HEPARIN SODIUM 1000 [USP'U]/ML
10000 INJECTION, SOLUTION INTRAVENOUS; SUBCUTANEOUS PRN
Status: DISCONTINUED | OUTPATIENT
Start: 2020-09-19 | End: 2020-09-20

## 2020-09-19 RX ORDER — SODIUM CHLORIDE 0.9 % (FLUSH) 0.9 %
10 SYRINGE (ML) INJECTION EVERY 12 HOURS SCHEDULED
Status: DISCONTINUED | OUTPATIENT
Start: 2020-09-19 | End: 2020-09-20

## 2020-09-19 RX ORDER — ACETAMINOPHEN 325 MG/1
650 TABLET ORAL EVERY 6 HOURS PRN
Status: DISCONTINUED | OUTPATIENT
Start: 2020-09-19 | End: 2020-09-28 | Stop reason: HOSPADM

## 2020-09-19 RX ORDER — MAGNESIUM SULFATE 1 G/100ML
1 INJECTION INTRAVENOUS PRN
Status: DISCONTINUED | OUTPATIENT
Start: 2020-09-19 | End: 2020-09-28 | Stop reason: HOSPADM

## 2020-09-19 RX ORDER — HEPARIN SODIUM 1000 [USP'U]/ML
10000 INJECTION, SOLUTION INTRAVENOUS; SUBCUTANEOUS ONCE
Status: COMPLETED | OUTPATIENT
Start: 2020-09-19 | End: 2020-09-19

## 2020-09-19 RX ORDER — ACETAMINOPHEN 650 MG/1
650 SUPPOSITORY RECTAL EVERY 6 HOURS PRN
Status: DISCONTINUED | OUTPATIENT
Start: 2020-09-19 | End: 2020-09-28 | Stop reason: HOSPADM

## 2020-09-19 RX ORDER — ONDANSETRON 2 MG/ML
4 INJECTION INTRAMUSCULAR; INTRAVENOUS EVERY 6 HOURS PRN
Status: DISCONTINUED | OUTPATIENT
Start: 2020-09-19 | End: 2020-09-28 | Stop reason: HOSPADM

## 2020-09-19 RX ORDER — SODIUM CHLORIDE 0.9 % (FLUSH) 0.9 %
10 SYRINGE (ML) INJECTION PRN
Status: DISCONTINUED | OUTPATIENT
Start: 2020-09-19 | End: 2020-09-20

## 2020-09-19 RX ORDER — FAMOTIDINE 20 MG/1
20 TABLET, FILM COATED ORAL 2 TIMES DAILY
Status: DISCONTINUED | OUTPATIENT
Start: 2020-09-19 | End: 2020-09-22

## 2020-09-19 RX ORDER — POTASSIUM CHLORIDE 20 MEQ/1
40 TABLET, EXTENDED RELEASE ORAL PRN
Status: DISCONTINUED | OUTPATIENT
Start: 2020-09-19 | End: 2020-09-28 | Stop reason: HOSPADM

## 2020-09-19 RX ORDER — POTASSIUM CHLORIDE 7.45 MG/ML
10 INJECTION INTRAVENOUS PRN
Status: DISCONTINUED | OUTPATIENT
Start: 2020-09-19 | End: 2020-09-28 | Stop reason: HOSPADM

## 2020-09-19 RX ORDER — PROMETHAZINE HYDROCHLORIDE 12.5 MG/1
12.5 TABLET ORAL EVERY 6 HOURS PRN
Status: DISCONTINUED | OUTPATIENT
Start: 2020-09-19 | End: 2020-09-28 | Stop reason: HOSPADM

## 2020-09-19 RX ADMIN — HEPARIN SODIUM 10000 UNITS: 1000 INJECTION INTRAVENOUS; SUBCUTANEOUS at 18:11

## 2020-09-19 RX ADMIN — IOPAMIDOL 90 ML: 755 INJECTION, SOLUTION INTRAVENOUS at 18:01

## 2020-09-19 RX ADMIN — HEPARIN SODIUM 12.18 UNITS/KG/HR: 10000 INJECTION, SOLUTION INTRAVENOUS at 18:11

## 2020-09-19 ASSESSMENT — ENCOUNTER SYMPTOMS
ABDOMINAL DISTENTION: 0
SINUS PRESSURE: 0
VOMITING: 0
PHOTOPHOBIA: 0
CHEST TIGHTNESS: 0
WHEEZING: 0
EYE ITCHING: 0
NAUSEA: 0
ABDOMINAL PAIN: 0
COUGH: 0
SORE THROAT: 0
DIARRHEA: 0
SINUS PAIN: 0
EYE PAIN: 0
SHORTNESS OF BREATH: 0

## 2020-09-19 ASSESSMENT — PAIN SCALES - GENERAL
PAINLEVEL_OUTOF10: 8
PAINLEVEL_OUTOF10: 6

## 2020-09-19 NOTE — H&P
HEMORRHOID SURGERY N/A 6/24/2016    HEMORRHOID INCISION AND DRAINAGE performed by Dagoberto Wheeler MD at [facility] 43 CYSTO/URETERO/PYELOSCOPY W/LITHOTRIPSY Right 8/7/2018    URETEROSCOPY LASER LITHOTRIPSY STONE EXTRACTION performed by German Fall MD at [address] Right 8/7/2018    STENT PLACEMENT performed by German Fall MD at [facility] ESWL Right 2/13/2018    ESWL EXTRACORPEAL SHOCK WAVE LITHOTRIPSY performed by German Fall MD at [address] [facility] ESW[facility] Right 3/13/2018    ESWL EXTRACORPEAL SHOCK WAVE LITHOTRIPSY performed by German Fall MD at [address] [facility] ESW[facility] Right 7/24/2018    ESWL [address] LITHOTRIPSY performed by German Fall MD at [facility] 43 LAP, [address] N/A 4/0/0769    HERNIA UMBILICAL REPAIR LAPAROSCOPIC performed by Thor Florez MD at [address]      left wrist.  Pt was a child       Home Medications:  Prior to Admission medications    Medication Sig Start Date End Date Taking? Authorizing Provider   acetaminophen (APAP EXTRA STRENGTH) 500 MG tablet Take 2 tablets by mouth 3 times daily for 10 days 9/16/20 9/26/20 Yes Tamia Reed MD   metFORMIN (GLUCOPHAGE) 500 MG tablet Take 1 tablet by mouth 2 times daily (with meals) 7/14/20  Yes VANDANA Simms   tiZANidine (ZANAFLEX) 4 MG tablet Take 1 tablet by mouth nightly as needed (spasm) 6/10/20  Yes VANDANA Simms   meloxicam (MOBIC) 15 MG tablet Take 1 tablet by mouth daily 1/6/20  Yes VANDANA Simms       Allergies:    Patient has no known allergies. Social History:    The patient currently lives at home with his wife  Tobacco:   reports that he has never smoked. He has never used smokeless tobacco.  Alcohol:   reports current alcohol use.       Family History:      Problem Relation Age of Onset    Cancer Mother 46        colon cancer    Cancer Father         luekemia    Diabetes Father     Other Maternal Grandmother         copd    Other Maternal Grandfather         copd    Heart Disease Paternal Grandmother        Review of Systems:   Review of Systems   Constitutional: Negative for chills, diaphoresis, fatigue and fever. HENT: Negative for congestion, ear pain, sinus pressure, sinus pain and sore throat. Eyes: Negative for photophobia, pain and itching. Respiratory: Negative for cough, chest tightness, shortness of breath and wheezing. Cardiovascular: Negative for chest pain, palpitations and leg swelling. Gastrointestinal: Negative for abdominal distention, abdominal pain, diarrhea, nausea and vomiting. Endocrine: Negative for cold intolerance and heat intolerance. Genitourinary: Negative for difficulty urinating, dysuria, flank pain, frequency and urgency. Musculoskeletal: Positive for myalgias. Negative for arthralgias and joint swelling. L leg pain   Neurological: Negative for dizziness, tremors, syncope, weakness, light-headedness, numbness and headaches. Hematological: Does not bruise/bleed easily. Psychiatric/Behavioral: Negative for agitation, confusion, dysphoric mood, hallucinations, self-injury and suicidal ideas. Physical Examination:  VITALS: /89   Pulse 96   Temp 97.1 °F (36.2 °C) (Temporal)   Resp 12   Ht 6' (1.829 m)   Wt (!) 386 lb 9.6 oz (175.4 kg)   SpO2 94%   BMI 52.43 kg/m²   Physical Exam  Constitutional:       General: He is not in acute distress. Appearance: Normal appearance. He is obese. He is not ill-appearing, toxic-appearing or diaphoretic. HENT:      Head: Normocephalic and atraumatic. Right Ear: External ear normal.      Left Ear: External ear normal.      Nose: Nose normal.      Mouth/Throat:      Mouth: Mucous membranes are moist.      Pharynx: Oropharynx is clear. Eyes:      General: No scleral icterus.      Extraocular Movements: Extraocular movements intact. Conjunctiva/sclera: Conjunctivae normal.   Neck:      Musculoskeletal: Normal range of motion and neck supple. Cardiovascular:      Rate and Rhythm: Normal rate and regular rhythm. Pulses: Normal pulses. Heart sounds: Normal heart sounds. No murmur. No friction rub. No gallop. Pulmonary:      Effort: Pulmonary effort is normal.      Breath sounds: Normal breath sounds. No stridor. No wheezing or rhonchi. Abdominal:      General: Bowel sounds are normal. There is no distension. Palpations: Abdomen is soft. There is no mass. Tenderness: There is no abdominal tenderness. Hernia: No hernia is present. Comments: Obese abdomen   Musculoskeletal:         General: Swelling and tenderness present. No signs of injury. Right lower leg: No edema. Left lower leg: No edema. Comments: LLE   Skin:     General: Skin is warm and dry. Coloration: Skin is not jaundiced or pale. Findings: No erythema, lesion or rash. Neurological:      General: No focal deficit present. Mental Status: He is alert and oriented to person, place, and time. Cranial Nerves: No cranial nerve deficit. Sensory: No sensory deficit. Motor: No weakness. Psychiatric:         Mood and Affect: Mood normal.         Behavior: Behavior normal.         Thought Content:  Thought content normal.         Judgment: Judgment normal.          Diagnostic Data:  CBC:  Recent Labs     09/19/20  1718   WBC 10.0   HGB 15.4   HCT 46.6        BMP:  Recent Labs     09/19/20  1718      K 4.0   CL 97*   CO2 27   BUN 8   CREATININE 0.6   CALCIUM 9.5     Recent Labs     09/19/20  1718   AST 21   ALT 32   BILITOT 0.3   ALKPHOS 99     Coag Panel:   Recent Labs     09/19/20  1718   INR 1.11   PROTIME 14.3   APTT 26.6     Urinalysis:  Lab Results   Component Value Date    NITRU POSITIVE 03/23/2020    WBCUA 11 03/23/2020    BACTERIA 4+ 03/23/2020    RBCUA 1 03/23/2020    BLOODU Negative 03/23/2020    SPECGRAV 1.024 03/23/2020    GLUCOSEU Negative 03/23/2020     RAD   CTA Pulm W contrast  Result Date: 9/19/2020  Impression:  Multiple acute pulmonary emboli identified on the right, involving the third, fourth, and fifth order pulmonary artery branches extending into the right lower lobe. There are findings that are suggestive of mild right heart strain. Signed by Dr. Marcie Gusman. Bonifaciohoose on 9/19/2020 6:20PM    Vascular preliminary results. Left lower extremity venous duplex scan performed today. (+) DVT and SVT  (-) reflux  DVT: thrombus noted in Lt CFV, Lt SFV (prox, mid and distal), Lt Pop V, Lt Gastrocs, Lt PTV. Floating tail noted in Lt CFV measuring approximately 4.3cm. SVT: thrombus noted in Lt LSV  Informed Yolande Virgen of preliminary results. Final report pending. Assessment/Plan:  Principal Problem:    Leg DVT (deep venous thromboembolism), acute, left ([address]    Acute pulmonary embolism ([address]   - Vascular surgery consulted from ED due to floating tail on DVT. Pt started on heparin gtt. hypercoagulability workup initiated and Hem/onc consulted for unprovoked DVT. Follow troponin and BNP level to rule out any cardiac involvement. Active Problems:    Prediabetes- A1C ordered. SSI, poct glucose qac and hs, hypoglycemia treatement orders.      Signed:  Brian Pickett PA-C  9/19/2020, 7:43 PM

## 2020-09-19 NOTE — PROGRESS NOTES
Vascular preliminary results. Left lower extremity venous duplex scan performed today. (+) DVT and SVT  (-) reflux  DVT: thrombus noted in Lt CFV, Lt SFV (prox, mid and distal), Lt Pop V, Lt Gastrocs, Lt PTV. Floating tail noted in Lt CFV measuring approximately 4.3cm. SVT: thrombus noted in Lt LSV  Informed Keith Hinders of preliminary results. Final report pending.

## 2020-09-19 NOTE — ED PROVIDER NOTES
[facility] 3 YESENIA/VAS/MED  eMERGENCYdEPARTMENT eNCOUnter      Pt Name: Franck Blackmon  MRN: [id number]  [place] 1980  Date of evaluation: 9/19/2020  Provider:YUMIKO Rossi    CHIEF COMPLAINT       Chief Complaint   Patient presents with    Leg Swelling     Pt to ED with c/o LLE pain/swelling x1 week Pt reports surgery approx 1 month ago         HISTORY OF PRESENT ILLNESS  (Location/Symptom, Timing/Onset, Context/Setting, Quality, Duration, Modifying Factors, Severity.)   Franck Blackmon is a 38 y.o. male who presents to the emergency department with complaints of left lower extremity pain for 1 week no injury. Patient is a prediabetic. Admits to carpal tunnel on August 21 as a recent procedure. No blood thinners on board or antibiotics. Patient admits that there has been worsening pain more so on the back of his knee and his calf with no unilateral swelling. Denies any discoloration. No prior DVT. Patient has no cancer history or hormonal therapy. Patient is ambulatory the pain is worse when he ambulates. Described as an aching sensation he is morbidly obese. HPI    Nursing Notes were reviewed and I agree. REVIEW OF SYSTEMS    (2-9 systems for level 4, 10 or more for level 5)     Review of Systems   Constitutional: Negative for activity change, appetite change, chills and fever. HENT: Negative for congestion and dental problem. Eyes: Negative for photophobia, discharge and itching. Respiratory: Negative for apnea, cough and shortness of breath. Cardiovascular: Negative for chest pain. Musculoskeletal: Positive for arthralgias, joint swelling and myalgias. Negative for back pain, gait problem and neck pain. Skin: Negative for color change, pallor and rash. Neurological: Negative for dizziness, seizures and syncope. Psychiatric/Behavioral: Negative for agitation. The patient is not nervous/anxious.          Except as noted above the remainder of the review of systems was reviewed and negative.        PAST MEDICAL HISTORY     Past Medical History:   Diagnosis Date    Arthritis     both wrist    Bilateral carpal tunnel syndrome 8/15/2019    Bronchitis     10 yrs ago    Diabetes mellitus ([address]     Kidney stone     recurrent    Sleep apnea     untested    Umbilical hernia          SURGICAL HISTORY       Past Surgical History:   Procedure Laterality Date    CARPAL TUNNEL RELEASE Left 8/21/2020    LEFT CARPAL TUNNEL RELEASE performed by Reinaldo Grullon MD at [address] Right 7/24/2018    CYSTOSCOPY/ URETEROSCOPY; INSERTION DOUBLE J STENT/  URETERAL performed by Breana Martinez MD at [facility]      both wrists    HEMORRHOID SURGERY N/A 6/24/2016    HEMORRHOID INCISION AND DRAINAGE performed by Lucero Duong MD at [facility] [address] CYSTO/URETERO/PYELOSCOPY W/LITHOTRIPSY Right 8/7/2018    URETEROSCOPY LASER LITHOTRIPSY STONE EXTRACTION performed by Breana Martinez MD at [address] [facility] Right 8/7/2018    STENT PLACEMENT performed by Breana Martinez MD at [address] [facility] Right 2/13/2018    ESWL EXTRACORPEAL SHOCK WAVE LITHOTRIPSY performed by Breana Martinez MD at [address] [facility] Right 3/13/2018    ESWL EXTRACORPEAL SHOCK WAVE LITHOTRIPSY performed by Breana Martinez MD at [address] [facility] Right 7/24/2018    ESWL [address] LITHOTRIPSY performed by Breana Martinez MD at [facility] [address], [address] N/A 4/9/1202    HERNIA UMBILICAL REPAIR LAPAROSCOPIC performed by Claire Sheets MD at [address] [facility]      left wrist.  Pt was a child         CURRENT MEDICATIONS       Current Discharge Medication List      CONTINUE these medications which have NOT CHANGED    Details   acetaminophen (APAP EXTRA STRENGTH) 500 MG tablet Take 2 tablets by mouth 3 times daily for 10 days  Qty: 60 tablet, Refills: 0 violence     Fear of current or ex partner: None     Emotionally abused: None     Physically abused: None     Forced sexual activity: None   Other Topics Concern    None   Social History Narrative    None       SCREENINGS    Dejuan Coma Scale  Eye Opening: Spontaneous  Best Verbal Response: Oriented  Best Motor Response: Obeys commands  Dejuan Coma Scale Score: 15      PHYSICAL EXAM    (up to 7 forlevel 4, 8 or more for level 5)     ED Triage Vitals [09/19/20 1653]   BP Temp Temp src Pulse Resp SpO2 Height Weight   (!) 158/99 98.1 °F (36.7 °C) -- 98 20 94 % 6' (1.829 m) (!) 380 lb (172.4 kg)       Physical Exam  Vitals signs and nursing note reviewed. Constitutional:       General: He is not in acute distress. Appearance: Normal appearance. He is well-developed. He is obese. He is not diaphoretic. HENT:      Head: Normocephalic and atraumatic. Right Ear: External ear normal.      Left Ear: External ear normal.   Eyes:      Pupils: Pupils are equal, round, and reactive to light. Neck:      Musculoskeletal: Normal range of motion and neck supple. Trachea: No tracheal deviation. Cardiovascular:      Rate and Rhythm: Normal rate and regular rhythm. Pulses: Normal pulses. Heart sounds: Normal heart sounds. No murmur. Comments: Patient has palpable pulses of his left lower extremity is warm no unilateral swelling or discoloration is appreciated. He definitely has pain behind his left knee and calf. Positive Homans sign. Pulmonary:      Effort: Pulmonary effort is normal.      Breath sounds: Normal breath sounds. No stridor. No wheezing. Chest:      Chest wall: No tenderness. Abdominal:      General: Bowel sounds are normal. There is no distension. Palpations: Abdomen is soft. Tenderness: There is no abdominal tenderness. Musculoskeletal: Normal range of motion. General: Tenderness present. No signs of injury. Skin:     General: Skin is warm and dry. HEMOGLOBIN A1C - Abnormal; Notable for the following components:    Hemoglobin A1C 8.8 (*)     All other components within normal limits    Narrative:     Collection has been rescheduled by [employer] at 09/20/2020 02:19 Reason: Do   at 0600   CBC WITH AUTO DIFFERENTIAL - Abnormal; Notable for the following components:    MCHC 32.6 (*)     Eosinophils % 5.3 (*)     All other components within normal limits    Narrative:     Collection has been rescheduled by [employer] at 09/20/2020 02:19 Reason: Do   at 4348   [address] - Abnormal; Notable for the following components:    Glucose 203 (*)     All other components within normal limits    Narrative:     Collection has been rescheduled by [employer] at 09/20/2020 02:19 Reason: Do   at 0600   POCT GLUCOSE - Abnormal; Notable for the following components:    POC Glucose 240 (*)     All other components within normal limits   CULTURE, URINE   PROTIME-INR   APTT   BRAIN NATRIURETIC PEPTIDE   TROPONIN   MAGNESIUM    Narrative:     Collection has been rescheduled by [employer] at 09/20/2020 02:19 Reason: Do   at 0600   PHOSPHORUS    Narrative:     Collection has been rescheduled by [employer] at 09/20/2020 02:19 Reason: Do   at 0600   FACTOR 5 LEIDEN    Narrative:     Collection has been rescheduled by [facility] at 09/19/2020 19:51 Reason:   Draw at 0000 per ann marie de jesus   COVID-19   PROTEIN C ANTIGEN, TOTAL   PROTEIN S ANTIGEN, TOTAL   LUPUS ANTICOAGULANT   ANTITHROMBIN III ACTIVITY   APTT   APTT   CARDIOLIPIN ANTIBODIES IGG & IGM   ANTIPHOSPHOLIPID ANTIBODY PANEL   FACTOR 5 LEIDEN   LUPUS ANTICOAGULANT   MTHFR MUTATION   PROTHROMBIN GENE MUTATION   BETA-2 GLYCOPROTEIN ANTIBODIES   ANTITHROMBIN III ACTIVITY   PROTEIN S ANTIGEN, TOTAL   PROTEIN C ANTIGEN, TOTAL   MISCELLANEOUS SENDOUT 1   POCT GLUCOSE   POCT GLUCOSE   POCT GLUCOSE   TYPE AND SCREEN       All other labs were within normal range or notreturned as of this dictation.     RE-ASSESSMENT        EMERGENCY DEPARTMENT COURSE and

## 2020-09-20 ENCOUNTER — ANESTHESIA (OUTPATIENT)
Dept: OPERATING ROOM | Age: 40
DRG: 166 | End: 2020-09-20
Payer: COMMERCIAL

## 2020-09-20 ENCOUNTER — APPOINTMENT (OUTPATIENT)
Dept: GENERAL RADIOLOGY | Age: 40
DRG: 166 | End: 2020-09-20
Payer: COMMERCIAL

## 2020-09-20 ENCOUNTER — APPOINTMENT (OUTPATIENT)
Dept: INTERVENTIONAL RADIOLOGY/VASCULAR | Age: 40
DRG: 166 | End: 2020-09-20
Payer: COMMERCIAL

## 2020-09-20 ENCOUNTER — ANESTHESIA EVENT (OUTPATIENT)
Dept: OPERATING ROOM | Age: 40
DRG: 166 | End: 2020-09-20
Payer: COMMERCIAL

## 2020-09-20 VITALS
OXYGEN SATURATION: 98 % | TEMPERATURE: 98.1 F | SYSTOLIC BLOOD PRESSURE: 82 MMHG | DIASTOLIC BLOOD PRESSURE: 51 MMHG | RESPIRATION RATE: 2 BRPM

## 2020-09-20 LAB
ANION GAP SERPL CALCULATED.3IONS-SCNC: 13 MMOL/L (ref 7–19)
APTT: 27.1 SEC (ref 26–36.2)
APTT: 48.4 SEC (ref 26–36.2)
APTT: 56.9 SEC (ref 26–36.2)
BASOPHILS ABSOLUTE: 0.1 K/UL (ref 0–0.2)
BASOPHILS RELATIVE PERCENT: 0.6 % (ref 0–1)
BUN BLDV-MCNC: 7 MG/DL (ref 6–20)
CALCIUM SERPL-MCNC: 8.9 MG/DL (ref 8.6–10)
CHLORIDE BLD-SCNC: 99 MMOL/L (ref 98–111)
CO2: 26 MMOL/L (ref 22–29)
CREAT SERPL-MCNC: 0.6 MG/DL (ref 0.5–1.2)
EOSINOPHILS ABSOLUTE: 0.5 K/UL (ref 0–0.6)
EOSINOPHILS RELATIVE PERCENT: 5.3 % (ref 0–5)
FIBRINOGEN: 419 MG/DL (ref 238–505)
GFR AFRICAN AMERICAN: >59
GFR NON-AFRICAN AMERICAN: >60
GLUCOSE BLD-MCNC: 203 MG/DL (ref 74–109)
GLUCOSE BLD-MCNC: 277 MG/DL (ref 70–99)
GLUCOSE BLD-MCNC: 284 MG/DL (ref 70–99)
HBA1C MFR BLD: 8.8 % (ref 4–6)
HCT VFR BLD CALC: 44.6 % (ref 42–52)
HCT VFR BLD CALC: 45.7 % (ref 42–52)
HEMOGLOBIN: 14.4 G/DL (ref 14–18)
HEMOGLOBIN: 14.9 G/DL (ref 14–18)
IMMATURE GRANULOCYTES #: 0.1 K/UL
LYMPHOCYTES ABSOLUTE: 2.3 K/UL (ref 1.1–4.5)
LYMPHOCYTES RELATIVE PERCENT: 24.9 % (ref 20–40)
MAGNESIUM: 1.8 MG/DL (ref 1.6–2.6)
MCH RBC QN AUTO: 28.4 PG (ref 27–31)
MCH RBC QN AUTO: 28.9 PG (ref 27–31)
MCHC RBC AUTO-ENTMCNC: 32.3 G/DL (ref 33–37)
MCHC RBC AUTO-ENTMCNC: 32.6 G/DL (ref 33–37)
MCV RBC AUTO: 87.2 FL (ref 80–94)
MCV RBC AUTO: 89.6 FL (ref 80–94)
MONOCYTES ABSOLUTE: 0.8 K/UL (ref 0–0.9)
MONOCYTES RELATIVE PERCENT: 8.7 % (ref 0–10)
NEUTROPHILS ABSOLUTE: 5.6 K/UL (ref 1.5–7.5)
NEUTROPHILS RELATIVE PERCENT: 59.9 % (ref 50–65)
PDW BLD-RTO: 13.3 % (ref 11.5–14.5)
PDW BLD-RTO: 13.5 % (ref 11.5–14.5)
PERFORMED ON: ABNORMAL
PERFORMED ON: ABNORMAL
PHOSPHORUS: 3.3 MG/DL (ref 2.5–4.5)
PLATELET # BLD: 192 K/UL (ref 130–400)
PLATELET # BLD: 234 K/UL (ref 130–400)
PMV BLD AUTO: 10 FL (ref 9.4–12.4)
PMV BLD AUTO: 9.9 FL (ref 9.4–12.4)
POTASSIUM SERPL-SCNC: 4.1 MMOL/L (ref 3.5–5)
RBC # BLD: 4.98 M/UL (ref 4.7–6.1)
RBC # BLD: 5.24 M/UL (ref 4.7–6.1)
REASON FOR REJECTION: NORMAL
REJECTED TEST: NORMAL
SARS-COV-2, NAAT: NOT DETECTED
SODIUM BLD-SCNC: 138 MMOL/L (ref 136–145)
WBC # BLD: 24.9 K/UL (ref 4.8–10.8)
WBC # BLD: 9.4 K/UL (ref 4.8–10.8)

## 2020-09-20 PROCEDURE — B51CYZZ FLUOROSCOPY OF LEFT LOWER EXTREMITY VEINS USING OTHER CONTRAST: ICD-10-PCS | Performed by: SURGERY

## 2020-09-20 PROCEDURE — 2580000003 HC RX 258: Performed by: HOSPITALIST

## 2020-09-20 PROCEDURE — 99255 IP/OBS CONSLTJ NEW/EST HI 80: CPT | Performed by: INTERNAL MEDICINE

## 2020-09-20 PROCEDURE — 37187 VENOUS MECH THROMBECTOMY: CPT | Performed by: SURGERY

## 2020-09-20 PROCEDURE — 93971 EXTREMITY STUDY: CPT

## 2020-09-20 PROCEDURE — U0002 COVID-19 LAB TEST NON-CDC: HCPCS

## 2020-09-20 PROCEDURE — 6370000000 HC RX 637 (ALT 250 FOR IP): Performed by: HOSPITALIST

## 2020-09-20 PROCEDURE — 85730 THROMBOPLASTIN TIME PARTIAL: CPT

## 2020-09-20 PROCEDURE — 85384 FIBRINOGEN ACTIVITY: CPT

## 2020-09-20 PROCEDURE — 36005 INJECTION EXT VENOGRAPHY: CPT | Performed by: SURGERY

## 2020-09-20 PROCEDURE — 6360000002 HC RX W HCPCS: Performed by: PSYCHIATRY & NEUROLOGY

## 2020-09-20 PROCEDURE — 2000000000 HC ICU R&B

## 2020-09-20 PROCEDURE — 85025 COMPLETE CBC W/AUTO DIFF WBC: CPT

## 2020-09-20 PROCEDURE — 83735 ASSAY OF MAGNESIUM: CPT

## 2020-09-20 PROCEDURE — 36592 COLLECT BLOOD FROM PICC: CPT

## 2020-09-20 PROCEDURE — 3700000000 HC ANESTHESIA ATTENDED CARE: Performed by: SURGERY

## 2020-09-20 PROCEDURE — 36620 INSERTION CATHETER ARTERY: CPT

## 2020-09-20 PROCEDURE — 81240 F2 GENE: CPT

## 2020-09-20 PROCEDURE — 82947 ASSAY GLUCOSE BLOOD QUANT: CPT

## 2020-09-20 PROCEDURE — C1751 CATH, INF, PER/CENT/MIDLINE: HCPCS | Performed by: SURGERY

## 2020-09-20 PROCEDURE — 81241 F5 GENE: CPT

## 2020-09-20 PROCEDURE — C1880 VENA CAVA FILTER: HCPCS | Performed by: SURGERY

## 2020-09-20 PROCEDURE — 37191 INS ENDOVAS VENA CAVA FILTR: CPT | Performed by: SURGERY

## 2020-09-20 PROCEDURE — 6370000000 HC RX 637 (ALT 250 FOR IP): Performed by: SURGERY

## 2020-09-20 PROCEDURE — 80048 BASIC METABOLIC PNL TOTAL CA: CPT

## 2020-09-20 PROCEDURE — C1887 CATHETER, GUIDING: HCPCS | Performed by: SURGERY

## 2020-09-20 PROCEDURE — 3700000001 HC ADD 15 MINUTES (ANESTHESIA): Performed by: SURGERY

## 2020-09-20 PROCEDURE — 2700000000 HC OXYGEN THERAPY PER DAY

## 2020-09-20 PROCEDURE — 37212 THROMBOLYTIC VENOUS THERAPY: CPT | Performed by: SURGERY

## 2020-09-20 PROCEDURE — 2500000003 HC RX 250 WO HCPCS

## 2020-09-20 PROCEDURE — 2500000003 HC RX 250 WO HCPCS: Performed by: NURSE ANESTHETIST, CERTIFIED REGISTERED

## 2020-09-20 PROCEDURE — 75820 VEIN X-RAY ARM/LEG: CPT | Performed by: SURGERY

## 2020-09-20 PROCEDURE — 99253 IP/OBS CNSLTJ NEW/EST LOW 45: CPT | Performed by: SURGERY

## 2020-09-20 PROCEDURE — 6360000002 HC RX W HCPCS: Performed by: HOSPITALIST

## 2020-09-20 PROCEDURE — 6360000002 HC RX W HCPCS: Performed by: SURGERY

## 2020-09-20 PROCEDURE — 2580000003 HC RX 258: Performed by: SURGERY

## 2020-09-20 PROCEDURE — 3E05317 INTRODUCTION OF OTHER THROMBOLYTIC INTO PERIPHERAL ARTERY, PERCUTANEOUS APPROACH: ICD-10-PCS | Performed by: SURGERY

## 2020-09-20 PROCEDURE — C1757 CATH, THROMBECTOMY/EMBOLECT: HCPCS | Performed by: SURGERY

## 2020-09-20 PROCEDURE — C1769 GUIDE WIRE: HCPCS | Performed by: SURGERY

## 2020-09-20 PROCEDURE — 83036 HEMOGLOBIN GLYCOSYLATED A1C: CPT

## 2020-09-20 PROCEDURE — 85300 ANTITHROMBIN III ACTIVITY: CPT

## 2020-09-20 PROCEDURE — 85305 CLOT INHIBIT PROT S TOTAL: CPT

## 2020-09-20 PROCEDURE — 06H03DZ INSERTION OF INTRALUMINAL DEVICE INTO INFERIOR VENA CAVA, PERCUTANEOUS APPROACH: ICD-10-PCS | Performed by: SURGERY

## 2020-09-20 PROCEDURE — 84100 ASSAY OF PHOSPHORUS: CPT

## 2020-09-20 PROCEDURE — 85302 CLOT INHIBIT PROT C ANTIGEN: CPT

## 2020-09-20 PROCEDURE — 6360000002 HC RX W HCPCS: Performed by: NURSE ANESTHETIST, CERTIFIED REGISTERED

## 2020-09-20 PROCEDURE — 71045 X-RAY EXAM CHEST 1 VIEW: CPT

## 2020-09-20 PROCEDURE — 85027 COMPLETE CBC AUTOMATED: CPT

## 2020-09-20 PROCEDURE — 81291 MTHFR GENE: CPT

## 2020-09-20 PROCEDURE — 3600000007 HC SURGERY HYBRID BASE: Performed by: SURGERY

## 2020-09-20 PROCEDURE — 3600000017 HC SURGERY HYBRID ADDL 15MIN: Performed by: SURGERY

## 2020-09-20 PROCEDURE — 2709999900 HC NON-CHARGEABLE SUPPLY: Performed by: SURGERY

## 2020-09-20 PROCEDURE — C1894 INTRO/SHEATH, NON-LASER: HCPCS | Performed by: SURGERY

## 2020-09-20 PROCEDURE — 06CG3ZZ EXTIRPATION OF MATTER FROM LEFT EXTERNAL ILIAC VEIN, PERCUTANEOUS APPROACH: ICD-10-PCS | Performed by: SURGERY

## 2020-09-20 PROCEDURE — 36415 COLL VENOUS BLD VENIPUNCTURE: CPT

## 2020-09-20 DEVICE — DENALI®  VENA CAVA FILTER JUGULAR/SUBCLAVIAN
Type: IMPLANTABLE DEVICE | Status: FUNCTIONAL
Brand: DENALI® VENA CAVA FILTER

## 2020-09-20 RX ORDER — HYDROMORPHONE HYDROCHLORIDE 1 MG/ML
0.25 INJECTION, SOLUTION INTRAMUSCULAR; INTRAVENOUS; SUBCUTANEOUS EVERY 5 MIN PRN
Status: DISCONTINUED | OUTPATIENT
Start: 2020-09-20 | End: 2020-09-20

## 2020-09-20 RX ORDER — PROMETHAZINE HYDROCHLORIDE 25 MG/ML
6.25 INJECTION, SOLUTION INTRAMUSCULAR; INTRAVENOUS
Status: DISCONTINUED | OUTPATIENT
Start: 2020-09-20 | End: 2020-09-20

## 2020-09-20 RX ORDER — HYDROMORPHONE HYDROCHLORIDE 1 MG/ML
0.5 INJECTION, SOLUTION INTRAMUSCULAR; INTRAVENOUS; SUBCUTANEOUS
Status: DISCONTINUED | OUTPATIENT
Start: 2020-09-20 | End: 2020-09-25

## 2020-09-20 RX ORDER — HYDRALAZINE HYDROCHLORIDE 20 MG/ML
10 INJECTION INTRAMUSCULAR; INTRAVENOUS EVERY 6 HOURS PRN
Status: DISCONTINUED | OUTPATIENT
Start: 2020-09-20 | End: 2020-09-27

## 2020-09-20 RX ORDER — HYDROMORPHONE HYDROCHLORIDE 1 MG/ML
0.5 INJECTION, SOLUTION INTRAMUSCULAR; INTRAVENOUS; SUBCUTANEOUS EVERY 5 MIN PRN
Status: DISCONTINUED | OUTPATIENT
Start: 2020-09-20 | End: 2020-09-20

## 2020-09-20 RX ORDER — DEXTROSE MONOHYDRATE 50 MG/ML
100 INJECTION, SOLUTION INTRAVENOUS PRN
Status: DISCONTINUED | OUTPATIENT
Start: 2020-09-20 | End: 2020-09-28 | Stop reason: HOSPADM

## 2020-09-20 RX ORDER — ONDANSETRON 2 MG/ML
INJECTION INTRAMUSCULAR; INTRAVENOUS PRN
Status: DISCONTINUED | OUTPATIENT
Start: 2020-09-20 | End: 2020-09-20 | Stop reason: SDUPTHER

## 2020-09-20 RX ORDER — ROCURONIUM BROMIDE 10 MG/ML
INJECTION, SOLUTION INTRAVENOUS PRN
Status: DISCONTINUED | OUTPATIENT
Start: 2020-09-20 | End: 2020-09-20 | Stop reason: SDUPTHER

## 2020-09-20 RX ORDER — MORPHINE SULFATE 4 MG/ML
4 INJECTION, SOLUTION INTRAMUSCULAR; INTRAVENOUS EVERY 5 MIN PRN
Status: DISCONTINUED | OUTPATIENT
Start: 2020-09-20 | End: 2020-09-20

## 2020-09-20 RX ORDER — MORPHINE SULFATE 4 MG/ML
2 INJECTION, SOLUTION INTRAMUSCULAR; INTRAVENOUS EVERY 5 MIN PRN
Status: DISCONTINUED | OUTPATIENT
Start: 2020-09-20 | End: 2020-09-20

## 2020-09-20 RX ORDER — NICOTINE POLACRILEX 4 MG
15 LOZENGE BUCCAL PRN
Status: DISCONTINUED | OUTPATIENT
Start: 2020-09-20 | End: 2020-09-28 | Stop reason: HOSPADM

## 2020-09-20 RX ORDER — PROPOFOL 10 MG/ML
INJECTION, EMULSION INTRAVENOUS PRN
Status: DISCONTINUED | OUTPATIENT
Start: 2020-09-20 | End: 2020-09-20 | Stop reason: SDUPTHER

## 2020-09-20 RX ORDER — DEXTROSE MONOHYDRATE 25 G/50ML
12.5 INJECTION, SOLUTION INTRAVENOUS PRN
Status: DISCONTINUED | OUTPATIENT
Start: 2020-09-20 | End: 2020-09-28 | Stop reason: HOSPADM

## 2020-09-20 RX ORDER — METOPROLOL TARTRATE 5 MG/5ML
INJECTION INTRAVENOUS
Status: DISPENSED
Start: 2020-09-20 | End: 2020-09-21

## 2020-09-20 RX ORDER — DEXAMETHASONE SODIUM PHOSPHATE 10 MG/ML
INJECTION, SOLUTION INTRAMUSCULAR; INTRAVENOUS PRN
Status: DISCONTINUED | OUTPATIENT
Start: 2020-09-20 | End: 2020-09-20 | Stop reason: SDUPTHER

## 2020-09-20 RX ORDER — ONDANSETRON 2 MG/ML
4 INJECTION INTRAMUSCULAR; INTRAVENOUS EVERY 8 HOURS PRN
Status: DISCONTINUED | OUTPATIENT
Start: 2020-09-20 | End: 2020-09-28 | Stop reason: HOSPADM

## 2020-09-20 RX ORDER — HYDROCODONE BITARTRATE AND ACETAMINOPHEN 5; 325 MG/1; MG/1
1 TABLET ORAL EVERY 4 HOURS PRN
Status: DISCONTINUED | OUTPATIENT
Start: 2020-09-20 | End: 2020-09-25

## 2020-09-20 RX ORDER — METOCLOPRAMIDE HYDROCHLORIDE 5 MG/ML
10 INJECTION INTRAMUSCULAR; INTRAVENOUS
Status: DISCONTINUED | OUTPATIENT
Start: 2020-09-20 | End: 2020-09-20

## 2020-09-20 RX ORDER — SODIUM CHLORIDE 9 MG/ML
INJECTION, SOLUTION INTRAVENOUS CONTINUOUS
Status: DISCONTINUED | OUTPATIENT
Start: 2020-09-20 | End: 2020-09-21

## 2020-09-20 RX ORDER — FENTANYL CITRATE 50 UG/ML
INJECTION, SOLUTION INTRAMUSCULAR; INTRAVENOUS PRN
Status: DISCONTINUED | OUTPATIENT
Start: 2020-09-20 | End: 2020-09-20 | Stop reason: SDUPTHER

## 2020-09-20 RX ORDER — METOPROLOL TARTRATE 5 MG/5ML
5 INJECTION INTRAVENOUS ONCE
Status: COMPLETED | OUTPATIENT
Start: 2020-09-20 | End: 2020-09-20

## 2020-09-20 RX ORDER — METOPROLOL TARTRATE 5 MG/5ML
INJECTION INTRAVENOUS
Status: COMPLETED
Start: 2020-09-20 | End: 2020-09-20

## 2020-09-20 RX ORDER — MEPERIDINE HYDROCHLORIDE 50 MG/ML
12.5 INJECTION INTRAMUSCULAR; INTRAVENOUS; SUBCUTANEOUS EVERY 5 MIN PRN
Status: DISCONTINUED | OUTPATIENT
Start: 2020-09-20 | End: 2020-09-20

## 2020-09-20 RX ORDER — LIDOCAINE HYDROCHLORIDE 10 MG/ML
INJECTION, SOLUTION EPIDURAL; INFILTRATION; INTRACAUDAL; PERINEURAL PRN
Status: DISCONTINUED | OUTPATIENT
Start: 2020-09-20 | End: 2020-09-20 | Stop reason: SDUPTHER

## 2020-09-20 RX ORDER — HYDROCODONE BITARTRATE AND ACETAMINOPHEN 5; 325 MG/1; MG/1
2 TABLET ORAL EVERY 4 HOURS PRN
Status: DISCONTINUED | OUTPATIENT
Start: 2020-09-20 | End: 2020-09-25

## 2020-09-20 RX ORDER — ENALAPRILAT 2.5 MG/2ML
1.25 INJECTION INTRAVENOUS
Status: DISCONTINUED | OUTPATIENT
Start: 2020-09-20 | End: 2020-09-20

## 2020-09-20 RX ORDER — SUCCINYLCHOLINE CHLORIDE 20 MG/ML
INJECTION INTRAMUSCULAR; INTRAVENOUS PRN
Status: DISCONTINUED | OUTPATIENT
Start: 2020-09-20 | End: 2020-09-20 | Stop reason: SDUPTHER

## 2020-09-20 RX ORDER — CEFAZOLIN SODIUM 1 G/3ML
INJECTION, POWDER, FOR SOLUTION INTRAMUSCULAR; INTRAVENOUS PRN
Status: DISCONTINUED | OUTPATIENT
Start: 2020-09-20 | End: 2020-09-20 | Stop reason: SDUPTHER

## 2020-09-20 RX ORDER — HEPARIN SODIUM 10000 [USP'U]/100ML
INJECTION, SOLUTION INTRAVENOUS CONTINUOUS PRN
Status: DISCONTINUED | OUTPATIENT
Start: 2020-09-20 | End: 2020-09-20 | Stop reason: SDUPTHER

## 2020-09-20 RX ORDER — LABETALOL HYDROCHLORIDE 5 MG/ML
5 INJECTION, SOLUTION INTRAVENOUS EVERY 10 MIN PRN
Status: DISCONTINUED | OUTPATIENT
Start: 2020-09-20 | End: 2020-09-20

## 2020-09-20 RX ORDER — HEPARIN SODIUM AND DEXTROSE 10000; 5 [USP'U]/100ML; G/100ML
600 INJECTION INTRAVENOUS CONTINUOUS
Status: DISCONTINUED | OUTPATIENT
Start: 2020-09-20 | End: 2020-09-22

## 2020-09-20 RX ORDER — HYDRALAZINE HYDROCHLORIDE 20 MG/ML
5 INJECTION INTRAMUSCULAR; INTRAVENOUS EVERY 10 MIN PRN
Status: DISCONTINUED | OUTPATIENT
Start: 2020-09-20 | End: 2020-09-20

## 2020-09-20 RX ORDER — MIDAZOLAM HYDROCHLORIDE 1 MG/ML
INJECTION INTRAMUSCULAR; INTRAVENOUS PRN
Status: DISCONTINUED | OUTPATIENT
Start: 2020-09-20 | End: 2020-09-20 | Stop reason: SDUPTHER

## 2020-09-20 RX ORDER — HYDROMORPHONE HYDROCHLORIDE 1 MG/ML
0.25 INJECTION, SOLUTION INTRAMUSCULAR; INTRAVENOUS; SUBCUTANEOUS
Status: DISCONTINUED | OUTPATIENT
Start: 2020-09-20 | End: 2020-09-25

## 2020-09-20 RX ORDER — GLIPIZIDE 10 MG/1
10 TABLET ORAL
Status: DISCONTINUED | OUTPATIENT
Start: 2020-09-20 | End: 2020-09-25

## 2020-09-20 RX ORDER — DIPHENHYDRAMINE HYDROCHLORIDE 50 MG/ML
12.5 INJECTION INTRAMUSCULAR; INTRAVENOUS
Status: DISCONTINUED | OUTPATIENT
Start: 2020-09-20 | End: 2020-09-20

## 2020-09-20 RX ADMIN — INSULIN LISPRO 2 UNITS: 100 INJECTION, SOLUTION INTRAVENOUS; SUBCUTANEOUS at 19:49

## 2020-09-20 RX ADMIN — FENTANYL CITRATE 100 MCG: 50 INJECTION INTRAMUSCULAR; INTRAVENOUS at 11:57

## 2020-09-20 RX ADMIN — INSULIN LISPRO 3 UNITS: 100 INJECTION, SOLUTION INTRAVENOUS; SUBCUTANEOUS at 17:45

## 2020-09-20 RX ADMIN — SUCCINYLCHOLINE CHLORIDE 140 MG: 20 INJECTION, SOLUTION INTRAMUSCULAR; INTRAVENOUS at 11:57

## 2020-09-20 RX ADMIN — HEPARIN SODIUM AND DEXTROSE 2246.87 UNITS/HR: 10000; 5 INJECTION INTRAVENOUS at 11:45

## 2020-09-20 RX ADMIN — SODIUM CHLORIDE, PRESERVATIVE FREE 10 ML: 5 INJECTION INTRAVENOUS at 00:15

## 2020-09-20 RX ADMIN — FAMOTIDINE 20 MG: 20 TABLET ORAL at 19:43

## 2020-09-20 RX ADMIN — HYDROMORPHONE HYDROCHLORIDE 0.25 MG: 1 INJECTION, SOLUTION INTRAMUSCULAR; INTRAVENOUS; SUBCUTANEOUS at 22:14

## 2020-09-20 RX ADMIN — ONDANSETRON HYDROCHLORIDE 4 MG: 2 INJECTION, SOLUTION INTRAMUSCULAR; INTRAVENOUS at 15:31

## 2020-09-20 RX ADMIN — DEXAMETHASONE SODIUM PHOSPHATE 10 MG: 10 INJECTION, SOLUTION INTRAMUSCULAR; INTRAVENOUS at 12:02

## 2020-09-20 RX ADMIN — HEPARIN SODIUM AND DEXTROSE 600 UNITS/HR: 10000; 5 INJECTION INTRAVENOUS at 15:21

## 2020-09-20 RX ADMIN — HEPARIN SODIUM 12.18 UNITS/KG/HR: 10000 INJECTION, SOLUTION INTRAVENOUS at 04:38

## 2020-09-20 RX ADMIN — ROCURONIUM BROMIDE 20 MG: 10 INJECTION, SOLUTION INTRAVENOUS at 14:49

## 2020-09-20 RX ADMIN — LIDOCAINE HYDROCHLORIDE 50 MG: 10 INJECTION, SOLUTION EPIDURAL; INFILTRATION; INTRACAUDAL; PERINEURAL at 11:57

## 2020-09-20 RX ADMIN — ROCURONIUM BROMIDE 50 MG: 10 INJECTION, SOLUTION INTRAVENOUS at 13:15

## 2020-09-20 RX ADMIN — MIDAZOLAM 2 MG: 1 INJECTION INTRAMUSCULAR; INTRAVENOUS at 11:55

## 2020-09-20 RX ADMIN — FENTANYL CITRATE 100 MCG: 50 INJECTION INTRAMUSCULAR; INTRAVENOUS at 13:15

## 2020-09-20 RX ADMIN — HYDROMORPHONE HYDROCHLORIDE 0.5 MG: 1 INJECTION, SOLUTION INTRAMUSCULAR; INTRAVENOUS; SUBCUTANEOUS at 18:15

## 2020-09-20 RX ADMIN — SODIUM CHLORIDE, PRESERVATIVE FREE 10 ML: 5 INJECTION INTRAVENOUS at 08:50

## 2020-09-20 RX ADMIN — METOPROLOL TARTRATE 5 MG: 5 INJECTION INTRAVENOUS at 16:32

## 2020-09-20 RX ADMIN — INSULIN LISPRO 1 UNITS: 100 INJECTION, SOLUTION INTRAVENOUS; SUBCUTANEOUS at 00:14

## 2020-09-20 RX ADMIN — ALTEPLASE 1 MG/HR: 2.2 INJECTION, POWDER, LYOPHILIZED, FOR SOLUTION INTRAVENOUS at 13:27

## 2020-09-20 RX ADMIN — FAMOTIDINE 20 MG: 20 TABLET ORAL at 00:14

## 2020-09-20 RX ADMIN — PROPOFOL 200 MG: 10 INJECTION, EMULSION INTRAVENOUS at 11:57

## 2020-09-20 RX ADMIN — HEPARIN SODIUM AND DEXTROSE 600 UNITS/HR: 10000; 5 INJECTION INTRAVENOUS at 16:00

## 2020-09-20 RX ADMIN — ROCURONIUM BROMIDE 45 MG: 10 INJECTION, SOLUTION INTRAVENOUS at 12:01

## 2020-09-20 RX ADMIN — ALTEPLASE 1 MG/HR: 2.2 INJECTION, POWDER, LYOPHILIZED, FOR SOLUTION INTRAVENOUS at 22:12

## 2020-09-20 RX ADMIN — HEPARIN SODIUM 5000 UNITS: 1000 INJECTION INTRAVENOUS; SUBCUTANEOUS at 06:39

## 2020-09-20 RX ADMIN — FENTANYL CITRATE 50 MCG: 50 INJECTION INTRAMUSCULAR; INTRAVENOUS at 12:33

## 2020-09-20 RX ADMIN — HYDRALAZINE HYDROCHLORIDE 10 MG: 20 INJECTION INTRAMUSCULAR; INTRAVENOUS at 16:58

## 2020-09-20 RX ADMIN — HYDROMORPHONE HYDROCHLORIDE 0.5 MG: 1 INJECTION, SOLUTION INTRAMUSCULAR; INTRAVENOUS; SUBCUTANEOUS at 14:04

## 2020-09-20 RX ADMIN — CEFAZOLIN 2000 MG: 330 INJECTION, POWDER, FOR SOLUTION INTRAMUSCULAR; INTRAVENOUS at 12:39

## 2020-09-20 RX ADMIN — ROCURONIUM BROMIDE 5 MG: 10 INJECTION, SOLUTION INTRAVENOUS at 11:57

## 2020-09-20 RX ADMIN — SODIUM CHLORIDE: 9 INJECTION, SOLUTION INTRAVENOUS at 17:31

## 2020-09-20 RX ADMIN — HYDROMORPHONE HYDROCHLORIDE 0.5 MG: 1 INJECTION, SOLUTION INTRAMUSCULAR; INTRAVENOUS; SUBCUTANEOUS at 15:17

## 2020-09-20 RX ADMIN — SUGAMMADEX 400 MG: 100 INJECTION, SOLUTION INTRAVENOUS at 15:31

## 2020-09-20 RX ADMIN — ROCURONIUM BROMIDE 50 MG: 10 INJECTION, SOLUTION INTRAVENOUS at 12:32

## 2020-09-20 RX ADMIN — ACETAMINOPHEN 650 MG: 325 TABLET, FILM COATED ORAL at 20:41

## 2020-09-20 RX ADMIN — ROCURONIUM BROMIDE 30 MG: 10 INJECTION, SOLUTION INTRAVENOUS at 14:01

## 2020-09-20 RX ADMIN — ALTEPLASE 1 MG/HR: 2.2 INJECTION, POWDER, LYOPHILIZED, FOR SOLUTION INTRAVENOUS at 16:00

## 2020-09-20 ASSESSMENT — ENCOUNTER SYMPTOMS
EYE ITCHING: 0
APNEA: 0
COLOR CHANGE: 0
COUGH: 0
SORE THROAT: 0
SINUS PRESSURE: 0
CHEST TIGHTNESS: 0
SINUS PAIN: 0
BACK PAIN: 0
ABDOMINAL DISTENTION: 0
EYE DISCHARGE: 0
NAUSEA: 0
VOMITING: 0
PHOTOPHOBIA: 0
WHEEZING: 0
EYE PAIN: 0
ABDOMINAL PAIN: 0
SHORTNESS OF BREATH: 0
DIARRHEA: 0

## 2020-09-20 ASSESSMENT — PAIN SCALES - GENERAL
PAINLEVEL_OUTOF10: 3
PAINLEVEL_OUTOF10: 8
PAINLEVEL_OUTOF10: 0
PAINLEVEL_OUTOF10: 7
PAINLEVEL_OUTOF10: 7
PAINLEVEL_OUTOF10: 4

## 2020-09-20 ASSESSMENT — LIFESTYLE VARIABLES: SMOKING_STATUS: 0

## 2020-09-20 ASSESSMENT — PAIN DESCRIPTION - LOCATION: LOCATION: HEAD

## 2020-09-20 ASSESSMENT — PAIN DESCRIPTION - PAIN TYPE
TYPE: ACUTE PAIN
TYPE: SURGICAL PAIN

## 2020-09-20 NOTE — OP NOTE
Preprocedure diagnosis:  1. Extensive left lower extremity deep vein thrombosis extending into the proximal left common femoral vein with a floating tail  2. Right-sided pulmonary embolism with mild right heart strain  3. Morbid obesity  4. History of left open carpal tunnel release performed 8/21/2020    Post procedure diagnosis: Same    Procedure:  1. Ultrasound/duplex guided cannulation of a thrombosed left popliteal vein with 5 [place] and later 8 [place] sheath  2. Left lower extremity venograms all the way to the inferior vena cava  3. Percutaneous thrombectomy/PulseSpray thrombolysis with AngioJet Zelante catheter  4. Left lower extremity venograms after percutaneous thrombectomy and thrombolysis  5. Placement of 40 cm infusion length EKOS TPA catheter from the popliteal vein all the way to the distal external iliac vein  6. Inferior vena cava filter placement from a right internal jugular vein approach (Bard Haralson inferior vena cava filter)    Surgeon: Delta Mcgraw MD    Anesthesia: General    Estimated blood loss: Less than 100 mL    Findings:  1. The patient does have extensive left lower extremity deep vein thrombosis. After TPA thrombolysis and mechanical thrombectomy, there was a significant floating tail remaining in the left common femoral vein. The left iliac vein and inferior vena cava both appear to be fairly widely patent. I did not perform intravascular ultrasound of the iliac venous system and will perform this tomorrow prior to completion of his treatment therapy. Indications for procedure: This is a 48-year-old  man who had open left carpal tunnel release performed 8/21/2020. Around 5 to 7 days ago, the patient noticed some left leg discomfort which she thought was a pulled muscle. He contacted his primary care office and the patient states that an ultrasound was suggested but never performed.   His pain and swelling worsened so he presented to the emergency department last night where he was found to have extensive left lower extremity deep vein thrombosis including a floating tail in the left common femoral vein. The patient was also found to have right pulmonary embolism with mild right ventricular strain on CTA. I plan left lower extremity percutaneous thrombolysis and mechanical thrombectomy. I may also have to leave a TPA catheter if the thrombolysis and mechanical thrombectomy are incomplete. Preoperatively, I did not plan on performing an inferior vena cava filter but with the findings mentioned above, I am afraid if I do not place a filter in the patient's floating tail should embolize, he may not survive with clot already in the right pulmonary artery and mild ventricular strain. Procedure in detail:    After the patient was consented and given intravenous antibiotics, he was brought to the hybrid operating room where lines were placed by anesthesia and then general endotracheal anesthesia was achieved and he was placed on the hybrid operating room table prone position. His left popliteal fossa was prepped and draped in usual sterile procedure. Micropuncture needle was used to cannulate left popliteal vein under ultrasound/duplex guidance. Seldinger technique was utilized to place a 5 [place] glide sheath. I was able to pass an 035 Glidewire through the thrombosed popliteal, femoral, and common femoral veins all the way to the inferior vena cava under fluoroscopic visualization. Left lower extremity venograms reveal extensive thrombus. I exchanged the 5 Nepali sheath for an 8 Nepali sheath and then the AngioJet thrombectomy catheter was placed through this sheath and 2 passes were performed in thrombectomy mode only. Next, PulseSpray thrombolysis was performed of the left popliteal, femoral, common femoral, and external iliac veins with 18 mg of TPA (20 mg/milliliters of TPA +80 mL of normal saline) total utilized.   30 minutes dwell time was allowed. Following 30 minutes, AngioJet thrombectomy mode was again initiated and 2 additional passes were performed in thrombectomy mode. Left lower extremity venograms were performed and reveal no significant residual thrombus in the femoral vein but there is a fairly large thrombus with floating tail in the left common femoral vein extending into the left external iliac vein. The 40 cm infusion length TPA catheter was then placed with the tip in the external iliac vein and the proximal portion in the above-knee popliteal vein. Sterile dressings were applied. The patient was then placed supine on the operating room table. His right neck was prepped and draped including the anesthesia placed triple-lumen internal jugular vein catheter. I wired that catheter with an angled Glidewire which was placed on into the distal inferior vena cava under fluoroscopic visualization. I removed the triple-lumen catheter and exchanged it for the [facility] dilator/sheath. This was placed down into the distal inferior vena cava. Distal inferior vena cava venograms were performed and the level of the renal veins was marked. A Jerauld inferior vena cava filter was then placed with the tip just inferior to the level of the renal veins. I exchanged the [facility] sheath for a 9 R[place] sheath because the outer diameter of the Jerauld sheath is 8.4 [place]. This can be utilized for an intravenous line while the patient is in the intensive care unit undergoing TPA thrombolysis. The 9 Rwandan sheath was secured to the patient's neck with 3-0 nylon suture.

## 2020-09-20 NOTE — PROGRESS NOTES
Pt received to  from OR at 15:50 s/p LLE venograms, inferior vena cava filter placement. Bedside report from Tess Rosado CRNA. Pt is drowsy post-op, not yet waking up and following commands. He is on 3 lpm/nc O2. EKOS to left popliteal sheath; alteplase at 1 mg/hr, Heparin gtt at 400 units/hr--changed to 600 units/hr per Dr. Abbey Joseph instruction and clarified with phone call, NS coolant at 40 ml/hr. 9F sheath to RIJ--okay to use as central line per Dr. Boo Dotson, R radial A-line has good waveform, 20G PIV to RAC. F/C to bsd, urine is yellow, starting to have a more kathryn appearance with sediment as Dr. Boo Dotson said it would. Pt is laying flat now, okay to raise HOB to 30 degrees per Dr. Boo Dotson. HTN noted on arrival, orders for lopressor IV once and hydralazine prn received from Dr. Brii Boggs. Labs drawn and sent to lab. Will continue to monitor.

## 2020-09-20 NOTE — PROGRESS NOTES
[facility]      Patient:  Cassandra Alcaraz  YOB: 1980  Date of Service: 9/20/2020  MRN: [id number]   Acct: [de-identified]   Primary Care Physician: VANDANA Bradley  Advance Directive: Full Code  Admit Date: 9/19/2020       Hospital Day: 1    CHIEF COMPLAINT Left leg pain    SUBJECTIVE:     Pt is resting in bed in no acute distress, about to be taken for vascular procedure. Pt has no new complaints or concerns at this time. Cumulative Hospital Course:   9/19/2020 The patient is a 38 y.o. male with PMH of prediabetes and sleep apnea who presented to [facility] ED complaining of left leg pain. Pt is resting in bed in no acute distress. He states that his left leg swelling and pain began Tuesday. He states the pain is located from his knee down and is more severe when standing. Pt states that he had a visit with his PCP and he was suppose to have an ultrasound completed but was never contacted by imaging. His Left lower extremity is swollen and has some mild warmth. Pt denies any chest pain, cough, fever, long car rides, or inactivity. He admits to being more fatigued recently and also having some abdominal discomfort and diarrhea yesterday. Pt states his mother and aunt both have hx of blood clots. He denies cigarette smoking or alcohol use. Upon arrival pt is afebrile HR 98 with /99 and SpO2 at 94% room air. Labs show elevated glucose. Venous doppler of Left leg revealing DVT with floating tail. CTA was performed and also revealed multiple acute pulmonary emboli. Vascular surgery was consulted from the ED and patient started on heparin gtt. 9/20/2020 Vascular surgery consulted and recommending percutaneous mechanical thrombectomy/PulseSpray thrombolysis. A1C 8.8. BNP <5 and troponin -ve. Review of Systems:   Review of Systems   Constitutional: Negative for chills, diaphoresis, fatigue and fever. HENT: Negative for congestion, ear pain, sinus pressure, sinus pain and sore throat. Eyes: Negative for photophobia, pain and itching. Respiratory: Negative for cough, chest tightness, shortness of breath and wheezing. Cardiovascular: Positive for leg swelling. Negative for chest pain and palpitations. Gastrointestinal: Negative for abdominal distention, abdominal pain, diarrhea, nausea and vomiting. Endocrine: Negative for cold intolerance and heat intolerance. Genitourinary: Negative for difficulty urinating, dysuria, flank pain, frequency and urgency. Musculoskeletal: Negative for arthralgias, joint swelling and myalgias. Neurological: Negative for dizziness, tremors, syncope, weakness, light-headedness, numbness and headaches. Hematological: Does not bruise/bleed easily. Psychiatric/Behavioral: Negative for agitation, confusion, dysphoric mood, hallucinations, self-injury and suicidal ideas. Objective:   VITALS:  /64   Pulse 86   Temp 99.5 °F (37.5 °C) (Temporal)   Resp 17   Ht 6' (1.829 m)   Wt (!) 386 lb 9.6 oz (175.4 kg)   SpO2 92%   BMI 52.43 kg/m²   24HR INTAKE/OUTPUT:      Intake/Output Summary (Last 24 hours) at 9/20/2020 1426  Last data filed at 9/19/2020 2147  Gross per 24 hour   Intake 250 ml   Output --   Net 250 ml       Physical Exam  Constitutional:       General: He is not in acute distress. Appearance: He is obese. He is not ill-appearing, toxic-appearing or diaphoretic. HENT:      Head: Normocephalic and atraumatic. Right Ear: External ear normal.      Left Ear: External ear normal.      Nose: Nose normal.      Mouth/Throat:      Mouth: Mucous membranes are moist.      Pharynx: Oropharynx is clear. Eyes:      General: No scleral icterus. Extraocular Movements: Extraocular movements intact. Conjunctiva/sclera: Conjunctivae normal.   Neck:      Musculoskeletal: Normal range of motion and neck supple. Cardiovascular:      Rate and Rhythm: Normal rate and regular rhythm. Pulses: Normal pulses.       Heart sounds: Normal heart sounds. No murmur. No friction rub. No gallop. Pulmonary:      Effort: Pulmonary effort is normal.      Breath sounds: Normal breath sounds. No stridor. No wheezing or rhonchi. Abdominal:      General: Abdomen is flat. Bowel sounds are normal. There is no distension. Palpations: Abdomen is soft. There is no mass. Tenderness: There is no abdominal tenderness. Hernia: No hernia is present. Comments: Obese abdomen   Musculoskeletal:         General: Swelling and tenderness present. No signs of injury. Right lower leg: No edema. Left lower leg: No edema. Comments: LLE   Skin:     General: Skin is warm and dry. Coloration: Skin is not jaundiced or pale. Findings: No erythema, lesion or rash. Neurological:      General: No focal deficit present. Mental Status: He is alert and oriented to person, place, and time. Cranial Nerves: No cranial nerve deficit. Sensory: No sensory deficit. Motor: No weakness. Psychiatric:         Mood and Affect: Mood normal.         Behavior: Behavior normal.         Thought Content:  Thought content normal.         Judgment: Judgment normal.            Medications:      [MAR Hold] dextrose      alteplase (ACTIVASE) 10 mg in 0.9% sodium chloride infusion 100 mL 1 mg/hr (09/20/20 1327)    alteplase (ACTIVASE) 10mg in 0.9% sodium chloride infusion (EKOS catheter)      heparin (porcine)      [MAR Hold] heparin (porcine) 13.18 Units/kg/hr (09/20/20 0639)      [MAR Hold] glipiZIDE  10 mg Oral BID AC    [MAR Hold] metFORMIN  500 mg Oral BID WC    alteplase  6 mg Other Once    [facility] sodium chloride flush  10 mL Intravenous 2 times per day    [MAR Hold] famotidine  20 mg Oral BID    [MAR Hold] insulin lispro  0-6 Units Subcutaneous TID WC    [MAR Hold] insulin lispro  0-3 Units Subcutaneous Nightly     [MAR Hold] glucose, [MAR Hold] dextrose, [MAR Hold] glucagon (rDNA), [MAR Hold] dextrose, heparin irrigation, heparin irrigation, HYDROmorphone, HYDROmorphone, morphine, morphine, diphenhydrAMINE, promethazine, metoclopramide, labetalol, hydrALAZINE, enalaprilat, meperidine, [MAR Hold] heparin (porcine), [MAR Hold] heparin (porcine), [MAR Hold] sodium chloride flush, [MAR Hold] potassium chloride **OR** [MAR Hold] potassium alternative oral replacement **OR** [MAR Hold] potassium chloride, [MAR Hold] magnesium sulfate, [MAR Hold] acetaminophen **OR** [MAR Hold] acetaminophen, [MAR Hold] polyethylene glycol, [MAR Hold] promethazine **OR** [MAR Hold] ondansetron  Diet NPO, After Midnight Exceptions are: Ice Chips, Sips with Meds     Lab and other Data:     Recent Labs     09/19/20 1718 09/20/20  0559   WBC 10.0 9.4   HGB 15.4 14.9    192     Recent Labs     09/19/20 1718 09/20/20  0559    138   K 4.0 4.1   CL 97* 99   CO2 27 26   BUN 8 7   CREATININE 0.6 0.6   GLUCOSE 244* 203*     Recent Labs     09/19/20 1718   AST 21   ALT 32   BILITOT 0.3   ALKPHOS 99     Troponin T:   Recent Labs     09/19/20 1718   TROPONINI <0.01     Pro-BNP: <5  INR:   Recent Labs     09/19/20 1718   INR 1.11     A1C:   Recent Labs     09/20/20  0559   LABA1C 8.8*       RAD:   Vl Extremity Venous Left  Result Date: 9/20/2020  Impression     There is evidence of subacute deep vein thrombosis in the left lower  extremity involving the common femoral, femoral, popliteal, posterior  tibial, and gastrocnemius veins. The anterior tibial and peroneal veins  were not well visualized secondary to edema and limb size. Superficial thrombophlebitis is seen in the short saphenous vein of the  left lower extremity. Floating tail left common femoral vein around 4 cm in length. Electronically signed by Marysol Bonilla MD(Interpreting  physician) on 09/20/2020 09:28 AM      Cta Pulmonary W Contrast  Result Date: 9/19/2020  1.  Multiple acute pulmonary emboli identified on the right, involving the third, fourth and fifth order pulmonary artery branches extending into the right lower lobe. There are findings that are suggestive of mild right heart strain. Signed by Dr Jennifer Gonzalez. Geo on 9/19/2020 6:20 PM      Micro:   Urine culture-no results     Assessment/Plan   Principal Problem:    Leg DVT (deep venous thromboembolism), acute, left [facility])- Vascular consulted and performing percutaneous mechanical thrombectomy/PulseSpray thrombolysis. Hem/Onc consulted and workup for hypercoagulability ongoing. Heparin gtt    Active Problems:    Acute pulmonary embolism (HCC) -heparin gtt. Diabetes Mellitus type II- A1C 8.8. SSI, poct gluose q ac and hs, hypoglycemia treatment ordered.        ROMIE FerminC  9/20/2020, 2:26 PM

## 2020-09-20 NOTE — ANESTHESIA PROCEDURE NOTES
Central Venous Line:    A central venous line was placed using ultrasound guidance, in the OR for the following indication(s): central venous access. 9/20/2020 12:30 PM    Sterility preparation included the following: hand hygiene performed prior to procedure, maximum sterile barriers used and sterile technique used to drape from head to toe. The patient was placed in supine position. The right internal jugular vein was prepped. The site was prepped with Chloraprep. A 7.5 Fr (size), 16 (length), introducer triple lumen was placed. During the procedure, the following specific steps were taken: target vein identified, needle advanced into vein and blood aspirated and guidewire advanced into vein. Intravenous verification was obtained by ultrasound, venous blood return and x-ray. Post insertion care included: all ports aspirated, all ports flushed easily, guidewire removed intact, Biopatch applied, line sutured in place and dressing applied. During the procedure the patient experienced: patient tolerated procedure well with no complications and EBL < 5mL. Insertion site scrubbed per usage guidelines?: Yes  Skin prep agent dried for 3 minutes prior to procedure?:yes  Anesthesia type: general.. No    Additional notes:  Central line malpositioned down right upper extremity. Central line repositioned over guidewire under sterile conditions. Line repositioned in satisfactory position.   Staffing  Anesthesiologist: Baron Chas DO  Performed: Anesthesiologist   Preanesthetic Checklist  Completed: patient identified, site marked, surgical consent, pre-op evaluation, timeout performed, IV checked, risks and benefits discussed, monitors and equipment checked, anesthesia consent given, oxygen available and patient being monitored

## 2020-09-20 NOTE — ANESTHESIA POSTPROCEDURE EVALUATION
Department of Anesthesiology  Postprocedure Note    Patient: Mitul Villanueva  MRN: [id number]  YOB: 1980  Date of evaluation: 9/20/2020  Time:  3:57 PM     Procedure Summary     Date:  09/20/20 Room / Location:  [facility] OR [address]    Anesthesia Start:  2359 Anesthesia Stop:  7370    Procedure:   LEFT LOWER EXTREMITY VENOGRAMS; INFERIOR VENA CAVA FILTER PLACEMENT. (Left ) Diagnosis:  (left leg dvt)    Surgeon:  Latricia Cowden, MD Responsible Provider:  VANDANA Silveira CRNA    Anesthesia Type:  general ASA Status:  3 - Emergent          Anesthesia Type: general    Guzman Phase I:      Guzman Phase II:      Last vitals: Reviewed and per EMR flowsheets.        Anesthesia Post Evaluation    Patient location during evaluation: ICU  Patient participation: complete - patient participated  Level of consciousness: awake and alert  Pain score: 0  Airway patency: patent  Nausea & Vomiting: no nausea and no vomiting  Complications: no  Cardiovascular status: blood pressure returned to baseline  Respiratory status: acceptable and nasal cannula  Hydration status: euvolemic

## 2020-09-20 NOTE — ANESTHESIA PROCEDURE NOTES
Arterial Line:    An arterial line was placed using ultrasound guidance, in the OR for the following indication(s): continuous blood pressure monitoring and blood sampling needed. A 20 gauge (size), 1 and 3/4 inch (length), Arrow (type) catheter was placed, Seldinger technique used, into the left radial artery, secured by tape and Tegaderm. Anesthesia type: Local  Local infiltration: None    Events:  patient tolerated procedure well with no complications and EBL 0mL.   9/20/2020 12:11 PM  Anesthesiologist: Norlin Seip, DO  Performed: Anesthesiologist   Preanesthetic Checklist  Completed: patient identified, site marked, surgical consent, pre-op evaluation, timeout performed, IV checked, risks and benefits discussed, monitors and equipment checked, anesthesia consent given, oxygen available and patient being monitored

## 2020-09-20 NOTE — ANESTHESIA PRE PROCEDURE
Department of Anesthesiology  Preprocedure Note       Name:  Carlos Ledezma   Age:  38 y.o.  :  1980                                          MRN:  [id number]         Date:  2020      Surgeon: Gerry Gregg):  Yael Munguia MD    Procedure: Procedure(s):  LEFT CARPAL TUNNEL RELEASE    Medications prior to admission:   Prior to Admission medications    Medication Sig Start Date End Date Taking? Authorizing Provider   acetaminophen (APAP EXTRA STRENGTH) 500 MG tablet Take 2 tablets by mouth 3 times daily for 10 days 20  Nora Lemons MD   metFORMIN (GLUCOPHAGE) 500 MG tablet Take 1 tablet by mouth 2 times daily (with meals) 20   Kimberly Book, APRN   tiZANidine (ZANAFLEX) 4 MG tablet Take 1 tablet by mouth nightly as needed (spasm) 6/10/20   Kimberly Book, APRN   meloxicam (MOBIC) 15 MG tablet Take 1 tablet by mouth daily 20   Kimberly Book, APRN       Current medications:    No current facility-administered medications for this visit. No current outpatient medications on file.      Facility-Administered Medications Ordered in Other Visits   Medication Dose Route Frequency Provider Last Rate Last Dose    glipiZIDE (GLUCOTROL) tablet 10 mg  10 mg Oral BID AC Buzz Simpson MD        glucose (GLUTOSE) 40 % oral gel 15 g  15 g Oral PRN Buzz Simpson MD        dextrose 50 % IV solution  12.5 g Intravenous PRN Buzz Simpson MD        glucagon (rDNA) injection 1 mg  1 mg Intramuscular PRN Buzz Simpson MD        dextrose 5 % solution  100 mL/hr Intravenous PRN Buzz Simpson MD        metFORMIN (GLUCOPHAGE) tablet 500 mg  500 mg Oral BID WC Buzz Simpson MD        heparin (porcine) injection 10,000 Units  10,000 Units Intravenous PRN Buzz Simpson MD        heparin (porcine) injection 5,000 Units  5,000 Units Intravenous PRN Buzz Simpson MD   5,000 Units at 20 0639    heparin 25,000 units in dextrose 5% 250 mL infusion  12.18 Units/kg/hr Intravenous Continuous Buzz Simpson MD 22.7 mL/hr at 09/20/20 0639 13.18 Units/kg/hr at 09/20/20 0783    sodium chloride flush 0.9 % injection 10 mL  10 mL Intravenous 2 times per day Buzz Simpson MD   10 mL at 09/20/20 0850    sodium chloride flush 0.9 % injection 10 mL  10 mL Intravenous PRN Buzz Simpson MD        potassium chloride (KLOR-CON M) extended release tablet 40 mEq  40 mEq Oral PRN Buzz Simpson MD        Or    potassium bicarb-citric acid (EFFER-K) effervescent tablet 40 mEq  40 mEq Oral PRN Buzz Simpson MD        Or    potassium chloride 10 mEq/100 mL IVPB (Peripheral Line)  10 mEq Intravenous PRN Buzz Simpson MD        magnesium sulfate 1 g in dextrose 5% 100 mL IVPB  1 g Intravenous PRN Buzz Simpson MD        acetaminophen (TYLENOL) tablet 650 mg  650 mg Oral Q6H PRN Buzz Simpson MD        Or    acetaminophen (TYLENOL) suppository 650 mg  650 mg Rectal Q6H PRN Buzz Simpson MD        polyethylene glycol (GLYCOLAX) packet 17 g  17 g Oral Daily PRN Buzz Simpson MD        promethazine (PHENERGAN) tablet 12.5 mg  12.5 mg Oral Q6H PRN Buzz Simpson MD        Or    ondansetron (ZOFRAN) injection 4 mg  4 mg Intravenous Q6H PRN Buzz Simpson MD        famotidine (PEPCID) tablet 20 mg  20 mg Oral BID Buzz Simpson MD   20 mg at 09/20/20 0014    insulin lispro (HUMALOG) injection vial 0-6 Units  0-6 Units Subcutaneous TID WC Buzz Simpson MD        insulin lispro (HUMALOG) injection vial 0-3 Units  0-3 Units Subcutaneous Nightly Buzz Simpson MD   1 Units at 09/20/20 0014       Allergies:  No Known Allergies    Problem List:    Patient Active Problem List   Diagnosis Code    Thrombosed external hemorrhoid K64.5    Right ureteral calculus P57.9    Umbilical hernia without obstruction and without gangrene K42.9    Hydronephrosis with renal and ureteral calculus obstruction N13.2    Ureteral obstruction, right N13.5    Transient alteration of awareness R40.4    Prediabetes R73.03    Bilateral carpal tunnel syndrome G56.03    Arthritis M19.90    Numbness and tingling in both hands R20.0, R20.2    S/P carpal tunnel release Z98.890    Leg DVT (deep venous thromboembolism), acute, left (HCC) I82.402    Acute pulmonary embolism (HCC) I26.99    DVT of deep femoral vein, left (HCC) I82.412       Past Medical History:        Diagnosis Date    Arthritis     both wrist    Bilateral carpal tunnel syndrome 8/15/2019    Bronchitis     10 yrs ago    Diabetes mellitus ([address]     Kidney stone     recurrent    Sleep apnea     untested    Umbilical hernia        Past Surgical History:        Procedure Laterality Date    CARPAL TUNNEL RELEASE Left 8/21/2020    LEFT CARPAL TUNNEL RELEASE performed by Cady Sumner MD at [facility] Right 7/24/2018    CYSTOSCOPY/ URETEROSCOPY; INSERTION DOUBLE J STENT/  URETERAL performed by Uday Cali MD at [address]      both wrists    . [address] N/A 6/24/2016    HEMORRHOID INCISION AND DRAINAGE performed by Guy Wiggins MD at [facility] CYSTO/URETERO/PYELOSCOPY W/LITHOTRIPSY Right 8/7/2018    URETEROSCOPY LASER LITHOTRIPSY STONE EXTRACTION performed by Uday Cali MD at [address] [facility] Right 8/7/2018    STENT PLACEMENT performed by Uday Cali MD at [address] [place] [address] [facility] Right 2/13/2018    ESWL EXTRACORPEAL SHOCK WAVE LITHOTRIPSY performed by Uday Cali MD at [address] [place] [address] [facility]L Right 3/13/2018    ESWL EXTRACORPEAL SHOCK WAVE LITHOTRIPSY performed by Uday Cali MD at [address] [place] [address] [facility]L Right 7/24/2018    ESWL EXTRACORPEAL SHOCK WAVE LITHOTRIPSY performed by Uday Cali MD at [address] [place] [address], [facility] N/A 5/0/0424    HERNIA UMBILICAL REPAIR LAPAROSCOPIC performed by Dc Kay MD at [facility]      left wrist.  Pt was a child       Social History:    Social History     Tobacco Use    Smoking status: Never Smoker    Smokeless tobacco: Never Used    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[employer]   Substance Use Topics    Alcohol use: Yes     Comment: OCC                                Counseling given: Not Answered  Comment: Unload trucks at [address] Signs (Current): There were no vitals filed for this visit.                                            BP Readings from Last 3 Encounters:   09/20/20 138/87   09/02/20 (!) 138/90   08/21/20 (!) 153/78       NPO Status:                                                                                 BMI:   Wt Readings from Last 3 Encounters:   09/19/20 (!) 386 lb 9.6 oz (175.4 kg)   09/02/20 (!) 388 lb (176 kg)   08/18/20 (!) 370 lb (167.8 kg)     There is no height or weight on file to calculate BMI.    CBC:   Lab Results   Component Value Date    WBC 9.4 09/20/2020    RBC 5.24 09/20/2020    HGB 14.9 09/20/2020    HCT 45.7 09/20/2020    MCV 87.2 09/20/2020    RDW 13.3 09/20/2020     09/20/2020       CMP:   Lab Results   Component Value Date     09/20/2020     08/14/2011    K 4.1 09/20/2020    K 4.0 09/19/2020    K 4.5 08/14/2011    CL 99 09/20/2020     08/14/2011    CO2 26 09/20/2020    BUN 7 09/20/2020    CREATININE 0.6 09/20/2020    CREATININE 0.8 08/14/2011    GFRAA >59 09/20/2020    LABGLOM >60 09/20/2020    GLUCOSE 203 09/20/2020    PROT 7.4 09/19/2020    CALCIUM 8.9 09/20/2020    BILITOT 0.3 09/19/2020    ALKPHOS 99 09/19/2020    AST 21 09/19/2020    ALT 32 09/19/2020       POC Tests:   Recent Labs     09/19/20  2332   POCGLU 240*       Coags:   Lab Results   Component Value Date    PROTIME 14.3 09/19/2020    INR 1.11 09/19/2020    APTT 48.4 09/20/2020       HCG (If Applicable): No results found for: PREGTESTUR, PREGSERUM, HCG, HCGQUANT     ABGs: No results found for: PHART, PO2ART, DKO7ZDL, KEO3GEP, BEART, W0TGZBCB     Type & Screen (If Applicable):  No results found for: LABABO, LABRH    Drug/Infectious Status (If Applicable):  No results found for: HIV, HEPCAB    COVID-19 Screening (If Applicable):   Lab Results   Component Value Date    COVID19 Not Detected 09/20/2020    COVID19 NOT DETECTED 08/18/2020         Anesthesia Evaluation  Patient summary reviewed and Nursing notes reviewed no history of anesthetic complications:   Airway: Mallampati: II  TM distance: >3 FB   Neck ROM: full  Mouth opening: > = 3 FB Dental:          Pulmonary:normal exam    (+) sleep apnea:      (-) not a current smoker                           Cardiovascular:  Exercise tolerance: good (>4 METS),       (-) hypertension, past MI, CAD, CABG/stent, dysrhythmias and  angina    ECG reviewed               Beta Blocker:  Not on Beta Blocker      ROS comment: Right lung PE with right heart strain     Neuro/Psych:      (-) seizures and CVA           GI/Hepatic/Renal:   (+) GERD: well controlled, renal disease: kidney stones, morbid obesity          Endo/Other:    (+) blood dyscrasia (heparin gtt): anticoagulation therapy:., .    (-) diabetes mellitus                ROS comment: Prediabetic per patient Abdominal:   (+) obese,         Vascular:   + DVT, PE.  - PVD. ROS comment: CT PE:  Impression   1. Multiple acute pulmonary emboli identified on the right, involving   the third, fourth and fifth order pulmonary artery branches extending   into the right lower lobe. There are findings that are suggestive of mild right heart strain. .                                 Anesthesia Plan      general     ASA 3 - emergent     (Given pt body habitus, right lung PE,  right heart strain, and prone positioning we will place [facility] and central line.)  Induction: intravenous. arterial line and central line  MIPS: Postoperative opioids intended and Prophylactic antiemetics administered. Anesthetic plan and risks discussed with patient. Use of blood products discussed with patient whom consented to blood products.    Plan discussed with

## 2020-09-20 NOTE — CONSULTS
Reason for Consult: Left lower extremity DVT and pulmonary embolism for prothrombotic evaluation    Requesting Physician: Dr. Christine Altamirano:    Pain and swelling of the left leg and burning in the right side of his chest.    Chief Complaint   Patient presents with    Leg Swelling     Pt to ED with c/o LLE pain/swelling x1 week Pt reports surgery approx 1 month ago         History Obtained From:  History obtained from patient, mother, wife and chart requested. HISTORY OF PRESENT ILLNESS:    Caroline Son is a 44-year-old  gentleman admitted to [address] [place] [address] ED on 9/19/2020 having presented with pain and swelling of the left leg, burning in the right side of his chest with evaluation leading to a diagnosis of left lower extremity DVT and PE. Hematology consultation requested. HEMATOLOGICAL HISTORY: Left lower extremity DVT with right lung pulmonary emboli 9/19/2020  Caroline Son was seen in initial hematology consultation on 9/20/2020 as an inpatient at [address] [place] [address] having been admitted on 9/19/2020 with left lower extremity DVT and PE. He was placed on heparin as initial management. Mr. Arnold Ford has a 5-day history of left leg pain and swelling. Initially he thought he had pulled a muscle but this did not get better. When things did not get better, he sought evaluation at the ED at [address] [place] [address]. Additionally he has nonspecific symptoms of pulmonary embolus including burning in the right side of his chest.  He does not have hemoptysis or dyspnea. Mr. Arnold Ford does not have a personal history of DVT or PE or hypercoagulability. Risk features include:   Obesity. Recent left hand carpal tunnel surgery in August 2020. Family history: His mother had an episode of left lower extremity DVT and PE 15 years ago without recurrence. A maternal aunt also had DVT of the lower extremities.     Noninvasive venous studies of the lower extremities on 9/19/2020 document:   · Extensive thrombosis (DVT) noted in Lt common femoral vein, Lt SFV (prox, mid and distal), Lt popliteal vein, Lt Gastrocs, Lt posterior tibial veins. A floating tail is noted in left common femoral vein measuring approximately 4.3cm. · SVT: thrombus noted in Lt LSV    Pulmonary CTA with contrast on 9/19/2020 documented the following:  · Multiple right-sided acute pulmonary emboli identified involving the right third fourth and fifth ordered pulmonary artery branches extending into the right lower lobe. · Possible right heart strain  · No left sided pulmonary emboli identified  · Small calcified subcarinal lymph nodes consistent with healed granulomatous disease  · Scattered calcified granulomas in both lungs    He was seen by Dr. Buffy Bergeron from vascular surgery this morning (9/20/2020), with recommendations for percutaneous mechanical thrombectomy/pulse spray thrombolysis to try to prevent long-term swelling in this young patient with iliofemoral DVT. If the result in that procedure is not satisfactory, he would consider placing a TPA thrombolyzes catheter for thrombolysis overnight. The patient agreed to proceed. Dr. Buffy Bergeron did not feel that he needed TPA thrombolysis of the pulmonary embolism because he is fairly asymptomatic from that standpoint. Agree with plans as outlined  A serologic prothrombotic work-up will be requested. We will follow with you.     Past Medical History:    Past Medical History:   Diagnosis Date    Arthritis     both wrist    Bilateral carpal tunnel syndrome 8/15/2019    Bronchitis     10 yrs ago    Diabetes mellitus ([facility] [address]     Kidney stone     recurrent    Sleep apnea     untested    Umbilical hernia          Past Surgical History:    Past Surgical History:   Procedure Laterality Date    CARPAL TUNNEL RELEASE Left 8/21/2020    LEFT CARPAL TUNNEL RELEASE performed by Augustina Rothman MD at [address] [facility] Right 7/24/2018    CYSTOSCOPY/ URETEROSCOPY; INSERTION DOUBLE J STENT/  URETERAL performed by Guera Cooney MD at [address]      both wrists    HEMORRHOID SURGERY N/A 6/24/2016    HEMORRHOID INCISION AND DRAINAGE performed by Ej Edmondson MD at [address] Right 8/7/2018    URETEROSCOPY LASER LITHOTRIPSY STONE EXTRACTION performed by Guera Cooney MD at [address] Right 8/7/2018    STENT PLACEMENT performed by Guera Cooney MD at [facility] Right 2/13/2018    ESWL EXTRACORPEAL SHOCK WAVE LITHOTRIPSY performed by Guera Cooney MD at [facility] Right 3/13/2018    ESWL EXTRACORPEAL SHOCK WAVE LITHOTRIPSY performed by Guera Cooney MD at [address] [facility] Right 7/24/2018    ESWL [address] LITHOTRIPSY performed by Guera Cooney MD at [address], VENTRAL HERNIA REPAIR,REDUCIBLE N/A 6/2/1585    HERNIA UMBILICAL REPAIR LAPAROSCOPIC performed by Susanna Mejia MD at [address]      left wrist.  Pt was a child         Immunizations:    Immunization History   Administered Date(s) Administered    Influenza Virus Vaccine 10/06/2014    Influenza, Camejo Pulse, IM, PF (6 mo and older Fluzone, Flulaval, Fluarix, and 3 yrs and older Afluria) 09/26/2018    Tdap (Boostrix, Adacel) 03/07/2017         Current Hospital Medications:    Current Facility-Administered Medications   Medication Dose Route Frequency Provider Last Rate Last Dose    glipiZIDE (GLUCOTROL) tablet 10 mg  10 mg Oral BID AC Buzz Simpson MD        glucose (GLUTOSE) 40 % oral gel 15 g  15 g Oral PRN Buzz Simpson MD        dextrose 50 % IV solution  12.5 g Intravenous PRN Buzz Simpson MD        glucagon (rDNA) injection 1 mg  1 mg Intramuscular PRN Buzz Simpson MD        dextrose 5 % solution  100 mL/hr Intravenous PRN Buzz Simpson MD        metFORMIN (GLUCOPHAGE) tablet History    Marital status:      Spouse name: None    Number of children: None    Years of education: None    Highest education level: None   Occupational History    None   Social Needs    Financial resource strain: None    Food insecurity     Worry: None     Inability: None    Transportation needs     Medical: None     Non-medical: None   Tobacco Use    Smoking status: Never Smoker    Smokeless tobacco: Never Used    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[employer]   Substance and Sexual Activity    Alcohol use: Yes     Comment: OCC    Drug use: No    Sexual activity: Yes     Partners: Female   Lifestyle    Physical activity     Days per week: None     Minutes per session: None    Stress: None   Relationships    Social connections     Talks on phone: None     Gets together: None     Attends Islam service: None     Active member of club or organization: None     Attends meetings of clubs or organizations: None     Relationship status: None    Intimate partner violence     Fear of current or ex partner: None     Emotionally abused: None     Physically abused: None     Forced sexual activity: None   Other Topics Concern    None   Social History Narrative    None         Family History:   Family History   Problem Relation Age of Onset    Cancer Mother 46        colon cancer    Cancer Father         luekemia    Diabetes Father     Other Maternal Grandmother         copd    Other Maternal Grandfather         copd    Heart Disease Paternal Grandmother        Review of Systems:  Constitutional: Negative for chills, fever or significant weight loss. Positive for fatigue and left leg pain  HENT: Negative for congestion, hearing loss, nosebleeds or sore throat. Eyes: Negative for photophobia, pain, discharge, redness and visual disturbance. Respiratory: Negative for cough, shortness of breath, or wheezing. Cardiovascular: Negative for chest pain, palpitations or leg swelling. 09/20/2020    K 4.1 09/20/2020    CL 99 09/20/2020    CO2 26 09/20/2020    BUN 7 09/20/2020    CREATININE 0.6 09/20/2020    GLUCOSE 203 (H) 09/20/2020    CALCIUM 8.9 09/20/2020    PROT 7.4 09/19/2020    LABALBU 4.1 09/19/2020    BILITOT 0.3 09/19/2020    ALKPHOS 99 09/19/2020    AST 21 09/19/2020    ALT 32 09/19/2020    LABGLOM >60 09/20/2020    GFRAA >59 09/20/2020    GLOB 3.2 03/07/2017         30 Day lookback of cultures:    Blood Culture Recent: No results for input(s): BC in the last 720 hours. Gram Stain Recent: No results for input(s): LABGRAM in the last 720 hours. Resp Culture Recent: No results for input(s): CULTRESP in the last 720 hours. Body Fluid Recent : No results for input(s): BFCX in the last 720 hours. MRSA Recent : No results for input(s): [address] in the last 720 hours. Urine Culture Recent : No results for input(s): LABURIN in the last 720 hours. Organism Recent : No results for input(s): ORG in the last 720 hours. IMPRESSION/RECOMMENDATIONS:    Darleen Ni is a 53-year-old  gentleman admitted to [address] [place] [address] ED on 9/19/2020 having presented with pain and swelling of the left leg, burning in the right side of his chest with evaluation leading to a diagnosis of left lower extremity DVT and PE. Hematology consultation requested. HEMATOLOGICAL HISTORY: Left lower extremity DVT with right lung pulmonary emboli 9/19/2020  Darleen Ni was seen in initial hematology consultation on 9/20/2020 as an inpatient at [address] [place] [address] having been admitted on 9/19/2020 with left lower extremity DVT and PE. He was placed on heparin as initial management. Mr. Larisa Navarro has a 5-day history of left leg pain and swelling. Initially he thought he had pulled a muscle but this did not get better. When things did not get better, he sought evaluation at the ED at [address] [place] [address].   Additionally he has nonspecific symptoms of pulmonary embolus including burning in the right side of his chest.  He does not have hemoptysis or dyspnea. Mr. Nakita Alcantar does not have a personal history of DVT or PE or hypercoagulability. Risk features include:   Obesity. Recent left hand carpal tunnel surgery in August 2020. Family history: His mother had an episode of left lower extremity DVT and PE 15 years ago without recurrence. A maternal aunt also had DVT of the lower extremities. Noninvasive venous studies of the lower extremities on 9/19/2020 document:   · Extensive thrombosis (DVT) noted in Lt common femoral vein, Lt SFV (prox, mid and distal), Lt popliteal vein, Lt Gastrocs, Lt posterior tibial veins. A floating tail is noted in left common femoral vein measuring approximately 4.3cm. · SVT: thrombus noted in Lt LSV    Pulmonary CTA with contrast on 9/19/2020 documented the following:  · Multiple right-sided acute pulmonary emboli identified involving the right third fourth and fifth ordered pulmonary artery branches extending into the right lower lobe. · Possible right heart strain  · No left sided pulmonary emboli identified  · Small calcified subcarinal lymph nodes consistent with healed granulomatous disease  · Scattered calcified granulomas in both lungs    He was seen by Dr. Fletcher Saenz from vascular surgery this morning (9/20/2020), with recommendations for percutaneous mechanical thrombectomy/pulse spray thrombolysis to try to prevent long-term swelling in this young patient with iliofemoral DVT. If the result in that procedure is not satisfactory, he would consider placing a TPA thrombolyzes catheter for thrombolysis overnight. The patient agreed to proceed. Dr. Fletcher Saenz did not feel that he needed TPA thrombolysis of the pulmonary embolism because he is fairly asymptomatic from that standpoint. Extended discussion undertaken with the patient, his wife and his mother who was also present today. All other questions were answered to their understanding and satisfaction.     Agree with plans as outlined  A serologic prothrombotic work-up will be requested. We will follow with you.     Romayne Sacramento    09/20/20  9:25 AM

## 2020-09-20 NOTE — CONSULTS
Vascular Surgery Consultation    I was consulted to see the patient for left iliofemoral deep Vein Thrombosis (DVT) with a floating tail as well as pulmonary embolism. HPI    This is a 38year old  man admitted and found to have DVT and pulmonary embolism. He  does have swelling and pain in the left leg. The symptoms started around 5 days ago. Initially, he thought that he pulled a muscle or had a muscle strain. When the symptoms did not become better, he contacted his primary care physician. Apparently, he had a virtual visit and the primary care physician thought that it might of been a deep vein thrombosis versus a muscle strain. He was going to order a venous duplex scan but this never happened. When the patient's symptoms worsen, he came to the emergency department last night. He does not have a known history of DVT in the past.     He does have nonspecific symtoms of pulmonary embolism including some burning in the right side of his chest.  He does not have any hemoptysis nor significant shortness of breath. The patient does not have a known history of pulmonary embolism. His current treatment includes heparin. This was started in the emergency department and continued after admission. He does not have an IVC filter. He does not have a personal history of hypercoagulability. He does have a family history of hypercoagulability. Risk factors for hypercoagulable state include: obesity and recent surgery, date August 2020, carpal tunnel surgery left hand.     Current Medical History    Phillip Mena is a 38 y.o. male with the following history reviewed and recorded in [employer]:  Patient Active Problem List    Diagnosis Date Noted    Leg DVT (deep venous thromboembolism), acute, left ([address] 09/19/2020    Acute pulmonary embolism ([address] 09/19/2020    S/P carpal tunnel release 09/02/2020    Numbness and tingling in both hands     Prediabetes 08/15/2019    Bilateral carpal tunnel syndrome 08/15/2019    Arthritis 08/15/2019    Transient alteration of awareness 07/08/2019    Hydronephrosis with renal and ureteral calculus obstruction 07/24/2018    Ureteral obstruction, right 13/65/8593    Umbilical hernia without obstruction and without gangrene 05/07/2018    Right ureteral calculus 02/13/2018    Thrombosed external hemorrhoid      Current Facility-Administered Medications   Medication Dose Route Frequency Provider Last Rate Last Dose    glipiZIDE (GLUCOTROL) tablet 10 mg  10 mg Oral BID AC Buzz Simpson MD        glucose (GLUTOSE) 40 % oral gel 15 g  15 g Oral PRN Buzz Simpson MD        dextrose 50 % IV solution  12.5 g Intravenous PRN Buzz Simpson MD        glucagon (rDNA) injection 1 mg  1 mg Intramuscular PRN Buzz Simpson MD        dextrose 5 % solution  100 mL/hr Intravenous PRNANCY Simpson MD        metFORMIN (GLUCOPHAGE) tablet 500 mg  500 mg Oral BID WC Buzz Simpson MD        heparin (porcine) injection 10,000 Units  10,000 Units Intravenous PRN Buzz Simpson MD        heparin (porcine) injection 5,000 Units  5,000 Units Intravenous PRN Buzz Simpson MD   5,000 Units at 09/20/20 0639    heparin 25,000 units in dextrose 5% 250 mL infusion  12.18 Units/kg/hr Intravenous Continuous Buzz Simpson MD 22.7 mL/hr at 09/20/20 0639 13.18 Units/kg/hr at 09/20/20 0639    sodium chloride flush 0.9 % injection 10 mL  10 mL Intravenous 2 times per day Buzz Simpson MD   10 mL at 09/20/20 0015    sodium chloride flush 0.9 % injection 10 mL  10 mL Intravenous PRN Buzz Simpson MD        potassium chloride (KLOR-CON M) extended release tablet 40 mEq  40 mEq Oral PRN Buzz Simpson MD        Or    potassium bicarb-citric acid (EFFER-K) effervescent tablet 40 mEq  40 mEq Oral PRN Buzz Simpson MD        Or    potassium chloride 10 mEq/100 mL IVPB (Peripheral Line)  10 mEq Intravenous PRN Buzz Simpson MD        magnesium sulfate 1 g in dextrose 5% 100 mL IVPB  1 g Intravenous PRNANCY Gerber Juan Diego Xiao MD at [facility]/ [facility] Right 3/13/2018    ESWL EXTRACORPEAL SHOCK WAVE LITHOTRIPSY performed by Neda Verduzco MD at [facility] Right 7/24/2018    ESWL [address] LITHOTRIPSY performed by Neda Verduzco MD at [address], [address] N/A 7/5/0261    HERNIA UMBILICAL REPAIR LAPAROSCOPIC performed by Frankie Batres MD at [address]      left wrist.  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 No fever or chills. No diaphoresis or significant fatigue. HENT - no significant rhinorrhea or epistaxis. No tinnitus or significant hearing loss. Eyes - no sudden vision change or amaurosis. Respiratory - no significant shortness of breath, wheezing, or stridor. No apnea, cough. See history of present illness. Cardiovascular - no syncope, or significant dizziness. No palpitations. No claudication. Left leg edema. Gastrointestinal - no abdominal swelling or pain. No blood in stool. No severe constipation, diarrhea, nausea, or vomiting. Genitourinary - No difficulty urinating, dysuria, frequency, or urgency. No flank pain or hematuria. He does have a history of kidney stones but no problems with hematuria nor pain in the flanks recently.   Musculoskeletal - no back pain, gait disturbance, or myalgia. Skin - no color change, rash, pallor, or new wound. Neurologic - no dizziness, facial asymmetry, or light headedness. No seizures. No speech difficulty or lateralizing weakness. Hematologic - no easy bruising or excessive bleeding. Psychiatric - no severe anxiety or nervousness. No confusion. All other review of systems are negative. Physical Exam    Constitutional - well developed, well nourished. No diaphoresis or acute distress. HENT - head normocephalic. Right external ear canal appears normal.  Left external ear canal appears normal.  Septum appears midline. Eyes - conjunctiva normal.  EOMS normal.  No exudate. No icterus. Neck- ROM appears normal, no tracheal deviation. Cardiovascular - Regular rate and rhythm. Carotid pulses are 2+ to palpation bilaterally without bruit. Extremities - Radial and brachial pulses are 2+ to palpation bilaterally. Right femoral pulse: present 2+; Right DP: present 1+; Right PT present 1+; Left femoral pulse: present 2+;  Left DP: present 1+; Left PT: present 1+  No cyanosis, clubbing, moderate edema of the left thigh calf and foot compared to the right. No signs atheroembolic event. Pulmonary - no labored breathing. no respiratory distress. GI - Abdomen - soft, non tender, bowel sounds X 4 quadrants. No guarding or rebound tenderness. No distension or palpable mass. Genitourinary - deferred. Musculoskeletal - well-healed left hand scar  Neurologic - alert and oriented X 3. Physiologic. Skin - warm, dry, and intact. No rash, erythema, or pallor. Psychiatric - mood, affect, and behavior appear normal.  Judgment and thought processes appear normal.    Radiology/Vascular lab tests:    Vascular preliminary results. Left lower extremity venous duplex scan performed today. (+) DVT and SVT  (-) reflux  DVT: thrombus noted in Lt CFV, Lt SFV (prox, mid and distal), Lt Pop V, Lt Gastrocs, Lt PTV.  Floating tail noted in Lt CFV measuring approximately 4.3cm. SVT: thrombus noted in Lt LSV    EXAMINATION: CTA PULMONARY W CONTRAST 9/19/2020 6:13 PM    HISTORY: Shortness of breath, clinical concern for pulmonary embolism. DOSE: 804 mGycm. Automatic exposure control was utilized in an effort    to use as little radiation as possible, without compromising image    quality. REPORT: Spiral CT of the chest was performed after administration of    intravenous contrast using CTA protocol, which includes reconstructed    coronal, sagittal and 3-D images. COMPARISON: There are no correlative imaging studies for comparison. The contrast bolus is satisfactory, there is respiratory motion    artifact. The pulmonary arteries are normal in caliber, there are    multiple filling defects within the pulmonary arteries on the right,    involving the third, fourth and fifth order branches extending into    the right lower lobe there is mild straightening of the cardiac    ventricular septum, which may indicate mild right heart strain. No    left-sided pulmonary emboli are identified. The thoracic aorta is    normal in caliber, no evidence of dissection is identified. Heart size    is normal. No intrathoracic lymphadenopathy is identified. Small    calcified subcarinal lymph nodes are compatible with healed    granulomatous disease. The thyroid gland is homogeneous and normal.    Review of lung windows demonstrates scattered calcified granulomas in    both lungs. There is no parenchymal infiltrate or consolidation. No    pneumothorax or pleural effusion is identified. The airways are    patent. The visualized upper abdomen shows decreased attenuation of    the liver parenchyma compatible with fatty infiltration. There is    bilateral gynecomastia, mild. Review of bone windows shows fused    syndesmophytes throughout the mid and lower thoracic spine. No acute    osseous abnormalities identified.         Impression    1.  Multiple acute pulmonary emboli identified on the right, involving    the third, fourth and fifth order pulmonary artery branches extending    into the right lower lobe. Mamadou Spatz are findings that are suggestive of    mild right heart strain. Signed by Dr Mariluz Pelaez. Geo on 9/19/2020 6:20 PM            Assessment    1. DVT of deep femoral vein, left ([address]    2. Morbid obesity ([address]    3. Pre-diabetes    4. Other acute pulmonary embolism, unspecified whether acute cor pulmonale present [facility])        He has left lower extremity Deep Vein Thrombosis. Sufficiently extensive including the common femoral, femoral vein, popliteal vein, gastrocnemius and posterior tibial veins. There is a floating tail noted in the left common femoral vein as well. There is not a contraindication for anticoagulation or TPA thrombolytic currently. He does have pulmonary embolism as well. He does not need TPA thrombolysis for the pulmonary embolism because he is fairly asymptomatic from this standpoint. Plan    Continue full dose anticoagulation with heparin for now. I recommended percutaneous mechanical thrombectomy/PulseSpray thrombolysis to try to prevent long-term swelling in this young patient with iliofemoral DVT. I also told him that what if we do not achieve a satisfactory result with that, I may place a TPA thrombolysis catheter for thrombolysis overnight. I discussed the risks, benefits, and alternatives to the procedure. He seems to understand and agrees to proceed.

## 2020-09-20 NOTE — PROGRESS NOTES
4 Eyes Skin Assessment    Shelly Apt is being assessed upon: Admission    I agree that I, Luis Felipe Murguia, along with [employer] (either 2 RN's or 1 LPN and 1 RN) have performed a thorough Head to Toe Skin Assessment on the patient. ALL assessment sites listed below have been assessed. Areas assessed by both nurses:     [x]   Head, Face, and Ears   [x]   Shoulders, Back, and Chest  [x]   Arms, Elbows, and Hands   [x]   Coccyx, Sacrum, and Ischium  [x]   Legs, Feet, and Heels    Does the Patient have Skin Breakdown?  No    Kailash Prevention initiated: No  Wound Care Orders initiated: No    WOC nurse consulted for Pressure Injury (Stage 3,4, Unstageable, DTI, NWPT, and Complex wounds) and New or Established Ostomies: No        Primary Nurse eSignature: Luis Felipe Murguia RN on 9/20/2020 at 12:27 AM      Co-Signer eSignature: Electronically signed by Perry Rothman LPN on 4/77/62 at 98:78 AM CDT

## 2020-09-21 ENCOUNTER — APPOINTMENT (OUTPATIENT)
Dept: INTERVENTIONAL RADIOLOGY/VASCULAR | Age: 40
DRG: 166 | End: 2020-09-21
Payer: COMMERCIAL

## 2020-09-21 PROBLEM — N17.9 ACUTE KIDNEY INJURY (HCC): Status: ACTIVE | Noted: 2020-09-21

## 2020-09-21 PROBLEM — E87.5 HYPERKALEMIA: Status: ACTIVE | Noted: 2020-09-21

## 2020-09-21 LAB
ANION GAP SERPL CALCULATED.3IONS-SCNC: 13 MMOL/L (ref 7–19)
ANION GAP SERPL CALCULATED.3IONS-SCNC: 15 MMOL/L (ref 7–19)
APTT: 29.1 SEC (ref 26–36.2)
APTT: 30.5 SEC (ref 26–36.2)
BACTERIA: ABNORMAL /HPF
BILIRUBIN URINE: NEGATIVE
BLOOD, URINE: ABNORMAL
BUN BLDV-MCNC: 30 MG/DL (ref 6–20)
BUN BLDV-MCNC: 39 MG/DL (ref 6–20)
CALCIUM SERPL-MCNC: 7.5 MG/DL (ref 8.6–10)
CALCIUM SERPL-MCNC: 8 MG/DL (ref 8.6–10)
CHLORIDE BLD-SCNC: 104 MMOL/L (ref 98–111)
CHLORIDE BLD-SCNC: 104 MMOL/L (ref 98–111)
CLARITY: ABNORMAL
CO2: 20 MMOL/L (ref 22–29)
CO2: 22 MMOL/L (ref 22–29)
COLOR: ABNORMAL
CREAT SERPL-MCNC: 3.3 MG/DL (ref 0.5–1.2)
CREAT SERPL-MCNC: 4.6 MG/DL (ref 0.5–1.2)
CREATININE URINE: 76 MG/DL (ref 4.2–622)
CRYSTALS, UA: ABNORMAL /HPF
EKG P AXIS: 39 DEGREES
EKG P-R INTERVAL: 172 MS
EKG Q-T INTERVAL: 348 MS
EKG QRS DURATION: 100 MS
EKG QTC CALCULATION (BAZETT): 393 MS
EKG T AXIS: 33 DEGREES
FIBRINOGEN: 297 MG/DL (ref 238–505)
FIBRINOGEN: 318 MG/DL (ref 238–505)
GFR AFRICAN AMERICAN: 17
GFR AFRICAN AMERICAN: 25
GFR NON-AFRICAN AMERICAN: 14
GFR NON-AFRICAN AMERICAN: 21
GLUCOSE BLD-MCNC: 146 MG/DL (ref 70–99)
GLUCOSE BLD-MCNC: 179 MG/DL (ref 70–99)
GLUCOSE BLD-MCNC: 189 MG/DL (ref 74–109)
GLUCOSE BLD-MCNC: 200 MG/DL (ref 74–109)
GLUCOSE BLD-MCNC: 222 MG/DL (ref 70–99)
GLUCOSE URINE: 100 MG/DL
HCT VFR BLD CALC: 41.2 % (ref 42–52)
HEMOGLOBIN: 12.9 G/DL (ref 14–18)
KETONES, URINE: ABNORMAL MG/DL
LEUKOCYTE ESTERASE, URINE: ABNORMAL
Lab: NORMAL
MAGNESIUM: 1.7 MG/DL (ref 1.6–2.6)
MCH RBC QN AUTO: 29.2 PG (ref 27–31)
MCHC RBC AUTO-ENTMCNC: 31.3 G/DL (ref 33–37)
MCV RBC AUTO: 93.2 FL (ref 80–94)
NITRITE, URINE: NEGATIVE
PDW BLD-RTO: 13.7 % (ref 11.5–14.5)
PERFORMED ON: ABNORMAL
PH UA: 6 (ref 5–8)
PHOSPHORUS: 3.5 MG/DL (ref 2.5–4.5)
PLATELET # BLD: 173 K/UL (ref 130–400)
PMV BLD AUTO: 10.3 FL (ref 9.4–12.4)
POTASSIUM REFLEX MAGNESIUM: 4.7 MMOL/L (ref 3.5–5)
POTASSIUM SERPL-SCNC: 5.1 MMOL/L (ref 3.5–5)
PROTEIN PROTEIN: 550 MG/DL (ref 15–45)
PROTEIN UA: 300 MG/DL
RBC # BLD: 4.42 M/UL (ref 4.7–6.1)
RBC UA: ABNORMAL /HPF (ref 0–2)
REPORT: NORMAL
SODIUM BLD-SCNC: 137 MMOL/L (ref 136–145)
SODIUM BLD-SCNC: 141 MMOL/L (ref 136–145)
SODIUM URINE: 84 MMOL/L
SPECIFIC GRAVITY UA: 1.02 (ref 1–1.03)
THIS TEST SENT TO: NORMAL
UREA NITROGEN, UR: 83 MG/DL
UROBILINOGEN, URINE: 0.2 E.U./DL
WBC # BLD: 18.5 K/UL (ref 4.8–10.8)
WBC UA: ABNORMAL /HPF (ref 0–5)

## 2020-09-21 PROCEDURE — C1769 GUIDE WIRE: HCPCS

## 2020-09-21 PROCEDURE — 85730 THROMBOPLASTIN TIME PARTIAL: CPT

## 2020-09-21 PROCEDURE — 87186 SC STD MICRODIL/AGAR DIL: CPT

## 2020-09-21 PROCEDURE — 6370000000 HC RX 637 (ALT 250 FOR IP): Performed by: SURGERY

## 2020-09-21 PROCEDURE — 84300 ASSAY OF URINE SODIUM: CPT

## 2020-09-21 PROCEDURE — 37214 CESSJ THERAPY CATH REMOVAL: CPT | Performed by: SURGERY

## 2020-09-21 PROCEDURE — 85300 ANTITHROMBIN III ACTIVITY: CPT

## 2020-09-21 PROCEDURE — 85384 FIBRINOGEN ACTIVITY: CPT

## 2020-09-21 PROCEDURE — 2500000003 HC RX 250 WO HCPCS: Performed by: PHYSICIAN ASSISTANT

## 2020-09-21 PROCEDURE — 2580000003 HC RX 258: Performed by: SURGERY

## 2020-09-21 PROCEDURE — 86146 BETA-2 GLYCOPROTEIN ANTIBODY: CPT

## 2020-09-21 PROCEDURE — 81001 URINALYSIS AUTO W/SCOPE: CPT

## 2020-09-21 PROCEDURE — 80048 BASIC METABOLIC PNL TOTAL CA: CPT

## 2020-09-21 PROCEDURE — 99152 MOD SED SAME PHYS/QHP 5/>YRS: CPT | Performed by: SURGERY

## 2020-09-21 PROCEDURE — 85027 COMPLETE CBC AUTOMATED: CPT

## 2020-09-21 PROCEDURE — 37252 INTRVASC US NONCORONARY 1ST: CPT | Performed by: SURGERY

## 2020-09-21 PROCEDURE — 83516 IMMUNOASSAY NONANTIBODY: CPT

## 2020-09-21 PROCEDURE — 6370000000 HC RX 637 (ALT 250 FOR IP): Performed by: INTERNAL MEDICINE

## 2020-09-21 PROCEDURE — 93010 ELECTROCARDIOGRAM REPORT: CPT | Performed by: INTERNAL MEDICINE

## 2020-09-21 PROCEDURE — 84100 ASSAY OF PHOSPHORUS: CPT

## 2020-09-21 PROCEDURE — B44GZZ3 ULTRASONOGRAPHY OF LEFT LOWER EXTREMITY ARTERIES, INTRAVASCULAR: ICD-10-PCS | Performed by: SURGERY

## 2020-09-21 PROCEDURE — 85613 RUSSELL VIPER VENOM DILUTED: CPT

## 2020-09-21 PROCEDURE — 99232 SBSQ HOSP IP/OBS MODERATE 35: CPT | Performed by: INTERNAL MEDICINE

## 2020-09-21 PROCEDURE — 82947 ASSAY GLUCOSE BLOOD QUANT: CPT

## 2020-09-21 PROCEDURE — B549ZZ3 ULTRASONOGRAPHY OF INFERIOR VENA CAVA, INTRAVASCULAR: ICD-10-PCS | Performed by: SURGERY

## 2020-09-21 PROCEDURE — 85302 CLOT INHIBIT PROT C ANTIGEN: CPT

## 2020-09-21 PROCEDURE — 86147 CARDIOLIPIN ANTIBODY EA IG: CPT

## 2020-09-21 PROCEDURE — 82139 AMINO ACIDS QUAN 6 OR MORE: CPT

## 2020-09-21 PROCEDURE — 99153 MOD SED SAME PHYS/QHP EA: CPT | Performed by: SURGERY

## 2020-09-21 PROCEDURE — 87205 SMEAR GRAM STAIN: CPT

## 2020-09-21 PROCEDURE — 36012 PLACE CATHETER IN VEIN: CPT | Performed by: SURGERY

## 2020-09-21 PROCEDURE — 84540 ASSAY OF URINE/UREA-N: CPT

## 2020-09-21 PROCEDURE — 2580000003 HC RX 258: Performed by: PHYSICIAN ASSISTANT

## 2020-09-21 PROCEDURE — 6360000002 HC RX W HCPCS: Performed by: SURGERY

## 2020-09-21 PROCEDURE — 85610 PROTHROMBIN TIME: CPT

## 2020-09-21 PROCEDURE — 2000000000 HC ICU R&B

## 2020-09-21 PROCEDURE — 84156 ASSAY OF PROTEIN URINE: CPT

## 2020-09-21 PROCEDURE — 36415 COLL VENOUS BLD VENIPUNCTURE: CPT

## 2020-09-21 PROCEDURE — 85305 CLOT INHIBIT PROT S TOTAL: CPT

## 2020-09-21 PROCEDURE — 06PYX3Z REMOVAL OF INFUSION DEVICE FROM LOWER VEIN, EXTERNAL APPROACH: ICD-10-PCS | Performed by: SURGERY

## 2020-09-21 PROCEDURE — 82570 ASSAY OF URINE CREATININE: CPT

## 2020-09-21 PROCEDURE — 37253 INTRVASC US NONCORONARY ADDL: CPT | Performed by: SURGERY

## 2020-09-21 PROCEDURE — 87086 URINE CULTURE/COLONY COUNT: CPT

## 2020-09-21 PROCEDURE — 83735 ASSAY OF MAGNESIUM: CPT

## 2020-09-21 RX ORDER — HEPARIN SODIUM 1000 [USP'U]/ML
10000 INJECTION, SOLUTION INTRAVENOUS; SUBCUTANEOUS PRN
Status: DISCONTINUED | OUTPATIENT
Start: 2020-09-21 | End: 2020-09-24

## 2020-09-21 RX ORDER — FENTANYL CITRATE 50 UG/ML
INJECTION, SOLUTION INTRAMUSCULAR; INTRAVENOUS
Status: COMPLETED | OUTPATIENT
Start: 2020-09-21 | End: 2020-09-21

## 2020-09-21 RX ORDER — MIDAZOLAM HYDROCHLORIDE 1 MG/ML
INJECTION INTRAMUSCULAR; INTRAVENOUS
Status: COMPLETED | OUTPATIENT
Start: 2020-09-21 | End: 2020-09-21

## 2020-09-21 RX ORDER — SODIUM POLYSTYRENE SULFONATE 15 G/60ML
30 SUSPENSION ORAL; RECTAL ONCE
Status: DISCONTINUED | OUTPATIENT
Start: 2020-09-21 | End: 2020-09-21

## 2020-09-21 RX ORDER — 0.9 % SODIUM CHLORIDE 0.9 %
500 INTRAVENOUS SOLUTION INTRAVENOUS ONCE
Status: COMPLETED | OUTPATIENT
Start: 2020-09-21 | End: 2020-09-21

## 2020-09-21 RX ORDER — HEPARIN SODIUM 1000 [USP'U]/ML
8000 INJECTION, SOLUTION INTRAVENOUS; SUBCUTANEOUS ONCE
Status: COMPLETED | OUTPATIENT
Start: 2020-09-21 | End: 2020-09-21

## 2020-09-21 RX ORDER — HEPARIN SODIUM 10000 [USP'U]/100ML
11.69 INJECTION, SOLUTION INTRAVENOUS CONTINUOUS
Status: DISCONTINUED | OUTPATIENT
Start: 2020-09-21 | End: 2020-09-24

## 2020-09-21 RX ORDER — WARFARIN SODIUM 7.5 MG/1
7.5 TABLET ORAL
Status: COMPLETED | OUTPATIENT
Start: 2020-09-21 | End: 2020-09-21

## 2020-09-21 RX ORDER — HEPARIN SODIUM 1000 [USP'U]/ML
5000 INJECTION, SOLUTION INTRAVENOUS; SUBCUTANEOUS PRN
Status: DISCONTINUED | OUTPATIENT
Start: 2020-09-21 | End: 2020-09-24

## 2020-09-21 RX ADMIN — ONDANSETRON 4 MG: 2 INJECTION INTRAMUSCULAR; INTRAVENOUS at 03:24

## 2020-09-21 RX ADMIN — INSULIN LISPRO 1 UNITS: 100 INJECTION, SOLUTION INTRAVENOUS; SUBCUTANEOUS at 18:15

## 2020-09-21 RX ADMIN — HYDROCODONE BITARTRATE AND ACETAMINOPHEN 1 TABLET: 5; 325 TABLET ORAL at 20:35

## 2020-09-21 RX ADMIN — ONDANSETRON 4 MG: 2 INJECTION INTRAMUSCULAR; INTRAVENOUS at 13:18

## 2020-09-21 RX ADMIN — FENTANYL CITRATE 25 MCG: 50 INJECTION, SOLUTION INTRAMUSCULAR; INTRAVENOUS at 13:47

## 2020-09-21 RX ADMIN — MIDAZOLAM 1 MG: 1 INJECTION INTRAMUSCULAR; INTRAVENOUS at 13:57

## 2020-09-21 RX ADMIN — SODIUM CHLORIDE: 9 INJECTION, SOLUTION INTRAVENOUS at 11:20

## 2020-09-21 RX ADMIN — FAMOTIDINE 20 MG: 20 TABLET ORAL at 20:27

## 2020-09-21 RX ADMIN — FENTANYL CITRATE 25 MCG: 50 INJECTION, SOLUTION INTRAMUSCULAR; INTRAVENOUS at 13:57

## 2020-09-21 RX ADMIN — HEPARIN SODIUM 11.69 UNITS/KG/HR: 10000 INJECTION, SOLUTION INTRAVENOUS at 15:51

## 2020-09-21 RX ADMIN — GLIPIZIDE 10 MG: 10 TABLET ORAL at 18:13

## 2020-09-21 RX ADMIN — ALTEPLASE 1 MG/HR: 2.2 INJECTION, POWDER, LYOPHILIZED, FOR SOLUTION INTRAVENOUS at 05:32

## 2020-09-21 RX ADMIN — HEPARIN SODIUM 10000 UNITS: 1000 INJECTION INTRAVENOUS; SUBCUTANEOUS at 22:52

## 2020-09-21 RX ADMIN — HEPARIN SODIUM 8000 UNITS: 1000 INJECTION INTRAVENOUS; SUBCUTANEOUS at 15:52

## 2020-09-21 RX ADMIN — WARFARIN SODIUM 7.5 MG: 7.5 TABLET ORAL at 20:27

## 2020-09-21 RX ADMIN — INSULIN LISPRO 1 UNITS: 100 INJECTION, SOLUTION INTRAVENOUS; SUBCUTANEOUS at 20:31

## 2020-09-21 RX ADMIN — ALTEPLASE 6 MG: 2.2 INJECTION, POWDER, LYOPHILIZED, FOR SOLUTION INTRAVENOUS at 06:11

## 2020-09-21 RX ADMIN — HYDROMORPHONE HYDROCHLORIDE 0.5 MG: 1 INJECTION, SOLUTION INTRAMUSCULAR; INTRAVENOUS; SUBCUTANEOUS at 07:24

## 2020-09-21 RX ADMIN — SODIUM BICARBONATE: 84 INJECTION INTRAVENOUS at 18:15

## 2020-09-21 RX ADMIN — MIDAZOLAM 1 MG: 1 INJECTION INTRAMUSCULAR; INTRAVENOUS at 13:47

## 2020-09-21 RX ADMIN — SODIUM CHLORIDE 500 ML: 9 INJECTION, SOLUTION INTRAVENOUS at 05:56

## 2020-09-21 RX ADMIN — INSULIN LISPRO 2 UNITS: 100 INJECTION, SOLUTION INTRAVENOUS; SUBCUTANEOUS at 10:22

## 2020-09-21 ASSESSMENT — PAIN SCALES - GENERAL
PAINLEVEL_OUTOF10: 0
PAINLEVEL_OUTOF10: 6
PAINLEVEL_OUTOF10: 4
PAINLEVEL_OUTOF10: 7
PAINLEVEL_OUTOF10: 4
PAINLEVEL_OUTOF10: 4

## 2020-09-21 ASSESSMENT — PAIN DESCRIPTION - LOCATION: LOCATION: HEAD

## 2020-09-21 ASSESSMENT — PAIN DESCRIPTION - PAIN TYPE: TYPE: SURGICAL PAIN

## 2020-09-21 ASSESSMENT — PAIN DESCRIPTION - DESCRIPTORS: DESCRIPTORS: THROBBING

## 2020-09-21 NOTE — PROGRESS NOTES
Pt returned to ICU from specials. EKOS and RIJ sheath dc'd. Dr. Susan Kraft to bedside, orders, labs, and IVF reviewed. Nephrology consulted--okay to see tomorrow.

## 2020-09-21 NOTE — PROGRESS NOTES
homicidal ideation;  NEUROLOGY: no syncope, no seizures, no numbness or tingling of hands, no numbness or tingling of feet, no paresis;    Objective   /70   Pulse 106   Temp 97.9 °F (36.6 °C) (Temporal)   Resp 19   Ht 6' (1.829 m)   Wt (!) 396 lb (179.6 kg)   SpO2 92%   BMI 53.71 kg/m²     PHYSICAL EXAM:  CONSTITUTIONAL: Alert, appropriate, no acute distress, obese  EYES: Non icteric, EOM intact, pupils equal round   ENT: Mucus membranes moist, no oral pharyngeal lesions, external inspection of ears and nose are normal  NECK: Supple, no masses. No palpable thyroid mass  CHEST/LUNGS: CTA bilaterally, normal respiratory effort   CARDIOVASCULAR: TACHYCARDIC, RRR, no murmurs. No lower extremity edema  ABDOMEN: soft non-tender, active bowel sounds, no HSM. No palpable masses  EXTREMITIES: warm, full ROM in all 4 extremities, no focal weakness. SKIN: warm, dry with no rashes or lesions  LYMPH: No cervical, clavicular, axillary, or inguinal lymphadenopathy  NEUROLOGIC: follows commands, non focal   PSYCH: mood and affect appropriate.   Alert and oriented to time, place, person        LABORATORY RESULTS REVIEWED BY ME:  Recent Labs     09/20/20  2250 09/20/20  0559 09/19/20  1718   WBC 24.9* 9.4 10.0   HGB 14.4 14.9 15.4   HCT 44.6 45.7 46.6   MCV 89.6 87.2 87.1    192 179       Lab Results   Component Value Date     09/20/2020    K 4.1 09/20/2020    CL 99 09/20/2020    CO2 26 09/20/2020    BUN 7 09/20/2020    CREATININE 0.6 09/20/2020    GLUCOSE 203 (H) 09/20/2020    CALCIUM 8.9 09/20/2020    PROT 7.4 09/19/2020    LABALBU 4.1 09/19/2020    BILITOT 0.3 09/19/2020    ALKPHOS 99 09/19/2020    AST 21 09/19/2020    ALT 32 09/19/2020    LABGLOM >60 09/20/2020    GFRAA >59 09/20/2020    GLOB 3.2 03/07/2017       Lab Results   Component Value Date    INR 1.11 09/19/2020    INR 1.11 07/23/2018    INR 1.04 03/12/2018    PROTIME 14.3 09/19/2020    PROTIME 14.2 07/23/2018    PROTIME 13.5 03/12/2018 RADIOLOGY STUDIES REVIEWED BY ME:  Vl Extremity Venous Left    Result Date: 9/20/2020  Vascular Lower Extremities DVT Study Procedure  Demographics   Patient Name    Go Colvin Age                   38   Patient Number  [id number]           Gender                Male   Visit Number    [id number]        Interpreting          Crystal Epstein MD                                   Physician   Date of Birth   1980       Referring Physician   Crystal Epstein MD   Accession       [id number]       [address] [facility], [facility]  Number  Procedure Type of Study:   Veins:Lower Extremities DVT Study, VL EXTREMITY VENOUS DUPLEX LEFT. Indications for Study:DVT and Venous access. Risk Factors   - The patient's risk factor(s) include: obesity. Impression   Successful ultrasound/duplex guided cannulation of thrombosed left  popliteal vein for sheath placement. Signature   ----------------------------------------------------------------  Electronically signed by Crystal Epstein MD(Interpreting  physician) on 09/20/2020 03:38 PM  ----------------------------------------------------------------      Vl Extremity Venous Left    Result Date: 9/20/2020  Vascular Lower Extremities DVT Study Procedure  Demographics   Patient Name    Go Colvin Age                   38   Patient Number  [id number]           Gender                Male   Visit Number    [id number]        Interpreting          Crystal Epstein MD                                   Physician   Date of Birth   1980       Referring Physician   Nydia Herrera   Accession       [id number]       [facility], [facility]  Number  Procedure Type of Study:   Veins:Lower Extremities DVT Study, VL EXTREMITY VENOUS DUPLEX LEFT. Indications for Study:Pain, left lower extremity. Risk Factors   - The patient's risk factor(s) include: obesity.   Impression   There is evidence of subacute deep vein thrombosis in the left lower  extremity involving the common +------------------------------------+----------+---------------+----------+ ! Mid Femoral                         !Yes       ! No             !None      ! +------------------------------------+----------+---------------+----------+ ! Dist Femoral                        !Partial   !No             !None      ! +------------------------------------+----------+---------------+----------+ ! Deep Femoral                        !Partial   !Yes            ! None      ! +------------------------------------+----------+---------------+----------+ ! Popliteal                           !Partial   !No             !None      ! +------------------------------------+----------+---------------+----------+ ! SSV                                 ! Yes       ! No             !None      ! +------------------------------------+----------+---------------+----------+ ! Gastroc                             ! Partial   !No             !None      ! +------------------------------------+----------+---------------+----------+ ! PTV                                 ! Partial   !No             !None      ! +------------------------------------+----------+---------------+----------+ ! GSV                                 ! Yes       ! Yes            ! None      ! +------------------------------------+----------+---------------+----------+ ! ATV                                 ! No        !               !          ! +------------------------------------+----------+---------------+----------+ ! Peroneal                            !No        !               !          ! +------------------------------------+----------+---------------+----------+    Xr Chest Portable    Result Date: 9/20/2020  EXAMINATION:  XR CHEST PORTABLE  9/20/2020 1:15 PM HISTORY: Central line adjustment. COMPARISON: 9/20/2020. FINDINGS:  The central venous catheter has been repositioned and now extends into the superior vena cava.  The patient remains intubated with the endotracheal tube tip at the T2-3 level. There is a new esophageal monitoring lead. There is again poor inspiration with bilateral infiltrates. There is cardiomegaly. The left costophrenic angle is not fully included on this study. 1. Central venous catheter has been repositioned and now extends into the superior vena cava. No evidence of pneumothorax. 2. Poor inspiration. 3. Bilateral infiltrates may be related to the poor inspiratory effort. Edema and pneumonia are possible. 4. Cardiomegaly. Signed by Dr Yaneth Coker on 9/20/2020 1:16 PM    Xr Chest Portable    Result Date: 9/20/2020  EXAMINATION:  XR CHEST PORTABLE  9/20/2020 12:26 PM HISTORY: Central venous catheter placement. COMPARISON: 7/8/2019. FINDINGS:  There has been placement of a right IJ central venous catheter. The catheter extends to the right into the right subclavian vein. There is no evidence of pneumothorax. The patient is intubated with the endotracheal tube tip at the T2-3 level. There is very poor inspiratory effort. There is vascular crowding. There are bilateral infiltrates. There is cardiomegaly. 1. Right IJ central venous catheter placement with catheter tip extending to the right into the right subclavian vein. No evidence of pneumothorax. 2. Endotracheal tube tip at the T2-3 level. 3. Very poor inspiration with vascular crowding. 4. Bilateral infiltrates may be related to the poor inspiration. Bilateral pneumonia and pulmonary edema cannot be ruled out on this image. 5. Cardiomegaly. Signed by Dr Yaneth Coker on 9/20/2020 12:28 PM    Cta Pulmonary W Contrast    Result Date: 9/19/2020  EXAMINATION: CTA PULMONARY W CONTRAST 9/19/2020 6:13 PM HISTORY: Shortness of breath, clinical concern for pulmonary embolism. DOSE: 804 mGycm. Automatic exposure control was utilized in an effort to use as little radiation as possible, without compromising image quality.  REPORT: Spiral CT of the chest was performed after administration of intravenous contrast using CTA venograms after percutaneous thrombectomy and thrombolysis  · Placement of 40 cm infusion length EKOS TPA catheter from the popliteal vein all the way to the distal external iliac vein  · Inferior vena cava filter placement from a right internal jugular vein approach ([employer] inferior vena cava filter)    Will follow with UFH x24 hours and warfarin to target INR 2-3      #Morbid obesity-BMI above 50. DOACS not a good option as this was not studied in patients BMI above 40. Pharmacy to dose warfarin. #Family history of VTE-apparently mother had 2 episodes of a provoked event. #Neutrophil leukocytosis-likely reactive. Continue to monitor. PLAN:  UFH x24 hours infusion after completion of catheter direct thrombolysis  Start warfarin tonight  Follow-up results of thrombophilia work-up. I have seen, examined and reviewed this patient medication list, appropriate labs and imaging studies. I reviewed relevant medical records and others physicians notes. I discussed the plans of care with the patient. I answered all the questions to the patients satisfaction. I have also reviewed the chief complaint (CC) and part of the history (History of Present Illness (HPI), Past Family Social History [facility]), or Review of Systems (ROS) and made changes when appropriated.        (Please note that portions of this note were completed with a voice recognition program. Efforts were made to edit the dictations but occasionally words are mis-transcribed.)    Joseph Bennett MD    09/21/20  6:58 AM

## 2020-09-21 NOTE — PROGRESS NOTES
Consent obtained from patient for venogram with possible left iliac stenting with intravascular ultrasound by Dr. Marysol Bonilla. Patient's wife at bedside.

## 2020-09-21 NOTE — OP NOTE
popliteal vein sheath was also removed and pressure applied for hemostasis. Sterile dressings were placed in both areas. The patient tolerated the procedure well. He is brought back to the intensive care unit in stable condition. He should stay in bed for the next 2 to 3 hours. After this, he can get out of bed and start ambulating. We will continue full dose anticoagulation for 1 year because he has a fairly significant right pulmonary embolism. His filter can be removed in 6 weeks to 3 months after we follow-up with another left lower extremity venous duplex.

## 2020-09-21 NOTE — PROGRESS NOTES
[QUATE:4432]    I/O last 3 completed shifts: In: 9303 [I.V.:1221]  Out: 1260 [Urine:1240; Blood:20]     Date 09/21/20 0000 - 09/21/20 2359   Shift 2510-0912 8773-2112 9486-4370 24 Hour Total   INTAKE   I.V.(mL/kg) 448(2.5)   448(2.5)   Shift Total(mL/kg) 448(2.5)   448(2.5)   OUTPUT   Urine(mL/kg/hr) 220(0.2)   220   Shift Total(mL/kg) 220(1.2)   220(1.2)   Weight (kg) 179.6 179.6 179.6 179.6       Wt Readings from Last 3 Encounters:   09/21/20 (!) 396 lb (179.6 kg)   09/02/20 (!) 388 lb (176 kg)   08/18/20 (!) 370 lb (167.8 kg)        Body mass index is 53.71 kg/m².      Diet: Diet NPO Effective Now Exceptions are: Ice Chips, Sips with Meds        MEDS:     Scheduled Meds:   ceFAZolin  2 g Intravenous Once    warfarin (COUMADIN) daily dosing (placeholder)   Other RX Placeholder    warfarin  7.5 mg Oral Once    glipiZIDE  10 mg Oral BID AC    famotidine  20 mg Oral BID    insulin lispro  0-6 Units Subcutaneous TID WC    insulin lispro  0-3 Units Subcutaneous Nightly     Continuous Infusions:   dextrose      alteplase (ACTIVASE) 10mg in 0.9% sodium chloride infusion (EKOS catheter) 1 mg/hr (09/21/20 0532)    heparin (porcine) 600 Units/hr (09/20/20 1600)    sodium chloride 25 mL/hr at 09/20/20 1731     PRN Meds:glucose, 15 g, PRN  dextrose, 12.5 g, PRN  glucagon (rDNA), 1 mg, PRN  dextrose, 100 mL/hr, PRN  ondansetron, 4 mg, Q8H PRN  HYDROmorphone, 0.25 mg, Q3H PRN    Or  HYDROmorphone, 0.5 mg, Q3H PRN  HYDROcodone 5 mg - acetaminophen, 1 tablet, Q4H PRN    Or  HYDROcodone 5 mg - acetaminophen, 2 tablet, Q4H PRN  hydrALAZINE, 10 mg, Q6H PRN  potassium chloride, 40 mEq, PRN    Or  potassium alternative oral replacement, 40 mEq, PRN    Or  potassium chloride, 10 mEq, PRN  magnesium sulfate, 1 g, PRN  acetaminophen, 650 mg, Q6H PRN    Or  acetaminophen, 650 mg, Q6H PRN  polyethylene glycol, 17 g, Daily PRN  promethazine, 12.5 mg, Q6H PRN    Or  ondansetron, 4 mg, Q6H PRN          PHYSICAL EXAM: CONSTITUTIONAL: Alert and oriented times 3, no acute distress and cooperative to examination. LUNGS: Chest expands equally bilaterally upon respiration, no accessory muscle used. Ausculation reveals no wheezes, rales or rhonchi. 02 sat is 92% on 3L NC  CARDIOVASCULAR: Heart regular rate and rhythm, tachy at 108  ABDOMEN: soft, nontender, nondistended  NEUROLOGIC: Awake, alert, oriented to name, place and time. Speech clear, moves feet/hands without difficulty. WOUND/INCISION:  Left popliteal sheath taped secure, dressing CDI with no hematoma or bleeding, area soft to touch. Right IJ vein IV site CDI with no bleeding/hematoma. EXTREMITY: LLE with moderate amount of edema, foot warm to touch with Biphasic PT, monophasic DP doppler signal. Right foot warm to touch with +palp DP/PT pulse noted    LABS:     CBC:   Recent Labs     09/19/20 1718 09/20/20  0559 09/20/20  2250   WBC 10.0 9.4 24.9*   RBC 5.35 5.24 4.98   HGB 15.4 14.9 14.4   HCT 46.6 45.7 44.6   MCV 87.1 87.2 89.6   MCH 28.8 28.4 28.9   MCHC 33.0 32.6* 32.3*   RDW 13.3 13.3 13.5    192 234   MPV 9.9 9.9 10.0      Last 3 CMP:   Recent Labs     09/19/20 1718 09/20/20  0559    138   K 4.0 4.1   CL 97* 99   CO2 27 26   BUN 8 7   CREATININE 0.6 0.6   GLUCOSE 244* 203*   CALCIUM 9.5 8.9   PROT 7.4  --    LABALBU 4.1  --    BILITOT 0.3  --    ALKPHOS 99  --    AST 21  --    ALT 32  --       Troponin:   Recent Labs     09/19/20 1718   TROPONINI <0.01     Calcium:   Lab Results   Component Value Date    CALCIUM 8.9 09/20/2020    CALCIUM 9.5 09/19/2020    CALCIUM 8.9 08/18/2020      INR:   Recent Labs     09/19/20 1718   INR 1.11     Lactic Acid:   Lab Results   Component Value Date    LACTA 1.4 01/28/2018    LACTA 1.7 09/08/2014        DVT prophylaxis: -EKOS sheath with activase, heparin running        ASSESSMENT:     Procedure 9/21/20 :  1.   Ultrasound/duplex guided cannulation of a thrombosed left popliteal vein with 5 [place] and later 8 vein stenosis). I discussed the risks, benefits, and alternatives. He seems to understand and agrees to proceed.

## 2020-09-21 NOTE — PROGRESS NOTES
[facility]    Patient:  Gilbert Kumar  YOB: 1980  Date of Service: 9/21/2020  MRN: [id number]   Acct: [de-identified]   Primary Care Physician: VANDANA Reynolds  Advance Directive: Full Code  Admit Date: 9/19/2020       Hospital Day: 2  Portions of this note have been copied forward, however, changed to reflect the most current clinical status of this patient. CHIEF COMPLAINT Left Leg Pain    SUBJECTIVE: Mr. Kathie Miller has no new complaints. Resting comfortably after returning from specials today. Cumulative Hospital Course:   Mr. Kathie Miller is a 38year old with PMH morbid obesity, DMII, SHELBY, ESBL Ecoli UTI who presented to [address] [place] [address] ED complaining of left leg pain and edema that began 09/15/2020. He stated his mother and aunt both have history of blood clots and denied alcohol or tobacco use. Work up revealed significant left lower extremity thrombosis extending into the left iliofemoral vein as well as right sided pulmonary embolism with mild right ventricular strain. He was started on Heparin gtt with consult made to Vascular surgery who performed AngioJet mechanical thrombectomy/PulseSpray thrombolysis of left lower extremity DVT and inferior vena cava filter placement. He was continued on TPA thrombolysis overnight to treat common femoral vein floating tail. He underwent intravascular ultrasound of inferior vena cava/left common and external iliac veins/left common femoral vein on 09/21/2020 with findings of patent inferior vena cava filter. On 09/21/2020 urine output noted to be minimal with acute decline in renal function. IVF's were increased with consult made to Nephrology. Review of Systems:   14 point review of systems is negative except as specifically addressed above.     Objective:   VITALS:  BP (!) 148/83   Pulse 100   Temp 98.6 °F (37 °C) (Temporal)   Resp 16   Ht 6' (1.829 m)   Wt (!) 396 lb (179.6 kg)   SpO2 92%   BMI 53.71 kg/m²   24HR INTAKE/OUTPUT:      Intake/Output Summary (Last 24 hours) at 9/21/2020 1707  Last data filed at 9/21/2020 1200  Gross per 24 hour   Intake 1721 ml   Output 530 ml   Net 1191 ml     General appearance: Somnolent but easily awakened, appears stated age, cooperative and no distress  Head: Normocephalic, without obvious abnormality, atraumatic  Eyes: conjunctivae/corneas clear. PERRL, EOM's intact. Ears: normal external ears and nose, throat without exudate  Neck: no adenopathy, no carotid bruit, no JVD, supple, symmetrical, trachea midline   Lungs: diminished at bases otherwise clear to auscultation bilaterally,no rales or wheezes   Heart: regular rate and rhythm, S1, S2 normal, no murmur  Abdomen:soft,morbidly obese, non-tender; non-distended, normal bowel sounds no masses, no organomegaly  Extremities:Trace to 1+ peripheral edema,  No erythema, no tenderness to palpation, BLE's warm to touch   Skin: Skin color, texture, turgor normal. No rashes or lesions  Lymphatic: No palpable lymph node enlargment  Neurologic: Somnolent but easily awakened and oriented X 3, normal strength and tone.  No focal deficits  Psychiatric: Alert and oriented, thought content appropriate, normal insight, mood appropriate    Medications:      heparin (porcine) 11.69 Units/kg/hr (09/21/20 1551)    IV infusion builder      dextrose      alteplase (ACTIVASE) 10mg in 0.9% sodium chloride infusion (EKOS catheter) 1 mg/hr (09/21/20 0532)    heparin (porcine) 600 Units/hr (09/20/20 1600)      ceFAZolin  2 g Intravenous Once    warfarin (COUMADIN) daily dosing (placeholder)   Other RX Placeholder    warfarin  7.5 mg Oral Once    glipiZIDE  10 mg Oral BID AC    famotidine  20 mg Oral BID    insulin lispro  0-6 Units Subcutaneous TID WC    insulin lispro  0-3 Units Subcutaneous Nightly     heparin (porcine), heparin (porcine), glucose, dextrose, glucagon (rDNA), dextrose, ondansetron, HYDROmorphone **OR** HYDROmorphone, HYDROcodone 5 mg - acetaminophen **OR** HYDROcodone 5 mg - acetaminophen, hydrALAZINE, potassium chloride **OR** potassium alternative oral replacement **OR** potassium chloride, magnesium sulfate, acetaminophen **OR** acetaminophen, polyethylene glycol, promethazine **OR** ondansetron  DIET CARB CONTROL;     Lab and other Data:     Recent Labs     09/20/20  0559 09/20/20  2250 09/21/20  1035   WBC 9.4 24.9* 18.5*   HGB 14.9 14.4 12.9*    234 173     Recent Labs     09/19/20  1718 09/20/20  0559 09/21/20  1035    138 141   K 4.0 4.1 5.1*   CL 97* 99 104   CO2 27 26 22   BUN 8 7 30*   CREATININE 0.6 0.6 3.3*   GLUCOSE 244* 203* 200*     Recent Labs     09/19/20  1718   AST 21   ALT 32   BILITOT 0.3   ALKPHOS 99     Troponin T:   Recent Labs     09/19/20 1718   TROPONINI <0.01     INR:   Recent Labs     09/19/20 1718   INR 1.11     A1C:   Recent Labs     09/20/20  0559   LABA1C 8.8*     RAD:   Vl Extremity Venous Left  Impression   Successful ultrasound/duplex guided cannulation of thrombosed left  popliteal vein for sheath placement. Vl Extremity Venous Left  Impression   There is evidence of subacute deep vein thrombosis in the left lower  extremity involving the common femoral, femoral, popliteal, posterior  tibial, and gastrocnemius veins. The anterior tibial and peroneal veins  were not well visualized secondary to edema and limb size. Superficial thrombophlebitis is seen in the short saphenous vein of the  left lower extremity. Floating tail left common femoral vein around 4 cm in length. Xr Chest Portable  1. Central venous catheter has been repositioned and now extends into the superior vena cava. No evidence of pneumothorax. 2. Poor inspiration. 3. Bilateral infiltrates may be related to the poor inspiratory effort. Edema and pneumonia are possible. 4. Cardiomegaly. Signed by Dr Woody Villa on 9/20/2020 1:16 PM    Xr Chest Portable  1.  Right IJ central venous catheter placement with catheter tip extending to the right into the right subclavian vein. No evidence of pneumothorax. 2. Endotracheal tube tip at the T2-3 level. 3. Very poor inspiration with vascular crowding. 4. Bilateral infiltrates may be related to the poor inspiration. Bilateral pneumonia and pulmonary edema cannot be ruled out on this image. 5. Cardiomegaly. Signed by Dr Annmarie Daley on 9/20/2020 12:28 PM    Cta Pulmonary W Contrast  1. Multiple acute pulmonary emboli identified on the right, involving the third, fourth and fifth order pulmonary artery branches extending into the right lower lobe. There are findings that are suggestive of mild right heart strain. Signed by Dr Jesenia Guardado on 9/19/2020 6:20 PM    Micro: Urine culture pending    Assessment/Plan   Principal Problem:    Venous thromboembolism- s/p percutaneous thrombectomy/PulseSpray thrombolysis with Angiojet and placement of EKOS TPA catheter, IVC filter placement. Continue Heparin gtt while transitioning to Coumadin with target INR 2-3. Appreciate Vascular surgery/Hematology assistance    Active Problems:    Acute kidney injury-New problem, Add Sodium Bicarb and increase IVF rate, monitor BMP, Nephrology consult placed.        DM II-uncontrolled A1C 8.8-Glipizide, SSI, accuchecks, hypoglycemia tx orders, Carb control diet      Morbid obesity (HCC)-noted       Neutrophilic Leukocytosis-monitor, likely reactive       Hyperkalemia-mild, monitor     Further orders per clinical course/attending     DVT Prophylaxis: Heparin gtt/Coumadin     GI prophylaxis: Pepcid    Lorena Martinez PA-C

## 2020-09-22 ENCOUNTER — APPOINTMENT (OUTPATIENT)
Dept: ULTRASOUND IMAGING | Age: 40
DRG: 166 | End: 2020-09-22
Payer: COMMERCIAL

## 2020-09-22 LAB
ANION GAP SERPL CALCULATED.3IONS-SCNC: 14 MMOL/L (ref 7–19)
APTT: 169.6 SEC (ref 26–36.2)
APTT: 75.5 SEC (ref 26–36.2)
APTT: 80.6 SEC (ref 26–36.2)
BUN BLDV-MCNC: 44 MG/DL (ref 6–20)
CALCIUM SERPL-MCNC: 7.9 MG/DL (ref 8.6–10)
CHLORIDE BLD-SCNC: 103 MMOL/L (ref 98–111)
CO2: 21 MMOL/L (ref 22–29)
CREAT SERPL-MCNC: 5.6 MG/DL (ref 0.5–1.2)
EOSINOPHIL,URINE: NORMAL
FIBRINOGEN: 360 MG/DL (ref 238–505)
FIBRINOGEN: 430 MG/DL (ref 238–505)
GFR AFRICAN AMERICAN: 14
GFR NON-AFRICAN AMERICAN: 11
GLUCOSE BLD-MCNC: 116 MG/DL (ref 70–99)
GLUCOSE BLD-MCNC: 138 MG/DL (ref 70–99)
GLUCOSE BLD-MCNC: 139 MG/DL (ref 70–99)
GLUCOSE BLD-MCNC: 146 MG/DL (ref 70–99)
GLUCOSE BLD-MCNC: 156 MG/DL (ref 74–109)
INR BLD: 1.35 (ref 0.88–1.18)
MAGNESIUM: 1.6 MG/DL (ref 1.6–2.6)
PARATHYROID HORMONE INTACT: 259.9 PG/ML (ref 15–65)
PERFORMED ON: ABNORMAL
PHOSPHORUS: 4 MG/DL (ref 2.5–4.5)
POTASSIUM SERPL-SCNC: 4.6 MMOL/L (ref 3.5–5)
PROTHROMBIN TIME: 16.7 SEC (ref 12–14.6)
SODIUM BLD-SCNC: 138 MMOL/L (ref 136–145)
URIC ACID, SERUM: 7.8 MG/DL (ref 3.4–7)
VITAMIN D 25-HYDROXY: 10.7 NG/ML

## 2020-09-22 PROCEDURE — 2580000003 HC RX 258: Performed by: PHYSICIAN ASSISTANT

## 2020-09-22 PROCEDURE — 6360000002 HC RX W HCPCS: Performed by: PHYSICIAN ASSISTANT

## 2020-09-22 PROCEDURE — 84100 ASSAY OF PHOSPHORUS: CPT

## 2020-09-22 PROCEDURE — 85610 PROTHROMBIN TIME: CPT

## 2020-09-22 PROCEDURE — 86706 HEP B SURFACE ANTIBODY: CPT

## 2020-09-22 PROCEDURE — 2700000000 HC OXYGEN THERAPY PER DAY

## 2020-09-22 PROCEDURE — 36415 COLL VENOUS BLD VENIPUNCTURE: CPT

## 2020-09-22 PROCEDURE — 2500000003 HC RX 250 WO HCPCS: Performed by: PHYSICIAN ASSISTANT

## 2020-09-22 PROCEDURE — 80074 ACUTE HEPATITIS PANEL: CPT

## 2020-09-22 PROCEDURE — 84550 ASSAY OF BLOOD/URIC ACID: CPT

## 2020-09-22 PROCEDURE — 6370000000 HC RX 637 (ALT 250 FOR IP): Performed by: INTERNAL MEDICINE

## 2020-09-22 PROCEDURE — 85730 THROMBOPLASTIN TIME PARTIAL: CPT

## 2020-09-22 PROCEDURE — 6370000000 HC RX 637 (ALT 250 FOR IP): Performed by: SURGERY

## 2020-09-22 PROCEDURE — 82947 ASSAY GLUCOSE BLOOD QUANT: CPT

## 2020-09-22 PROCEDURE — 6360000002 HC RX W HCPCS: Performed by: SURGERY

## 2020-09-22 PROCEDURE — 85384 FIBRINOGEN ACTIVITY: CPT

## 2020-09-22 PROCEDURE — 76770 US EXAM ABDO BACK WALL COMP: CPT

## 2020-09-22 PROCEDURE — 83970 ASSAY OF PARATHORMONE: CPT

## 2020-09-22 PROCEDURE — 1210000000 HC MED SURG R&B

## 2020-09-22 PROCEDURE — 80048 BASIC METABOLIC PNL TOTAL CA: CPT

## 2020-09-22 PROCEDURE — 99232 SBSQ HOSP IP/OBS MODERATE 35: CPT | Performed by: INTERNAL MEDICINE

## 2020-09-22 PROCEDURE — 82306 VITAMIN D 25 HYDROXY: CPT

## 2020-09-22 PROCEDURE — 83735 ASSAY OF MAGNESIUM: CPT

## 2020-09-22 RX ORDER — FAMOTIDINE 20 MG/1
20 TABLET, FILM COATED ORAL DAILY
Status: DISCONTINUED | OUTPATIENT
Start: 2020-09-23 | End: 2020-09-23

## 2020-09-22 RX ORDER — ERGOCALCIFEROL 1.25 MG/1
50000 CAPSULE ORAL WEEKLY
Status: DISCONTINUED | OUTPATIENT
Start: 2020-09-22 | End: 2020-09-28 | Stop reason: HOSPADM

## 2020-09-22 RX ORDER — WARFARIN SODIUM 7.5 MG/1
7.5 TABLET ORAL
Status: COMPLETED | OUTPATIENT
Start: 2020-09-22 | End: 2020-09-22

## 2020-09-22 RX ADMIN — ACETAMINOPHEN 650 MG: 325 TABLET, FILM COATED ORAL at 03:14

## 2020-09-22 RX ADMIN — HEPARIN SODIUM 8.69 UNITS/KG/HR: 10000 INJECTION, SOLUTION INTRAVENOUS at 16:13

## 2020-09-22 RX ADMIN — ERGOCALCIFEROL 50000 UNITS: 1.25 CAPSULE ORAL at 16:18

## 2020-09-22 RX ADMIN — SODIUM BICARBONATE: 84 INJECTION INTRAVENOUS at 10:51

## 2020-09-22 RX ADMIN — SODIUM BICARBONATE: 84 INJECTION INTRAVENOUS at 02:26

## 2020-09-22 RX ADMIN — FAMOTIDINE 20 MG: 20 TABLET ORAL at 08:01

## 2020-09-22 RX ADMIN — HYDROCODONE BITARTRATE AND ACETAMINOPHEN 1 TABLET: 5; 325 TABLET ORAL at 18:24

## 2020-09-22 RX ADMIN — GLIPIZIDE 10 MG: 10 TABLET ORAL at 16:13

## 2020-09-22 RX ADMIN — MEROPENEM 1 G: 1 INJECTION, POWDER, FOR SOLUTION INTRAVENOUS at 21:55

## 2020-09-22 RX ADMIN — HEPARIN SODIUM 13.7 UNITS/KG/HR: 10000 INJECTION, SOLUTION INTRAVENOUS at 02:27

## 2020-09-22 RX ADMIN — WARFARIN SODIUM 7.5 MG: 7.5 TABLET ORAL at 18:20

## 2020-09-22 RX ADMIN — ACETAMINOPHEN 650 MG: 325 TABLET, FILM COATED ORAL at 12:00

## 2020-09-22 RX ADMIN — GLIPIZIDE 10 MG: 10 TABLET ORAL at 08:03

## 2020-09-22 RX ADMIN — MEROPENEM 1 G: 1 INJECTION, POWDER, FOR SOLUTION INTRAVENOUS at 16:12

## 2020-09-22 RX ADMIN — SODIUM BICARBONATE: 84 INJECTION INTRAVENOUS at 20:12

## 2020-09-22 RX ADMIN — INSULIN LISPRO 1 UNITS: 100 INJECTION, SOLUTION INTRAVENOUS; SUBCUTANEOUS at 17:25

## 2020-09-22 RX ADMIN — HYDROMORPHONE HYDROCHLORIDE 0.5 MG: 1 INJECTION, SOLUTION INTRAMUSCULAR; INTRAVENOUS; SUBCUTANEOUS at 21:38

## 2020-09-22 ASSESSMENT — PAIN DESCRIPTION - LOCATION: LOCATION: HEAD

## 2020-09-22 ASSESSMENT — PAIN SCALES - GENERAL
PAINLEVEL_OUTOF10: 6
PAINLEVEL_OUTOF10: 7
PAINLEVEL_OUTOF10: 0
PAINLEVEL_OUTOF10: 3
PAINLEVEL_OUTOF10: 0
PAINLEVEL_OUTOF10: 0
PAINLEVEL_OUTOF10: 7
PAINLEVEL_OUTOF10: 3
PAINLEVEL_OUTOF10: 4
PAINLEVEL_OUTOF10: 0
PAINLEVEL_OUTOF10: 0

## 2020-09-22 ASSESSMENT — PAIN DESCRIPTION - PAIN TYPE: TYPE: ACUTE PAIN

## 2020-09-22 ASSESSMENT — PAIN DESCRIPTION - DESCRIPTORS: DESCRIPTORS: HEADACHE

## 2020-09-22 NOTE — PROGRESS NOTES
[facility] Hospitalists    Patient:  Joon Almodovar  YOB: 1980  Date of Service: 9/22/2020  MRN: [id number]   Acct: [de-identified]   Primary Care Physician: VANDANA Ibanez  Advance Directive: Full Code  Admit Date: 9/19/2020       Hospital Day: 3  Portions of this note have been copied forward, however, changed to reflect the most current clinical status of this patient. CHIEF COMPLAINT Left Leg Pain    SUBJECTIVE: Mr. Alex Crooks has no new complaints. Has been up to chair twice day. Currently resting comfortably in bed and denies CP, heart palpitations or dyspnea. Cumulative Hospital Course:   Mr. Alex Crooks is a 38year old with PMH morbid obesity, DMII, SHELBY, ESBL Ecoli UTI who presented to [facility] ED complaining of left leg pain and edema that began 09/15/2020. He stated his mother and aunt both have history of blood clots and denied alcohol or tobacco use. Work up revealed significant left lower extremity thrombosis extending into the left iliofemoral vein as well as right sided pulmonary embolism with mild right ventricular strain. He was started on Heparin gtt with consult made to Vascular surgery who performed AngioJet mechanical thrombectomy/PulseSpray thrombolysis of left lower extremity DVT and inferior vena cava filter placement. He was continued on TPA thrombolysis overnight to treat common femoral vein floating tail. He underwent intravascular ultrasound of inferior vena cava/left common and external iliac veins/left common femoral vein on 09/21/2020 with findings of patent inferior vena cava filter. On 09/21/2020 urine output noted to be minimal with acute decline in renal function. IVF's were increased with addition of bicarbonate and consult made to Nephrology. On 09/22/2020 creatinine continued to worsen to 5.6, GFR 11. IVF's continued. Patient transferred to 3rd floor medical unit.     Review of Systems:   14 point review of systems is negative except as specifically addressed above.    Objective:   VITALS:  BP (!) 140/82   Pulse 99   Temp 96.8 °F (36 °C) (Temporal)   Resp 17   Ht 6' (1.829 m)   Wt (!) 398 lb 8 oz (180.8 kg)   SpO2 90%   BMI 54.05 kg/m²   24HR INTAKE/OUTPUT:      Intake/Output Summary (Last 24 hours) at 9/22/2020 1541  Last data filed at 9/22/2020 1400  Gross per 24 hour   Intake 2713.92 ml   Output 265 ml   Net 2448.92 ml     General appearance: Somnolent, easily awkaned, appears stated age, cooperative and no distress, resting comfortably in supine position with head of bed elevated   Head: Normocephalic, without obvious abnormality, atraumatic  Eyes: conjunctivae/corneas clear. PERRL, EOM's intact. Ears: normal external ears and nose, throat without exudate  Neck: no adenopathy, no carotid bruit, no JVD, supple, symmetrical, trachea midline   Lungs: diminished at bases otherwise clear to auscultation bilaterally,no rales or wheezes   Heart: RRR, S1, S2 normal, no murmur  Abdomen:soft, morbidly obese, non-tender; non-distended, normal bowel sounds no masses, no organomegaly  Extremities:1-2+ peripheral edema,  No erythema, no tenderness to palpation, BLE's warm to touch   Skin: Skin color, texture, turgor normal. No rashes or lesions  Lymphatic: No palpable lymph node enlargment  Neurologic: Somnolent but easily awakened and oriented X 3, normal strength and tone.  No focal deficits  Psychiatric: Calm, flat affect     Medications:      heparin (porcine) 8.69 Units/kg/hr (09/22/20 1028)    IV infusion builder 125 mL/hr at 09/22/20 1051    dextrose      alteplase (ACTIVASE) 10mg in 0.9% sodium chloride infusion (EKOS catheter) 1 mg/hr (09/21/20 0532)    heparin (porcine) 600 Units/hr (09/20/20 1600)      warfarin  7.5 mg Oral Once    [START ON 9/23/2020] famotidine  20 mg Oral Daily    vitamin D  50,000 Units Oral Weekly    meropenem  1 g Intravenous Q8H    ceFAZolin  2 g Intravenous Once    warfarin (COUMADIN) daily dosing (placeholder)   Other RX Placeholder    glipiZIDE  10 mg Oral BID AC    insulin lispro  0-6 Units Subcutaneous TID WC    insulin lispro  0-3 Units Subcutaneous Nightly     heparin (porcine), heparin (porcine), glucose, dextrose, glucagon (rDNA), dextrose, ondansetron, HYDROmorphone **OR** HYDROmorphone, HYDROcodone 5 mg - acetaminophen **OR** HYDROcodone 5 mg - acetaminophen, hydrALAZINE, potassium chloride **OR** potassium alternative oral replacement **OR** potassium chloride, magnesium sulfate, acetaminophen **OR** acetaminophen, polyethylene glycol, promethazine **OR** ondansetron  DIET CARB CONTROL;     Lab and other Data:     Recent Labs     09/20/20  0559 09/20/20  2250 09/21/20  1035   WBC 9.4 24.9* 18.5*   HGB 14.9 14.4 12.9*    234 173     Recent Labs     09/21/20  1035 09/21/20  1917 09/22/20  0351    137 138   K 5.1* 4.7 4.6    104 103   CO2 22 20* 21*   BUN 30* 39* 44*   CREATININE 3.3* 4.6* 5.6*   GLUCOSE 200* 189* 156*     Recent Labs     09/19/20  1718   AST 21   ALT 32   BILITOT 0.3   ALKPHOS 99     Troponin T:   Recent Labs     09/19/20  1718   TROPONINI <0.01     INR:   Recent Labs     09/19/20  1718 09/22/20  0351   INR 1.11 1.35*     A1C:   Recent Labs     09/20/20  0559   LABA1C 8.8*     RAD:   Vl Extremity Venous Left  Impression   Successful ultrasound/duplex guided cannulation of thrombosed left  popliteal vein for sheath placement. Vl Extremity Venous Left  Impression   There is evidence of subacute deep vein thrombosis in the left lower  extremity involving the common femoral, femoral, popliteal, posterior  tibial, and gastrocnemius veins. The anterior tibial and peroneal veins  were not well visualized secondary to edema and limb size. Superficial thrombophlebitis is seen in the short saphenous vein of the  left lower extremity. Floating tail left common femoral vein around 4 cm in length. Xr Chest Portable  1.  Central venous catheter has been repositioned and now extends into the superior vena cava. No evidence of pneumothorax. 2. Poor inspiration. 3. Bilateral infiltrates may be related to the poor inspiratory effort. Edema and pneumonia are possible. 4. Cardiomegaly. Signed by Dr Ruthie Montana on 9/20/2020 1:16 PM    Xr Chest Portable  1. Right IJ central venous catheter placement with catheter tip extending to the right into the right subclavian vein. No evidence of pneumothorax. 2. Endotracheal tube tip at the T2-3 level. 3. Very poor inspiration with vascular crowding. 4. Bilateral infiltrates may be related to the poor inspiration. Bilateral pneumonia and pulmonary edema cannot be ruled out on this image. 5. Cardiomegaly. Signed by Dr Ruthie Montana on 9/20/2020 12:28 PM    Cta Pulmonary W Contrast  1. Multiple acute pulmonary emboli identified on the right, involving the third, fourth and fifth order pulmonary artery branches extending into the right lower lobe. There are findings that are suggestive of mild right heart strain. Signed by Dr Angi Delaney. [place] on 9/19/2020 6:20 PM    Micro: Urine culture pending    Assessment/Plan   Principal Problem:    Venous thromboembolism- noted to have recent Carpal Tunnel surgery left wrist, s/p percutaneous thrombectomy/PulseSpray thrombolysis with Angiojet and placement of EKOS TPA catheter, IVC filter placement. Continue Heparin gtt while transitioning to Coumadin with target INR 2-3. Appreciate Vascular surgery/Hematology assistance, repeat venous duplex as outpatient in 8 weeks and discuss study/possibly schedule IVC removal per Vascular    Active Problems:    Acute kidney injury-New problem, continue IVF's w/ sodium bicarbonate, monitor BMP, Nephrology consult placed, possibility for dialysis if no improvement with decreased urine output.        DM II-uncontrolled A1C 8.8-Glipizide, SSI, accuchecks, hypoglycemia tx orders, Carb control diet, stable, monitor      Morbid obesity (HCC)-noted       Neutrophilic Leukocytosis-monitor, likely

## 2020-09-22 NOTE — PROGRESS NOTES
Clarified with Darrell Miller PA-C if she would like to keep the ordoñez catheter for accurate fluid management (pt continues to have very low urine output) or prefer we removed it as is standard upon transfer out of the unit. She would like to keep the f/c in use.

## 2020-09-22 NOTE — PROGRESS NOTES
PROGRESS NOTE    Pt Name: Alex Morales  MRN: [id number]  YOB: 1980  Date of evaluation: 9/22/2020    Subjective  Patient had a 2D echo this morning. Alert. Denies any new complaints. Removal of catheter sheath yesterday by vascular. He was started on warfarin yesterday night. No reported bleeding. Rishi Ellsworth is a 42-year-old  gentleman admitted to [address] [place] [address] ED on 9/19/2020 having presented with pain and swelling of the left leg, burning in the right side of his chest with evaluation leading to a diagnosis of left lower extremity DVT and PE.  Hematology consultation requested.     HEMATOLOGICAL HISTORY: Left lower extremity DVT with right lung pulmonary emboli 9/19/2020  Rishi Ellsworth was seen in initial hematology consultation on 9/20/2020 as an inpatient at [address] [place] [address] having been admitted on 9/19/2020 with left lower extremity DVT and PE.  He was placed on heparin as initial management.     Mr. Katy Suggs has a 5-day history of left leg pain and swelling.  Initially he thought he had pulled a muscle but this did not get better.  When things did not get better, he sought evaluation at the ED at [address] [place] [address]. Additionally he has nonspecific symptoms of pulmonary embolus including burning in the right side of his chest.  He does not have hemoptysis or dyspnea. Mr. Katy Suggs does not have a personal history of DVT or PE or hypercoagulability. Risk features include:   Obesity.    Recent left hand carpal tunnel surgery in August 2020.   Family history: His mother had an episode of left lower extremity DVT and PE 15 years ago without recurrence.  A maternal aunt also had DVT of the lower extremities.     Noninvasive venous studies of the lower extremities on 9/19/2020 document:   · Extensive thrombosis (DVT) noted in Lt common femoral vein, Lt SFV (prox, mid and distal), Lt popliteal vein, Lt Gastrocs, Lt posterior tibial veins.  A floating tail is noted in left common femoral vein measuring approximately 4.3cm.     · SVT: thrombus noted in Lt LSV     Pulmonary CTA with contrast on 9/19/2020 documented the following:  · Multiple right-sided acute pulmonary emboli identified involving the right third fourth and fifth ordered pulmonary artery branches extending into the right lower lobe. · Possible right heart strain  · No left sided pulmonary emboli identified  · Small calcified subcarinal lymph nodes consistent with healed granulomatous disease  · Scattered calcified granulomas in both lungs     He was seen by Dr. Gary Osei from vascular surgery this morning (9/20/2020), with recommendations for percutaneous mechanical thrombectomy/pulse spray thrombolysis to try to prevent long-term swelling in this young patient with iliofemoral DVT.  If the result in that procedure is not satisfactory, he would consider placing a TPA thrombolyzes catheter for thrombolysis overnight.  The patient agreed to proceed. Dr. Gary Osei did not feel that he needed TPA thrombolysis of the pulmonary embolism because he is fairly asymptomatic from that standpoint.     Agree with plans as outlined  A serologic prothrombotic work-up will be requested.         REVIEW OF SYSTEMS:   CONSTITUTIONAL: no fever, no night sweats, fatigue;  HEENT: no blurring of vision, no double vision, no hearing difficulty, no tinnitus, no ulceration, no dysplasia, no epistaxis;  LUNGS: no cough, no hemoptysis, no wheeze,  no shortness of breath;  CARDIOVASCULAR: no palpitation, no chest pain, no shortness of breath;  GI: no abdominal pain, no nausea, no vomiting, no diarrhea, no constipation;  RAKAN: no dysuria, no hematuria, no frequency or urgency, no nephrolithiasis;  MUSCULOSKELETAL: no joint pain, no swelling, no stiffness;  ENDOCRINE: no polyuria, no polydipsia, no cold or heat intolerance;  HEMATOLOGY: no easy bruising or bleeding, no history of clotting disorder;  DERMATOLOGY: no skin rash, no eczema, no pruritus;  PSYCHIATRY: no 07/23/2018    PROTIME 16.7 (H) 09/22/2020    PROTIME 14.3 09/19/2020    PROTIME 14.2 07/23/2018       RADIOLOGY STUDIES REVIEWED BY ME:    Vl Extremity Venous Left 09/20/2020   Impression   There is evidence of subacute deep vein thrombosis in the left lower  extremity involving the common femoral, femoral, popliteal, posterior  tibial, and gastrocnemius veins. The anterior tibial and peroneal veins  were not well visualized secondary to edema and limb size. Superficial thrombophlebitis is seen in the short saphenous vein of the  left lower extremity. Floating tail left common femoral vein around 4 cm in length. Cta Pulmonary W Contrast 09/19/2020  Multiple acute pulmonary emboli identified on the right, involving the third, fourth and fifth order pulmonary artery branches extending into the right lower lobe. There are findings that are suggestive of mild right heart strain. ASSESSMENT:  #Venous thromboembolism- provoked event ? ?(Recent surgery)  Risk factors: Morbid obesity, recent surgery  Currently receiving catheter directed thrombolysis. · Percutaneous thrombectomy/PulseSpray thrombolysis with AngioJet Zelante catheter  · Left lower extremity venograms after percutaneous thrombectomy and thrombolysis  · Placement of 40 cm infusion length EKOS TPA catheter from the popliteal vein all the way to the distal external iliac vein  · Inferior vena cava filter placement from a right internal jugular vein approach (Bard Heather inferior vena cava filter)  · 9/21/2002-removal of EKOS TPA catheter     Will continue with UFH due to poor renal function and warfarin to target INR 2-3      #Contrast-induced nephropathy-nephrology following  Creatinine 5.6  #Morbid obesity-BMI above 50. DOACS not a good option as this was not studied in patients BMI above 40. Pharmacy to dose warfarin.     #Family history of VTE-apparently mother had 2 episodes of a provoked event.     #Neutrophil leukocytosis-likely reactive.

## 2020-09-22 NOTE — CONSULTS
Nephrology ([facility]) Consult Note      Patient:  Kayleigh Lau  YOB: 1980  Date of Service: 9/21/2020  MRN: [id number]   Acct: [de-identified]   Primary Care Physician: VANDANA Barros  Advance Directive: Full Code  Admit Date: 9/19/2020       Hospital Day: 2  Referring Provider: Jesscia Snyder MD    Patient independently seen and examined, Chart, Consults, Notes, Operative notes, Labs, Cardiology, and Radiology studies reviewed as available. Subjective:  Kayleigh Lau is a 38 y.o. male  whom we were consulted for acute renal failure. Patient recalls a history of nephrolithiasis and urine tract infection but no other nephrologic evaluations. Recalled no significant NSAID usage. He initially presented for evaluation of DVT with pulmonary emboli. He underwent vascular surgical procedure with Dr. Veronika Burr. Subsequently developed decreased urine output and creatinine increased from 0.6-3.3. Contrast exposures noted beginning September 19. He was initially seen in the ICU with nursing. No family present. Denies dysuria or hematuria, rash or hemoptysis. Allergies:  Patient has no known allergies.     Medicines:  Current Facility-Administered Medications   Medication Dose Route Frequency Provider Last Rate Last Dose    ceFAZolin (ANCEF) 2 g in 0.9% sodium chloride 50 mL IVPB  2 g Intravenous Once Kevin Huddleston MD        warfarin (COUMADIN) daily dosing (placeholder)   Other RX Rayne Marrufo MD        warfarin (COUMADIN) tablet 7.5 mg  7.5 mg Oral Once Ivon Franco MD   Stopped at 09/21/20 1812    heparin (porcine) injection 10,000 Units  10,000 Units Intravenous PRN Kevin Huddleston MD        heparin (porcine) injection 5,000 Units  5,000 Units Intravenous PRN Kevin Huddleston MD        heparin 25,000 units in dextrose 5% 250 mL infusion  11.69 Units/kg/hr Intravenous Continuous Kevin Huddleston MD 21 mL/hr at 09/21/20 1551 11.69 Units/kg/hr at 09/21/20 1551    sodium bicarbonate 50 mEq in sodium chloride 0.45 % 1,000 mL infusion   Intravenous Continuous Augusto Brandt PA-C 125 mL/hr at 09/21/20 1815      glipiZIDE (GLUCOTROL) tablet 10 mg  10 mg Oral BID AC Franca Kendrick MD   10 mg at 09/21/20 1813    glucose (GLUTOSE) 40 % oral gel 15 g  15 g Oral PRN Franca Kendrick MD        dextrose 50 % IV solution  12.5 g Intravenous PRN Franca Kendrick MD        glucagon (rDNA) injection 1 mg  1 mg Intramuscular PRN Franca Kendrick MD        dextrose 5 % solution  100 mL/hr Intravenous PRN Franca Kendrick MD        alteplase (CATHFLO) 10 mg in sodium chloride 0.9 % 250 mL infusion  1 mg/hr Intracatheter Continuous Franca Kendrick MD 25 mL/hr at 09/21/20 0532 1 mg/hr at 09/21/20 0532    heparin 25,000 units in dextrose 5 % 250 mL infusion (rate based)  600 Units/hr Intravenous Continuous Franca Kendrick MD 6 mL/hr at 09/20/20 1600 600 Units/hr at 09/20/20 1600    ondansetron (ZOFRAN) injection 4 mg  4 mg Intravenous Q8H PRN Franca Kendrick MD        HYDROmorphone HCl PF (DILAUDID) injection 0.25 mg  0.25 mg Intravenous Q3H PRN Franca Kendrick MD   0.25 mg at 09/20/20 2214    Or    HYDROmorphone HCl PF (DILAUDID) injection 0.5 mg  0.5 mg Intravenous Q3H PRN Franca Kendrick MD   0.5 mg at 09/21/20 0724    HYDROcodone-acetaminophen (NORCO) 5-325 MG per tablet 1 tablet  1 tablet Oral Q4H PRN Franca Kendrick MD        Or    HYDROcodone-acetaminophen (NORCO) 5-325 MG per tablet 2 tablet  2 tablet Oral Q4H PRN Franca Kendrick MD        hydrALAZINE (APRESOLINE) injection 10 mg  10 mg Intravenous Q6H PRN Buzz Simpson MD   10 mg at 09/20/20 1658    potassium chloride (KLOR-CON M) extended release tablet 40 mEq  40 mEq Oral PRN Franca Kendrick MD        Or    potassium bicarb-citric acid (EFFER-K) effervescent tablet 40 mEq  40 mEq Oral PRN Inevalentea MD Aroldo        Or    potassium chloride 10 mEq/100 mL IVPB (Peripheral Line) 10 mEq Intravenous PRN Franca Kendrick MD        magnesium sulfate 1 g in dextrose 5% 100 mL IVPB  1 g Intravenous PRN Franca Kendrick MD        acetaminophen (TYLENOL) tablet 650 mg  650 mg Oral Q6H PRN Franca Kendrick MD   650 mg at 09/20/20 2041    Or    acetaminophen (TYLENOL) suppository 650 mg  650 mg Rectal Q6H PRN Franca Kendrick MD        polyethylene glycol [place]) packet 17 g  17 g Oral Daily PRN Franca Kendrick MD        promethazine (PHENERGAN) tablet 12.5 mg  12.5 mg Oral Q6H PRN Franca Kendrick MD        Or    ondansetron [facility]) injection 4 mg  4 mg Intravenous Q6H PRN Franca Kendrick MD   4 mg at 09/21/20 1318    famotidine (PEPCID) tablet 20 mg  20 mg Oral BID Franca Kendrick MD   20 mg at 09/20/20 1943    insulin lispro (HUMALOG) injection vial 0-6 Units  0-6 Units Subcutaneous TID WC Franca Kendrick MD   1 Units at 09/21/20 1815    insulin lispro (HUMALOG) injection vial 0-3 Units  0-3 Units Subcutaneous Nightly Franca Kendrick MD   2 Units at 09/20/20 1949       Past Medical History:  Past Medical History:   Diagnosis Date    Arthritis     both wrist    Bilateral carpal tunnel syndrome 8/15/2019    Bronchitis     10 yrs ago    Diabetes mellitus ([address]     Kidney stone     recurrent    Sleep apnea     untested    Umbilical hernia        Past Surgical History:  Past Surgical History:   Procedure Laterality Date    CARPAL TUNNEL RELEASE Left 8/21/2020    LEFT CARPAL TUNNEL RELEASE performed by Chad Albright MD at [facility] Right 7/24/2018    CYSTOSCOPY/ URETEROSCOPY; INSERTION DOUBLE J STENT/  URETERAL performed by Mehnaz Price MD at [facility]      both wrists    [address] N/A 6/24/2016    HEMORRHOID INCISION AND DRAINAGE performed by Najma Mack MD at [facility] Right 8/7/2018    URETEROSCOPY LASER LITHOTRIPSY STONE EXTRACTION performed by Mehnaz Price MD at [facility]  [place] CYSTOSCOPY,INSERT URETERAL STENT Right 8/7/2018    STENT PLACEMENT performed by Diaz Salas MD at [address] [facility] Right 2/13/2018    [facility] [address] LITHOTRIPSY performed by Diaz Salas MD at [address] [facility] Right 3/13/2018    [facility] [address] LITHOTRIPSY performed by Diaz Salas MD at [address] [facility] Right 7/24/2018    [facility] [address] LITHOTRIPSY performed by Diaz Salas MD at [address], VENTRAL HERNIA REPAIR,REDUCIBLE N/A 0/5/0664    HERNIA UMBILICAL REPAIR LAPAROSCOPIC performed by Adele Mcginnis MD at [address] / [address] Left 9/20/2020     LEFT LOWER EXTREMITY VENOGRAMS; INFERIOR VENA CAVA FILTER PLACEMENT. performed by Eric Flowers MD at [address]      left wrist.  Pt was a child       Family History  Family History   Problem Relation Age of Onset    Cancer Mother 46        colon cancer    Cancer Father         luekemia    Diabetes Father     Other Maternal Grandmother         copd    Other Maternal Grandfather         copd    Heart Disease Paternal Grandmother        Social History  Social History     Socioeconomic History    Marital status:      Spouse name: Not on file    Number of children: Not on file    Years of education: Not on file    Highest education level: Not on file   Occupational History    Not on file   Social Needs    Financial resource strain: Not on file    Food insecurity     Worry: Not on file     Inability: Not on file    Transportation needs     Medical: Not on file     Non-medical: Not on file   Tobacco Use    Smoking status: Never Smoker    Smokeless tobacco: Never Used    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[address] and Sexual Activity    Alcohol use: Yes     Comment: OCC    Drug use: No    Sexual activity: Yes     Partners: Female   Lifestyle    Physical activity     Days per week: Not on file     Minutes per session: Not on file    Stress: Not on file   Relationships    Social connections     Talks on phone: Not on file     Gets together: Not on file     Attends Church service: Not on file     Active member of club or organization: Not on file     Attends meetings of clubs or organizations: Not on file     Relationship status: Not on file    Intimate partner violence     Fear of current or ex partner: Not on file     Emotionally abused: Not on file     Physically abused: Not on file     Forced sexual activity: Not on file   Other Topics Concern    Not on file   Social History Narrative    Not on file         Review of Systems:  History obtained from chart review and the patient  General ROS: No fever or chills  Respiratory ROS: No cough, shortness of breath, wheezing  Cardiovascular ROS: No chest pain or palpitations  Gastrointestinal ROS: No abdominal pain or melena  Genito-Urinary ROS: No dysuria or hematuria  Musculoskeletal ROS: No joint pain or swelling   14 point ROS reviewed with the patient and negative except as noted above and in the HPI unless unable to obtain.     Objective:  Patient Vitals for the past 24 hrs:   BP Temp Temp src Pulse Resp SpO2 Weight   09/21/20 1900 (!) 168/81 98.1 °F (36.7 °C) Temporal 111 18 93 % --   09/21/20 1800 (!) 148/84 -- -- 109 23 96 % --   09/21/20 1756 -- -- -- -- 18 96 % --   09/21/20 1700 (!) 160/93 -- -- 101 16 94 % --   09/21/20 1600 (!) 149/70 98.2 °F (36.8 °C) Temporal 101 18 93 % --   09/21/20 1500 (!) 158/84 -- -- 97 13 94 % --   09/21/20 1445 (!) 148/83 -- -- 100 16 92 % --   09/21/20 1430 -- -- -- 96 14 94 % --   09/21/20 1413 (!) 151/88 -- -- -- -- -- --   09/21/20 1411 (!) 151/88 -- -- 101 17 94 % --   09/21/20 1406 (!) 157/81 -- -- 98 17 95 % --   09/21/20 1403 -- -- -- 104 12 94 % --   09/21/20 1400 (!) 154/89 -- -- 100 20 95 % --   09/21/20 1356 (!) 146/92 -- -- 101 18 96 % --   09/21/20 1351 (!) 161/88 -- -- 104 17 93 % --   09/21/20 1346 (!) 150/86 -- -- 102 19 94 % --   09/21/20 1343 (!) 150/86 -- -- 102 19 94 % --   09/21/20 1342 (!) 155/85 -- -- 104 11 94 % --   09/21/20 1310 -- -- -- 94 15 93 % --   09/21/20 1300 (!) 148/78 -- -- 94 16 94 % --   09/21/20 1200 (!) 153/66 -- -- 97 15 93 % --   09/21/20 1100 (!) 138/49 -- -- 100 13 92 % --   09/21/20 1000 139/73 -- -- 101 14 92 % --   09/21/20 0900 136/84 -- -- 107 22 90 % --   09/21/20 0800 129/77 98.6 °F (37 °C) Temporal 102 16 (!) 89 % --   09/21/20 0700 (!) 144/78 -- -- 109 18 92 % --   09/21/20 0600 130/70 -- -- 106 19 92 % --   09/21/20 0500 (!) 140/77 -- -- 113 20 92 % --   09/21/20 0400 130/61 97.9 °F (36.6 °C) Temporal 110 19 91 % (!) 396 lb (179.6 kg)   09/21/20 0300 (!) 141/66 -- -- 110 15 91 % --   09/21/20 0200 (!) 158/77 -- -- 108 15 93 % --   09/21/20 0100 137/81 -- -- 109 17 90 % --   09/21/20 0000 (!) 144/94 97.7 °F (36.5 °C) Temporal 112 15 93 % --   09/20/20 2300 (!) 151/77 -- -- 111 14 (!) 89 % --   09/20/20 2200 137/70 -- -- 115 16 90 % --   09/20/20 2100 105/87 -- -- 114 16 91 % --       Intake/Output Summary (Last 24 hours) at 9/21/2020 2003  Last data filed at 9/21/2020 1700  Gross per 24 hour   Intake 1555.75 ml   Output 355 ml   Net 1200.75 ml     General: awake/alert   Chest:  clear to auscultation bilaterally  CVS: regular rate and rhythm  Abdominal: soft, nontender, normal bowel sounds  Extremities: no cyanosis or edema  Skin: warm and dry without rash      Labs:  BMP:   Recent Labs     09/19/20  1718 09/20/20  0559 09/21/20  1035    138 141   K 4.0 4.1 5.1*   CL 97* 99 104   CO2 27 26 22   PHOS  --  3.3 3.5   BUN 8 7 30*   CREATININE 0.6 0.6 3.3*   CALCIUM 9.5 8.9 7.5*     CBC:   Recent Labs     09/20/20  0559 09/20/20  2250 09/21/20  1035   WBC 9.4 24.9* 18.5*   HGB 14.9 14.4 12.9*   HCT 45.7 44.6 41.2*   MCV 87.2 89.6 93.2    234 173     LIVER PROFILE:   Recent Labs     09/19/20  1718   AST 21   ALT 32 BILITOT 0.3   ALKPHOS 99     PT/INR:   Recent Labs     09/19/20  1718   PROTIME 14.3   INR 1.11     APTT:   Recent Labs     09/20/20  0559 09/20/20  2250 09/21/20  1159   APTT 48.4* 27.1 29.1     BNP:  No results for input(s): BNP in the last 72 hours. Ionized Calcium:No results for input(s): IONCA in the last 72 hours. Magnesium:  Recent Labs     09/20/20  0559 09/21/20  1035   MG 1.8 1.7     Phosphorus:  Recent Labs     09/20/20  0559 09/21/20  1035   PHOS 3.3 3.5     HgbA1C:   Recent Labs     09/20/20  0559   LABA1C 8.8*     Hepatic:   Recent Labs     09/19/20  1718   ALKPHOS 99   ALT 32   AST 21   PROT 7.4   BILITOT 0.3   LABALBU 4.1     Lactic Acid: No results for input(s): LACTA in the last 72 hours. Troponin: No results for input(s): CKTOTAL, CKMB, TROPONINT in the last 72 hours. ABGs: No results for input(s): PH, PCO2, PO2, HCO3, O2SAT in the last 72 hours. CRP:  No results for input(s): CRP in the last 72 hours. Sed Rate:  No results for input(s): SEDRATE in the last 72 hours. Cultures:   No results for input(s): CULTURE in the last 72 hours. No results for input(s): BC, [place] in the last 72 hours. No results for input(s): CXSURG in the last 72 hours. Radiology reports as per the Radiologist  Radiology: Vl Extremity Venous Left    Result Date: 9/20/2020  Vascular Lower Extremities DVT Study Procedure  Demographics   Patient Name    Humble Sikh Age                   38   Patient Number  [id number]           Gender                Male   Visit Number    [id number]        Interpreting          Tami Saenz MD                                   Physician   Date of Birth   1980       Referring Physician   Tami Saenz MD   Accession       [id number]       [facility]  Number  Procedure Type of Study:   Veins:Lower Extremities DVT Study, VL EXTREMITY VENOUS DUPLEX LEFT. Indications for Study:DVT and Venous access.  Risk Factors   - The patient's risk factor(s) include: obesity. Impression   Successful ultrasound/duplex guided cannulation of thrombosed left  popliteal vein for sheath placement. Signature   ----------------------------------------------------------------  Electronically signed by Fletcher Saenz MD(Interpreting  physician) on 09/20/2020 03:38 PM  ----------------------------------------------------------------      Vl Extremity Venous Left    Result Date: 9/20/2020  Vascular Lower Extremities DVT Study Procedure  Demographics   Patient Name    Ruiz Junior Age                   38   Patient Number  [id number]           Gender                Male   Visit Number    [id number]        Interpreting          Fletcher Saenz MD                                   Physician   Date of Birth   1980       Referring Physician   Mario Subramanian   Accession       [id number]       [address] [place] [address], [facility]  Number  Procedure Type of Study:   Veins:Lower Extremities DVT Study, VL EXTREMITY VENOUS DUPLEX LEFT. Indications for Study:Pain, left lower extremity. Risk Factors   - The patient's risk factor(s) include: obesity. Impression   There is evidence of subacute deep vein thrombosis in the left lower  extremity involving the common femoral, femoral, popliteal, posterior  tibial, and gastrocnemius veins. The anterior tibial and peroneal veins  were not well visualized secondary to edema and limb size. Superficial thrombophlebitis is seen in the short saphenous vein of the  left lower extremity. Floating tail left common femoral vein around 4 cm in length.    Signature   ----------------------------------------------------------------  Electronically signed by Fletcher Saenz MD(Interpreting  physician) on 09/20/2020 09:28 AM  ----------------------------------------------------------------  Velocities are measured in cm/s ; Diameters are measured in mm Right Lower Extremities DVT Study Measurements Right 2D Measurements +------------------------------------+----------+---------------+----------+ ! Location                            ! Visualized! Compressibility! Thrombosis! +------------------------------------+----------+---------------+----------+ ! Sapheno Femoral Junction            ! Yes       ! Yes            ! None      ! +------------------------------------+----------+---------------+----------+ ! Common Femoral                      !Yes       ! Yes            ! None      ! +------------------------------------+----------+---------------+----------+ Left Lower Extremities DVT Study Measurements Left 2D Measurements +------------------------------------+----------+---------------+----------+ ! Location                            ! Visualized! Compressibility! Thrombosis! +------------------------------------+----------+---------------+----------+ ! Sapheno Femoral Junction            ! Yes       ! Yes            ! None      ! +------------------------------------+----------+---------------+----------+ ! Common Femoral                      !Yes       ! No             !None      ! +------------------------------------+----------+---------------+----------+ ! Prox Femoral                        !Yes       ! No             !None      ! +------------------------------------+----------+---------------+----------+ ! Mid Femoral                         !Yes       ! No             !None      ! +------------------------------------+----------+---------------+----------+ ! Dist Femoral                        !Partial   !No             !None      ! +------------------------------------+----------+---------------+----------+ ! Deep Femoral                        !Partial   !Yes            ! None      ! +------------------------------------+----------+---------------+----------+ ! Popliteal                           !Partial   !No             !None      ! +------------------------------------+----------+---------------+----------+ ! SSV !Yes       !No             !None      ! +------------------------------------+----------+---------------+----------+ ! Gastroc                             ! Partial   !No             !None      ! +------------------------------------+----------+---------------+----------+ ! PTV                                 ! Partial   !No             !None      ! +------------------------------------+----------+---------------+----------+ ! GSV                                 ! Yes       ! Yes            ! None      ! +------------------------------------+----------+---------------+----------+ ! ATV                                 ! No        !               !          ! +------------------------------------+----------+---------------+----------+ ! Peroneal                            !No        !               !          ! +------------------------------------+----------+---------------+----------+    Xr Chest Portable    Result Date: 9/20/2020  EXAMINATION:  XR CHEST PORTABLE  9/20/2020 1:15 PM HISTORY: Central line adjustment. COMPARISON: 9/20/2020. FINDINGS:  The central venous catheter has been repositioned and now extends into the superior vena cava. The patient remains intubated with the endotracheal tube tip at the T2-3 level. There is a new esophageal monitoring lead. There is again poor inspiration with bilateral infiltrates. There is cardiomegaly. The left costophrenic angle is not fully included on this study. 1. Central venous catheter has been repositioned and now extends into the superior vena cava. No evidence of pneumothorax. 2. Poor inspiration. 3. Bilateral infiltrates may be related to the poor inspiratory effort. Edema and pneumonia are possible. 4. Cardiomegaly. Signed by Dr Florida Walton on 9/20/2020 1:16 PM    Xr Chest Portable    Result Date: 9/20/2020  EXAMINATION:  XR CHEST PORTABLE  9/20/2020 12:26 PM HISTORY: Central venous catheter placement. COMPARISON: 7/8/2019.  FINDINGS:  There has been placement of a right IJ central venous catheter. The catheter extends to the right into the right subclavian vein. There is no evidence of pneumothorax. The patient is intubated with the endotracheal tube tip at the T2-3 level. There is very poor inspiratory effort. There is vascular crowding. There are bilateral infiltrates. There is cardiomegaly. 1. Right IJ central venous catheter placement with catheter tip extending to the right into the right subclavian vein. No evidence of pneumothorax. 2. Endotracheal tube tip at the T2-3 level. 3. Very poor inspiration with vascular crowding. 4. Bilateral infiltrates may be related to the poor inspiration. Bilateral pneumonia and pulmonary edema cannot be ruled out on this image. 5. Cardiomegaly. Signed by Dr Tami Santoro on 9/20/2020 12:28 PM    Cta Pulmonary W Contrast    Result Date: 9/19/2020  EXAMINATION: CTA PULMONARY W CONTRAST 9/19/2020 6:13 PM HISTORY: Shortness of breath, clinical concern for pulmonary embolism. DOSE: 804 mGycm. Automatic exposure control was utilized in an effort to use as little radiation as possible, without compromising image quality. REPORT: Spiral CT of the chest was performed after administration of intravenous contrast using CTA protocol, which includes reconstructed coronal, sagittal and 3-D images. COMPARISON: There are no correlative imaging studies for comparison. The contrast bolus is satisfactory, there is respiratory motion artifact. The pulmonary arteries are normal in caliber, there are multiple filling defects within the pulmonary arteries on the right, involving the third, fourth and fifth order branches extending into the right lower lobe there is mild straightening of the cardiac ventricular septum, which may indicate mild right heart strain. No left-sided pulmonary emboli are identified. The thoracic aorta is normal in caliber, no evidence of dissection is identified. Heart size is normal. No intrathoracic lymphadenopathy is identified. Small calcified subcarinal lymph nodes are compatible with healed granulomatous disease. The thyroid gland is homogeneous and normal. Review of lung windows demonstrates scattered calcified granulomas in both lungs. There is no parenchymal infiltrate or consolidation. No pneumothorax or pleural effusion is identified. The airways are patent. The visualized upper abdomen shows decreased attenuation of the liver parenchyma compatible with fatty infiltration. There is bilateral gynecomastia, mild. Review of bone windows shows fused syndesmophytes throughout the mid and lower thoracic spine. No acute osseous abnormalities identified. 1. Multiple acute pulmonary emboli identified on the right, involving the third, fourth and fifth order pulmonary artery branches extending into the right lower lobe. There are findings that are suggestive of mild right heart strain. Signed by Dr Ashkan Castillo. Geo on 9/19/2020 6:20 PM       Assessment   Acute renal failure stage III  Risk factors include contrast-induced nephropathy or renal emboli  Morbid obesity  DVT and pulmonary emboli  Diabetes type 2 uncontrolled by A1c  Hyperkalemia      Plan:  Discussed with patient, nursing  Work-up ordered ongoing  Repeat BMP was pending time of examination, potassium continues to increase would begin medical management immediately  Continue bicarbonate containing IV fluids  Reviewed possibilities and methods of dialysis with the patient      Thank you for the consult, we appreciate the opportunity to provide care to your patients. Feel free to contact me if I can be of any further assistance.       Erica Zamorano MD  09/21/20  8:03 PM

## 2020-09-22 NOTE — PROGRESS NOTES
Vascular Surgery  Dr.Scott Boo Dotson   Daily Progress Note    Pt Name: [address] [place] [address] Record Number: [id number]  Date of Birth 1980   Today's Date: 9/22/2020    SUBJECTIVE:     Patient was seen and examined, sitting up in recliner at bedside with feet elevated.   Pain is controlled, states left popliteal puncture site tender, he kind of aches all over today  Asking how long he will be off work, is currently off until October 9th after Carpal Tunnel surgery left wrist.     OBJECTIVE:     Patient Vitals for the past 24 hrs:   BP Temp Temp src Pulse Resp SpO2 Height Weight   09/22/20 0800 136/65 -- -- 104 17 93 % -- --   09/22/20 0702 (!) 154/78 96.8 °F (36 °C) Temporal 94 18 92 % -- --   09/22/20 0657 -- -- -- -- 18 92 % -- --   09/22/20 0600 (!) 149/83 -- -- 98 18 91 % -- --   09/22/20 0500 (!) 146/74 -- -- 93 16 94 % -- --   09/22/20 0400 (!) 154/71 98.9 °F (37.2 °C) Temporal 106 18 94 % -- --   09/22/20 0300 (!) 159/79 -- -- 102 19 96 % -- --   09/22/20 0200 (!) 166/75 -- -- 108 22 90 % -- --   09/22/20 0100 (!) 157/78 -- -- 106 19 92 % -- --   09/22/20 0000 (!) 142/84 99 °F (37.2 °C) Temporal 110 18 94 % 6' (1.829 m) (!) 398 lb 8 oz (180.8 kg)   09/21/20 2300 (!) 150/82 -- -- 109 18 91 % -- --   09/21/20 2200 138/72 -- -- 101 14 100 % -- --   09/21/20 2100 (!) 146/72 -- -- 107 19 95 % -- --   09/21/20 2000 (!) 148/60 98.3 °F (36.8 °C) Temporal 107 16 95 % -- --   09/21/20 1900 (!) 168/81 98.1 °F (36.7 °C) Temporal 111 18 93 % -- --   09/21/20 1800 (!) 148/84 -- -- 109 23 96 % -- --   09/21/20 1756 -- -- -- -- 18 96 % -- --   09/21/20 1700 (!) 160/93 -- -- 101 16 94 % -- --   09/21/20 1600 (!) 149/70 98.2 °F (36.8 °C) Temporal 101 18 93 % -- --   09/21/20 1500 (!) 158/84 -- -- 97 13 94 % -- --   09/21/20 1445 (!) 148/83 -- -- 100 16 92 % -- --   09/21/20 1430 -- -- -- 96 14 94 % -- --   09/21/20 1413 (!) 151/88 -- -- -- -- -- -- --   09/21/20 1411 (!) 151/88 -- -- 101 17 94 % -- --   09/21/20 1406 (!) 157/81 -- -- 98 17 95 % -- --   09/21/20 1403 -- -- -- 104 12 94 % -- --   09/21/20 1400 (!) 154/89 -- -- 100 20 95 % -- --   09/21/20 1356 (!) 146/92 -- -- 101 18 96 % -- --   09/21/20 1351 (!) 161/88 -- -- 104 17 93 % -- --   09/21/20 1346 (!) 150/86 -- -- 102 19 94 % -- --   09/21/20 1343 (!) 150/86 -- -- 102 19 94 % -- --   09/21/20 1342 (!) 155/85 -- -- 104 11 94 % -- --   09/21/20 1310 -- -- -- 94 15 93 % -- --   09/21/20 1300 (!) 148/78 -- -- 94 16 94 % -- --   09/21/20 1200 (!) 153/66 -- -- 97 15 93 % -- --   09/21/20 1100 (!) 138/49 -- -- 100 13 92 % -- --   09/21/20 1000 139/73 -- -- 101 14 92 % -- --         Intake/Output Summary (Last 24 hours) at 9/22/2020 0908  Last data filed at 9/22/2020 0600  Gross per 24 hour   Intake 3213.92 ml   Output 230 ml   Net 2983.92 ml       In: 2713.9 [P.O.:500; I.V.:2213.9]  Out: 190 [Urine:190]    I/O last 3 completed shifts: In: 3213.9 [P.O.:500; I.V.:2213.9; IV Piggyback:500]  Out: 245 [Urine:245]     Date 09/22/20 0000 - 09/22/20 2359   Shift [id number] 24 Hour Total   INTAKE   P.O.(mL/kg/hr) 250(0.2)   250   I. V.(mL/kg) 1499.4(8.3)   1499.4(8.3)   Shift Total(mL/kg) 1749.4(9.7)   1749.4(9.7)   OUTPUT   Urine(mL/kg/hr) 70(0)   70   Shift Total(mL/kg) 70(0.4)   70(0.4)   Weight (kg) 180.8 180.8 180.8 180.8       Wt Readings from Last 3 Encounters:   09/22/20 (!) 398 lb 8 oz (180.8 kg)   09/02/20 (!) 388 lb (176 kg)   08/18/20 (!) 370 lb (167.8 kg)        Body mass index is 54.05 kg/m².      Diet: DIET CARB CONTROL;        MEDS:     Scheduled Meds:   ceFAZolin  2 g Intravenous Once    warfarin (COUMADIN) daily dosing (placeholder)   Other RX Placeholder    glipiZIDE  10 mg Oral BID AC    famotidine  20 mg Oral BID    insulin lispro  0-6 Units Subcutaneous TID WC    insulin lispro  0-3 Units Subcutaneous Nightly     Continuous Infusions:   heparin (porcine) Stopped (09/22/20 0732)    IV infusion builder 125 mL/hr at 09/22/20 0226    dextrose  alteplase (ACTIVASE) 10mg in 0.9% sodium chloride infusion (EKOS catheter) 1 mg/hr (09/21/20 0532)    heparin (porcine) 600 Units/hr (09/20/20 1600)     PRN Meds:heparin (porcine), 10,000 Units, PRN  heparin (porcine), 5,000 Units, PRN  glucose, 15 g, PRN  dextrose, 12.5 g, PRN  glucagon (rDNA), 1 mg, PRN  dextrose, 100 mL/hr, PRN  ondansetron, 4 mg, Q8H PRN  HYDROmorphone, 0.25 mg, Q3H PRN    Or  HYDROmorphone, 0.5 mg, Q3H PRN  HYDROcodone 5 mg - acetaminophen, 1 tablet, Q4H PRN    Or  HYDROcodone 5 mg - acetaminophen, 2 tablet, Q4H PRN  hydrALAZINE, 10 mg, Q6H PRN  potassium chloride, 40 mEq, PRN    Or  potassium alternative oral replacement, 40 mEq, PRN    Or  potassium chloride, 10 mEq, PRN  magnesium sulfate, 1 g, PRN  acetaminophen, 650 mg, Q6H PRN    Or  acetaminophen, 650 mg, Q6H PRN  polyethylene glycol, 17 g, Daily PRN  promethazine, 12.5 mg, Q6H PRN    Or  ondansetron, 4 mg, Q6H PRN        PHYSICAL EXAM:     CONSTITUTIONAL: Alert and oriented times 3, no acute distress and cooperative to examination. LUNGS: Chest expands equally bilaterally upon respiration, clear bilaterally anteriorly, slightly diminished lower lobe sounds . 02 sat on R/A 92%  CARDIOVASCULAR: Heart regular rate and rhythm  ABDOMEN: soft, nontender, nondistended  NEUROLOGIC: CN II-XII are grossly intact. There are no focalizing motor or sensory deficits  WOUND/INCISION:  Left popliteal puncture site with small bandage, no bleeding or hematoma noted, soft to touch  EXTREMITY:Feet warm to touch / pink color bilaterally.  Moderate edema LLE, mild edema RLE    LABS:     CBC:   Recent Labs     09/20/20  0559 09/20/20  2250 09/21/20  1035   WBC 9.4 24.9* 18.5*   RBC 5.24 4.98 4.42*   HGB 14.9 14.4 12.9*   HCT 45.7 44.6 41.2*   MCV 87.2 89.6 93.2   MCH 28.4 28.9 29.2   MCHC 32.6* 32.3* 31.3*   RDW 13.3 13.5 13.7    234 173   MPV 9.9 10.0 10.3      Last 3 CMP:   Recent Labs     09/19/20  1718  09/21/20  1035 09/21/20  1917 09/22/20  0351      < > 141 137 138   K 4.0   < > 5.1* 4.7 4.6   CL 97*   < > 104 104 103   CO2 27   < > 22 20* 21*   BUN 8   < > 30* 39* 44*   CREATININE 0.6   < > 3.3* 4.6* 5.6*   GLUCOSE 244*   < > 200* 189* 156*   CALCIUM 9.5   < > 7.5* 8.0* 7.9*   PROT 7.4  --   --   --   --    LABALBU 4.1  --   --   --   --    BILITOT 0.3  --   --   --   --    ALKPHOS 99  --   --   --   --    AST 21  --   --   --   --    ALT 32  --   --   --   --     < > = values in this interval not displayed. Troponin:   Recent Labs     09/19/20 1718   TROPONINI <0.01     Calcium:   Lab Results   Component Value Date    CALCIUM 7.9 09/22/2020    CALCIUM 8.0 09/21/2020    CALCIUM 7.5 09/21/2020      INR:   Recent Labs     09/19/20  1718 09/22/20  0351   INR 1.11 1.35*     Lactic Acid:   Lab Results   Component Value Date    LACTA 1.4 01/28/2018    LACTA 1.7 09/08/2014          DVT prophylaxis:            [x] Already on Anticoagulation Heparin drip bridging to Coumadin          ASSESSMENT:     Procedure 9/20/20 :  1. Ultrasound/duplex guided cannulation of a thrombosed left popliteal vein with 5 [place] and later 8 [place] sheath  2. Left lower extremity venograms all the way to the inferior vena cava  3. Percutaneous thrombectomy/PulseSpray thrombolysis with AngioJet Zel[place] catheter  4. Left lower extremity venograms after percutaneous thrombectomy and thrombolysis  5. Placement of 40 cm infusion length EKOS TPA catheter from the popliteal vein all the way to the distal external iliac vein  6. Inferior vena cava filter placement from a right internal jugular vein approach (Bard [place]ner inferior vena cava filter)   Procedure 9/21/20:  1. Intravascular ultrasound of inferior vena cava/left common and external iliac veins/left common femoral vein  1.  HD # 3  Patient Active Problem List   Diagnosis    Thrombosed external hemorrhoid    Right ureteral calculus    Umbilical hernia without obstruction and without gangrene    Hydronephrosis with renal and ureteral calculus obstruction    Ureteral obstruction, right    Transient alteration of awareness    Prediabetes    Bilateral carpal tunnel syndrome    Arthritis    Numbness and tingling in both hands    S/P carpal tunnel release    Leg DVT (deep venous thromboembolism), acute, left (HCC)    Acute pulmonary embolism (HCC)    DVT of deep femoral vein, left (HCC)    Morbid obesity (HCC)    Acute kidney injury ([address]    Hyperkalemia   2. Chief Complaint:  Chief Complaint   Patient presents with    Leg Swelling     Pt to ED with c/o LLE pain/swelling x1 week Pt reports surgery approx 1 month ago       PLAN:     1. Anticoagulation per Hematology, Heparin drip bridging to Coumadin  2. Nephrology consulted for ARF stage lll, poor UOP. Bicarbonate drip  3. Medical management per Hospitalist Service  4. Will apply Tubigrip from base of toes to below knee for compression LE's  5. Will need to wear knee high compression socks, 20-30mmHg  6.  Vascular will repeat venous duplex outpatient in 8 weeks and discuss study and possibly schedule for IVC removal

## 2020-09-22 NOTE — PROGRESS NOTES
Transfer from ICU to  334 s/p DVT LLE and multiple b/l PE. Currently patient is on Heparin gtt infusing at 8.69 units/kg/hr. PTT level just complete and remains therapeutic at 75.5. No changes to infusion rate. Also, patient is on coumadin 7.5 mg. Patient is A/O pain to back 6/10 norco 5/325 mg given. Patient is on a Bariatric bed Wt 398 lbs.

## 2020-09-22 NOTE — PROGRESS NOTES
Nephrology ([facility]) Consult Note      Patient:  Carla Hernandez  YOB: 1980  Date of Service: 9/22/2020  MRN: [id number]   Acct: [de-identified]   Primary Care Physician: VANDANA Pyle  Advance Directive: Full Code  Admit Date: 9/19/2020       Hospital Day: 3  Referring Provider: Jeanie Ni MD    Patient independently seen and examined, Chart, Consults, Notes, Operative notes, Labs, Cardiology, and Radiology studies reviewed as available. Subjective:  Carla Hernandez is a 38 y.o. male  whom we were consulted for acute renal failure. Patient recalls a history of nephrolithiasis and urine tract infection but no other nephrologic evaluations. Recalled no significant NSAID usage. He initially presented for evaluation of DVT with pulmonary emboli. He underwent vascular surgical procedure with Dr. Medina Johnson. Subsequently developed decreased urine output and creatinine increased from 0.6-3.3. Contrast exposures noted beginning September 19. He was initially seen in the ICU with nursing. No family present. Denied dysuria or hematuria, rash or hemoptysis. Today, no overnight events. Remains in ICU. Urine output marginal.  Family present. Denies chest pain, shortness of air, nausea vomiting. Allergies:  Patient has no known allergies.     Medicines:  Current Facility-Administered Medications   Medication Dose Route Frequency Provider Last Rate Last Dose    warfarin (COUMADIN) tablet 7.5 mg  7.5 mg Oral Once Mitch Lopez MD        [START ON 9/23/2020] famotidine (PEPCID) tablet 20 mg  20 mg Oral Daily Sherice Juan MD        ceFAZolin (ANCEF) 2 g in 0.9% sodium chloride 50 mL IVPB  2 g Intravenous Once Sherice Juan MD        warfarin (COUMADIN) daily dosing (placeholder)   Other RX Placeholder Mitch Lopez MD        heparin (porcine) injection 10,000 Units  10,000 Units Intravenous PRN Sherice Juan MD   10,000 Units at 09/21/20 2252    heparin (porcine) injection 5,000 Units  5,000 Units Intravenous PRN Otilia Billy MD        heparin 25,000 units in dextrose 5% 250 mL infusion  11.69 Units/kg/hr Intravenous Continuous Otilia Billy MD 15.6 mL/hr at 09/22/20 1028 8.69 Units/kg/hr at 09/22/20 1028    sodium bicarbonate 50 mEq in sodium chloride 0.45 % 1,000 mL infusion   Intravenous Continuous Shara Pacyareli PA-C 125 mL/hr at 09/22/20 1051      glipiZIDE (GLUCOTROL) tablet 10 mg  10 mg Oral BID AC Otilia Billy MD   10 mg at 09/22/20 0803    glucose (GLUTOSE) 40 % oral gel 15 g  15 g Oral PRN Otilia Billy MD        dextrose 50 % IV solution  12.5 g Intravenous PRN Otilia Billy MD        glucagon (rDNA) injection 1 mg  1 mg Intramuscular PRN Otilia Billy MD        dextrose 5 % solution  100 mL/hr Intravenous PRN Otilia Billy MD        alteplase (CATHFLO) 10 mg in sodium chloride 0.9 % 250 mL infusion  1 mg/hr Intracatheter Continuous Otilia Billy MD 25 mL/hr at 09/21/20 0532 1 mg/hr at 09/21/20 0532    heparin 25,000 units in dextrose 5 % 250 mL infusion (rate based)  600 Units/hr Intravenous Continuous Otilia Billy MD 6 mL/hr at 09/20/20 1600 600 Units/hr at 09/20/20 1600    ondansetron (ZOFRAN) injection 4 mg  4 mg Intravenous Q8H PRN Otilia Billy MD        HYDROmorphone HCl PF (DILAUDID) injection 0.25 mg  0.25 mg Intravenous Q3H PRN Otilia Billy MD   0.25 mg at 09/20/20 2214    Or    HYDROmorphone HCl PF (DILAUDID) injection 0.5 mg  0.5 mg Intravenous Q3H PRN Otilia Billy MD   0.5 mg at 09/21/20 0724    HYDROcodone-acetaminophen (NORCO) 5-325 MG per tablet 1 tablet  1 tablet Oral Q4H PRN Otilia Billy MD   1 tablet at 09/21/20 2035    Or    HYDROcodone-acetaminophen (NORCO) 5-325 MG per tablet 2 tablet  2 tablet Oral Q4H PRN Otilia Billy MD        hydrALAZINE (APRESOLINE) injection 10 mg  10 mg Intravenous Q6H PRN Buzz Simpson MD   10 mg at 09/20/20 5762    potassium chloride (KLOR-CON M) extended release tablet 40 mEq  40 mEq Oral PRN Breezy Crawford MD        Or    potassium bicarb-citric acid (EFFER-K) effervescent tablet 40 mEq  40 mEq Oral PRN Breezy Crawford MD        Or    potassium chloride 10 mEq/100 mL IVPB (Peripheral Line)  10 mEq Intravenous PRN Breezy Crawford MD        magnesium sulfate 1 g in dextrose 5% 100 mL IVPB  1 g Intravenous PRN Breezy Crawford MD        acetaminophen (TYLENOL) tablet 650 mg  650 mg Oral Q6H PRN Breezy Crawford MD   650 mg at 09/22/20 1200    Or    acetaminophen (TYLENOL) suppository 650 mg  650 mg Rectal Q6H PRN Breezy Crawford MD        polyethylene glycol (GLYCOLAX) packet 17 g  17 g Oral Daily PRN Breezy Crawford MD        promethazine (PHENERGAN) tablet 12.5 mg  12.5 mg Oral Q6H PRN Breezy Crawford MD        Or    [facility] 4 mg  4 mg Intravenous Q6H PRN Breezy Crawford MD   4 mg at 09/21/20 1318    insulin lispro (HUMALOG) injection vial 0-6 Units  0-6 Units Subcutaneous TID WC Breezy Crawford MD   1 Units at 09/21/20 1815    insulin lispro (HUMALOG) injection vial 0-3 Units  0-3 Units Subcutaneous Nightly Breezy Crawford MD   1 Units at 09/21/20 2031       Past Medical History:  Past Medical History:   Diagnosis Date    Arthritis     both wrist    Bilateral carpal tunnel syndrome 8/15/2019    Bronchitis     10 yrs ago    Diabetes mellitus ([facility] [address]     Kidney stone     recurrent    Sleep apnea     untested    Umbilical hernia        Past Surgical History:  Past Surgical History:   Procedure Laterality Date    CARPAL TUNNEL RELEASE Left 8/21/2020    LEFT CARPAL TUNNEL RELEASE performed by Abril Davis MD at [facility] Right 7/24/2018    CYSTOSCOPY/ URETEROSCOPY; INSERTION DOUBLE J STENT/  URETERAL performed by Marlon Simmons MD at [address] [place] [address]      both wrists    HEMORRHOID SURGERY N/A 6/24/2016    HEMORRHOID INCISION AND DRAINAGE performed by Michael Quezada MD at [address] CYSTO/URETERO/PYELOSCOPY W/LITHOTRIPSY Right 8/7/2018    URETEROSCOPY LASER LITHOTRIPSY STONE EXTRACTION performed by Marlon Simmons MD at [address] Right 8/7/2018    STENT PLACEMENT performed by Marlon Simmons MD at [address] [facility] Right 2/13/2018    [facility] [address] LITHOTRIPSY performed by Marlon Simmons MD at [address] [facility] Right 3/13/2018    [facility] [address] LITHOTRIPSY performed by Marlon Simmons MD at [address] [facility] Right 7/24/2018    ES[facility] [address] LITHOTRIPSY performed by Marlon Simmons MD at [address] LAP, [address] N/A 4/1/2294    HERNIA UMBILICAL REPAIR LAPAROSCOPIC performed by Jaleel Stewart MD at [address] / [address] Left 9/20/2020     LEFT LOWER EXTREMITY VENOGRAMS; INFERIOR VENA CAVA FILTER PLACEMENT. performed by Breezy Crawford MD at [address]      left wrist.  Pt was a child       Family History  Family History   Problem Relation Age of Onset    Cancer Mother 46        colon cancer    Cancer Father         luekemia    Diabetes Father     Other Maternal Grandmother         copd    Other Maternal Grandfather         copd    Heart Disease Paternal Grandmother        Social History  Social History     Socioeconomic History    Marital status:      Spouse name: Not on file    Number of children: Not on file    Years of education: Not on file    Highest education level: Not on file   Occupational History    Not on file   Social Needs    Financial resource strain: Not on file    Food insecurity     Worry: Not on file     Inability: Not on file    Transportation needs     Medical: Not on file     Non-medical: Not on file   Tobacco Use    Smoking status: Never Smoker    Smokeless tobacco: Never Used   [facility] 159/79 -- -- 102 19 96 % -- --   09/22/20 0200 (!) 166/75 -- -- 108 22 90 % -- --   09/22/20 0100 (!) 157/78 -- -- 106 19 92 % -- --   09/22/20 0000 (!) 142/84 99 °F (37.2 °C) Temporal 110 18 94 % 6' (1.829 m) (!) 398 lb 8 oz (180.8 kg)   09/21/20 2300 (!) 150/82 -- -- 109 18 91 % -- --   09/21/20 2200 138/72 -- -- 101 14 100 % -- --   09/21/20 2100 (!) 146/72 -- -- 107 19 95 % -- --   09/21/20 2000 (!) 148/60 98.3 °F (36.8 °C) Temporal 107 16 95 % -- --   09/21/20 1900 (!) 168/81 98.1 °F (36.7 °C) Temporal 111 18 93 % -- --   09/21/20 1800 (!) 148/84 -- -- 109 23 96 % -- --   09/21/20 1756 -- -- -- -- 18 96 % -- --   09/21/20 1700 (!) 160/93 -- -- 101 16 94 % -- --   09/21/20 1600 (!) 149/70 98.2 °F (36.8 °C) Temporal 101 18 93 % -- --   09/21/20 1500 (!) 158/84 -- -- 97 13 94 % -- --   09/21/20 1445 (!) 148/83 -- -- 100 16 92 % -- --   09/21/20 1430 -- -- -- 96 14 94 % -- --   09/21/20 1413 (!) 151/88 -- -- -- -- -- -- --   09/21/20 1411 (!) 151/88 -- -- 101 17 94 % -- --   09/21/20 1406 (!) 157/81 -- -- 98 17 95 % -- --   09/21/20 1403 -- -- -- 104 12 94 % -- --   09/21/20 1400 (!) 154/89 -- -- 100 20 95 % -- --   09/21/20 1356 (!) 146/92 -- -- 101 18 96 % -- --   09/21/20 1351 (!) 161/88 -- -- 104 17 93 % -- --   09/21/20 1346 (!) 150/86 -- -- 102 19 94 % -- --   09/21/20 1343 (!) 150/86 -- -- 102 19 94 % -- --       Intake/Output Summary (Last 24 hours) at 9/22/2020 1342  Last data filed at 9/22/2020 0600  Gross per 24 hour   Intake 2713.92 ml   Output 205 ml   Net 2508.92 ml     General: awake/alert   Chest:  clear to auscultation bilaterally  CVS: regular rate and rhythm  Abdominal: soft, nontender, normal bowel sounds  Extremities: no cyanosis or edema  Skin: warm and dry without rash      Labs:  BMP:   Recent Labs     09/20/20  0559 09/21/20  1035 09/21/20  1917 09/22/20  0351    141 137 138   K 4.1 5.1* 4.7 4.6   CL 99 104 104 103   CO2 26 22 20* 21*   PHOS 3.3 3.5  --  4.0   BUN 7 30* 39* 44* CREATININE 0.6 3.3* 4.6* 5.6*   CALCIUM 8.9 7.5* 8.0* 7.9*     CBC:   Recent Labs     09/20/20  0559 09/20/20  2250 09/21/20  1035   WBC 9.4 24.9* 18.5*   HGB 14.9 14.4 12.9*   HCT 45.7 44.6 41.2*   MCV 87.2 89.6 93.2    234 173     LIVER PROFILE:   Recent Labs     09/19/20  1718   AST 21   ALT 32   BILITOT 0.3   ALKPHOS 99     PT/INR:   Recent Labs     09/19/20  1718 09/22/20  0351   PROTIME 14.3 16.7*   INR 1.11 1.35*     APTT:   Recent Labs     09/21/20  2200 09/22/20  0351 09/22/20  0940   APTT 30.5 169.6* 80.6*     BNP:  No results for input(s): BNP in the last 72 hours. Ionized Calcium:No results for input(s): IONCA in the last 72 hours. Magnesium:  Recent Labs     09/20/20  0559 09/21/20  1035 09/22/20  0351   MG 1.8 1.7 1.6     Phosphorus:  Recent Labs     09/20/20  0559 09/21/20  1035 09/22/20  0351   PHOS 3.3 3.5 4.0     HgbA1C:   Recent Labs     09/20/20  0559   LABA1C 8.8*     Hepatic:   Recent Labs     09/19/20  1718   ALKPHOS 99   ALT 32   AST 21   PROT 7.4   BILITOT 0.3   LABALBU 4.1     Lactic Acid: No results for input(s): LACTA in the last 72 hours. Troponin: No results for input(s): CKTOTAL, CKMB, TROPONINT in the last 72 hours. ABGs: No results for input(s): PH, PCO2, PO2, HCO3, O2SAT in the last 72 hours. CRP:  No results for input(s): CRP in the last 72 hours. Sed Rate:  No results for input(s): SEDRATE in the last 72 hours. Cultures:   No results for input(s): CULTURE in the last 72 hours. No results for input(s): BC, [place]el in the last 72 hours. No results for input(s): CXSURG in the last 72 hours.     Radiology reports as per the Radiologist  Radiology: Vl Extremity Venous Left    Result Date: 9/20/2020  Vascular Lower Extremities DVT Study Procedure  Demographics   Patient Name    Oscar Rebolledo Age                   38   Patient Number  [id number]           Gender                Male   Visit Number    [id number]        Interpreting          Oscar Velarde MD ----------------------------------------------------------------  Electronically signed by Marysol Bonilla MD(Interpreting  physician) on 09/20/2020 09:28 AM  ----------------------------------------------------------------  Velocities are measured in cm/s ; Diameters are measured in mm Right Lower Extremities DVT Study Measurements Right 2D Measurements +------------------------------------+----------+---------------+----------+ ! Location                            ! Visualized! Compressibility! Thrombosis! +------------------------------------+----------+---------------+----------+ ! Sapheno Femoral Junction            ! Yes       ! Yes            ! None      ! +------------------------------------+----------+---------------+----------+ ! Common Femoral                      !Yes       ! Yes            ! None      ! +------------------------------------+----------+---------------+----------+ Left Lower Extremities DVT Study Measurements Left 2D Measurements +------------------------------------+----------+---------------+----------+ ! Location                            ! Visualized! Compressibility! Thrombosis! +------------------------------------+----------+---------------+----------+ ! Sapheno Femoral Junction            ! Yes       ! Yes            ! None      ! +------------------------------------+----------+---------------+----------+ ! Common Femoral                      !Yes       ! No             !None      ! +------------------------------------+----------+---------------+----------+ ! Prox Femoral                        !Yes       ! No             !None      ! +------------------------------------+----------+---------------+----------+ ! Mid Femoral                         !Yes       ! No             !None      ! +------------------------------------+----------+---------------+----------+ ! Dist Femoral                        !Partial   !No             !None      ! +------------------------------------+----------+---------------+----------+ ! Deep Femoral                        !Partial   !Yes            ! None      ! +------------------------------------+----------+---------------+----------+ ! Popliteal                           !Partial   !No             !None      ! +------------------------------------+----------+---------------+----------+ ! SSV                                 ! Yes       ! No             !None      ! +------------------------------------+----------+---------------+----------+ ! Gastroc                             ! Partial   !No             !None      ! +------------------------------------+----------+---------------+----------+ ! PTV                                 ! Partial   !No             !None      ! +------------------------------------+----------+---------------+----------+ ! GSV                                 ! Yes       ! Yes            ! None      ! +------------------------------------+----------+---------------+----------+ ! ATV                                 ! No        !               !          ! +------------------------------------+----------+---------------+----------+ ! Peroneal                            !No        !               !          ! +------------------------------------+----------+---------------+----------+    Xr Chest Portable    Result Date: 9/20/2020  EXAMINATION:  XR CHEST PORTABLE  9/20/2020 1:15 PM HISTORY: Central line adjustment. COMPARISON: 9/20/2020. FINDINGS:  The central venous catheter has been repositioned and now extends into the superior vena cava. The patient remains intubated with the endotracheal tube tip at the T2-3 level. There is a new esophageal monitoring lead. There is again poor inspiration with bilateral infiltrates. There is cardiomegaly. The left costophrenic angle is not fully included on this study. 1. Central venous catheter has been repositioned and now extends into the superior vena cava.  No evidence of pneumothorax. 2. Poor inspiration. 3. Bilateral infiltrates may be related to the poor inspiratory effort. Edema and pneumonia are possible. 4. Cardiomegaly. Signed by Dr Tran Waldron on 9/20/2020 1:16 PM    Xr Chest Portable    Result Date: 9/20/2020  EXAMINATION:  XR CHEST PORTABLE  9/20/2020 12:26 PM HISTORY: Central venous catheter placement. COMPARISON: 7/8/2019. FINDINGS:  There has been placement of a right IJ central venous catheter. The catheter extends to the right into the right subclavian vein. There is no evidence of pneumothorax. The patient is intubated with the endotracheal tube tip at the T2-3 level. There is very poor inspiratory effort. There is vascular crowding. There are bilateral infiltrates. There is cardiomegaly. 1. Right IJ central venous catheter placement with catheter tip extending to the right into the right subclavian vein. No evidence of pneumothorax. 2. Endotracheal tube tip at the T2-3 level. 3. Very poor inspiration with vascular crowding. 4. Bilateral infiltrates may be related to the poor inspiration. Bilateral pneumonia and pulmonary edema cannot be ruled out on this image. 5. Cardiomegaly. Signed by Dr Tran Waldron on 9/20/2020 12:28 PM    Cta Pulmonary W Contrast    Result Date: 9/19/2020  EXAMINATION: CTA PULMONARY W CONTRAST 9/19/2020 6:13 PM HISTORY: Shortness of breath, clinical concern for pulmonary embolism. DOSE: 804 mGycm. Automatic exposure control was utilized in an effort to use as little radiation as possible, without compromising image quality. REPORT: Spiral CT of the chest was performed after administration of intravenous contrast using CTA protocol, which includes reconstructed coronal, sagittal and 3-D images. COMPARISON: There are no correlative imaging studies for comparison. The contrast bolus is satisfactory, there is respiratory motion artifact.  The pulmonary arteries are normal in caliber, there are multiple filling defects within the pulmonary arteries on the right, involving the third, fourth and fifth order branches extending into the right lower lobe there is mild straightening of the cardiac ventricular septum, which may indicate mild right heart strain. No left-sided pulmonary emboli are identified. The thoracic aorta is normal in caliber, no evidence of dissection is identified. Heart size is normal. No intrathoracic lymphadenopathy is identified. Small calcified subcarinal lymph nodes are compatible with healed granulomatous disease. The thyroid gland is homogeneous and normal. Review of lung windows demonstrates scattered calcified granulomas in both lungs. There is no parenchymal infiltrate or consolidation. No pneumothorax or pleural effusion is identified. The airways are patent. The visualized upper abdomen shows decreased attenuation of the liver parenchyma compatible with fatty infiltration. There is bilateral gynecomastia, mild. Review of bone windows shows fused syndesmophytes throughout the mid and lower thoracic spine. No acute osseous abnormalities identified. 1. Multiple acute pulmonary emboli identified on the right, involving the third, fourth and fifth order pulmonary artery branches extending into the right lower lobe. There are findings that are suggestive of mild right heart strain. Signed by Dr Ramón Vaughn.  Geo on 9/19/2020 6:20 PM       Assessment   Acute renal failure stage III  Risk factors include contrast-induced nephropathy or renal emboli  Morbid obesity  DVT and pulmonary emboli  Diabetes type 2 uncontrolled by A1c  Hyperkalemia  Metabolic acidosis  Vitamin D deficiency  Secondary hyperparathyroidism      Plan:  Discussed with patient, nursing, family, vascular  Work-up ordered ongoing  Continue bicarbonate containing IV fluids  Reviewed possibilities and methods of dialysis with the patient, major issue tomorrow if no improvement  Vitamin D    Thank you for the consult, we appreciate the opportunity to provide care to your patients. Feel free to contact me if I can be of any further assistance.       Sacha Gillis MD  09/22/20  1:42 PM

## 2020-09-22 NOTE — PROGRESS NOTES
Pharmacy Renal Adjustment    Filippo Montemayor is a 38 y.o. male. Pharmacy has renally adjusted medications per protocol. Recent Labs     09/21/20 1917 09/22/20  0351   BUN 39* 44*       Recent Labs     09/21/20 1917 09/22/20  0351   CREATININE 4.6* 5.6*       Estimated Creatinine Clearance: 29 mL/min (A) (based on SCr of 5.6 mg/dL (H)). Height:   Ht Readings from Last 1 Encounters:   09/22/20 6' (1.829 m)     Weight:  Wt Readings from Last 1 Encounters:   09/22/20 (!) 398 lb 8 oz (180.8 kg)     Plan: Adjust the following medications based on renal function:           Pepcid adjusted to 20 mg by mouth daily.     Electronically signed by Reggie Gross PharmD, BCPS on 9/22/2020 at 1:22 PM

## 2020-09-22 NOTE — PROGRESS NOTES
Noman Beavers transferred  from [address] [place] [address]   via [facility] bed. Reason for transfer:lower level of care  Explained reason for transfer to Patient and Family. Belongings: phone with patient at bedside . Soft chart transferred with patient: Yes. Telemetry box number  transferred with patient: yes. Report given to: Evita via telephone.       Electronically signed by Madison Lebron RN on 9/22/2020 at 5:14 PM

## 2020-09-22 NOTE — PROGRESS NOTES
Clinical Pharmacy Note    Warfarin consult follow-up    Recent Labs     09/22/20  0351   INR 1.35*     Recent Labs     09/20/20  0559 09/20/20  2250 09/21/20  1035   HGB 14.9 14.4 12.9*   HCT 45.7 44.6 41.2*    234 173       Significant Drug-Drug Interactions:  New warfarin drug-drug interactions: None  Discontinued drug-drug interactions: None    Date INR Warfarin Dose   09/19/20 1.11 ---   09/21/20 --- 7.5 mg    09/22/20  1.35   7.5 mg                                        Notes: Give Coumadin 7.5 mg by mouth x 1 dose tonight. Daily PT/INR until stable within therapeutic range.      Electronically signed by Summer MayenD, BCPS on 9/22/2020 at 1:07 PM

## 2020-09-23 ENCOUNTER — APPOINTMENT (OUTPATIENT)
Dept: INTERVENTIONAL RADIOLOGY/VASCULAR | Age: 40
DRG: 166 | End: 2020-09-23
Payer: COMMERCIAL

## 2020-09-23 LAB
ANION GAP SERPL CALCULATED.3IONS-SCNC: 19 MMOL/L (ref 7–19)
ANTICARDIOLIPIN IGG ANTIBODY: 9 GPL (ref 0–14)
APTT: 73.9 SEC (ref 26–36.2)
APTT: 75.2 SEC (ref 26–36.2)
APTT: 82 SEC (ref 26–36.2)
APTT: 89.9 SEC (ref 26–36.2)
BASOPHILS ABSOLUTE: 0 K/UL (ref 0–0.2)
BASOPHILS RELATIVE PERCENT: 0.4 % (ref 0–1)
BETA-2 GLYCOPROTEIN 1 IGG ANTIBODY: 2 SGU (ref 0–20)
BETA-2 GLYCOPROTEIN 1 IGM ANTIBODY: 2 SMU (ref 0–20)
BUN BLDV-MCNC: 60 MG/DL (ref 6–20)
CALCIUM SERPL-MCNC: 7.5 MG/DL (ref 8.6–10)
CARDIOLIPIN AB IGM: 0 MPL (ref 0–12)
CHLORIDE BLD-SCNC: 98 MMOL/L (ref 98–111)
CO2: 20 MMOL/L (ref 22–29)
CREAT SERPL-MCNC: 7.9 MG/DL (ref 0.5–1.2)
EOSINOPHILS ABSOLUTE: 0.1 K/UL (ref 0–0.6)
EOSINOPHILS RELATIVE PERCENT: 0.8 % (ref 0–5)
FIBRINOGEN: 434 MG/DL (ref 238–505)
GFR AFRICAN AMERICAN: 9
GFR NON-AFRICAN AMERICAN: 8
GLUCOSE BLD-MCNC: 129 MG/DL (ref 70–99)
GLUCOSE BLD-MCNC: 132 MG/DL (ref 74–109)
GLUCOSE BLD-MCNC: 135 MG/DL (ref 70–99)
GLUCOSE BLD-MCNC: 89 MG/DL (ref 70–99)
GLUCOSE BLD-MCNC: 93 MG/DL (ref 70–99)
HAV IGM SER IA-ACNC: NORMAL
HBV SURFACE AB TITR SER: NORMAL {TITER}
HCT VFR BLD CALC: 33.4 % (ref 42–52)
HEMOGLOBIN: 10.7 G/DL (ref 14–18)
HEPATITIS B CORE IGM ANTIBODY: NORMAL
HEPATITIS B SURFACE ANTIGEN INTERPRETATION: NORMAL
HEPATITIS C ANTIBODY INTERPRETATION: NORMAL
IMMATURE GRANULOCYTES #: 0.1 K/UL
INR BLD: 2.07 (ref 0.88–1.18)
LYMPHOCYTES ABSOLUTE: 1.5 K/UL (ref 1.1–4.5)
LYMPHOCYTES RELATIVE PERCENT: 15.6 % (ref 20–40)
MCH RBC QN AUTO: 28.9 PG (ref 27–31)
MCHC RBC AUTO-ENTMCNC: 32 G/DL (ref 33–37)
MCV RBC AUTO: 90.3 FL (ref 80–94)
MONOCYTES ABSOLUTE: 1.1 K/UL (ref 0–0.9)
MONOCYTES RELATIVE PERCENT: 11.6 % (ref 0–10)
NEUTROPHILS ABSOLUTE: 6.7 K/UL (ref 1.5–7.5)
NEUTROPHILS RELATIVE PERCENT: 70.7 % (ref 50–65)
ORGANISM: ABNORMAL
PDW BLD-RTO: 13.5 % (ref 11.5–14.5)
PERFORMED ON: ABNORMAL
PERFORMED ON: ABNORMAL
PERFORMED ON: NORMAL
PERFORMED ON: NORMAL
PLATELET # BLD: 139 K/UL (ref 130–400)
PMV BLD AUTO: 10.4 FL (ref 9.4–12.4)
POTASSIUM REFLEX MAGNESIUM: 4.3 MMOL/L (ref 3.5–5)
PROTHROMBIN TIME: 23.7 SEC (ref 12–14.6)
RBC # BLD: 3.7 M/UL (ref 4.7–6.1)
SODIUM BLD-SCNC: 137 MMOL/L (ref 136–145)
URINE CULTURE, ROUTINE: ABNORMAL
URINE CULTURE, ROUTINE: ABNORMAL
WBC # BLD: 9.5 K/UL (ref 4.8–10.8)

## 2020-09-23 PROCEDURE — 85730 THROMBOPLASTIN TIME PARTIAL: CPT

## 2020-09-23 PROCEDURE — 6360000002 HC RX W HCPCS: Performed by: SURGERY

## 2020-09-23 PROCEDURE — 0JH63XZ INSERTION OF TUNNELED VASCULAR ACCESS DEVICE INTO CHEST SUBCUTANEOUS TISSUE AND FASCIA, PERCUTANEOUS APPROACH: ICD-10-PCS | Performed by: SURGERY

## 2020-09-23 PROCEDURE — 1210000000 HC MED SURG R&B

## 2020-09-23 PROCEDURE — 99231 SBSQ HOSP IP/OBS SF/LOW 25: CPT | Performed by: INTERNAL MEDICINE

## 2020-09-23 PROCEDURE — 99153 MOD SED SAME PHYS/QHP EA: CPT | Performed by: SURGERY

## 2020-09-23 PROCEDURE — C1769 GUIDE WIRE: HCPCS

## 2020-09-23 PROCEDURE — 77001 FLUOROGUIDE FOR VEIN DEVICE: CPT | Performed by: SURGERY

## 2020-09-23 PROCEDURE — 02HV33Z INSERTION OF INFUSION DEVICE INTO SUPERIOR VENA CAVA, PERCUTANEOUS APPROACH: ICD-10-PCS | Performed by: SURGERY

## 2020-09-23 PROCEDURE — 36558 INSERT TUNNELED CV CATH: CPT | Performed by: SURGERY

## 2020-09-23 PROCEDURE — 2580000003 HC RX 258: Performed by: PHYSICIAN ASSISTANT

## 2020-09-23 PROCEDURE — 2580000003 HC RX 258: Performed by: SURGERY

## 2020-09-23 PROCEDURE — 6360000002 HC RX W HCPCS: Performed by: PHYSICIAN ASSISTANT

## 2020-09-23 PROCEDURE — 76937 US GUIDE VASCULAR ACCESS: CPT | Performed by: SURGERY

## 2020-09-23 PROCEDURE — 85384 FIBRINOGEN ACTIVITY: CPT

## 2020-09-23 PROCEDURE — 36415 COLL VENOUS BLD VENIPUNCTURE: CPT

## 2020-09-23 PROCEDURE — 82947 ASSAY GLUCOSE BLOOD QUANT: CPT

## 2020-09-23 PROCEDURE — 85025 COMPLETE CBC W/AUTO DIFF WBC: CPT

## 2020-09-23 PROCEDURE — 6370000000 HC RX 637 (ALT 250 FOR IP): Performed by: HOSPITALIST

## 2020-09-23 PROCEDURE — 6370000000 HC RX 637 (ALT 250 FOR IP): Performed by: INTERNAL MEDICINE

## 2020-09-23 PROCEDURE — 6370000000 HC RX 637 (ALT 250 FOR IP): Performed by: SURGERY

## 2020-09-23 PROCEDURE — 2500000003 HC RX 250 WO HCPCS: Performed by: SURGERY

## 2020-09-23 PROCEDURE — 99152 MOD SED SAME PHYS/QHP 5/>YRS: CPT | Performed by: SURGERY

## 2020-09-23 PROCEDURE — 5A1D70Z PERFORMANCE OF URINARY FILTRATION, INTERMITTENT, LESS THAN 6 HOURS PER DAY: ICD-10-PCS | Performed by: INTERNAL MEDICINE

## 2020-09-23 PROCEDURE — 80048 BASIC METABOLIC PNL TOTAL CA: CPT

## 2020-09-23 PROCEDURE — 8010000000 HC HEMODIALYSIS ACUTE INPT

## 2020-09-23 PROCEDURE — 85610 PROTHROMBIN TIME: CPT

## 2020-09-23 RX ORDER — SIMETHICONE 80 MG
80 TABLET,CHEWABLE ORAL EVERY 6 HOURS PRN
Status: DISCONTINUED | OUTPATIENT
Start: 2020-09-23 | End: 2020-09-28 | Stop reason: HOSPADM

## 2020-09-23 RX ORDER — FENTANYL CITRATE 50 UG/ML
INJECTION, SOLUTION INTRAMUSCULAR; INTRAVENOUS
Status: COMPLETED | OUTPATIENT
Start: 2020-09-23 | End: 2020-09-23

## 2020-09-23 RX ORDER — MIDAZOLAM HYDROCHLORIDE 1 MG/ML
INJECTION INTRAMUSCULAR; INTRAVENOUS
Status: COMPLETED | OUTPATIENT
Start: 2020-09-23 | End: 2020-09-23

## 2020-09-23 RX ORDER — FAMOTIDINE 20 MG/1
10 TABLET, FILM COATED ORAL DAILY
Status: DISCONTINUED | OUTPATIENT
Start: 2020-09-24 | End: 2020-09-28 | Stop reason: HOSPADM

## 2020-09-23 RX ORDER — HEPARIN SODIUM 5000 [USP'U]/ML
INJECTION, SOLUTION INTRAVENOUS; SUBCUTANEOUS
Status: COMPLETED | OUTPATIENT
Start: 2020-09-23 | End: 2020-09-23

## 2020-09-23 RX ORDER — HEPARIN SODIUM 1000 [USP'U]/ML
INJECTION, SOLUTION INTRAVENOUS; SUBCUTANEOUS
Status: COMPLETED | OUTPATIENT
Start: 2020-09-23 | End: 2020-09-23

## 2020-09-23 RX ORDER — CHLORHEXIDINE GLUCONATE 4 G/100ML
SOLUTION TOPICAL ONCE
Status: COMPLETED | OUTPATIENT
Start: 2020-09-23 | End: 2020-09-23

## 2020-09-23 RX ORDER — WARFARIN SODIUM 5 MG/1
5 TABLET ORAL
Status: COMPLETED | OUTPATIENT
Start: 2020-09-23 | End: 2020-09-23

## 2020-09-23 RX ORDER — LIDOCAINE HYDROCHLORIDE 20 MG/ML
INJECTION, SOLUTION INFILTRATION; PERINEURAL
Status: COMPLETED | OUTPATIENT
Start: 2020-09-23 | End: 2020-09-23

## 2020-09-23 RX ADMIN — HYDROCODONE BITARTRATE AND ACETAMINOPHEN 2 TABLET: 5; 325 TABLET ORAL at 20:34

## 2020-09-23 RX ADMIN — SIMETHICONE 80 MG: 80 TABLET, CHEWABLE ORAL at 04:16

## 2020-09-23 RX ADMIN — VANCOMYCIN HYDROCHLORIDE 1000 MG: 10 INJECTION, POWDER, LYOPHILIZED, FOR SOLUTION INTRAVENOUS at 13:17

## 2020-09-23 RX ADMIN — MEROPENEM 500 MG: 500 INJECTION, POWDER, FOR SOLUTION INTRAVENOUS at 17:18

## 2020-09-23 RX ADMIN — MIDAZOLAM 1 MG: 1 INJECTION INTRAMUSCULAR; INTRAVENOUS at 13:06

## 2020-09-23 RX ADMIN — CHLORHEXIDINE GLUCONATE: 213 SOLUTION TOPICAL at 09:46

## 2020-09-23 RX ADMIN — HEPARIN SODIUM 4000 UNITS: 1000 INJECTION, SOLUTION INTRAVENOUS; SUBCUTANEOUS at 13:27

## 2020-09-23 RX ADMIN — FENTANYL CITRATE 25 MCG: 50 INJECTION INTRAMUSCULAR; INTRAVENOUS at 13:07

## 2020-09-23 RX ADMIN — GLIPIZIDE 10 MG: 10 TABLET ORAL at 17:18

## 2020-09-23 RX ADMIN — FENTANYL CITRATE 25 MCG: 50 INJECTION INTRAMUSCULAR; INTRAVENOUS at 13:10

## 2020-09-23 RX ADMIN — WARFARIN SODIUM 5 MG: 5 TABLET ORAL at 17:18

## 2020-09-23 RX ADMIN — FAMOTIDINE 20 MG: 20 TABLET ORAL at 08:43

## 2020-09-23 RX ADMIN — MIDAZOLAM 1 MG: 1 INJECTION INTRAMUSCULAR; INTRAVENOUS at 13:10

## 2020-09-23 RX ADMIN — HEPARIN SODIUM 7.69 UNITS/KG/HR: 10000 INJECTION, SOLUTION INTRAVENOUS at 09:23

## 2020-09-23 RX ADMIN — GLIPIZIDE 10 MG: 10 TABLET ORAL at 05:46

## 2020-09-23 RX ADMIN — HEPARIN SODIUM 5000 UNITS: 5000 INJECTION, SOLUTION INTRAVENOUS; SUBCUTANEOUS at 13:08

## 2020-09-23 RX ADMIN — LIDOCAINE HYDROCHLORIDE 20 ML: 20 INJECTION, SOLUTION INFILTRATION; PERINEURAL at 13:09

## 2020-09-23 RX ADMIN — FENTANYL CITRATE 50 MCG: 50 INJECTION INTRAMUSCULAR; INTRAVENOUS at 13:14

## 2020-09-23 RX ADMIN — MEROPENEM 1 G: 1 INJECTION, POWDER, FOR SOLUTION INTRAVENOUS at 05:46

## 2020-09-23 ASSESSMENT — PAIN - FUNCTIONAL ASSESSMENT: PAIN_FUNCTIONAL_ASSESSMENT: ACTIVITIES ARE NOT PREVENTED

## 2020-09-23 ASSESSMENT — PAIN SCALES - GENERAL
PAINLEVEL_OUTOF10: 1
PAINLEVEL_OUTOF10: 8
PAINLEVEL_OUTOF10: 6
PAINLEVEL_OUTOF10: 1

## 2020-09-23 ASSESSMENT — PAIN DESCRIPTION - FREQUENCY: FREQUENCY: CONTINUOUS

## 2020-09-23 ASSESSMENT — PAIN DESCRIPTION - PROGRESSION: CLINICAL_PROGRESSION: GRADUALLY IMPROVING

## 2020-09-23 ASSESSMENT — PAIN DESCRIPTION - ONSET: ONSET: ON-GOING

## 2020-09-23 ASSESSMENT — PAIN DESCRIPTION - PAIN TYPE: TYPE: ACUTE PAIN

## 2020-09-23 ASSESSMENT — PAIN DESCRIPTION - ORIENTATION: ORIENTATION: LEFT

## 2020-09-23 ASSESSMENT — PAIN DESCRIPTION - DESCRIPTORS: DESCRIPTORS: ACHING

## 2020-09-23 ASSESSMENT — PAIN DESCRIPTION - LOCATION: LOCATION: LEG

## 2020-09-23 NOTE — PROGRESS NOTES
Pharmacy Renal Adjustment    Danielle Patel is a 38 y.o. male. Pharmacy has renally adjusted medications per protocol. Recent Labs     09/22/20  0351 09/23/20  0511   BUN 44* 60*       Recent Labs     09/22/20  0351 09/23/20  0511   CREATININE 5.6* 7.9*       Estimated Creatinine Clearance: 21 mL/min (A) (based on SCr of 7.9 mg/dL (H)).     Height:   Ht Readings from Last 1 Encounters:   09/22/20 6' (1.829 m)     Weight:  Wt Readings from Last 1 Encounters:   09/23/20 (!) 409 lb 3.2 oz (185.6 kg)       Plan: Adjust the following medications based on renal function:           Famotidine 20 mg po daily to 10 mg po daily    Electronically signed by Maude Noel [facility] on 9/23/2020 at 12:26 PM

## 2020-09-23 NOTE — PROGRESS NOTES
PROGRESS NOTE    Pt Name: Shayan Lebron  MRN: [id number]  YOB: 1980  Date of evaluation: 9/23/2020    Subjective  \" I do not feel well this morning, I have an indigestion problem\". Sitting up in chair. Denies any short of breath. Denies any chest pain. UFH running. Discussed with nursing staff.       Darryl Cabral is a 50-year-old  gentleman admitted to [address] [place] [address] ED on 9/19/2020 having presented with pain and swelling of the left leg, burning in the right side of his chest with evaluation leading to a diagnosis of left lower extremity DVT and PE.  Hematology consultation requested.     HEMATOLOGICAL HISTORY: Left lower extremity DVT with right lung pulmonary emboli 9/19/2020  Darryl Cabral was seen in initial hematology consultation on 9/20/2020 as an inpatient at [address] [place] [address] having been admitted on 9/19/2020 with left lower extremity DVT and PE.  He was placed on heparin as initial management.     Mr. Sidney Pierre has a 5-day history of left leg pain and swelling.  Initially he thought he had pulled a muscle but this did not get better.  When things did not get better, he sought evaluation at the ED at [address] [place] [address]. Additionally he has nonspecific symptoms of pulmonary embolus including burning in the right side of his chest.  He does not have hemoptysis or dyspnea. Mr. Sidney Pierre does not have a personal history of DVT or PE or hypercoagulability. Risk features include:   Obesity.    Recent left hand carpal tunnel surgery in August 2020.   Family history: His mother had an episode of left lower extremity DVT and PE 15 years ago without recurrence.  A maternal aunt also had DVT of the lower extremities.     Noninvasive venous studies of the lower extremities on 9/19/2020 document:   · Extensive thrombosis (DVT) noted in Lt common femoral vein, Lt SFV (prox, mid and distal), Lt popliteal vein, Lt Gastrocs, Lt posterior tibial veins.  A floating tail is noted in left common femoral vein measuring approximately 4.3cm.     · SVT: thrombus noted in Lt LSV     Pulmonary CTA with contrast on 9/19/2020 documented the following:  · Multiple right-sided acute pulmonary emboli identified involving the right third fourth and fifth ordered pulmonary artery branches extending into the right lower lobe. · Possible right heart strain  · No left sided pulmonary emboli identified  · Small calcified subcarinal lymph nodes consistent with healed granulomatous disease  · Scattered calcified granulomas in both lungs     He was seen by Dr. Jennifer Maxwell from vascular surgery this morning (9/20/2020), with recommendations for percutaneous mechanical thrombectomy/pulse spray thrombolysis to try to prevent long-term swelling in this young patient with iliofemoral DVT.  If the result in that procedure is not satisfactory, he would consider placing a TPA thrombolyzes catheter for thrombolysis overnight.  The patient agreed to proceed. Dr. Jennifer Maxwell did not feel that he needed TPA thrombolysis of the pulmonary embolism because he is fairly asymptomatic from that standpoint.     Agree with plans as outlined  A serologic prothrombotic work-up will be requested.         REVIEW OF SYSTEMS:   CONSTITUTIONAL: no fever, no night sweats, fatigue;  HEENT: no blurring of vision, no double vision, no hearing difficulty, no tinnitus, no ulceration, no dysplasia, no epistaxis;  LUNGS: no cough, no hemoptysis, no wheeze,  no shortness of breath;  CARDIOVASCULAR: no palpitation, no chest pain, no shortness of breath;  GI: Indigestion, stomach is tight, no abdominal pain, nausea, no vomiting, no diarrhea, no constipation;  RAKAN: no dysuria, no hematuria, no frequency or urgency, no nephrolithiasis;  MUSCULOSKELETAL: no joint pain, no swelling, no stiffness;  ENDOCRINE: no polyuria, no polydipsia, no cold or heat intolerance;  HEMATOLOGY: no easy bruising or bleeding, no history of clotting disorder;  DERMATOLOGY: no skin rash, no eczema, no 09/23/2020    INR 1.35 (H) 09/22/2020    INR 1.11 09/19/2020    PROTIME 23.7 (H) 09/23/2020    PROTIME 16.7 (H) 09/22/2020    PROTIME 14.3 09/19/2020       RADIOLOGY STUDIES REVIEWED BY ME:  Vl Extremity Venous Left 09/20/2020   Impression   There is evidence of subacute deep vein thrombosis in the left lower  extremity involving the common femoral, femoral, popliteal, posterior  tibial, and gastrocnemius veins. The anterior tibial and peroneal veins  were not well visualized secondary to edema and limb size. Superficial thrombophlebitis is seen in the short saphenous vein of the  left lower extremity. Floating tail left common femoral vein around 4 cm in length.        Cta Pulmonary W Contrast 09/19/2020  Multiple acute pulmonary emboli identified on the right, involving the third, fourth and fifth order pulmonary artery branches extending into the right lower lobe. There are findings that are suggestive of mild right heart strain.      ASSESSMENT:  #Venous thromboembolism- provoked event ? ?(Recent surgery)  Risk factors: Morbid obesity, recent surgery  Currently receiving catheter directed thrombolysis. · Percutaneous thrombectomy/PulseSpray thrombolysis with AngioJet Zelante catheter  · Left lower extremity venograms after percutaneous thrombectomy and thrombolysis  · Placement of 40 cm infusion length EKOS TPA catheter from the popliteal vein all the way to the distal external iliac vein  · Inferior vena cava filter placement from a right internal jugular vein approach (Bard Heather inferior vena cava filter)  · 9/21/2002-removal of EKOS TPA catheter     Will continue with UFH due to poor renal function and warfarin to target INR 2-3. INR 2.09. Continue UFH for at least another 24h.     #Contrast-induced nephropathy-nephrology following. Worsening  Creatinine 5.6->7.9    #Morbid obesity-BMI above 50.-As per primary care provider. Not treating physician.   DOACS not a good option as this was not studied in patients BMI above 40. Pharmacy to dose warfarin.     #Family history of VTE-apparently mother had 2 episodes of a provoked event.     #Neutrophil leukocytosis-likely reactive.  Continue to monitor. Normalized.     #Vitamin D deficiency-10.7. As per nephrology. Vitamin D 50,000 weekly    E coli UTI- as per hospitalist    #Normocytic anemia- anemia of blood loss/acute illness. Hemoglobin 10.7/MCV 90.3     PLAN:  Continue full dose anticoagulation with UFH  times another 24 hours due until INR 2-3 on warfarin  Continue warfarin to target INR 2-3  Follow-up results of thrombophilia work-up. Will arrange follow-up in clinic. I have seen, examined and reviewed this patient medication list, appropriate labs and imaging studies. I reviewed relevant medical records and others physicians notes. I discussed the plans of care with the patient. I answered all the questions to the patients satisfaction. I have also reviewed the chief complaint (CC) and part of the history (History of Present Illness (HPI), Past Family Social History [facility]), or Review of Systems (ROS) and made changes when appropriated.        (Please note that portions of this note were completed with a voice recognition program. Efforts were made to edit the dictations but occasionally words are mis-transcribed.)      Mitch Lopez MD    09/23/20  7:24 AM

## 2020-09-23 NOTE — PLAN OF CARE
Problem: Falls - Risk of:  Goal: Will remain free from falls  Description: Will remain free from falls  Outcome: Ongoing  Goal: Absence of physical injury  Description: Absence of physical injury  Outcome: Ongoing     Problem: Pain:  Goal: Pain level will decrease  Description: Pain level will decrease  Outcome: Ongoing  Goal: Control of acute pain  Description: Control of acute pain  Outcome: Ongoing  Goal: Control of chronic pain  Description: Control of chronic pain  Outcome: Ongoing     Problem: Skin Integrity:  Goal: Will show no infection signs and symptoms  Description: Will show no infection signs and symptoms  Outcome: Ongoing  Goal: Absence of new skin breakdown  Description: Absence of new skin breakdown  Outcome: Ongoing   Electronically signed by Naif Carpenter RN on 9/23/2020 at 2:42 AM

## 2020-09-23 NOTE — PLAN OF CARE
Problem: Falls - Risk of:  Goal: Will remain free from falls  Description: Will remain free from falls  9/23/2020 1259 by Marcelo Mccracken RN  Outcome: Ongoing  9/23/2020 0242 by Kelle Diaz RN  Outcome: Ongoing  Goal: Absence of physical injury  Description: Absence of physical injury  9/23/2020 1259 by Marcelo Mccracken RN  Outcome: Ongoing  9/23/2020 0242 by Kelle Diaz RN  Outcome: Ongoing     Problem: Pain:  Goal: Pain level will decrease  Description: Pain level will decrease  9/23/2020 1259 by Marcelo Mccracken RN  Outcome: Ongoing  9/23/2020 0242 by Kelle Diaz RN  Outcome: Ongoing  Goal: Control of acute pain  Description: Control of acute pain  9/23/2020 1259 by Marcelo Mccracken RN  Outcome: Ongoing  9/23/2020 0242 by Kelle Diaz RN  Outcome: Ongoing  Goal: Control of chronic pain  Description: Control of chronic pain  9/23/2020 1259 by Marcelo Mccracken RN  Outcome: Ongoing  9/23/2020 0242 by Kelle Diaz RN  Outcome: Ongoing     Problem: Skin Integrity:  Goal: Will show no infection signs and symptoms  Description: Will show no infection signs and symptoms  9/23/2020 1259 by Marcelo Mccracken RN  Outcome: Ongoing  9/23/2020 0242 by Kelle Diaz RN  Outcome: Ongoing  Goal: Absence of new skin breakdown  Description: Absence of new skin breakdown  9/23/2020 1259 by Marcelo Mccracken RN  Outcome: Ongoing  9/23/2020 0242 by Kelle Diaz RN  Outcome: Ongoing

## 2020-09-23 NOTE — PROGRESS NOTES
Pharmacy Renal Adjustment    Noman Beavers is a 38 y.o. male. Pharmacy has renally adjusted medications per protocol. Recent Labs     09/22/20  0351 09/23/20  0511   BUN 44* 60*       Recent Labs     09/22/20  0351 09/23/20  0511   CREATININE 5.6* 7.9*       Estimated Creatinine Clearance: 21 mL/min (A) (based on SCr of 7.9 mg/dL (H)).     Height:   Ht Readings from Last 1 Encounters:   09/22/20 6' (1.829 m)     Weight:  Wt Readings from Last 1 Encounters:   09/23/20 (!) 409 lb 3.2 oz (185.6 kg)       Plan: Adjust the following medications based on renal function:           Meropenem 1 g IV every 8 hours to 500 mg IV every 12 hours    Electronically signed by Martine Baird, [address] [place] [address] on 9/23/2020 at 12:29 PM

## 2020-09-23 NOTE — PROGRESS NOTES
Nephrology ([facility]) Consult Note      Patient:  Portia Chu  YOB: 1980  Date of Service: 9/23/2020  MRN: [id number]   Acct: [de-identified]   Primary Care Physician: VANDANA Singh  Advance Directive: Full Code  Admit Date: 9/19/2020       Hospital Day: 4  Referring Provider: Drinda Romberg, MD    Patient independently seen and examined, Chart, Consults, Notes, Operative notes, Labs, Cardiology, and Radiology studies reviewed as available. Subjective:  Portia Chu is a 38 y.o. male  whom we were consulted for acute renal failure. Patient recalls a history of nephrolithiasis and urine tract infection but no other nephrologic evaluations. Recalled no significant NSAID usage. He initially presented for evaluation of DVT with pulmonary emboli. He underwent vascular surgical procedure with Dr. Kraig Barnes. Subsequently developed decreased urine output and creatinine increased from 0.6-3.3. Contrast exposures noted beginning September 19. He was initially seen in the ICU with nursing. No family present. Denied dysuria or hematuria, rash or hemoptysis. Today, no overnight events. Transferred to floor. Urine output marginal.  Family present. Denies chest pain, shortness of air, nausea vomiting. Dialysis   Pt was seen on RRT  Modality: Hemodialysis  Access: Catheter  Location: right upper  QB: 250  QD: 500  UF: 1250      Allergies:  Patient has no known allergies.     Medicines:  Current Facility-Administered Medications   Medication Dose Route Frequency Provider Last Rate Last Dose    simethicone (MYLICON) chewable tablet 80 mg  80 mg Oral Q6H PRN Ugo Todd MD   80 mg at 09/23/20 0416    vancomycin (VANCOCIN) 1 g in dextrose 5% 250 mL IVPB  1,000 mg Intravenous Once Sepideh Castro MD   1,000 mg at 09/23/20 1317    warfarin (COUMADIN) tablet 5 mg  5 mg Oral Once Pat Cade MD        [START ON 9/24/2020] famotidine (PEPCID) tablet 10 mg  10 mg Oral Daily Juan Diego Hill MD        meropenem [facility]) 500 mg in sterile water 10 mL IV syringe  500 mg Intravenous Q12H Julio Cesar Bobo PA-C        vitamin D (ERGOCALCIFEROL) capsule 50,000 Units  50,000 Units Oral Weekly Janis Up MD   50,000 Units at 09/22/20 1618    warfarin (COUMADIN) daily dosing (placeholder)   Other RX Placeholder Juan Diego Hill MD        heparin (porcine) injection 10,000 Units  10,000 Units Intravenous PRN Juan Diego Hill MD   10,000 Units at 09/21/20 2252    heparin (porcine) injection 5,000 Units  5,000 Units Intravenous PRNANCY Hill MD        heparin 25,000 units in dextrose 5% 250 mL infusion  11.69 Units/kg/hr Intravenous Continuous Juan Diego Hill MD 13.8 mL/hr at 09/23/20 0923 7.69 Units/kg/hr at 09/23/20 0923    sodium bicarbonate 50 mEq in sodium chloride 0.45 % 1,000 mL infusion   Intravenous Continuous Julio Cesar Bobo PA-C 125 mL/hr at 09/22/20 2012      glipiZIDE (GLUCOTROL) tablet 10 mg  10 mg Oral BID AC Juan Diego Hill MD   10 mg at 09/23/20 0546    glucose (GLUTOSE) 40 % oral gel 15 g  15 g Oral PRN Juan Diego Hill MD        dextrose 50 % IV solution  12.5 g Intravenous PRN Juan Diego Hill MD        glucagon (rDNA) injection 1 mg  1 mg Intramuscular PRN Juan Diego Hill MD        dextrose 5 % solution  100 mL/hr Intravenous PRN Juan Diego Hill MD        ondansetron [facility]) injection 4 mg  4 mg Intravenous Q8H PRN Juan Diego Hill MD        HYDROmorphone HCl PF (DILAUDID) injection 0.25 mg  0.25 mg Intravenous Q3H PRN Juan Diego Hill MD   0.25 mg at 09/20/20 2214    Or    HYDROmorphone HCl PF (DILAUDID) injection 0.5 mg  0.5 mg Intravenous Q3H PRN Juan Diego Hill MD   0.5 mg at 09/22/20 2138    HYDROcodone-acetaminophen (NORCO) 5-325 MG per tablet 1 tablet  1 tablet Oral Q4H ALICEN Juan Diego Hill MD   1 tablet at 09/22/20 1824    Or    HYDROcodone-acetaminophen (NORCO) 5-325 MG per tablet 2 tablet  2 tablet Oral Q4H PRN Franca Kendrick MD        hydrALAZINE (APRESOLINE) injection 10 mg  10 mg Intravenous Q6H PRN Franca Kendrick MD   10 mg at 09/20/20 1658    potassium chloride (KLOR-CON M) extended release tablet 40 mEq  40 mEq Oral PRN Franca Kendrick MD        Or    potassium bicarb-citric acid (EFFER-K) effervescent tablet 40 mEq  40 mEq Oral PRN Frnaca Kendrick MD        Or   [address] [place] [address] potassium chloride 10 mEq/100 mL IVPB (Peripheral Line)  10 mEq Intravenous PRN Franca Kendrick MD        magnesium sulfate 1 g in dextrose 5% 100 mL IVPB  1 g Intravenous PRN Franca Kendrick MD        acetaminophen (TYLENOL) tablet 650 mg  650 mg Oral Q6H PRN Franca Kendrick MD   650 mg at 09/22/20 1200    Or    acetaminophen (TYLENOL) suppository 650 mg  650 mg Rectal Q6H PRN Franca Kendrick MD        polyethylene glycol (GLYCOLAX) packet 17 g  17 g Oral Daily PRN Franca Kendrick MD        promethazine (PHENERGAN) tablet 12.5 mg  12.5 mg Oral Q6H PRN Franca Kendrick MD        Or    [facility]ron [facility]) injection 4 mg  4 mg Intravenous Q6H PRN Franca Kendrick MD   4 mg at 09/21/20 1318    insulin lispro (HUMALOG) injection vial 0-6 Units  0-6 Units Subcutaneous TID WC Franca Kendrick MD   1 Units at 09/22/20 1725    insulin lispro (HUMALOG) injection vial 0-3 Units  0-3 Units Subcutaneous Nightly Franca Kendrick MD   1 Units at 09/21/20 2031       Past Medical History:  Past Medical History:   Diagnosis Date    Arthritis     both wrist    Bilateral carpal tunnel syndrome 8/15/2019    Bronchitis     10 yrs ago    Diabetes mellitus ([address]     Kidney stone     recurrent    Sleep apnea     untested    Umbilical hernia        Past Surgical History:  Past Surgical History:   Procedure Laterality Date    CARPAL TUNNEL RELEASE Left 8/21/2020    LEFT CARPAL TUNNEL RELEASE performed by Chad Albright MD at [address] Right 7/24/2018    CYSTOSCOPY/ URETEROSCOPY; INSERTION DOUBLE J STENT/  URETERAL performed by Sue Scott MD at [facility]      both wrists    HEMORRHOID SURGERY N/A 6/24/2016    HEMORRHOID INCISION AND DRAINAGE performed by Cyndee Morton MD at \[facility]"" 43 CYSTO/URETERO/PYELOSCOPY W/LITHOTRIPSY Right 8/7/2018    URETEROSCOPY LASER LITHOTRIPSY STONE EXTRACTION performed by Sue Scott MD at [address] [place] Right 8/7/2018    STENT PLACEMENT performed by Sue Scott MD at [address] [facility] Right 2/13/2018    [facility] [address] LITHOTRIPSY performed by Sue Scott MD at [address] [facility] Right 3/13/2018    [facility] [address] LITHOTRIPSY performed by Sue Scott MD at [address] [facility] Right 7/24/2018    ES[facility] [address] LITHOTRIPSY performed by Sue Scott MD at \[facility]\"" 43 LAP, VENTRAL HERNIA REPAIR,REDUCIBLE N/A 1/1/7360    HERNIA UMBILICAL REPAIR LAPAROSCOPIC performed by Olya Cotton MD at [address] [facility] [address] / [address] Left 9/20/2020     LEFT LOWER EXTREMITY VENOGRAMS; INFERIOR VENA CAVA FILTER PLACEMENT. performed by Lisa Nuñez MD at [address]      left wrist.  Pt was a child       Family History  Family History   Problem Relation Age of Onset    Cancer Mother 46        colon cancer    Cancer Father         luekemia    Diabetes Father     Other Maternal Grandmother         copd    Other Maternal Grandfather         copd    Heart Disease Paternal Grandmother        Social History  Social History     Socioeconomic History    Marital status:      Spouse name: Not on file    Number of children: Not on file    Years of education: Not on file    Highest education level: Not on file   Occupational History    Not on file   Social Needs    Financial resource strain: Not on file    Food insecurity     Worry: Not on file     Inability: Not on file   [place] Transportation needs     Medical: Not on file     Non-medical: Not on file   Tobacco Use    Smoking status: Never Smoker    Smokeless tobacco: Never Used    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[employer]   Substance and Sexual Activity    Alcohol use: Yes     Comment: OCC    Drug use: No    Sexual activity: Yes     Partners: Female   Lifestyle    Physical activity     Days per week: Not on file     Minutes per session: Not on file    Stress: Not on file   Relationships    Social connections     Talks on phone: Not on file     Gets together: Not on file     Attends [employer] service: Not on file     Active member of club or organization: Not on file     Attends meetings of clubs or organizations: Not on file     Relationship status: Not on file    Intimate partner violence     Fear of current or ex partner: Not on file     Emotionally abused: Not on file     Physically abused: Not on file     Forced sexual activity: Not on file   Other Topics Concern    Not on file   Social History Narrative    Not on file         Review of Systems:  History obtained from chart review and the patient  General ROS: No fever or chills  Respiratory ROS: No cough, shortness of breath, wheezing  Cardiovascular ROS: No chest pain or palpitations  Gastrointestinal ROS: No abdominal pain or melena  Genito-Urinary ROS: No dysuria or hematuria  Musculoskeletal ROS: No joint pain or swelling   14 point ROS reviewed with the patient and negative except as noted above and in the HPI unless unable to obtain.     Objective:  Patient Vitals for the past 24 hrs:   BP Temp Temp src Pulse Resp SpO2 Weight   09/23/20 1331 (!) 145/70 -- -- 106 26 95 % --   09/23/20 1326 (!) 161/84 -- -- 92 19 94 % --   09/23/20 1321 (!) 171/79 -- -- 88 25 96 % --   09/23/20 1316 (!) 152/82 -- -- 85 22 94 % --   09/23/20 1311 (!) 166/83 -- -- 89 17 95 % --   09/23/20 1306 (!) 170/81 -- -- -- 26 94 % --   09/23/20 1305 -- -- -- 88 18 -- --   09/23/20 1303 (!) 190/78 -- -- -- 30 96 % --   09/23/20 1145 -- 98.3 °F (36.8 °C) Oral -- -- -- --   09/23/20 1116 (!) 185/87 100.1 °F (37.8 °C) -- 78 18 91 % --   09/23/20 0830 -- -- -- -- -- -- (!) 409 lb 3.2 oz (185.6 kg)   09/23/20 0659 (!) 160/90 -- -- -- -- -- --   09/23/20 0655 (!) 174/101 99.3 °F (37.4 °C) -- 79 18 93 % --   09/23/20 0027 -- 99.8 °F (37.7 °C) Temporal 94 16 91 % --   09/22/20 1845 (!) 164/93 98.8 °F (37.1 °C) Temporal 91 16 91 % --   09/22/20 1700 (!) 158/88 -- -- 91 22 93 % --   09/22/20 1600 (!) 161/89 97.2 °F (36.2 °C) Temporal 89 18 95 % --   09/22/20 1500 (!) 163/86 -- -- 95 20 94 % --   09/22/20 1400 (!) 140/82 -- -- 99 17 90 % --       Intake/Output Summary (Last 24 hours) at 9/23/2020 1344  Last data filed at 9/23/2020 0829  Gross per 24 hour   Intake 1938.18 ml   Output 335 ml   Net 1603.18 ml     General: awake/alert   Chest:  clear to auscultation bilaterally  CVS: regular rate and rhythm  Abdominal: soft, nontender, normal bowel sounds  Extremities: no cyanosis, ble edema  Skin: warm and dry without rash      Labs:  BMP:   Recent Labs     09/21/20  1035 09/21/20  1917 09/22/20  0351 09/23/20  0511    137 138 137   K 5.1* 4.7 4.6 4.3    104 103 98   CO2 22 20* 21* 20*   PHOS 3.5  --  4.0  --    BUN 30* 39* 44* 60*   CREATININE 3.3* 4.6* 5.6* 7.9*   CALCIUM 7.5* 8.0* 7.9* 7.5*     CBC:   Recent Labs     09/20/20  2250 09/21/20  1035 09/23/20  0511   WBC 24.9* 18.5* 9.5   HGB 14.4 12.9* 10.7*   HCT 44.6 41.2* 33.4*   MCV 89.6 93.2 90.3    173 139     LIVER PROFILE:   No results for input(s): AST, ALT, LIPASE, BILIDIR, BILITOT, ALKPHOS in the last 72 hours. Invalid input(s): AMYLASE,  ALB  PT/INR:   Recent Labs     09/22/20  0351 09/23/20  0511   PROTIME 16.7* 23.7*   INR 1.35* 2.07*     APTT:   Recent Labs     09/22/20  2353 09/23/20  0511 09/23/20  1224   APTT 73.9* 82.0* 75.2*     BNP:  No results for input(s): BNP in the last 72 hours.   Ionized Calcium:No results for input(s): IONCA in the last 72 hours. Magnesium:  Recent Labs     09/21/20  1035 09/22/20  0351   MG 1.7 1.6     Phosphorus:  Recent Labs     09/21/20  1035 09/22/20  0351   PHOS 3.5 4.0     HgbA1C:   No results for input(s): LABA1C in the last 72 hours. Hepatic:   No results for input(s): ALKPHOS, ALT, AST, PROT, BILITOT, BILIDIR, LABALBU in the last 72 hours. Lactic Acid: No results for input(s): LACTA in the last 72 hours. Troponin: No results for input(s): CKTOTAL, CKMB, TROPONINT in the last 72 hours. ABGs: No results for input(s): PH, PCO2, PO2, HCO3, O2SAT in the last 72 hours. CRP:  No results for input(s): CRP in the last 72 hours. Sed Rate:  No results for input(s): SEDRATE in the last 72 hours. Cultures:   No results for input(s): CULTURE in the last 72 hours. No results for input(s): BC, Aundria Velazco in the last 72 hours. No results for input(s): CXSURG in the last 72 hours. Radiology reports as per the Radiologist  Radiology: Vl Extremity Venous Left    Result Date: 9/20/2020  Vascular Lower Extremities DVT Study Procedure  Demographics   Patient Name    Alexandria Cullen Age                   38   Patient Number  [id number]           Gender                Male   Visit Number    [id number]        Interpreting          Jennifer Maxwell MD                                   Physician   Date of Birth   1980       Referring Physician   Jennifer Maxwell MD   Accession       [id number]       [address] [place] [address]  Number  Procedure Type of Study:   Veins:Lower Extremities DVT Study, VL EXTREMITY VENOUS DUPLEX LEFT. Indications for Study:DVT and Venous access. Risk Factors   - The patient's risk factor(s) include: obesity. Impression   Successful ultrasound/duplex guided cannulation of thrombosed left  popliteal vein for sheath placement.    Signature   ----------------------------------------------------------------  Electronically signed by Jennifer Maxwell MD(Interpreting  physician) on 09/20/2020 03:38 PM  ----------------------------------------------------------------      Vl Extremity Venous Left    Result Date: 9/20/2020  Vascular Lower Extremities DVT Study Procedure  Demographics   Patient Name    Go Colvin Age                   38   Patient Number  [id number]           Gender                Male   Visit Number    [id number]        Interpreting          Crystal Epstein MD                                   Physician   Date of Birth   1980       Referring Physician   Nydia Herrera   Accession       [id number]       [address] [facility]  Number  Procedure Type of Study:   Veins:Lower Extremities DVT Study, VL EXTREMITY VENOUS DUPLEX LEFT. Indications for Study:Pain, left lower extremity. Risk Factors   - The patient's risk factor(s) include: obesity. Impression   There is evidence of subacute deep vein thrombosis in the left lower  extremity involving the common femoral, femoral, popliteal, posterior  tibial, and gastrocnemius veins. The anterior tibial and peroneal veins  were not well visualized secondary to edema and limb size. Superficial thrombophlebitis is seen in the short saphenous vein of the  left lower extremity. Floating tail left common femoral vein around 4 cm in length. Signature   ----------------------------------------------------------------  Electronically signed by Crystal Epstein MD(Interpreting  physician) on 09/20/2020 09:28 AM  ----------------------------------------------------------------  Velocities are measured in cm/s ; Diameters are measured in mm Right Lower Extremities DVT Study Measurements Right 2D Measurements +------------------------------------+----------+---------------+----------+ ! Location                            ! Visualized! Compressibility! Thrombosis! +------------------------------------+----------+---------------+----------+ ! Sapheno Femoral Junction            ! Yes       ! Yes            ! None      ! +------------------------------------+----------+---------------+----------+ ! Common Femoral                      !Yes       ! Yes            ! None      ! +------------------------------------+----------+---------------+----------+ Left Lower Extremities DVT Study Measurements Left 2D Measurements +------------------------------------+----------+---------------+----------+ ! Location                            ! Visualized! Compressibility! Thrombosis! +------------------------------------+----------+---------------+----------+ ! Sapheno Femoral Junction            ! Yes       ! Yes            ! None      ! +------------------------------------+----------+---------------+----------+ ! Common Femoral                      !Yes       ! No             !None      ! +------------------------------------+----------+---------------+----------+ ! Prox Femoral                        !Yes       ! No             !None      ! +------------------------------------+----------+---------------+----------+ ! Mid Femoral                         !Yes       ! No             !None      ! +------------------------------------+----------+---------------+----------+ ! Dist Femoral                        !Partial   !No             !None      ! +------------------------------------+----------+---------------+----------+ ! Deep Femoral                        !Partial   !Yes            ! None      ! +------------------------------------+----------+---------------+----------+ ! Popliteal                           !Partial   !No             !None      ! +------------------------------------+----------+---------------+----------+ ! SSV                                 ! Yes       ! No             !None      ! +------------------------------------+----------+---------------+----------+ ! Gastroc                             ! Partial   !No             !None      ! +------------------------------------+----------+---------------+----------+ ! PTV !Partial   !No             !None      ! +------------------------------------+----------+---------------+----------+ ! GSV                                 ! Yes       ! Yes            ! None      ! +------------------------------------+----------+---------------+----------+ ! ATV                                 ! No        !               !          ! +------------------------------------+----------+---------------+----------+ ! Peroneal                            !No        !               !          ! +------------------------------------+----------+---------------+----------+    Xr Chest Portable    Result Date: 9/20/2020  EXAMINATION:  XR CHEST PORTABLE  9/20/2020 1:15 PM HISTORY: Central line adjustment. COMPARISON: 9/20/2020. FINDINGS:  The central venous catheter has been repositioned and now extends into the superior vena cava. The patient remains intubated with the endotracheal tube tip at the T2-3 level. There is a new esophageal monitoring lead. There is again poor inspiration with bilateral infiltrates. There is cardiomegaly. The left costophrenic angle is not fully included on this study. 1. Central venous catheter has been repositioned and now extends into the superior vena cava. No evidence of pneumothorax. 2. Poor inspiration. 3. Bilateral infiltrates may be related to the poor inspiratory effort. Edema and pneumonia are possible. 4. Cardiomegaly. Signed by Dr Woody Villa on 9/20/2020 1:16 PM    Xr Chest Portable    Result Date: 9/20/2020  EXAMINATION:  XR CHEST PORTABLE  9/20/2020 12:26 PM HISTORY: Central venous catheter placement. COMPARISON: 7/8/2019. FINDINGS:  There has been placement of a right IJ central venous catheter. The catheter extends to the right into the right subclavian vein. There is no evidence of pneumothorax. The patient is intubated with the endotracheal tube tip at the T2-3 level. There is very poor inspiratory effort. There is vascular crowding.  There are bilateral infiltrates. There is cardiomegaly. 1. Right IJ central venous catheter placement with catheter tip extending to the right into the right subclavian vein. No evidence of pneumothorax. 2. Endotracheal tube tip at the T2-3 level. 3. Very poor inspiration with vascular crowding. 4. Bilateral infiltrates may be related to the poor inspiration. Bilateral pneumonia and pulmonary edema cannot be ruled out on this image. 5. Cardiomegaly. Signed by Dr Yaneth Coker on 9/20/2020 12:28 PM    Cta Pulmonary W Contrast    Result Date: 9/19/2020  EXAMINATION: CTA PULMONARY W CONTRAST 9/19/2020 6:13 PM HISTORY: Shortness of breath, clinical concern for pulmonary embolism. DOSE: 804 mGycm. Automatic exposure control was utilized in an effort to use as little radiation as possible, without compromising image quality. REPORT: Spiral CT of the chest was performed after administration of intravenous contrast using CTA protocol, which includes reconstructed coronal, sagittal and 3-D images. COMPARISON: There are no correlative imaging studies for comparison. The contrast bolus is satisfactory, there is respiratory motion artifact. The pulmonary arteries are normal in caliber, there are multiple filling defects within the pulmonary arteries on the right, involving the third, fourth and fifth order branches extending into the right lower lobe there is mild straightening of the cardiac ventricular septum, which may indicate mild right heart strain. No left-sided pulmonary emboli are identified. The thoracic aorta is normal in caliber, no evidence of dissection is identified. Heart size is normal. No intrathoracic lymphadenopathy is identified. Small calcified subcarinal lymph nodes are compatible with healed granulomatous disease. The thyroid gland is homogeneous and normal. Review of lung windows demonstrates scattered calcified granulomas in both lungs. There is no parenchymal infiltrate or consolidation.  No pneumothorax or pleural effusion is identified. The airways are patent. The visualized upper abdomen shows decreased attenuation of the liver parenchyma compatible with fatty infiltration. There is bilateral gynecomastia, mild. Review of bone windows shows fused syndesmophytes throughout the mid and lower thoracic spine. No acute osseous abnormalities identified. 1. Multiple acute pulmonary emboli identified on the right, involving the third, fourth and fifth order pulmonary artery branches extending into the right lower lobe. There are findings that are suggestive of mild right heart strain. Signed by Dr Angi Delaney. Geo on 9/19/2020 6:20 PM       Assessment   Acute renal failure stage III  Risk factors include contrast-induced nephropathy or renal emboli  Morbid obesity  DVT and pulmonary emboli  Diabetes type 2 uncontrolled by A1c  Hyperkalemia  Metabolic acidosis  Vitamin D deficiency  Secondary hyperparathyroidism      Plan:  Discussed with patient, nursing, family, vascular  Continue bicarbonate containing IV fluids with adjusted dosing  Dialysis initiation today, appreciate vascular assistance  Vitamin D    Thank you for the consult, we appreciate the opportunity to provide care to your patients. Feel free to contact me if I can be of any further assistance.       Paul Reyez MD  09/23/20  1:44 PM

## 2020-09-23 NOTE — PROGRESS NOTES
catheter and patient underwent hemodialysis. Review of Systems:   14 point review of systems is negative except as specifically addressed above. Objective:   VITALS:  BP (!) 191/89 Comment: LOC 0. Report given to [facility], RN. No patient complaints  Pulse 82 Comment: LOC 0. Report given to [facility], RN. No patient complaints  Temp 98.3 °F (36.8 °C) (Oral)   Resp 17 Comment: LOC 0. Report given to [facility], RN. No patient complaints  Ht 6' (1.829 m)   Wt (!) 409 lb 3.2 oz (185.6 kg)   SpO2 96% Comment: LOC 0. Report given to [facility], RN. No patient complaints  BMI 55.50 kg/m²   24HR INTAKE/OUTPUT:      Intake/Output Summary (Last 24 hours) at 9/23/2020 1637  Last data filed at 9/23/2020 4782  Gross per 24 hour   Intake 250 ml   Output 300 ml   Net -50 ml     General appearance: 38year old male, no acute distress, resting comfortably in bed in supine position    Head: Normocephalic, without obvious abnormality, atraumatic  Eyes: conjunctivae/corneas clear. PERRL, EOM's intact.    Ears: normal external ears and nose, throat without exudate  Neck: no adenopathy, no carotid bruit, no JVD, supple, symmetrical, trachea midline   Lungs: decreased air entry at bases otherwise clear to auscultation bilaterally,no rales or wheezes   Heart: regular rate and rhythm, S1, S2 normal, no murmur  Abdomen:soft, morbidly obese, non-tender; non-distended, normal bowel sounds no masses, no organomegaly  Extremities:2+ peripheral edema,  No erythema, no tenderness to palpation, BLE's warm to touch   Skin: Skin color, texture, turgor normal. No rashes or lesions  Lymphatic: No palpable lymph node enlargment  Neurologic: Somnolent but easily awakened and oriented x 3, no focal deficits  Psychiatric: Calm, flat affect     Medications:      heparin (porcine) 7.69 Units/kg/hr (09/23/20 0923)    IV infusion builder 125 mL/hr at 09/22/20 2012    dextrose        warfarin  5 mg Oral Once    [START ON 9/24/2020] famotidine  10 mg Oral Daily    meropenem  500 mg Intravenous Q12H    vitamin D  50,000 Units Oral Weekly    warfarin (COUMADIN) daily dosing (placeholder)   Other RX Placeholder    glipiZIDE  10 mg Oral BID AC    insulin lispro  0-6 Units Subcutaneous TID WC    insulin lispro  0-3 Units Subcutaneous Nightly     simethicone, heparin (porcine), heparin (porcine), glucose, dextrose, glucagon (rDNA), dextrose, ondansetron, HYDROmorphone **OR** HYDROmorphone, HYDROcodone 5 mg - acetaminophen **OR** HYDROcodone 5 mg - acetaminophen, hydrALAZINE, potassium chloride **OR** potassium alternative oral replacement **OR** potassium chloride, magnesium sulfate, acetaminophen **OR** acetaminophen, polyethylene glycol, promethazine **OR** ondansetron  DIET RENAL;     Lab and other Data:     Recent Labs     09/20/20  2250 09/21/20  1035 09/23/20  0511   WBC 24.9* 18.5* 9.5   HGB 14.4 12.9* 10.7*    173 139     Recent Labs     09/21/20  1917 09/22/20  0351 09/23/20  0511    138 137   K 4.7 4.6 4.3    103 98   CO2 20* 21* 20*   BUN 39* 44* 60*   CREATININE 4.6* 5.6* 7.9*   GLUCOSE 189* 156* 132*     INR:   Recent Labs     09/22/20  0351 09/23/20  0511   INR 1.35* 2.07*     RAD:   Vl Extremity Venous Left  Impression:   Successful ultrasound/duplex guided cannulation of thrombosed left  popliteal vein for sheath placement. Vl Extremity Venous Left  Impression   There is evidence of subacute deep vein thrombosis in the left lower  extremity involving the common femoral, femoral, popliteal, posterior  tibial, and gastrocnemius veins. The anterior tibial and peroneal veins  were not well visualized secondary to edema and limb size. Superficial thrombophlebitis is seen in the short saphenous vein of the  left lower extremity. Floating tail left common femoral vein around 4 cm in length. Xr Chest Portable  1. Central venous catheter has been repositioned and now extends into the superior vena cava.  No evidence of pneumothorax. 2. Poor inspiration. 3. Bilateral infiltrates may be related to the poor inspiratory effort. Edema and pneumonia are possible. 4. Cardiomegaly. Signed by Dr Rafael Farooq on 9/20/2020 1:16 PM    Xr Chest Portable  1. Right IJ central venous catheter placement with catheter tip extending to the right into the right subclavian vein. No evidence of pneumothorax. 2. Endotracheal tube tip at the T2-3 level. 3. Very poor inspiration with vascular crowding. 4. Bilateral infiltrates may be related to the poor inspiration. Bilateral pneumonia and pulmonary edema cannot be ruled out on this image. 5. Cardiomegaly. Signed by Dr Rafael Farooq on 9/20/2020 12:28 PM    Cta Pulmonary W Contrast  1. Multiple acute pulmonary emboli identified on the right, involving the third, fourth and fifth order pulmonary artery branches extending into the right lower lobe. There are findings that are suggestive of mild right heart strain. Signed by Dr Ramón Vaughn. Geo on 9/19/2020 6:20 PM    Micro: Urine culture   Escherichia coli (esbl) (1)   Antibiotic  Interpretation  CRYSTAL  Status    ampicillin  Resistant  >=32  mcg/mL     aztreonam  Resistant       ceFAZolin  Resistant  >=64  mcg/mL     cefepime  Resistant  >=64  mcg/mL     cefTRIAXone  Resistant  >=64  mcg/mL     ertapenem  Sensitive  <=0.5  mcg/mL     gentamicin  Resistant  >=16  mcg/mL     levofloxacin  Resistant  >=8  mcg/mL     meropenem  Sensitive  <=0.25  mcg/mL     nitrofurantoin  Sensitive  <=16  mcg/mL     tobramycin  Intermediate  8  mcg/mL     trimethoprim-sulfamethoxazole  Resistant  >=320  mcg/mL       Assessment/Plan   Principal Problem:    Venous thromboembolism- noted to have recent Carpal Tunnel surgery left wrist, s/p percutaneous thrombectomy/PulseSpray thrombolysis with Angiojet and placement of EKOS TPA catheter, IVC filter placement. Continue Heparin gtt while transitioning to Coumadin with target INR 2-3-today INR 2.07.  Appreciate Vascular surgery/Hematology assistance, repeat venous duplex as outpatient in 8 weeks and discuss study/possibly schedule IVC removal per Vascular    Active Problems:    Acute kidney injury-New problem, permacath placed today with first HD treatment, monitor for renal recovery       DM II-uncontrolled A1C 8.8-Glipizide, SSI, accuchecks, hypoglycemia tx orders, Carb control diet, controlled, continue to monitor       Morbid obesity (HCC)-noted       Neutrophilic Leukocytosis-resolved       Hyperkalemia-resolved      Complicated UTI 2/2 ESBL E coli-Merrem started 09/22/2020    Further orders per clinical course/attending     DVT Prophylaxis: Heparin gtt/Coumadin     GI prophylaxis: Julio Delacruz PA-C

## 2020-09-23 NOTE — PROGRESS NOTES
4.3    103 98   CO2 20* 21* 20*   BUN 39* 44* 60*   CREATININE 4.6* 5.6* 7.9*   GLUCOSE 189* 156* 132*   CALCIUM 8.0* 7.9* 7.5*      Calcium:   Lab Results   Component Value Date    CALCIUM 7.5 09/23/2020    CALCIUM 7.9 09/22/2020    CALCIUM 8.0 09/21/2020      INR:   Recent Labs     09/22/20  0351 09/23/20  0511   INR 1.35* 2.07*     Lactic Acid:   Lab Results   Component Value Date    LACTA 1.4 01/28/2018    LACTA 1.7 09/08/2014          DVT prophylaxis:            [x] Already on Anticoagulation Heparin drip bridging to Coumadin          ASSESSMENT:     Procedure 9/20/20 :  1. Ultrasound/duplex guided cannulation of a thrombosed left popliteal vein with 5 Western Mirta and later 8 Western Mirta sheath  2. Left lower extremity venograms all the way to the inferior vena cava  3. Percutaneous thrombectomy/PulseSpray thrombolysis with AngioJet Zelante catheter  4. Left lower extremity venograms after percutaneous thrombectomy and thrombolysis  5. Placement of 40 cm infusion length EKOS TPA catheter from the popliteal vein all the way to the distal external iliac vein  6. Inferior vena cava filter placement from a right internal jugular vein approach (Bard Lucas inferior vena cava filter)   Procedure 9/21/20:  1. Intravascular ultrasound of inferior vena cava/left common and external iliac veins/left common femoral vein  1.  HD # 4  Patient Active Problem List   Diagnosis    Thrombosed external hemorrhoid    Right ureteral calculus    Umbilical hernia without obstruction and without gangrene    Hydronephrosis with renal and ureteral calculus obstruction    Ureteral obstruction, right    Transient alteration of awareness    Prediabetes    Bilateral carpal tunnel syndrome    Arthritis    Numbness and tingling in both hands    S/P carpal tunnel release    Leg DVT (deep venous thromboembolism), acute, left (HCC)    Acute pulmonary embolism (HCC)    DVT of deep femoral vein, left (HCC)    Morbid obesity ([address]  Acute kidney injury ([facility] [facility]    Hyperkalemia       Chief Complaint:  Chief Complaint   Patient presents with    Leg Swelling     Pt to ED with c/o LLE pain/swelling x1 week Pt reports surgery approx 1 month ago       PLAN:     1. Anticoagulation per Hematology, Heparin drip bridging to Coumadin, INR 2.07  2. Nephrology requesting dialysis acces for today. 3. Medical management per Hospitalist Service  4. NPO for placement of tunneled hemodialysis catheter today  5. Will apply Tubigrip from base of toes to below knee for compression LE's  6. Will need to wear knee high compression socks, 20-30mmHg  7.  Vascular will repeat venous duplex outpatient on 11/28/20, then have Video Visit to  discuss study results and possibly schedule for IVC removal

## 2020-09-23 NOTE — OP NOTE
Preoperative Diagnosis:    1. Acute renal failure requiring hemodialysis     Postoperative Diagnosis:  Same    Operative Procedure:    1. Ultrasound guided cannulation of right internal jugular vein   2. Placement of right internal jugular vein tunneled dialysis catheter (Bard Equistream XK 23 cm tip to cuff). Surgeon:  Donal Tong. Karen Tapia MD    Anesthesia:  Local and Moderate Sedation    EBL:  Less than 50 ml    Findings:  1. The right internal jugular vein is patent. 2.  The dialysis catheter tips are in the right atrium/superior vena cava junction. Procedure in Detail:    After the patient was consented, and given intravenous antibiotics, the patient was brought to angiography and placed on the table in the supine position. Moderate sedation was administered and the patient's right neck and chest were prepped and draped in the usual sterile fashion. Skin and subcutaneous tissues over the internal jugular vein were anesthetized with licocaine. The right internal jugular vein was cannulated under ultrasound guidance with a micropuncture needle. Seldinger technique was utilized to place an 0.035 wire into the inferior vena cava under fluoroscopic visualization. The right neck and chest were anesthetized with lidocaine. An incision was made in the right neck over the wire with knife. A separate incision was made in the right chest with knife. The catheter was tunneled from the chest to the neck incision. Dilators were passed over the wire under fluoroscopic visualization. A dilator/tear-away sheath was placed over the wire under fluoroscopic visualization and the dilator and wire were removed. The catheter was placed through the tear-away sheath and down to the right atrium under fluoroscopic visualization. The tear-away sheath was removed and the catheter was withdrawn until the tips were in the superior vena cava/right atrial junction.       The right neck wound was closed in layers with 3-0 vicryl subcutaneous, 3-0 nylon sutures and dermabond skin adhesive. The exit site was secured around the catheter with 3-0 vicryl subcutaneous purstring suture. The catheter itself was secured to the chest with two 2-0 nylon sutures. Sterile dressings were placed. Both ports of the catheter aspirated and flushed easily with heparinized saline and heparin lock. The catheter is ready for use.

## 2020-09-24 ENCOUNTER — TELEPHONE (OUTPATIENT)
Dept: PRIMARY CARE CLINIC | Age: 40
End: 2020-09-24

## 2020-09-24 LAB
ANION GAP SERPL CALCULATED.3IONS-SCNC: 20 MMOL/L (ref 7–19)
ANTIPHOSPHOLIPID AB IGA: 5 APU
ANTIPHOSPHOLIPID AB IGG: 10.4 GPU
ANTIPHOSPHOLIPID AB IGM: 3.2 MPU
ANTITHROMBIN ACTIVITY: NORMAL % (ref 76–128)
APTT: 57.2 SEC (ref 26–36.2)
APTT: 64.1 SEC (ref 26–36.2)
BUN BLDV-MCNC: 61 MG/DL (ref 6–20)
CALCIUM SERPL-MCNC: 8 MG/DL (ref 8.6–10)
CHLORIDE BLD-SCNC: 99 MMOL/L (ref 98–111)
CO2: 20 MMOL/L (ref 22–29)
CREAT SERPL-MCNC: 8.8 MG/DL (ref 0.5–1.2)
GFR AFRICAN AMERICAN: 8
GFR NON-AFRICAN AMERICAN: 7
GLUCOSE BLD-MCNC: 77 MG/DL (ref 70–99)
GLUCOSE BLD-MCNC: 77 MG/DL (ref 74–109)
GLUCOSE BLD-MCNC: 81 MG/DL (ref 70–99)
GLUCOSE BLD-MCNC: 81 MG/DL (ref 70–99)
GLUCOSE BLD-MCNC: 85 MG/DL (ref 70–99)
INR BLD: 2.64 (ref 0.88–1.18)
PERFORMED ON: NORMAL
POTASSIUM REFLEX MAGNESIUM: 4.1 MMOL/L (ref 3.5–5)
PROTEIN C ANTIGEN: NORMAL % (ref 63–153)
PROTEIN S ANTIGEN, TOTAL: NORMAL % (ref 84–134)
PROTHROMBIN TIME: 28.9 SEC (ref 12–14.6)
SODIUM BLD-SCNC: 139 MMOL/L (ref 136–145)

## 2020-09-24 PROCEDURE — 85730 THROMBOPLASTIN TIME PARTIAL: CPT

## 2020-09-24 PROCEDURE — 6370000000 HC RX 637 (ALT 250 FOR IP): Performed by: INTERNAL MEDICINE

## 2020-09-24 PROCEDURE — 1210000000 HC MED SURG R&B

## 2020-09-24 PROCEDURE — 99231 SBSQ HOSP IP/OBS SF/LOW 25: CPT | Performed by: INTERNAL MEDICINE

## 2020-09-24 PROCEDURE — 82947 ASSAY GLUCOSE BLOOD QUANT: CPT

## 2020-09-24 PROCEDURE — 80048 BASIC METABOLIC PNL TOTAL CA: CPT

## 2020-09-24 PROCEDURE — 6360000002 HC RX W HCPCS: Performed by: SURGERY

## 2020-09-24 PROCEDURE — 6360000002 HC RX W HCPCS: Performed by: INTERNAL MEDICINE

## 2020-09-24 PROCEDURE — 6370000000 HC RX 637 (ALT 250 FOR IP): Performed by: SURGERY

## 2020-09-24 PROCEDURE — 85610 PROTHROMBIN TIME: CPT

## 2020-09-24 PROCEDURE — 2580000003 HC RX 258: Performed by: SURGERY

## 2020-09-24 PROCEDURE — 36415 COLL VENOUS BLD VENIPUNCTURE: CPT

## 2020-09-24 PROCEDURE — 99024 POSTOP FOLLOW-UP VISIT: CPT | Performed by: NEUROLOGICAL SURGERY

## 2020-09-24 PROCEDURE — 8010000000 HC HEMODIALYSIS ACUTE INPT

## 2020-09-24 RX ORDER — HEPARIN SODIUM 1000 [USP'U]/ML
3600 INJECTION, SOLUTION INTRAVENOUS; SUBCUTANEOUS
Status: COMPLETED | OUTPATIENT
Start: 2020-09-24 | End: 2020-09-24

## 2020-09-24 RX ORDER — WARFARIN SODIUM 2.5 MG/1
2.5 TABLET ORAL
Status: COMPLETED | OUTPATIENT
Start: 2020-09-24 | End: 2020-09-24

## 2020-09-24 RX ADMIN — MEROPENEM 500 MG: 500 INJECTION, POWDER, FOR SOLUTION INTRAVENOUS at 17:26

## 2020-09-24 RX ADMIN — FAMOTIDINE 10 MG: 20 TABLET ORAL at 07:55

## 2020-09-24 RX ADMIN — GLIPIZIDE 10 MG: 10 TABLET ORAL at 17:26

## 2020-09-24 RX ADMIN — ACETAMINOPHEN 650 MG: 325 TABLET, FILM COATED ORAL at 18:59

## 2020-09-24 RX ADMIN — GLIPIZIDE 10 MG: 10 TABLET ORAL at 05:28

## 2020-09-24 RX ADMIN — MEROPENEM 500 MG: 500 INJECTION, POWDER, FOR SOLUTION INTRAVENOUS at 05:29

## 2020-09-24 RX ADMIN — WARFARIN SODIUM 2.5 MG: 2.5 TABLET ORAL at 17:26

## 2020-09-24 RX ADMIN — HEPARIN SODIUM 3600 UNITS: 1000 INJECTION INTRAVENOUS; SUBCUTANEOUS at 11:00

## 2020-09-24 ASSESSMENT — PAIN SCALES - GENERAL: PAINLEVEL_OUTOF10: 3

## 2020-09-24 NOTE — TELEPHONE ENCOUNTER
Estephanie called with report that patient is in the hospital with blood clots,\"had a procedure to help take care of the clots and now has Kidney failure and on dialysis. \" Arben Young reports that the physician told her if he had waited one more day he would be dead. \"  Arben Young states \"I am not upset with the office but am really upset with imaging and do not know why it wasn't scheduled.  Discussed and call transferred to [facility] voice mail  Informed Estephanie of above concerns

## 2020-09-24 NOTE — CARE COORDINATION
oupt HD set-up has been initiated; referral sent via [facility] (dialysis)  [phone]  [phone]  Electronically signed by BRIANNA Villanueva on 9/24/2020 at 10:04 AM

## 2020-09-24 NOTE — PLAN OF CARE
Problem: Falls - Risk of:  Goal: Will remain free from falls  Description: Will remain free from falls  9/24/2020 1057 by Arleen Sanchez RN  Outcome: Ongoing  9/24/2020 0220 by Cathie Hayes RN  Outcome: Ongoing  Goal: Absence of physical injury  Description: Absence of physical injury  9/24/2020 1057 by Arleen Sanchez RN  Outcome: Ongoing  9/24/2020 0220 by Cathie Hayes RN  Outcome: Ongoing     Problem: Pain:  Description: Pain management should include both nonpharmacologic and pharmacologic interventions.   Goal: Pain level will decrease  Description: Pain level will decrease  9/24/2020 1057 by Arleen Sanchez RN  Outcome: Ongoing  9/24/2020 0220 by Cathie Hayes RN  Outcome: Ongoing  Goal: Control of acute pain  Description: Control of acute pain  9/24/2020 1057 by Arleen Sanchez RN  Outcome: Ongoing  9/24/2020 0220 by Cathie Hayes RN  Outcome: Ongoing  Goal: Control of chronic pain  Description: Control of chronic pain  9/24/2020 1057 by Arleen Sanchez RN  Outcome: Ongoing  9/24/2020 0220 by Cathie Hayes RN  Outcome: Ongoing     Problem: Skin Integrity:  Goal: Will show no infection signs and symptoms  Description: Will show no infection signs and symptoms  9/24/2020 1057 by Arleen Sanchez RN  Outcome: Ongoing  9/24/2020 0220 by Cathie Hayes RN  Outcome: Ongoing  Goal: Absence of new skin breakdown  Description: Absence of new skin breakdown  9/24/2020 1057 by Arleen Sanchez RN  Outcome: Ongoing  9/24/2020 0220 by Cathie Hayes RN  Outcome: Ongoing

## 2020-09-24 NOTE — PROGRESS NOTES
Vascular Surgery  Dr.Scott Khalil   Daily Progress Note    Pt Name: [address] [place] [address] Record Number: [id number]  Date of Birth 1980   Today's Date: 9/24/2020    SUBJECTIVE:     Patient was seen and examined, states he feels a little better today, is hungry  States new dialysis cath site is tender  Reports still feels like \"his skin is tight\"    OBJECTIVE:     Patient Vitals for the past 24 hrs:   BP Temp Temp src Pulse Resp SpO2 Weight   09/24/20 0609 (!) 149/83 97.6 °F (36.4 °C) -- 70 18 98 % --   09/24/20 0002 (!) 163/88 97.2 °F (36.2 °C) Temporal 87 18 92 % (!) 497 lb 8 oz (225.7 kg)   09/23/20 1814 (!) 150/90 97.6 °F (36.4 °C) Temporal 84 18 93 % --   09/23/20 1714 (!) 173/91 98.6 °F (37 °C) -- 80 18 93 % --   09/23/20 1402 (!) 191/89 -- -- 82 17 96 % --   09/23/20 1357 (!) 207/92 -- -- 82 18 96 % --   09/23/20 1356 -- -- -- 84 18 95 % --   09/23/20 1351 (!) 204/80 -- -- 82 17 95 % --   09/23/20 1346 (!) 194/95 -- -- 83 18 93 % --   09/23/20 1341 (!) 193/88 -- -- 83 18 93 % --   09/23/20 1336 (!) 188/116 -- -- 90 21 96 % --   09/23/20 1331 (!) 145/70 -- -- 106 26 95 % --   09/23/20 1326 (!) 161/84 -- -- 92 19 94 % --   09/23/20 1321 (!) 171/79 -- -- 88 25 96 % --   09/23/20 1316 (!) 152/82 -- -- 85 22 94 % --   09/23/20 1311 (!) 166/83 -- -- 89 17 95 % --   09/23/20 1306 (!) 170/81 -- -- -- 26 94 % --   09/23/20 1305 -- -- -- 88 18 -- --   09/23/20 1303 (!) 190/78 -- -- -- 30 96 % --   09/23/20 1145 -- 98.3 °F (36.8 °C) Oral -- -- -- --   09/23/20 1116 (!) 185/87 100.1 °F (37.8 °C) -- 78 18 91 % --       Intake/Output Summary (Last 24 hours) at 9/24/2020 0849  Last data filed at 9/24/2020 0535  Gross per 24 hour   Intake 1584 ml   Output 150 ml   Net 1434 ml     In: 1584 [P. O.:620; I.V.:964]  Out: 150 [Urine:150]    I/O last 3 completed shifts: In: 1584 [P. O.:620;  I.V.:964]  Out: 450 [Urine:450]     Date 09/24/20 0000 - 09/24/20 2359   Shift 6455-5360 8843-5139 8533-1663 24 Hour Total   INTAKE P.O.(mL/kg/hr) 500(0.3)   500   I. V.(mL/kg) 964(4.3)   964(4.3)   Shift Total(mL/kg) 1464(6.5)   1464(6.5)   OUTPUT   Urine(mL/kg/hr) 150(0.1)   150   Shift Total(mL/kg) 150(0.7)   150(0.7)   Weight (kg) 225.7 225.7 225.7 225.7       Wt Readings from Last 3 Encounters:   09/24/20 (!) 497 lb 8 oz (225.7 kg)   09/02/20 (!) 388 lb (176 kg)   08/18/20 (!) 370 lb (167.8 kg)        Body mass index is 67.47 kg/m². Diet: DIET RENAL;    MEDS:     Scheduled Meds:   famotidine  10 mg Oral Daily    meropenem  500 mg Intravenous Q12H    vitamin D  50,000 Units Oral Weekly    warfarin (COUMADIN) daily dosing (placeholder)   Other RX Placeholder    glipiZIDE  10 mg Oral BID AC    insulin lispro  0-6 Units Subcutaneous TID WC    insulin lispro  0-3 Units Subcutaneous Nightly     Continuous Infusions:   IV infusion builder 125 mL/hr at 09/22/20 2012    dextrose       PRN Meds:simethicone, 80 mg, Q6H PRN  glucose, 15 g, PRN  dextrose, 12.5 g, PRN  glucagon (rDNA), 1 mg, PRN  dextrose, 100 mL/hr, PRN  ondansetron, 4 mg, Q8H PRN  HYDROmorphone, 0.25 mg, Q3H PRN    Or  HYDROmorphone, 0.5 mg, Q3H PRN  HYDROcodone 5 mg - acetaminophen, 1 tablet, Q4H PRN    Or  HYDROcodone 5 mg - acetaminophen, 2 tablet, Q4H PRN  hydrALAZINE, 10 mg, Q6H PRN  potassium chloride, 40 mEq, PRN    Or  potassium alternative oral replacement, 40 mEq, PRN    Or  potassium chloride, 10 mEq, PRN  magnesium sulfate, 1 g, PRN  acetaminophen, 650 mg, Q6H PRN    Or  acetaminophen, 650 mg, Q6H PRN  polyethylene glycol, 17 g, Daily PRN  promethazine, 12.5 mg, Q6H PRN    Or  ondansetron, 4 mg, Q6H PRN      PHYSICAL EXAM:     CONSTITUTIONAL: Alert and oriented times 3, cooperative to examination. LUNGS: Lungs clear bilaterally anteriorly, slightly diminished lower lobe sounds .    CARDIOVASCULAR: Heart regular rate and rhythm  ABDOMEN: soft, nontender, softly distended  NEUROLOGIC:Awake, alert, oriented, speech clear  WOUND/INCISION:  Left popliteal puncture site with small bandage, no bleeding or hematoma noted, soft to touch. Right IJ perm site with dressing CDI, mild edema, minimal bruising at site. EXTREMITY:Feet warm to touch / pink color bilaterally. Moderate edema LLE, mild edema RLE. Bilateral arms and hands with edema. LABS:     CBC:   Recent Labs     09/21/20  1035 09/23/20  0511   WBC 18.5* 9.5   RBC 4.42* 3.70*   HGB 12.9* 10.7*   HCT 41.2* 33.4*   MCV 93.2 90.3   MCH 29.2 28.9   MCHC 31.3* 32.0*   RDW 13.7 13.5    139   MPV 10.3 10.4      Last 3 CMP:   Recent Labs     09/22/20  0351 09/23/20  0511 09/24/20  0700    137 139   K 4.6 4.3 4.1    98 99   CO2 21* 20* 20*   BUN 44* 60* 61*   CREATININE 5.6* 7.9* 8.8*   GLUCOSE 156* 132* 77   CALCIUM 7.9* 7.5* 8.0*      Calcium:   Lab Results   Component Value Date    CALCIUM 8.0 09/24/2020    CALCIUM 7.5 09/23/2020    CALCIUM 7.9 09/22/2020      INR:   Recent Labs     09/22/20  0351 09/23/20  0511 09/24/20  0700   INR 1.35* 2.07* 2.64*     Lactic Acid:   Lab Results   Component Value Date    LACTA 1.4 01/28/2018    LACTA 1.7 09/08/2014          DVT prophylaxis:            [x] Already on  Coumadin, INR therapeutic          ASSESSMENT:     Procedure 9/20/20 :  1. Ultrasound/duplex guided cannulation of a thrombosed left popliteal vein with 5 Western Mirta and later 8 Western Mirat sheath  2. Left lower extremity venograms all the way to the inferior vena cava  3. Percutaneous thrombectomy/PulseSpray thrombolysis with AngioJet Zelante catheter  4. Left lower extremity venograms after percutaneous thrombectomy and thrombolysis  5. Placement of 40 cm infusion length EKOS TPA catheter from the popliteal vein all the way to the distal external iliac vein  6. Inferior vena cava filter placement from a right internal jugular vein approach (Bard Heather inferior vena cava filter)   Procedure 9/21/20:  1.   Intravascular ultrasound of inferior vena cava/left common and external iliac veins/left common femoral vein  Operative Procedure 9/23/20 :    1. Ultrasound guided cannulation of right internal jugular vein   2. Placement of right internal jugular vein tunneled dialysis catheter (Bard Equistream XK 23 cm tip to cuff). 1. HD # 5  Patient Active Problem List   Diagnosis    Thrombosed external hemorrhoid    Right ureteral calculus    Umbilical hernia without obstruction and without gangrene    Hydronephrosis with renal and ureteral calculus obstruction    Ureteral obstruction, right    Transient alteration of awareness    Prediabetes    Bilateral carpal tunnel syndrome    Arthritis    Numbness and tingling in both hands    S/P carpal tunnel release    Leg DVT (deep venous thromboembolism), acute, left (HCC)    Acute pulmonary embolism (HCC)    DVT of deep femoral vein, left (HCC)    Morbid obesity ([address]    Acute kidney injury ([address]    Hyperkalemia       Chief Complaint:  Chief Complaint   Patient presents with    Leg Swelling     Pt to ED with c/o LLE pain/swelling x1 week Pt reports surgery approx 1 month ago       PLAN:     1. Anticoagulation per Hematology, is now therapeutic on Coumadin, INR 2.64. Per Hematology, Coumadin will be lifetime  2. Nephrology managing dialysis. [employer] ran well in HD yesterday  3. Medical management per Hospitalist Service  4. Will need to wear knee high compression socks, 20-30mmHg  5. Vascular will repeat venous duplex outpatient on 11/28/20, then have Video Visit to  discuss study results and possibly schedule for IVC removal  6.    Tapia to BSD, urine remains tea colored, 150 ml emptied since 0830 yesterday

## 2020-09-24 NOTE — PROGRESS NOTES
[facility] Hospitalists    Patient:  Amanda Gray  YOB: 1980  Date of Service: 9/24/2020  MRN: [id number]   Acct: [de-identified]   Primary Care Physician: VANDANA Chu  Advance Directive: Full Code  Admit Date: 9/19/2020       Hospital Day: 5  Portions of this note have been copied forward, however, changed to reflect the most current clinical status of this patient. CHIEF COMPLAINT Left Leg Pain    SUBJECTIVE: Mr. Ata Peter states he feels much better today. Swelling is improved. Feels more energetic. Denies difficulty with dialysis treatment. Cumulative Hospital Course:   Mr. Ata Peter is a 38year old with PMH morbid obesity, DMII, SHELBY, ESBL Ecoli UTI who presented to [facility] ED complaining of left leg pain and edema that began 09/15/2020. He stated his mother and aunt both have history of blood clots and denied alcohol or tobacco use. Work up revealed significant left lower extremity thrombosis extending into the left iliofemoral vein as well as right sided pulmonary embolism with mild right ventricular strain. He was started on Heparin gtt with consult made to Vascular surgery who performed AngioJet mechanical thrombectomy/PulseSpray thrombolysis of left lower extremity DVT and inferior vena cava filter placement. He was continued on TPA thrombolysis overnight to treat common femoral vein floating tail. He underwent intravascular ultrasound of inferior vena cava/left common and external iliac veins/left common femoral vein on 09/21/2020 with findings of patent inferior vena cava filter. On 09/21/2020 urine output noted to be minimal with acute decline in renal function. IVF's were increased with addition of bicarbonate and consult made to Nephrology. On 09/22/2020 creatinine continued to worsen to 5.6, GFR 11. IVF's continued. Patient transferred to 3rd floor medical unit.  On 09/23/2020 Vascular surgery placed a right internal jugular vein tunneled dialysis catheter and patient underwent hemodialysis. Heparin gtt discontinued as INR is therapeutic. Review of Systems:   14 point review of systems is negative except as specifically addressed above. Objective:   VITALS:  BP (!) 148/87   Pulse 77   Temp 98.9 °F (37.2 °C)   Resp 16   Ht 6' (1.829 m)   Wt (!) 497 lb 8 oz (225.7 kg)   SpO2 91%   BMI 67.47 kg/m²   24HR INTAKE/OUTPUT:      Intake/Output Summary (Last 24 hours) at 9/24/2020 1553  Last data filed at 9/24/2020 1436  Gross per 24 hour   Intake 2064 ml   Output 2300 ml   Net -236 ml     General appearance: 38year old male, resting comfortably in bed, no acute distress    Head: Normocephalic, without obvious abnormality, atraumatic  Eyes: conjunctivae/corneas clear. PERRL, EOM's intact.    Ears: normal external ears and nose, throat without exudate  Neck: no adenopathy, no carotid bruit, no JVD, supple, symmetrical, trachea midline   Lungs: clear throughout, no wheezes, rales or rhonchi   Heart: RRR, S1, S2 normal, no murmur  Abdomen:soft, morbidly obese, non-tender; non-distended, normal bowel sounds no masses, no organomegaly  Extremities: 2+ peripheral edema,  No erythema, no tenderness to palpation, BLE's warm to touch   Skin: Skin color, texture, turgor normal. No rashes or lesions  Lymphatic: No palpable lymph node enlargment  Neurologic: Alert and oriented x 3, no focal deficits, speech fluent, no facial asymmetry   Psychiatric: Calm, appropriate affect      Medications:      dextrose        warfarin  2.5 mg Oral Once    famotidine  10 mg Oral Daily    meropenem  500 mg Intravenous Q12H    vitamin D  50,000 Units Oral Weekly    warfarin (COUMADIN) daily dosing (placeholder)   Other RX Placeholder    glipiZIDE  10 mg Oral BID AC    insulin lispro  0-6 Units Subcutaneous TID WC    insulin lispro  0-3 Units Subcutaneous Nightly     simethicone, glucose, dextrose, glucagon (rDNA), dextrose, ondansetron, HYDROmorphone **OR** HYDROmorphone, HYDROcodone 5 mg - acetaminophen inspiration. Bilateral pneumonia and pulmonary edema cannot be ruled out on this image. 5. Cardiomegaly. Signed by Dr Олег Robb on 9/20/2020 12:28 PM    Cta Pulmonary W Contrast  1. Multiple acute pulmonary emboli identified on the right, involving the third, fourth and fifth order pulmonary artery branches extending into the right lower lobe. There are findings that are suggestive of mild right heart strain. Signed by Dr Jayme Moise. Vanhoviral on 9/19/2020 6:20 PM    Micro: Urine culture   Escherichia coli (esbl) (1)   Antibiotic  Interpretation  CRYSTAL  Status    ampicillin  Resistant  >=32  mcg/mL     aztreonam  Resistant       ceFAZolin  Resistant  >=64  mcg/mL     cefepime  Resistant  >=64  mcg/mL     cefTRIAXone  Resistant  >=64  mcg/mL     ertapenem  Sensitive  <=0.5  mcg/mL     gentamicin  Resistant  >=16  mcg/mL     levofloxacin  Resistant  >=8  mcg/mL     meropenem  Sensitive  <=0.25  mcg/mL     nitrofurantoin  Sensitive  <=16  mcg/mL     tobramycin  Intermediate  8  mcg/mL     trimethoprim-sulfamethoxazole  Resistant  >=320  mcg/mL       Assessment/Plan   Principal Problem:    Venous thromboembolism- noted to have recent Carpal Tunnel surgery left wrist, s/p percutaneous thrombectomy/PulseSpray thrombolysis with Angiojet and placement of EKOS TPA catheter, IVC filter placement. INR therapeutic, heparin gtt discontinued.  Appreciate Vascular surgery/Hematology assistance, repeat venous duplex as outpatient in 8 weeks and discuss study/possibly schedule IVC removal per Vascular    Active Problems:    Acute kidney injury-New problem, permacath placed followed by first HD 09/23/2020, continue to monitor for renal recovery       DM II-uncontrolled A1C 8.8-Glipizide, SSI, accuchecks, hypoglycemia tx orders, Carb control diet, controlled, continue to monitor, well controlled      Morbid obesity (HCC)-noted       Neutrophilic Leukocytosis-resolved       Hyperkalemia-resolved      Complicated UTI 2/2 ESBL E coli-Merrem started 09/22/2020, continue Merrem     Further orders per clinical course/attending     DVT Prophylaxis: Coumadin     GI prophylaxis: Pepcid    Epi Olmstead PA-C

## 2020-09-24 NOTE — CARE COORDINATION
9/24/20: ROLANDO chair time/schedule received; Trisha Martinez MWF 5:25PM; first tx this coming Monday.   Electronically signed by BRIANNA Rachel on 9/24/2020 at 3:58 PM

## 2020-09-24 NOTE — PLAN OF CARE
Problem: Falls - Risk of:  Goal: Will remain free from falls  Description: Will remain free from falls  9/24/2020 0220 by Julio Mendez RN  Outcome: Ongoing  9/23/2020 1259 by Merced Bryson RN  Outcome: Ongoing  Goal: Absence of physical injury  Description: Absence of physical injury  9/24/2020 0220 by Julio Mendez RN  Outcome: Ongoing  9/23/2020 1259 by Merced Bryson RN  Outcome: Ongoing     Problem: Pain:  Goal: Pain level will decrease  Description: Pain level will decrease  9/24/2020 0220 by Julio Mendez RN  Outcome: Ongoing  9/23/2020 1259 by Merced Bryson RN  Outcome: Ongoing  Goal: Control of acute pain  Description: Control of acute pain  9/24/2020 0220 by Julio Mendez RN  Outcome: Ongoing  9/23/2020 1259 by Merced Bryson RN  Outcome: Ongoing  Goal: Control of chronic pain  Description: Control of chronic pain  9/24/2020 0220 by Julio Mendez RN  Outcome: Ongoing  9/23/2020 1259 by Merced Bryson RN  Outcome: Ongoing     Problem: Skin Integrity:  Goal: Will show no infection signs and symptoms  Description: Will show no infection signs and symptoms  9/24/2020 0220 by Julio Mendez RN  Outcome: Ongoing  9/23/2020 1259 by Merced Bryson RN  Outcome: Ongoing  Goal: Absence of new skin breakdown  Description: Absence of new skin breakdown  9/24/2020 0220 by Julio Mendez RN  Outcome: Ongoing  9/23/2020 1259 by Merced Bryson RN  Outcome: Ongoing   Electronically signed by Julio Mendez RN on 9/24/2020 at 2:20 AM

## 2020-09-24 NOTE — PROGRESS NOTES
Clinical Pharmacy Note    Warfarin consult follow-up    Recent Labs     09/24/20  0700   INR 2.64*     Recent Labs     09/21/20  1035 09/23/20  0511   HGB 12.9* 10.7*   HCT 41.2* 33.4*    139       Significant Drug-Drug Interactions:  New warfarin drug-drug interactions: none  Discontinued drug-drug interactions: none    Date INR Warfarin Dose   09/19/20 1.11 ---   09/21/20 --- 7.5 mg    09/22/20  1.35   7.5 mg    09/23/20 2.07  5 mg   09/24/20  2.64  2.5 mg                         Notes:                   Give Coumadin 2.5mg x 1 dose today. Daily PT/INR until stable within therapeutic range.      GARCÍA VINCENT, PHARM D, 9/24/2020, 9:50 AM

## 2020-09-24 NOTE — TELEPHONE ENCOUNTER
This has been brought to my attention today on the side of primary care. I called to schedule this and the  couldn't get the dept at [facility] or [facility] she called several times and said she would call back whenever she spoke to the dept she called and couldn't get the dept but was gonna cont. To call and would call the patient.

## 2020-09-24 NOTE — PROGRESS NOTES
NEUROSURGERY PROGRESS NOTE      Chief Complaint:   Chief Complaint   Patient presents with    Leg Swelling     Pt to ED with c/o LLE pain/swelling x1 week Pt reports surgery approx 1 month ago         Interval Update:  I became aware of recent events when reviewing patient's chart to evaluate cersri-vu-qiaw status after L carpal tunnel release on 8/21. He was seen on the dialysis unit. He complains of fatigue and mild malaise. He denies new numbness or weakness in his left hand. He denies new incisional pain, swelling or drainage. Objective:    BP (!) 149/83   Pulse 70   Temp 97.6 °F (36.4 °C)   Resp 18   Ht 6' (1.829 m)   Wt (!) 497 lb 8 oz (225.7 kg)   SpO2 98%   BMI 67.47 kg/m²       Intake/Output Summary (Last 24 hours) at 9/24/2020 3156  Last data filed at 9/24/2020 0535  Gross per 24 hour   Intake 1584 ml   Output 150 ml   Net 1434 ml           Physical Exam:    General: alert, cooperative, no distress  Cardiorespiratory: unlabored breathing  Abdomen: obese, nondistended  Wound: Expected superficial desquamation of epidermis. Dermis is closed. No fluctuance or swelling.       Neurologic Exam:    Mental Status: Alert, oriented, thought content appropriate  Cranial Nerves: PERRL, EOMI, symmetric facies, tongue midline  Motor: Normal dexterity and strength in left hand  Somatosensory: intact light touch sensation in the left hand        Pertinent Labs:  Lab Results   Component Value Date    WBC 9.5 09/23/2020    HGB 10.7 (L) 09/23/2020    HCT 33.4 (L) 09/23/2020    MCV 90.3 09/23/2020     09/23/2020     Lab Results   Component Value Date     09/24/2020     08/14/2011    K 4.1 09/24/2020    K 4.5 08/14/2011    CL 99 09/24/2020     08/14/2011    CO2 20 09/24/2020    BUN 61 09/24/2020    CREATININE 8.8 09/24/2020    CREATININE 0.8 08/14/2011    GLUCOSE 77 09/24/2020    CALCIUM 8.0 09/24/2020              Assessment and Plan:  37 y/o M now ~1m s/p L carpal tunnel release admitted from the ED with a LLE DVT and PE, now s/p thrombectomy, IVC filter placement. He appears to have contrast-induced nephropathy and is requiring dialysis. I explained that I do not have much guidance to give regarding his [id number] status given recent events. I suggested that he discuss this with the medical teams guiding his current treatment. He appears to be recovering well from his carpal tunnel release. I am available as-needed if concerns arise with respect to his carpal tunnel surgery.          Electronically signed by Rylee Caldera MD on 9/24/2020 at 9:52 AM

## 2020-09-24 NOTE — PROGRESS NOTES
emboli identified involving the right third fourth and fifth ordered pulmonary artery branches extending into the right lower lobe. · Possible right heart strain  · No left sided pulmonary emboli identified  · Small calcified subcarinal lymph nodes consistent with healed granulomatous disease  · Scattered calcified granulomas in both lungs     He was seen by Dr. Art Fraga from vascular surgery this morning (9/20/2020), with recommendations for percutaneous mechanical thrombectomy/pulse spray thrombolysis to try to prevent long-term swelling in this young patient with iliofemoral DVT.  If the result in that procedure is not satisfactory, he would consider placing a TPA thrombolyzes catheter for thrombolysis overnight.  The patient agreed to proceed. Dr. Art Fraga did not feel that he needed TPA thrombolysis of the pulmonary embolism because he is fairly asymptomatic from that standpoint.     Agree with plans as outlined  A serologic prothrombotic work-up will be requested.         REVIEW OF SYSTEMS:   CONSTITUTIONAL: no fever, no night sweats, fatigue;  HEENT: no blurring of vision, no double vision, no hearing difficulty, no tinnitus, no ulceration, no dysplasia, no epistaxis;  LUNGS: no cough, no hemoptysis, no wheeze,  no shortness of breath;  CARDIOVASCULAR: no palpitation, no chest pain, no shortness of breath;  GI: Indigestion, stomach is tight, no abdominal pain, nausea, no vomiting, no diarrhea, no constipation;  RAKAN: no dysuria, no hematuria, no frequency or urgency, no nephrolithiasis;  MUSCULOSKELETAL: no joint pain, no swelling, no stiffness;  ENDOCRINE: no polyuria, no polydipsia, no cold or heat intolerance;  HEMATOLOGY: no easy bruising or bleeding, no history of clotting disorder;  DERMATOLOGY: no skin rash, no eczema, no pruritus;  PSYCHIATRY: no depression, no anxiety, no panic attacks, no suicidal ideation, no homicidal ideation;  NEUROLOGY: no syncope, no seizures, no numbness or tingling of hands, no numbness or tingling of feet, no paresis;    Objective   BP (!) 149/83   Pulse 70   Temp 97.6 °F (36.4 °C)   Resp 18   Ht 6' (1.829 m)   Wt (!) 497 lb 8 oz (225.7 kg)   SpO2 98%   BMI 67.47 kg/m²     PHYSICAL EXAM:  CONSTITUTIONAL: Alert, appropriate, no acute distress, obese  EYES: Non icteric, EOM intact, pupils equal round   ENT: Mucus membranes moist, no oral pharyngeal lesions, external inspection of ears and nose are normal  NECK: Supple, no masses. No palpable thyroid mass  CHEST/LUNGS: CTA bilaterally, normal respiratory effort   CARDIOVASCULAR: RRR, no murmurs. No lower extremity edema  ABDOMEN: soft non-tender, active bowel sounds, no HSM. No palpable masses  EXTREMITIES: Bilateral lower extremity edema, warm, full ROM in all 4 extremities, no focal weakness. SKIN: warm, dry with no rashes or lesions  LYMPH: No cervical, clavicular, axillary, or inguinal lymphadenopathy  NEUROLOGIC: follows commands, non focal   PSYCH: mood and affect appropriate.   Alert and oriented to time, place, person        LABORATORY RESULTS REVIEWED BY ME:  Recent Labs     09/23/20  0511 09/21/20  1035 09/20/20  2250   WBC 9.5 18.5* 24.9*   HGB 10.7* 12.9* 14.4   HCT 33.4* 41.2* 44.6   MCV 90.3 93.2 89.6    173 234       Lab Results   Component Value Date     09/23/2020    K 4.3 09/23/2020    CL 98 09/23/2020    CO2 20 (L) 09/23/2020    BUN 60 (H) 09/23/2020    CREATININE 7.9 (H) 09/23/2020    GLUCOSE 132 (H) 09/23/2020    CALCIUM 7.5 (L) 09/23/2020    PROT 7.4 09/19/2020    LABALBU 4.1 09/19/2020    BILITOT 0.3 09/19/2020    ALKPHOS 99 09/19/2020    AST 21 09/19/2020    ALT 32 09/19/2020    LABGLOM 8 (A) 09/23/2020    GFRAA 9 (L) 09/23/2020    GLOB 3.2 03/07/2017       Lab Results   Component Value Date    INR 2.07 (H) 09/23/2020    INR 1.35 (H) 09/22/2020    INR 1.11 09/19/2020    PROTIME 23.7 (H) 09/23/2020    PROTIME 16.7 (H) 09/22/2020    PROTIME 14.3 09/19/2020       RADIOLOGY STUDIES REVIEWED BY ME:  Vl Extremity Venous Left 09/20/2020   Impression   There is evidence of subacute deep vein thrombosis in the left lower  extremity involving the common femoral, femoral, popliteal, posterior  tibial, and gastrocnemius veins. The anterior tibial and peroneal veins  were not well visualized secondary to edema and limb size. Superficial thrombophlebitis is seen in the short saphenous vein of the  left lower extremity. Floating tail left common femoral vein around 4 cm in length.        Cta Pulmonary W Contrast 09/19/2020  Multiple acute pulmonary emboli identified on the right, involving the third, fourth and fifth order pulmonary artery branches extending into the right lower lobe. There are findings that are suggestive of mild right heart strain.      ASSESSMENT:  #Venous thromboembolism- provoked event ? ?(Recent surgery)  Risk factors: Morbid obesity, recent surgery  Currently receiving catheter directed thrombolysis. · Percutaneous thrombectomy/PulseSpray thrombolysis with AngioJet Zelante catheter  · Left lower extremity venograms after percutaneous thrombectomy and thrombolysis  · Placement of 40 cm infusion length EKOS TPA catheter from the popliteal vein all the way to the distal external iliac vein  · Inferior vena cava filter placement from a right internal jugular vein approach ([place] inferior vena cava filter)  · 9/21/2002-removal of EKOS TPA catheter     D/c UFH- Continue Warfarin tartet INR 2-3      #Contrast-induced nephropathy-nephrology following. Worsening  Creatinine 5.6->7.9  HD today    #Morbid obesity-BMI above 50.-As per primary care provider. Not treating physician. DOACS not a good option as this was not studied in patients BMI above 40. Pharmacy to dose warfarin.     #Family history of VTE-apparently mother had 2 episodes of a provoked event.     #Neutrophil leukocytosis-likely reactive.  Continue to monitor. Normalized.     #Vitamin D deficiency-10.7.  As per nephrology. Vitamin D 50,000 weekly    E coli UTI- as per hospitalist    #Normocytic anemia- anemia of blood loss/acute illness. Hemoglobin 10.7/MCV 90.3     PLAN:  Discontinue anticoagulation with UFH. Continue warfarin to target INR 2-3  Follow-up results of thrombophilia work-up. Will arrange follow-up in clinic. I have seen, examined and reviewed this patient medication list, appropriate labs and imaging studies. I reviewed relevant medical records and others physicians notes. I discussed the plans of care with the patient. I answered all the questions to the patients satisfaction. I have also reviewed the chief complaint (CC) and part of the history (History of Present Illness (HPI), Past Family Social History [facility]), or Review of Systems (ROS) and made changes when appropriated.        (Please note that portions of this note were completed with a voice recognition program. Efforts were made to edit the dictations but occasionally words are mis-transcribed.)      Bar Preston MD    09/24/20  7:06 AM

## 2020-09-24 NOTE — PROGRESS NOTES
Nephrology ([facility]) Consult Note      Patient:  Carla Hernandez  YOB: 1980  Date of Service: 9/24/2020  MRN: [id number]   Acct: [de-identified]   Primary Care Physician: VANDANA Pyle  Advance Directive: Full Code  Admit Date: 9/19/2020       Hospital Day: 5  Referring Provider: Jeanie Ni MD    Patient independently seen and examined, Chart, Consults, Notes, Operative notes, Labs, Cardiology, and Radiology studies reviewed as available. Subjective:  Carla Hernandez is a 38 y.o. male  whom we were consulted for acute renal failure. Patient recalls a history of nephrolithiasis and urine tract infection but no other nephrologic evaluations. Recalled no significant NSAID usage. He initially presented for evaluation of DVT with pulmonary emboli. He underwent vascular surgical procedure with Dr. Medina Johnson. Subsequently developed decreased urine output and creatinine increased from 0.6-3.3. Contrast exposures noted beginning September 19. He was initially seen in the ICU with nursing. No family present. Denied dysuria or hematuria, rash or hemoptysis. Today, no overnight events. Denies chest pain, shortness of air, nausea vomiting. Tolerating HD. Dialysis   Pt was seen on RRT  Modality: Hemodialysis  Access: Catheter  Location: right upper  QB: 300  QD: 500  UF: 1000      Allergies:  Patient has no known allergies.     Medicines:  Current Facility-Administered Medications   Medication Dose Route Frequency Provider Last Rate Last Dose    warfarin (COUMADIN) tablet 2.5 mg  2.5 mg Oral Once Mitch Lopez MD        heparin (porcine) injection 3,600 Units  3,600 Units Intracatheter Once in dialysis Sacha Gillis MD        [facility]) chewable tablet 80 mg  80 mg Oral Q6H PRN Sherice Juan MD   80 mg at 09/23/20 7896    famotidine (PEPCID) tablet 10 mg  10 mg Oral Daily Sherice Juan MD   10 mg at 09/24/20 5613    meropenem (MERREM) 500 mg in sterile water 10 mL IV syringe  500 mg Intravenous Q12H Rogelio Lopez MD   500 mg at 09/24/20 0529    vitamin D (ERGOCALCIFEROL) capsule 50,000 Units  50,000 Units Oral Weekly Rogelio Lopez MD   50,000 Units at 09/22/20 1618    warfarin (COUMADIN) daily dosing (placeholder)   Other RX Placeholder Rogelio Lopez MD        sodium bicarbonate 50 mEq in sodium chloride 0.45 % 1,000 mL infusion   Intravenous Continuous Rogelio Lopez  mL/hr at 09/22/20 2012      glipiZIDE (GLUCOTROL) tablet 10 mg  10 mg Oral BID AC Rogelio Lopez MD   10 mg at 09/24/20 0528    glucose (GLUTOSE) 40 % oral gel 15 g  15 g Oral PRN Rogelio Lopez MD        dextrose 50 % IV solution  12.5 g Intravenous PRN Rogelio Lopez MD        glucagon (rDNA) injection 1 mg  1 mg Intramuscular PRN Rogelio Lopez MD        dextrose 5 % solution  100 mL/hr Intravenous PRN Rogelio Lopez MD        ondansetron [facility]) injection 4 mg  4 mg Intravenous Q8H PRN Rogelio Lopez MD        HYDROmorphone HCl PF (DILAUDID) injection 0.25 mg  0.25 mg Intravenous Q3H PRNANCY Lopez MD   0.25 mg at 09/20/20 2214    Or    HYDROmorphone HCl PF (DILAUDID) injection 0.5 mg  0.5 mg Intravenous Q3H PRNANCY Lopez MD   0.5 mg at 09/22/20 2138    HYDROcodone-acetaminophen ([place]) 5-325 MG per tablet 1 tablet  1 tablet Oral Q4H SHAGGY Lopez MD   1 tablet at 09/22/20 1824    Or    HYDROcodone-acetaminophen (NORCO) 5-325 MG per tablet 2 tablet  2 tablet Oral Q4H PRNANCY Lopez MD   2 tablet at 09/23/20 2034    hydrALAZINE (APRESOLINE) injection 10 mg  10 mg Intravenous Q6H PRN Rogelio Lopez MD   10 mg at 09/20/20 1658    potassium chloride (KLOR-CON M) extended release tablet 40 mEq  40 mEq Oral PRN Rogelio Lopez MD        Or    potassium bicarb-citric acid (EFFER-K) effervescent tablet 40 mEq  40 mEq Oral PRN Rogelio Lopez MD        Or    potassium chloride 10 mEq/100 mL IVPB (Peripheral Line)  10 mEq Intravenous PRN Boubacar Walden MD        magnesium sulfate 1 g in dextrose 5% 100 mL IVPB  1 g Intravenous PRN Boubacar Walden MD        acetaminophen (TYLENOL) tablet 650 mg  650 mg Oral Q6H PRN Boubacar Walden MD   650 mg at 09/22/20 1200    Or    acetaminophen (TYLENOL) suppository 650 mg  650 mg Rectal Q6H PRN Boubacar Walden MD        polyethylene glycol [facility]) packet 17 g  17 g Oral Daily PRN Boubacar Walden MD        promethazine (PHENERGAN) tablet 12.5 mg  12.5 mg Oral Q6H PRN Boubacar Walden MD        Or    [facility]dansetron [facility]) injection 4 mg  4 mg Intravenous Q6H PRN Boubacar Walden MD   4 mg at 09/21/20 1318    insulin lispro (HUMALOG) injection vial 0-6 Units  0-6 Units Subcutaneous TID WC Boubacar Walden MD   1 Units at 09/22/20 1725    insulin lispro (HUMALOG) injection vial 0-3 Units  0-3 Units Subcutaneous Nightly Boubacar Walden MD   1 Units at 09/21/20 2031       Past Medical History:  Past Medical History:   Diagnosis Date    Arthritis     both wrist    Bilateral carpal tunnel syndrome 8/15/2019    Bronchitis     10 yrs ago    Diabetes mellitus ([address]     Kidney stone     recurrent    Sleep apnea     untested    Umbilical hernia        Past Surgical History:  Past Surgical History:   Procedure Laterality Date    CARPAL TUNNEL RELEASE Left 8/21/2020    LEFT CARPAL TUNNEL RELEASE performed by Eliecer Phelps MD at [address] [place] [address] Right 7/24/2018    CYSTOSCOPY/ URETEROSCOPY; INSERTION DOUBLE J STENT/  URETERAL performed by Diana Vail MD at [address] [place] [address]      both wrists    HEMORRHOID SURGERY N/A 6/24/2016    HEMORRHOID INCISION AND DRAINAGE performed by Byrd Sandifer, MD at [address] CYSTO/URETERO/PYELOSCOPY W/LITHOTRIPSY Right 8/7/2018    URETEROSCOPY LASER LITHOTRIPSY STONE EXTRACTION performed by Diana Vail MD at [address] [place] [address] Right 8/7/2018    STENT PLACEMENT performed by Jeannette Sheikh MD at [facility] Right 2/13/2018    [facility] [address] LITHOTRIPSY performed by Jeannette Sheikh MD at [facility] Right 3/13/2018    [facility] [address] LITHOTRIPSY performed by Jeannette Sheikh MD at [facility] Right 7/24/2018    ES[facility] [address] LITHOTRIPSY performed by Jeannette Sheikh MD at [address] LAP, VENTRAL HERNIA REPAIR,REDUCIBLE N/A 6/9/0529    HERNIA UMBILICAL REPAIR LAPAROSCOPIC performed by Delfino Mart MD at [address] / [address] Left 9/20/2020     LEFT LOWER EXTREMITY VENOGRAMS; INFERIOR VENA CAVA FILTER PLACEMENT. performed by Javier Harding MD at [facility] [address]      left wrist.  Pt was a child       Family History  Family History   Problem Relation Age of Onset    Cancer Mother 46        colon cancer    Cancer Father         luekemia    Diabetes Father     Other Maternal Grandmother         copd    Other Maternal Grandfather         copd    Heart Disease Paternal Grandmother        Social History  Social History     Socioeconomic History    Marital status:      Spouse name: Not on file    Number of children: Not on file    Years of education: Not on file    Highest education level: Not on file   Occupational History    Not on file   Social Needs    Financial resource strain: Not on file    Food insecurity     Worry: Not on file     Inability: Not on file    Transportation needs     Medical: Not on file     Non-medical: Not on file   Tobacco Use    Smoking status: Never Smoker    Smokeless tobacco: Never Used    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[address] and Sexual Activity    Alcohol use: Yes     Comment: OCC    Drug use: No    Sexual activity: Yes     Partners: Female   Lifestyle    Physical activity     Days per week: Not on file     Minutes per session: Not on file    Stress: Not on file   Relationships    Social connections     Talks on phone: Not on file     Gets together: Not on file     Attends Religion service: Not on file     Active member of club or organization: Not on file     Attends meetings of clubs or organizations: Not on file     Relationship status: Not on file    Intimate partner violence     Fear of current or ex partner: Not on file     Emotionally abused: Not on file     Physically abused: Not on file     Forced sexual activity: Not on file   Other Topics Concern    Not on file   Social History Narrative    Not on file         Review of Systems:  History obtained from chart review and the patient  General ROS: No fever or chills  Respiratory ROS: No cough, shortness of breath, wheezing  Cardiovascular ROS: No chest pain or palpitations  Gastrointestinal ROS: No abdominal pain or melena  Genito-Urinary ROS: No dysuria or hematuria  Musculoskeletal ROS: No joint pain or swelling   14 point ROS reviewed with the patient and negative except as noted above and in the HPI unless unable to obtain.     Objective:  Patient Vitals for the past 24 hrs:   BP Temp Temp src Pulse Resp SpO2 Weight   09/24/20 0609 (!) 149/83 97.6 °F (36.4 °C) -- 70 18 98 % --   09/24/20 0002 (!) 163/88 97.2 °F (36.2 °C) Temporal 87 18 92 % (!) 497 lb 8 oz (225.7 kg)   09/23/20 1814 (!) 150/90 97.6 °F (36.4 °C) Temporal 84 18 93 % --   09/23/20 1714 (!) 173/91 98.6 °F (37 °C) -- 80 18 93 % --   09/23/20 1402 (!) 191/89 -- -- 82 17 96 % --   09/23/20 1357 (!) 207/92 -- -- 82 18 96 % --   09/23/20 1356 -- -- -- 84 18 95 % --   09/23/20 1351 (!) 204/80 -- -- 82 17 95 % --   09/23/20 1346 (!) 194/95 -- -- 83 18 93 % --   09/23/20 1341 (!) 193/88 -- -- 83 18 93 % --   09/23/20 1336 (!) 188/116 -- -- 90 21 96 % --   09/23/20 1331 (!) 145/70 -- -- 106 26 95 % --   09/23/20 1326 (!) 161/84 -- -- 92 19 94 % --   09/23/20 1321 (!) 171/79 -- -- 88 25 96 % --   09/23/20 1316 (!) 152/82 -- -- 85 22 94 % --   09/23/20 1311 (!) 166/83 -- -- 89 17 95 % --   09/23/20 1306 (!) 170/81 -- -- -- 26 94 % --   09/23/20 1305 -- -- -- 88 18 -- --   09/23/20 1303 (!) 190/78 -- -- -- 30 96 % --   09/23/20 1145 -- 98.3 °F (36.8 °C) Oral -- -- -- --       Intake/Output Summary (Last 24 hours) at 9/24/2020 1134  Last data filed at 9/24/2020 0535  Gross per 24 hour   Intake 1584 ml   Output 150 ml   Net 1434 ml     General: awake/alert   Chest:  clear to auscultation bilaterally  CVS: regular rate and rhythm  Abdominal: soft, nontender, normal bowel sounds  Extremities: no cyanosis, ble edema  Skin: warm and dry without rash      Labs:  BMP:   Recent Labs     09/22/20  0351 09/23/20  0511 09/24/20  0700    137 139   K 4.6 4.3 4.1    98 99   CO2 21* 20* 20*   PHOS 4.0  --   --    BUN 44* 60* 61*   CREATININE 5.6* 7.9* 8.8*   CALCIUM 7.9* 7.5* 8.0*     CBC:   Recent Labs     09/23/20  0511   WBC 9.5   HGB 10.7*   HCT 33.4*   MCV 90.3        LIVER PROFILE:   No results for input(s): AST, ALT, LIPASE, BILIDIR, BILITOT, ALKPHOS in the last 72 hours. Invalid input(s): AMYLASE,  ALB  PT/INR:   Recent Labs     09/22/20  0351 09/23/20  0511 09/24/20  0700   PROTIME 16.7* 23.7* 28.9*   INR 1.35* 2.07* 2.64*     APTT:   Recent Labs     09/23/20  1845 09/24/20  0104 09/24/20  0700   APTT 89.9* 64.1* 57.2*     BNP:  No results for input(s): BNP in the last 72 hours. Ionized Calcium:No results for input(s): IONCA in the last 72 hours. Magnesium:  Recent Labs     09/22/20  0351   MG 1.6     Phosphorus:  Recent Labs     09/22/20  0351   PHOS 4.0     HgbA1C:   No results for input(s): LABA1C in the last 72 hours. Hepatic:   No results for input(s): ALKPHOS, ALT, AST, PROT, BILITOT, BILIDIR, LABALBU in the last 72 hours. Lactic Acid: No results for input(s): LACTA in the last 72 hours. Troponin: No results for input(s): CKTOTAL, CKMB, TROPONINT in the last 72 hours.   ABGs: No results for input(s): PH, PCO2, PO2, HCO3, O2SAT in the last 72 hours. CRP:  No results for input(s): CRP in the last 72 hours. Sed Rate:  No results for input(s): SEDRATE in the last 72 hours. Cultures:   No results for input(s): CULTURE in the last 72 hours. No results for input(s): BC, Aunia Velazco in the last 72 hours. No results for input(s): CXSURG in the last 72 hours. Radiology reports as per the Radiologist  Radiology: Vl Extremity Venous Left    Result Date: 9/20/2020  Vascular Lower Extremities DVT Study Procedure  Demographics   Patient Name    Alexandria Ahumada                   38   Patient Number  [id number]           Gender                Male   Visit Number    [id number]        Interpreting          Jennifer Maxwell MD                                   Physician   Date of Birth   1980       Referring Physician   Jennifer Maxwell MD   Accession       [id number]       [address] [place] [address], [facility]  Number  Procedure Type of Study:   Veins:Lower Extremities DVT Study, VL EXTREMITY VENOUS DUPLEX LEFT. Indications for Study:DVT and Venous access. Risk Factors   - The patient's risk factor(s) include: obesity. Impression   Successful ultrasound/duplex guided cannulation of thrombosed left  popliteal vein for sheath placement.    Signature   ----------------------------------------------------------------  Electronically signed by Jennifer Maxwell MD(Interpreting  physician) on 09/20/2020 03:38 PM  ----------------------------------------------------------------      Vl Extremity Venous Left    Result Date: 9/20/2020  Vascular Lower Extremities DVT Study Procedure  Demographics   Patient Name    Alexandria Ahumada                   38   Patient Number  [id number]           Gender                Male   Visit Number    [id number]        Interpreting          Jennifer Maxwell MD                                   Physician   Date of Birth   1980       Referring Physician   Anita Angel   Accession       [id number] [facility]  Number  Procedure Type of Study:   Veins:Lower Extremities DVT Study, VL EXTREMITY VENOUS DUPLEX LEFT. Indications for Study:Pain, left lower extremity. Risk Factors   - The patient's risk factor(s) include: obesity. Impression   There is evidence of subacute deep vein thrombosis in the left lower  extremity involving the common femoral, femoral, popliteal, posterior  tibial, and gastrocnemius veins. The anterior tibial and peroneal veins  were not well visualized secondary to edema and limb size. Superficial thrombophlebitis is seen in the short saphenous vein of the  left lower extremity. Floating tail left common femoral vein around 4 cm in length. Signature   ----------------------------------------------------------------  Electronically signed by Gary Osei MD(Interpreting  physician) on 09/20/2020 09:28 AM  ----------------------------------------------------------------  Velocities are measured in cm/s ; Diameters are measured in mm Right Lower Extremities DVT Study Measurements Right 2D Measurements +------------------------------------+----------+---------------+----------+ ! Location                            ! Visualized! Compressibility! Thrombosis! +------------------------------------+----------+---------------+----------+ ! Sapheno Femoral Junction            ! Yes       ! Yes            ! None      ! +------------------------------------+----------+---------------+----------+ ! Common Femoral                      !Yes       ! Yes            ! None      ! +------------------------------------+----------+---------------+----------+ Left Lower Extremities DVT Study Measurements Left 2D Measurements +------------------------------------+----------+---------------+----------+ ! Location                            ! Visualized! Compressibility! Thrombosis! +------------------------------------+----------+---------------+----------+ ! Sapheno Femoral Junction +------------------------------------+----------+---------------+----------+    Xr Chest Portable    Result Date: 9/20/2020  EXAMINATION:  XR CHEST PORTABLE  9/20/2020 1:15 PM HISTORY: Central line adjustment. COMPARISON: 9/20/2020. FINDINGS:  The central venous catheter has been repositioned and now extends into the superior vena cava. The patient remains intubated with the endotracheal tube tip at the T2-3 level. There is a new esophageal monitoring lead. There is again poor inspiration with bilateral infiltrates. There is cardiomegaly. The left costophrenic angle is not fully included on this study. 1. Central venous catheter has been repositioned and now extends into the superior vena cava. No evidence of pneumothorax. 2. Poor inspiration. 3. Bilateral infiltrates may be related to the poor inspiratory effort. Edema and pneumonia are possible. 4. Cardiomegaly. Signed by Dr Dejan Heard on 9/20/2020 1:16 PM    Xr Chest Portable    Result Date: 9/20/2020  EXAMINATION:  XR CHEST PORTABLE  9/20/2020 12:26 PM HISTORY: Central venous catheter placement. COMPARISON: 7/8/2019. FINDINGS:  There has been placement of a right IJ central venous catheter. The catheter extends to the right into the right subclavian vein. There is no evidence of pneumothorax. The patient is intubated with the endotracheal tube tip at the T2-3 level. There is very poor inspiratory effort. There is vascular crowding. There are bilateral infiltrates. There is cardiomegaly. 1. Right IJ central venous catheter placement with catheter tip extending to the right into the right subclavian vein. No evidence of pneumothorax. 2. Endotracheal tube tip at the T2-3 level. 3. Very poor inspiration with vascular crowding. 4. Bilateral infiltrates may be related to the poor inspiration. Bilateral pneumonia and pulmonary edema cannot be ruled out on this image. 5. Cardiomegaly.  Signed by Dr Dejan Heard on 9/20/2020 12:28 PM    Cta Pulmonary W Contrast    Result Date: 9/19/2020  EXAMINATION: CTA PULMONARY W CONTRAST 9/19/2020 6:13 PM HISTORY: Shortness of breath, clinical concern for pulmonary embolism. DOSE: 804 mGycm. Automatic exposure control was utilized in an effort to use as little radiation as possible, without compromising image quality. REPORT: Spiral CT of the chest was performed after administration of intravenous contrast using CTA protocol, which includes reconstructed coronal, sagittal and 3-D images. COMPARISON: There are no correlative imaging studies for comparison. The contrast bolus is satisfactory, there is respiratory motion artifact. The pulmonary arteries are normal in caliber, there are multiple filling defects within the pulmonary arteries on the right, involving the third, fourth and fifth order branches extending into the right lower lobe there is mild straightening of the cardiac ventricular septum, which may indicate mild right heart strain. No left-sided pulmonary emboli are identified. The thoracic aorta is normal in caliber, no evidence of dissection is identified. Heart size is normal. No intrathoracic lymphadenopathy is identified. Small calcified subcarinal lymph nodes are compatible with healed granulomatous disease. The thyroid gland is homogeneous and normal. Review of lung windows demonstrates scattered calcified granulomas in both lungs. There is no parenchymal infiltrate or consolidation. No pneumothorax or pleural effusion is identified. The airways are patent. The visualized upper abdomen shows decreased attenuation of the liver parenchyma compatible with fatty infiltration. There is bilateral gynecomastia, mild. Review of bone windows shows fused syndesmophytes throughout the mid and lower thoracic spine. No acute osseous abnormalities identified.     1. Multiple acute pulmonary emboli identified on the right, involving the third, fourth and fifth order pulmonary artery branches extending into the right lower lobe.  There are findings that are suggestive of mild right heart strain. Signed by Dr Marcie Gusman. Geo on 9/19/2020 6:20 PM       Assessment   Acute renal failure stage III  Risk factors include contrast-induced nephropathy or renal emboli  Morbid obesity  DVT and pulmonary emboli  Diabetes type 2 uncontrolled by A1c  Hyperkalemia  Metabolic acidosis  Vitamin D deficiency  Secondary hyperparathyroidism      Plan:  Discussed with patient, nursing, family, vascular, NS  Wean fluids  Dialysis initiation continues today, appreciate vascular assistance  Vitamin D    Thank you for the consult, we appreciate the opportunity to provide care to your patients. Feel free to contact me if I can be of any further assistance.       Marta Gutierrez MD  09/24/20  11:34 AM

## 2020-09-25 ENCOUNTER — TELEPHONE (OUTPATIENT)
Dept: PRIMARY CARE CLINIC | Age: 40
End: 2020-09-25

## 2020-09-25 LAB
ANION GAP SERPL CALCULATED.3IONS-SCNC: 17 MMOL/L (ref 7–19)
ANTITHROMBIN ACTIVITY: 86 % (ref 76–128)
BUN BLDV-MCNC: 58 MG/DL (ref 6–20)
CALCIUM SERPL-MCNC: 8.4 MG/DL (ref 8.6–10)
CHLORIDE BLD-SCNC: 100 MMOL/L (ref 98–111)
CO2: 22 MMOL/L (ref 22–29)
CREAT SERPL-MCNC: 9.2 MG/DL (ref 0.5–1.2)
FACTOR V LEIDEN: ABNORMAL
GFR AFRICAN AMERICAN: 8
GFR NON-AFRICAN AMERICAN: 6
GLUCOSE BLD-MCNC: 55 MG/DL (ref 70–99)
GLUCOSE BLD-MCNC: 57 MG/DL (ref 74–109)
GLUCOSE BLD-MCNC: 82 MG/DL (ref 70–99)
GLUCOSE BLD-MCNC: 85 MG/DL (ref 70–99)
INR BLD: 2.25 (ref 0.88–1.18)
MISCELLANEOUS LAB TEST RESULT: ABNORMAL
PERFORMED ON: ABNORMAL
PERFORMED ON: NORMAL
PERFORMED ON: NORMAL
POTASSIUM REFLEX MAGNESIUM: 3.8 MMOL/L (ref 3.5–5)
PROTEIN C ANTIGEN: 95 % (ref 63–153)
PROTEIN S ANTIGEN, TOTAL: 157 % (ref 84–134)
PROTHROMBIN TIME: 25.3 SEC (ref 12–14.6)
SODIUM BLD-SCNC: 139 MMOL/L (ref 136–145)
SPECIMEN: ABNORMAL

## 2020-09-25 PROCEDURE — 82947 ASSAY GLUCOSE BLOOD QUANT: CPT

## 2020-09-25 PROCEDURE — 2580000003 HC RX 258: Performed by: SURGERY

## 2020-09-25 PROCEDURE — 85670 THROMBIN TIME PLASMA: CPT

## 2020-09-25 PROCEDURE — 8010000000 HC HEMODIALYSIS ACUTE INPT

## 2020-09-25 PROCEDURE — 85730 THROMBOPLASTIN TIME PARTIAL: CPT

## 2020-09-25 PROCEDURE — 85610 PROTHROMBIN TIME: CPT

## 2020-09-25 PROCEDURE — 6360000002 HC RX W HCPCS: Performed by: SURGERY

## 2020-09-25 PROCEDURE — 85613 RUSSELL VIPER VENOM DILUTED: CPT

## 2020-09-25 PROCEDURE — 99231 SBSQ HOSP IP/OBS SF/LOW 25: CPT | Performed by: INTERNAL MEDICINE

## 2020-09-25 PROCEDURE — 80048 BASIC METABOLIC PNL TOTAL CA: CPT

## 2020-09-25 PROCEDURE — 85732 THROMBOPLASTIN TIME PARTIAL: CPT

## 2020-09-25 PROCEDURE — 36415 COLL VENOUS BLD VENIPUNCTURE: CPT

## 2020-09-25 PROCEDURE — 1210000000 HC MED SURG R&B

## 2020-09-25 PROCEDURE — 6360000002 HC RX W HCPCS: Performed by: INTERNAL MEDICINE

## 2020-09-25 PROCEDURE — 6370000000 HC RX 637 (ALT 250 FOR IP): Performed by: SURGERY

## 2020-09-25 PROCEDURE — 6370000000 HC RX 637 (ALT 250 FOR IP): Performed by: INTERNAL MEDICINE

## 2020-09-25 RX ORDER — GLIPIZIDE 5 MG/1
5 TABLET ORAL
Status: DISCONTINUED | OUTPATIENT
Start: 2020-09-25 | End: 2020-09-25

## 2020-09-25 RX ORDER — HEPARIN SODIUM 1000 [USP'U]/ML
3600 INJECTION, SOLUTION INTRAVENOUS; SUBCUTANEOUS
Status: COMPLETED | OUTPATIENT
Start: 2020-09-25 | End: 2020-09-25

## 2020-09-25 RX ORDER — WARFARIN SODIUM 5 MG/1
5 TABLET ORAL
Status: COMPLETED | OUTPATIENT
Start: 2020-09-25 | End: 2020-09-25

## 2020-09-25 RX ADMIN — HEPARIN SODIUM 3600 UNITS: 1000 INJECTION INTRAVENOUS; SUBCUTANEOUS at 11:10

## 2020-09-25 RX ADMIN — GLIPIZIDE 10 MG: 10 TABLET ORAL at 06:12

## 2020-09-25 RX ADMIN — MEROPENEM 500 MG: 500 INJECTION, POWDER, FOR SOLUTION INTRAVENOUS at 06:09

## 2020-09-25 RX ADMIN — ACETAMINOPHEN 650 MG: 325 TABLET, FILM COATED ORAL at 21:33

## 2020-09-25 RX ADMIN — FAMOTIDINE 10 MG: 20 TABLET ORAL at 11:41

## 2020-09-25 RX ADMIN — WARFARIN SODIUM 5 MG: 5 TABLET ORAL at 17:40

## 2020-09-25 ASSESSMENT — PAIN SCALES - GENERAL
PAINLEVEL_OUTOF10: 5
PAINLEVEL_OUTOF10: 9
PAINLEVEL_OUTOF10: 0

## 2020-09-25 NOTE — PROGRESS NOTES
Comprehensive Nutrition Assessment    Type and Reason for Visit:  Initial, RD Nutrition Re-Screen/LOS    Nutrition Recommendations/Plan: modify current diet to include Carb Control    Nutrition Assessment:  Pt is adequately nourished AEB fat and muscle mass. Diet modified to include Carb Control. Breakfast on hold d/t dialysis. Malnutrition Assessment:  Malnutrition Status:  No malnutrition    Context:  Acute Illness     Findings of the 6 clinical characteristics of malnutrition:  Energy Intake:  Mild decrease in energy intake (Comment)  Weight Loss:  No significant weight loss     Body Fat Loss:  No significant body fat loss     Muscle Mass Loss:  No significant muscle mass loss    Fluid Accumulation:  1 - Mild Extremities   Strength:  Not Performed    Nutrition Related Findings:  well nourished, morbidly obese      Wounds:  Surgical Wound       Current Nutrition Therapies:    DIET CARB CONTROL; Carb Control: 4 carb choices (60 gms)/meal; Renal    Anthropometric Measures:  · Height: 6' (182.9 cm)  · Current Body Weight: 491 lb (222.7 kg)   · Admission Body Weight: 380 lb (172.4 kg)(stated)    · Usual Body Weight: 390 lb (176.9 kg)(6/2020)     · Ideal Body Weight: 178 lbs; % Ideal Body Weight 275.8 %   · BMI: 66.6  · Adjusted Body Weight:  ; No Adjustment   · Adjusted BMI:      · BMI Categories: Obese Class 3 (BMI 40.0 or greater)       Nutrition Diagnosis:   · Overweight/Obese related to excessive energy intake as evidenced by BMI      Nutrition Interventions:   Food and/or Nutrient Delivery:  Modify Current Diet  Nutrition Education/Counseling:  No recommendation at this time   Coordination of Nutrition Care:  Continued Inpatient Monitoring    Goals:  PO intake 50% or greater. Weight decrease 1-3# per week.   Surgical incision healing       Nutrition Monitoring and Evaluation:   Behavioral-Environmental Outcomes:      Food/Nutrient Intake Outcomes:  Food and Nutrient Intake  Physical Signs/Symptoms Outcomes:  Biochemical Data, Nutrition Focused Physical Findings, Skin, Weight     Discharge Planning:     Too soon to determine     Electronically signed by Elizabeth Urena MS, RD, LD on 9/25/20 at 1:32 PM CDT    Contact: [phone]

## 2020-09-25 NOTE — PLAN OF CARE
Problem: Falls - Risk of:  Goal: Will remain free from falls  Description: Will remain free from falls  Outcome: Ongoing  Goal: Absence of physical injury  Description: Absence of physical injury  Outcome: Ongoing     Problem: Pain:  Goal: Pain level will decrease  Description: Pain level will decrease  Outcome: Ongoing  Goal: Control of acute pain  Description: Control of acute pain  Outcome: Ongoing  Goal: Control of chronic pain  Description: Control of chronic pain  Outcome: Ongoing     Problem: Skin Integrity:  Goal: Will show no infection signs and symptoms  Description: Will show no infection signs and symptoms  Outcome: Ongoing  Goal: Absence of new skin breakdown  Description: Absence of new skin breakdown  Outcome: Ongoing  Goal: Status of oral mucous membranes will improve  Description: Status of oral mucous membranes will improve  Outcome: Ongoing  Goal: Skin integrity will be maintained  Description: Skin integrity will be maintained  Outcome: Ongoing     Problem:  Activity:  Goal: Fatigue will decrease  Description: Fatigue will decrease  Outcome: Ongoing  Goal: Risk for activity intolerance will decrease  Description: Risk for activity intolerance will decrease  Outcome: Ongoing     Problem: Coping:  Goal: Ability to cope will improve  Description: Ability to cope will improve  Outcome: Ongoing     Problem: Fluid Volume:  Goal: Will show no signs or symptoms of fluid imbalance  Description: Will show no signs or symptoms of fluid imbalance  Outcome: Ongoing     Problem: Health Behavior:  Goal: Ability to manage health-related needs will improve  Description: Ability to manage health-related needs will improve  Outcome: Ongoing  Goal: Identification of resources available to assist in meeting health care needs will improve  Description: Identification of resources available to assist in meeting health care needs will improve  Outcome: Ongoing     Problem: Nutritional:  Goal: Ability to identify appropriate dietary choices will improve  Description: Ability to identify appropriate dietary choices will improve  Outcome: Ongoing     Problem: Physical Regulation:  Goal: Ability to maintain clinical measurements within normal limits will improve  Description: Ability to maintain clinical measurements within normal limits will improve  Outcome: Ongoing  Goal: Complications related to the disease process, condition or treatment will be avoided or minimized  Description: Complications related to the disease process, condition or treatment will be avoided or minimized  Outcome: Ongoing     Problem: Sensory:  Goal: General experience of comfort will improve  Description: General experience of comfort will improve  Outcome: Ongoing

## 2020-09-25 NOTE — PROGRESS NOTES
[facility]    Patient:  Alex Morales  YOB: 1980  Date of Service: 9/25/2020  MRN: [id number]   Acct: [de-identified]   Primary Care Physician: VANDANA Cummings  Advance Directive: Full Code  Admit Date: 9/19/2020       Hospital Day: 6  Portions of this note have been copied forward, however, changed to reflect the most current clinical status of this patient. CHIEF COMPLAINT Left Leg Pain    SUBJECTIVE: Mr. Katy Suggs states he felt mild cramping today during his dialysis treatment but adjustments were made and they resolved quickly. Otherwise no new complains. Denies chest pain, N/V/D or constipation. Cumulative Hospital Course:   Mr. Katy Suggs is a 38year old with PMH morbid obesity, DMII, SHELBY, ESBL Ecoli UTI who presented to [facility] ED complaining of left leg pain and edema that began 09/15/2020. He stated his mother and aunt both have history of blood clots and denied alcohol or tobacco use. Work up revealed significant left lower extremity thrombosis extending into the left iliofemoral vein as well as right sided pulmonary embolism with mild right ventricular strain. He was started on Heparin gtt with consult made to Vascular surgery who performed AngioJet mechanical thrombectomy/PulseSpray thrombolysis of left lower extremity DVT and inferior vena cava filter placement. He was continued on TPA thrombolysis overnight to treat common femoral vein floating tail. He underwent intravascular ultrasound of inferior vena cava/left common and external iliac veins/left common femoral vein on 09/21/2020 with findings of patent inferior vena cava filter. On 09/21/2020 urine output noted to be minimal with acute decline in renal function. IVF's were increased with addition of bicarbonate and consult made to Nephrology. On 09/22/2020 creatinine continued to worsen to 5.6, GFR 11. IVF's continued. He was started on Merrem for ESBL E coli UTI. Patient transferred to 3rd floor medical unit.  On 09/23/2020 Vascular surgery placed a right internal jugular vein tunneled dialysis catheter and patient underwent hemodialysis. Heparin gtt discontinued as INR is therapeutic. Review of Systems:   14 point review of systems is negative except as specifically addressed above. Objective:   VITALS:  BP (!) 156/94   Pulse 78   Temp 97.9 °F (36.6 °C)   Resp 19   Ht 6' (1.829 m)   Wt (!) 491 lb (222.7 kg)   SpO2 90%   BMI 66.59 kg/m²   24HR INTAKE/OUTPUT:      Intake/Output Summary (Last 24 hours) at 9/25/2020 1426  Last data filed at 9/25/2020 1154  Gross per 24 hour   Intake 1080 ml   Output 1850 ml   Net -770 ml     General appearance: 38year old male, resting comfortably in bed, no acute distress, laying in supine position   Head: Normocephalic, without obvious abnormality, atraumatic  Eyes: conjunctivae/corneas clear. PERRL, EOM's intact.    Ears: normal external ears and nose, throat without exudate  Neck: no adenopathy, no carotid bruit, no JVD, supple, symmetrical, trachea midline   Lungs: clear to auscultation throughout, no wheezes, rales or rhonchi  Heart: regular rate rhythm, S1, S2 normal, no murmur  Abdomen:soft, morbidly obese, non-tender; non-distended, normal bowel sounds no masses, no organomegaly  Extremities: 1-2+ peripheral edema,  No erythema, no tenderness to palpation, BLE's warm to touch   Skin: Skin color, texture, turgor normal. No rashes or lesions  Lymphatic: No palpable lymph node enlargment  Neurologic: Alert and oriented x 3, no focal deficits, speech fluent, no facial asymmetry   Psychiatric: Calm, appropriate affect      Medications:      dextrose        [START ON 9/26/2020] meropenem  500 mg Intravenous Q24H    warfarin  5 mg Oral Once    famotidine  10 mg Oral Daily    vitamin D  50,000 Units Oral Weekly    warfarin (COUMADIN) daily dosing (placeholder)   Other RX Placeholder    glipiZIDE  10 mg Oral BID AC    insulin lispro  0-6 Units Subcutaneous TID WC    insulin lispro 0-3 Units Subcutaneous Nightly     simethicone, glucose, dextrose, glucagon (rDNA), dextrose, ondansetron, HYDROmorphone **OR** HYDROmorphone, HYDROcodone 5 mg - acetaminophen **OR** HYDROcodone 5 mg - acetaminophen, hydrALAZINE, potassium chloride **OR** potassium alternative oral replacement **OR** potassium chloride, magnesium sulfate, acetaminophen **OR** acetaminophen, polyethylene glycol, promethazine **OR** ondansetron  DIET CARB CONTROL; Carb Control: 4 carb choices (60 gms)/meal; Renal     Lab and other Data:     Recent Labs     09/23/20  0511   WBC 9.5   HGB 10.7*        Recent Labs     09/23/20  0511 09/24/20  0700 09/25/20  0416    139 139   K 4.3 4.1 3.8   CL 98 99 100   CO2 20* 20* 22   BUN 60* 61* 58*   CREATININE 7.9* 8.8* 9.2*   GLUCOSE 132* 77 57*     INR:   Recent Labs     09/23/20  0511 09/24/20  0700 09/25/20  0415   INR 2.07* 2.64* 2.25*     RAD:   Renal ultrasound 09/22/2020  1. Grossly negative renal ultrasound. 2. The bladder is not well evaluated. Vl Extremity Venous Left  Impression:   Successful ultrasound/duplex guided cannulation of thrombosed left  popliteal vein for sheath placement. Vl Extremity Venous Left  Impression   There is evidence of subacute deep vein thrombosis in the left lower  extremity involving the common femoral, femoral, popliteal, posterior  tibial, and gastrocnemius veins. The anterior tibial and peroneal veins  were not well visualized secondary to edema and limb size. Superficial thrombophlebitis is seen in the short saphenous vein of the  left lower extremity. Floating tail left common femoral vein around 4 cm in length. Xr Chest Portable  1. Central venous catheter has been repositioned and now extends into the superior vena cava. No evidence of pneumothorax. 2. Poor inspiration. 3. Bilateral infiltrates may be related to the poor inspiratory effort. Edema and pneumonia are possible. 4. Cardiomegaly.  Signed by Dr Naveen Stephens on 9/20/2020 1:16 PM    Xr Chest Portable  1. Right IJ central venous catheter placement with catheter tip extending to the right into the right subclavian vein. No evidence of pneumothorax. 2. Endotracheal tube tip at the T2-3 level. 3. Very poor inspiration with vascular crowding. 4. Bilateral infiltrates may be related to the poor inspiration. Bilateral pneumonia and pulmonary edema cannot be ruled out on this image. 5. Cardiomegaly. Signed by Dr Pauly Park on 9/20/2020 12:28 PM    Cta Pulmonary W Contrast  1. Multiple acute pulmonary emboli identified on the right, involving the third, fourth and fifth order pulmonary artery branches extending into the right lower lobe. There are findings that are suggestive of mild right heart strain. Signed by Dr Ya Ramires. Geo on 9/19/2020 6:20 PM    Micro: Urine culture   Escherichia coli (esbl) (1)   Antibiotic  Interpretation  CRYSTAL  Status    ampicillin  Resistant  >=32  mcg/mL     aztreonam  Resistant       ceFAZolin  Resistant  >=64  mcg/mL     cefepime  Resistant  >=64  mcg/mL     cefTRIAXone  Resistant  >=64  mcg/mL     ertapenem  Sensitive  <=0.5  mcg/mL     gentamicin  Resistant  >=16  mcg/mL     levofloxacin  Resistant  >=8  mcg/mL     meropenem  Sensitive  <=0.25  mcg/mL     nitrofurantoin  Sensitive  <=16  mcg/mL     tobramycin  Intermediate  8  mcg/mL     trimethoprim-sulfamethoxazole  Resistant  >=320  mcg/mL       Assessment/Plan   Principal Problem:    Venous thromboembolism- noted to have recent Carpal Tunnel surgery left wrist, s/p percutaneous thrombectomy/PulseSpray thrombolysis with Angiojet and placement of EKOS TPA catheter, IVC filter placement. INR therapeutic, heparin gtt discontinued.  Appreciate Vascular surgery/Hematology assistance, repeat venous duplex as outpatient in 8 weeks and discuss study/possibly schedule IVC removal per Vascular, stable    Active Problems:    Acute kidney injury- permacath placed followed by [facility] 09/23/2020,

## 2020-09-25 NOTE — PLAN OF CARE
Problem: Falls - Risk of:  Goal: Will remain free from falls  Description: Will remain free from falls  Outcome: Ongoing  Goal: Absence of physical injury  Description: Absence of physical injury  Outcome: Ongoing     Problem: Pain:  Goal: Pain level will decrease  Description: Pain level will decrease  Outcome: Ongoing  Goal: Control of acute pain  Description: Control of acute pain  Outcome: Ongoing  Goal: Control of chronic pain  Description: Control of chronic pain  Outcome: Ongoing     Problem: Skin Integrity:  Goal: Will show no infection signs and symptoms  Description: Will show no infection signs and symptoms  Outcome: Ongoing  Goal: Absence of new skin breakdown  Description: Absence of new skin breakdown  Outcome: Ongoing  Goal: Status of oral mucous membranes will improve  Description: Status of oral mucous membranes will improve  Outcome: Ongoing  Goal: Skin integrity will be maintained  Description: Skin integrity will be maintained  Outcome: Ongoing     Problem:  Activity:  Goal: Fatigue will decrease  Description: Fatigue will decrease  Outcome: Ongoing  Goal: Risk for activity intolerance will decrease  Description: Risk for activity intolerance will decrease  Outcome: Ongoing     Problem: Coping:  Goal: Ability to cope will improve  Description: Ability to cope will improve  Outcome: Ongoing     Problem: Fluid Volume:  Goal: Will show no signs or symptoms of fluid imbalance  Description: Will show no signs or symptoms of fluid imbalance  Outcome: Ongoing     Problem: Health Behavior:  Goal: Ability to manage health-related needs will improve  Description: Ability to manage health-related needs will improve  Outcome: Ongoing  Goal: Identification of resources available to assist in meeting health care needs will improve  Description: Identification of resources available to assist in meeting health care needs will improve  Outcome: Ongoing     Problem: Nutritional:  Goal: Ability to identify

## 2020-09-25 NOTE — PROGRESS NOTES
PROGRESS NOTE    Pt Name: Sunni Brown  MRN: [id number]  YOB: 1980  Date of evaluation: 9/25/2020  Room: 334    [facility] HD      INTERVAL HISTORY  Sarah Reilly is a 50-year-old  gentleman admitted to [facility] ED on 9/19/2020 having presented with pain and swelling of the left leg, burning in the right side of his chest with evaluation leading to a diagnosis of left lower extremity DVT and PE.  Hematology consultation requested.     HEMATOLOGICAL HISTORY: Left lower extremity DVT with right lung pulmonary emboli 9/19/2020  Sarah Reilly was seen in initial hematology consultation on 9/20/2020 as an inpatient at [facility] having been admitted on 9/19/2020 with left lower extremity DVT and PE.  He was placed on heparin as initial management.     Mr. Georgiana Woodruff has a 5-day history of left leg pain and swelling.  Initially he thought he had pulled a muscle but this did not get better.  When things did not get better, he sought evaluation at the ED at [facility]. Additionally he has nonspecific symptoms of pulmonary embolus including burning in the right side of his chest.  He does not have hemoptysis or dyspnea. Mr. Georgiana Woodruff does not have a personal history of DVT or PE or hypercoagulability. Risk features include:   Obesity.    Recent left hand carpal tunnel surgery in August 2020. Family history: His mother had an episode of left lower extremity DVT and PE 15 years ago without recurrence.  A maternal aunt also had DVT of the lower extremities.     Noninvasive venous studies of the lower extremities on 9/19/2020 document:   · Extensive thrombosis (DVT) noted in Lt common femoral vein, Lt SFV (prox, mid and distal), Lt popliteal vein, Lt Gastrocs, Lt posterior tibial veins.  A floating tail is noted in left common femoral vein measuring approximately 4.3cm.     · SVT: thrombus noted in Lt LSV     Pulmonary CTA with contrast on 9/19/2020 documented the following:  · Multiple right-sided acute pulmonary emboli identified involving the right third fourth and fifth ordered pulmonary artery branches extending into the right lower lobe. · Possible right heart strain  · No left sided pulmonary emboli identified  · Small calcified subcarinal lymph nodes consistent with healed granulomatous disease  · Scattered calcified granulomas in both lungs     He was seen by Dr. Bishop Blake from vascular surgery this morning (9/20/2020), with recommendations for percutaneous mechanical thrombectomy/pulse spray thrombolysis to try to prevent long-term swelling in this young patient with iliofemoral DVT.  If the result in that procedure is not satisfactory, he would consider placing a TPA thrombolyzes catheter for thrombolysis overnight.  The patient agreed to proceed. Dr. Bishop Blake did not feel that he needed TPA thrombolysis of the pulmonary embolism because he is fairly asymptomatic from that standpoint.     Agree with plans as outlined  A serologic prothrombotic work-up will be requested.         REVIEW OF SYSTEMS:   CONSTITUTIONAL: no fever, no night sweats, fatigue;  HEENT: no blurring of vision, no double vision, no hearing difficulty, no tinnitus, no ulceration, no dysplasia, no epistaxis;  LUNGS: no cough, no hemoptysis, no wheeze,  no shortness of breath;  CARDIOVASCULAR: no palpitation, no chest pain, no shortness of breath;  GI: Indigestion, stomach is tight, no abdominal pain, nausea, no vomiting, no diarrhea, no constipation;  RAKAN: no dysuria, no hematuria, no frequency or urgency, no nephrolithiasis;  MUSCULOSKELETAL: no joint pain, no swelling, no stiffness;  ENDOCRINE: no polyuria, no polydipsia, no cold or heat intolerance;  HEMATOLOGY: no easy bruising or bleeding, no history of clotting disorder;  DERMATOLOGY: no skin rash, no eczema, no pruritus;  PSYCHIATRY: no depression, no anxiety, no panic attacks, no suicidal ideation, no homicidal ideation;  NEUROLOGY: no syncope, no 09/23/2020       RADIOLOGY STUDIES REVIEWED BY ME:  Vl Extremity Venous Left 09/20/2020   Impression   There is evidence of subacute deep vein thrombosis in the left lower  extremity involving the common femoral, femoral, popliteal, posterior  tibial, and gastrocnemius veins. The anterior tibial and peroneal veins  were not well visualized secondary to edema and limb size. Superficial thrombophlebitis is seen in the short saphenous vein of the  left lower extremity. Floating tail left common femoral vein around 4 cm in length.        Cta Pulmonary W Contrast 09/19/2020  Multiple acute pulmonary emboli identified on the right, involving the third, fourth and fifth order pulmonary artery branches extending into the right lower lobe. There are findings that are suggestive of mild right heart strain.      ASSESSMENT:  #Venous thromboembolism LLE DVT and PE- provoked event ? ?(Recent surgery)  Risk factors: Morbid obesity, recent surgery  Currently receiving catheter directed thrombolysis. · Percutaneous thrombectomy/PulseSpray thrombolysis with AngioJet Zelante catheter  · Left lower extremity venograms after percutaneous thrombectomy and thrombolysis  · Placement of 40 cm infusion length EKOS TPA catheter from the popliteal vein all the way to the distal external iliac vein  · Inferior vena cava filter placement from a right internal jugular vein approach (Bard Heather inferior vena cava filter)  · 9/21/2002-removal of EKOS TPA catheter     PT/INR - 25.3/2.25  Pharmacy to dose warfarin. Prothrombotic work-up  Beta 2 glycoprotein IgG and IgM - both negative  Cardiolipin Ab IgG and IgM - both negative  Phospholipid Ab - negative        #Contrast-induced nephropathy-nephrology following. Worsening  Creatinine 5.6->7.9->9.2        #Morbid obesity-BMI above 50.-As per primary care provider. Not treating physician. DOACS not a good option as this was not studied in patients BMI above 40.   Pharmacy to dose warfarin.     #Family history of VTE-apparently mother had 2 episodes of a provoked event.     #Neutrophil leukocytosis-likely reactive.  Continue to monitor. Normalized.     #Vitamin D deficiency-10.7. As per nephrology. Vitamin D 50,000 weekly    E coli UTI- as per hospitalist    #Normocytic anemia- anemia of blood loss/acute illness. Hemoglobin 10.7/MCV 90.3 on 9/23/2020     PLAN:  Continue warfarin to target INR 2-3  Follow-up results of thrombophilia work-up. Will arrange follow-up in clinic in 2 months    Cinthya Noble PA-C    09/25/20  7:11 AM   Physician's attestation/substantial contribution:  I, Dr Obi [facility], independently performed an evaluation on Pérez Plush. I have reviewed relevant medical information/data to include but not limited to medication list, relevant appropriate labs and imaging when applicable. I reviewed other physician's notes, ancillary services and nurses assessment. I have reviewed the above documentation completed by the Nurse Practitioner or Physician Assistant. Please see my additional contributions to the history of present illness, physical examination, and assessment/medical decision-making that reflect my findings and impressions. I discussed essential elements of the care plan with the NP or PA and the patient as well. I answered all the questions to the patient's satisfaction. I concur with above stated. Subjective-no new complaints  Objective- ill-appearing  Assessment/plan:  DVT/PE- continue warfarin to keep INR 2-3. Recommend lifelong anticoagulation. Contrast-induced nephropathy/acute kidney injury- continue hemodialysis as per nephrology.     Obi [facility] MD

## 2020-09-25 NOTE — PROGRESS NOTES
Clinical Pharmacy Note    Warfarin consult follow-up    Recent Labs     09/25/20  0415   INR 2.25*     Recent Labs     09/23/20  0511   HGB 10.7*   HCT 33.4*          Significant Drug-Drug Interactions:  New warfarin drug-drug interactions: none  Discontinued drug-drug interactions: none    Date INR Warfarin Dose   09/19/20 1.11 ---   09/21/20 --- 7.5 mg    09/22/20  1.35   7.5 mg    09/23/20 2.07  5 mg   09/24/20  2.64  2.5 mg    09/25/20  2.25  5 mg                  Notes:                   Give Coumadin 5mg x 1 dose tonight. Daily PT/INR until stable within therapeutic range.      GARCÍA VINCENT, PHARM D, 9/25/2020, 9:10 AM

## 2020-09-25 NOTE — CARE COORDINATION
SW gave Pt his HD schedule letter; discussed time, date, etc., voiced understanding; Pt's mother and spouse also present.   Pt noted he cannot think of anything additional that he needs    Electronically signed by BRIANNA Caceres on 9/25/2020 at 3:49 PM

## 2020-09-25 NOTE — PROGRESS NOTES
Nephrology ([address] [facility]) Consult Note      Patient:  Irving Dubin  YOB: 1980  Date of Service: 9/25/2020  MRN: [id number]   Acct: [de-identified]   Primary Care Physician: VANDANA Gomez  Advance Directive: Full Code  Admit Date: 9/19/2020       Hospital Day: 6  Referring Provider: Adeline Gimenez MD    Patient independently seen and examined, Chart, Consults, Notes, Operative notes, Labs, Cardiology, and Radiology studies reviewed as available. Subjective:  Irving Dubin is a 38 y.o. male  whom we were consulted for acute renal failure. Patient recalls a history of nephrolithiasis and urine tract infection but no other nephrologic evaluations. Recalled no significant NSAID usage. He initially presented for evaluation of DVT with pulmonary emboli. He underwent vascular surgical procedure with Dr. Colonel Saenz. Subsequently developed decreased urine output and creatinine increased from 0.6-3.3. Contrast exposures noted beginning September 19. He was initially seen in the ICU with nursing. No family present. Denied dysuria or hematuria, rash or hemoptysis. Today, no overnight events. Denies current chest pain, shortness of air, nausea vomiting. Tolerating HD. Dialysis   Pt was seen on RRT  Modality: Hemodialysis  Access: Catheter  Location: right upper  QB: 450  QD: 700  UF: 670      Allergies:  Patient has no known allergies.     Medicines:  Current Facility-Administered Medications   Medication Dose Route Frequency Provider Last Rate Last Dose    [START ON 9/26/2020] meropenem (MERREM) 500 mg in sterile water 10 mL IV syringe  500 mg Intravenous Q24H Franca Kendrick MD        warfarin (COUMADIN) tablet 5 mg  5 mg Oral Once Mindy Ann MD        [facility]) chewable tablet 80 mg  80 mg Oral Q6H PRN Franca Kendrick MD   80 mg at 09/23/20 0416    famotidine (PEPCID) tablet 10 mg  10 mg Oral Daily Franca Kendrick MD   10 mg at 09/25/20 1141    vitamin D (ERGOCALCIFEROL) capsule 50,000 Units  50,000 Units Oral Weekly Jarad Daly MD   50,000 Units at 09/22/20 1618    warfarin (COUMADIN) daily dosing (placeholder)   Other RX Yessy Grace MD        glipiZIDE (GLUCOTROL) tablet 10 mg  10 mg Oral BID AC Jarad Daly MD   10 mg at 09/25/20 0612    glucose (GLUTOSE) 40 % oral gel 15 g  15 g Oral PRN Jarad Daly MD        dextrose 50 % IV solution  12.5 g Intravenous PRN Jarad Daly MD        glucagon (rDNA) injection 1 mg  1 mg Intramuscular PRNANCY Daly MD        dextrose 5 % solution  100 mL/hr Intravenous PRNANCY Daly MD        ondansetron [facility]) injection 4 mg  4 mg Intravenous Q8H PRNANCY Daly MD        HYDROmorphone HCl PF (DILAUDID) injection 0.25 mg  0.25 mg Intravenous Q3H PRNANCY Daly MD   0.25 mg at 09/20/20 2214    Or    HYDROmorphone HCl PF (DILAUDID) injection 0.5 mg  0.5 mg Intravenous Q3H PRNANCY Daly MD   0.5 mg at 09/22/20 2138    HYDROcodone-acetaminophen ([address]) 5-325 MG per tablet 1 tablet  1 tablet Oral Q4H PRNANCY Daly MD   1 tablet at 09/22/20 1824    Or    HYDROcodone-acetaminophen (NORCO) 5-325 MG per tablet 2 tablet  2 tablet Oral Q4H PRNANCY Daly MD   2 tablet at 09/23/20 2034    hydrALAZINE (APRESOLINE) injection 10 mg  10 mg Intravenous Q6H PRNANCY Daly MD   10 mg at 09/20/20 1658    potassium chloride (KLOR-CON M) extended release tablet 40 mEq  40 mEq Oral PRNANCY Daly MD        Or    potassium bicarb-citric acid (EFFER-K) effervescent tablet 40 mEq  40 mEq Oral PRNANCY Daly MD        Or   [facility] potassium chloride 10 mEq/100 mL IVPB (Peripheral Line)  10 mEq Intravenous PRNANCY Daly MD        magnesium sulfate 1 g in dextrose 5% 100 mL IVPB  1 g Intravenous PRNANCY Daly MD        acetaminophen (TYLENOL) tablet 650 mg  650 mg Oral Q6H PRN Jarad Daly MD   650 mg at 09/24/20 1859    Or    acetaminophen (TYLENOL) suppository 650 mg  650 mg Rectal Q6H PRN Luna Otero MD        polyethylene glycol [facility]) packet 17 g  17 g Oral Daily PRN Luna Otero MD        promethazine (PHENERGAN) tablet 12.5 mg  12.5 mg Oral Q6H PRN Luna Otero MD        Or    ondansetron [facility]) injection 4 mg  4 mg Intravenous Q6H PRN Luna Otero MD   4 mg at 09/21/20 1318    insulin lispro (HUMALOG) injection vial 0-6 Units  0-6 Units Subcutaneous TID WC Luna Otero MD   1 Units at 09/22/20 1725    insulin lispro (HUMALOG) injection vial 0-3 Units  0-3 Units Subcutaneous Nightly Luna Otero MD   1 Units at 09/21/20 2031       Past Medical History:  Past Medical History:   Diagnosis Date    Arthritis     both wrist    Bilateral carpal tunnel syndrome 8/15/2019    Bronchitis     10 yrs ago    Diabetes mellitus ([address]     Kidney stone     recurrent    Sleep apnea     untested    Umbilical hernia        Past Surgical History:  Past Surgical History:   Procedure Laterality Date    CARPAL TUNNEL RELEASE Left 8/21/2020    LEFT CARPAL TUNNEL RELEASE performed by Cherylene Fanti, MD at [facility] Right 7/24/2018    CYSTOSCOPY/ URETEROSCOPY; INSERTION DOUBLE J STENT/  URETERAL performed by John Youngblood MD at [address] [place] [address]      both wrists    HEMORRHOID SURGERY N/A 6/24/2016    HEMORRHOID INCISION AND DRAINAGE performed by Richard Jeffrey MD at [facility] CYSTO/URETERO/PYELOSCOPY W/LITHOTRIPSY Right 8/7/2018    URETEROSCOPY LASER LITHOTRIPSY STONE EXTRACTION performed by John Youngblood MD at [address] [place] [address] Right 8/7/2018    STENT PLACEMENT performed by John Youngblood MD at [address] [place] [address] [facility] Right 2/13/2018    ESWL EXTRACORPEAL SHOCK WAVE LITHOTRIPSY performed by John Youngblood MD at [address] [place] [address] [facility] Right 3/13/2018    ESWL EXTRACORPEAL SHOCK clubs or organizations: Not on file     Relationship status: Not on file    Intimate partner violence     Fear of current or ex partner: Not on file     Emotionally abused: Not on file     Physically abused: Not on file     Forced sexual activity: Not on file   Other Topics Concern    Not on file   Social History Narrative    Not on file         Review of Systems:  History obtained from chart review and the patient  General ROS: No fever or chills  Respiratory ROS: No cough, shortness of breath, wheezing  Cardiovascular ROS: No chest pain or palpitations  Gastrointestinal ROS: No abdominal pain or melena  Genito-Urinary ROS: No dysuria or hematuria  Musculoskeletal ROS: No joint pain or swelling   14 point ROS reviewed with the patient and negative except as noted above and in the HPI unless unable to obtain.     Objective:  Patient Vitals for the past 24 hrs:   BP Temp Temp src Pulse Resp SpO2 Weight   09/25/20 1151 (!) 156/94 97.9 °F (36.6 °C) -- 78 19 90 % --   09/25/20 1124 (!) 141/79 -- -- 67 -- -- --   09/25/20 0703 (!) 155/97 97.9 °F (36.6 °C) -- 71 18 97 % --   09/25/20 0527 -- -- -- -- -- -- (!) 491 lb (222.7 kg)   09/25/20 0231 (!) 148/99 96.8 °F (36 °C) Temporal 76 18 95 % --   09/24/20 1927 (!) 156/90 99 °F (37.2 °C) Temporal 83 16 91 % --       Intake/Output Summary (Last 24 hours) at 9/25/2020 1324  Last data filed at 9/25/2020 1154  Gross per 24 hour   Intake 1080 ml   Output 1850 ml   Net -770 ml     General: awake/alert   Chest:  clear to auscultation bilaterally  CVS: regular rate and rhythm  Abdominal: soft, nontender, normal bowel sounds  Extremities: no cyanosis, ble edema  Skin: warm and dry without rash      Labs:  BMP:   Recent Labs     09/23/20  0511 09/24/20  0700 09/25/20  0416    139 139   K 4.3 4.1 3.8   CL 98 99 100   CO2 20* 20* 22   BUN 60* 61* 58*   CREATININE 7.9* 8.8* 9.2*   CALCIUM 7.5* 8.0* 8.4*     CBC:   Recent Labs     09/23/20  0511   WBC 9.5   HGB 10.7*   HCT 33.4* MCV 90.3        LIVER PROFILE:   No results for input(s): AST, ALT, LIPASE, BILIDIR, BILITOT, ALKPHOS in the last 72 hours. Invalid input(s): AMYLASE,  ALB  PT/INR:   Recent Labs     09/23/20  0511 09/24/20  0700 09/25/20  0415   PROTIME 23.7* 28.9* 25.3*   INR 2.07* 2.64* 2.25*     APTT:   Recent Labs     09/23/20  1845 09/24/20  0104 09/24/20  0700   APTT 89.9* 64.1* 57.2*     BNP:  No results for input(s): BNP in the last 72 hours. Ionized Calcium:No results for input(s): IONCA in the last 72 hours. Magnesium:  No results for input(s): MG in the last 72 hours. Phosphorus:  No results for input(s): PHOS in the last 72 hours. HgbA1C:   No results for input(s): LABA1C in the last 72 hours. Hepatic:   No results for input(s): ALKPHOS, ALT, AST, PROT, BILITOT, BILIDIR, LABALBU in the last 72 hours. Lactic Acid: No results for input(s): LACTA in the last 72 hours. Troponin: No results for input(s): CKTOTAL, CKMB, TROPONINT in the last 72 hours. ABGs: No results for input(s): PH, PCO2, PO2, HCO3, O2SAT in the last 72 hours. CRP:  No results for input(s): CRP in the last 72 hours. Sed Rate:  No results for input(s): SEDRATE in the last 72 hours. Cultures:   No results for input(s): CULTURE in the last 72 hours. No results for input(s): BC, Ward Konig in the last 72 hours. No results for input(s): CXSURG in the last 72 hours.     Radiology reports as per the Radiologist  Radiology: Vl Extremity Venous Left    Result Date: 9/20/2020  Vascular Lower Extremities DVT Study Procedure  Demographics   Patient Name    Breezy Alaniz Age                   38   Patient Number  [id number]           Gender                Male   Visit Number    [id number]        Interpreting          Radha Plaza MD                                   Physician   Date of Birth   1980       Referring Physician   Radha Plaza MD   Accession       [id number]       [address]  Number  Procedure Type of Study:   Veins:Lower Extremities DVT Study, VL EXTREMITY VENOUS DUPLEX LEFT. Indications for Study:DVT and Venous access. Risk Factors   - The patient's risk factor(s) include: obesity. Impression   Successful ultrasound/duplex guided cannulation of thrombosed left  popliteal vein for sheath placement. Signature   ----------------------------------------------------------------  Electronically signed by Harpal Tanner MD(Interpreting  physician) on 09/20/2020 03:38 PM  ----------------------------------------------------------------      Vl Extremity Venous Left    Result Date: 9/20/2020  Vascular Lower Extremities DVT Study Procedure  Demographics   Patient Name    Oscar Rebolledo Age                   38   Patient Number  [id number]           Gender                Male   Visit Number    [id number]        Interpreting          Harpal Tanner MD                                   Physician   Date of Birth   1980       Referring Physician   Meliton Raw   Accession       [id number]       [address] [facility]  Number  Procedure Type of Study:   Veins:Lower Extremities DVT Study, VL EXTREMITY VENOUS DUPLEX LEFT. Indications for Study:Pain, left lower extremity. Risk Factors   - The patient's risk factor(s) include: obesity. Impression   There is evidence of subacute deep vein thrombosis in the left lower  extremity involving the common femoral, femoral, popliteal, posterior  tibial, and gastrocnemius veins. The anterior tibial and peroneal veins  were not well visualized secondary to edema and limb size. Superficial thrombophlebitis is seen in the short saphenous vein of the  left lower extremity. Floating tail left common femoral vein around 4 cm in length.    Signature   ----------------------------------------------------------------  Electronically signed by Harpal Tanner MD(Interpreting  physician) on 09/20/2020 09:28 AM ----------------------------------------------------------------  Velocities are measured in cm/s ; Diameters are measured in mm Right Lower Extremities DVT Study Measurements Right 2D Measurements +------------------------------------+----------+---------------+----------+ ! Location                            ! Visualized! Compressibility! Thrombosis! +------------------------------------+----------+---------------+----------+ ! Sapheno Femoral Junction            ! Yes       ! Yes            ! None      ! +------------------------------------+----------+---------------+----------+ ! Common Femoral                      !Yes       ! Yes            ! None      ! +------------------------------------+----------+---------------+----------+ Left Lower Extremities DVT Study Measurements Left 2D Measurements +------------------------------------+----------+---------------+----------+ ! Location                            ! Visualized! Compressibility! Thrombosis! +------------------------------------+----------+---------------+----------+ ! Sapheno Femoral Junction            ! Yes       ! Yes            ! None      ! +------------------------------------+----------+---------------+----------+ ! Common Femoral                      !Yes       ! No             !None      ! +------------------------------------+----------+---------------+----------+ ! Prox Femoral                        !Yes       ! No             !None      ! +------------------------------------+----------+---------------+----------+ ! Mid Femoral                         !Yes       ! No             !None      ! +------------------------------------+----------+---------------+----------+ ! Dist Femoral                        !Partial   !No             !None      ! +------------------------------------+----------+---------------+----------+ ! Deep Femoral                        !Partial   !Yes            ! None      ! Cardiomegaly. Signed by Dr Patricia Avila on 9/20/2020 1:16 PM    Xr Chest Portable    Result Date: 9/20/2020  EXAMINATION:  XR CHEST PORTABLE  9/20/2020 12:26 PM HISTORY: Central venous catheter placement. COMPARISON: 7/8/2019. FINDINGS:  There has been placement of a right IJ central venous catheter. The catheter extends to the right into the right subclavian vein. There is no evidence of pneumothorax. The patient is intubated with the endotracheal tube tip at the T2-3 level. There is very poor inspiratory effort. There is vascular crowding. There are bilateral infiltrates. There is cardiomegaly. 1. Right IJ central venous catheter placement with catheter tip extending to the right into the right subclavian vein. No evidence of pneumothorax. 2. Endotracheal tube tip at the T2-3 level. 3. Very poor inspiration with vascular crowding. 4. Bilateral infiltrates may be related to the poor inspiration. Bilateral pneumonia and pulmonary edema cannot be ruled out on this image. 5. Cardiomegaly. Signed by Dr Patricia Avila on 9/20/2020 12:28 PM    Cta Pulmonary W Contrast    Result Date: 9/19/2020  EXAMINATION: CTA PULMONARY W CONTRAST 9/19/2020 6:13 PM HISTORY: Shortness of breath, clinical concern for pulmonary embolism. DOSE: 804 mGycm. Automatic exposure control was utilized in an effort to use as little radiation as possible, without compromising image quality. REPORT: Spiral CT of the chest was performed after administration of intravenous contrast using CTA protocol, which includes reconstructed coronal, sagittal and 3-D images. COMPARISON: There are no correlative imaging studies for comparison. The contrast bolus is satisfactory, there is respiratory motion artifact.  The pulmonary arteries are normal in caliber, there are multiple filling defects within the pulmonary arteries on the right, involving the third, fourth and fifth order branches extending into the right lower lobe there is mild straightening of the cardiac ventricular septum, which may indicate mild right heart strain. No left-sided pulmonary emboli are identified. The thoracic aorta is normal in caliber, no evidence of dissection is identified. Heart size is normal. No intrathoracic lymphadenopathy is identified. Small calcified subcarinal lymph nodes are compatible with healed granulomatous disease. The thyroid gland is homogeneous and normal. Review of lung windows demonstrates scattered calcified granulomas in both lungs. There is no parenchymal infiltrate or consolidation. No pneumothorax or pleural effusion is identified. The airways are patent. The visualized upper abdomen shows decreased attenuation of the liver parenchyma compatible with fatty infiltration. There is bilateral gynecomastia, mild. Review of bone windows shows fused syndesmophytes throughout the mid and lower thoracic spine. No acute osseous abnormalities identified. 1. Multiple acute pulmonary emboli identified on the right, involving the third, fourth and fifth order pulmonary artery branches extending into the right lower lobe. There are findings that are suggestive of mild right heart strain. Signed by Dr Medina Guardado on 9/19/2020 6:20 PM       Assessment   Acute renal failure stage III  Risk factors include contrast-induced nephropathy or renal emboli  Morbid obesity  DVT and pulmonary emboli  Diabetes type 2 uncontrolled by A1c  Hyperkalemia  Metabolic acidosis  Vitamin D deficiency  Secondary hyperparathyroidism      Plan:  Discussed with patient, nursing  Wean fluids  Dialysis initiation continues today, appreciate vascular assistance  Vitamin D    Thank you for the consult, we appreciate the opportunity to provide care to your patients. Feel free to contact me if I can be of any further assistance.       Ismael Gee MD  09/25/20  1:24 PM

## 2020-09-25 NOTE — PROGRESS NOTES
Pharmacy Renal Adjustment    Shayan Lebron is a 38 y.o. male. Pharmacy has renally adjusted medications per protocol. Recent Labs     09/24/20  0700 09/25/20  0416   BUN 61* 58*       Recent Labs     09/24/20  0700 09/25/20  0416   CREATININE 8.8* 9.2*       Estimated Creatinine Clearance: 20 mL/min (A) (based on SCr of 9.2 mg/dL (H)). Height:   Ht Readings from Last 1 Encounters:   09/22/20 6' (1.829 m)     Weight:  Wt Readings from Last 1 Encounters:   09/25/20 (!) 491 lb (222.7 kg)       CKD stage: HD patient    Plan: Adjust the following medications based on renal function:  Change from Merrem 500mg IV q 12 hours to Merrem 500mg IV q 24 hours for hemodialysis.     GARCÍA VINCENT, PHARM D, 9/25/2020, 9:06 AM

## 2020-09-25 NOTE — TELEPHONE ENCOUNTER
Patient's wife, Miya Kuhn, called and left a message to speak to me regarding her . Calls were attempted at 4p (did not leave message) and 4:30p (left message that since phones had already rolled over, I would try again prior to leaving the office. At 7:15p, I spoke with [facility]. She was not angry, but concerned as to why her  never received an appointment date / time for his \"stat\" ultrasound to rule/out clots. Patient had a virtual visit with Dr. Angi Garay on 9/16/2020. After going through the series of medical events that occurred 09/19/2020, I apologized and told her that I would escalate this situation. A SafeCare was placed on 09/25/2020. Estephanie appreciated the call and spoke very highly of our office.   Mr. Edison Crouch is currently in the hospital.

## 2020-09-25 NOTE — PROGRESS NOTES
09/24/20  0700 09/25/20  0415   INR 2.07* 2.64* 2.25*     Lactic Acid:   Lab Results   Component Value Date    LACTA 1.4 01/28/2018    LACTA 1.7 09/08/2014          DVT prophylaxis:            [x] Already on  Coumadin, INR therapeutic          ASSESSMENT:     Procedure 9/20/20 :  1. Ultrasound/duplex guided cannulation of a thrombosed left popliteal vein with 5 [place] and later 8 [place] sheath  2. Left lower extremity venograms all the way to the inferior vena cava  3. Percutaneous thrombectomy/PulseSpray thrombolysis with AngioJet Zelante catheter  4. Left lower extremity venograms after percutaneous thrombectomy and thrombolysis  5. Placement of 40 cm infusion length EKOS TPA catheter from the popliteal vein all the way to the distal external iliac vein  6. Inferior vena cava filter placement from a right internal jugular vein approach (Bard Heather inferior vena cava filter)   Procedure 9/21/20:  1. Intravascular ultrasound of inferior vena cava/left common and external iliac veins/left common femoral vein  Operative Procedure 9/23/20 :    1. Ultrasound guided cannulation of right internal jugular vein   2. Placement of right internal jugular vein tunneled dialysis catheter (Bard Equistream XK 23 cm tip to cuff).    1. HD # 6  Patient Active Problem List   Diagnosis    Thrombosed external hemorrhoid    Right ureteral calculus    Umbilical hernia without obstruction and without gangrene    Hydronephrosis with renal and ureteral calculus obstruction    Ureteral obstruction, right    Transient alteration of awareness    Prediabetes    Bilateral carpal tunnel syndrome    Arthritis    Numbness and tingling in both hands    S/P carpal tunnel release    Leg DVT (deep venous thromboembolism), acute, left (HCC)    Acute pulmonary embolism (HCC)    DVT of deep femoral vein, left (HCC)    Morbid obesity ([address]    Acute kidney injury ([address]    Hyperkalemia       Chief Complaint:  Chief Complaint Patient presents with    Leg Swelling     Pt to ED with c/o LLE pain/swelling x1 week Pt reports surgery approx 1 month ago       PLAN:     1. Anticoagulation per Hematology, is now therapeutic on Coumadin, INR 2.25,  per Hematology, Coumadin will be lifetime  2. Nephrology managing dialysis. 3. Medical management per Hospitalist Service  4. Will need to wear knee high compression socks, 20-30mmHg  5. Vascular will repeat venous duplex outpatient on 11/28/20, then have Video Visit to  discuss study results and possibly schedule for IVC removal  6. Tapia to BSD, urine remains tea colored, having somewhat more urinary output  7.     O/P dialysis chair arranged per Social Work

## 2020-09-26 LAB
ANION GAP SERPL CALCULATED.3IONS-SCNC: 14 MMOL/L (ref 7–19)
BASOPHILS ABSOLUTE: 0.1 K/UL (ref 0–0.2)
BASOPHILS RELATIVE PERCENT: 0.7 % (ref 0–1)
BUN BLDV-MCNC: 47 MG/DL (ref 6–20)
CALCIUM SERPL-MCNC: 8.2 MG/DL (ref 8.6–10)
CHLORIDE BLD-SCNC: 99 MMOL/L (ref 98–111)
CO2: 23 MMOL/L (ref 22–29)
CREAT SERPL-MCNC: 8.4 MG/DL (ref 0.5–1.2)
EOSINOPHILS ABSOLUTE: 0.3 K/UL (ref 0–0.6)
EOSINOPHILS RELATIVE PERCENT: 3.1 % (ref 0–5)
GFR AFRICAN AMERICAN: 9
GFR NON-AFRICAN AMERICAN: 7
GLUCOSE BLD-MCNC: 148 MG/DL (ref 70–99)
GLUCOSE BLD-MCNC: 154 MG/DL (ref 70–99)
GLUCOSE BLD-MCNC: 87 MG/DL (ref 74–109)
GLUCOSE BLD-MCNC: 92 MG/DL (ref 70–99)
HCT VFR BLD CALC: 33.4 % (ref 42–52)
HEMOGLOBIN: 11.1 G/DL (ref 14–18)
IMMATURE GRANULOCYTES #: 0.1 K/UL
INR BLD: 1.87 (ref 0.88–1.18)
LYMPHOCYTES ABSOLUTE: 1.4 K/UL (ref 1.1–4.5)
LYMPHOCYTES RELATIVE PERCENT: 15.1 % (ref 20–40)
MCH RBC QN AUTO: 28.8 PG (ref 27–31)
MCHC RBC AUTO-ENTMCNC: 33.2 G/DL (ref 33–37)
MCV RBC AUTO: 86.8 FL (ref 80–94)
MONOCYTES ABSOLUTE: 1.2 K/UL (ref 0–0.9)
MONOCYTES RELATIVE PERCENT: 13.4 % (ref 0–10)
NEUTROPHILS ABSOLUTE: 6.1 K/UL (ref 1.5–7.5)
NEUTROPHILS RELATIVE PERCENT: 66.9 % (ref 50–65)
PDW BLD-RTO: 13.5 % (ref 11.5–14.5)
PERFORMED ON: ABNORMAL
PERFORMED ON: ABNORMAL
PERFORMED ON: NORMAL
PLATELET # BLD: 191 K/UL (ref 130–400)
PMV BLD AUTO: 10 FL (ref 9.4–12.4)
POTASSIUM REFLEX MAGNESIUM: 4.4 MMOL/L (ref 3.5–5)
PROTHROMBIN TIME: 21.8 SEC (ref 12–14.6)
RBC # BLD: 3.85 M/UL (ref 4.7–6.1)
SODIUM BLD-SCNC: 136 MMOL/L (ref 136–145)
WBC # BLD: 9.1 K/UL (ref 4.8–10.8)

## 2020-09-26 PROCEDURE — 6360000002 HC RX W HCPCS: Performed by: SURGERY

## 2020-09-26 PROCEDURE — 82947 ASSAY GLUCOSE BLOOD QUANT: CPT

## 2020-09-26 PROCEDURE — 1210000000 HC MED SURG R&B

## 2020-09-26 PROCEDURE — 99231 SBSQ HOSP IP/OBS SF/LOW 25: CPT | Performed by: INTERNAL MEDICINE

## 2020-09-26 PROCEDURE — 85025 COMPLETE CBC W/AUTO DIFF WBC: CPT

## 2020-09-26 PROCEDURE — 6360000002 HC RX W HCPCS: Performed by: INTERNAL MEDICINE

## 2020-09-26 PROCEDURE — 6370000000 HC RX 637 (ALT 250 FOR IP): Performed by: HOSPITALIST

## 2020-09-26 PROCEDURE — 85610 PROTHROMBIN TIME: CPT

## 2020-09-26 PROCEDURE — 36415 COLL VENOUS BLD VENIPUNCTURE: CPT

## 2020-09-26 PROCEDURE — 6370000000 HC RX 637 (ALT 250 FOR IP): Performed by: INTERNAL MEDICINE

## 2020-09-26 PROCEDURE — 2580000003 HC RX 258: Performed by: SURGERY

## 2020-09-26 PROCEDURE — 8010000000 HC HEMODIALYSIS ACUTE INPT

## 2020-09-26 PROCEDURE — 6370000000 HC RX 637 (ALT 250 FOR IP): Performed by: SURGERY

## 2020-09-26 PROCEDURE — 80048 BASIC METABOLIC PNL TOTAL CA: CPT

## 2020-09-26 RX ORDER — HEPARIN SODIUM 1000 [USP'U]/ML
3600 INJECTION, SOLUTION INTRAVENOUS; SUBCUTANEOUS
Status: COMPLETED | OUTPATIENT
Start: 2020-09-26 | End: 2020-09-26

## 2020-09-26 RX ORDER — OXYCODONE HYDROCHLORIDE 5 MG/1
5 TABLET ORAL ONCE
Status: COMPLETED | OUTPATIENT
Start: 2020-09-26 | End: 2020-09-26

## 2020-09-26 RX ADMIN — INSULIN LISPRO 1 UNITS: 100 INJECTION, SOLUTION INTRAVENOUS; SUBCUTANEOUS at 17:47

## 2020-09-26 RX ADMIN — MEROPENEM 500 MG: 500 INJECTION, POWDER, FOR SOLUTION INTRAVENOUS at 16:13

## 2020-09-26 RX ADMIN — FAMOTIDINE 10 MG: 20 TABLET ORAL at 13:05

## 2020-09-26 RX ADMIN — OXYCODONE HYDROCHLORIDE 5 MG: 5 TABLET ORAL at 04:31

## 2020-09-26 RX ADMIN — HEPARIN SODIUM 3600 UNITS: 1000 INJECTION INTRAVENOUS; SUBCUTANEOUS at 11:30

## 2020-09-26 RX ADMIN — WARFARIN SODIUM 7.5 MG: 2.5 TABLET ORAL at 16:13

## 2020-09-26 RX ADMIN — INSULIN LISPRO 1 UNITS: 100 INJECTION, SOLUTION INTRAVENOUS; SUBCUTANEOUS at 21:42

## 2020-09-26 ASSESSMENT — PAIN DESCRIPTION - PROGRESSION: CLINICAL_PROGRESSION: GRADUALLY IMPROVING

## 2020-09-26 ASSESSMENT — PAIN SCALES - GENERAL: PAINLEVEL_OUTOF10: 10

## 2020-09-26 NOTE — PROGRESS NOTES
[facility] Hospitalists    Patient:  Cassandra Alcaraz  YOB: 1980  Date of Service: 9/26/2020  MRN: [id number]   Acct: [de-identified]   Primary Care Physician: VANDANA Bradley  Advance Directive: Full Code  Admit Date: 9/19/2020       Hospital Day: 7  Portions of this note have been copied forward, however, changed to reflect the most current clinical status of this patient. CHIEF COMPLAINT Left Leg Pain    SUBJECTIVE: Mr. Marta Gant is during dialysis. States he feels exhausted. Has not yet been ambulatory outside of transfers. Discussed need to increase activity with ambulation in [place] and up to chair all meals. Cumulative Hospital Course:   Mr. Marta Gant is a 38year old with PMH morbid obesity, DMII, SHELBY, ESBL Ecoli UTI who presented to [facility] ED complaining of left leg pain and edema that began 09/15/2020. He stated his mother and aunt both have history of blood clots and denied alcohol or tobacco use. Work up revealed significant left lower extremity thrombosis extending into the left iliofemoral vein as well as right sided pulmonary embolism with mild right ventricular strain. He was started on Heparin gtt with consult made to Vascular surgery who performed AngioJet mechanical thrombectomy/PulseSpray thrombolysis of left lower extremity DVT and inferior vena cava filter placement. He was continued on TPA thrombolysis overnight to treat common femoral vein floating tail. He underwent intravascular ultrasound of inferior vena cava/left common and external iliac veins/left common femoral vein on 09/21/2020 with findings of patent inferior vena cava filter. On 09/21/2020 urine output noted to be minimal with acute decline in renal function. IVF's were increased with addition of bicarbonate and consult made to Nephrology. On 09/22/2020 creatinine continued to worsen to 5.6, GFR 11. IVF's continued. He was started on Merrem for ESBL E coli UTI. Patient transferred to 3rd floor medical unit.  On 09/23/2020 renal recovery. Has OP dialysis chair ready on Monday      DM II-uncontrolled A1C 8.8- SSI, accuchecks, hypoglycemia tx orders, Carb control diet, hypoglycemic at times-Glipizide DC'd 09/25/2020 and glucose has remained controlled      Morbid obesity (HCC)-noted       Neutrophilic Leukocytosis-rresolved      Hyperkalemia-resolved      Complicated UTI 2/2 ESBL E coli-Merrem started 09/22/2020, continue Merrem. Remove ordoñez catheter    Increase patient activity. Consider discharge soon.  Further orders per clinical course/attending     DVT Prophylaxis: Coumadin     GI prophylaxis: Julio Chung PA-C

## 2020-09-26 NOTE — PROGRESS NOTES
Nephrology ([facility]) Dialysis porgress Note      Patient:  Jens Devi  YOB: 1980  Date of Service: 9/26/2020  MRN: [id number]   Acct: [de-identified]   Primary Care Physician: VANDANA Mi  Advance Directive: Full Code  Admit Date: 9/19/2020       Hospital Day: 7  Referring Provider: Alex Mcmullen MD    Patient independently seen and examined, Chart, Consults, Notes, Operative notes, Labs, Cardiology, and Radiology studies reviewed as available. Subjective:  Jens Devi is a 38 y.o. male  whom we were consulted for acute renal failure. Patient recalls a history of nephrolithiasis and urine tract infection but no other nephrologic evaluations. Recalled no significant NSAID usage. He initially presented for evaluation of DVT with pulmonary emboli. He underwent vascular surgical procedure with Dr. Franca Hall. Subsequently developed decreased urine output and creatinine increased from 0.6-3.3. Contrast exposures noted beginning September 19. He was initially seen in the ICU with nursing. No family present. Denied dysuria or hematuria, rash or hemoptysis. Today, no overnight events. Denies current chest pain, nmild shortness of air mostly at night. Tolerating HD. Dialysis   Pt was seen on RRT  Modality: Hemodialysis  Access: Catheter  Location: right upper  QB: 400  QD: 600  UF: 2.5 L      Allergies:  Patient has no known allergies.     Medicines:  Current Facility-Administered Medications   Medication Dose Route Frequency Provider Last Rate Last Dose    heparin (porcine) injection 3,600 Units  3,600 Units Intracatheter Once in dialysis Dakota Quezada MD        [facility]) 500 mg in sterile water 10 mL IV syringe  500 mg Intravenous Q24H J Carlos Menjivar MD        [facility]) chewable tablet 80 mg  80 mg Oral Q6H PRN J Carlos Menjivar MD   80 mg at 09/23/20 0416    famotidine (PEPCID) tablet 10 mg  10 mg Oral Daily Luis Kathy Johnston MD   10 mg at 09/25/20 1141    vitamin D (ERGOCALCIFEROL) capsule 50,000 Units  50,000 Units Oral Weekly Oralia Haque MD   50,000 Units at 09/22/20 1618    warfarin (COUMADIN) daily dosing (placeholder)   Other RX Jonny Munoz MD        glucose (GLUTOSE) 40 % oral gel 15 g  15 g Oral PRN Oralia Haque MD        dextrose 50 % IV solution  12.5 g Intravenous PRN Oralia Haque MD        glucagon (rDNA) injection 1 mg  1 mg Intramuscular PRN Oralia Haque MD        dextrose 5 % solution  100 mL/hr Intravenous PRN Oralia Haque MD        ondansetron [facility]) injection 4 mg  4 mg Intravenous Q8H PRN Oralia Haque MD        hydrALAZINE (APRESOLINE) injection 10 mg  10 mg Intravenous Q6H ALICEN Oralia Haque MD   10 mg at 09/20/20 1658    potassium chloride (KLOR-CON M) extended release tablet 40 mEq  40 mEq Oral ALICEN Oralia Haque MD        Or    potassium bicarb-citric acid (EFFER-K) effervescent tablet 40 mEq  40 mEq Oral PRN Oralia Haque MD        Or   Adrian Tejeda potassium chloride 10 mEq/100 mL IVPB (Peripheral Line)  10 mEq Intravenous SHAGGY Haque MD        magnesium sulfate 1 g in dextrose 5% 100 mL IVPB  1 g Intravenous PRN Oralia Haque MD        acetaminophen (TYLENOL) tablet 650 mg  650 mg Oral Q6H ALICEN Oralia Haque MD   650 mg at 09/25/20 2133    Or    acetaminophen (TYLENOL) suppository 650 mg  650 mg Rectal Q6H PRN Oralia Haque MD        polyethylene glycol [facility]) packet 17 g  17 g Oral Daily PRN Oralia Haque MD        promethazine (PHENERGAN) tablet 12.5 mg  12.5 mg Oral Q6H SHAGGY Haque MD        Or    ondansetron [facility]) injection 4 mg  4 mg Intravenous Q6H ALICEN Oralia Haque MD   4 mg at 09/21/20 1318    insulin lispro (HUMALOG) injection vial 0-6 Units  0-6 Units Subcutaneous TID MATTHEW Haque MD   1 Units at 09/22/20 1725    insulin lispro (HUMALOG) injection vial 0-3 Units  0-3 Units Subcutaneous Nightly Luther Short MD   1 Units at 09/21/20 2031       Past Medical History:  Past Medical History:   Diagnosis Date    Arthritis     both wrist    Bilateral carpal tunnel syndrome 8/15/2019    Bronchitis     10 yrs ago    Diabetes mellitus ([address]     Kidney stone     recurrent    Sleep apnea     untested    Umbilical hernia        Past Surgical History:  Past Surgical History:   Procedure Laterality Date    CARPAL TUNNEL RELEASE Left 8/21/2020    LEFT CARPAL TUNNEL RELEASE performed by Mai Perez MD at [facility] Right 7/24/2018    CYSTOSCOPY/ URETEROSCOPY; INSERTION DOUBLE J STENT/  URETERAL performed by Delfino Pitt MD at [address]      both wrists    HEMORRHOID SURGERY N/A 6/24/2016    HEMORRHOID INCISION AND DRAINAGE performed by Alanna Miles MD at [facility] CYSTO/URETERO/PYELOSCOPY W/LITHOTRIPSY Right 8/7/2018    URETEROSCOPY LASER LITHOTRIPSY STONE EXTRACTION performed by Delfino Pitt MD at [address] [facility] Right 8/7/2018    STENT PLACEMENT performed by Delfino Pitt MD at [address] [facility] Right 2/13/2018    ESWL EXTRACORPEAL SHOCK WAVE LITHOTRIPSY performed by Delfino Pitt MD at [address] [facility] Right 3/13/2018    ESWL EXTRACORPEAL SHOCK WAVE LITHOTRIPSY performed by Delfino Pitt MD at [facility] Right 7/24/2018    ESWL EXTRACORPEAL SHOCK WAVE LITHOTRIPSY performed by Delfino Pitt MD at [facility] [address], [address] N/A 5/1/1346    HERNIA UMBILICAL REPAIR LAPAROSCOPIC performed by Em Ye MD at [address] / [address] Left 9/20/2020     LEFT LOWER EXTREMITY VENOGRAMS; INFERIOR VENA CAVA FILTER PLACEMENT. performed by Luther Short MD at [address]      left wrist.  Pt was a child       Family History  Family History   Problem Relation Age of Onset    Cancer Mother 46        colon cancer    Cancer Father         luekemia    Diabetes Father     Other Maternal Grandmother         copd    Other Maternal Grandfather         copd    Heart Disease Paternal Grandmother        Social History  Social History     Socioeconomic History    Marital status:      Spouse name: Not on file    Number of children: Not on file    Years of education: Not on file    Highest education level: Not on file   Occupational History    Not on file   Social Needs    Financial resource strain: Not on file    Food insecurity     Worry: Not on file     Inability: Not on file    Transportation needs     Medical: Not on file     Non-medical: Not on file   Tobacco Use    Smoking status: Never Smoker    Smokeless tobacco: Never Used    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[address] and Sexual Activity    Alcohol use: Yes     Comment: OCC    Drug use: No    Sexual activity: Yes     Partners: Female   Lifestyle    Physical activity     Days per week: Not on file     Minutes per session: Not on file    Stress: Not on file   Relationships    Social connections     Talks on phone: Not on file     Gets together: Not on file     Attends Synagogue service: Not on file     Active member of club or organization: Not on file     Attends meetings of clubs or organizations: Not on file     Relationship status: Not on file    Intimate partner violence     Fear of current or ex partner: Not on file     Emotionally abused: Not on file     Physically abused: Not on file     Forced sexual activity: Not on file   Other Topics Concern    Not on file   Social History Narrative    Not on file         Review of Systems:  History obtained from chart review and the patient  General ROS: No fever or chills  Respiratory ROS: No cough, shortness of breath, wheezing  Cardiovascular ROS: No chest pain or palpitations  Gastrointestinal ROS: No abdominal pain or melena  Genito-Urinary ROS: No dysuria or hematuria  Musculoskeletal ROS: No joint pain or swelling   14 point ROS reviewed with the patient and negative except as noted above and in the HPI unless unable to obtain. Objective:  BP: 166/108   HR: 72  General: awake/alert   Chest:  clear to auscultation bilaterally  CVS: regular rate and rhythm  Abdominal: soft, nontender, normal bowel sounds  Extremities: no cyanosis, ble edema  Skin: warm and dry without rash      Labs:  BMP:   Recent Labs     09/24/20  0700 09/25/20  0416 09/26/20  0201    139 136   K 4.1 3.8 4.4   CL 99 100 99   CO2 20* 22 23   BUN 61* 58* 47*   CREATININE 8.8* 9.2* 8.4*   CALCIUM 8.0* 8.4* 8.2*     CBC:   Recent Labs     09/26/20  0201   WBC 9.1   HGB 11.1*   HCT 33.4*   MCV 86.8        LIVER PROFILE:   No results for input(s): AST, ALT, LIPASE, BILIDIR, BILITOT, ALKPHOS in the last 72 hours. Invalid input(s): AMYLASE,  ALB  PT/INR:   Recent Labs     09/24/20  0700 09/25/20  0415 09/26/20  0201   PROTIME 28.9* 25.3* 21.8*   INR 2.64* 2.25* 1.87*     APTT:   Recent Labs     09/23/20  1845 09/24/20  0104 09/24/20  0700   APTT 89.9* 64.1* 57.2*     BNP:  No results for input(s): BNP in the last 72 hours. Ionized Calcium:No results for input(s): IONCA in the last 72 hours. Magnesium:  No results for input(s): MG in the last 72 hours. Phosphorus:  No results for input(s): PHOS in the last 72 hours. HgbA1C:   No results for input(s): LABA1C in the last 72 hours. Hepatic:   No results for input(s): ALKPHOS, ALT, AST, PROT, BILITOT, BILIDIR, LABALBU in the last 72 hours. Lactic Acid: No results for input(s): LACTA in the last 72 hours. Troponin: No results for input(s): CKTOTAL, CKMB, TROPONINT in the last 72 hours. ABGs: No results for input(s): PH, PCO2, PO2, HCO3, O2SAT in the last 72 hours. CRP:  No results for input(s): CRP in the last 72 hours.   Sed Rate:  No results for input(s): SEDRATE in the last 72 hours.      Cultures:   No results for input(s): CULTURE in the last 72 hours. No results for input(s): BC, Kath Holes in the last 72 hours. No results for input(s): CXSURG in the last 72 hours. Radiology reports as per the Radiologist  Radiology: Vl Extremity Venous Left    Result Date: 9/20/2020  Vascular Lower Extremities DVT Study Procedure  Demographics   Patient Name    Renae [facility] Age                   38   Patient Number  [id number]           Gender                Male   Visit Number    [id number]        Interpreting          Vijay Richmond MD                                   Physician   Date of Birth   1980       Referring Physician   Vijay Richmond MD   Accession       [id number]       [facility]  Number  Procedure Type of Study:   Veins:Lower Extremities DVT Study, VL EXTREMITY VENOUS DUPLEX LEFT. Indications for Study:DVT and Venous access. Risk Factors   - The patient's risk factor(s) include: obesity. Impression   Successful ultrasound/duplex guided cannulation of thrombosed left  popliteal vein for sheath placement. Signature   ----------------------------------------------------------------  Electronically signed by Vijay Richmond MD(Interpreting  physician) on 09/20/2020 03:38 PM  ----------------------------------------------------------------      Vl Extremity Venous Left    Result Date: 9/20/2020  Vascular Lower Extremities DVT Study Procedure  Demographics   Patient Name    Renae [facility]                   38   Patient Number  [id number]           Gender                Male   Visit Number    [id number]        Interpreting          Vijay Richmond MD                                   Physician   Date of Birth   1980       Referring Physician   Mariann Emmanuel   Accession       [id number]       [address]  Number  Procedure Type of Study:   Veins:Lower Extremities DVT Study, VL EXTREMITY VENOUS DUPLEX LEFT.   Indications for Study:Pain, left lower extremity. Risk Factors   - The patient's risk factor(s) include: obesity. Impression   There is evidence of subacute deep vein thrombosis in the left lower  extremity involving the common femoral, femoral, popliteal, posterior  tibial, and gastrocnemius veins. The anterior tibial and peroneal veins  were not well visualized secondary to edema and limb size. Superficial thrombophlebitis is seen in the short saphenous vein of the  left lower extremity. Floating tail left common femoral vein around 4 cm in length. Signature   ----------------------------------------------------------------  Electronically signed by Yves Alexander MD(Interpreting  physician) on 09/20/2020 09:28 AM  ----------------------------------------------------------------  Velocities are measured in cm/s ; Diameters are measured in mm Right Lower Extremities DVT Study Measurements Right 2D Measurements +------------------------------------+----------+---------------+----------+ ! Location                            ! Visualized! Compressibility! Thrombosis! +------------------------------------+----------+---------------+----------+ ! Sapheno Femoral Junction            ! Yes       ! Yes            ! None      ! +------------------------------------+----------+---------------+----------+ ! Common Femoral                      !Yes       ! Yes            ! None      ! +------------------------------------+----------+---------------+----------+ Left Lower Extremities DVT Study Measurements Left 2D Measurements +------------------------------------+----------+---------------+----------+ ! Location                            ! Visualized! Compressibility! Thrombosis! +------------------------------------+----------+---------------+----------+ ! Sapheno Femoral Junction            ! Yes       ! Yes            ! None      ! +------------------------------------+----------+---------------+----------+ ! Common Femoral                      !Yes !No             !None      ! +------------------------------------+----------+---------------+----------+ ! Prox Femoral                        !Yes       ! No             !None      ! +------------------------------------+----------+---------------+----------+ ! Mid Femoral                         !Yes       ! No             !None      ! +------------------------------------+----------+---------------+----------+ ! Dist Femoral                        !Partial   !No             !None      ! +------------------------------------+----------+---------------+----------+ ! Deep Femoral                        !Partial   !Yes            ! None      ! +------------------------------------+----------+---------------+----------+ ! Popliteal                           !Partial   !No             !None      ! +------------------------------------+----------+---------------+----------+ ! SSV                                 ! Yes       ! No             !None      ! +------------------------------------+----------+---------------+----------+ ! Gastroc                             ! Partial   !No             !None      ! +------------------------------------+----------+---------------+----------+ ! PTV                                 ! Partial   !No             !None      ! +------------------------------------+----------+---------------+----------+ ! GSV                                 ! Yes       ! Yes            ! None      ! +------------------------------------+----------+---------------+----------+ ! ATV                                 ! No        !               !          ! +------------------------------------+----------+---------------+----------+ ! Peroneal                            !No        !               !          ! +------------------------------------+----------+---------------+----------+    Xr Chest Portable    Result Date: 9/20/2020  EXAMINATION:  XR CHEST PORTABLE  9/20/2020 1:15 PM HISTORY: Central line adjustment.  COMPARISON: 9/20/2020. FINDINGS:  The central venous catheter has been repositioned and now extends into the superior vena cava. The patient remains intubated with the endotracheal tube tip at the T2-3 level. There is a new esophageal monitoring lead. There is again poor inspiration with bilateral infiltrates. There is cardiomegaly. The left costophrenic angle is not fully included on this study. 1. Central venous catheter has been repositioned and now extends into the superior vena cava. No evidence of pneumothorax. 2. Poor inspiration. 3. Bilateral infiltrates may be related to the poor inspiratory effort. Edema and pneumonia are possible. 4. Cardiomegaly. Signed by Dr Andrey Black on 9/20/2020 1:16 PM    Xr Chest Portable    Result Date: 9/20/2020  EXAMINATION:  XR CHEST PORTABLE  9/20/2020 12:26 PM HISTORY: Central venous catheter placement. COMPARISON: 7/8/2019. FINDINGS:  There has been placement of a right IJ central venous catheter. The catheter extends to the right into the right subclavian vein. There is no evidence of pneumothorax. The patient is intubated with the endotracheal tube tip at the T2-3 level. There is very poor inspiratory effort. There is vascular crowding. There are bilateral infiltrates. There is cardiomegaly. 1. Right IJ central venous catheter placement with catheter tip extending to the right into the right subclavian vein. No evidence of pneumothorax. 2. Endotracheal tube tip at the T2-3 level. 3. Very poor inspiration with vascular crowding. 4. Bilateral infiltrates may be related to the poor inspiration. Bilateral pneumonia and pulmonary edema cannot be ruled out on this image. 5. Cardiomegaly. Signed by Dr Andrey Black on 9/20/2020 12:28 PM    Cta Pulmonary W Contrast    Result Date: 9/19/2020  EXAMINATION: CTA PULMONARY W CONTRAST 9/19/2020 6:13 PM HISTORY: Shortness of breath, clinical concern for pulmonary embolism. DOSE: 804 mGycm.  Automatic exposure control was utilized in an effort to use as little radiation as possible, without compromising image quality. REPORT: Spiral CT of the chest was performed after administration of intravenous contrast using CTA protocol, which includes reconstructed coronal, sagittal and 3-D images. COMPARISON: There are no correlative imaging studies for comparison. The contrast bolus is satisfactory, there is respiratory motion artifact. The pulmonary arteries are normal in caliber, there are multiple filling defects within the pulmonary arteries on the right, involving the third, fourth and fifth order branches extending into the right lower lobe there is mild straightening of the cardiac ventricular septum, which may indicate mild right heart strain. No left-sided pulmonary emboli are identified. The thoracic aorta is normal in caliber, no evidence of dissection is identified. Heart size is normal. No intrathoracic lymphadenopathy is identified. Small calcified subcarinal lymph nodes are compatible with healed granulomatous disease. The thyroid gland is homogeneous and normal. Review of lung windows demonstrates scattered calcified granulomas in both lungs. There is no parenchymal infiltrate or consolidation. No pneumothorax or pleural effusion is identified. The airways are patent. The visualized upper abdomen shows decreased attenuation of the liver parenchyma compatible with fatty infiltration. There is bilateral gynecomastia, mild. Review of bone windows shows fused syndesmophytes throughout the mid and lower thoracic spine. No acute osseous abnormalities identified. 1. Multiple acute pulmonary emboli identified on the right, involving the third, fourth and fifth order pulmonary artery branches extending into the right lower lobe. There are findings that are suggestive of mild right heart strain. Signed by Dr Astrid Vazquez.  [place] on 9/19/2020 6:20 PM       Assessment   Acute renal failure stage III  Risk factors include contrast-induced nephropathy or renal emboli  Morbid obesity  DVT and pulmonary emboli  Diabetes type 2 uncontrolled by A1c  Hyperkalemia  Metabolic acidosis  Vitamin D deficiency  Secondary hyperparathyroidism      Plan:  Discussed with patient, nursing  Vitamin D  Follow up labs for renal recovery      Vishal Marcial MD  09/26/20  9:13 AM

## 2020-09-26 NOTE — CONSULTS
Comprehensive Nutrition Assessment    Type and Reason for Visit:  Reassess, Consult    Nutrition Recommendations/Plan: Renal/DM diet education. Nutrition Assessment:  Consult received for diet education for newly initiated dialysis pt. Pt and family at bedside very receptive to education. Provided education regarding sodium, potassium, and phosphorus restrictions. Significant other at bedside states she has been reading nutrition labels on foods at home. Pt and family also asked questions regarding diet for DM. We discussed the importance of consistent carbohydrate intake and carbohydrate counting. Encouraged pt to follow closely with RD at [facility] dialysis clinic. Provided contact name/number. Malnutrition Assessment:  Malnutrition Status:  No malnutrition    Context:  Acute Illness       Nutrition Related Findings:  well nourished, morbidly obese      Wounds:  Surgical Wound       Current Nutrition Therapies:    DIET CARB CONTROL; Carb Control: 4 carb choices (60 gms)/meal; Renal    Anthropometric Measures:  · Height: 6' (182.9 cm)  · Current Body Weight: 491 lb (222.7 kg)   · Admission Body Weight: 380 lb (172.4 kg)(stated)    · Usual Body Weight: 390 lb (176.9 kg)(6/2020)     · Ideal Body Weight: 178 lbs; % Ideal Body Weight 275.8 %   · BMI: 66.6  · BMI Categories: Obese Class 3 (BMI 40.0 or greater)       Nutrition Diagnosis:   · Overweight/Obese related to excessive energy intake as evidenced by BMI      Nutrition Interventions:   Food and/or Nutrient Delivery:  Continue Current Diet  Nutrition Education/Counseling:  Education completed   Coordination of Nutrition Care:  Continued Inpatient Monitoring    Goals:  PO intake 50% or greater. Weight decrease 1-3# per week.   Surgical incision healing       Nutrition Monitoring and Evaluation:   Behavioral-Environmental Outcomes:  Beliefs and Attitutes, Knowledge or Skill   Food/Nutrient Intake Outcomes:  Food and Nutrient Intake  Physical Signs/Symptoms Outcomes:  Biochemical Data, Nutrition Focused Physical Findings, Skin, Weight     Discharge Planning:    Continue current diet, Recommend pursue outpatient nutrition counseling     Electronically signed by Nydia Jackson RD, LD on 9/26/20 at 1:07 PM CDT    Contact: [phone]

## 2020-09-26 NOTE — PROGRESS NOTES
Clinical Pharmacy Note    Warfarin consult follow-up    Recent Labs     09/26/20  0201   INR 1.87*     Recent Labs     09/26/20  0201   HGB 11.1*   HCT 33.4*        Significant Drug-Drug Interactions:  New warfarin drug-drug interactions: none  Discontinued drug-drug interactions: none     Date INR Warfarin Dose   09/19/20 1.11 ---   09/21/20 --- 7.5 mg    09/22/20  1.35   7.5 mg    09/23/20 2.07  5 mg   09/24/20  2.64  2.5 mg    09/25/20  2.25  5 mg    09/26/20 1.87                     7.5 mg                        Give warfarin 7.5 mg PO x 1 dose. Notes:                     Daily PT/INR until stable within therapeutic range.      Electronically signed by Urvashi Guan, [address] [place] [address] on 9/26/2020 at 10:39 AM

## 2020-09-26 NOTE — PLAN OF CARE
Problem: Falls - Risk of:  Goal: Will remain free from falls  Description: Will remain free from falls  9/26/2020 0326 by Kailyn Castillo RN  Outcome: Ongoing  9/25/2020 1624 by Lita Echevarria RN  Outcome: Ongoing  Goal: Absence of physical injury  Description: Absence of physical injury  9/26/2020 0326 by Kailyn Castillo RN  Outcome: Ongoing  9/25/2020 1624 by Lita Echevarria RN  Outcome: Ongoing     Problem: Pain:  Goal: Pain level will decrease  Description: Pain level will decrease  9/26/2020 0326 by Kailyn Castillo RN  Outcome: Ongoing  9/25/2020 1624 by Lita Echevarria RN  Outcome: Ongoing  Goal: Control of acute pain  Description: Control of acute pain  9/26/2020 0326 by Kailyn Castillo RN  Outcome: Ongoing  9/25/2020 1624 by Lita Echevarria RN  Outcome: Ongoing  Goal: Control of chronic pain  Description: Control of chronic pain  9/26/2020 0326 by Kailyn Castillo RN  Outcome: Ongoing  9/25/2020 1624 by Lita Echevarria RN  Outcome: Ongoing     Problem: Skin Integrity:  Goal: Will show no infection signs and symptoms  Description: Will show no infection signs and symptoms  9/26/2020 0326 by Kailyn Castillo RN  Outcome: Ongoing  9/25/2020 1624 by Lita Echevarria RN  Outcome: Ongoing  Goal: Absence of new skin breakdown  Description: Absence of new skin breakdown  9/26/2020 0326 by Kailyn Castillo RN  Outcome: Ongoing  9/25/2020 1624 by Lita Echevarria RN  Outcome: Ongoing  Goal: Status of oral mucous membranes will improve  Description: Status of oral mucous membranes will improve  9/26/2020 0326 by Kailyn Castillo RN  Outcome: Ongoing  9/25/2020 1624 by Lita Echevarria RN  Outcome: Ongoing  Goal: Skin integrity will be maintained  Description: Skin integrity will be maintained  9/26/2020 0326 by Kailyn Castillo RN  Outcome: Ongoing  9/25/2020 1624 by iLta Echevarria RN  Outcome: Ongoing     Problem:  Activity:  Goal: Fatigue will decrease  Description: Fatigue will decrease  9/26/2020 0326 by Kailyn Castillo RN  Outcome: Ongoing  9/25/2020 1624 by Denise Martinez RN  Outcome: Ongoing  Goal: Risk for activity intolerance will decrease  Description: Risk for activity intolerance will decrease  9/26/2020 0326 by Rashmi Robles RN  Outcome: Ongoing  9/25/2020 1624 by Denise Martinez RN  Outcome: Ongoing     Problem: Coping:  Goal: Ability to cope will improve  Description: Ability to cope will improve  9/26/2020 0326 by Rashmi Robles RN  Outcome: Ongoing  9/25/2020 1624 by Denise Martinez RN  Outcome: Ongoing     Problem: Fluid Volume:  Goal: Will show no signs or symptoms of fluid imbalance  Description: Will show no signs or symptoms of fluid imbalance  9/26/2020 0326 by Rashmi Robles RN  Outcome: Ongoing  9/25/2020 1624 by Denise Martinez RN  Outcome: Ongoing     Problem: Health Behavior:  Goal: Ability to manage health-related needs will improve  Description: Ability to manage health-related needs will improve  9/26/2020 0326 by Rashmi Robles RN  Outcome: Ongoing  9/25/2020 1624 by Denise Martinez RN  Outcome: Ongoing  Goal: Identification of resources available to assist in meeting health care needs will improve  Description: Identification of resources available to assist in meeting health care needs will improve  9/26/2020 0326 by Rashmi Robles RN  Outcome: Ongoing  9/25/2020 1624 by Denise Martinez RN  Outcome: Ongoing     Problem: Nutritional:  Goal: Ability to identify appropriate dietary choices will improve  Description: Ability to identify appropriate dietary choices will improve  9/26/2020 0326 by Rashmi Robles RN  Outcome: Ongoing  9/25/2020 1624 by Denise Martinez RN  Outcome: Ongoing     Problem: Physical Regulation:  Goal: Ability to maintain clinical measurements within normal limits will improve  Description: Ability to maintain clinical measurements within normal limits will improve  9/26/2020 0326 by Rashmi Robles RN  Outcome: Ongoing  9/25/2020 1624 by Denise Martinez RN  Outcome: Ongoing  Goal: Complications related to

## 2020-09-26 NOTE — PROGRESS NOTES
PROGRESS NOTE    Pt Name: Danielle Patel  MRN: [id number]  YOB: 1980  Date of evaluation: 9/26/2020  Room: 334    Subjective  Getting HD, no bleeding. \"I feel tired\"      INTERVAL HISTORY  Randall Vicente is a 50-year-old  gentleman admitted to [address] [place] [address] ED on 9/19/2020 having presented with pain and swelling of the left leg, burning in the right side of his chest with evaluation leading to a diagnosis of left lower extremity DVT and PE.  Hematology consultation requested.     HEMATOLOGICAL HISTORY: Left lower extremity DVT with right lung pulmonary emboli 9/19/2020  Randall Vicente was seen in initial hematology consultation on 9/20/2020 as an inpatient at [address] [place] [address] having been admitted on 9/19/2020 with left lower extremity DVT and PE.  He was placed on heparin as initial management.     Mr. Nakita Alcantar has a 5-day history of left leg pain and swelling.  Initially he thought he had pulled a muscle but this did not get better.  When things did not get better, he sought evaluation at the ED at [facility]. Additionally he has nonspecific symptoms of pulmonary embolus including burning in the right side of his chest.  He does not have hemoptysis or dyspnea. Mr. Nakita Alcantar does not have a personal history of DVT or PE or hypercoagulability. Risk features include:   Obesity.    Recent left hand carpal tunnel surgery in August 2020. Family history: His mother had an episode of left lower extremity DVT and PE 15 years ago without recurrence.  A maternal aunt also had DVT of the lower extremities.     Noninvasive venous studies of the lower extremities on 9/19/2020 document:   · Extensive thrombosis (DVT) noted in Lt common femoral vein, Lt SFV (prox, mid and distal), Lt popliteal vein, Lt Gastrocs, Lt posterior tibial veins.  A floating tail is noted in left common femoral vein measuring approximately 4.3cm.     · SVT: thrombus noted in Lt LSV     Pulmonary CTA with contrast on 9/19/2020 documented the following:  · Multiple right-sided acute pulmonary emboli identified involving the right third fourth and fifth ordered pulmonary artery branches extending into the right lower lobe. · Possible right heart strain  · No left sided pulmonary emboli identified  · Small calcified subcarinal lymph nodes consistent with healed granulomatous disease  · Scattered calcified granulomas in both lungs     He was seen by Dr. Tony Orellana from vascular surgery this morning (9/20/2020), with recommendations for percutaneous mechanical thrombectomy/pulse spray thrombolysis to try to prevent long-term swelling in this young patient with iliofemoral DVT.  If the result in that procedure is not satisfactory, he would consider placing a TPA thrombolyzes catheter for thrombolysis overnight.  The patient agreed to proceed. Dr. Tony Orellana did not feel that he needed TPA thrombolysis of the pulmonary embolism because he is fairly asymptomatic from that standpoint.     Agree with plans as outlined  A serologic prothrombotic work-up will be requested.         REVIEW OF SYSTEMS:   CONSTITUTIONAL: no fever, no night sweats, fatigue;  HEENT: no blurring of vision, no double vision, no hearing difficulty, no tinnitus, no ulceration, no dysplasia, no epistaxis;  LUNGS: no cough, no hemoptysis, no wheeze,  no shortness of breath;  CARDIOVASCULAR: no palpitation, no chest pain, no shortness of breath;  GI: Indigestion, stomach is tight, no abdominal pain, nausea, no vomiting, no diarrhea, no constipation;  RAKAN: no dysuria, no hematuria, no frequency or urgency, no nephrolithiasis;  MUSCULOSKELETAL: no joint pain, no swelling, no stiffness;  ENDOCRINE: no polyuria, no polydipsia, no cold or heat intolerance;  HEMATOLOGY: no easy bruising or bleeding, no history of clotting disorder;  DERMATOLOGY: no skin rash, no eczema, no pruritus;  PSYCHIATRY: no depression, no anxiety, no panic attacks, no suicidal ideation, no homicidal ideation;  NEUROLOGY: no syncope, no seizures, no numbness or tingling of hands, no numbness or tingling of feet, no paresis;    Objective   BP (!) 146/87   Pulse 82   Temp 97.9 °F (36.6 °C) (Temporal)   Resp 18   Ht 6' (1.829 m)   Wt (!) 491 lb (222.7 kg)   SpO2 95%   BMI 66.59 kg/m²     PHYSICAL EXAM:  CONSTITUTIONAL: Alert, appropriate, no acute distress, obese  EYES: Non icteric, EOM intact, pupils equal round   ENT: Mucus membranes moist, no oral pharyngeal lesions, external inspection of ears and nose are normal  NECK: Supple, no masses. No palpable thyroid mass  CHEST/LUNGS: CTA bilaterally, normal respiratory effort   CARDIOVASCULAR: RRR, no murmurs. No lower extremity edema  ABDOMEN: soft non-tender, active bowel sounds, no HSM. No palpable masses  EXTREMITIES: Bilateral lower extremity edema, warm, full ROM in all 4 extremities, no focal weakness. SKIN: warm, dry with no rashes or lesions  LYMPH: No cervical, clavicular, axillary, or inguinal lymphadenopathy  NEUROLOGIC: follows commands, non focal   PSYCH: mood and affect appropriate.   Alert and oriented to time, place, person        LABORATORY RESULTS REVIEWED BY ME:  Recent Labs     09/26/20  0201 09/23/20  0511 09/21/20  1035   WBC 9.1 9.5 18.5*   HGB 11.1* 10.7* 12.9*   HCT 33.4* 33.4* 41.2*   MCV 86.8 90.3 93.2    139 173       Lab Results   Component Value Date     09/26/2020    K 4.4 09/26/2020    CL 99 09/26/2020    CO2 23 09/26/2020    BUN 47 (H) 09/26/2020    CREATININE 8.4 (H) 09/26/2020    GLUCOSE 87 09/26/2020    CALCIUM 8.2 (L) 09/26/2020    PROT 7.4 09/19/2020    LABALBU 4.1 09/19/2020    BILITOT 0.3 09/19/2020    ALKPHOS 99 09/19/2020    AST 21 09/19/2020    ALT 32 09/19/2020    LABGLOM 7 (A) 09/26/2020    GFRAA 9 (L) 09/26/2020    GLOB 3.2 03/07/2017       Lab Results   Component Value Date    INR 1.87 (H) 09/26/2020    INR 2.25 (H) 09/25/2020    INR 2.64 (H) 09/24/2020    PROTIME 21.8 (H) 09/26/2020    PROTIME 25.3 (H) 09/25/2020    PROTIME 28.9 (H) 09/24/2020       RADIOLOGY STUDIES REVIEWED BY ME:  Vl Extremity Venous Left 09/20/2020   Impression   There is evidence of subacute deep vein thrombosis in the left lower  extremity involving the common femoral, femoral, popliteal, posterior  tibial, and gastrocnemius veins. The anterior tibial and peroneal veins  were not well visualized secondary to edema and limb size. Superficial thrombophlebitis is seen in the short saphenous vein of the  left lower extremity. Floating tail left common femoral vein around 4 cm in length.        Cta Pulmonary W Contrast 09/19/2020  Multiple acute pulmonary emboli identified on the right, involving the third, fourth and fifth order pulmonary artery branches extending into the right lower lobe. There are findings that are suggestive of mild right heart strain.      ASSESSMENT:  #Venous thromboembolism LLE DVT and PE- provoked event ? ?(Recent surgery)  Risk factors: Morbid obesity, recent surgery  Currently receiving catheter directed thrombolysis. · Percutaneous thrombectomy/PulseSpray thrombolysis with AngioJet Zelante catheter  · Left lower extremity venograms after percutaneous thrombectomy and thrombolysis  · Placement of 40 cm infusion length EKOS TPA catheter from the popliteal vein all the way to the distal external iliac vein  · Inferior vena cava filter placement from a right internal jugular vein approach (Bard Heather inferior vena cava filter)  · 9/21/2002-removal of EKOS TPA catheter     PT/INR - 1.87 today. Pharmacy to dose warfarin. Prothrombotic work-up  Beta 2 glycoprotein IgG and IgM - both negative  Cardiolipin Ab IgG and IgM - both negative  Phospholipid Ab - negative        #Contrast-induced nephropathy-nephrology following. Worsening  Creatinine 5.6->7.9->9.2->8.4    #Morbid obesity-BMI above 50.-As per primary care provider. Not treating physician.   DOACS not a good option as this was not studied in patients BMI above 40. Pharmacy to dose warfarin.     #Family history of VTE-apparently mother had 2 episodes of a provoked event.     #Neutrophil leukocytosis-likely reactive.  Continue to monitor. Normalized.     #Vitamin D deficiency-10.7. As per nephrology. Vitamin D 50,000 weekly    E coli UTI- as per hospitalist    #Normocytic anemia- anemia of blood loss/acute illness. Hemoglobin 11.1/MCV 86 on 9/26/2020    Disposition- appointment with Evangelista Nuñez 11/25/2020 9 AM to follow-up results of thrombophilia work-up. Please arrange follow-up at the Coumadin clinic to follow-up INR.     PLAN:  Continue warfarin as per pharmacy dosing to target INR 2-3. Pharmacy to adjust dose  Follow-up results of thrombophilia work-up. Will arrange follow-up in clinic in 2 months    I have seen, examined and reviewed this patient medication list, appropriate labs and imaging studies. I reviewed relevant medical records and others physicians notes. I discussed the plans of care with the patient. I answered all the questions to the patients satisfaction. I have also reviewed the chief complaint (CC) and part of the history (History of Present Illness (HPI), Past Family Social History [facility]), or Review of Systems (ROS) and made changes when appropriated.        (Please note that portions of this note were completed with a voice recognition program. Efforts were made to edit the dictations but occasionally words are mis-transcribed.)      Jeferson Simpson MD    09/26/20  9:30 AM

## 2020-09-27 LAB
ANION GAP SERPL CALCULATED.3IONS-SCNC: 16 MMOL/L (ref 7–19)
BUN BLDV-MCNC: 49 MG/DL (ref 6–20)
CALCIUM SERPL-MCNC: 8.7 MG/DL (ref 8.6–10)
CHLORIDE BLD-SCNC: 98 MMOL/L (ref 98–111)
CO2: 24 MMOL/L (ref 22–29)
CREAT SERPL-MCNC: 8 MG/DL (ref 0.5–1.2)
GFR AFRICAN AMERICAN: 9
GFR NON-AFRICAN AMERICAN: 7
GLUCOSE BLD-MCNC: 114 MG/DL (ref 70–99)
GLUCOSE BLD-MCNC: 122 MG/DL (ref 70–99)
GLUCOSE BLD-MCNC: 124 MG/DL (ref 74–109)
GLUCOSE BLD-MCNC: 131 MG/DL (ref 70–99)
GLUCOSE BLD-MCNC: 138 MG/DL (ref 70–99)
INR BLD: 2.18 (ref 0.88–1.18)
PERFORMED ON: ABNORMAL
POTASSIUM REFLEX MAGNESIUM: 4.5 MMOL/L (ref 3.5–5)
PROTHROMBIN G20210A MUTATION: NEGATIVE
PROTHROMBIN TIME: 24.7 SEC (ref 12–14.6)
PT PCR SPECIMEN: NORMAL
SODIUM BLD-SCNC: 138 MMOL/L (ref 136–145)

## 2020-09-27 PROCEDURE — 6370000000 HC RX 637 (ALT 250 FOR IP): Performed by: HOSPITALIST

## 2020-09-27 PROCEDURE — 85610 PROTHROMBIN TIME: CPT

## 2020-09-27 PROCEDURE — 99232 SBSQ HOSP IP/OBS MODERATE 35: CPT | Performed by: INTERNAL MEDICINE

## 2020-09-27 PROCEDURE — 6370000000 HC RX 637 (ALT 250 FOR IP): Performed by: PHYSICIAN ASSISTANT

## 2020-09-27 PROCEDURE — 6370000000 HC RX 637 (ALT 250 FOR IP): Performed by: INTERNAL MEDICINE

## 2020-09-27 PROCEDURE — 6370000000 HC RX 637 (ALT 250 FOR IP): Performed by: SURGERY

## 2020-09-27 PROCEDURE — 1210000000 HC MED SURG R&B

## 2020-09-27 PROCEDURE — 36415 COLL VENOUS BLD VENIPUNCTURE: CPT

## 2020-09-27 PROCEDURE — 82947 ASSAY GLUCOSE BLOOD QUANT: CPT

## 2020-09-27 PROCEDURE — 80048 BASIC METABOLIC PNL TOTAL CA: CPT

## 2020-09-27 RX ORDER — HYDROCORTISONE 25 MG/G
CREAM TOPICAL 2 TIMES DAILY
Status: DISCONTINUED | OUTPATIENT
Start: 2020-09-27 | End: 2020-09-28 | Stop reason: HOSPADM

## 2020-09-27 RX ORDER — HYDRALAZINE HYDROCHLORIDE 25 MG/1
25 TABLET, FILM COATED ORAL EVERY 6 HOURS PRN
Status: DISCONTINUED | OUTPATIENT
Start: 2020-09-27 | End: 2020-09-28 | Stop reason: HOSPADM

## 2020-09-27 RX ORDER — DOCUSATE SODIUM 100 MG/1
100 CAPSULE, LIQUID FILLED ORAL 2 TIMES DAILY
Status: DISCONTINUED | OUTPATIENT
Start: 2020-09-27 | End: 2020-09-28 | Stop reason: HOSPADM

## 2020-09-27 RX ORDER — WARFARIN SODIUM 5 MG/1
5 TABLET ORAL
Status: COMPLETED | OUTPATIENT
Start: 2020-09-27 | End: 2020-09-27

## 2020-09-27 RX ORDER — POLYETHYLENE GLYCOL 3350 17 G/17G
17 POWDER, FOR SOLUTION ORAL DAILY
Status: DISCONTINUED | OUTPATIENT
Start: 2020-09-27 | End: 2020-09-28 | Stop reason: HOSPADM

## 2020-09-27 RX ADMIN — POLYETHYLENE GLYCOL 3350 17 G: 17 POWDER, FOR SOLUTION ORAL at 10:15

## 2020-09-27 RX ADMIN — DOCUSATE SODIUM 100 MG: 100 CAPSULE, LIQUID FILLED ORAL at 20:34

## 2020-09-27 RX ADMIN — WARFARIN SODIUM 5 MG: 5 TABLET ORAL at 17:48

## 2020-09-27 RX ADMIN — HYDROCORTISONE 2.5%: 25 CREAM TOPICAL at 20:37

## 2020-09-27 RX ADMIN — HYDRALAZINE HYDROCHLORIDE 25 MG: 25 TABLET, FILM COATED ORAL at 06:43

## 2020-09-27 RX ADMIN — FAMOTIDINE 10 MG: 20 TABLET ORAL at 10:14

## 2020-09-27 NOTE — PROGRESS NOTES
PROGRESS NOTE    Pt Name: Portia Chu  MRN: [id number]  YOB: 1980  Date of evaluation: 9/27/2020  Room: 334    Subjective Feeling better today. Denies any new complaints. Denies any bleeding. He had warfarin yesterday night 7.5 mg p.o. Complains of constipation. Prescribed MiraLAX. Discussed with nurse at bedside. Discussed with the patient wife as well at bedside. All questions answered. INTERVAL HISTORY  Bernadette Awan is a 79-year-old  gentleman admitted to [address] [place] [address] ED on 9/19/2020 having presented with pain and swelling of the left leg, burning in the right side of his chest with evaluation leading to a diagnosis of left lower extremity DVT and PE.  Hematology consultation requested.     HEMATOLOGICAL HISTORY: Left lower extremity DVT with right lung pulmonary emboli 9/19/2020  Bernadette Awan was seen in initial hematology consultation on 9/20/2020 as an inpatient at [address] [place] [address] having been admitted on 9/19/2020 with left lower extremity DVT and PE.  He was placed on heparin as initial management.     Mr. Elvis Montana has a 5-day history of left leg pain and swelling.  Initially he thought he had pulled a muscle but this did not get better.  When things did not get better, he sought evaluation at the ED at [address] [place] [address]. Additionally he has nonspecific symptoms of pulmonary embolus including burning in the right side of his chest.  He does not have hemoptysis or dyspnea. Mr. Elvis Montana does not have a personal history of DVT or PE or hypercoagulability. Risk features include:   Obesity.    Recent left hand carpal tunnel surgery in August 2020.   Family history: His mother had an episode of left lower extremity DVT and PE 15 years ago without recurrence.  A maternal aunt also had DVT of the lower extremities.     Noninvasive venous studies of the lower extremities on 9/19/2020 document:   · Extensive thrombosis (DVT) noted in Lt common femoral vein, Lt SFV (prox, mid and distal), Lt popliteal vein, Lt Gastrocs, Lt posterior tibial veins.  A floating tail is noted in left common femoral vein measuring approximately 4.3cm.     · SVT: thrombus noted in Lt LSV     Pulmonary CTA with contrast on 9/19/2020 documented the following:  · Multiple right-sided acute pulmonary emboli identified involving the right third fourth and fifth ordered pulmonary artery branches extending into the right lower lobe. · Possible right heart strain  · No left sided pulmonary emboli identified  · Small calcified subcarinal lymph nodes consistent with healed granulomatous disease  · Scattered calcified granulomas in both lungs     He was seen by Dr. Radha Plaza from vascular surgery this morning (9/20/2020), with recommendations for percutaneous mechanical thrombectomy/pulse spray thrombolysis to try to prevent long-term swelling in this young patient with iliofemoral DVT.  If the result in that procedure is not satisfactory, he would consider placing a TPA thrombolyzes catheter for thrombolysis overnight.  The patient agreed to proceed. Dr. Radha Plaza did not feel that he needed TPA thrombolysis of the pulmonary embolism because he is fairly asymptomatic from that standpoint.     Agree with plans as outlined  A serologic prothrombotic work-up will be requested.         REVIEW OF SYSTEMS:   CONSTITUTIONAL: no fever, no night sweats, fatigue;  HEENT: no blurring of vision, no double vision, no hearing difficulty, no tinnitus, no ulceration, no dysplasia, no epistaxis;  LUNGS: no cough, no hemoptysis, no wheeze,  no shortness of breath;  CARDIOVASCULAR: no palpitation, no chest pain, no shortness of breath;  GI: no abdominal pain, no nausea, no vomiting, no diarrhea, constipation;  RAKAN: no dysuria, no hematuria, no frequency or urgency, no nephrolithiasis;  MUSCULOSKELETAL: no joint pain, no swelling, no stiffness;  ENDOCRINE: no polyuria, no polydipsia, no cold or heat intolerance;  HEMATOLOGY: no easy bruising or bleeding, no history of clotting disorder;  DERMATOLOGY: no skin rash, no eczema, no pruritus;  PSYCHIATRY: no depression, no anxiety, no panic attacks, no suicidal ideation, no homicidal ideation;  NEUROLOGY: no syncope, no seizures, no numbness or tingling of hands, no numbness or tingling of feet, no paresis;    Objective   BP (!) 162/92   Pulse 76   Temp 97.8 °F (36.6 °C) (Temporal)   Resp 16   Ht 6' (1.829 m)   Wt (!) 491 lb (222.7 kg)   SpO2 95%   BMI 66.59 kg/m²     PHYSICAL EXAM:  CONSTITUTIONAL: Alert, appropriate, no acute distress, obese  EYES: Non icteric, EOM intact, pupils equal round   ENT: Mucus membranes moist, no oral pharyngeal lesions, external inspection of ears and nose are normal  NECK: Supple, no masses. No palpable thyroid mass  CHEST/LUNGS: CTA bilaterally, normal respiratory effort   CARDIOVASCULAR: RRR, no murmurs. No lower extremity edema  ABDOMEN: soft non-tender, active bowel sounds, no HSM. No palpable masses  EXTREMITIES: Bilateral lower extremity edema, warm, full ROM in all 4 extremities, no focal weakness. SKIN: warm, dry with no rashes or lesions  LYMPH: No cervical, clavicular, axillary, or inguinal lymphadenopathy  NEUROLOGIC: follows commands, non focal   PSYCH: mood and affect appropriate.   Alert and oriented to time, place, person        LABORATORY RESULTS REVIEWED BY ME:  Recent Labs     09/26/20  0201 09/23/20  0511 09/21/20  1035   WBC 9.1 9.5 18.5*   HGB 11.1* 10.7* 12.9*   HCT 33.4* 33.4* 41.2*   MCV 86.8 90.3 93.2    139 173       Lab Results   Component Value Date     09/27/2020    K 4.5 09/27/2020    CL 98 09/27/2020    CO2 24 09/27/2020    BUN 49 (H) 09/27/2020    CREATININE 8.0 (H) 09/27/2020    GLUCOSE 124 (H) 09/27/2020    CALCIUM 8.7 09/27/2020    PROT 7.4 09/19/2020    LABALBU 4.1 09/19/2020    BILITOT 0.3 09/19/2020    ALKPHOS 99 09/19/2020    AST 21 09/19/2020    ALT 32 09/19/2020    LABGLOM 7 (A) 09/27/2020    GFRAA 9 (L) 09/27/2020 GLOB 3.2 03/07/2017       Lab Results   Component Value Date    INR 2.18 (H) 09/27/2020    INR 1.87 (H) 09/26/2020    INR 2.25 (H) 09/25/2020    PROTIME 24.7 (H) 09/27/2020    PROTIME 21.8 (H) 09/26/2020    PROTIME 25.3 (H) 09/25/2020       RADIOLOGY STUDIES REVIEWED BY ME:  Vl Extremity Venous Left 09/20/2020   Impression   There is evidence of subacute deep vein thrombosis in the left lower  extremity involving the common femoral, femoral, popliteal, posterior  tibial, and gastrocnemius veins. The anterior tibial and peroneal veins  were not well visualized secondary to edema and limb size. Superficial thrombophlebitis is seen in the short saphenous vein of the  left lower extremity. Floating tail left common femoral vein around 4 cm in length.        Cta Pulmonary W Contrast 09/19/2020  Multiple acute pulmonary emboli identified on the right, involving the third, fourth and fifth order pulmonary artery branches extending into the right lower lobe. There are findings that are suggestive of mild right heart strain.      ASSESSMENT:  #Venous thromboembolism LLE DVT and PE- provoked event ? ?(Recent surgery)  Risk factors: Morbid obesity, recent surgery  Currently receiving catheter directed thrombolysis. · Percutaneous thrombectomy/PulseSpray thrombolysis with AngioJet Zelante catheter  · Left lower extremity venograms after percutaneous thrombectomy and thrombolysis  · Placement of 40 cm infusion length EKOS TPA catheter from the popliteal vein all the way to the distal external iliac vein  · Inferior vena cava filter placement from a right internal jugular vein approach (Bard Irion inferior vena cava filter)  · 9/21/2002-removal of EKOS TPA catheter     PT/INR - 2.18 today. Received warfarin 7.5 mg p.o. yesterday. Pharmacy to dose warfarin.     DOACS not a good option due to morbid obesity with BMI above 50    Prothrombotic work-up  Beta 2 glycoprotein IgG and IgM - both negative  Cardiolipin Ab IgG and IgM - both negative  Phospholipid Ab - negative        #Contrast-induced nephropathy-nephrology following. Worsening  Creatinine 5.6->7.9->9.2->8.4->8.0    #Morbid obesity-BMI above 50.-As per primary care provider. Not treating physician. DOACS not a good option as this was not studied in patients BMI above 40. Currently on warfarin.     #Family history of VTE-apparently mother had 2 episodes of a provoked event.     #Vitamin D deficiency-10.7. As per nephrology. Vitamin D 50,000 weekly    ESBL E coli UTI- as per hospitalist    #Normocytic anemia- anemia of blood loss/acute illness. Hemoglobin 11.1/MCV 86 on 9/26/2020    Disposition- appointment with Rohith Hunter 11/25/2020 9 AM to follow-up results of thrombophilia work-up. Please arrange follow-up at the Coumadin clinic to follow-up INR. #Constipation-MiraLAX as needed     PLAN:  Continue warfarin as per pharmacy dosing to target INR 2-3. Pharmacy to adjust dose  Follow-up results of thrombophilia work-up. Follow-up in clinic with Rohith Hunter 11/25/2020 9 AM to complete thrombophilia work-up. Follow-up with primary care provider or Coumadin clinic for INR check    I have seen, examined and reviewed this patient medication list, appropriate labs and imaging studies. I reviewed relevant medical records and others physicians notes. I discussed the plans of care with the patient. I answered all the questions to the patients satisfaction. I have also reviewed the chief complaint (CC) and part of the history (History of Present Illness (HPI), Past Family Social History [facility]), or Review of Systems (ROS) and made changes when appropriated.        (Please note that portions of this note were completed with a voice recognition program. Efforts were made to edit the dictations but occasionally words are mis-transcribed.)      Mart Burton MD    09/27/20  9:02 AM

## 2020-09-27 NOTE — PROGRESS NOTES
Nephrology ([address] [facility]) Progress Note    Patient:  Franck Blackmon  YOB: 1980  Date of Service: 9/27/2020  MRN: [id number]   Acct: [de-identified]   Primary Care Physician: VANDANA Rankin  Advance Directive: Full Code  Admit Date: 9/19/2020       Hospital Day: 8  Referring Provider: Kimberley Frias MD    Patient Seen, Chart, Consults notes, Labs, Radiology studies reviewed. Subjective:  Franck Blackmon is a 38 y.o. male  whom we were consulted for acute renal failure. Patient recalls a history of nephrolithiasis and urine tract infection but no other nephrologic evaluations. Recalled no significant NSAID usage. He initially presented for evaluation of DVT with pulmonary emboli. He underwent vascular surgical procedure with Dr. Terry Scott. Subsequently developed decreased urine output and creatinine increased from 0.6-3.3. Contrast exposures noted beginning September 19. He was initially seen in the ICU with nursing. No family present. Denied dysuria or hematuria, rash or hemoptysis.     Today, no overnight events. Denies current chest pain,  Has mild shortness of air mostly at night. Tolerated dialysis on 9/26. Allergies:  Patient has no known allergies.     Medicines:  Current Facility-Administered Medications   Medication Dose Route Frequency Provider Last Rate Last Dose    hydrALAZINE (APRESOLINE) tablet 25 mg  25 mg Oral Q6H PRN Christina Berger MD   25 mg at 09/27/20 0643    meropenem (MERREM) 500 mg in sterile water 10 mL IV syringe  500 mg Intravenous Q24H Janak Burnett MD   500 mg at 09/26/20 1613    simethicone (MYLICON) chewable tablet 80 mg  80 mg Oral Q6H PRN Janak Burnett MD   80 mg at 09/23/20 0416    famotidine (PEPCID) tablet 10 mg  10 mg Oral Daily Janak Burnett MD   10 mg at 09/26/20 1305    vitamin D (ERGOCALCIFEROL) capsule 50,000 Units  50,000 Units Oral Weekly Janak Burnett MD   50,000 Units at 09/22/20 1618    warfarin (COUMADIN) daily dosing (placeholder)   Other RX Aliya Roberts MD        glucose (GLUTOSE) 40 % oral gel 15 g  15 g Oral PRN Sepideh Castro MD        dextrose 50 % IV solution  12.5 g Intravenous PRN Sepideh Castro MD        glucagon (rDNA) injection 1 mg  1 mg Intramuscular PRN Sepideh Castro MD        dextrose 5 % solution  100 mL/hr Intravenous PRN Sepideh Castro MD        ondansetron [facility]) injection 4 mg  4 mg Intravenous Q8H PRN Sepideh Castro MD        potassium chloride (KLOR-CON M) extended release tablet 40 mEq  40 mEq Oral PRN Sepideh Castro MD        Or    potassium bicarb-citric acid (EFFER-K) effervescent tablet 40 mEq  40 mEq Oral PRN Sepideh Castro MD        Or    potassium chloride 10 mEq/100 mL IVPB (Peripheral Line)  10 mEq Intravenous PRN Sepideh Castro MD        magnesium sulfate 1 g in dextrose 5% 100 mL IVPB  1 g Intravenous PRN Sepideh Castro MD        acetaminophen (TYLENOL) tablet 650 mg  650 mg Oral Q6H PRN Sepideh Castro MD   650 mg at 09/25/20 2133    Or    acetaminophen (TYLENOL) suppository 650 mg  650 mg Rectal Q6H PRN Sepideh Castro MD        polyethylene glycol [facility]) packet 17 g  17 g Oral Daily PRN Sepideh Castro MD        promethazine (PHENERGAN) tablet 12.5 mg  12.5 mg Oral Q6H PRN Sepideh Castro MD        Or    ondansetron [facility]) injection 4 mg  4 mg Intravenous Q6H PRNANCY Castro MD   4 mg at 09/21/20 1318    insulin lispro (HUMALOG) injection vial 0-6 Units  0-6 Units Subcutaneous TID WC Sepideh Castro MD   1 Units at 09/26/20 1747    insulin lispro (HUMALOG) injection vial 0-3 Units  0-3 Units Subcutaneous Nightly Sepideh Castro MD   1 Units at 09/26/20 2142       Past Medical History:  Past Medical History:   Diagnosis Date    Arthritis     both wrist    Bilateral carpal tunnel syndrome 8/15/2019    Bronchitis     10 yrs ago    Diabetes mellitus ([address]     Kidney stone     recurrent    Sleep  Marital status:      Spouse name: Not on file    Number of children: Not on file    Years of education: Not on file    Highest education level: Not on file   Occupational History    Not on file   Social Needs    Financial resource strain: Not on file    Food insecurity     Worry: Not on file     Inability: Not on file    Transportation needs     Medical: Not on file     Non-medical: Not on file   Tobacco Use    Smoking status: Never Smoker    Smokeless tobacco: Never Used    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[address] and Sexual Activity    Alcohol use: Yes     Comment: OCC    Drug use: No    Sexual activity: Yes     Partners: Female   Lifestyle    Physical activity     Days per week: Not on file     Minutes per session: Not on file    Stress: Not on file   Relationships    Social connections     Talks on phone: Not on file     Gets together: Not on file     Attends Synagogue service: Not on file     Active member of club or organization: Not on file     Attends meetings of clubs or organizations: Not on file     Relationship status: Not on file    Intimate partner violence     Fear of current or ex partner: Not on file     Emotionally abused: Not on file     Physically abused: Not on file     Forced sexual activity: Not on file   Other Topics Concern    Not on file   Social History Narrative    Not on file         Review of Systems:  History obtained from chart review and the patient  General ROS: No fever or chills  Respiratory ROS: No cough, shortness of breath, wheezing  Cardiovascular ROS: no chest pain or dyspnea on exertion  Gastrointestinal ROS: No abdominal pain or melena  Genito-Urinary ROS: No dysuria or hematuria  Musculoskeletal ROS: No joint pain or swelling         Objective:  Blood pressure (!) 162/92, pulse 76, temperature 97.8 °F (36.6 °C), temperature source Temporal, resp.  rate 16, height 6' (1.829 m), weight (!) 491 lb (222.7 kg), SpO2 95 %.    Intake/Output Summary (Last 24 hours) at 9/27/2020 0918  Last data filed at 9/27/2020 0807  Gross per 24 hour   Intake 420 ml   Output 63 ml   Net 357 ml     General: alert and oriented x3   Chest:  clear to auscultation bilaterally without respiratory distress  CVS: regular rate and rhythm  Abdominal: soft, nontender, normal bowel sounds  Extremities: no cyanosis or edema  Skin: warm and dry without rash    Labs:  BMP:   Recent Labs     09/25/20  0416 09/26/20  0201 09/27/20  0112    136 138   K 3.8 4.4 4.5    99 98   CO2 22 23 24   BUN 58* 47* 49*   CREATININE 9.2* 8.4* 8.0*   CALCIUM 8.4* 8.2* 8.7     CBC:   Recent Labs     09/26/20 0201   WBC 9.1   HGB 11.1*   HCT 33.4*   MCV 86.8        LIVER PROFILE: No results for input(s): AST, ALT, LIPASE, BILIDIR, BILITOT, ALKPHOS in the last 72 hours. Invalid input(s): AMYLASE,  ALB  PT/INR:   Recent Labs     09/25/20  0415 09/26/20 0201 09/27/20  0112   PROTIME 25.3* 21.8* 24.7*   INR 2.25* 1.87* 2.18*     APTT: No results for input(s): APTT in the last 72 hours. BNP:  No results for input(s): BNP in the last 72 hours. Ionized Calcium:No results for input(s): IONCA in the last 72 hours. Magnesium:No results for input(s): MG in the last 72 hours. Phosphorus:No results for input(s): PHOS in the last 72 hours. HgbA1C: No results for input(s): LABA1C in the last 72 hours. Hepatic: No results for input(s): ALKPHOS, ALT, AST, PROT, BILITOT, BILIDIR, LABALBU in the last 72 hours. Lactic Acid: No results for input(s): LACTA in the last 72 hours. Troponin: No results for input(s): CKTOTAL, CKMB, TROPONINT in the last 72 hours. ABGs: No results for input(s): PH, PCO2, PO2, HCO3, O2SAT in the last 72 hours. CRP:  No results for input(s): CRP in the last 72 hours. Sed Rate:  No results for input(s): SEDRATE in the last 72 hours. Cultures:   No results for input(s): CULTURE in the last 72 hours.     Radiology reports as per the Radiologist  Radiology: Vl Extremity Venous Left    Result Date: 9/20/2020  Vascular Lower Extremities DVT Study Procedure  Demographics   Patient Name    Wes Rosa Age                   38   Patient Number  [id number]           Gender                Male   Visit Number    [id number]        Interpreting          Lauren Cox MD                                   Physician   Date of Birth   1980       Referring Physician   Lauren Cox MD   Accession       [id number]       [address] [facility] [place], [place]  Number  Procedure Type of Study:   Veins:Lower Extremities DVT Study, VL EXTREMITY VENOUS DUPLEX LEFT. Indications for Study:DVT and Venous access. Risk Factors   - The patient's risk factor(s) include: obesity. Impression   Successful ultrasound/duplex guided cannulation of thrombosed left  popliteal vein for sheath placement. Signature   ----------------------------------------------------------------  Electronically signed by Lauren Cox MD(Interpreting  physician) on 09/20/2020 03:38 PM  ----------------------------------------------------------------      Vl Extremity Venous Left    Result Date: 9/20/2020  Vascular Lower Extremities DVT Study Procedure  Demographics   Patient Name    Wes Ahumada                   38   Patient Number  [id number]           Gender                Male   Visit Number    [id number]        Interpreting          Lauren Cox MD                                   Physician   Date of Birth   1980       Referring Physician   Darlene Michaels   Accession       [id number]       [address] [facility] [place], [place]  Number  Procedure Type of Study:   Veins:Lower Extremities DVT Study, VL EXTREMITY VENOUS DUPLEX LEFT. Indications for Study:Pain, left lower extremity. Risk Factors   - The patient's risk factor(s) include: obesity.   Impression   There is evidence of subacute deep vein thrombosis in the left lower  extremity involving the common femoral, femoral, popliteal, posterior  tibial, and gastrocnemius veins. The anterior tibial and peroneal veins  were not well visualized secondary to edema and limb size. Superficial thrombophlebitis is seen in the short saphenous vein of the  left lower extremity. Floating tail left common femoral vein around 4 cm in length. Signature   ----------------------------------------------------------------  Electronically signed by Kaylie Sandhu MD(Interpreting  physician) on 09/20/2020 09:28 AM  ----------------------------------------------------------------  Velocities are measured in cm/s ; Diameters are measured in mm Right Lower Extremities DVT Study Measurements Right 2D Measurements +------------------------------------+----------+---------------+----------+ ! Location                            ! Visualized! Compressibility! Thrombosis! +------------------------------------+----------+---------------+----------+ ! Sapheno Femoral Junction            ! Yes       ! Yes            ! None      ! +------------------------------------+----------+---------------+----------+ ! Common Femoral                      !Yes       ! Yes            ! None      ! +------------------------------------+----------+---------------+----------+ Left Lower Extremities DVT Study Measurements Left 2D Measurements +------------------------------------+----------+---------------+----------+ ! Location                            ! Visualized! Compressibility! Thrombosis! +------------------------------------+----------+---------------+----------+ ! Sapheno Femoral Junction            ! Yes       ! Yes            ! None      ! +------------------------------------+----------+---------------+----------+ ! Common Femoral                      !Yes       ! No             !None      ! +------------------------------------+----------+---------------+----------+ ! Prox Femoral                        !Yes       ! No             !None      ! T2-3 level. There is a new esophageal monitoring lead. There is again poor inspiration with bilateral infiltrates. There is cardiomegaly. The left costophrenic angle is not fully included on this study. 1. Central venous catheter has been repositioned and now extends into the superior vena cava. No evidence of pneumothorax. 2. Poor inspiration. 3. Bilateral infiltrates may be related to the poor inspiratory effort. Edema and pneumonia are possible. 4. Cardiomegaly. Signed by Dr Kaylyn Gale on 9/20/2020 1:16 PM    Xr Chest Portable    Result Date: 9/20/2020  EXAMINATION:  XR CHEST PORTABLE  9/20/2020 12:26 PM HISTORY: Central venous catheter placement. COMPARISON: 7/8/2019. FINDINGS:  There has been placement of a right IJ central venous catheter. The catheter extends to the right into the right subclavian vein. There is no evidence of pneumothorax. The patient is intubated with the endotracheal tube tip at the T2-3 level. There is very poor inspiratory effort. There is vascular crowding. There are bilateral infiltrates. There is cardiomegaly. 1. Right IJ central venous catheter placement with catheter tip extending to the right into the right subclavian vein. No evidence of pneumothorax. 2. Endotracheal tube tip at the T2-3 level. 3. Very poor inspiration with vascular crowding. 4. Bilateral infiltrates may be related to the poor inspiration. Bilateral pneumonia and pulmonary edema cannot be ruled out on this image. 5. Cardiomegaly. Signed by Dr Kaylyn Gale on 9/20/2020 12:28 PM    Cta Pulmonary W Contrast    Result Date: 9/19/2020  EXAMINATION: CTA PULMONARY W CONTRAST 9/19/2020 6:13 PM HISTORY: Shortness of breath, clinical concern for pulmonary embolism. DOSE: 804 mGycm. Automatic exposure control was utilized in an effort to use as little radiation as possible, without compromising image quality.  REPORT: Spiral CT of the chest was performed after administration of intravenous contrast using CTA protocol, which includes reconstructed coronal, sagittal and 3-D images. COMPARISON: There are no correlative imaging studies for comparison. The contrast bolus is satisfactory, there is respiratory motion artifact. The pulmonary arteries are normal in caliber, there are multiple filling defects within the pulmonary arteries on the right, involving the third, fourth and fifth order branches extending into the right lower lobe there is mild straightening of the cardiac ventricular septum, which may indicate mild right heart strain. No left-sided pulmonary emboli are identified. The thoracic aorta is normal in caliber, no evidence of dissection is identified. Heart size is normal. No intrathoracic lymphadenopathy is identified. Small calcified subcarinal lymph nodes are compatible with healed granulomatous disease. The thyroid gland is homogeneous and normal. Review of lung windows demonstrates scattered calcified granulomas in both lungs. There is no parenchymal infiltrate or consolidation. No pneumothorax or pleural effusion is identified. The airways are patent. The visualized upper abdomen shows decreased attenuation of the liver parenchyma compatible with fatty infiltration. There is bilateral gynecomastia, mild. Review of bone windows shows fused syndesmophytes throughout the mid and lower thoracic spine. No acute osseous abnormalities identified. 1. Multiple acute pulmonary emboli identified on the right, involving the third, fourth and fifth order pulmonary artery branches extending into the right lower lobe. There are findings that are suggestive of mild right heart strain. Signed by Dr Awa Reed.  [place] on 9/19/2020 6:20 PM       Assessment     -Acute kidney injury (ATN, contrast induced nephropathy)  -Morbid obesity  -DVT and pulmonary emboli  -Diabetes type 2 uncontrolled   -Hyperkalemia  -Metabolic acidosis  -Vitamin D deficiency  -Secondary hyperparathyroidism      Plan:  Dialysis is due next in AM. Outpatient dialysis placement was already arranged. Follow up labs.

## 2020-09-27 NOTE — PROGRESS NOTES
Clinical Pharmacy Note    Warfarin consult follow-up    Recent Labs     09/27/20  0112   INR 2.18*     Recent Labs     09/26/20  0201   HGB 11.1*   HCT 33.4*        Significant Drug-Drug Interactions:  New warfarin drug-drug interactions: None  Discontinued drug-drug interactions: None    Date INR Warfarin Dose   09/19/20 1.11 ---   09/21/20 --- 7.5 mg    09/22/20  1.35   7.5 mg    09/23/20 2.07  5 mg   09/24/20  2.64    2.5 mg    09/25/20  2.25  5 mg    09/26/20 1.87                    7.5 mg    09/27/20   2.18                      5 mg                     Notes:  Give Coumadin 5 mg by mouth x 1 dose tonight. Daily PT/INR until stable within therapeutic range.      Electronically signed by Katlyn Waldron, PharmD, BCPS on 9/27/2020 at 1:16 PM

## 2020-09-27 NOTE — PROGRESS NOTES
[facility] Hospitalists    Patient:  Lulu Walker  YOB: 1980  Date of Service: 9/27/2020  MRN: [id number]   Acct: [de-identified]   Primary Care Physician: VANDANA Horne  Advance Directive: Full Code  Admit Date: 9/19/2020       Hospital Day: 8  Portions of this note have been copied forward, however, changed to reflect the most current clinical status of this patient. CHIEF COMPLAINT Left Leg Pain    SUBJECTIVE: Mr. Boyd Severe has no new complaints. States he ambulated in [place] last night but has not been out of his room today. Denies dyspnea, heart palpitations, N/V. Cumulative Hospital Course:   Mr. Boyd Severe is a 38year old with PMH morbid obesity, DMII, SHELBY, ESBL Ecoli UTI who presented to [facility] ED complaining of left leg pain and edema that began 09/15/2020. He stated his mother and aunt both have history of blood clots and denied alcohol or tobacco use. Work up revealed significant left lower extremity thrombosis extending into the left iliofemoral vein as well as right sided pulmonary embolism with mild right ventricular strain. He was started on Heparin gtt with consult made to Vascular surgery who performed AngioJet mechanical thrombectomy/PulseSpray thrombolysis of left lower extremity DVT and inferior vena cava filter placement. He was continued on TPA thrombolysis overnight to treat common femoral vein floating tail. He underwent intravascular ultrasound of inferior vena cava/left common and external iliac veins/left common femoral vein on 09/21/2020 with findings of patent inferior vena cava filter. On 09/21/2020 urine output noted to be minimal with acute decline in renal function. IVF's were increased with addition of bicarbonate and consult made to Nephrology. On 09/22/2020 creatinine continued to worsen to 5.6, GFR 11. IVF's continued. He was started on Merrem for ESBL E coli UTI. Patient transferred to 3rd floor medical unit.  On 09/23/2020 Vascular surgery placed a right internal jugular vein tunneled dialysis catheter and patient underwent hemodialysis. Heparin gtt discontinued as INR is therapeutic. On 09/26/2020 ordoñez catheter ordered to be removed and patient activity increased in preparation for discharge. Outpatient dialysis has been arranged on M-W-F at [facility] at 5:25. Review of Systems:   14 point review of systems is negative except as specifically addressed above. Objective:   VITALS:  /82   Pulse 70   Temp 98.8 °F (37.1 °C) (Temporal)   Resp 16   Ht 6' (1.829 m)   Wt (!) 491 lb (222.7 kg)   SpO2 94%   BMI 66.59 kg/m²   24HR INTAKE/OUTPUT:      Intake/Output Summary (Last 24 hours) at 9/27/2020 1411  Last data filed at 9/27/2020 0943  Gross per 24 hour   Intake 540 ml   Output 63 ml   Net 477 ml     General appearance: 38year old male, laying in supine position, no acute distress   Head: Normocephalic, without obvious abnormality, atraumatic  Eyes: conjunctivae/corneas clear. PERRL, EOM's intact.    Ears: normal external ears and nose, throat without exudate  Neck: no adenopathy, no carotid bruit, no JVD, supple, symmetrical, trachea midline   Lungs: decreased air entry at bases, no wheezes, rales or rhonchi  Heart: regular rate and rhythm, S1, S2 normal, no murmur  Abdomen:soft, morbidly obese, non-tender; non-distended, normal bowel sounds no masses, no organomegaly  Extremities: trace peripheral edema,  No erythema, no tenderness to palpation, BLE's warm to touch   Skin: Skin color, texture, turgor normal. No rashes or lesions  Lymphatic: No palpable lymph node enlargment  Neurologic: Alert and oriented x 3, no focal deficits, speech fluent, no facial asymmetry, generalized weakness  Psychiatric: Calm, appropriate affect      Medications:      dextrose        polyethylene glycol  17 g Oral Daily    warfarin  5 mg Oral Once    meropenem  500 mg Intravenous Q24H    famotidine  10 mg Oral Daily    vitamin D  50,000 Units Oral Weekly    warfarin (COUMADIN) daily dosing (placeholder)   Other RX Placeholder    insulin lispro  0-6 Units Subcutaneous TID WC    insulin lispro  0-3 Units Subcutaneous Nightly     hydrALAZINE, simethicone, glucose, dextrose, glucagon (rDNA), dextrose, ondansetron, potassium chloride **OR** potassium alternative oral replacement **OR** potassium chloride, magnesium sulfate, acetaminophen **OR** acetaminophen, polyethylene glycol, promethazine **OR** ondansetron  DIET CARB CONTROL; Carb Control: 4 carb choices (60 gms)/meal; Renal     Lab and other Data:     Recent Labs     09/26/20 0201   WBC 9.1   HGB 11.1*        Recent Labs     09/25/20 0416 09/26/20 0201 09/27/20  0112    136 138   K 3.8 4.4 4.5    99 98   CO2 22 23 24   BUN 58* 47* 49*   CREATININE 9.2* 8.4* 8.0*   GLUCOSE 57* 87 124*     INR:   Recent Labs     09/25/20 0415 09/26/20 0201 09/27/20  0112   INR 2.25* 1.87* 2.18*     RAD:   Renal ultrasound 09/22/2020  1. Grossly negative renal ultrasound. 2. The bladder is not well evaluated. Vl Extremity Venous Left  Impression:   Successful ultrasound/duplex guided cannulation of thrombosed left  popliteal vein for sheath placement. Vl Extremity Venous Left  Impression   There is evidence of subacute deep vein thrombosis in the left lower  extremity involving the common femoral, femoral, popliteal, posterior  tibial, and gastrocnemius veins. The anterior tibial and peroneal veins  were not well visualized secondary to edema and limb size. Superficial thrombophlebitis is seen in the short saphenous vein of the  left lower extremity. Floating tail left common femoral vein around 4 cm in length. Xr Chest Portable  1. Central venous catheter has been repositioned and now extends into the superior vena cava. No evidence of pneumothorax. 2. Poor inspiration. 3. Bilateral infiltrates may be related to the poor inspiratory effort. Edema and pneumonia are possible. 4. Cardiomegaly.  Signed by Dr Garrett Son on 9/20/2020 1:16 PM    Xr Chest Portable  1. Right IJ central venous catheter placement with catheter tip extending to the right into the right subclavian vein. No evidence of pneumothorax. 2. Endotracheal tube tip at the T2-3 level. 3. Very poor inspiration with vascular crowding. 4. Bilateral infiltrates may be related to the poor inspiration. Bilateral pneumonia and pulmonary edema cannot be ruled out on this image. 5. Cardiomegaly. Signed by Dr Garrett Son on 9/20/2020 12:28 PM    Cta Pulmonary W Contrast  1. Multiple acute pulmonary emboli identified on the right, involving the third, fourth and fifth order pulmonary artery branches extending into the right lower lobe. There are findings that are suggestive of mild right heart strain. Signed by Dr Dante Guardado on 9/19/2020 6:20 PM    Micro: Urine culture   Escherichia coli (esbl) (1)   Antibiotic  Interpretation  CRYSTAL  Status    ampicillin  Resistant  >=32  mcg/mL     aztreonam  Resistant       ceFAZolin  Resistant  >=64  mcg/mL     cefepime  Resistant  >=64  mcg/mL     cefTRIAXone  Resistant  >=64  mcg/mL     ertapenem  Sensitive  <=0.5  mcg/mL     gentamicin  Resistant  >=16  mcg/mL     levofloxacin  Resistant  >=8  mcg/mL     meropenem  Sensitive  <=0.25  mcg/mL     nitrofurantoin  Sensitive  <=16  mcg/mL     tobramycin  Intermediate  8  mcg/mL     trimethoprim-sulfamethoxazole  Resistant  >=320  mcg/mL       Assessment/Plan   Principal Problem:    Venous thromboembolism- noted to have recent Carpal Tunnel surgery left wrist, s/p percutaneous thrombectomy/PulseSpray thrombolysis with Angiojet and placement of EKOS TPA catheter, IVC filter placement. INR therapeutic, heparin gtt discontinued.  Appreciate Vascular surgery/Hematology assistance, repeat venous duplex as outpatient in 8 weeks and discuss study/possibly schedule IVC removal per Vascular, stable, home on Coumadin     Active Problems:    Acute kidney injury- permacath placed followed by first HD 09/23/2020, creatinine mildly improved today, monitor for renal recovery. Has OP dialysis chair ready Monday evening      DM II-uncontrolled A1C 8.8- SSI, accuchecks, hypoglycemia tx orders, Carb control diet, hypoglycemic at times-Glipizide DC'd 09/25/2020 and glucose has remained controlled, will need close follow up with PCP      Morbid obesity (HCC)-noted       Neutrophilic Leukocytosis-resolved      Hyperkalemia-resolved      Complicated UTI 2/2 ESBL E coli-Merrem started 09/22/2020, continue Merrem through tomorrow. If UA improved can DC home without antibiotics    Continue IV Merrem for ESBL Ecoli UTI. Repeat UA, if improved can DC Merrem after tomorrow's dosing and discharge home.     DVT Prophylaxis: Coumadin     GI prophylaxis: Pepcid    London Varela PA-C

## 2020-09-28 VITALS
WEIGHT: 315 LBS | TEMPERATURE: 96.9 F | HEIGHT: 72 IN | HEART RATE: 62 BPM | RESPIRATION RATE: 16 BRPM | DIASTOLIC BLOOD PRESSURE: 92 MMHG | BODY MASS INDEX: 42.66 KG/M2 | SYSTOLIC BLOOD PRESSURE: 154 MMHG | OXYGEN SATURATION: 93 %

## 2020-09-28 LAB
ANION GAP SERPL CALCULATED.3IONS-SCNC: 19 MMOL/L (ref 7–19)
BUN BLDV-MCNC: 72 MG/DL (ref 6–20)
CALCIUM SERPL-MCNC: 8.7 MG/DL (ref 8.6–10)
CHLORIDE BLD-SCNC: 97 MMOL/L (ref 98–111)
CO2: 20 MMOL/L (ref 22–29)
CREAT SERPL-MCNC: 10.3 MG/DL (ref 0.5–1.2)
GFR AFRICAN AMERICAN: 7
GFR NON-AFRICAN AMERICAN: 6
GLUCOSE BLD-MCNC: 102 MG/DL (ref 70–99)
GLUCOSE BLD-MCNC: 114 MG/DL (ref 74–109)
INR BLD: 2.39 (ref 0.88–1.18)
MTHFR BY PCR SPECIMEN: NORMAL
MTHFR INTERPRETATION: NORMAL
MTHFR MUTATION A1298C: NEGATIVE
MTHFR MUTATION C677T: NEGATIVE
PERFORMED ON: ABNORMAL
POTASSIUM REFLEX MAGNESIUM: 4.7 MMOL/L (ref 3.5–5)
PROTHROMBIN TIME: 26.6 SEC (ref 12–14.6)
SODIUM BLD-SCNC: 136 MMOL/L (ref 136–145)

## 2020-09-28 PROCEDURE — 36415 COLL VENOUS BLD VENIPUNCTURE: CPT

## 2020-09-28 PROCEDURE — 6370000000 HC RX 637 (ALT 250 FOR IP): Performed by: SURGERY

## 2020-09-28 PROCEDURE — 8010000000 HC HEMODIALYSIS ACUTE INPT

## 2020-09-28 PROCEDURE — 6360000002 HC RX W HCPCS: Performed by: INTERNAL MEDICINE

## 2020-09-28 PROCEDURE — 6370000000 HC RX 637 (ALT 250 FOR IP): Performed by: PHYSICIAN ASSISTANT

## 2020-09-28 PROCEDURE — 80048 BASIC METABOLIC PNL TOTAL CA: CPT

## 2020-09-28 PROCEDURE — 82947 ASSAY GLUCOSE BLOOD QUANT: CPT

## 2020-09-28 PROCEDURE — 85610 PROTHROMBIN TIME: CPT

## 2020-09-28 PROCEDURE — 6370000000 HC RX 637 (ALT 250 FOR IP): Performed by: INTERNAL MEDICINE

## 2020-09-28 RX ORDER — HYDRALAZINE HYDROCHLORIDE 25 MG/1
25 TABLET, FILM COATED ORAL EVERY 6 HOURS PRN
Qty: 120 TABLET | Refills: 0 | Status: SHIPPED | OUTPATIENT
Start: 2020-09-28 | End: 2022-06-15

## 2020-09-28 RX ORDER — HEPARIN SODIUM 1000 [USP'U]/ML
3600 INJECTION, SOLUTION INTRAVENOUS; SUBCUTANEOUS
Status: COMPLETED | OUTPATIENT
Start: 2020-09-28 | End: 2020-09-28

## 2020-09-28 RX ORDER — WARFARIN SODIUM 5 MG/1
5 TABLET ORAL
Status: DISCONTINUED | OUTPATIENT
Start: 2020-09-28 | End: 2020-09-28 | Stop reason: HOSPADM

## 2020-09-28 RX ORDER — AMLODIPINE BESYLATE 5 MG/1
5 TABLET ORAL DAILY
Qty: 30 TABLET | Refills: 0 | Status: SHIPPED | OUTPATIENT
Start: 2020-09-28 | End: 2020-10-29 | Stop reason: SDUPTHER

## 2020-09-28 RX ORDER — WARFARIN SODIUM 5 MG/1
TABLET ORAL
Qty: 30 TABLET | Refills: 0 | Status: SHIPPED | OUTPATIENT
Start: 2020-09-28 | End: 2020-10-26 | Stop reason: SDUPTHER

## 2020-09-28 RX ORDER — AMLODIPINE BESYLATE 5 MG/1
5 TABLET ORAL DAILY
Status: DISCONTINUED | OUTPATIENT
Start: 2020-09-28 | End: 2020-09-28 | Stop reason: HOSPADM

## 2020-09-28 RX ORDER — ERGOCALCIFEROL 1.25 MG/1
50000 CAPSULE ORAL WEEKLY
Qty: 4 CAPSULE | Refills: 0 | Status: SHIPPED | OUTPATIENT
Start: 2020-09-29 | End: 2022-06-15

## 2020-09-28 RX ADMIN — ACETAMINOPHEN 650 MG: 325 TABLET, FILM COATED ORAL at 09:44

## 2020-09-28 RX ADMIN — FAMOTIDINE 10 MG: 20 TABLET ORAL at 12:12

## 2020-09-28 RX ADMIN — ACETAMINOPHEN 650 MG: 325 TABLET, FILM COATED ORAL at 00:16

## 2020-09-28 RX ADMIN — AMLODIPINE BESYLATE 5 MG: 5 TABLET ORAL at 12:12

## 2020-09-28 RX ADMIN — HEPARIN SODIUM 3600 UNITS: 1000 INJECTION INTRAVENOUS; SUBCUTANEOUS at 11:45

## 2020-09-28 RX ADMIN — DOCUSATE SODIUM 100 MG: 100 CAPSULE, LIQUID FILLED ORAL at 12:12

## 2020-09-28 ASSESSMENT — PAIN SCALES - GENERAL
PAINLEVEL_OUTOF10: 5
PAINLEVEL_OUTOF10: 9
PAINLEVEL_OUTOF10: 2

## 2020-09-28 NOTE — PROGRESS NOTES
Vascular Surgery  Dr.Scott Khalil   Daily Progress Note    Pt Name: [address] [place] [address] Record Number: [id number]  Date of Birth 1980   Today's Date: 9/28/2020    SUBJECTIVE:     Patient was seen and examined in dialysis  Thinks he feels a little better today    OBJECTIVE:     Patient Vitals for the past 24 hrs:   BP Temp Temp src Pulse Resp SpO2 Weight   09/28/20 0710 (!) 148/81 96.2 °F (35.7 °C) Temporal 69 18 95 % --   09/28/20 0546 -- -- -- -- -- -- (!) 480 lb (217.7 kg)   09/28/20 0055 125/72 98.9 °F (37.2 °C) Temporal 70 17 94 % --   09/27/20 1900 122/80 98.9 °F (37.2 °C) Temporal 70 16 94 % --   09/27/20 1154 128/82 98.8 °F (37.1 °C) Temporal 70 16 94 % --       Intake/Output Summary (Last 24 hours) at 9/28/2020 1039  Last data filed at 9/27/2020 1927  Gross per 24 hour   Intake 480 ml   Output --   Net 480 ml     In: 480 [P.O.:480]  Out: -     I/O last 3 completed shifts: In: 900 [P.O.:900]  Out: -        Wt Readings from Last 3 Encounters:   09/28/20 (!) 480 lb (217.7 kg)   09/02/20 (!) 388 lb (176 kg)   08/18/20 (!) 370 lb (167.8 kg)        Body mass index is 65.1 kg/m².      Diet: DIET CARB CONTROL; Carb Control: 4 carb choices (60 gms)/meal; Renal    MEDS:     Scheduled Meds:   amLODIPine  5 mg Oral Daily    warfarin  5 mg Oral Once    polyethylene glycol  17 g Oral Daily    hydrocortisone   Rectal BID    docusate sodium  100 mg Oral BID    meropenem  500 mg Intravenous Q24H    famotidine  10 mg Oral Daily    vitamin D  50,000 Units Oral Weekly    warfarin (COUMADIN) daily dosing (placeholder)   Other RX Placeholder    insulin lispro  0-6 Units Subcutaneous TID WC    insulin lispro  0-3 Units Subcutaneous Nightly     Continuous Infusions:   dextrose       PRN Meds:hydrALAZINE, 25 mg, Q6H PRN  simethicone, 80 mg, Q6H PRN  glucose, 15 g, PRN  dextrose, 12.5 g, PRN  glucagon (rDNA), 1 mg, PRN  dextrose, 100 mL/hr, PRN  ondansetron, 4 mg, Q8H PRN  potassium chloride, 40 mEq, PRN Or  potassium alternative oral replacement, 40 mEq, PRN    Or  potassium chloride, 10 mEq, PRN  magnesium sulfate, 1 g, PRN  acetaminophen, 650 mg, Q6H PRN    Or  acetaminophen, 650 mg, Q6H PRN  polyethylene glycol, 17 g, Daily PRN  promethazine, 12.5 mg, Q6H PRN    Or  ondansetron, 4 mg, Q6H PRN      PHYSICAL EXAM:     CONSTITUTIONAL: Alert and oriented times 3, cooperative to examination. LUNGS: Clear bilaterally anteriorly, diminished lower lobe breath sounds  CARDIOVASCULAR: Heart regular rate and rhythm  ABDOMEN: soft, nontender, softly distended  NEUROLOGIC:Awake, alert, oriented, speech clear  WOUND/INCISION:  Left popliteal puncture site soft, no bleeding or hematoma noted. Right IJ perm site with dressing CDI, minimal edema, mild bruising at site. EXTREMITY:Feet warm to touch / pink color bilaterally. Moderate edema LLE, minimal edema RLE    LABS:     CBC:   Recent Labs     09/26/20  0201   WBC 9.1   RBC 3.85*   HGB 11.1*   HCT 33.4*   MCV 86.8   MCH 28.8   MCHC 33.2   RDW 13.5      MPV 10.0      Last 3 CMP:   Recent Labs     09/26/20  0201 09/27/20  0112 09/28/20  0129    138 136   K 4.4 4.5 4.7   CL 99 98 97*   CO2 23 24 20*   BUN 47* 49* 72*   CREATININE 8.4* 8.0* 10.3*   GLUCOSE 87 124* 114*   CALCIUM 8.2* 8.7 8.7      Calcium:   Lab Results   Component Value Date    CALCIUM 8.7 09/28/2020    CALCIUM 8.7 09/27/2020    CALCIUM 8.2 09/26/2020      INR:   Recent Labs     09/26/20  0201 09/27/20  0112 09/28/20  0129   INR 1.87* 2.18* 2.39*     Lactic Acid:   Lab Results   Component Value Date    LACTA 1.4 01/28/2018    LACTA 1.7 09/08/2014          DVT prophylaxis:            [x] Already on  Coumadin, INR therapeutic          ASSESSMENT:     Procedure 9/20/20 :  1. Ultrasound/duplex guided cannulation of a thrombosed left popliteal vein with 5 [place] and later 8 [place] sheath  2. Left lower extremity venograms all the way to the inferior vena cava  3.   Percutaneous thrombectomy/PulseSpray thrombolysis with AngioJet [place] catheter  4. Left lower extremity venograms after percutaneous thrombectomy and thrombolysis  5. Placement of 40 cm infusion length EKOS TPA catheter from the popliteal vein all the way to the distal external iliac vein  6. Inferior vena cava filter placement from a right internal jugular vein approach (Bard Heather inferior vena cava filter)   Procedure 9/21/20:  1. Intravascular ultrasound of inferior vena cava/left common and external iliac veins/left common femoral vein  Operative Procedure 9/23/20 :    1. Ultrasound guided cannulation of right internal jugular vein   2. Placement of right internal jugular vein tunneled dialysis catheter (Bard Equistream XK 23 cm tip to cuff). 1. HD # 9  Patient Active Problem List   Diagnosis    Thrombosed external hemorrhoid    Right ureteral calculus    Umbilical hernia without obstruction and without gangrene    Hydronephrosis with renal and ureteral calculus obstruction    Ureteral obstruction, right    Transient alteration of awareness    Prediabetes    Bilateral carpal tunnel syndrome    Arthritis    Numbness and tingling in both hands    S/P carpal tunnel release    Leg DVT (deep venous thromboembolism), acute, left (HCC)    Acute pulmonary embolism (HCC)    DVT of deep femoral vein, left (HCC)    Morbid obesity ([address]    Acute kidney injury ([address]    Hyperkalemia       Chief Complaint:  Chief Complaint   Patient presents with    Leg Swelling     Pt to ED with c/o LLE pain/swelling x1 week Pt reports surgery approx 1 month ago       PLAN:     1. Anticoagulation per Hematology, remains therapeutic on Coumadin, INR 2.39,  per Hematology, Coumadin will be lifetime  2. Nephrology managing dialysis, has O/P chair arranged. 3. Medical management per Hospitalist Service  4. Will need to wear knee high compression socks, 20-30mmHg, Tubigrip size \"G\" applied to LLE today  5.  Vascular will repeat venous duplex outpatient on 11/28/20, then have Video Visit to  discuss study results and possibly schedule for IVC removal

## 2020-09-28 NOTE — PROGRESS NOTES
2034    meropenem (MERREM) 500 mg in sterile water 10 mL IV syringe  500 mg Intravenous Q24H Sherice Juan MD   500 mg at 09/26/20 1613    simethicone (MYLICON) chewable tablet 80 mg  80 mg Oral Q6H PRN Sherice Juan MD   80 mg at 09/23/20 0416    famotidine (PEPCID) tablet 10 mg  10 mg Oral Daily Sherice Juan MD   10 mg at 09/27/20 1014    vitamin D (ERGOCALCIFEROL) capsule 50,000 Units  50,000 Units Oral Weekly Sherice Juan MD   50,000 Units at 09/22/20 1618    warfarin (COUMADIN) daily dosing (placeholder)   Other RX Obi Dutton MD        glucose (GLUTOSE) 40 % oral gel 15 g  15 g Oral PRN Sherice Juan MD        dextrose 50 % IV solution  12.5 g Intravenous PRN Sherice Juan MD        glucagon (rDNA) injection 1 mg  1 mg Intramuscular PRN Sherice Juan MD        dextrose 5 % solution  100 mL/hr Intravenous PRNANCY Juan MD        ondansetron [facility]) injection 4 mg  4 mg Intravenous Q8H PRNANCY Juan MD        potassium chloride (KLOR-CON M) extended release tablet 40 mEq  40 mEq Oral PRNANCY Juan MD        Or    potassium bicarb-citric acid (EFFER-K) effervescent tablet 40 mEq  40 mEq Oral SHAGGY Juan MD        Or    potassium chloride 10 mEq/100 mL IVPB (Peripheral Line)  10 mEq Intravenous PRNANCY Juan MD        magnesium sulfate 1 g in dextrose 5% 100 mL IVPB  1 g Intravenous PRN Sherice Juan MD        acetaminophen (TYLENOL) tablet 650 mg  650 mg Oral Q6H PRN Sherice Juan MD   650 mg at 09/28/20 0016    Or    acetaminophen (TYLENOL) suppository 650 mg  650 mg Rectal Q6H PRNANCY Juan MD        polyethylene glycol [facility]) packet 17 g  17 g Oral Daily PRN Shercie Juan MD   17 g at 09/27/20 1015    promethazine (PHENERGAN) tablet 12.5 mg  12.5 mg Oral Q6H PRN Sherice Juan MD        Or    ondansetron [facility]) injection 4 mg  4 mg Intravenous Q6H PRN Sherice Juan MD   4 mg at 09/21/20 1318    insulin lispro (HUMALOG) injection vial 0-6 Units  0-6 Units Subcutaneous TID WC Ryan Rangel MD   1 Units at 09/26/20 1747    insulin lispro (HUMALOG) injection vial 0-3 Units  0-3 Units Subcutaneous Nightly Ryan Rangel MD   1 Units at 09/26/20 2142       Past Medical History:  Past Medical History:   Diagnosis Date    Arthritis     both wrist    Bilateral carpal tunnel syndrome 8/15/2019    Bronchitis     10 yrs ago    Diabetes mellitus ([address]     Kidney stone     recurrent    Sleep apnea     untested    Umbilical hernia        Past Surgical History:  Past Surgical History:   Procedure Laterality Date    CARPAL TUNNEL RELEASE Left 8/21/2020    LEFT CARPAL TUNNEL RELEASE performed by Edilberto Molina MD at [facility] Right 7/24/2018    CYSTOSCOPY/ URETEROSCOPY; INSERTION DOUBLE J STENT/  URETERAL performed by Matteo Valadez MD at [address] [place] [address]      both wrists    HEMORRHOID SURGERY N/A 6/24/2016    HEMORRHOID INCISION AND DRAINAGE performed by Connie Torres MD at [facility] CYSTO/URETERO/PYELOSCOPY W/LITHOTRIPSY Right 8/7/2018    URETEROSCOPY LASER LITHOTRIPSY STONE EXTRACTION performed by Matteo Valadez MD at [address] [facility] Right 8/7/2018    STENT PLACEMENT performed by Matteo Valadez MD at [address] [place] [address] [facility] Right 2/13/2018    ESWL EXTRACORPEAL SHOCK WAVE LITHOTRIPSY performed by Matteo Valadez MD at [address] [place] [address] [facility] Right 3/13/2018    ESWL EXTRACORPEAL SHOCK WAVE LITHOTRIPSY performed by Matteo Valadez MD at [address] [place] [address] [facility] Right 7/24/2018    ESWL EXTRACORPEAL SHOCK WAVE LITHOTRIPSY performed by Matteo Valadez MD at [address], [address] N/A 0/2/7108    HERNIA UMBILICAL REPAIR LAPAROSCOPIC performed by Henry Baez MD at [address] / [address] [facility] Left 9/20/2020     LEFT LOWER EXTREMITY VENOGRAMS; INFERIOR VENA CAVA FILTER PLACEMENT. performed by Javier Harding MD at [address]      left wrist.  Pt was a child       Family History  Family History   Problem Relation Age of Onset    Cancer Mother 46        colon cancer    Cancer Father         luekemia    Diabetes Father     Other Maternal Grandmother         copd    Other Maternal Grandfather         copd    Heart Disease Paternal Grandmother        Social History  Social History     Socioeconomic History    Marital status:      Spouse name: Not on file    Number of children: Not on file    Years of education: Not on file    Highest education level: Not on file   Occupational History    Not on file   Social Needs    Financial resource strain: Not on file    Food insecurity     Worry: Not on file     Inability: Not on file    Transportation needs     Medical: Not on file     Non-medical: Not on file   Tobacco Use    Smoking status: Never Smoker    Smokeless tobacco: Never Used    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[address] and Sexual Activity    Alcohol use: Yes     Comment: OCC    Drug use: No    Sexual activity: Yes     Partners: Female   Lifestyle    Physical activity     Days per week: Not on file     Minutes per session: Not on file    Stress: Not on file   Relationships    Social connections     Talks on phone: Not on file     Gets together: Not on file     Attends [employer] service: Not on file     Active member of club or organization: Not on file     Attends meetings of clubs or organizations: Not on file     Relationship status: Not on file    Intimate partner violence     Fear of current or ex partner: Not on file     Emotionally abused: Not on file     Physically abused: Not on file     Forced sexual activity: Not on file   Other Topics Concern    Not on file   Social History Narrative    Not on file         Review of Systems:  History obtained from chart review and the patient  General ROS: No fever or chills  Respiratory ROS: No cough, shortness of breath, wheezing  Cardiovascular ROS: No chest pain or palpitations  Gastrointestinal ROS: No abdominal pain or melena  Genito-Urinary ROS: No dysuria or hematuria  Musculoskeletal ROS: No joint pain or swelling   14 point ROS reviewed with the patient and negative except as noted above and in the HPI unless unable to obtain. Objective:  BP: 181/95   HR: 63  General: awake/alert   Chest:  clear to auscultation bilaterally  CVS: regular rate and rhythm  Abdominal: soft, nontender, normal bowel sounds  Extremities: no cyanosis, ble edema  Skin: warm and dry without rash      Labs:  BMP:   Recent Labs     09/26/20 0201 09/27/20 0112 09/28/20  0129    138 136   K 4.4 4.5 4.7   CL 99 98 97*   CO2 23 24 20*   BUN 47* 49* 72*   CREATININE 8.4* 8.0* 10.3*   CALCIUM 8.2* 8.7 8.7     CBC:   Recent Labs     09/26/20 0201   WBC 9.1   HGB 11.1*   HCT 33.4*   MCV 86.8        LIVER PROFILE:   No results for input(s): AST, ALT, LIPASE, BILIDIR, BILITOT, ALKPHOS in the last 72 hours. Invalid input(s): AMYLASE,  ALB  PT/INR:   Recent Labs     09/26/20 0201 09/27/20 0112 09/28/20 0129   PROTIME 21.8* 24.7* 26.6*   INR 1.87* 2.18* 2.39*     APTT:   No results for input(s): APTT in the last 72 hours. BNP:  No results for input(s): BNP in the last 72 hours. Ionized Calcium:No results for input(s): IONCA in the last 72 hours. Magnesium:  No results for input(s): MG in the last 72 hours. Phosphorus:  No results for input(s): PHOS in the last 72 hours. HgbA1C:   No results for input(s): LABA1C in the last 72 hours. Hepatic:   No results for input(s): ALKPHOS, ALT, AST, PROT, BILITOT, BILIDIR, LABALBU in the last 72 hours. Lactic Acid: No results for input(s): LACTA in the last 72 hours. Troponin: No results for input(s): CKTOTAL, CKMB, TROPONINT in the last 72 hours.   ABGs: No results for input(s): PH, PCO2, PO2, HCO3, O2SAT in the last 72 hours. CRP:  No results for input(s): CRP in the last 72 hours. Sed Rate:  No results for input(s): SEDRATE in the last 72 hours. Cultures:   No results for input(s): CULTURE in the last 72 hours. No results for input(s): BC, [place] in the last 72 hours. No results for input(s): CXSURG in the last 72 hours. Radiology reports as per the Radiologist  Radiology: Vl Extremity Venous Left    Result Date: 9/20/2020  Vascular Lower Extremities DVT Study Procedure  Demographics   Patient Name    Maru Ahumada                   38   Patient Number  [id number]           Gender                Male   Visit Number    [id number]        Interpreting          Tamika Nye MD                                   Physician   Date of Birth   1980       Referring Physician   Tamika Nye MD   Accession       [id number]       [address] [facility]  Number  Procedure Type of Study:   Veins:Lower Extremities DVT Study, VL EXTREMITY VENOUS DUPLEX LEFT. Indications for Study:DVT and Venous access. Risk Factors   - The patient's risk factor(s) include: obesity. Impression   Successful ultrasound/duplex guided cannulation of thrombosed left  popliteal vein for sheath placement.    Signature   ----------------------------------------------------------------  Electronically signed by Tamika Nye MD(Interpreting  physician) on 09/20/2020 03:38 PM  ----------------------------------------------------------------      Vl Extremity Venous Left    Result Date: 9/20/2020  Vascular Lower Extremities DVT Study Procedure  Demographics   Patient Name    Maru Ahumada                   38   Patient Number  [id number]           Gender                Male   Visit Number    [id number]        Interpreting          Tamika Nye MD                                   Physician   Date of Birth   1980       Referring Physician   Maddie Phipps   Accession       [id number]       UMIPXLIZCVM Karo Castro, RVT  Number  Procedure Type of Study:   Veins:Lower Extremities DVT Study, VL EXTREMITY VENOUS DUPLEX LEFT. Indications for Study:Pain, left lower extremity. Risk Factors   - The patient's risk factor(s) include: obesity. Impression   There is evidence of subacute deep vein thrombosis in the left lower  extremity involving the common femoral, femoral, popliteal, posterior  tibial, and gastrocnemius veins. The anterior tibial and peroneal veins  were not well visualized secondary to edema and limb size. Superficial thrombophlebitis is seen in the short saphenous vein of the  left lower extremity. Floating tail left common femoral vein around 4 cm in length. Signature   ----------------------------------------------------------------  Electronically signed by Tami Saenz MD(Interpreting  physician) on 09/20/2020 09:28 AM  ----------------------------------------------------------------  Velocities are measured in cm/s ; Diameters are measured in mm Right Lower Extremities DVT Study Measurements Right 2D Measurements +------------------------------------+----------+---------------+----------+ ! Location                            ! Visualized! Compressibility! Thrombosis! +------------------------------------+----------+---------------+----------+ ! Sapheno Femoral Junction            ! Yes       ! Yes            ! None      ! +------------------------------------+----------+---------------+----------+ ! Common Femoral                      !Yes       ! Yes            ! None      ! +------------------------------------+----------+---------------+----------+ Left Lower Extremities DVT Study Measurements Left 2D Measurements +------------------------------------+----------+---------------+----------+ ! Location                            ! Visualized! Compressibility! Thrombosis! +------------------------------------+----------+---------------+----------+ ! Sapheno Femoral Junction            ! Yes       ! Yes !None      ! +------------------------------------+----------+---------------+----------+ ! Common Femoral                      !Yes       ! No             !None      ! +------------------------------------+----------+---------------+----------+ ! Prox Femoral                        !Yes       ! No             !None      ! +------------------------------------+----------+---------------+----------+ ! Mid Femoral                         !Yes       ! No             !None      ! +------------------------------------+----------+---------------+----------+ ! Dist Femoral                        !Partial   !No             !None      ! +------------------------------------+----------+---------------+----------+ ! Deep Femoral                        !Partial   !Yes            ! None      ! +------------------------------------+----------+---------------+----------+ ! Popliteal                           !Partial   !No             !None      ! +------------------------------------+----------+---------------+----------+ ! SSV                                 ! Yes       ! No             !None      ! +------------------------------------+----------+---------------+----------+ ! Gastroc                             ! Partial   !No             !None      ! +------------------------------------+----------+---------------+----------+ ! PTV                                 ! Partial   !No             !None      ! +------------------------------------+----------+---------------+----------+ ! GSV                                 ! Yes       ! Yes            ! None      ! +------------------------------------+----------+---------------+----------+ ! ATV                                 ! No        !               !          ! +------------------------------------+----------+---------------+----------+ ! Alexander                            !No        !               !          ! Contrast    Result Date: 9/19/2020  EXAMINATION: CTA PULMONARY W CONTRAST 9/19/2020 6:13 PM HISTORY: Shortness of breath, clinical concern for pulmonary embolism. DOSE: 804 mGycm. Automatic exposure control was utilized in an effort to use as little radiation as possible, without compromising image quality. REPORT: Spiral CT of the chest was performed after administration of intravenous contrast using CTA protocol, which includes reconstructed coronal, sagittal and 3-D images. COMPARISON: There are no correlative imaging studies for comparison. The contrast bolus is satisfactory, there is respiratory motion artifact. The pulmonary arteries are normal in caliber, there are multiple filling defects within the pulmonary arteries on the right, involving the third, fourth and fifth order branches extending into the right lower lobe there is mild straightening of the cardiac ventricular septum, which may indicate mild right heart strain. No left-sided pulmonary emboli are identified. The thoracic aorta is normal in caliber, no evidence of dissection is identified. Heart size is normal. No intrathoracic lymphadenopathy is identified. Small calcified subcarinal lymph nodes are compatible with healed granulomatous disease. The thyroid gland is homogeneous and normal. Review of lung windows demonstrates scattered calcified granulomas in both lungs. There is no parenchymal infiltrate or consolidation. No pneumothorax or pleural effusion is identified. The airways are patent. The visualized upper abdomen shows decreased attenuation of the liver parenchyma compatible with fatty infiltration. There is bilateral gynecomastia, mild. Review of bone windows shows fused syndesmophytes throughout the mid and lower thoracic spine. No acute osseous abnormalities identified.     1. Multiple acute pulmonary emboli identified on the right, involving the third, fourth and fifth order pulmonary artery branches extending into the right lower lobe.  There are findings that are suggestive of mild right heart strain. Signed by Dr Belle Mckinney.  [place] on 9/19/2020 6:20 PM       Assessment   Acute renal failure stage III  Risk factors include contrast-induced nephropathy or renal emboli  Morbid obesity  DVT and pulmonary emboli  Diabetes type 2 uncontrolled by A1c  Hyperkalemia  Metabolic acidosis  Vitamin D deficiency  Secondary hyperparathyroidism      Plan:  Discussed with patient, nursing  Will add amlodipine  Follow up labs for renal recovery      Binh Hernandez MD  09/28/20  9:27 AM

## 2020-09-28 NOTE — PROGRESS NOTES
CLINICAL PHARMACY NOTE: MEDS TO [address] Select Patient?: No  Total # of Prescriptions Filled: 4   The following medications were delivered to the patient:  · Warfarin 5 mg  · Hydralazine 25 mg  · Amlodipine 5 mg  · Vitamin D (Ergo)  Total # of Interventions Completed: 0  Time Spent (min): 30    Additional Documentation:    Gave scripts to patient and he had me place on cabinet with his other belongings. Paid via phone as manual entry at register.

## 2020-09-28 NOTE — PLAN OF CARE
Problem: Falls - Risk of:  Goal: Will remain free from falls  Description: Will remain free from falls  9/28/2020 0726 by Neda Jimenez RN  Outcome: Ongoing  9/28/2020 0026 by Kana Payan RN  Outcome: Ongoing  Goal: Absence of physical injury  Description: Absence of physical injury  9/28/2020 0726 by Neda Jimenez RN  Outcome: Ongoing  9/28/2020 0026 by Kana Payan RN  Outcome: Ongoing     Problem: Pain:  Goal: Pain level will decrease  Description: Pain level will decrease  9/28/2020 0726 by Neda Jimenez RN  Outcome: Ongoing  9/28/2020 0026 by Kana Payan RN  Outcome: Ongoing  Goal: Control of acute pain  Description: Control of acute pain  9/28/2020 0726 by Neda Jimenez RN  Outcome: Ongoing  9/28/2020 0026 by Kana Payan RN  Outcome: Ongoing  Goal: Control of chronic pain  Description: Control of chronic pain  9/28/2020 0726 by Neda Jimenez RN  Outcome: Ongoing  9/28/2020 0026 by Kana Payan RN  Outcome: Ongoing     Problem: Skin Integrity:  Goal: Will show no infection signs and symptoms  Description: Will show no infection signs and symptoms  9/28/2020 0726 by Neda Jimenez RN  Outcome: Ongoing  9/28/2020 0026 by Kana Payan RN  Outcome: Ongoing  Goal: Absence of new skin breakdown  Description: Absence of new skin breakdown  9/28/2020 0726 by Neda Jimenez RN  Outcome: Ongoing  9/28/2020 0026 by Kana Payan RN  Outcome: Ongoing  Goal: Status of oral mucous membranes will improve  Description: Status of oral mucous membranes will improve  9/28/2020 0726 by Neda Jimenez RN  Outcome: Ongoing  9/28/2020 0026 by Kana Payan RN  Outcome: Ongoing  Goal: Skin integrity will be maintained  Description: Skin integrity will be maintained  9/28/2020 0726 by Neda Jimenez RN  Outcome: Ongoing  9/28/2020 0026 by Kana Payan RN  Outcome: Ongoing     Problem:  Activity:  Goal: Fatigue will decrease  Description: Fatigue will decrease  9/28/2020 0726 by Neda Jimenez RN  Outcome: Ongoing  9/28/2020 0026 by Kana Payan RN  Outcome: Ongoing  Goal: Risk for activity intolerance will decrease  Description: Risk for activity intolerance will decrease  9/28/2020 0726 by Mone Alvarez RN  Outcome: Ongoing  9/28/2020 0026 by Gary Soto RN  Outcome: Ongoing     Problem: Coping:  Goal: Ability to cope will improve  Description: Ability to cope will improve  9/28/2020 0726 by Mone Alvarez RN  Outcome: Ongoing  9/28/2020 0026 by Gary Soto RN  Outcome: Ongoing     Problem: Fluid Volume:  Goal: Will show no signs or symptoms of fluid imbalance  Description: Will show no signs or symptoms of fluid imbalance  9/28/2020 0726 by Mone Alvarez RN  Outcome: Ongoing  9/28/2020 0026 by Gary Soto RN  Outcome: Ongoing     Problem: Health Behavior:  Goal: Ability to manage health-related needs will improve  Description: Ability to manage health-related needs will improve  9/28/2020 0726 by Mone Alvarez RN  Outcome: Ongoing  9/28/2020 0026 by Gary Soto RN  Outcome: Ongoing  Goal: Identification of resources available to assist in meeting health care needs will improve  Description: Identification of resources available to assist in meeting health care needs will improve  9/28/2020 0726 by Mone Alvarez RN  Outcome: Ongoing  9/28/2020 0026 by Gary Soto RN  Outcome: Ongoing     Problem: Nutritional:  Goal: Ability to identify appropriate dietary choices will improve  Description: Ability to identify appropriate dietary choices will improve  9/28/2020 0726 by Mone Alvarez RN  Outcome: Ongoing  9/28/2020 0026 by Gary Soto RN  Outcome: Ongoing     Problem: Physical Regulation:  Goal: Ability to maintain clinical measurements within normal limits will improve  Description: Ability to maintain clinical measurements within normal limits will improve  9/28/2020 0726 by Mone Alvarez RN  Outcome: Ongoing  9/28/2020 0026 by Gary Soto RN  Outcome: Ongoing  Goal: Complications related to the disease process, condition or treatment will be avoided or minimized  Description: Complications related to the disease process, condition or treatment will be avoided or minimized  9/28/2020 0726 by Kelsey Mar RN  Outcome: Ongoing  9/28/2020 0026 by Kezia Son RN  Outcome: Ongoing     Problem: Sensory:  Goal: General experience of comfort will improve  Description: General experience of comfort will improve  9/28/2020 0726 by Kelsey Mar RN  Outcome: Ongoing  9/28/2020 0026 by Kezia Son RN  Outcome: Ongoing

## 2020-09-28 NOTE — PROGRESS NOTES
Clinical Pharmacy Note    Warfarin consult follow-up    Recent Labs     09/28/20  0129   INR 2.39*     Recent Labs     09/26/20  0201   HGB 11.1*   HCT 33.4*          Significant Drug-Drug Interactions:  New warfarin drug-drug interactions: none  Discontinued drug-drug interactions: none    Date INR Warfarin Dose   09/19/20 1.11 ---   09/21/20 --- 7.5 mg    09/22/20  1.35   7.5 mg    09/23/20 2.07  5 mg   09/24/20  2.64    2.5 mg    09/25/20  2.25  5 mg    09/26/20 1.87 7.5 mg    09/27/20   2. 18  5 mg    09/28/20 2.39  5 mg             Notes: Will give warfarin 5 mg once tonight. Daily PT/INR until stable within therapeutic range.      Electronically signed by Samuel Cooney, [address] [place] [address] on 9/28/2020 at 10:09 AM

## 2020-09-28 NOTE — DISCHARGE SUMMARY
Sim Butt  :  1980  MRN:  [id number]    Admit date:  2020  Discharge date:   2020    Discharging Physician:  Dr. Luz Coulter Directive: Full Code    Consults: Vascular surgery, Hem/onc, and nephrology    Primary Care Physician:  José Luis Zheng, APRN    Discharge Diagnoses:  Principal Problem:    Leg DVT (deep venous thromboembolism), acute, left ([address]  Active Problems:    Prediabetes    Acute pulmonary embolism ([address]    DVT of deep femoral vein, left (HCC)    Morbid obesity ([address]    Acute kidney injury ([address]    Hyperkalemia  Resolved Problems:    * No resolved hospital problems. *  Portions of this note have been copied forward, however, changed to reflect the most current clinical status of this patient. Hospital Course:     Mr. Tereza Fletcher is a 38year old with PMH morbid obesity, DMII, SHELBY, ESBL Ecoli UTI who presented to [facility] ED complaining of left leg pain and edema that began 09/15/2020. He stated his mother and aunt both have history of blood clots and denied alcohol or tobacco use. Work up revealed significant left lower extremity thrombosis extending into the left iliofemoral vein as well as right sided pulmonary embolism with mild right ventricular strain. He was started on Heparin gtt with consult made to Vascular surgery who performed AngioJet mechanical thrombectomy/PulseSpray thrombolysis of left lower extremity DVT and inferior vena cava filter placement. He was continued on TPA thrombolysis overnight to treat common femoral vein floating tail. He underwent intravascular ultrasound of inferior vena cava/left common and external iliac veins/left common femoral vein on 2020 with findings of patent inferior vena cava filter. On 2020 urine output noted to be minimal with acute decline in renal function. IVF's were increased with addition of bicarbonate and consult made to Nephrology. On 2020 creatinine continued to worsen to 5.6, GFR 11. IVF's continued.  He was started on Merrem for ESBL E coli UTI. Patient transferred to 3rd floor medical unit. On 09/23/2020 Vascular surgery placed a right internal jugular vein tunneled dialysis catheter and patient underwent hemodialysis. Heparin gtt discontinued as INR is therapeutic. On 09/26/2020 ordoñez catheter ordered to be removed and patient activity increased in preparation for discharge. Outpatient dialysis has been arranged on M-W-F at [facility] at 5:25. Pt is stable and ready for discharge per attending Hospitalist, Vascular, Nephrology, and Heme/onc. Significant Diagnostic Studies:   Vl Extremity Venous Left  Result Date: 9/20/2020  Impression     Successful ultrasound/duplex guided cannulation of thrombosed left  popliteal vein for sheath placement. Electronically signed by Marie Madera MD(Interpreting  physician) on 09/20/2020 03:38 PM     Vl Extremity Venous Left  Result Date: 9/20/2020  Impression     There is evidence of subacute deep vein thrombosis in the left lower  extremity involving the common femoral, femoral, popliteal, posterior  tibial, and gastrocnemius veins. The anterior tibial and peroneal veins  were not well visualized secondary to edema and limb size. Superficial thrombophlebitis is seen in the short saphenous vein of the  left lower extremity. Floating tail left common femoral vein around 4 cm in length. Electronically signed by Marie Madera MD(Interpreting  physician) on 09/20/2020 09:28 AM      Xr Chest Portable  Result Date: 9/20/2020  1. Central venous catheter has been repositioned and now extends into the superior vena cava. No evidence of pneumothorax. 2. Poor inspiration. 3. Bilateral infiltrates may be related to the poor inspiratory effort. Edema and pneumonia are possible. 4. Cardiomegaly. Signed by Dr Kathleen Copeland on 9/20/2020 1:16 PM    Xr Chest Portable  Result Date: 9/20/2020  1.  Right IJ central venous catheter placement with catheter tip extending to the right into the right subclavian vein. No evidence of pneumothorax. 2. Endotracheal tube tip at the T2-3 level. 3. Very poor inspiration with vascular crowding. 4. Bilateral infiltrates may be related to the poor inspiration. Bilateral pneumonia and pulmonary edema cannot be ruled out on this image. 5. Cardiomegaly. Signed by Dr Олег Robb on 9/20/2020 12:28 PM    Cta Pulmonary W Contrast  Result Date: 9/19/2020  1. Multiple acute pulmonary emboli identified on the right, involving the third, fourth and fifth order pulmonary artery branches extending into the right lower lobe. There are findings that are suggestive of mild right heart strain. Signed by Dr Jayme Moise. Bonifaciohoose on 9/19/2020 6:20 PM      Pertinent Labs:   CBC:   Recent Labs     09/26/20 0201   WBC 9.1   HGB 11.1*        BMP:    Recent Labs     09/26/20 0201 09/27/20  0112 09/28/20  0129    138 136   K 4.4 4.5 4.7   CL 99 98 97*   CO2 23 24 20*   BUN 47* 49* 72*   CREATININE 8.4* 8.0* 10.3*   GLUCOSE 87 124* 114*     INR:   Recent Labs     09/26/20 0201 09/27/20  0112 09/28/20  0129   INR 1.87* 2.18* 2.39*       Physical Exam:  Vital Signs: BP (!) 154/92   Pulse 62   Temp 96.9 °F (36.1 °C) (Temporal)   Resp 16   Ht 6' (1.829 m)   Wt (!) 480 lb (217.7 kg)   SpO2 93%   BMI 65.10 kg/m²   General appearance:. Alert and Cooperative   HEENT: Normocephalic. Chest: clear to auscultation bilaterally without wheezes or rhonchi. Cardiac: Normal heart tones with regular rate and rhythm, S1, S2 normal. No murmurs, gallops, or rubs auscultated. Abdomen:soft, non-tender; non-distended normal bowel sounds no masses, no organomegaly. Extremities: No clubbing or cyanosis. No peripheral edema. Peripheral pulses palpable. Neurologic: Grossly intact.     Discharge Medications:       [facility]   Home Medication Instructions ELM:[id number]    Printed on:09/28/20 1259   Medication Information                      acetaminophen (APAP EXTRA STRENGTH) 500 MG tablet  Take 2 tablets by mouth 3 times daily for 10 days             amLODIPine (NORVASC) 5 MG tablet  Take 1 tablet by mouth daily             hydrALAZINE (APRESOLINE) 25 MG tablet  Take 1 tablet by mouth every 6 hours as needed (SBP>160mmHg)             metFORMIN (GLUCOPHAGE) 500 MG tablet  Take 1 tablet by mouth 2 times daily (with meals)             tiZANidine (ZANAFLEX) 4 MG tablet  Take 1 tablet by mouth nightly as needed (spasm)             vitamin D (ERGOCALCIFEROL) 1.25 MG (93410 UT) CAPS capsule  Take 1 capsule by mouth once a week for 4 doses             warfarin (COUMADIN) 5 MG tablet  Take 5 mg orally daily  Follow-up with PCP within 3-5 days for INR monitoring and warfarin dosing adjusted as needed. Discharge Instructions: Follow up with VANDANA Calvo in 5-7 days, [facility] NANCY TUCKER PA-C on 10/06/2020, and Josefina Delaney PA-C on 11/25/2020. Take medications as directed. Resume activity as tolerated. Diet: DIET CARB CONTROL; Carb Control: 4 carb choices (60 gms)/meal; Renal     Disposition: Patient is medically stable and will be discharged home. Time spent on discharge 45 minutes spent in assessing patient, reviewing medications, discussion with nursing, confirming safe discharge plan and preparation of discharge summary.     Signed:  Rekha Gonzalez PA-C

## 2020-09-29 ENCOUNTER — TELEPHONE (OUTPATIENT)
Dept: INTERNAL MEDICINE | Age: 40
End: 2020-09-29

## 2020-09-29 LAB
DRVVT CONFIRMATION TEST: ABNORMAL RATIO
DRVVT SCREEN: 38 SEC (ref 33–44)
DRVVT,DIL: ABNORMAL SEC (ref 33–44)
HEXAGONAL PHOSPHOLIPID NEUTRALIZAT TEST: ABNORMAL
LUPUS ANTICOAG INTERP: ABNORMAL
PLT NEUTA: ABNORMAL
PT D: 22.6 SEC (ref 12–15.5)
PTT D: 58 SEC (ref 32–48)
PTT-D CORR REFLEX: 45 SEC (ref 32–48)
PTT-HEPARIN NEUTRALIZED: ABNORMAL SEC (ref 32–48)
REPTILASE TIME: ABNORMAL SEC
THROMBIN TIME: 15.1 SEC (ref 14.7–19.5)

## 2020-09-29 NOTE — TELEPHONE ENCOUNTER
[facility] Transitions Initial Follow Up Call    Outreach made within 2 business days of discharge: Yes    Patient: Manisha Li Patient : 1980   MRN: [id number]    Reason for Admission:   Principal Problem:    Leg DVT (deep venous thromboembolism), acute, left (HCC)  Active Problems:    Prediabetes    Acute pulmonary embolism (HCC)    DVT of deep femoral vein, left (HCC)    Morbid obesity ([facility]    Acute kidney injury ([facility]    Hyperkalemia  Resolved Problems:    * No resolved hospital problems. *  Portions of this note have been copied forward, however, changed to reflect the most current clinical status of this patient. Admit date:  2020  Discharge Date: 20       Spoke with: Patient    Discharge department/facility:[address] Interactive Patient Contact:  Was patient able to fill all prescriptions: Yes  Was patient instructed to bring all medications to the follow-up visit: Yes  Is patient taking all medications as directed in the discharge summary? Yes  Does patient understand their discharge instructions: Yes  Does patient have questions or concerns that need addressed prior to 7-14 day follow up office visit: no    Per patient he is doing a little bit better. He reports he's not as tired, he's had several BMs. He said he did have a decreased appetite, but it's starting to come back. He said everything just tastes bland. He is aware of his upcoming VV on Friday with Kathleen Reamer. He was agreeable to come in for INR check Thursday.       Scheduled appointment with PCP within 7-14 days    Follow Up  Future Appointments   Date Time Provider Apollo Smiley   2020  8:30 AM Deepali Carter MD [facility]   10/2/2020  9:00 AM [facility]   10/6/2020 10:45 AM Carolan Meckel Legereit, PA-C [facility]   2020 10:30 AM [facility] VASC LAB ROOM 1 [facility] VASC LAB [facility]   2020 10:00 AM Carolan Meckel [address] Mccullough PA-C [facility]   2020 9:00 [facility]   11/25/2020  9:15 AM SCHEDULE, L MED ONC MA [facility] MED ONC Jade NEWELL   12/9/2020  9:30 AM [facility]       April D Mitch Louise LPN

## 2020-10-01 ENCOUNTER — TELEPHONE (OUTPATIENT)
Dept: VASCULAR SURGERY | Age: 40
End: 2020-10-01

## 2020-10-01 ENCOUNTER — NURSE ONLY (OUTPATIENT)
Dept: INTERNAL MEDICINE | Age: 40
End: 2020-10-01
Payer: COMMERCIAL

## 2020-10-01 LAB — INTERNATIONAL NORMALIZATION RATIO, POC: 2.7

## 2020-10-01 PROCEDURE — 85610 PROTHROMBIN TIME: CPT | Performed by: NURSE PRACTITIONER

## 2020-10-01 PROCEDURE — 93793 ANTICOAG MGMT PT WARFARIN: CPT | Performed by: NURSE PRACTITIONER

## 2020-10-01 NOTE — PROGRESS NOTES
Mr. Mark Nguyen was here today. INR today:   Results for orders placed or performed in visit on 10/01/20   POCT INR   Result Value Ref Range    INR 2.7      INR Goal: The patient does not have an INR goal.    Dosing Plan  As of 10/1/2020    TTR:   --   Full warfarin instructions:   5 mg every day            PLAN: CONTINUE CURRENT DOSE  NEXT COUMADIN CLINIC APT IS: 10/8/2020 at 8:30am      [address] [facility] [phone]      IF IT'S AN EMERGENCY, PLEASE CALL 911 OR GO TO YOUR NEAREST [address]. IF UNABLE TO REACH COUMADIN CLINIC, [address] [place] [address] Rd, [phone]. Anola Presto EFFECTS -- The major complication associated with warfarin is bleeding due to excessive anticoagulation. Excessive bleeding, or hemorrhage, can occur from any area of the body, and patients on warfarin should report any falls or accidents, as well as signs or symptoms of bleeding or unusual bruising. Signs of unusual bleeding include bleeding from the gums, blood in the urine, bloody or dark stool, a nosebleed, or vomiting blood. Because the risk of bleeding increases as the INR rises, the INR is closely monitored and adjustments are made as needed to maintain the INR within the target range. [employer] also cause skin necrosis or gangrene, which can cause dark red or black areas on the skin. This is a rare complication that may occur during the first several days of warfarin therapy. When to seek help -- If there are obvious or subtle signs of bleeding, including the following, patients should call their healthcare provider immediately.   · Persistent nausea, stomach upset, or vomiting blood or other material that looks like coffee grounds   · Headaches, dizziness, or weakness   · Nosebleeds   · Dark red or brown urine   · Blood in the bowel movement or dark-colored stool   · Pain, discomfort, or swelling, especially after an injury   · After a serious fall or head injury, even if there are no other symptoms  The patient should also call if any of the following occurs:  · Bleeding from the gums after brushing the teeth   · Swelling or pain at an injection site   · Excessive menstrual bleeding or bleeding between menstrual periods   · Diarrhea, vomiting, or inability to eat for more than 24 hours   · Fever (temperature greater than 100.4º F or 38º C)    It is important to remember that warfarin is taken to reduce the risk of a clotting condition(s), such as a deep vein thrombosis or pulmonary embolism. If one or more of these symptoms develops, the patient should seek immediate medical attention. OTHER RECOMMENDATIONS  Take warfarin on a schedule -- Warfarin should be taken exactly as directed. Do not increase, decrease, or change the dose unless told to do so by a healthcare provider. If a dose is missed or forgotten, call the prescribing clinician for advice. Warfarin tablets come in different strengths; each is usually a different color, with the amount of warfarin (in milligrams) clearly printed on the tablet. If the color or dose of the tablet appears different than those taken previously, the patient should immediately notify their pharmacist or healthcare provider. Reduce the risk of bleeding -- There is a tendency to bleed more easily than usual while taking warfarin. Some simple changes can decrease this risk:  · Use a soft bristle toothbrush   · Floss with waxed floss rather than unwaxed floss   · Shave with an electric razor rather than a blade   · Take care when using sharp objects, such as knives and scissors   · Avoid activities that have a risk of falling or injury (eg, contact sports)  Prevent falls -- Falling may significantly increase the risk of bleeding. Taking measures to prevent falls is recommended, and could include the following:  · Remove loose rugs and electrical cords or any other loose items in the home that could lead to tripping, slipping, and falling. · Ensure that there is adequate lighting in all areas inside and around the home, including stairwells and entrance ways. · Avoid walking on ice, wet or polished floors, or other potentially slippery surfaces. · Avoid walking on unfamiliar areas outside. Warfarin and food -- Some foods and supplements can interfere with warfarin's effectiveness. After being stabilized on a particular warfarin dose, consult a healthcare provider before making major dietary changes (eg, starting a diet to lose weight, starting a nutritional supplement or vitamin). · Vitamin K -- Eating an increased amount of foods rich in vitamin K can lower the prothrombin time and INR, making warfarin less effective, and potentially increasing the risk of blood clots. Patients who take warfarin should aim to eat a relatively similar amount of vitamin K each week. Some foods have a high level of vitamin K, including: kale, broccoli, spinach, kenzie or turnip greens, lettuce, [place] sprouts, and cabbage (table 1). It is not necessary to avoid these foods. However, the patient should eat a relatively similar amount on a regular basis rather than eating a large serving occasionally. · Cranberry juice -- There have been mixed reports on the effect of cranberry juice in people who use warfarin to prevent blood clots. Some experts have reported that drinking cranberry juice while on warfarin can cause significant over-anticoagulation and bleeding [1]. However, a small study found that drinking one eight ounce serving of cranberry juice per day for seven days had no effect on the INR of seven men taking warfarin for atrial fibrillation [2]. It is possible that larger amounts could have a more significant effect. The best advice is probably to avoid consuming large amounts of cranberry juice, and to speak with a healthcare provider regarding any concerns about a possible interaction.   · Alcohol -- Chronic abuse of alcohol affects the body's ability to handle warfarin. Patients on warfarin therapy should avoid drinking alcohol on a daily basis. Alcohol should be limited to no more than one to two servings of alcohol occasionally. In addition, drinking excessive amounts of alcohol can increase the risk of injury, and therefore bleeding. Warfarin and medications -- A number of medications, herbs, and vitamins can interact with warfarin (table 2 and table 3). This interaction may affect the action of warfarin or the other medication. If warfarin is affected, the dose may need to be adjusted (up or down) to maintain an optimal coagulation effect. Patients who take warfarin should consult with their clinician before taking any new medication, including over-the-counter (non-prescription) drugs, herbal medicines, vitamins, or any other products. Some of the most common over-the-counter pain relievers, including acetaminophen (Tylenol®), aspirin, and nonsteroidal antiinflammatory drugs (such as ibuprofen [Advil®]) and naproxen (Aleve®), enhance the anticoagulant effects of warfarin. Vitamin E may increase the anticoagulant effects of warfarin. Consult a healthcare provider before adding or changing a dose of vitamin E or any other vitamin. Wear medical identification -- People who require long-term warfarin should wear a bracelet, necklace, or similar alert tag at all times. If an accident occurs and the person is too ill to explain their condition, this will help responders provide appropriate care. The alert tag should include a list of major medical conditions and the reason warfarin is needed (eg, atrial fibrillation), as well as the name and phone number of an emergency contact. One device, Medic Alert®, provides a toll-free number that emergency medical workers can call to find out a person's medical history, list of medications, family emergency contact numbers, and healthcare provider names and numbers.     GRAPHICS: Table 1  Foods with

## 2020-10-02 ENCOUNTER — TELEMEDICINE (OUTPATIENT)
Dept: PRIMARY CARE CLINIC | Age: 40
End: 2020-10-02
Payer: COMMERCIAL

## 2020-10-02 PROCEDURE — 1111F DSCHRG MED/CURRENT MED MERGE: CPT | Performed by: NURSE PRACTITIONER

## 2020-10-02 PROCEDURE — 99496 TRANSJ CARE MGMT HIGH F2F 7D: CPT | Performed by: NURSE PRACTITIONER

## 2020-10-02 NOTE — PROGRESS NOTES
vitamin D (ERGOCALCIFEROL) 1.25 MG (34828 UT) CAPS capsule  Take 1 capsule by mouth once a week for 4 doses             warfarin (COUMADIN) 5 MG tablet  Take 5 mg orally daily  Follow-up with PCP within 3-5 days for INR monitoring and warfarin dosing adjusted as needed. Medications marked \"taking\" at this time  Outpatient Medications Marked as Taking for the 10/2/20 encounter (Telemedicine) with VANDANA Menon   Medication Sig Dispense Refill    Homeopathic Products ([place]) FOAM Use daily for spasms 1 Can 2    hydrALAZINE (APRESOLINE) 25 MG tablet Take 1 tablet by mouth every 6 hours as needed (SBP>160mmHg) 120 tablet 0    amLODIPine (NORVASC) 5 MG tablet Take 1 tablet by mouth daily 30 tablet 0    vitamin D (ERGOCALCIFEROL) 1.25 MG (55146 UT) CAPS capsule Take 1 capsule by mouth once a week for 4 doses 4 capsule 0    warfarin (COUMADIN) 5 MG tablet Take 5 mg orally daily  Follow-up with PCP within 3-5 days for INR monitoring and warfarin dosing adjusted as needed. 30 tablet 0    tiZANidine (ZANAFLEX) 4 MG tablet Take 1 tablet by mouth nightly as needed (spasm) 30 tablet 1        Medications patient taking as of now reconciled against medications ordered at time of hospital discharge: Yes    Chief Complaint   Patient presents with   [address] from Hospital       HPI    Inpatient course: Discharge summary reviewed- see chart. Interval history/Current status: This is a hospital follow-up for patient that was admitted on September 19 and discharged on September 28. Patient was noted to have DVT and right-sided PE. He was treated with a heparin drip. Patient was then taken to surgery via vascular for AngioJet mechanical thromboembolectomy and PulseSpray thrombolysis of the left lower extremity. Patient then had vena cava filter. Patient also noted to have decreased renal function and nephrology was consulted on. Creatinine was noted to be 10.3 and GFR of 6.   Patient also was noted to have an E. coli UTI. Patient did have a right internal jugular vein dialysis catheter placed and underwent hemodialysis. Patient is currently on outpatient dialysis on Monday Wednesday and Fridays. Patient is scheduled to have a repeat venous duplex outpatient on November 28 and have a video visit afterwards to discuss possible IVC removal.    He does have follow up with nephrology in November. Review of Systems    There were no vitals filed for this visit. There is no height or weight on file to calculate BMI. Wt Readings from Last 3 Encounters:   09/28/20 (!) 480 lb (217.7 kg)   09/02/20 (!) 388 lb (176 kg)   08/18/20 (!) 370 lb (167.8 kg)     BP Readings from Last 3 Encounters:   09/28/20 (!) 154/92   09/20/20 (!) 82/51   09/02/20 (!) 138/90       Physical Exam  Constitutional:       Appearance: Normal appearance. HENT:      Head: Normocephalic. Right Ear: External ear normal.      Left Ear: External ear normal.      Nose: Nose normal.   Eyes:      Conjunctiva/sclera: Conjunctivae normal.   Neck:      Musculoskeletal: Normal range of motion. Pulmonary:      Effort: Pulmonary effort is normal.   Musculoskeletal: Normal range of motion. Skin:     Findings: No rash. Neurological:      General: No focal deficit present. Mental Status: He is alert and oriented to person, place, and time. Psychiatric:         Mood and Affect: Mood normal.         Behavior: Behavior normal.         Thought Content: Thought content normal.         Judgment: Judgment normal.             Assessment/Plan:  1. Hospital discharge follow-up    - ND DISCHARGE MEDS RECONCILED W/ CURRENT OUTPATIENT MED LIST    2. Morbid obesity ([address]      3. Acute septic pulmonary embolism with acute cor pulmonale (HCC)      4. DVT of deep femoral vein, left (HCC)      5. Dialysis patient [facility])          Medical Decision Making: high complexity      This is a video visit and both audio and video were used.    Patient

## 2020-10-06 ENCOUNTER — VIRTUAL VISIT (OUTPATIENT)
Dept: VASCULAR SURGERY | Age: 40
End: 2020-10-06
Payer: COMMERCIAL

## 2020-10-06 PROCEDURE — 99212 OFFICE O/P EST SF 10 MIN: CPT | Performed by: PHYSICIAN ASSISTANT

## 2020-10-06 NOTE — PROGRESS NOTES
10/6/2020    TELEHEALTH EVALUATION -- Audio/Visual (During XTNTY- public health emergency)    HPI:    Pérez Granda (:  1980) has requested an audio/video evaluation for the following concern(s):    Mr. Keith Madden is a 39 yo male who underwent 1. Ultrasound/duplex guided cannulation of a thrombosed left popliteal vein with 5 Western Mirta and later 8 Western Mirta sheath  2. Left lower extremity venograms all the way to the inferior vena cava  3. Percutaneous thrombectomy/PulseSpray thrombolysis with AngioJet Zelante catheter  4. Left lower extremity venograms after percutaneous thrombectomy and thrombolysis  5. Placement of 40 cm infusion length EKOS TPA catheter from the popliteal vein all the way to the distal external iliac vein  6. Inferior vena cava filter placement from a right internal jugular vein approach (Bard Heather inferior vena cava filter) on 2020 by Dr. Kaveh Pemberton. He also underwent placement of a Permcath for HD on 2020. Today we are following up for his extensive left leg DVT with floating tail and PE with right sided heart strain. He reports that his chest discomfort and SOB has improved. He also reports that his left leg still hurts but is feeling better. He is on Coumadin 5 mg daily and is being followed by his PCP for dosage regulation. He is wearing his compression stockings as directed.         Pérez Granda is a 38 y.o. male with the following history as recorded in [facility]:  Patient Active Problem List    Diagnosis Date Noted    Acute kidney injury ([address] 2020    Hyperkalemia 2020    Morbid obesity ([address]     DVT of deep femoral vein, left ([address]     Leg DVT (deep venous thromboembolism), acute, left ([address] 2020    Acute pulmonary embolism ([address] 2020    S/P carpal tunnel release 2020    Numbness and tingling in both hands     Prediabetes 08/15/2019    Bilateral carpal tunnel syndrome 08/15/2019    Arthritis 08/15/2019    Transient alteration of awareness 07/08/2019    Hydronephrosis with renal and ureteral calculus obstruction 07/24/2018    Ureteral obstruction, right 57/84/7643    Umbilical hernia without obstruction and without gangrene 05/07/2018    Right ureteral calculus 02/13/2018    Thrombosed external hemorrhoid      Current Outpatient Medications   Medication Sig Dispense Refill    Homeopathic Products (THERAWORX RELIEF) FOAM Use daily for spasms 1 Can 2    hydrALAZINE (APRESOLINE) 25 MG tablet Take 1 tablet by mouth every 6 hours as needed (SBP>160mmHg) 120 tablet 0    amLODIPine (NORVASC) 5 MG tablet Take 1 tablet by mouth daily 30 tablet 0    vitamin D (ERGOCALCIFEROL) 1.25 MG (27627 UT) CAPS capsule Take 1 capsule by mouth once a week for 4 doses 4 capsule 0    warfarin (COUMADIN) 5 MG tablet Take 5 mg orally daily  Follow-up with PCP within 3-5 days for INR monitoring and warfarin dosing adjusted as needed. 30 tablet 0    acetaminophen (APAP EXTRA STRENGTH) 500 MG tablet Take 2 tablets by mouth 3 times daily for 10 days 60 tablet 0    tiZANidine (ZANAFLEX) 4 MG tablet Take 1 tablet by mouth nightly as needed (spasm) 30 tablet 1     No current facility-administered medications for this visit. Allergies: Patient has no known allergies.   Past Medical History:   Diagnosis Date    Arthritis     both wrist    Bilateral carpal tunnel syndrome 8/15/2019    Bronchitis     10 yrs ago    Diabetes mellitus [facility]     Kidney stone     recurrent    Sleep apnea     untested    Umbilical hernia      Past Surgical History:   Procedure Laterality Date    CARPAL TUNNEL RELEASE Left 8/21/2020    LEFT CARPAL TUNNEL RELEASE performed by Urszula Almanzar MD at [facility] Right 7/24/2018    CYSTOSCOPY/ URETEROSCOPY; INSERTION DOUBLE J STENT/  URETERAL performed by Neda Verduzco MD at [address] [place] [address]      both wrists    HEMORRHOID SURGERY N/A 6/24/2016    HEMORRHOID INCISION AND DRAINAGE performed by Tessa Tapia Agustin [facility] CYSTO/URETERO/PYELOSCOPY W/LITHOTRIPSY Right 8/7/2018    URETEROSCOPY LASER LITHOTRIPSY STONE EXTRACTION performed by Bhumi Salcido MD at [address] [place] Right 8/7/2018    STENT PLACEMENT performed by Bhumi Salcido MD at [facility] Right 2/13/2018    ES[facility] [address] LITHOTRIPSY performed by Bhumi Salcido MD at [facility] Right 3/13/2018    ES[facility] [address] LITHOTRIPSY performed by Bhumi Salcido MD at [facility] Right 7/24/2018    ESWL [address] LITHOTRIPSY performed by Bhumi Salcido MD at [facility] LAP, [address] N/A 7/6/1721    HERNIA UMBILICAL REPAIR LAPAROSCOPIC performed by Reta Ormond, MD [address] / [address] Left 9/20/2020     LEFT LOWER EXTREMITY VENOGRAMS; INFERIOR VENA CAVA FILTER PLACEMENT. performed by Giovana Antonio MD at [address]      left wrist.  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 Pursuant to the emergency declaration under the [address] [place] [address], [address] [place] [address] and the [employer] Act, this Virtual Visit was conducted with patient's (and/or legal guardian's) consent, to reduce the patient's risk of exposure to COVID-19 and provide necessary medical care. The patient (and/or legal guardian) has also been advised to contact this office for worsening conditions or problems, and seek emergency medical treatment and/or call 911 if deemed necessary. Patient identification was verified at the start of the visit: Yes      Services were provided through a video synchronous discussion virtually to substitute for in-person clinic visit. Patient and provider were located at their individual homes. --Ricardo Griggs PA-C on 10/6/2020 at 10:36 AM    An electronic signature was used to authenticate this note.

## 2020-10-08 ENCOUNTER — ANTI-COAG VISIT (OUTPATIENT)
Dept: INTERNAL MEDICINE | Age: 40
End: 2020-10-08
Payer: COMMERCIAL

## 2020-10-08 LAB — INTERNATIONAL NORMALIZATION RATIO, POC: 1.7

## 2020-10-08 PROCEDURE — 85610 PROTHROMBIN TIME: CPT | Performed by: STUDENT IN AN ORGANIZED HEALTH CARE EDUCATION/TRAINING PROGRAM

## 2020-10-08 PROCEDURE — 93793 ANTICOAG MGMT PT WARFARIN: CPT | Performed by: STUDENT IN AN ORGANIZED HEALTH CARE EDUCATION/TRAINING PROGRAM

## 2020-10-08 NOTE — PROGRESS NOTES
Mr. Carlo Nguyen was here today. INR today:   Results for orders placed or performed in visit on 10/08/20   POCT INR   Result Value Ref Range    INR 1.7      INR Goal: The patient does not have an INR goal.    Dosing Plan  As of 10/8/2020    TTR:   --   Full warfarin instructions:   10/8: 7.5 mg; Otherwise 5 mg every day            PLAN: TAKE 7.5MG TONIGHT, THEN CONTINUE CURRENT DOSE  NEXT COUMADIN CLINIC APT IS: 10/15/2020 AT 8:30AM      [facility] INTERNAL MEDICINE COUMADIN CLINIC [phone]    IF IT'S AN EMERGENCY, PLEASE CALL 911 OR GO TO YOUR NEAREST EMERGENCY ROOM. IF UNABLE TO REACH COUMADIN [facility], [address] [place] [address], [phone]. Nicolasa Lion EFFECTS -- The major complication associated with warfarin is bleeding due to excessive anticoagulation. Excessive bleeding, or hemorrhage, can occur from any area of the body, and patients on warfarin should report any falls or accidents, as well as signs or symptoms of bleeding or unusual bruising. Signs of unusual bleeding include bleeding from the gums, blood in the urine, bloody or dark stool, a nosebleed, or vomiting blood. Because the risk of bleeding increases as the INR rises, the INR is closely monitored and adjustments are made as needed to maintain the INR within the target range. Isamar Pillion also cause skin necrosis or gangrene, which can cause dark red or black areas on the skin. This is a rare complication that may occur during the first several days of warfarin therapy. When to seek help -- If there are obvious or subtle signs of bleeding, including the following, patients should call their healthcare provider immediately.   · Persistent nausea, stomach upset, or vomiting blood or other material that looks like coffee grounds   · Headaches, dizziness, or weakness   · Nosebleeds   · Dark red or brown urine   · Blood in the bowel movement or dark-colored stool   · Pain, discomfort, or swelling, especially after an injury · After a serious fall or head injury, even if there are no other symptoms  The patient should also call if any of the following occurs:  · Bleeding from the gums after brushing the teeth   · Swelling or pain at an injection site   · Excessive menstrual bleeding or bleeding between menstrual periods   · Diarrhea, vomiting, or inability to eat for more than 24 hours   · Fever (temperature greater than 100.4º F or 38º C)    It is important to remember that warfarin is taken to reduce the risk of a clotting condition(s), such as a deep vein thrombosis or pulmonary embolism. If one or more of these symptoms develops, the patient should seek immediate medical attention. OTHER RECOMMENDATIONS  Take warfarin on a schedule -- Warfarin should be taken exactly as directed. Do not increase, decrease, or change the dose unless told to do so by a healthcare provider. If a dose is missed or forgotten, call the prescribing clinician for advice. Warfarin tablets come in different strengths; each is usually a different color, with the amount of warfarin (in milligrams) clearly printed on the tablet. If the color or dose of the tablet appears different than those taken previously, the patient should immediately notify their pharmacist or healthcare provider. Reduce the risk of bleeding -- There is a tendency to bleed more easily than usual while taking warfarin. Some simple changes can decrease this risk:  · Use a soft bristle toothbrush   · Floss with waxed floss rather than unwaxed floss   · Shave with an electric razor rather than a blade   · Take care when using sharp objects, such as knives and scissors   · Avoid activities that have a risk of falling or injury (eg, contact sports)  Prevent falls -- Falling may significantly increase the risk of bleeding.  Taking measures to prevent falls is recommended, and could include the following:  · Remove loose rugs and electrical cords or any other loose items in the home that could lead to tripping, slipping, and falling. · Ensure that there is adequate lighting in all areas inside and around the home, including stairwells and entrance ways. · Avoid walking on ice, wet or polished floors, or other potentially slippery surfaces. · Avoid walking on unfamiliar areas outside. Warfarin and food -- Some foods and supplements can interfere with warfarin's effectiveness. After being stabilized on a particular warfarin dose, consult a healthcare provider before making major dietary changes (eg, starting a diet to lose weight, starting a nutritional supplement or vitamin). · Vitamin K -- Eating an increased amount of foods rich in vitamin K can lower the prothrombin time and INR, making warfarin less effective, and potentially increasing the risk of blood clots. Patients who take warfarin should aim to eat a relatively similar amount of vitamin K each week. Some foods have a high level of vitamin K, including: kale, broccoli, spinach, kenzie or turnip greens, lettuce, [place] sprouts, and cabbage (table 1). It is not necessary to avoid these foods. However, the patient should eat a relatively similar amount on a regular basis rather than eating a large serving occasionally. · Cranberry juice -- There have been mixed reports on the effect of cranberry juice in people who use warfarin to prevent blood clots. Some experts have reported that drinking cranberry juice while on warfarin can cause significant over-anticoagulation and bleeding [1]. However, a small study found that drinking one eight ounce serving of cranberry juice per day for seven days had no effect on the INR of seven men taking warfarin for atrial fibrillation [2]. It is possible that larger amounts could have a more significant effect.   The best advice is probably to avoid consuming large amounts of cranberry juice, and to speak with a healthcare provider regarding any concerns about a possible interaction. · Alcohol -- Chronic abuse of alcohol affects the body's ability to handle warfarin. Patients on warfarin therapy should avoid drinking alcohol on a daily basis. Alcohol should be limited to no more than one to two servings of alcohol occasionally. In addition, drinking excessive amounts of alcohol can increase the risk of injury, and therefore bleeding. Warfarin and medications -- A number of medications, herbs, and vitamins can interact with warfarin (table 2 and table 3). This interaction may affect the action of warfarin or the other medication. If warfarin is affected, the dose may need to be adjusted (up or down) to maintain an optimal coagulation effect. Patients who take warfarin should consult with their clinician before taking any new medication, including over-the-counter (non-prescription) drugs, herbal medicines, vitamins, or any other products. Some of the most common over-the-counter pain relievers, including acetaminophen (Tylenol®), aspirin, and nonsteroidal antiinflammatory drugs (such as ibuprofen [Advil®]) and naproxen (Aleve®), enhance the anticoagulant effects of warfarin. Vitamin E may increase the anticoagulant effects of warfarin. Consult a healthcare provider before adding or changing a dose of vitamin E or any other vitamin. Wear medical identification -- People who require long-term warfarin should wear a bracelet, necklace, or similar alert tag at all times. If an accident occurs and the person is too ill to explain their condition, this will help responders provide appropriate care. The alert tag should include a list of major medical conditions and the reason warfarin is needed (eg, atrial fibrillation), as well as the name and phone number of an emergency contact.  One device, Medic Alert®, provides a toll-free number that emergency medical workers can call to find out a person's medical history, list of medications, family emergency contact numbers, and healthcare provider names and numbers. GRAPHICS: Table 1  Foods with moderate to high levels of vitamin K  Food name Serving size Vitamin K (micrograms)   High level vitamin K foods   Kale, frozen (cooked or boiled, drained) 1/2 cup 570   Kale, fresh, (cooked or boiled, drained) 1/2 cup 530   Spinach, frozen (cooked or boiled, drained) 1/2 cup 514   Spinach, raw 1 cup 150   [place] greens, frozen (cooked, drained) 1/2 cup 530   Turnip greens, frozen (cooked, drained) 1/2 cup 425   [place] sprouts, frozen (cooked, drained) 1/2 cup 110   Moderate level vitamin K foods   Asparagus, frozen (cooked, drained) 1/2 cup  4 deleon 72  48   Asparagus, fresh (cooked, drained) 4 deleon 30   Broccoli, frozen (cooked, drained) 1/2 cup 60   Broccoli, fresh (cooked, drained) 1 spear 52   Broccoli, raw 1/2 cup 40   Lettuce (butterhead, Jenison, erasmo) 1/2 head 80   Lettuce (iceberg, crisphead) 1/2 head 65   Lettuce (arya, cos) 1 cup 57   Lettuce (green leaf) 1 cup 97   Okra, fresh (cooked, drained) 1/2 cup 32   Okra, frozen (cooked, drained) 1/2 cup 44   Cabbage (cooked, drained) 1/2 cup 73   Cabbage, raw 1/2 cup 21   Cabbage, josette (raw) 1/2 cup 24   Cabbage, Chinese (cooked, drained) 1/2 cup 28   Coleslaw (fast food-type) 3/4 cup 56   Sauerkraut, canned 1/2 cup 41   Peas, frozen, with pod (cooked, drained) 1/2 cup 24   Peas, fresh, with pod (cooked, drained) 1/2 cup 20   Peas, green, frozen (cooked, drained) 1/2 cup 18   Celery, raw 1/2 cup 17   Beans, green or yellow, fresh (cooked, drained) 1/2 cup 10   Oil, canola 1 tablespoon 17   Oil, olive 1 tablespoon 8   Oil, other (including peanut, sesame, safflower, corn, sunflower, soybean) 1 tablespoon 3 or less   Green tea, brewed in hot water 3.5 ounces 0.3   Data from: [employer]. USDA Nutrient Database for Standard Reference. Nutrient Data Laboratory Home Page, [employer]Card[employer]commendations.ca.

## 2020-10-15 ENCOUNTER — ANTI-COAG VISIT (OUTPATIENT)
Dept: INTERNAL MEDICINE | Age: 40
End: 2020-10-15
Payer: COMMERCIAL

## 2020-10-15 LAB — INTERNATIONAL NORMALIZATION RATIO, POC: 2.2

## 2020-10-15 PROCEDURE — 85610 PROTHROMBIN TIME: CPT | Performed by: NURSE PRACTITIONER

## 2020-10-15 PROCEDURE — 93793 ANTICOAG MGMT PT WARFARIN: CPT | Performed by: NURSE PRACTITIONER

## 2020-10-15 NOTE — PROGRESS NOTES
Mr. Bravo Apt was here today. INR today:   Results for orders placed or performed in visit on 10/15/20   POCT INR   Result Value Ref Range    INR 2.2      INR Goal: The patient does not have an INR goal.    Dosing Plan  As of 10/15/2020    TTR:   --   Full warfarin instructions:   5 mg every day            PLAN: CONTINUE CURRENT DOSE  NEXT COUMADIN CLINIC APT IS: 10/22/2020 at 8:45am        IF IT'S AN EMERGENCY, PLEASE CALL 911 OR GO TO YOUR NEAREST [address]. [facility] INTERNAL MEDICINE COUMADIN CLINIC [phone]  IF UNABLE TO REACH COUMADIN CLINIC, [address] [place] [address] Rd, [phone].

## 2020-10-22 ENCOUNTER — ANTI-COAG VISIT (OUTPATIENT)
Dept: INTERNAL MEDICINE | Age: 40
End: 2020-10-22
Payer: COMMERCIAL

## 2020-10-22 LAB — INTERNATIONAL NORMALIZATION RATIO, POC: 1.6

## 2020-10-22 PROCEDURE — 85610 PROTHROMBIN TIME: CPT | Performed by: NURSE PRACTITIONER

## 2020-10-22 PROCEDURE — 93793 ANTICOAG MGMT PT WARFARIN: CPT | Performed by: NURSE PRACTITIONER

## 2020-10-22 NOTE — PROGRESS NOTES
Mr. Thuy Barnard was here today. INR today:   Results for orders placed or performed in visit on 10/22/20   POCT INR   Result Value Ref Range    INR 1.6      INR Goal: 2.0-3.0    Dosing Plan  As of 10/22/2020    TTR:   36.5 % (1 wk)   Full warfarin instructions:   7.5 mg every Tue, Thu; 5 mg all other days            PLAN: CHANGE TO 7.5MG TUES AND THURS, AND 5MG ALL OTHER DAYS  NEXT COUMADIN CLINIC APT IS: 10/29/2020 AT 8:45AM        IF IT'S AN EMERGENCY, PLEASE CALL 911 OR GO TO YOUR NEAREST EMERGENCY ROOM. [facility] INTERNAL MEDICINE COUMADIN CLINIC [phone]  IF UNABLE TO REACH COUMADIN CLINIC, [address] [place] [address], [phone].

## 2020-10-26 LAB
BUN BLDV-MCNC: 24 MG/DL (ref 6–20)
CREAT SERPL-MCNC: 1.5 MG/DL (ref 0.5–1.2)
GFR AFRICAN AMERICAN: >59
GFR NON-AFRICAN AMERICAN: 51

## 2020-10-26 RX ORDER — WARFARIN SODIUM 5 MG/1
TABLET ORAL
Qty: 45 TABLET | Refills: 1 | Status: ON HOLD | OUTPATIENT
Start: 2020-10-26 | End: 2020-11-06 | Stop reason: SDUPTHER

## 2020-10-29 ENCOUNTER — ANTI-COAG VISIT (OUTPATIENT)
Dept: INTERNAL MEDICINE | Age: 40
End: 2020-10-29
Payer: COMMERCIAL

## 2020-10-29 LAB
ANION GAP SERPL CALCULATED.3IONS-SCNC: 8 MMOL/L (ref 7–19)
BUN BLDV-MCNC: 20 MG/DL (ref 6–20)
CALCIUM SERPL-MCNC: 9.4 MG/DL (ref 8.6–10)
CHLORIDE BLD-SCNC: 106 MMOL/L (ref 98–111)
CO2: 25 MMOL/L (ref 22–29)
CREAT SERPL-MCNC: 1.4 MG/DL (ref 0.5–1.2)
GFR AFRICAN AMERICAN: >59
GFR NON-AFRICAN AMERICAN: 56
GLUCOSE BLD-MCNC: 159 MG/DL (ref 74–109)
INTERNATIONAL NORMALIZATION RATIO, POC: 1.5
POTASSIUM SERPL-SCNC: 4.2 MMOL/L (ref 3.5–5)
SODIUM BLD-SCNC: 139 MMOL/L (ref 136–145)

## 2020-10-29 PROCEDURE — 93793 ANTICOAG MGMT PT WARFARIN: CPT | Performed by: NURSE PRACTITIONER

## 2020-10-29 PROCEDURE — 85610 PROTHROMBIN TIME: CPT | Performed by: NURSE PRACTITIONER

## 2020-10-29 RX ORDER — AMLODIPINE BESYLATE 5 MG/1
5 TABLET ORAL DAILY
Qty: 30 TABLET | Refills: 0 | Status: SHIPPED | OUTPATIENT
Start: 2020-10-29 | End: 2020-12-06

## 2020-10-29 NOTE — PROGRESS NOTES
Mr. Shayan Lebron was here today. INR today:   Results for orders placed or performed in visit on 10/29/20   POCT INR   Result Value Ref Range    INR 1.5      INR Goal: 2.0-3.0    Dosing Plan  As of 10/29/2020    TTR:   18.7 % (2 wk)   Full warfarin instructions:   10/29: 10 mg; Otherwise 7.5 mg every Tue, Thu; 5 mg all other days            PLAN: TAKE 10MG TONIGHT, INSTEAD OF 7.5, THEN CONTINUE CURRENT DOSE  NEXT COUMADIN CLINIC APT IS: 11/5/2020 AT 8:30AM      IF IT'S AN EMERGENCY, PLEASE CALL 911 OR GO TO YOUR NEAREST EMERGENCY ROOM. [facility] INTERNAL MEDICINE COUMADIN CLINIC [phone]  IF UNABLE TO REACH COUMADIN CLINIC, [address] [place] [address] Rd, [phone].

## 2020-11-04 ENCOUNTER — HOSPITAL ENCOUNTER (INPATIENT)
Age: 40
LOS: 2 days | Discharge: HOME | DRG: 315 | End: 2020-11-06
Attending: HOSPITALIST | Admitting: HOSPITALIST
Payer: COMMERCIAL

## 2020-11-04 ENCOUNTER — TELEPHONE (OUTPATIENT)
Dept: PRIMARY CARE CLINIC | Age: 40
End: 2020-11-04

## 2020-11-04 PROBLEM — I82.409 RECURRENT DEEP VEIN THROMBOSIS (HCC): Status: ACTIVE | Noted: 2020-11-04

## 2020-11-04 LAB
ALBUMIN SERPL-MCNC: 4 G/DL (ref 3.5–5.2)
ALP BLD-CCNC: 93 U/L (ref 40–130)
ALT SERPL-CCNC: 27 U/L (ref 5–41)
ANION GAP SERPL CALCULATED.3IONS-SCNC: 11 MMOL/L (ref 7–19)
APTT: 28 SEC (ref 26–36.2)
APTT: 31.6 SEC (ref 26–36.2)
AST SERPL-CCNC: 17 U/L (ref 5–40)
BASOPHILS ABSOLUTE: 0.1 K/UL (ref 0–0.2)
BASOPHILS RELATIVE PERCENT: 0.6 % (ref 0–1)
BILIRUB SERPL-MCNC: <0.2 MG/DL (ref 0.2–1.2)
BUN BLDV-MCNC: 12 MG/DL (ref 6–20)
CALCIUM SERPL-MCNC: 9.6 MG/DL (ref 8.6–10)
CHLORIDE BLD-SCNC: 103 MMOL/L (ref 98–111)
CO2: 25 MMOL/L (ref 22–29)
CREAT SERPL-MCNC: 0.9 MG/DL (ref 0.5–1.2)
EOSINOPHILS ABSOLUTE: 0.3 K/UL (ref 0–0.6)
EOSINOPHILS RELATIVE PERCENT: 3.1 % (ref 0–5)
GFR AFRICAN AMERICAN: >59
GFR NON-AFRICAN AMERICAN: >60
GLUCOSE BLD-MCNC: 104 MG/DL (ref 74–109)
HCT VFR BLD CALC: 33.9 % (ref 42–52)
HEMOGLOBIN: 10.8 G/DL (ref 14–18)
IMMATURE GRANULOCYTES #: 0.1 K/UL
INR BLD: 1.5 (ref 0.88–1.18)
LYMPHOCYTES ABSOLUTE: 2 K/UL (ref 1.1–4.5)
LYMPHOCYTES RELATIVE PERCENT: 20.8 % (ref 20–40)
MCH RBC QN AUTO: 28.6 PG (ref 27–31)
MCHC RBC AUTO-ENTMCNC: 31.9 G/DL (ref 33–37)
MCV RBC AUTO: 89.7 FL (ref 80–94)
MONOCYTES ABSOLUTE: 0.7 K/UL (ref 0–0.9)
MONOCYTES RELATIVE PERCENT: 7.3 % (ref 0–10)
NEUTROPHILS ABSOLUTE: 6.5 K/UL (ref 1.5–7.5)
NEUTROPHILS RELATIVE PERCENT: 67.3 % (ref 50–65)
PDW BLD-RTO: 15.1 % (ref 11.5–14.5)
PLATELET # BLD: 222 K/UL (ref 130–400)
PMV BLD AUTO: 9.5 FL (ref 9.4–12.4)
POTASSIUM REFLEX MAGNESIUM: 4.1 MMOL/L (ref 3.5–5)
PROTHROMBIN TIME: 18.2 SEC (ref 12–14.6)
RBC # BLD: 3.78 M/UL (ref 4.7–6.1)
SODIUM BLD-SCNC: 139 MMOL/L (ref 136–145)
TOTAL PROTEIN: 7.5 G/DL (ref 6.6–8.7)
WBC # BLD: 9.7 K/UL (ref 4.8–10.8)

## 2020-11-04 PROCEDURE — 93971 EXTREMITY STUDY: CPT

## 2020-11-04 PROCEDURE — 2580000003 HC RX 258: Performed by: HOSPITALIST

## 2020-11-04 PROCEDURE — 36415 COLL VENOUS BLD VENIPUNCTURE: CPT

## 2020-11-04 PROCEDURE — 6370000000 HC RX 637 (ALT 250 FOR IP): Performed by: HOSPITALIST

## 2020-11-04 PROCEDURE — 1210000000 HC MED SURG R&B

## 2020-11-04 PROCEDURE — 99284 EMERGENCY DEPT VISIT MOD MDM: CPT

## 2020-11-04 PROCEDURE — 85025 COMPLETE CBC W/AUTO DIFF WBC: CPT

## 2020-11-04 PROCEDURE — 6360000002 HC RX W HCPCS: Performed by: HOSPITALIST

## 2020-11-04 PROCEDURE — 85730 THROMBOPLASTIN TIME PARTIAL: CPT

## 2020-11-04 PROCEDURE — 80053 COMPREHEN METABOLIC PANEL: CPT

## 2020-11-04 PROCEDURE — 99999 PR OFFICE/OUTPT VISIT,PROCEDURE ONLY: CPT | Performed by: PHYSICIAN ASSISTANT

## 2020-11-04 PROCEDURE — 85610 PROTHROMBIN TIME: CPT

## 2020-11-04 RX ORDER — POTASSIUM CHLORIDE 7.45 MG/ML
10 INJECTION INTRAVENOUS PRN
Status: DISCONTINUED | OUTPATIENT
Start: 2020-11-04 | End: 2020-11-06 | Stop reason: HOSPADM

## 2020-11-04 RX ORDER — ACETAMINOPHEN 650 MG/1
650 SUPPOSITORY RECTAL EVERY 6 HOURS PRN
Status: DISCONTINUED | OUTPATIENT
Start: 2020-11-04 | End: 2020-11-06 | Stop reason: HOSPADM

## 2020-11-04 RX ORDER — ONDANSETRON 4 MG/1
4 TABLET, ORALLY DISINTEGRATING ORAL EVERY 8 HOURS PRN
Status: DISCONTINUED | OUTPATIENT
Start: 2020-11-04 | End: 2020-11-06 | Stop reason: HOSPADM

## 2020-11-04 RX ORDER — ACETAMINOPHEN 500 MG
500 TABLET ORAL EVERY 6 HOURS PRN
Status: DISCONTINUED | OUTPATIENT
Start: 2020-11-04 | End: 2020-11-06 | Stop reason: HOSPADM

## 2020-11-04 RX ORDER — HYDRALAZINE HYDROCHLORIDE 25 MG/1
25 TABLET, FILM COATED ORAL EVERY 4 HOURS PRN
Status: DISCONTINUED | OUTPATIENT
Start: 2020-11-04 | End: 2020-11-06 | Stop reason: HOSPADM

## 2020-11-04 RX ORDER — POTASSIUM CHLORIDE 20 MEQ/1
40 TABLET, EXTENDED RELEASE ORAL PRN
Status: DISCONTINUED | OUTPATIENT
Start: 2020-11-04 | End: 2020-11-06 | Stop reason: HOSPADM

## 2020-11-04 RX ORDER — HEPARIN SODIUM 1000 [USP'U]/ML
5000 INJECTION, SOLUTION INTRAVENOUS; SUBCUTANEOUS PRN
Status: DISCONTINUED | OUTPATIENT
Start: 2020-11-04 | End: 2020-11-06 | Stop reason: HOSPADM

## 2020-11-04 RX ORDER — MAGNESIUM SULFATE IN WATER 40 MG/ML
2 INJECTION, SOLUTION INTRAVENOUS PRN
Status: DISCONTINUED | OUTPATIENT
Start: 2020-11-04 | End: 2020-11-06 | Stop reason: HOSPADM

## 2020-11-04 RX ORDER — HEPARIN SODIUM 1000 [USP'U]/ML
10000 INJECTION, SOLUTION INTRAVENOUS; SUBCUTANEOUS ONCE
Status: COMPLETED | OUTPATIENT
Start: 2020-11-04 | End: 2020-11-04

## 2020-11-04 RX ORDER — POLYETHYLENE GLYCOL 3350 17 G/17G
17 POWDER, FOR SOLUTION ORAL DAILY PRN
Status: DISCONTINUED | OUTPATIENT
Start: 2020-11-04 | End: 2020-11-06 | Stop reason: HOSPADM

## 2020-11-04 RX ORDER — AMLODIPINE BESYLATE 5 MG/1
5 TABLET ORAL DAILY
Status: DISCONTINUED | OUTPATIENT
Start: 2020-11-04 | End: 2020-11-06 | Stop reason: HOSPADM

## 2020-11-04 RX ORDER — SODIUM CHLORIDE 0.9 % (FLUSH) 0.9 %
10 SYRINGE (ML) INJECTION EVERY 12 HOURS SCHEDULED
Status: DISCONTINUED | OUTPATIENT
Start: 2020-11-04 | End: 2020-11-06 | Stop reason: HOSPADM

## 2020-11-04 RX ORDER — SODIUM CHLORIDE 0.9 % (FLUSH) 0.9 %
10 SYRINGE (ML) INJECTION PRN
Status: DISCONTINUED | OUTPATIENT
Start: 2020-11-04 | End: 2020-11-06 | Stop reason: HOSPADM

## 2020-11-04 RX ORDER — ACETAMINOPHEN 500 MG
1000 TABLET ORAL 3 TIMES DAILY
Status: DISCONTINUED | OUTPATIENT
Start: 2020-11-04 | End: 2020-11-06 | Stop reason: HOSPADM

## 2020-11-04 RX ORDER — HEPARIN SODIUM 1000 [USP'U]/ML
10000 INJECTION, SOLUTION INTRAVENOUS; SUBCUTANEOUS PRN
Status: DISCONTINUED | OUTPATIENT
Start: 2020-11-04 | End: 2020-11-06 | Stop reason: HOSPADM

## 2020-11-04 RX ORDER — TIZANIDINE 4 MG/1
2 TABLET ORAL EVERY 8 HOURS PRN
Status: DISCONTINUED | OUTPATIENT
Start: 2020-11-04 | End: 2020-11-06 | Stop reason: HOSPADM

## 2020-11-04 RX ORDER — TIZANIDINE 4 MG/1
4 TABLET ORAL NIGHTLY
Status: DISCONTINUED | OUTPATIENT
Start: 2020-11-04 | End: 2020-11-06 | Stop reason: HOSPADM

## 2020-11-04 RX ORDER — HEPARIN SODIUM 10000 [USP'U]/100ML
12.23 INJECTION, SOLUTION INTRAVENOUS CONTINUOUS
Status: DISCONTINUED | OUTPATIENT
Start: 2020-11-04 | End: 2020-11-06 | Stop reason: ALTCHOICE

## 2020-11-04 RX ORDER — HEPARIN SODIUM 10000 [USP'U]/100ML
12.68 INJECTION, SOLUTION INTRAVENOUS CONTINUOUS
Status: DISCONTINUED | OUTPATIENT
Start: 2020-11-04 | End: 2020-11-04

## 2020-11-04 RX ORDER — ONDANSETRON 2 MG/ML
4 INJECTION INTRAMUSCULAR; INTRAVENOUS EVERY 6 HOURS PRN
Status: DISCONTINUED | OUTPATIENT
Start: 2020-11-04 | End: 2020-11-06 | Stop reason: HOSPADM

## 2020-11-04 RX ADMIN — SODIUM CHLORIDE, PRESERVATIVE FREE 10 ML: 5 INJECTION INTRAVENOUS at 20:55

## 2020-11-04 RX ADMIN — ACETAMINOPHEN 1000 MG: 500 TABLET, FILM COATED ORAL at 20:54

## 2020-11-04 RX ADMIN — HEPARIN SODIUM 12.23 UNITS/KG/HR: 10000 INJECTION, SOLUTION INTRAVENOUS at 20:55

## 2020-11-04 RX ADMIN — AMLODIPINE BESYLATE 5 MG: 5 TABLET ORAL at 20:54

## 2020-11-04 RX ADMIN — TIZANIDINE 4 MG: 4 TABLET ORAL at 20:55

## 2020-11-04 RX ADMIN — HEPARIN SODIUM 10000 UNITS: 1000 INJECTION INTRAVENOUS; SUBCUTANEOUS at 20:55

## 2020-11-04 ASSESSMENT — PAIN DESCRIPTION - LOCATION: LOCATION: SHOULDER

## 2020-11-04 ASSESSMENT — ENCOUNTER SYMPTOMS
COLOR CHANGE: 0
SHORTNESS OF BREATH: 0
EYE DISCHARGE: 0
BACK PAIN: 0
COUGH: 0
APNEA: 0
PHOTOPHOBIA: 0
EYE ITCHING: 0

## 2020-11-04 ASSESSMENT — PAIN SCALES - GENERAL
PAINLEVEL_OUTOF10: 4
PAINLEVEL_OUTOF10: 1

## 2020-11-04 ASSESSMENT — PAIN DESCRIPTION - DESCRIPTORS: DESCRIPTORS: DULL

## 2020-11-04 ASSESSMENT — PAIN DESCRIPTION - ORIENTATION: ORIENTATION: RIGHT

## 2020-11-04 NOTE — ED PROVIDER NOTES
[facility] 3 YESENIA/VAS/MED  eMERGENCYdEPARTMENT eNCOUnter      Pt Name: Portia Chu  MRN: [id number]  [place] 1980  Date of evaluation: 11/4/2020  Provider:YUMIKO Rossi    CHIEF COMPLAINT       Chief Complaint   Patient presents with    Leg Swelling     left leg and right arm x 3 days         HISTORY OF PRESENT ILLNESS  (Location/Symptom, Timing/Onset, Context/Setting, Quality, Duration, Modifying Factors, Severity.)   Portia Chu is a 38 y.o. male who presents to the emergency department with complaints of 3 day onset of swelling to left leg and right arm. Denies pain or discoloration. Has hx of dvt on coumadin. Followed by Dr Kraig Barnes planning on removing his portacath right side soon. Hx of PE as well with thrombectomy/embolectomy left femur. HPI    Nursing Notes were reviewed and I agree. REVIEW OF SYSTEMS    (2-9 systems for level 4, 10 or more for level 5)     Review of Systems   Constitutional: Negative for activity change, appetite change, chills and fever. HENT: Negative for congestion and dental problem. Eyes: Negative for photophobia, discharge and itching. Respiratory: Negative for apnea, cough and shortness of breath. Cardiovascular: Negative for chest pain. Musculoskeletal: Positive for joint swelling. Negative for arthralgias, back pain, gait problem, myalgias and neck pain. Skin: Negative for color change, pallor and rash. Neurological: Negative for dizziness, seizures and syncope. Psychiatric/Behavioral: Negative for agitation. The patient is not nervous/anxious. Except as noted above the remainder of the review of systems was reviewed and negative.        PAST MEDICAL HISTORY     Past Medical History:   Diagnosis Date    Arthritis     both wrist    Bilateral carpal tunnel syndrome 8/15/2019    Bronchitis     10 yrs ago    Diabetes mellitus ([facility] [address]     Disease of blood and blood forming organ     Kidney stone     recurrent    Sleep apnea     untested    Umbilical hernia          SURGICAL HISTORY       Past Surgical History:   Procedure Laterality Date    CARPAL TUNNEL RELEASE Left 8/21/2020    LEFT CARPAL TUNNEL RELEASE performed by Briseida Romano MD at [facility] Right 7/24/2018    CYSTOSCOPY/ URETEROSCOPY; INSERTION DOUBLE J STENT/  URETERAL performed by Seu Scott MD at [address] [facility]      both wrists    HEMORRHOID SURGERY N/A 6/24/2016    HEMORRHOID INCISION AND DRAINAGE performed by Cyndee Morton MD at [facility] CYSTO/URETERO/PYELOSCOPY W/LITHOTRIPSY Right 8/7/2018    URETEROSCOPY LASER LITHOTRIPSY STONE EXTRACTION performed by Sue Scott MD at [facility] Right 8/7/2018    STENT PLACEMENT performed by Sue Scott MD at [facility] Right 2/13/2018    ESWL EXTRACORPEAL SHOCK WAVE LITHOTRIPSY performed by Sue Scott MD at [facility] Right 3/13/2018    ESWL EXTRACORPEAL SHOCK WAVE LITHOTRIPSY performed by Sue Scott MD at [facility] Right 7/24/2018    ESWL EXTRACORPEAL SHOCK WAVE LITHOTRIPSY performed by Sue Scott MD at [facility] [address], [address] N/A 1/8/2006    HERNIA UMBILICAL REPAIR LAPAROSCOPIC performed by Olya Cotton MD at [facility] / [facility] Left 9/20/2020     LEFT LOWER EXTREMITY VENOGRAMS; INFERIOR VENA CAVA FILTER PLACEMENT. performed by Lisa Nuñez MD at [address]      left wrist.  Pt was a child         CURRENT MEDICATIONS       Current Discharge Medication List      CONTINUE these medications which have NOT CHANGED    Details   amLODIPine (NORVASC) 5 MG tablet Take 1 tablet by mouth daily  Qty: 30 tablet, Refills: 0    Associated Diagnoses: Hypertension, unspecified type      warfarin (COUMADIN) 5 MG tablet TAKE 7.5MG ON TUES AND THURS AND 5MG ALL OTHER DAYS, OR AS DIRECTED. Qty: 45 tablet, Refills: 1      Homeopathic Products (THERAWORX RELIEF) FOAM Use daily for spasms  Qty: 1 Can, Refills: 2      hydrALAZINE (APRESOLINE) 25 MG tablet Take 1 tablet by mouth every 6 hours as needed (SBP>160mmHg)  Qty: 120 tablet, Refills: 0      vitamin D (ERGOCALCIFEROL) 1.25 MG (51149 UT) CAPS capsule Take 1 capsule by mouth once a week for 4 doses  Qty: 4 capsule, Refills: 0      acetaminophen (APAP EXTRA STRENGTH) 500 MG tablet Take 2 tablets by mouth 3 times daily for 10 days  Qty: 60 tablet, Refills: 0    Associated Diagnoses: Pain of left calf      tiZANidine (ZANAFLEX) 4 MG tablet Take 1 tablet by mouth nightly as needed (spasm)  Qty: 30 tablet, Refills: 1    Associated Diagnoses: Strain of lumbar region, subsequent encounter             ALLERGIES     Patient has no known allergies.     FAMILY HISTORY       Family History   Problem Relation Age of Onset    Cancer Mother [facility]        colon cancer    Cancer Father         [facility]    Diabetes Father     Other Maternal Grandmother         copd    Other Maternal Grandfather         copd    Heart Disease Paternal Grandmother           SOCIAL HISTORY       Social History     Socioeconomic History    Marital status:      Spouse name: None    Number of children: None    Years of education: None    Highest education level: None   Occupational History    None   Social Needs    Financial resource strain: None    Food insecurity     Worry: None     Inability: None    Transportation needs     Medical: None     Non-medical: None   Tobacco Use    Smoking status: Never Smoker    Smokeless tobacco: Never Used    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[employer]   Substance and Sexual Activity    Alcohol use: Yes     Comment: OCC    Drug use: No    Sexual activity: Yes     Partners: Female   Lifestyle    Physical activity     Days per week: None     Minutes per session: None    Stress: None   Relationships    Social connections Talks on phone: None     Gets together: None     Attends Hinduism service: None     Active member of club or organization: None     Attends meetings of clubs or organizations: None     Relationship status: None    Intimate partner violence     Fear of current or ex partner: None     Emotionally abused: None     Physically abused: None     Forced sexual activity: None   Other Topics Concern    None   Social History Narrative    None       SCREENINGS    [place] Coma Scale  Eye Opening: Spontaneous  Best Verbal Response: Oriented  Best Motor Response: Obeys commands  Dejuan Coma Scale Score: 15      PHYSICAL EXAM    (up to 7 forlevel 4, 8 or more for level 5)     ED Triage Vitals [11/04/20 1349]   BP Temp Temp src Pulse Resp SpO2 Height Weight   (!) 168/87 98.8 °F (37.1 °C) -- 91 17 95 % 6' (1.829 m) (!) 365 lb (165.6 kg)       Physical Exam  Vitals signs and nursing note reviewed. Constitutional:       General: He is not in acute distress. Appearance: Normal appearance. He is well-developed. He is not diaphoretic. HENT:      Head: Normocephalic and atraumatic. Right Ear: Tympanic membrane, ear canal and external ear normal.      Left Ear: Tympanic membrane, ear canal and external ear normal.      Nose: Nose normal.      Mouth/Throat:      Mouth: Mucous membranes are moist.   Eyes:      Pupils: Pupils are equal, round, and reactive to light. Neck:      Musculoskeletal: Normal range of motion and neck supple. Trachea: No tracheal deviation. Cardiovascular:      Rate and Rhythm: Normal rate and regular rhythm. Pulses: Normal pulses. Heart sounds: Normal heart sounds. No murmur. Comments: Able to palpate all arterial pulses in both right arm and left leg which are complaints today. He denies any pain. Pulmonary:      Effort: Pulmonary effort is normal.      Breath sounds: Normal breath sounds. No stridor. No wheezing. Chest:      Chest wall: No tenderness.    Abdominal: 11/04/20 1349 11/04/20 2015 11/04/20 2050   BP: (!) 168/87  (!) 168/87   Pulse: 91  93   Resp: 17  16   Temp: 98.8 °F (37.1 °C)  98.2 °F (36.8 °C)   TempSrc:   Temporal   SpO2: 95%  96%   Weight: (!) 365 lb (165.6 kg) (!) 378 lb 9 oz (171.7 kg)    Height: 6' (1.829 m) 6' (1.829 m)        MDM  Ultrasound reveals persistent femoral DVT. On arm it reveals a concern for slow flow potentially developing the DVT in right axillary vessel. We also appreciate subclavian and jugular DVT. I have spoken with Dr. Salina Castillo who states that there needs to be an adjustment in Coumadin as she is subtherapeutic. Based on the location of these DVTs we will go ahead and elect to admit with Dr. Meyer Peer will take over care anticipate heparin or Lovenox. Consult to vascular. We want to confirm that he is getting back to a therapeutic level and there is no change in these newly found clots that may in fact be old but will need to monitor for that resolve. PROCEDURES:    Procedures      FINAL IMPRESSION      1. Jugular vein thrombosis, right    2. Subtherapeutic international normalized ratio (INR)          DISPOSITION/PLAN   DISPOSITION Admitted 11/04/2020 07:32:15 PM      PATIENT REFERRED TO:  No follow-up provider specified.     DISCHARGE MEDICATIONS:  Current Discharge Medication List          (Please note that portions of this note were completed with a voice recognition program.  Efforts were made to edit the dictations but occasionallywords are mis-transcribed.)    Mirna Mooney [address], [place]  11/04/20 0083

## 2020-11-04 NOTE — ED NOTES
Call placed @ [phone] to Vascular Surgery . Sujit Montano spoke with her on the phone . Electronically signed by Jayce Ridley on 11/4/2020 at 5:20 PM       Jayce Ridley  11/04/20 5173

## 2020-11-05 ENCOUNTER — TELEPHONE (OUTPATIENT)
Dept: HEMATOLOGY | Age: 40
End: 2020-11-05

## 2020-11-05 PROBLEM — N17.9 ACUTE KIDNEY INJURY (HCC): Status: RESOLVED | Noted: 2020-09-21 | Resolved: 2020-11-05

## 2020-11-05 LAB
ANION GAP SERPL CALCULATED.3IONS-SCNC: 13 MMOL/L (ref 7–19)
APTT: 162.5 SEC (ref 26–36.2)
APTT: 55.5 SEC (ref 26–36.2)
APTT: 57 SEC (ref 26–36.2)
BASOPHILS ABSOLUTE: 0.1 K/UL (ref 0–0.2)
BASOPHILS RELATIVE PERCENT: 0.6 % (ref 0–1)
BUN BLDV-MCNC: 15 MG/DL (ref 6–20)
CALCIUM SERPL-MCNC: 9.2 MG/DL (ref 8.6–10)
CHLORIDE BLD-SCNC: 105 MMOL/L (ref 98–111)
CO2: 22 MMOL/L (ref 22–29)
CREAT SERPL-MCNC: 1.1 MG/DL (ref 0.5–1.2)
EOSINOPHILS ABSOLUTE: 0.3 K/UL (ref 0–0.6)
EOSINOPHILS RELATIVE PERCENT: 3.5 % (ref 0–5)
GFR AFRICAN AMERICAN: >59
GFR NON-AFRICAN AMERICAN: >60
GLUCOSE BLD-MCNC: 134 MG/DL (ref 74–109)
HCT VFR BLD CALC: 32.4 % (ref 42–52)
HEMOGLOBIN: 10.3 G/DL (ref 14–18)
IMMATURE GRANULOCYTES #: 0.1 K/UL
INR BLD: 1.49 (ref 0.88–1.18)
LYMPHOCYTES ABSOLUTE: 2.4 K/UL (ref 1.1–4.5)
LYMPHOCYTES RELATIVE PERCENT: 26.9 % (ref 20–40)
MCH RBC QN AUTO: 28.4 PG (ref 27–31)
MCHC RBC AUTO-ENTMCNC: 31.8 G/DL (ref 33–37)
MCV RBC AUTO: 89.3 FL (ref 80–94)
MONOCYTES ABSOLUTE: 0.6 K/UL (ref 0–0.9)
MONOCYTES RELATIVE PERCENT: 6.8 % (ref 0–10)
NEUTROPHILS ABSOLUTE: 5.5 K/UL (ref 1.5–7.5)
NEUTROPHILS RELATIVE PERCENT: 61.6 % (ref 50–65)
PDW BLD-RTO: 15.2 % (ref 11.5–14.5)
PLATELET # BLD: 205 K/UL (ref 130–400)
PMV BLD AUTO: 10 FL (ref 9.4–12.4)
POTASSIUM REFLEX MAGNESIUM: 3.9 MMOL/L (ref 3.5–5)
PROTHROMBIN TIME: 18.1 SEC (ref 12–14.6)
RBC # BLD: 3.63 M/UL (ref 4.7–6.1)
SODIUM BLD-SCNC: 140 MMOL/L (ref 136–145)
WBC # BLD: 8.9 K/UL (ref 4.8–10.8)

## 2020-11-05 PROCEDURE — 05PYX3Z REMOVAL OF INFUSION DEVICE FROM UPPER VEIN, EXTERNAL APPROACH: ICD-10-PCS | Performed by: INTERNAL MEDICINE

## 2020-11-05 PROCEDURE — 6370000000 HC RX 637 (ALT 250 FOR IP): Performed by: HOSPITALIST

## 2020-11-05 PROCEDURE — 99253 IP/OBS CNSLTJ NEW/EST LOW 45: CPT | Performed by: SURGERY

## 2020-11-05 PROCEDURE — 36589 REMOVAL TUNNELED CV CATH: CPT | Performed by: SURGERY

## 2020-11-05 PROCEDURE — 94762 N-INVAS EAR/PLS OXIMTRY CONT: CPT

## 2020-11-05 PROCEDURE — 1210000000 HC MED SURG R&B

## 2020-11-05 PROCEDURE — 85730 THROMBOPLASTIN TIME PARTIAL: CPT

## 2020-11-05 PROCEDURE — 99222 1ST HOSP IP/OBS MODERATE 55: CPT | Performed by: INTERNAL MEDICINE

## 2020-11-05 PROCEDURE — 99222 1ST HOSP IP/OBS MODERATE 55: CPT | Performed by: NURSE PRACTITIONER

## 2020-11-05 PROCEDURE — 85025 COMPLETE CBC W/AUTO DIFF WBC: CPT

## 2020-11-05 PROCEDURE — 36415 COLL VENOUS BLD VENIPUNCTURE: CPT

## 2020-11-05 PROCEDURE — 6360000002 HC RX W HCPCS: Performed by: HOSPITALIST

## 2020-11-05 PROCEDURE — 80048 BASIC METABOLIC PNL TOTAL CA: CPT

## 2020-11-05 PROCEDURE — 85610 PROTHROMBIN TIME: CPT

## 2020-11-05 RX ORDER — WARFARIN SODIUM 5 MG/1
5 TABLET ORAL ONCE
Status: COMPLETED | OUTPATIENT
Start: 2020-11-05 | End: 2020-11-05

## 2020-11-05 RX ADMIN — WARFARIN SODIUM 5 MG: 5 TABLET ORAL at 00:16

## 2020-11-05 RX ADMIN — TIZANIDINE 4 MG: 4 TABLET ORAL at 22:01

## 2020-11-05 RX ADMIN — ACETAMINOPHEN 1000 MG: 500 TABLET, FILM COATED ORAL at 08:30

## 2020-11-05 RX ADMIN — HEPARIN SODIUM 8.21 UNITS/KG/HR: 10000 INJECTION, SOLUTION INTRAVENOUS at 13:49

## 2020-11-05 RX ADMIN — ACETAMINOPHEN 1000 MG: 500 TABLET, FILM COATED ORAL at 22:01

## 2020-11-05 RX ADMIN — WARFARIN SODIUM 7.5 MG: 2.5 TABLET ORAL at 17:24

## 2020-11-05 RX ADMIN — ACETAMINOPHEN 1000 MG: 500 TABLET, FILM COATED ORAL at 13:18

## 2020-11-05 RX ADMIN — AMLODIPINE BESYLATE 5 MG: 5 TABLET ORAL at 08:30

## 2020-11-05 ASSESSMENT — PAIN SCALES - GENERAL
PAINLEVEL_OUTOF10: 0
PAINLEVEL_OUTOF10: 0
PAINLEVEL_OUTOF10: 4
PAINLEVEL_OUTOF10: 0

## 2020-11-05 ASSESSMENT — PAIN DESCRIPTION - PAIN TYPE: TYPE: ACUTE PAIN

## 2020-11-05 ASSESSMENT — PAIN DESCRIPTION - DESCRIPTORS: DESCRIPTORS: DULL

## 2020-11-05 ASSESSMENT — PAIN DESCRIPTION - LOCATION: LOCATION: SHOULDER

## 2020-11-05 ASSESSMENT — PAIN DESCRIPTION - FREQUENCY: FREQUENCY: INTERMITTENT

## 2020-11-05 ASSESSMENT — ENCOUNTER SYMPTOMS: SHORTNESS OF BREATH: 0

## 2020-11-05 ASSESSMENT — PAIN DESCRIPTION - PROGRESSION: CLINICAL_PROGRESSION: GRADUALLY IMPROVING

## 2020-11-05 ASSESSMENT — PAIN - FUNCTIONAL ASSESSMENT: PAIN_FUNCTIONAL_ASSESSMENT: PREVENTS OR INTERFERES SOME ACTIVE ACTIVITIES AND ADLS

## 2020-11-05 ASSESSMENT — PAIN DESCRIPTION - ONSET: ONSET: GRADUAL

## 2020-11-05 ASSESSMENT — PAIN DESCRIPTION - ORIENTATION: ORIENTATION: RIGHT

## 2020-11-05 NOTE — CONSULTS
[id number] [facility] Vascular Surgery    CONSULT    Patient:  Sunni Brown  YOB: 1980  Date of Service: 11/5/2020  MRN: [id number]   Acct: [de-identified]   Primary Care Physician: VANDANA Marina    Reason for consult: DVT, RUE    Requesting Physician: Dr. Osorio Hess    History Obtained From: Patient    HISTORY OF PRESENT ILLNESS:  Mr. Sunni Brown is a 38 y.o. morbid obese male who recently experienced acute PE with right heart strain 2' to DVT, LLE in September of this year. He underwent EKOS with IVC filter placement on 09/21/2020 by  Dr. Es Smith. During the hospitalization, he also developed RONNELL, requiring hemodialysis. A right IJ permcath was placed on 09/23/2020. He presented to the ER on 11/04/2020 with complaint of LLE and right arm swelling. Work-up in the ER included venous ultrasound of RUE and LLE - noted to have acute appearing DVT in right IJ/Subclavian vein. DVT of LLE involving femoral and popliteal veins. Vascular surgery consulted for recommendations.         Past Medical History:       Diagnosis Date    Arthritis     both wrist    Bilateral carpal tunnel syndrome 8/15/2019    Bronchitis     10 yrs ago    Diabetes mellitus ([address]     Disease of blood and blood forming organ     Kidney stone     recurrent    Sleep apnea     untested    Umbilical hernia        Past Surgical History:        Procedure Laterality Date    CARPAL TUNNEL RELEASE Left 8/21/2020    LEFT CARPAL TUNNEL RELEASE performed by Zenon Bautista MD at [facility] Right 7/24/2018    CYSTOSCOPY/ URETEROSCOPY; INSERTION DOUBLE J STENT/  URETERAL performed by Prasad Becerril MD at [address] [place] [address]      both wrists    HEMORRHOID SURGERY N/A 6/24/2016    HEMORRHOID INCISION AND DRAINAGE performed by Mikey Sanchez MD at [place] [address] CYSTO/URETERO/PYELOSCOPY W/LITHOTRIPSY Right 8/7/2018    URETEROSCOPY LASER LITHOTRIPSY STONE EXTRACTION performed by Prasad Becerril MD at \[facility]"" 43 CYSTOSCOPY,INSERT URETERAL STENT Right 8/7/2018    STENT PLACEMENT performed by Maryjo Castillo MD at [facility] Right 2/13/2018    ESWL EXTRACORPEAL SHOCK WAVE LITHOTRIPSY performed by Maryjo Castillo MD at [facility] Right 3/13/2018    ESWL [address] LITHOTRIPSY performed by Maryjo Castillo MD at [facility] Right 7/24/2018    ESWL [address] LITHOTRIPSY performed by Maryjo Castillo MD at [place] [address], [address] N/A 5/7/9084    HERNIA UMBILICAL REPAIR LAPAROSCOPIC performed by Heidi Garrido MD at [address] [facility] [address] / [address] Left 9/20/2020     LEFT LOWER EXTREMITY VENOGRAMS; INFERIOR VENA CAVA FILTER PLACEMENT. performed by Dahiana George MD at [address]      left wrist.  Pt was a child       Allergies:  Patient has no known allergies.     Medications Current:   Current Facility-Administered Medications   Medication Dose Route Frequency Provider Last Rate Last Dose    warfarin (COUMADIN) daily dosing (placeholder)   Other RX Placeholder Claudean Close, MD        acetaminophen (TYLENOL) tablet 1,000 mg  1,000 mg Oral TID Claudean Close, MD   1,000 mg at 11/05/20 0830    amLODIPine (NORVASC) tablet 5 mg  5 mg Oral Daily Claudean Close, MD   5 mg at 11/05/20 0830    hydrALAZINE (APRESOLINE) tablet 25 mg  25 mg Oral Q4H PRN Claudean Close, MD        tiZANidine (ZANAFLEX) tablet 2 mg  2 mg Oral Q8H PRN Claudean Close, MD        tiZANidine (ZANAFLEX) tablet 4 mg  4 mg Oral Nightly Claudean Close, MD   4 mg at 11/04/20 2055    heparin (porcine) injection 10,000 Units  10,000 Units Intravenous PRN Claudean Close, MD        heparin (porcine) injection 5,000 Units  5,000 Units Intravenous PRN Claudean Close, MD        sodium chloride flush 0.9 % injection 10 mL  10 mL Intravenous 2 times per day Claudean Close, MD   10 mL at 11/04/20 2055    sodium chloride flush 0.9 % injection 10 mL  10 mL Intravenous PRN Mir Solorio MD        acetaminophen (TYLENOL) tablet 500 mg  500 mg Oral Q6H PRN Mir Soolrio MD        Or    acetaminophen (TYLENOL) suppository 650 mg  650 mg Rectal Q6H PRN Mir Solorio MD        potassium chloride (KLOR-CON M) extended release tablet 40 mEq  40 mEq Oral PRN Mir Solorio MD        Or    potassium bicarb-citric acid (EFFER-K) effervescent tablet 40 mEq  40 mEq Oral PRN Mir Solorio MD        Or    potassium chloride 10 mEq/100 mL IVPB (Peripheral Line)  10 mEq Intravenous PRN Mir Solorio MD        magnesium sulfate 2 g in 50 mL IVPB premix  2 g Intravenous PRN Mir Solorio MD        polyethylene glycol [facility]) packet 17 g  17 g Oral Daily PRN Mir Solorio MD        ondansetron (ZOFRAN-ODT) disintegrating tablet 4 mg  4 mg Oral Q8H PRN Mir Solorio MD        Or    ondansetron [facility]) injection 4 mg  4 mg Intravenous Q6H PRN Mir Solorio MD        heparin 25,000 units in dextrose 5% 250 mL infusion  12.23 Units/kg/hr Intravenous Continuous Mir Solorio MD 14.1 mL/hr at 11/05/20 0458 8.23 Units/kg/hr at 11/05/20 0458       Social History:  Reports that he has never smoked. He has never used smokeless tobacco. He reports current alcohol use. He reports that he does not use drugs. Family History:      Problem Relation Age of Onset   [facility] Cancer Mother 46        colon cancer    Cancer Father         luekemia    Diabetes Father     Other Maternal Grandmother         copd    Other Maternal Grandfather         copd    Heart Disease Paternal Grandmother      Mother had at least one miscarraige  REVIEW OF SYSTEMS:  General: Denies any fever or chills. Denies any unexplained weight loss or gain. Denies any change in activity or endurance. HEENT: Denies any headaches or visual changes. Respiratory: Denies any cough or hoarseness. Cardiac: Denies any chest pain or pressure.  Denies any palpitations. Denies any presyncope or syncope. Denies any orthopnea or PND. Denies any lower extremity edema. GI: Denies any abdominal pain. Denies any nausea or vomiting. Denies any change in bowel habits. Denies any recent history of GI tract blood loss. : Denies any hematuria, frequency, hesitancy, or dysuria. Musculoskeletal: Increased swelling of RUE and LLE. Neurological: Denies any paraesthesias. Denies any history of seizure or stroke symptoms. Psychological: Denies any problems with anxiety or depression. All other systems are negative except where stated above. PHYSICAL EXAM:  /62   Pulse 79   Temp 98.8 °F (37.1 °C) (Temporal)   Resp 18   Ht 6' (1.829 m)   Wt (!) 378 lb 9 oz (171.7 kg)   SpO2 92%   BMI 51.34 kg/m²   General appearance: Demonstrates a well-developed, well-nourished male who is alert and oriented in no acute distress. HEENT: Normocephalic. Atraumatic. MARCO A. NECK: Supple. NO JVD. No carotids bruits auscultated. Chest: Clear to auscultation bilaterally without wheezes or rhonchi. Cardiac: Normal heart tones with regular rate and rhythm, S1, S2 normal. No murmurs, gallops, or rubs auscultated. Abdomen: Obese, soft, non-tender; non-distended normal bowel sounds no masses, no organomegaly. Extremities: No clubbing or cyanosis. LLE swelling. RUE swelling with Right IJ permcath in place. Peripheral pulses palpable. Skin: Skin color, texture, turgor normal. No rashes or lesions  Neurologic: Grossly intact. LABS AND DIAGNOSTICS:     Venous Duplex, LLE:   There is evidence of acute deep vein thrombosis in the left lower extremity involving the femoral, popliteal, veins.    Superficial thrombophlebitis is seen in the short saphenous vein of the left lower extremity(ies).    Evidence of reflux in the deep veins of the left lower extremity.       Venous Duplex, RUE:   Acute appearing deep vein thrombosis (DVT) is seen in the internal jugular vein subclavian , right upper extremity(ies).         CBC with Differential:   Lab Results   Component Value Date    WBC 8.9 11/05/2020    RBC 3.63 11/05/2020    HGB 10.3 11/05/2020    HCT 32.4 11/05/2020     11/05/2020    MCV 89.3 11/05/2020    LYMPHOPCT 26.9 11/05/2020     BMP:   Lab Results   Component Value Date     11/05/2020    K 3.9 11/05/2020     11/05/2020    CO2 22 11/05/2020    BUN 15 11/05/2020    CREATININE 1.1 11/05/2020    CALCIUM 9.2 11/05/2020    GFRAA >59 11/05/2020    LABGLOM >60 11/05/2020    GLUCOSE 134 11/05/2020     PT/INR:  18.2 / 1.5      ASSESSMENT:    1. Acute DVT, RUE, Likely 2' to Right IJ Permcath   2. Recent Pulmonary Emboli with CorPulmonale with LLE DVT - S/P EKOS, D/C Home on Coumadin  3. DM, Type 2  4. Obstructive Sleep Apnea  5. Morbid Obesity with BMI 51.34    PLAN:    1. D/C Permcath  2. Recommend consult Hematology for Anticoagulation - check coagulation panel done on previous admission. VANDANA Rodrigez    Note: I saw, interviewed and examined patient. I added to H& P however, agree with What is written by VANDANA Slater. First of all I think right upper DVT and IJ DVT most likely related mainly to catheter which I have removed as it is not being used. I had discussion with him regarding anticoagulation, and etiology of his DVT. He is mainly of [place] heritage with some [facility] and [facility]. Also family history Positive for at least one miscarriage. Dr. Torie Hare is working up for hereditary conditions. He did have one positive Lupus anticoagulant screen. Could not find Leiden factor V test.  He has filter in place. I went over with him that removal of the filter may be very difficult with Rt. IJ thrombosis, hopefully this vein will recanalize with removal of catheter. For now needs to continue anticoagulation.

## 2020-11-05 NOTE — PROGRESS NOTES
[facility]    Patient:  Gilbert Kumar  YOB: 1980  Date of Service: 11/5/2020  MRN: [id number]   Acct: [de-identified]   Primary Care Physician: VANDANA Perkins  Advance Directive: Full Code  Admit Date: 11/4/2020       Hospital Day: 1  Portions of this note have been copied forward, however, changed to reflect the most current clinical status of this patient. CHIEF COMPLAINT Left leg/Right arm swelling    SUBJECTIVE: Mr. Kathie Miller has no new complaints. Spouse is at bedside states patient has frequent apneic episodes at night. Cumulative Hospital Course:  Mr. Kathie Miller is a 38year old male who was recently diagnosed and treated for extensive, acute bilateral pulmonary emboli with right heart strain 2/2 DVT 09/2020. He underwent EKOS, IVC filter placement and subsequently developed RONNELL requiring hemodialysis. A right IJ permcath was placed on 09/23/200. He presented to [address] [place] [address] ED on 11/04/2020 complaining of LLE/RUE edema. Work up revealed acute DVT in right IJ/subclavian vein. DVT LLE again noted, which is chronic, and involving femoral, popliteal veins. Vascular surgery was consulted who recommend Permcath removal as well as Hematology consultation as patient has had difficulty keeping INR 2.5-3.5. Review of Systems:   14 point review of systems is negative except as specifically addressed above. Objective:   VITALS:  /74   Pulse 93   Temp 98.9 °F (37.2 °C) (Temporal)   Resp 18   Ht 6' (1.829 m)   Wt (!) 378 lb 9 oz (171.7 kg)   SpO2 91%   BMI 51.34 kg/m²   24HR INTAKE/OUTPUT:  No intake or output data in the 24 hours ending 11/05/20 1422    General appearance: alert, appears stated age, cooperative and no distress  Head: Normocephalic, without obvious abnormality, atraumatic  Eyes: conjunctivae/corneas clear. PERRL, EOM's intact.    Ears: normal external ears and nose, throat without exudate  Neck: no adenopathy, no carotid bruit, no JVD, supple, symmetrical, trachea midline Lungs: decreased air entry at bases otherwise clear to auscultation bilaterally,no rales or wheezes   Heart: regular rate and rhythm, S1, S2 normal, no murmur  Abdomen:soft,obese, non-tender; non-distended, normal bowel sounds no masses, no organomegaly  Extremities:RUE/LLE edema otherwise No erythema or TTP  Skin: Skin color, texture, turgor normal. No rashes or lesions  Lymphatic: No palpable lymph node enlargment  Neurologic: Alert and oriented X 3, normal strength and tone. No focal deficits  Psychiatric: Alert and oriented, thought content appropriate, normal insight, mood appropriate    Medications:      heparin (PORCINE) Infusion 8.212 Units/kg/hr (11/05/20 1349)      warfarin (COUMADIN) daily dosing (placeholder)   Other RX Placeholder    acetaminophen  1,000 mg Oral TID    amLODIPine  5 mg Oral Daily    tiZANidine  4 mg Oral Nightly    sodium chloride flush  10 mL Intravenous 2 times per day     hydrALAZINE, tiZANidine, heparin (porcine), heparin (porcine), sodium chloride flush, acetaminophen **OR** acetaminophen, potassium chloride **OR** potassium alternative oral replacement **OR** potassium chloride, magnesium sulfate, polyethylene glycol, ondansetron **OR** ondansetron  DIET CARB CONTROL;     Lab and other Data:     Recent Labs     11/04/20  1526 11/05/20  0227   WBC 9.7 8.9   HGB 10.8* 10.3*    205     Recent Labs     11/04/20  1526 11/05/20  0227    140   K 4.1 3.9    105   CO2 25 22   BUN 12 15   CREATININE 0.9 1.1   GLUCOSE 104 134*     Recent Labs     11/04/20  1526   AST 17   ALT 27   BILITOT <0.2   ALKPHOS 93     INR:   Recent Labs     11/04/20  1526 11/05/20  0500   INR 1.50* 1.49*     RAD:   Vl Extremity Venous Left   Impression   There is evidence of acute deep vein thrombosis in the left lower  extremity involving the femoral, popliteal, veins. Superficial thrombophlebitis is seen in the short saphenous vein of the  left lower extremity(ies).   Evidence of reflux in the deep veins of the left lower extremity. Vl Extremity Venous Right   Impression   Acute appearing deep vein thrombosis (DVT) is seen in the internal jugular  vein subclavian , right upper extremity(ies). Assessment/Plan   Principal Problem:    Jugular vein thrombosis, right-likely 2/2 Permcath with plans for removal per Vascular surgery    Active Problems:    Subtherapeutic international normalized ratio (INR)-Hematology consulted for assistance, OP follow up, ? 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  If qualifies will set up nightly home oxygen and recommend formal OP sleep study     DVT Prophylaxis:Coumadin, Heparin gtt    Lorraine Closs, PA-C

## 2020-11-05 NOTE — PROGRESS NOTES
Irving Dubin arrived to room # 329. Presented with: recurrent DVTs  Mental Status: Patient is oriented, alert, coherent, logical, thought processes intact and able to concentrate and follow conversation. Vitals:    11/05/20 0019   BP: 110/78   Pulse: 94   Resp: 16   Temp: 97 °F (36.1 °C)   SpO2: 94%     Patient safety contract and falls prevention contract reviewed with patient Yes. Oriented Patient and Family to room. Call light within reach. Yes.   Needs, issues or concerns expressed at this time: no.      Electronically signed by Anmol Robert RN on 11/5/2020 at 6:13 AM

## 2020-11-05 NOTE — PROGRESS NOTES
4 Eyes Skin Assessment    Elissa Espitia is being assessed upon: Admission    I agree that I, Nick Dahl, along with Hema Leonard (either 2 RN's or 1 LPN and 1 RN) have performed a thorough Head to Toe Skin Assessment on the patient. ALL assessment sites listed below have been assessed. Areas assessed by both nurses:     [x]   Head, Face, and Ears   [x]   Shoulders, Back, and Chest  [x]   Arms, Elbows, and Hands   [x]   Coccyx, Sacrum, and Ischium  [x]   Legs, Feet, and Heels    Does the Patient have Skin Breakdown?  No    Kailash Prevention initiated: No  Wound Care Orders initiated: No    WOC nurse consulted for Pressure Injury (Stage 3,4, Unstageable, DTI, NWPT, and Complex wounds) and New or Established Ostomies: No        Primary Nurse eSignature: Nick Dahl RN on 11/5/2020 at 6:14 AM      Co-Signer eSignature: Electronically signed by Hema Leonard RN on 11/5/20 at 6:15 AM CST

## 2020-11-05 NOTE — H&P
Patient Information:  Patient: Elissa Espitia  MRN: [id number]   Acct: [de-identified]  YOB: 1980  Admit Date: 11/4/2020      Date of Service: 11/4/2020   Primary Care Physician: VANDANA Calvo  Advance Directive: Full Code        SUBJECTIVE:    Chief Complaint   Patient presents with    Leg Swelling     left leg and right arm x 3 days     EP Sign Out:  HAS recurrent DVTs: is obese and prediabetic with Hx of prior DVTs and PEs seen by Vasc Sx and got port in Sept, was for port removal, is subtheraputic at 1.5 today, HAS ACUTE OF RIGHT UE and LEFT LE     HPI:  Mr. Elissa Espitia has had discomfort of the right arm wtih palpable swelling over the last three days. He states that he could feel it swelling. He has had the leg swelling for the last three days. When the arm started swelling he states that he was very concerned as he has had renal failure before and had had to be on hemodialysis at that time. Review of Systems:   Review of Systems   Constitutional: Positive for chills and fatigue. Negative for diaphoresis and fever. Respiratory: Negative for shortness of breath. Cardiovascular: Positive for leg swelling. Negative for chest pain. Has arm swelling   Genitourinary: Negative for dysuria, frequency and urgency. Has Urine malodor   Neurological: Negative for weakness. Psychiatric/Behavioral: Negative for confusion.    he related the chills to his blood thinner, he has had shoulder pain since the arm began to swell    (following subjective sections as per chart review, other than allergies)    Past Medical History:   Diagnosis Date    Arthritis     both wrist    Bilateral carpal tunnel syndrome 8/15/2019    Bronchitis     10 yrs ago    Diabetes mellitus ([facility] [address]     Disease of blood and blood forming organ     Kidney stone     recurrent    Sleep apnea     untested    Umbilical hernia      Past Surgical History:   Procedure Laterality Date    CARPAL TUNNEL RELEASE Left 8/21/2020    LEFT CARPAL TUNNEL RELEASE performed by Karin Bill MD at [facility] Right 7/24/2018    CYSTOSCOPY/ URETEROSCOPY; INSERTION DOUBLE J STENT/  URETERAL performed by Racheal Abad MD at [address] [place]      both wrists    HEMORRHOID SURGERY N/A 6/24/2016    HEMORRHOID INCISION AND DRAINAGE performed by Greg Enriquez MD at [address] [facility] W/LITHOTRIPSY Right 8/7/2018    URETEROSCOPY LASER LITHOTRIPSY STONE EXTRACTION performed by Racheal Abad MD at [facility] Right 8/7/2018    STENT PLACEMENT performed by Racheal Abad MD at [address] [facility] Right 2/13/2018    ESWL EXTRACORPEAL SHOCK WAVE LITHOTRIPSY performed by Racheal Abad MD at [address] [facility] [address] Right 3/13/2018    ESWL [address] LITHOTRIPSY performed by Racheal Abad MD at [address] [facility] Right 7/24/2018    ESWL [address] LITHOTRIPSY performed by Racheal Abad MD at [address], VENTRAL HERNIA REPAIR,REDUCIBLE N/A 3/6/9786    HERNIA UMBILICAL REPAIR LAPAROSCOPIC performed by Tiffany Jorge MD at [address] [facility] [address] / [address] Left 9/20/2020     LEFT LOWER EXTREMITY VENOGRAMS; INFERIOR VENA CAVA FILTER PLACEMENT. performed by Christofer Fenton MD at [address]      left wrist.  Pt was a child     Social History     Tobacco History     Smoking Status  Never Smoker    Smokeless Tobacco Use  Never Used    Tobacco Comment  Unload trucks at [employer]          Alcohol History     Alcohol Use Status  Yes Comment  OCC          Drug Use     Drug Use Status  No          Sexual Activity     Sexually Active  Yes Partners  Female           Family History   Problem Relation Age of Onset   Gloriajean Seeds Cancer Mother 46        colon cancer    Cancer Father         luekemia    Diabetes Father     Other Maternal Grandmother         copd    Other Maternal Grandfather         copd    Heart Disease Paternal Grandmother      Allergies:  No Known Allergies    Current Facility-Administered Medications   Medication Dose Route Frequency Provider Last Rate Last Dose    warfarin (COUMADIN) daily dosing (placeholder)   Other RX Placeholder Randall Puckett MD        acetaminophen (TYLENOL) tablet 1,000 mg  1,000 mg Oral TID Randall Puckett MD   1,000 mg at 11/05/20 0830    amLODIPine (NORVASC) tablet 5 mg  5 mg Oral Daily Randall Puckett MD   5 mg at 11/05/20 0830    hydrALAZINE (APRESOLINE) tablet 25 mg  25 mg Oral Q4H PRN Randall Puckett MD        tiZANidine (ZANAFLEX) tablet 2 mg  2 mg Oral Q8H PRN Randall Puckett MD        tiZANidine (ZANAFLEX) tablet 4 mg  4 mg Oral Nightly Randall Puckett MD   4 mg at 11/04/20 2055    heparin (porcine) injection 10,000 Units  10,000 Units Intravenous PRN Randall Puckett MD        heparin (porcine) injection 5,000 Units  5,000 Units Intravenous PRN Randall Puckett MD        sodium chloride flush 0.9 % injection 10 mL  10 mL Intravenous 2 times per day Randall Puckett MD   10 mL at 11/04/20 2055    sodium chloride flush 0.9 % injection 10 mL  10 mL Intravenous PRN Randall Puckett MD        acetaminophen (TYLENOL) tablet 500 mg  500 mg Oral Q6H PRN Randall Puckett MD        Or    acetaminophen (TYLENOL) suppository 650 mg  650 mg Rectal Q6H PRN Randall Puckett MD        potassium chloride (KLOR-CON M) extended release tablet 40 mEq  40 mEq Oral PRN Randall Puckett MD        Or    potassium bicarb-citric acid (EFFER-K) effervescent tablet 40 mEq  40 mEq Oral PRN Randall Puckett MD        Or    potassium chloride 10 mEq/100 mL IVPB (Peripheral Line)  10 mEq Intravenous PRN Randall Puckett MD        magnesium sulfate 2 g in 50 mL IVPB premix  2 g Intravenous PRN Randall Puckett MD        polyethylene glycol [facility]) packet 17 g  17 g Oral Daily PRN Randall Puckett MD        ondansetron (ZOFRAN-ODT) disintegrating tablet 4 mg  4 mg Oral Q8H PRN Bronson Luna MD        Or    ondansetron [facility]) injection 4 mg  4 mg Intravenous Q6H PRN Bronson Luna MD        heparin 25,000 units in dextrose 5% 250 mL infusion  12.23 Units/kg/hr Intravenous Continuous Bronson Luna MD 14.1 mL/hr at 11/05/20 0458 8.23 Units/kg/hr at 11/05/20 0458     Home Medications:  Prior to Admission medications    Medication Sig Start Date End Date Taking? Authorizing Provider   amLODIPine (NORVASC) 5 MG tablet Take 1 tablet by mouth daily 10/29/20 11/28/20  VANDANA Padilla   warfarin (COUMADIN) 5 MG tablet TAKE 7.5MG ON TUES AND THURS AND 5MG ALL OTHER DAYS, OR AS DIRECTED. 10/26/20   VANDANA Padilla   Homeopathic Products ([place]) FOAM Use daily for spasms 10/2/20   VANDANA Padilla   hydrALAZINE (APRESOLINE) 25 MG tablet Take 1 tablet by mouth every 6 hours as needed (SBP>160mmHg) 9/28/20 10/28/20  Sailaja Campbell MD   vitamin D (ERGOCALCIFEROL) 1.25 MG (35670 UT) CAPS capsule Take 1 capsule by mouth once a week for 4 doses 9/29/20 10/21/20  Sailaja Campbell MD   acetaminophen (APAP EXTRA STRENGTH) 500 MG tablet Take 2 tablets by mouth 3 times daily for 10 days 9/16/20 9/26/20  Zhao Chavez MD   tiZANidine (ZANAFLEX) 4 MG tablet Take 1 tablet by mouth nightly as needed (spasm) 6/10/20   VANDANA Padilla         OBJECTIVE:    Vitals:    11/04/20 1349   BP: (!) 168/87   Pulse: 91   Resp: 17   Temp: 98.8 °F (37.1 °C)   SpO2: 95%   breathing on room air    BP (!) 168/87   Pulse 91   Temp 98.8 °F (37.1 °C)   Resp 17   Ht 6' (1.829 m)   Wt (!) 365 lb (165.6 kg)   SpO2 95%   BMI 49.50 kg/m²     No intake or output data in the 24 hours ending 11/04/20 1959    Physical Exam  Vitals signs reviewed. Constitutional:       General: He is not in acute distress. Appearance: Normal appearance. He is obese. He is not ill-appearing or toxic-appearing. HENT:      Head: Normocephalic and atraumatic.

## 2020-11-05 NOTE — PROGRESS NOTES
Comprehensive Nutrition Assessment    Type and Reason for Visit:  Initial, Positive Nutrition Screen    Nutrition Recommendations/Plan: follow for diet advancement and tolerance. Follow for questions that may arise re: salt or Vit K    Nutrition Assessment:  Pt is well nourished and not at risk for nutritional compromise at this time. Aware pt asked to see Dietitian. Pt NPO at this time, but did stop to see if I could answer any questions. New educational sheet for Vit K givento pt and wife. Also discussed salt consumption and a way to decreased weight. Believe pt would do best with watching portion sizes for decreasing weight. List of foods high insodium also given.     Malnutrition Assessment:  Malnutrition Status:  No malnutrition    Context:  Acute Illness     Findings of the 6 clinical characteristics of malnutrition:  Energy Intake:  Unable to assess  Weight Loss:  No significant weight loss     Body Fat Loss:  No significant body fat loss     Muscle Mass Loss:  No significant muscle mass loss    Fluid Accumulation:  1 - Mild Extremities   Strength:  Not Performed    Nutrition Related Findings:  well nourished, morbidly obese      Wounds:  None       Current Nutrition Therapies:    Diet NPO, After Midnight    Anthropometric Measures:  · Height: 6' (182.9 cm)  · Current Body Weight: 378 lb 9 oz (171.7 kg)   · Admission Body Weight: 365 lb (165.6 kg)(stated)    · Usual Body Weight: 370 lb (167.8 kg)(8/2020)     · Ideal Body Weight: 178 lbs; % Ideal Body Weight 212.7 %   · BMI: 51.3  · Adjusted Body Weight:  ; No Adjustment   · BMI Categories: Obese Class 3 (BMI 40.0 or greater)       Nutrition Diagnosis:   · Overweight/Obese related to excessive energy intake as evidenced by BMI      Nutrition Interventions:   Food and/or Nutrient Delivery:  Continue NPO  Nutrition Education/Counseling:  Education completed, Counseling initiated   Coordination of Nutrition Care:  Continue to monitor while inpatient    Goals:  po intake 75% or greater. Weight stable or lose 1-4# per week       Nutrition Monitoring and Evaluation:   Behavioral-Environmental Outcomes:  Readiness for Change   Food/Nutrient Intake Outcomes:  Diet Advancement/Tolerance, Food and Nutrient Intake  Physical Signs/Symptoms Outcomes:  Biochemical Data, Skin, Weight, Hemodynamic Status     Discharge Planning:     Too soon to determine     Electronically signed by Christine Blancas MS, RD, LD on 11/5/20 at 10:42 AM CST    Contact: [phone]

## 2020-11-05 NOTE — PROGRESS NOTES
Clinical Pharmacy Note    Warfarin consult follow-up    Recent Labs     11/05/20  0500   INR 1.49*     Recent Labs     11/04/20  1526 11/05/20  0227   HGB 10.8* 10.3*   HCT 33.9* 32.4*    205       Significant Drug-Drug Interactions:  New warfarin drug-drug interactions: None  Discontinued drug-drug interactions: None    Date INR Warfarin Dose   11/04/20 1.5     11/05/20   5 mg (0030)   11/05/20   1.49 7.5 mg                                        Notes:  Give Wafarin 7.5 mg po x 1 tonight. Daily PT/INR until stable within therapeutic range.      Electronically signed by MELINA Kamara [facility] on 11/5/2020 at 2:50 PM

## 2020-11-05 NOTE — ED NOTES
Patient assigned room [facility] [address], [address] [place] [address]  11/04/20 3740

## 2020-11-05 NOTE — PROGRESS NOTES
Clinical Pharmacy Note    Alex Morales is a 38 y.o. male for whom pharmacy has been asked to manage warfarin therapy. Reason for Admission: DVT    Consulting Physician: Cristine Sutton  Warfarin dose prior to admission: 7.5 mg Tueday/Thursday 5 mg all other days  Warfarin indication: PE  Target INR range: 2-3   Outpatient warfarin provider: Thad ROSS    Past Medical History:   Diagnosis Date    Arthritis     both wrist    Bilateral carpal tunnel syndrome 8/15/2019    Bronchitis     10 yrs ago    Diabetes mellitus ([facility] [address]     Disease of blood and blood forming organ     Kidney stone     recurrent    Sleep apnea     untested    Umbilical hernia                 Recent Labs     11/04/20  1526   INR 1.50*     Recent Labs     11/04/20  1526   HGB 10.8*   HCT 33.9*          Current warfarin drug-drug interactions: None      Date INR Warfarin Dose   11/04/20 1.5    11/05/20  5 mg (0030)                              Give Coumadin 5 mg x 1 dose now    Daily PT/INR until stable within therapeutic range. Thank you for the consult.      Electronically signed by Etta Quezada, [address] [place] [address] on 11/5/2020 at 12:12 AM

## 2020-11-05 NOTE — CONSULTS
MEDICAL ONCOLOGY CONSULTATION    Pt Name: Mark Nguyen  MRN: [id number]  YOB: 1980  Date of evaluation: 11/5/2020    REASON FOR CONSULTATION: Blood clots  REQUESTING PHYSICIAN: Hospitalist    History Obtained From:    patient, electronic medical record    HISTORY OF PRESENT ILLNESS:    Tabatha Black was first seen by me on 11/5/2020 at [facility] inpatient consultation. I was consulted for findings of chronic blood clots. The patient was admitted on 11/4/2020. He presented to the emergency department with complaints of swelling of the left leg and right arm of 3 days duration. The patient has a history of DVT on Coumadin. He is followed by vascular. He also has a history of pulmonary embolism. He has undergone several procedures in September for DVTs of lower extremity. He had catheter directed thrombolysis performed to the left popliteal vein and distal external iliac vein. An IVC filter was placed. Darleen Ni is a 54-year-old  gentleman admitted to [address] [place] [address] ED on 9/19/2020 having presented with pain and swelling of the left leg, burning in the right side of his chest with evaluation leading to a diagnosis of left lower extremity DVT and PE.  Hematology consultation requested.     HEMATOLOGICAL HISTORY: Left lower extremity DVT with right lung pulmonary emboli 9/19/2020  Darleen Ni was seen in initial hematology consultation on 9/20/2020 as an inpatient at [address] [place] [address] having been admitted on 9/19/2020 with left lower extremity DVT and PE.  He was placed on heparin as initial management.     Mr. Larisa Navarro has a 5-day history of left leg pain and swelling.  Initially he thought he had pulled a muscle but this did not get better.  When things did not get better, he sought evaluation at the ED at [address] [place] [address]. Additionally he has nonspecific symptoms of pulmonary embolus including burning in the right side of his chest.  He does not have hemoptysis or dyspnea.   Mr. Larisa Navarro does not have a personal history of DVT or PE or hypercoagulability. Risk features include:   Obesity.    Recent left hand carpal tunnel surgery in August 2020. Family history: His mother had an episode of left lower extremity DVT and PE 15 years ago without recurrence.  A maternal aunt also had DVT of the lower extremities.     Noninvasive venous studies of the lower extremities on 9/19/2020 document:   · Extensive thrombosis (DVT) noted in Lt common femoral vein, Lt SFV (prox, mid and distal), Lt popliteal vein, Lt Gastrocs, Lt posterior tibial veins.  A floating tail is noted in left common femoral vein measuring approximately 4.3cm.     · SVT: thrombus noted in Lt LSV     Pulmonary CTA with contrast on 9/19/2020 documented the following:  · Multiple right-sided acute pulmonary emboli identified involving the right third fourth and fifth ordered pulmonary artery branches extending into the right lower lobe. · Possible right heart strain  · No left sided pulmonary emboli identified  · Small calcified subcarinal lymph nodes consistent with healed granulomatous disease  · Scattered calcified granulomas in both lungs     He was seen by Dr. Carmen Tucker from vascular surgery this morning (9/20/2020), with recommendations for percutaneous mechanical thrombectomy/pulse spray thrombolysis to try to prevent long-term swelling in this young patient with iliofemoral DVT.  If the result in that procedure is not satisfactory, he would consider placing a TPA thrombolyzes catheter for thrombolysis overnight.  The patient agreed to proceed.   Dr. Carmen Tucker did not feel that he needed TPA thrombolysis of the pulmonary embolism because he is fairly asymptomatic from that standpoint.     Agree with plans as outlined  A serologic prothrombotic work-up will be requested.              Past Medical History:    Past Medical History:   Diagnosis Date    Arthritis     both wrist    Bilateral carpal tunnel syndrome 8/15/2019    Bronchitis     10 yrs ago    Diabetes mellitus ([facility] [address]     Disease of blood and blood forming organ     Kidney stone     recurrent    Sleep apnea     untested    Umbilical hernia        Past Surgical History:    Past Surgical History:   Procedure Laterality Date    CARPAL TUNNEL RELEASE Left 8/21/2020    LEFT CARPAL TUNNEL RELEASE performed by Avni Garcia MD at [address] [place] [address] Right 7/24/2018    CYSTOSCOPY/ URETEROSCOPY; INSERTION DOUBLE J STENT/  URETERAL performed by Willian Kelley MD at [facility]      both wrists    HEMORRHOID SURGERY N/A 6/24/2016    HEMORRHOID INCISION AND DRAINAGE performed by Governor Kirk MD at [facility] CYSTO/URETERO/PYELOSCOPY W/LITHOTRIPSY Right 8/7/2018    URETEROSCOPY LASER LITHOTRIPSY STONE EXTRACTION performed by Willian Kelley MD at [facility] Right 8/7/2018    STENT PLACEMENT performed by Willian Kelley MD at [address] [place] [address] ESWL Right 2/13/2018    ESWL EXTRACORPEAL SHOCK WAVE LITHOTRIPSY performed by Willian Kelley MD at [address] [place] [address] ESWL Right 3/13/2018    ESWL EXTRACORPEAL SHOCK WAVE LITHOTRIPSY performed by Willian Kelley MD at [address] [place] [address] ESWL Right 7/24/2018    ESWL [address] LITHOTRIPSY performed by Willian Kelley MD at [address], [address] N/A 8/4/1045    HERNIA UMBILICAL REPAIR LAPAROSCOPIC performed by Demetra Bautista MD at [address] / [address] Left 9/20/2020     LEFT LOWER EXTREMITY VENOGRAMS; INFERIOR VENA CAVA FILTER PLACEMENT. performed by Jarad Daly MD at [address]      left wrist.  Pt was a child       Social History:    Social History     Socioeconomic History    Marital status:      Spouse name: Not on file    Number of children: Not on file    Years of education: Not on file    Highest education level: Not on file   Occupational History    Not on file   Social Needs    Financial resource strain: Not on file    Food insecurity     Worry: Not on file     Inability: Not on file    Transportation needs     Medical: Not on file     Non-medical: Not on file   Tobacco Use    Smoking status: Never Smoker    Smokeless tobacco: Never Used    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[address] and Sexual Activity    Alcohol use: Yes     Comment: OCC    Drug use: No    Sexual activity: Yes     Partners: Female   Lifestyle    Physical activity     Days per week: Not on file     Minutes per session: Not on file    Stress: Not on file   Relationships    Social connections     Talks on phone: Not on file     Gets together: Not on file     Attends Voodoo service: Not on file     Active member of club or organization: Not on file     Attends meetings of clubs or organizations: Not on file     Relationship status: Not on file    Intimate partner violence     Fear of current or ex partner: Not on file     Emotionally abused: Not on file     Physically abused: Not on file     Forced sexual activity: Not on file   Other Topics Concern    Not on file   Social History Narrative    Not on file       Family History:   Family History   Problem Relation Age of Onset    Cancer Mother 46        colon cancer    Cancer Father         luekemia    Diabetes Father     Other Maternal Grandmother         copd    Other Maternal Grandfather         copd    Heart Disease Paternal Grandmother        Current Hospital Medications:    Current Facility-Administered Medications: warfarin (COUMADIN) daily dosing (placeholder), , Other, RX Placeholder  warfarin (COUMADIN) tablet 7.5 mg, 7.5 mg, Oral, Once  acetaminophen (TYLENOL) tablet 1,000 mg, 1,000 mg, Oral, TID  amLODIPine (NORVASC) tablet 5 mg, 5 mg, Oral, Daily  hydrALAZINE (APRESOLINE) tablet 25 mg, 25 mg, Oral, Q4H PRN  tiZANidine (ZANAFLEX) tablet 2 mg, 2 mg, Oral, Q8H PRN  tiZANidine (ZANAFLEX) tablet 4 mg, 4 mg, Oral, Nightly  heparin (porcine) injection 10,000 Units, 10,000 Units, Intravenous, PRN  heparin (porcine) injection 5,000 Units, 5,000 Units, Intravenous, PRN  sodium chloride flush 0.9 % injection 10 mL, 10 mL, Intravenous, 2 times per day  sodium chloride flush 0.9 % injection 10 mL, 10 mL, Intravenous, PRN  acetaminophen (TYLENOL) tablet 500 mg, 500 mg, Oral, Q6H PRN **OR** acetaminophen (TYLENOL) suppository 650 mg, 650 mg, Rectal, Q6H PRN  potassium chloride (KLOR-CON M) extended release tablet 40 mEq, 40 mEq, Oral, PRN **OR** potassium bicarb-citric acid (EFFER-K) effervescent tablet 40 mEq, 40 mEq, Oral, PRN **OR** potassium chloride 10 mEq/100 mL IVPB (Peripheral Line), 10 mEq, Intravenous, PRN  magnesium sulfate 2 g in 50 mL IVPB premix, 2 g, Intravenous, PRN  polyethylene glycol (GLYCOLAX) packet 17 g, 17 g, Oral, Daily PRN  ondansetron (ZOFRAN-ODT) disintegrating tablet 4 mg, 4 mg, Oral, Q8H PRN **OR** ondansetron (ZOFRAN) injection 4 mg, 4 mg, Intravenous, Q6H PRN  heparin 25,000 units in dextrose 5% 250 mL infusion, 12.23 Units/kg/hr, Intravenous, Continuous    Allergies: No Known Allergies      Subjective   REVIEW OF SYSTEMS:   CONSTITUTIONAL: no fever, no night sweats,  fatigue;  HEENT: no blurring of vision, no double vision, no hearing difficulty, no tinnitus, no ulceration, no dysplasia, no epistaxis;  LUNGS: no cough, no hemoptysis, no wheeze,  no shortness of breath;  CARDIOVASCULAR: no palpitation, no chest pain, no shortness of breath;  GI: no abdominal pain, no nausea, no vomiting, no diarrhea, no constipation;  RAKAN: no dysuria, no hematuria, no frequency or urgency, no nephrolithiasis;  MUSCULOSKELETAL: Leg swelling, arm swelling no joint pain, no swelling, no stiffness;  ENDOCRINE: no polyuria, no polydipsia, no cold or heat intolerance;  HEMATOLOGY: no easy bruising or bleeding, no history of clotting disorder;  DERMATOLOGY: no skin rash, no eczema, no pruritus;  PSYCHIATRY: no depression, no anxiety, no panic attacks, no suicidal ideation, no homicidal ideation;  NEUROLOGY: no syncope, no seizures, no numbness or tingling of hands, no numbness or tingling of feet, no paresis;    Objective   /74   Pulse 93   Temp 98.9 °F (37.2 °C) (Temporal)   Resp 18   Ht 6' (1.829 m)   Wt (!) 378 lb 9 oz (171.7 kg)   SpO2 91%   BMI 51.34 kg/m²     PHYSICAL EXAM:  CONSTITUTIONAL: Alert, appropriate, no acute distress, obese  EYES: Non icteric, EOM intact, pupils equal round   ENT: Mucus membranes moist, no oral pharyngeal lesions, external inspection of ears and nose are normal  NECK: Supple, no masses. No palpable thyroid mass  CHEST/LUNGS: CTA bilaterally, normal respiratory effort   CARDIOVASCULAR: RRR, no murmurs. No lower extremity edema  ABDOMEN: soft non-tender, active bowel sounds, no HSM. No palpable masses  EXTREMITIES: Left lower extremity swelling, warm, full ROM in all 4 extremities, no focal weakness. SKIN: warm, dry with no rashes or lesions  LYMPH: No cervical, clavicular, axillary, or inguinal lymphadenopathy  NEUROLOGIC: follows commands, non focal   PSYCH: mood and affect appropriate.   Alert and oriented to time, place, person        LABORATORY RESULTS REVIEWED BY ME:  Recent Labs     11/05/20  0227 11/04/20  1526   WBC 8.9 9.7   HGB 10.3* 10.8*   HCT 32.4* 33.9*   MCV 89.3 89.7    222       Lab Results   Component Value Date     11/05/2020    K 3.9 11/05/2020     11/05/2020    CO2 22 11/05/2020    BUN 15 11/05/2020    CREATININE 1.1 11/05/2020    GLUCOSE 134 (H) 11/05/2020    CALCIUM 9.2 11/05/2020    PROT 7.5 11/04/2020    LABALBU 4.0 11/04/2020    BILITOT <0.2 11/04/2020    ALKPHOS 93 11/04/2020    AST 17 11/04/2020    ALT 27 11/04/2020    LABGLOM >60 11/05/2020    GFRAA >59 11/05/2020    GLOB 3.2 03/07/2017       Lab Results   Component Value Date    INR 1.49 (H) 11/05/2020    INR 1.50 (H) 11/04/2020    INR 1.5 10/29/2020    PROTIME 18.1 (H) 11/05/2020    PROTIME 18.2 (H) 11/04/2020    PROTIME 26.6 (H) 09/28/2020       RADIOLOGY STUDIES REPORT/REVIEWED AND INTERPRETED BY ME:  Vl Extremity Venous Left    Result Date: 11/5/2020  Vascular Lower Extremities DVT Study \  Impression   There is evidence of acute deep vein thrombosis in the left lower  extremity involving the femoral, popliteal, veins. Superficial thrombophlebitis is seen in the short saphenous vein of the  left lower extremity(ies). Evidence of reflux in the deep veins of the left lower extremity. Vl Extremity Venous Right  Impression   Acute appearing deep vein thrombosis (DVT) is seen in the internal jugular  vein subclavian , right upper extremity(ies)    Vl Extremity Venous Left 09/20/2020   Impression   There is evidence of subacute deep vein thrombosis in the left lower  extremity involving the common femoral, femoral, popliteal, posterior  tibial, and gastrocnemius veins. The anterior tibial and peroneal veins  were not well visualized secondary to edema and limb size.  Superficial thrombophlebitis is seen in the short saphenous vein of the  left lower extremity.  Floating tail left common femoral vein around 4 cm in length.        Cta Pulmonary W Contrast 09/19/2020  Multiple acute pulmonary emboli identified on the right, involving the third, fourth and fifth order pulmonary artery branches extending into the right lower lobe.  There are findings that are suggestive of mild right heart strain.      ASSESSMENT:  #Recurrent venous thromboembolism LLE DVT and PE- provoked event ? ?(Recent surgery), Sep 2020, Nov 2020 (subtherapeutic INR), heterozygous factor V Leiden  Left LE DVT acute and IJ catheter related DVT (removed by vascular 11/05/2030)  Risk factors: Morbid obesity, recent surgery  S/p catheter directed thrombolysis September 2020.   · Percutaneous thrombectomy/PulseSpray thrombolysis with AngioJet Zelante catheter  · Left lower extremity venograms after percutaneous thrombectomy and thrombolysis  · Placement of 40 cm infusion length EKOS TPA catheter from the popliteal vein all the way to the distal external iliac vein  · Inferior vena cava filter placement from a right internal jugular vein approach (Bard Owen inferior vena cava filter)  · 9/21/2002-removal of EKOS TPA catheter     Patient was discharged on warfarin with therapeutic INR on 9/28/2020    DOACS not a good option due to morbid obesity with BMI above 50     Prothrombotic work-up  Beta 2 glycoprotein IgG and IgM - both negative  Cardiolipin Ab IgG and IgM - both negative  Phospholipid Ab - negative  Protein C antigen-95%   Protein S antigen-157%(H)  Antithrombin III-86%  Prothrombin gene mutation-not detected  Factor V Leiden-heterozygous, one copy    #Subtherapeutic INR-the patient will need a close follow-up with the Coumadin clinic for control of his INR. He left with INR at therapeutic level. His INR at admission was subtherapeutic. Case management consult for outpatient LMWH to bridge with coumadin         #H/o Contrast-induced nephropathy Sep 2020-  Creatinine 1.0     #Morbid obesity-BMI above 50.-As per primary care provider. Not treating physician. DOACS not a good option as this was not studied in patients BMI above 40. Currently on warfarin. Subtherapeutic INR.     #Family history of VTE-apparently mother had 2 episodes of a provoked event.     #Vitamin D deficiency-10.7. Patient was discharged on Vitamin D 50,000 weekly     #Normocytic anemia- anemia of blood loss/acute illness. Hemoglobin 10.3     Disposition- appointment with Nhan Howard 11/25/2020 9 AM to follow-up results of thrombophilia work-up. Please arrange follow-up at the Coumadin clinic to follow-up INR. Case management consult for outpatient LMWH to bridge with coumadin        PLAN:  Continue warfarin as per pharmacy dosing to target INR 2-3.   Pharmacy to adjust dose  Consider resuming vitamin D replacement  Continue UFH continue warfarin at target INR 2-3  Follow-up in clinic with Buffy Ruiz 11/25/2020 9 AM to complete thrombophilia work-up. Follow-up with primary care provider or Coumadin clinic for INR check  Anemia work-up  Case management consult for outpatient LMWH to bridge with coumadin    I have seen, examined and reviewed this patient medication list, appropriate labs and imaging studies. I reviewed relevant medical records and others physicians notes. I discussed the plans of care with the patient. I answered all the questions to the patients satisfaction. I have also reviewed the chief complaint (CC) and part of the history (History of Present Illness (HPI), Past Family Social History [facility]), or Review of Systems (ROS) and made changes when appropriated.        (Please note that portions of this note were completed with a voice recognition program. Efforts were made to edit the dictations but occasionally words are mis-transcribed.)    Sriram Kelley MD    11/05/20  3:37 PM

## 2020-11-06 VITALS
HEIGHT: 72 IN | DIASTOLIC BLOOD PRESSURE: 81 MMHG | WEIGHT: 315 LBS | SYSTOLIC BLOOD PRESSURE: 136 MMHG | TEMPERATURE: 98.6 F | HEART RATE: 80 BPM | BODY MASS INDEX: 42.66 KG/M2 | OXYGEN SATURATION: 98 % | RESPIRATION RATE: 18 BRPM

## 2020-11-06 LAB
ANION GAP SERPL CALCULATED.3IONS-SCNC: 9 MMOL/L (ref 7–19)
APTT: 53.8 SEC (ref 26–36.2)
APTT: 61 SEC (ref 26–36.2)
BASOPHILS ABSOLUTE: 0.1 K/UL (ref 0–0.2)
BASOPHILS RELATIVE PERCENT: 0.8 % (ref 0–1)
BUN BLDV-MCNC: 14 MG/DL (ref 6–20)
CALCIUM SERPL-MCNC: 8.9 MG/DL (ref 8.6–10)
CHLORIDE BLD-SCNC: 104 MMOL/L (ref 98–111)
CO2: 25 MMOL/L (ref 22–29)
CREAT SERPL-MCNC: 1 MG/DL (ref 0.5–1.2)
EOSINOPHILS ABSOLUTE: 0.3 K/UL (ref 0–0.6)
EOSINOPHILS RELATIVE PERCENT: 3.8 % (ref 0–5)
FERRITIN: 714.8 NG/ML (ref 30–400)
GFR AFRICAN AMERICAN: >59
GFR NON-AFRICAN AMERICAN: >60
GLUCOSE BLD-MCNC: 134 MG/DL (ref 74–109)
HCT VFR BLD CALC: 32.1 % (ref 42–52)
HEMOGLOBIN: 10.3 G/DL (ref 14–18)
IMMATURE GRANULOCYTES #: 0.1 K/UL
INR BLD: 1.62 (ref 0.88–1.18)
IRON SATURATION: 32 % (ref 14–50)
IRON: 78 UG/DL (ref 59–158)
LYMPHOCYTES ABSOLUTE: 1.9 K/UL (ref 1.1–4.5)
LYMPHOCYTES RELATIVE PERCENT: 28 % (ref 20–40)
MCH RBC QN AUTO: 28.9 PG (ref 27–31)
MCHC RBC AUTO-ENTMCNC: 32.1 G/DL (ref 33–37)
MCV RBC AUTO: 89.9 FL (ref 80–94)
MONOCYTES ABSOLUTE: 0.5 K/UL (ref 0–0.9)
MONOCYTES RELATIVE PERCENT: 7.3 % (ref 0–10)
NEUTROPHILS ABSOLUTE: 3.9 K/UL (ref 1.5–7.5)
NEUTROPHILS RELATIVE PERCENT: 59 % (ref 50–65)
PDW BLD-RTO: 15.1 % (ref 11.5–14.5)
PLATELET # BLD: 210 K/UL (ref 130–400)
PMV BLD AUTO: 9.4 FL (ref 9.4–12.4)
POTASSIUM REFLEX MAGNESIUM: 3.6 MMOL/L (ref 3.5–5)
PROTHROMBIN TIME: 19.4 SEC (ref 12–14.6)
RBC # BLD: 3.57 M/UL (ref 4.7–6.1)
RETICULOCYTE ABSOLUTE COUNT: 0.12 M/UL (ref 0.03–0.12)
RETICULOCYTE COUNT PCT: 3.48 % (ref 0.5–1.5)
SODIUM BLD-SCNC: 138 MMOL/L (ref 136–145)
TOTAL IRON BINDING CAPACITY: 242 UG/DL (ref 250–400)
WBC # BLD: 6.6 K/UL (ref 4.8–10.8)

## 2020-11-06 PROCEDURE — 82728 ASSAY OF FERRITIN: CPT

## 2020-11-06 PROCEDURE — 85610 PROTHROMBIN TIME: CPT

## 2020-11-06 PROCEDURE — 80048 BASIC METABOLIC PNL TOTAL CA: CPT

## 2020-11-06 PROCEDURE — 85045 AUTOMATED RETICULOCYTE COUNT: CPT

## 2020-11-06 PROCEDURE — 6370000000 HC RX 637 (ALT 250 FOR IP): Performed by: HOSPITALIST

## 2020-11-06 PROCEDURE — 99232 SBSQ HOSP IP/OBS MODERATE 35: CPT | Performed by: INTERNAL MEDICINE

## 2020-11-06 PROCEDURE — 6360000002 HC RX W HCPCS: Performed by: NURSE PRACTITIONER

## 2020-11-06 PROCEDURE — 85025 COMPLETE CBC W/AUTO DIFF WBC: CPT

## 2020-11-06 PROCEDURE — 83540 ASSAY OF IRON: CPT

## 2020-11-06 PROCEDURE — 36415 COLL VENOUS BLD VENIPUNCTURE: CPT

## 2020-11-06 PROCEDURE — 94762 N-INVAS EAR/PLS OXIMTRY CONT: CPT

## 2020-11-06 PROCEDURE — 83550 IRON BINDING TEST: CPT

## 2020-11-06 PROCEDURE — 85730 THROMBOPLASTIN TIME PARTIAL: CPT

## 2020-11-06 RX ORDER — WARFARIN SODIUM 5 MG/1
TABLET ORAL
Qty: 45 TABLET | Refills: 1 | Status: SHIPPED | OUTPATIENT
Start: 2020-11-06 | End: 2020-11-13 | Stop reason: SDUPTHER

## 2020-11-06 RX ADMIN — ACETAMINOPHEN 1000 MG: 500 TABLET, FILM COATED ORAL at 08:38

## 2020-11-06 RX ADMIN — AMLODIPINE BESYLATE 5 MG: 5 TABLET ORAL at 08:37

## 2020-11-06 RX ADMIN — ENOXAPARIN SODIUM 180 MG: 100 INJECTION SUBCUTANEOUS at 08:37

## 2020-11-06 ASSESSMENT — ENCOUNTER SYMPTOMS
NAUSEA: 0
BACK PAIN: 0
EYE DISCHARGE: 0
DIARRHEA: 0
WHEEZING: 0
SORE THROAT: 0
CONSTIPATION: 0
SHORTNESS OF BREATH: 0
EYE REDNESS: 0
ABDOMINAL PAIN: 0
BLOOD IN STOOL: 0
RESPIRATORY NEGATIVE: 1
EYE PAIN: 0
EYES NEGATIVE: 1
COUGH: 0
GASTROINTESTINAL NEGATIVE: 1
VOMITING: 0

## 2020-11-06 ASSESSMENT — PAIN SCALES - GENERAL: PAINLEVEL_OUTOF10: 1

## 2020-11-06 NOTE — CARE COORDINATION
SVEN faxed demographics, H&P, respiratory note, and order for home oxygen to [facility]; asked that they deliver to Pt's home--overnight use.     [facility]  [phone] [id number]  [phone] [id number] F    Electronically signed by BRIANNA Braxton on 11/6/2020 at 11:51 AM

## 2020-11-06 NOTE — OP NOTE
JOSÉ MANUEL [employer] [facility] [address], [address] [facility]                                OPERATIVE REPORT    PATIENT NAME: Parul Allison                   :        1980  MED REC NO:   [id number]                              ROOM:       [facility]  ACCOUNT NO:   [de-identified]                           ADMIT DATE: 2020  PROVIDER:     Miguelina Trivedi MD      DATE OF PROCEDURE:  2020    PREPROCEDURE DIAGNOSIS:  Acute renal failure requiring dialysis  (resolved). POSTPROCEDURE DIAGNOSIS  Acute renal failure requiring dialysis  (resolved). PROCEDURE PERFORMED:  Removal of tunneled, cuffed right internal jugular  vein based dual-lumen dialysis access catheter. SURGEON:  Miguelina Trivedi MD    ASSISTANT:  VICENTE Girard    PROCEDURE:  After going over the risks, benefits, options and how the  procedure would be performed, the patient agreed to proceed. The  procedure took place in the patient's hospital room. The right side of  the neck, chest and shoulder area and around the PermCath were all  prepped with ChloraPrep, which was allowed to dry. The field was then  draped off. The area around the cuff, which was located about 3 cm  proximal to the exit site, was anesthetized with 2% lidocaine without  epinephrine, 8 mL. Next, the sutures at the exit site were cut and removed. Then working  through the exit site with a small hemostat, I bluntly dissected out the  cuff. I then applied traction to the catheter and the catheter came  out. On the removed specimen, both tips of the catheter, the entire  length of the catheter and the cuff were all identified and discarded. Pressure was held above the clavicle and along the tunnel for  approximately 10 minutes. At the end of that time, hemostasis appeared  to be good. The patient tolerated the procedure well, was able to  remain in his room with no immediate complications.   The estimated blood  loss was less than 10 mL.         Miryam Dubose MD    D: 11/05/2020 17:53:04      T: 11/05/2020 18:01:04     [email or web address]  Job#: [id number]     Doc#: [id number]    CC:

## 2020-11-06 NOTE — PLAN OF CARE
Problem: Falls - Risk of:  Goal: Will remain free from falls  Description: Will remain free from falls  11/6/2020 0046 by Mya Leal RN  Outcome: Ongoing  11/5/2020 1750 by Jose Rothman  Outcome: Ongoing  Goal: Absence of physical injury  Description: Absence of physical injury  11/6/2020 0046 by Mya Leal RN  Outcome: Ongoing  11/5/2020 1750 by Jose Rothman  Outcome: Ongoing

## 2020-11-06 NOTE — DISCHARGE SUMMARY
Studies:   Vl Extremity Venous Left   Impression   There is evidence of acute deep vein thrombosis in the left lower  extremity involving the femoral, popliteal, veins. Superficial thrombophlebitis is seen in the short saphenous vein of the  left lower extremity(ies). Evidence of reflux in the deep veins of the left lower extremity.        Vl Extremity Venous Right   Impression   Acute appearing deep vein thrombosis (DVT) is seen in the internal jugular  vein subclavian , right upper extremity(ies). Pertinent Labs:   CBC:   Recent Labs     11/04/20  1526 11/05/20 0227 11/06/20 0451   WBC 9.7 8.9 6.6   HGB 10.8* 10.3* 10.3*    205 210     BMP:    Recent Labs     11/04/20 1526 11/05/20 0227 11/06/20 0451    140 138   K 4.1 3.9 3.6    105 104   CO2 25 22 25   BUN 12 15 14   CREATININE 0.9 1.1 1.0   GLUCOSE 104 134* 134*     INR:   Recent Labs     11/04/20 1526 11/05/20  0500 11/06/20 0451   INR 1.50* 1.49* 1.62*       Physical Exam:  Vital Signs: /81   Pulse 80   Temp 98.6 °F (37 °C) (Temporal)   Resp 18   Ht 6' (1.829 m)   Wt (!) 378 lb 9 oz (171.7 kg)   SpO2 98%   BMI 51.34 kg/m²   General appearance:. Alert and Cooperative   HEENT: Normocephalic. Chest: clear to auscultation bilaterally without wheezes or rhonchi. Cardiac: Normal heart tones with regular rate and rhythm, S1, S2 normal. No murmurs, gallops, or rubs auscultated. Abdomen:soft, non-tender; non-distended normal bowel sounds no masses, no organomegaly. Extremities: No clubbing or cyanosis. RUE/LLE edema. Peripheral pulses palpable. Neurologic: Grossly intact.     Discharge Medications:       [facility] Medication Instructions [facility]:[id number]    Printed on:11/06/20 1222   Medication Information                      acetaminophen (APAP EXTRA STRENGTH) 500 MG tablet  Take 2 tablets by mouth 3 times daily for 10 days             amLODIPine (NORVASC) 5 MG tablet  Take 1 tablet by mouth daily enoxaparin (LOVENOX) 100 MG/ML injection  Inject 1.7 mLs into the skin 2 times daily for 7 days             Homeopathic Products (THERAWORX RELIEF) FOAM  Use daily for spasms             hydrALAZINE (APRESOLINE) 25 MG tablet  Take 1 tablet by mouth every 6 hours as needed (SBP>160mmHg)             tiZANidine (ZANAFLEX) 4 MG tablet  Take 1 tablet by mouth nightly as needed (spasm)             vitamin D (ERGOCALCIFEROL) 1.25 MG (80742 UT) CAPS capsule  Take 1 capsule by mouth once a week for 4 doses             warfarin (COUMADIN) 5 MG tablet  TAKE 7.5MG daily                 Discharge Instructions: Follow up with VANDANA Montez as scheduled. Take medications as directed. Resume activity as tolerated. Please call your Coumadin Clinic first thing in the morning on Tuesday next week. You will need to have INR checked that day. Continue Lovenox twice daily. Do not miss any doses. Keep follow up appointments. We are ordering home oxygen for you to wear at night through [facility]. They will deliver this to your home this evening. Recommend formal outpatient sleep study. Diet: DIET CARB CONTROL;     Disposition: Patient is medically stable and will be discharged home. Time spent on discharge 45 minutes spent in assessing patient, reviewing medications, discussion with nursing, confirming safe discharge plan and preparation of discharge summary.     Signed:  Marlene August PA-C

## 2020-11-06 NOTE — PROGRESS NOTES
Patient name: Brittany Shea  Patient : 1980  6:26 AM  ROOM 329  Patient Active Problem List   Diagnosis Code    Thrombosed external hemorrhoid K64.5    Right ureteral calculus S14.2    Umbilical hernia without obstruction and without gangrene K42.9    Hydronephrosis with renal and ureteral calculus obstruction N13.2    Ureteral obstruction, right N13.5    Transient alteration of awareness R40.4    Prediabetes R73.03    Bilateral carpal tunnel syndrome G56.03    Arthritis M19.90    Numbness and tingling in both hands R20.0, R20.2    S/P carpal tunnel release Z98.890    Leg DVT (deep venous thromboembolism), acute, left ([facility]) I82.402    Acute pulmonary embolism ([facility]) I26.99    DVT of deep femoral vein, left ([facility]) I82.412    Morbid obesity ([facility]) E66.01    Hyperkalemia E87.5    Recurrent deep vein thrombosis ([facility]) I82.409    Jugular vein thrombosis, right I82.890    Subtherapeutic international normalized ratio (INR) R79.1    DVT femoral (deep venous thrombosis) with thrombophlebitis, left ([facility]) I82.412       Subjective: Resting in bed. No acute distress. HISTORY OF PRESENT ILLNESS:   Tamia Mccord was first seen by Dr. Salena Short on 2020 at [facility] inpatient consultation for findings of chronic blood clots. The patient was admitted on 2020 after presenting to the emergency department with complaints of swelling of the left leg and right arm of 3 days duration. The patient has a history of DVT on Coumadin. He is followed by vascular. He also has a history of pulmonary embolism. He has undergone several procedures in September for DVTs of lower extremity. He had catheter directed thrombolysis performed to the left popliteal vein and distal external iliac vein.   An IVC filter was placed.        HEMATOLOGICAL HISTORY: Left lower extremity DVT with right lung pulmonary emboli 2020  Flores Bonilla was seen in initial hematology consultation on 9/20/2020 as an inpatient at [facility] having been admitted on 9/19/2020 with left lower extremity DVT and PE.  He was placed on heparin as initial management.     Mr. Jammie Reynoso has a 5-day history of left leg pain and swelling.  Initially he thought he had pulled a muscle but this did not get better.  When things did not get better, he sought evaluation at the ED at [facility]. Additionally he has nonspecific symptoms of pulmonary embolus including burning in the right side of his chest.  He does not have hemoptysis or dyspnea. Mr. Jammie Reynoso does not have a personal history of DVT or PE or hypercoagulability. Risk features include:   Obesity.    Recent left hand carpal tunnel surgery in August 2020. Family history: His mother had an episode of left lower extremity DVT and PE 15 years ago without recurrence.  A maternal aunt also had DVT of the lower extremities.     Noninvasive venous studies of the lower extremities on 9/19/2020 document:   · Extensive thrombosis (DVT) noted in Lt common femoral vein, Lt SFV (prox, mid and distal), Lt popliteal vein, Lt Gastrocs, Lt posterior tibial veins.  A floating tail is noted in left common femoral vein measuring approximately 4.3cm.     · SVT: thrombus noted in Lt LSV     Pulmonary CTA with contrast on 9/19/2020 documented the following:  · Multiple right-sided acute pulmonary emboli identified involving the right third fourth and fifth ordered pulmonary artery branches extending into the right lower lobe.   · Possible right heart strain  · No left sided pulmonary emboli identified  · Small calcified subcarinal lymph nodes consistent with healed granulomatous disease  · Scattered calcified granulomas in both lungs     He was seen by Dr. Ale Gonzalez from vascular surgery (9/20/2020), with recommendations for percutaneous mechanical thrombectomy/pulse spray thrombolysis to try to prevent long-term swelling in this young patient with iliofemoral DVT.  If the result in that procedure is not satisfactory, he would consider placing a TPA thrombolyzes catheter for thrombolysis overnight.  The patient agreed to proceed. Dr. Sadie Antoine did not feel that he needed TPA thrombolysis of the pulmonary embolism because he is fairly asymptomatic from that standpoint.     Agree with plans as outlined  A serologic prothrombotic work-up will be requested. Review of Systems   Constitutional: Positive for fatigue. Negative for chills, diaphoresis and fever. HENT: Negative. Negative for congestion, ear pain, hearing loss, nosebleeds, sore throat and tinnitus. Eyes: Negative. Negative for pain, discharge and redness. Respiratory: Negative. Negative for cough, shortness of breath and wheezing. Cardiovascular: Positive for leg swelling (LLE). Negative for chest pain and palpitations. Gastrointestinal: Negative. Negative for abdominal pain, blood in stool, constipation, diarrhea, nausea and vomiting. Endocrine: Negative for polydipsia. Genitourinary: Negative for dysuria, flank pain, frequency, hematuria and urgency. Musculoskeletal: Negative. Negative for back pain, myalgias and neck pain. Skin: Negative. Negative for rash. Neurological: Negative. Negative for dizziness, tremors, seizures, weakness and headaches. Hematological: Does not bruise/bleed easily. Psychiatric/Behavioral: Negative. The patient is not nervous/anxious.         Current Pain Assessment  Pain Assessment  Pain Assessment: FLACC  Pain Level: 4  Patient's Stated Pain Goal: No pain  Pain Type: Acute pain  Pain Location: Shoulder  Pain Orientation: Right  Pain Descriptors: Dull  Pain Frequency: Intermittent  Pain Onset: Gradual  Clinical Progression: Gradually improving  Functional Pain Assessment: Prevents or interferes some active activities and ADLs  Non-Pharmaceutical Pain Intervention(s): Repositioned, Rest  Response to Pain Intervention: Asleep with RR greater than 10    OBJECTIVE:  VITALS: alert and oriented to person, place, and time. Cranial Nerves: No cranial nerve deficit. Coordination: Coordination normal.   Psychiatric:         Behavior: Behavior normal.         Thought Content: Thought content normal.         BMP:   Recent Labs     11/05/20 0227 11/06/20 0451    138   K 3.9 3.6    104   CO2 22 25   BUN 15 14   CREATININE 1.1 1.0   GLUCOSE 134* 134*   CALCIUM 9.2 8.9     Recent Labs     11/06/20 0451 11/05/20 0227 11/04/20  1526   WBC 6.6 8.9 9.7   HGB 10.3* 10.3* 10.8*   HCT 32.1* 32.4* 33.9*   MCV 89.9 89.3 89.7    205 222     CMP:    Recent Labs     11/04/20  1526 11/05/20 0227 11/06/20 0451    140 138   K 4.1 3.9 3.6    105 104   CO2 25 22 25   BUN 12 15 14   CREATININE 0.9 1.1 1.0   GFRAA >59 >59 >59   LABGLOM >60 >60 >60   GLUCOSE 104 134* 134*   PROT 7.5  --   --    LABALBU 4.0  --   --    CALCIUM 9.6 9.2 8.9   BILITOT <0.2  --   --    ALKPHOS 93  --   --    AST 17  --   --    ALT 27  --   --        30Day lookback of cultures:    Blood Culture Recent: No results for input(s): BC in the last 720 hours. Gram Stain Recent: No results for input(s): LABGRAM in the last 720 hours. Resp Culture Recent: No results for input(s): CULTRESP in the last 720 hours. Body Fluid Recent : No results for input(s): BFCX in the last 720 hours. MRSA Recent : No results for input(s): [facility] in the last 720 hours. Urine Culture Recent : No results for input(s): LABURIN in the last 720 hours. Organism Recent : No results for input(s): ORG in the last 720 hours.        Radiology:   Vl Extremity Venous Left    Result Date: 11/5/2020  Vascular Lower Extremities DVT Study Procedure  Demographics   Patient Name    Daron Ontiveros Age                   38   Patient Number  [id number]           Gender                Male   Visit Number    [id number]        Interpreting          Amanda Flores MD                                   Physician   Date of Birth   1980 Referring Physician   Deann Bonilla   Accession       [id number]       [address]  Number  Procedure Type of Study:   Veins:Lower Extremities DVT Study, VL EXTREMITY VENOUS DUPLEX LEFT. Indications for Study:Swelling and History of DVT. Impression   There is evidence of acute deep vein thrombosis in the left lower  extremity involving the femoral, popliteal, veins. Superficial thrombophlebitis is seen in the short saphenous vein of the  left lower extremity(ies). Evidence of reflux in the deep veins of the left lower extremity. Signature   ----------------------------------------------------------------  Electronically signed by Quinton Yoon MD(Interpreting  physician) on 11/05/2020 07:47 AM  ----------------------------------------------------------------  Velocities are measured in cm/s ; Diameters are measured in mm Right Lower Extremities DVT Study Measurements Right 2D Measurements +------------------------------------+----------+---------------+----------+ ! Location                            ! Visualized! Compressibility! Thrombosis! +------------------------------------+----------+---------------+----------+ ! Sapheno Femoral Junction            ! Yes       ! Yes            ! None      ! +------------------------------------+----------+---------------+----------+ ! Common Femoral                      !Yes       ! Yes            ! None      ! +------------------------------------+----------+---------------+----------+ Left Lower Extremities DVT Study Measurements Left 2D Measurements +------------------------------------+----------+---------------+----------+ ! Location                            ! Visualized! Compressibility! Thrombosis! +------------------------------------+----------+---------------+----------+ ! Sapheno Femoral Junction            ! Yes       ! Yes            ! None      ! +------------------------------------+----------+---------------+----------+ ! Common Femoral !Yes       !Yes            ! None      ! +------------------------------------+----------+---------------+----------+ ! Prox Femoral                        !Yes       ! Yes            ! None      ! +------------------------------------+----------+---------------+----------+ ! Mid Femoral                         !Partial   !Partial        !          ! +------------------------------------+----------+---------------+----------+ ! Dist Femoral                        !Partial   !Partial        !Acute     ! +------------------------------------+----------+---------------+----------+ ! Deep Femoral                        !Yes       ! Yes            ! None      ! +------------------------------------+----------+---------------+----------+ ! Popliteal                           !Yes       ! Partial        !          ! +------------------------------------+----------+---------------+----------+ ! SSV                                 ! Yes       ! No             !          ! +------------------------------------+----------+---------------+----------+ ! Gastroc                             ! Partial   !Yes            ! None      ! +------------------------------------+----------+---------------+----------+ ! PTV                                 ! Partial   !No             !          ! +------------------------------------+----------+---------------+----------+ ! GSV                                 ! Yes       ! Yes            ! None      ! +------------------------------------+----------+---------------+----------+ ! ATV                                 ! Partial   !Yes            ! None      ! +------------------------------------+----------+---------------+----------+ ! Peroneal                            !No        !               !          ! +------------------------------------+----------+---------------+----------+ Left Doppler Measurements +----------------------+------+------+-------------------------------------+ ! Location !Signal!Reflux! Reflux (msec)                        ! +----------------------+------+------+-------------------------------------+ ! Mid Femoral           !      !      !1224                                 ! +----------------------+------+------+-------------------------------------+ ! Dist Femoral          !      !      !6465                                 ! +----------------------+------+------+-------------------------------------+ ! Popliteal             !      !      !2978                                 ! +----------------------+------+------+-------------------------------------+    Vl Extremity Venous Right    Result Date: 11/5/2020  Vascular Upper Extremities Veins Procedure  Demographics   Patient Name    Parris Langley Age                   38   Patient Number  [id number]           Gender                Male   Visit Number    [id number]        Interpreting          Malu Pearce MD                                   Physician   Date of Birth   1980       Referring Physician   Ellyn Pineda   Accession       [id number]       [address]  Number  Procedure Type of Study:   Veins:Upper Extremities Veins, UE VENOUS UNILATERAL/FLU. Indications for Study:Arm swelling and Arm pain. Impression   Acute appearing deep vein thrombosis (DVT) is seen in the internal jugular  vein subclavian , right upper extremity(ies). Signature   ----------------------------------------------------------------  Electronically signed by Malu Pearce MD(Interpreting  physician) on 11/05/2020 07:48 AM  ----------------------------------------------------------------  Velocities are measured in cm/s ; Diameters are measured in mm Right UE Vein Measurements 2D Measurements +---------------+-----------------+----------------------+-----------------+ ! Location       ! Visualized       ! Compressibility       ! Thrombosis       !  +---------------+-----------------+----------------------+-----------------+ ! IJV            !Yes              ! No                    !                 ! +---------------+-----------------+----------------------+-----------------+ ! SCV            ! Partial          !No                    !                 ! +---------------+-----------------+----------------------+-----------------+ ! Axillary       ! Yes              ! Yes                   ! None             ! +---------------+-----------------+----------------------+-----------------+ ! Brachial       !Yes              ! Yes                   ! None             ! +---------------+-----------------+----------------------+-----------------+ ! Radial         !Yes              ! Yes                   ! None             ! +---------------+-----------------+----------------------+-----------------+ ! Ulnar          ! Yes              ! Yes                   ! None             ! +---------------+-----------------+----------------------+-----------------+ ! Cephalic       ! Partial          !Yes                   ! None             ! +---------------+-----------------+----------------------+-----------------+ ! Basilic        ! Yes              ! Yes                   ! None             ! +---------------+-----------------+----------------------+-----------------+ Left UE Vein Measurements 2D Measurements +---------------+-----------------+----------------------+-----------------+ ! Location       ! Visualized       ! Compressibility       ! Thrombosis       ! +---------------+-----------------+----------------------+-----------------+ ! IJV            ! Yes              ! Yes                   ! None             ! +---------------+-----------------+----------------------+-----------------+ ! SCV            ! Yes              ! Yes                   ! None             ! +---------------+-----------------+----------------------+-----------------+      Medications  Current Facility-Administered Medications   Medication Dose Route Frequency Provider Last Rate Last Dose    warfarin (COUMADIN) daily dosing (placeholder)   Other RX Placeholder Wai Murray MD        acetaminophen (TYLENOL) tablet 1,000 mg  1,000 mg Oral TID Wai Murray MD   1,000 mg at 11/05/20 2201    amLODIPine (NORVASC) tablet 5 mg  5 mg Oral Daily Wai Murray MD   5 mg at 11/05/20 0830    hydrALAZINE (APRESOLINE) tablet 25 mg  25 mg Oral Q4H PRN Wai Murray MD        tiZANidine (ZANAFLEX) tablet 2 mg  2 mg Oral Q8H PRN Wai Murray MD        tiZANidine (ZANAFLEX) tablet 4 mg  4 mg Oral Nightly Wai Murray MD   4 mg at 11/05/20 2201    heparin (porcine) injection 10,000 Units  10,000 Units Intravenous PRN Wai Murray MD        heparin (porcine) injection 5,000 Units  5,000 Units Intravenous PRN Wai Murray MD        sodium chloride flush 0.9 % injection 10 mL  10 mL Intravenous 2 times per day Wai Murray MD   10 mL at 11/04/20 2055    sodium chloride flush 0.9 % injection 10 mL  10 mL Intravenous PRN Wai Murray MD        acetaminophen (TYLENOL) tablet 500 mg  500 mg Oral Q6H PRN Wai Murray MD        Or    acetaminophen (TYLENOL) suppository 650 mg  650 mg Rectal Q6H PRN Wai Murray MD        potassium chloride (KLOR-CON M) extended release tablet 40 mEq  40 mEq Oral PRN Wai Murray MD        Or    potassium bicarb-citric acid (EFFER-K) effervescent tablet 40 mEq  40 mEq Oral PRN Wai Murray MD        Or    potassium chloride 10 mEq/100 mL IVPB (Peripheral Line)  10 mEq Intravenous PRN Wai Murray MD        magnesium sulfate 2 g in 50 mL IVPB premix  2 g Intravenous PRN Wai Murray MD        polyethylene glycol [facility]) packet 17 g  17 g Oral Daily PRN Wai Murray MD        ondansetron (ZOFRAN-ODT) disintegrating tablet 4 mg  4 mg Oral Q8H PRN Wai Murray MD        Or    ondansetron [facility]) injection 4 mg  4 mg Intravenous Q6H PRN Wai Murray MD        heparin 25,000 units in dextrose 5% 250 mL infusion  12.23 Units/kg/hr Intravenous Continuous Tiarra Haskins MD 14.1 mL/hr at 11/06/20 0543 8.23 Units/kg/hr at 11/06/20 0543       Allergies  Patient has no known allergies. ASSESSMENT:  #Recurrent venous thromboembolism LLE DVT and PE- provoked event ? ?(Recent surgery), Sep 2020, Nov 2020 (subtherapeutic INR), heterozygous factor V Leiden  Left LE DVT acute and IJ catheter related DVT (removed by vascular 11/05/2030)    Risk factors: Morbid obesity, recent surgery  S/p catheter directed thrombolysis September 2020. · Percutaneous thrombectomy/PulseSpray thrombolysis with AngioJet Zelante catheter  · Left lower extremity venograms after percutaneous thrombectomy and thrombolysis  · Placement of 40 cm infusion length EKOS TPA catheter from the popliteal vein all the way to the distal external iliac vein  · Inferior vena cava filter placement from a right internal jugular vein approach (Bard Day inferior vena cava filter)  · 9/21/2002-removal of EKOS TPA catheter     Patient was discharged on warfarin with therapeutic INR on 9/28/2020    DOACS not a good option due to morbid obesity with BMI above 50     Prothrombotic work-up on 9/21/2020  Beta 2 glycoprotein IgG and IgM - both negative  Cardiolipin Ab IgG and IgM - both negative  Phospholipid Ab - negative  Protein C antigen-95%   Protein S antigen-157%(H)  Antithrombin III-86%  Prothrombin gene mutation-not detected  Factor V Leiden-heterozygous, one copy     #Subtherapeutic INR-the patient will need a close follow-up with the Coumadin clinic for control of his INR. He left with INR at therapeutic level. His INR at admission was subtherapeutic.   Case management consult for outpatient LMWH to bridge with coumadin    Continue warfarin as per pharmacy dosing to target INR 2-3.  Pharmacy to adjust dose  Continue UFH continue warfarin at target INR 2-3  PT 19.4/INR 1.62 today, 11/6/2020       #H/o Contrast-induced nephropathy Sep 2020-resolved  Creatinine 1.0/GFR >60 today, 11/6/2020     #Morbid obesity-BMI above 50.-As per primary care provider.  Not treating physician. DOACS not a good option as this was not studied in patients BMI above 40. Currently on warfarin. Subtherapeutic INR.     #Family history of VTE-apparently mother had 2 episodes of a provoked event.     #Vitamin D deficiency-10.7. Patient was discharged on Vitamin D 50,000 weekly     #Normocytic anemia- anemia of blood loss/acute illness.  Hemoglobin 10.3/MCV 89.9 today, 11/6/2020      Labs on 11/6/2020:  Ferritin 714.8  Iron 78  Iron saturation 32%  TIBC at 242  Creatinine 1.0/GFR >60    Disposition- appointment with Juliane Durant 11/25/2020 9 AM to follow-up results of thrombophilia work-up.  Please arrange follow-up at the Coumadin clinic to follow-up INR. Case management consult for outpatient LMWH to bridge with coumadin        PLAN:  Continue warfarin as per pharmacy dosing to target INR 2-3.  Pharmacy to adjust dose  Consider resuming vitamin D replacement  Okay to transition from UHF to Lovenox 1 mg/kg every 12 hours for bridging and then discharged home on therapeutic Lovenox and Coumadin with close follow-up with Coumadin clinic for INR check, target INR 2-3  Follow-up in clinic with Juliane Durant 11/25/2020 9 AM to complete thrombophilia work-up. Case management consult for outpatient LMWH to bridge with coumadin    Okay from hematology standpoint to discharge once on therapeutic Lovenox and arrangements have been made for outpatient Lovenox and follow-up in Coumadin clinic for close INR monitoring      VANDANA James    11/06/20  6:26 AM  Physician's attestation/substantial contribution:  I, Dr Chidi Quevedo, independently performed an evaluation on Bibi Boyd. I have reviewed relevant medical information/data to include but not limited to medication list, relevant appropriate labs and imaging when applicable. I reviewed other physician's notes, ancillary services and nurses assessment.  I have reviewed the above documentation completed by the Nurse Practitioner or Physician Assistant. Please see my additional contributions to the history of present illness, physical examination, and assessment/medical decision-making that reflect my findings and impressions. I discussed essential elements of the care plan with the NP or PA and the patient as well. I answered all the questions to the patient's satisfaction. I concur with above stated. Subjective-no new complaints morning. Objective-left lower extremity swelling  Assessment/plan:  Recurrent DVT-subtherapeutic INR. Transition to low molecular weight heparin 1 mg/kg every 12 hours. May be discharged from my standpoint with enough supply of Lovenox. Arrange close follow-up with Coumadin clinic to adjust Coumadin dose to target INR 2-3. He has an follow-up appointment clinic with Noris Reeves.     Katina Perry MD

## 2020-11-06 NOTE — CARE COORDINATION
Per Jelly; [facility] noted zero copay for Lovenox  Electronically signed by BRIANNA Braxton on 11/6/2020 at 11:00 AM

## 2020-11-10 ENCOUNTER — OFFICE VISIT (OUTPATIENT)
Dept: PRIMARY CARE CLINIC | Age: 40
End: 2020-11-10
Payer: COMMERCIAL

## 2020-11-10 VITALS
RESPIRATION RATE: 18 BRPM | DIASTOLIC BLOOD PRESSURE: 86 MMHG | HEIGHT: 72 IN | TEMPERATURE: 97.2 F | HEART RATE: 88 BPM | BODY MASS INDEX: 42.66 KG/M2 | OXYGEN SATURATION: 98 % | SYSTOLIC BLOOD PRESSURE: 134 MMHG | WEIGHT: 315 LBS

## 2020-11-10 LAB
INTERNATIONAL NORMALIZATION RATIO, POC: 1.8
PROTHROMBIN TIME, POC: NORMAL

## 2020-11-10 PROCEDURE — 85610 PROTHROMBIN TIME: CPT | Performed by: NURSE PRACTITIONER

## 2020-11-10 PROCEDURE — 1111F DSCHRG MED/CURRENT MED MERGE: CPT | Performed by: NURSE PRACTITIONER

## 2020-11-10 PROCEDURE — 99496 TRANSJ CARE MGMT HIGH F2F 7D: CPT | Performed by: NURSE PRACTITIONER

## 2020-11-10 ASSESSMENT — ENCOUNTER SYMPTOMS
GASTROINTESTINAL NEGATIVE: 1
EYES NEGATIVE: 1
RESPIRATORY NEGATIVE: 1

## 2020-11-10 NOTE — PROGRESS NOTES
Post-Discharge Transitional Care Management Services or Hospital Follow Up      Sunni Brown   YOB: 1980    Date of Office Visit:  11/10/2020  Date of Hospital Admission: 11/4/20  Date of Hospital Discharge: 11/6/20  Readmission Risk Score(high >=14%.  Medium >=10%):Readmission Risk Score: 14      Care management risk score Rising risk (score 2-5) and Complex Care (Scores >=6): 4     Non face to face  following discharge, date last encounter closed (first attempt may have been earlier): *No documented post hospital discharge outreach found in the last 14 days *No documented post hospital discharge outreach found in the last 14 days    Call initiated 2 business days of discharge: *No response recorded in the last 14 days     Patient Active Problem List   Diagnosis    Thrombosed external hemorrhoid    Right ureteral calculus    Umbilical hernia without obstruction and without gangrene    Hydronephrosis with renal and ureteral calculus obstruction    Ureteral obstruction, right    Transient alteration of awareness    Prediabetes    Bilateral carpal tunnel syndrome    Arthritis    Numbness and tingling in both hands    S/P carpal tunnel release    Leg DVT (deep venous thromboembolism), acute, left (HCC)    Acute pulmonary embolism ([address]    DVT of deep femoral vein, left (HCC)    Morbid obesity ([address]    Hyperkalemia    Recurrent deep vein thrombosis ([address]    Jugular vein thrombosis, right    Subtherapeutic international normalized ratio (INR)    DVT femoral (deep venous thrombosis) with thrombophlebitis, left (HCC)       No Known Allergies    Medications listed as ordered at the time of discharge from [address] Medication Instructions TANISHA:    Printed on:11/10/20 7930   Medication Information                      acetaminophen (APAP EXTRA STRENGTH) 500 MG tablet  Take 2 tablets by mouth 3 times daily for 10 days             amLODIPine (NORVASC) 5 MG tablet  Take 1 tablet by mouth daily             enoxaparin (LOVENOX) 100 MG/ML injection  Inject 1.7 mLs into the skin 2 times daily for 7 days             Homeopathic Products (THERAWORX RELIEF) FOAM  Use daily for spasms             hydrALAZINE (APRESOLINE) 25 MG tablet  Take 1 tablet by mouth every 6 hours as needed (SBP>160mmHg)             tiZANidine (ZANAFLEX) 4 MG tablet  Take 1 tablet by mouth nightly as needed (spasm)             vitamin D (ERGOCALCIFEROL) 1.25 MG (83868 UT) CAPS capsule  Take 1 capsule by mouth once a week for 4 doses             warfarin (COUMADIN) 5 MG tablet  TAKE 7.5MG daily                   Medications marked \"taking\" at this time  Outpatient Medications Marked as Taking for the 11/10/20 encounter (Office Visit) with VANDANA Cummings   Medication Sig Dispense Refill    enoxaparin (LOVENOX) 100 MG/ML injection Inject 1.7 mLs into the skin 2 times daily for 7 days 14 Syringe 0    warfarin (COUMADIN) 5 MG tablet TAKE 7.5MG daily 45 tablet 1    amLODIPine (NORVASC) 5 MG tablet Take 1 tablet by mouth daily 30 tablet 0    Homeopathic Products (THERAWORX RELIEF) FOAM Use daily for spasms 1 Can 2    tiZANidine (ZANAFLEX) 4 MG tablet Take 1 tablet by mouth nightly as needed (spasm) 30 tablet 1        Medications patient taking as of now reconciled against medications ordered at time of hospital discharge: Yes    Chief Complaint   Patient presents with    Follow-Up from Hospital     Pt has swelling in L lower leg that has returned he said it has went down but now is coming back up swelling in R arm is going down but doing so slowly. Pt states they did overnight O2 with him while in hospital and he now has o2 at night. He states they said he needed testing for sleep apnea     Anticoagulation     INR 1.8, current dose 7.5mg  recheck in lab on Friday       HPI    Inpatient course: Discharge summary reviewed- see chart. Interval history/Current status: this is a hospital follow up. Patient was admitted on 11/4/2020 and discharged on 11/6/2020. He reports feeling better since coming home. He is currently on Lovenox shots and is being seen by coumadin clinic. Review of Systems   Constitutional: Positive for fatigue. HENT: Negative. Eyes: Negative. Respiratory: Negative. Cardiovascular: Negative. Gastrointestinal: Negative. Endocrine: Negative. Genitourinary: Negative. Musculoskeletal: Negative. Skin: Negative. Neurological: Negative. Hematological: Negative. Psychiatric/Behavioral: Negative. Vitals:    11/10/20 0919   BP: 134/86   Pulse: 88   Resp: 18   Temp: 97.2 °F (36.2 °C)   TempSrc: Temporal   SpO2: 98%   Weight: (!) 388 lb (176 kg)   Height: 6' (1.829 m)     Body mass index is 52.62 kg/m². Wt Readings from Last 3 Encounters:   11/10/20 (!) 388 lb (176 kg)   11/04/20 (!) 378 lb 9 oz (171.7 kg)   09/28/20 (!) 480 lb (217.7 kg)     BP Readings from Last 3 Encounters:   11/10/20 134/86   11/06/20 136/81   09/28/20 (!) 154/92       Physical Exam  Vitals signs and nursing note reviewed. Constitutional:       Appearance: Normal appearance. He is well-developed. Comments: obese   HENT:      Head: Normocephalic and atraumatic. Right Ear: Hearing, tympanic membrane, ear canal and external ear normal.      Left Ear: Hearing, tympanic membrane, ear canal and external ear normal.      Nose: Nose normal.      Mouth/Throat:      Pharynx: Uvula midline. Eyes:      General: Lids are normal.      Conjunctiva/sclera: Conjunctivae normal.      Pupils: Pupils are equal, round, and reactive to light. Neck:      Musculoskeletal: Normal range of motion and neck supple. Thyroid: No thyroid mass or thyromegaly. Trachea: Trachea normal.   Cardiovascular:      Rate and Rhythm: Normal rate and regular rhythm. Heart sounds: Normal heart sounds. Pulmonary:      Effort: Pulmonary effort is normal.      Breath sounds: Normal breath sounds. Abdominal:      General: Bowel sounds are normal.      Palpations: Abdomen is soft. Musculoskeletal: Normal range of motion. Cervical back: Normal. He exhibits normal range of motion and no tenderness. Thoracic back: Normal. He exhibits normal range of motion and no tenderness. Lumbar back: Normal. He exhibits normal range of motion and no tenderness. Skin:     General: Skin is warm and dry. Neurological:      Mental Status: He is alert and oriented to person, place, and time. Psychiatric:         Speech: Speech normal.         Behavior: Behavior normal.         Thought Content: Thought content normal.         Judgment: Judgment normal.             Assessment/Plan:  1. Hospital discharge follow-up    - WI DISCHARGE MEDS RECONCILED W/ CURRENT OUTPATIENT MED LIST    2. DVT of deep femoral vein, left (HCC)    - POCT INR  - Protime-INR; Future    3. Sleep apnea, unspecified type    - Home Sleep Study; Future        Medical Decision Making: high complexity      Sleep study ordered as discussed. Patient is to have INR redrawn on Friday. He will have lovenox shots until that time.

## 2020-11-13 DIAGNOSIS — I82.412 DVT OF DEEP FEMORAL VEIN, LEFT (HCC): ICD-10-CM

## 2020-11-13 LAB
INR BLD: 1.31 (ref 0.88–1.18)
PROTHROMBIN TIME: 16.3 SEC (ref 12–14.6)

## 2020-11-13 RX ORDER — WARFARIN SODIUM 5 MG/1
TABLET ORAL
Qty: 61 TABLET | Refills: 1 | Status: SHIPPED | OUTPATIENT
Start: 2020-11-13 | End: 2021-02-13 | Stop reason: SDUPTHER

## 2020-11-17 DIAGNOSIS — I82.412 DVT OF DEEP FEMORAL VEIN, LEFT (HCC): ICD-10-CM

## 2020-11-17 LAB
INR BLD: 1.48 (ref 0.88–1.18)
PROTHROMBIN TIME: 18 SEC (ref 12–14.6)

## 2020-11-18 ENCOUNTER — TELEPHONE (OUTPATIENT)
Dept: VASCULAR SURGERY | Age: 40
End: 2020-11-18

## 2020-11-19 ENCOUNTER — VIRTUAL VISIT (OUTPATIENT)
Dept: VASCULAR SURGERY | Age: 40
End: 2020-11-19
Payer: COMMERCIAL

## 2020-11-19 PROCEDURE — 99213 OFFICE O/P EST LOW 20 MIN: CPT | Performed by: PHYSICIAN ASSISTANT

## 2020-11-19 NOTE — PROGRESS NOTES
([address]     Disease of blood and blood forming organ     Kidney stone     recurrent    Sleep apnea     untested    Umbilical hernia      Past Surgical History:   Procedure Laterality Date    CARPAL TUNNEL RELEASE Left 8/21/2020    LEFT CARPAL TUNNEL RELEASE performed by Raghav Garza MD at hospitals Right 7/24/2018    CYSTOSCOPY/ URETEROSCOPY; INSERTION DOUBLE J STENT/  URETERAL performed by Maria Luz Carnes MD at [address] [place] [address]      [address] [address] [facility] [address] N/A 6/24/2016    HEMORRHOID INCISION AND DRAINAGE performed by Laurie Wells MD at [facility] CYSTO/URETERO/PYELOSCOPY W/LITHOTRIPSY Right 8/7/2018    URETEROSCOPY LASER LITHOTRIPSY STONE EXTRACTION performed by Maria Luz Carnes MD at [address] [facility] Right 8/7/2018    STENT PLACEMENT performed by Maria Luz Carnes MD at [address] [place] [address] [facility] Right 2/13/2018    [facility] [address] LITHOTRIPSY performed by Maria Luz Carnes MD at [address] [place] [address] [facility] Right 3/13/2018    [facility] [address] LITHOTRIPSY performed by Maria Luz Carnes MD at [address] [place] [address] [facility] Right 7/24/2018    ES[facility] [address] LITHOTRIPSY performed by Maria Luz Carnes MD at [facility] [address] LAP, [address] N/A 8/2/3372    HERNIA UMBILICAL REPAIR LAPAROSCOPIC performed by Luisa Holter, MD at [address] / [address] Left 9/20/2020     LEFT LOWER EXTREMITY VENOGRAMS; INFERIOR VENA CAVA FILTER PLACEMENT. performed by Jessica Nicole MD at [address]      left wrist.  Pt was a child     Family History   Problem Relation Age of Onset   Unk [place] Cancer Mother 46        colon cancer    Cancer Father         luekemia    Diabetes Father     Other Maternal Grandmother         copd    Other Maternal Grandfather         copd    Heart Disease Paternal Grandmother      Social History     Tobacco Use    Smoking status: Never Smoker    Smokeless tobacco: Never Used    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[employer]   Substance Use Topics    Alcohol use: Yes     Comment: OCC       PHYSICAL EXAMINATION:  [ INSTRUCTIONS:  \"[x]\" Indicates a positive item  \"[]\" Indicates a negative item  -- DELETE ALL ITEMS NOT EXAMINED]      Constitutional: [x] Appears well-developed and well-nourished [x] No apparent distress      [] Abnormal-   Mental status  [x] Alert and awake  [x] Oriented to person/place/time [x]Able to follow commands      Eyes:  EOM    [x]  Normal  [] Abnormal-  Sclera  [x]  Normal  [] Abnormal -         Discharge []  None visible  [] Abnormal -    HENT:   [x] Normocephalic, atraumatic.   [] Abnormal   [x] Mouth/Throat: Mucous membranes are moist.     External Ears [] Normal  [] Abnormal-     Neck: [x] No visualized mass     Pulmonary/Chest: [x] Respiratory effort normal.  [x] No visualized signs of difficulty breathing or respiratory distress        [] Abnormal-      Musculoskeletal:   [] Normal gait with no signs of ataxia         [x] Normal range of motion of neck        [] Abnormal-       Neurological:        [x] No Facial Asymmetry (Cranial nerve 7 motor function) (limited exam to video visit)          [x] No gaze palsy        [] Abnormal-         Skin:        [x] No significant exanthematous lesions or discoloration noted on facial skin         [] Abnormal-            Psychiatric:       [x] Normal Affect [x] No Hallucinations        [] Abnormal-     Other pertinent observable physical exam findings-       LLE venous scan -       Impression          There is evidence of acute deep vein thrombosis in the left lower     extremity involving the femoral, popliteal, veins.     Superficial thrombophlebitis is seen in the short saphenous vein of the     left lower extremity(ies).     Evidence of reflux in the deep veins of the left lower extremity.          Signature       ----------------------------------------------------------------     Electronically signed by Paulette BUCIOInterpreting     physician) on 11/05/2020 07:47 AM     ----------------------------------------------------------------         Velocities are measured in cm/s ; Diameters are measured in mm         Right Lower Extremities DVT Study Measurements    Right 2D Measurements    +------------------------------------+----------+---------------+----------+    ! Location                            ! Visualized! Compressibility! Thrombosis! +------------------------------------+----------+---------------+----------+    ! Sapheno Femoral Junction            ! Yes       ! Yes            !None      !    +------------------------------------+----------+---------------+----------+    ! Common Femoral                      ! Yes       ! Yes            !None      !    +------------------------------------+----------+---------------+----------+         Left Lower Extremities DVT Study Measurements    Left 2D Measurements    +------------------------------------+----------+---------------+----------+    ! Location                            ! Visualized! Compressibility! Thrombosis! +------------------------------------+----------+---------------+----------+    ! Sapheno Femoral Junction            ! Yes       ! Yes            !None      !    +------------------------------------+----------+---------------+----------+    ! Common Femoral                      ! Yes       ! Yes            !None      !    +------------------------------------+----------+---------------+----------+    ! Prox Femoral                        ! Yes       ! Yes            !None      !    +------------------------------------+----------+---------------+----------+    ! Mid Femoral                         !Partial   !Partial        !          !    +------------------------------------+----------+---------------+----------+    ! Dist Femoral                        !Partial   !Partial        !Acute     !    +------------------------------------+----------+---------------+----------+    ! Deep Femoral                        ! Yes       ! Yes            !None      !    +------------------------------------+----------+---------------+----------+    ! Popliteal                           ! Yes       !Partial        !          !    +------------------------------------+----------+---------------+----------+    ! SSV                                 ! Yes       !No             !          !    +------------------------------------+----------+---------------+----------+    ! Gastroc                             !Partial   ! Yes            !None      !    +------------------------------------+----------+---------------+----------+    ! PTV                                 !Partial   ! No             !          !    +------------------------------------+----------+---------------+----------+    ! GSV                                 ! Yes       ! Yes            !None      !    +------------------------------------+----------+---------------+----------+    ! ATV                                 !Partial   ! Yes            !None      !    +------------------------------------+----------+---------------+----------+    ! Peroneal                            !No        !               !          !    +------------------------------------+----------+---------------+----------+         Left Doppler Measurements    +----------------------+------+------+-------------------------------------+    ! Location              !Signal!Reflux! Reflux (msec)                        !    +----------------------+------+------+-------------------------------------+    ! Mid Femoral           !      !      !2461                                 !    +----------------------+------+------+-------------------------------------+    ! Dist Femoral          !      !      !8655                                 ! +----------------------+------+------+-------------------------------------+    ! Popliteal             !      !      !5108                                 !    +----------------------+------+------+-------------------------------------+             ASSESSMENT/PLAN:      1. LLE DVT with bilateral PE  2. Acute Right IJV and SCV      Recommend continue compression stockings daily  Continue anticoagulation per Heme/Onc  I will discuss with Dr. Antonella Willard regarding IVC filter retrieval. We will call the patient with further recommendations      Options were discussed with the patient including continued medical management versus proceeding with angiography and potential intervention for Retrieval IVC filter. Patient has opted to proceed with Retrieval IVC filter. Risks have been discussed with the patient including but not limited to MI, death, CVA, bleed, nerve injury, infection, contrast nephropathy, possible need for dialysis, and need for further surgery. Proceed with Retrieval IVC filter       Sim Butt is a 38 y.o. male being evaluated by a Virtual Visit (video visit) encounter to address concerns as mentioned above. A caregiver was present when appropriate. Due to this being a TeleHealth encounter (During [facility]- public health emergency), evaluation of the following organ systems was limited: Vitals/Constitutional/EENT/Resp/CV/GI//MS/Neuro/Skin/Heme-Lymph-Imm. Pursuant to the emergency declaration under the [facility] [address] authority and the [employer] and [employer] Act, this Virtual Visit was conducted with patient's (and/or legal guardian's) consent, to reduce the patient's risk of exposure to COVID-19 and provide necessary medical care.   The patient (and/or legal guardian) has also been advised to contact this office for worsening conditions or problems, and seek emergency medical treatment and/or call 911 if deemed necessary. Patient identification was verified at the start of the visit: Yes    Services were provided through a video synchronous discussion virtually to substitute for in-person clinic visit. Patient and provider were located at their individual homes. --April Griggs PA-C on 11/19/2020 at 10:28 AM    An electronic signature was used to authenticate this note.

## 2020-11-19 NOTE — TELEPHONE ENCOUNTER
----- Message from Mayra Romano, [address] [place] sent at 11/19/2020  9:12 AM CST -----  Spoke with Darryl Cabral. He took his last dose of Lovenox yesterday. He is currently taking 10 mg a day of warfarin. What do you think about having take an additional 5 mg today, Saturday and Monday then re-checking him on Tuesday?  MMB

## 2020-11-25 ENCOUNTER — HOSPITAL ENCOUNTER (OUTPATIENT)
Dept: INFUSION THERAPY | Age: 40
End: 2020-11-25
Payer: COMMERCIAL

## 2020-11-25 ENCOUNTER — TELEPHONE (OUTPATIENT)
Dept: INTERNAL MEDICINE | Age: 40
End: 2020-11-25

## 2020-11-25 NOTE — TELEPHONE ENCOUNTER
I checked to see what patient's INR was today at Hem/Onc and he had cancelled his appointment. His wife has been exposed to Covid and was tested on Monday. She has not received results back yet and they are under quarantine at this time. He states he is taking the Lovenox injections daily as prescribed. He continues to take coumadin 10 mg daily. We will heath to get an INR on him as soon as possible.  Merlinda Garre CCMA

## 2020-12-02 ENCOUNTER — TELEPHONE (OUTPATIENT)
Dept: PRIMARY CARE CLINIC | Age: 40
End: 2020-12-02

## 2020-12-02 ENCOUNTER — TELEPHONE (OUTPATIENT)
Dept: VASCULAR SURGERY | Age: 40
End: 2020-12-02

## 2020-12-02 NOTE — TELEPHONE ENCOUNTER
I sarah Mary LPN with [facility] office to ask if it would be okay to hold the pt's Coumadin the night prior to an IVC filter retrieval with Dr. Clayton Gibson on 12/14/2020. Per Sheyla Robbi we may hold the pt's Coumadin for one day.

## 2020-12-02 NOTE — TELEPHONE ENCOUNTER
I left a vm asking for a returned call. I need to discuss procedure instructions with the pt. The pt and his spouse returned my call to discuss the following information. These instructions were also sent via [employer] per pt's request.       1. Report to the [facility] center at [facility] (go in the front door and to the left) on 2020, at 6:30 am. The office will call the day prior between 2-4 pm to confirm your arrival time as it's subject to change. 2. Nothing to eat or drink after midnight the night before the procedure. 3. Please take all medications as normally scheduled to take, including heart and blood pressure medicines with a sip of water. 4. Do not take Lasix, insulin, or any diabetic medicine the morning of the procedure. If you take insulin, you may only take 1/2 of any scheduled nightly dose, and none the morning of the procedure. You may take all regularly scheduled heart, cholesterol, and blood pressure medicines with a sip of water. Except Norvasc, do not take this medication the morning of your procedure. 5. If you take Glucophage, Metformin, Actos Plus Met, or Glucovance,you can not take this the day of your procedure and two days after the procedure. 6. Hold Coumadin for 1 day prior to surgery. Resume it the night of your procedure unless otherwise specified by Dr. Luis Fernando Reed. 7. If you have sleep apnea and require C-PAP, please bring this with you to the hospital.  8. Bring a list of all of your allergies and medications with you to the hospital.  9. Please let our nurse know if you have had an allergy to iodine, shellfish, or x-ray dye. 10. Let the nurse know if you take any of the followin. Over the counter herbal supplements  2. Diclofenec, indomethacin, ketoprofen, Caridopa/levadopa, naproxen, sulindac, piroxicam, glucosamine, Chondrotin, cocchine, or methotrexate.   11. Plan to stay at the hospital for 4 - 6 hours before being released  by the

## 2020-12-06 RX ORDER — AMLODIPINE BESYLATE 5 MG/1
TABLET ORAL
Qty: 30 TABLET | Refills: 0 | Status: SHIPPED | OUTPATIENT
Start: 2020-12-06 | End: 2021-01-11

## 2020-12-08 NOTE — PROGRESS NOTES
Progress Note      Pt Name: Gilbert Kumar  YOB: 1980  MRN: [id number]    Date of evaluation: 12/9/2020  History Obtained From:  patient, electronic medical record    CHIEF COMPLAINT:    Chief Complaint   Patient presents with    New Patient     [address] FU     Current active problems  LLE DVT  Right-sided PE  Heterozygous factor V Leiden  Morbid obesity    HISTORY OF PRESENT ILLNESS:    Gilbert Kumar is a 38 y.o.  male was seen on 2 separate occasions during acute hospitalizations at [address] [place] [address] on 9/20/2020 for a left lower extremity DVT and right pulmonary embolus. He did have thrombolytic treatment of the DVT by Dr. Ivone Mayo during this initial hospitalization. He was ultimately discharged home on warfarin. He represented on 11/4/2020 with subtherapeutic INR and worsening LLE DVT. Prothrombotic panel did reveal a heterozygous factor V Leiden. He reports mild edema of the left lower extremity. He is not having any significant pain. He denies any significant shortness of breath whatsoever. Unfortunately he has not been able lose any weight. He is currently on 12.5 mg of Coumadin daily and he is being managed both at the [facility] and occasionally by his PCP VANDANA Reynolds. He is going to be seeing her this afternoon. Blood pressure has been running up slightly, he is currently on amlodipine 5 mg daily, lisinopril 5 mg daily, hydralazine as needed. He does have chronic issues with muscle spasms and is on Zanaflex. He is taking vitamin D replacement.          HEMATOLOGICAL HISTORY: Left lower extremity DVT with right lung pulmonary emboli 9/19/2020  Kiersten Goldberg was seen in initial hematology consultation on 9/20/2020 as an inpatient at [facility] having been admitted on 9/19/2020 with left lower extremity DVT and PE.  He was placed on heparin as initial management.     Mr. Kathie Miller had a 5-day history of left leg pain and swelling.  Initially he thought he had pulled a muscle but this did not get better.  When things did not get better, he sought evaluation at the ED at [facility]. Additionally he has nonspecific symptoms of pulmonary embolus including burning in the right side of his chest.  He does not have hemoptysis or dyspnea. Mr. Rell Díaz does not have a personal history of DVT or PE or hypercoagulability.      Risk features include:   Obesity.    Recent left hand carpal tunnel surgery in August 2020. Family history: His mother had an episode of left lower extremity DVT and PE 15 years ago without recurrence.  A maternal aunt also had DVT of the lower extremities.     Noninvasive venous studies of the lower extremities on 9/19/2020 document:   · Extensive thrombosis (DVT) noted in Lt common femoral vein, Lt SFV (prox, mid and distal), Lt popliteal vein, Lt Gastrocs, Lt posterior tibial veins.  A floating tail is noted in left common femoral vein measuring approximately 4.3cm.     · SVT: thrombus noted in Lt LSV     Pulmonary CTA with contrast on 9/19/2020 documented the following:  · Multiple right-sided acute pulmonary emboli identified involving the right third fourth and fifth ordered pulmonary artery branches extending into the right lower lobe. · Possible right heart strain  · No left sided pulmonary emboli identified  · Small calcified subcarinal lymph nodes consistent with healed granulomatous disease  · Scattered calcified granulomas in both lungs     He was seen by Dr. Tony Orellana from vascular surgery (9/20/2020), with recommendations for percutaneous mechanical thrombectomy/pulse spray thrombolysis to try to prevent long-term swelling in this young patient with iliofemoral DVT.  If the result in that procedure is not satisfactory, he would consider placing a TPA thrombolyzes catheter for thrombolysis overnight.  The patient agreed to proceed.   Dr. Tony Orellana did not feel that he needed TPA thrombolysis of the pulmonary embolism because he is fairly asymptomatic from that standpoint. PRIOR INTERVENTION  · Percutaneous thrombectomy/PulseSpray thrombolysis with AngioJet Zelante catheter  · Left lower extremity venograms after percutaneous thrombectomy and thrombolysis  · Placement of 40 cm infusion length EKOS TPA catheter from the popliteal vein all the way to the distal external iliac vein  · Inferior vena cava filter placement from a right internal jugular vein approach (Bard Heather inferior vena cava filter)  · 9/21/2002-removal of EKOS TPA catheter      Prothrombotic work-up on 9/21/2020  Beta 2 glycoprotein IgG and IgM - both negative  Cardiolipin Ab IgG and IgM - both negative  Phospholipid Ab - negative  Protein C antigen-95%   Protein S antigen-157%(H)  Antithrombin III-86%  Prothrombin gene mutation-not detected  Factor V Leiden-heterozygous, one copy    DOACS not a good option due to morbid obesity with BMI above 50     He was discharged home on warfarin. The patient was admitted on 11/4/2020 after presenting to the emergency department with complaints of swelling of the left leg and right arm of 3 days duration.  Unfortunately he is INR was subtherapeutic at 1.5.     Past Medical History:   Diagnosis Date    Arthritis     both wrist    Bilateral carpal tunnel syndrome 8/15/2019    Bronchitis     10 yrs ago    Diabetes mellitus ([address]     Disease of blood and blood forming organ     Kidney stone     recurrent    Sleep apnea     untested    Umbilical hernia         Past Surgical History:   Procedure Laterality Date    CARPAL TUNNEL RELEASE Left 8/21/2020    LEFT CARPAL TUNNEL RELEASE performed by Kyle Bishop MD at [facility] Right 7/24/2018    CYSTOSCOPY/ URETEROSCOPY; INSERTION DOUBLE J STENT/  URETERAL performed by Kerri Contreras MD at [address] [place] [address]      both wrists    HEMORRHOID SURGERY N/A 6/24/2016    HEMORRHOID INCISION AND DRAINAGE performed by Freedom Mc MD at [facility] CYSTO/URETERO/PYELOSCOPY W/LITHOTRIPSY Right 8/7/2018    URETEROSCOPY LASER LITHOTRIPSY STONE EXTRACTION performed by Alessia Barnard MD at [address] [place] Right 8/7/2018    STENT PLACEMENT performed by Alessia Barnard MD at [address] [facility] ESWL Right 2/13/2018    ESWL EXTRACORPEAL SHOCK WAVE LITHOTRIPSY performed by Alessia Barnard MD at [address] [facility] ESWL Right 3/13/2018    ESWL EXTRACORPEAL SHOCK WAVE LITHOTRIPSY performed by Alessia Barnard MD at [address] [facility] ESWL Right 7/24/2018    ESWL [address] LITHOTRIPSY performed by Alessia Barnard MD at [address], [address] N/A 8/9/4427    HERNIA UMBILICAL REPAIR LAPAROSCOPIC performed by Tomas Cortes MD at [address] / [address] Left 9/20/2020     LEFT LOWER EXTREMITY VENOGRAMS; INFERIOR VENA CAVA FILTER PLACEMENT. performed by Otilia Billy MD at [address]      left wrist.  Pt was a child           Current Outpatient Medications:     lisinopril (PRINIVIL;ZESTRIL) 5 MG tablet, TAKE 1 TABLET BY MOUTH ONCE DAILY FOR 30 DAYS, Disp: , Rfl:     amLODIPine (NORVASC) 5 MG tablet, Take 1 tablet by mouth once daily, Disp: 30 tablet, Rfl: 0    warfarin (COUMADIN) 5 MG tablet, TAKE 10 MG daily, Disp: 61 tablet, Rfl: 1    tiZANidine (ZANAFLEX) 4 MG tablet, Take 1 tablet by mouth nightly as needed (spasm), Disp: 30 tablet, Rfl: 1    Homeopathic Products (THERAWORX RELIEF) FOAM, Use daily for spasms (Patient not taking: Reported on 12/9/2020), Disp: 1 Can, Rfl: 2    hydrALAZINE (APRESOLINE) 25 MG tablet, Take 1 tablet by mouth every 6 hours as needed (SBP>160mmHg), Disp: 120 tablet, Rfl: 0    vitamin D (ERGOCALCIFEROL) 1.25 MG (19651 UT) CAPS capsule, Take 1 capsule by mouth once a week for 4 doses, Disp: 4 capsule, Rfl: 0    acetaminophen (APAP EXTRA STRENGTH) 500 MG tablet, Take 2 tablets by mouth 3 times daily for 10 days, Disp: 60 tablet, Rfl: 0     No Known Allergies    Social History     Tobacco Use    Smoking status: Never Smoker    Smokeless tobacco: Never Used    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[facility]   Substance Use Topics    Alcohol use: Yes     Comment: OCC    Drug use: No       Family History   Problem Relation Age of Onset   Leah Butlertein Cancer Mother 46        colon cancer    Cancer Father         luekemia    Diabetes Father     Other Maternal Grandmother         copd    Other Maternal Grandfather         copd    Heart Disease Paternal Grandmother        Subjective   REVIEW OF SYSTEMS:   Review of Systems   Constitutional: Positive for fatigue. Negative for chills, diaphoresis, fever and unexpected weight change. HENT: Negative for mouth sores, nosebleeds, sore throat, trouble swallowing and voice change. Eyes: Negative for photophobia, discharge and itching. Respiratory: Negative for cough, shortness of breath and wheezing. Cardiovascular: Positive for leg swelling. Negative for chest pain and palpitations. Gastrointestinal: Negative for abdominal distention, abdominal pain, blood in stool, constipation, diarrhea, nausea and vomiting. Endocrine: Negative for cold intolerance, heat intolerance, polydipsia and polyuria. Genitourinary: Negative for difficulty urinating, dysuria, hematuria and urgency. Musculoskeletal: Positive for myalgias. Negative for arthralgias, back pain and joint swelling. Skin: Negative for color change and rash. Neurological: Negative for dizziness, tremors, seizures, syncope and light-headedness. Hematological: Negative for adenopathy. Does not bruise/bleed easily. Psychiatric/Behavioral: Negative for behavioral problems and suicidal ideas. The patient is not nervous/anxious. All other systems reviewed and are negative.       Objective   BP (!) 154/80   Pulse 94   Temp 97.1 °F (36.2 °C) (Temporal)   Ht 6' (1.829 m) Wt (!) 392 lb 1.6 oz (177.9 kg)   SpO2 96%   BMI 53.18 kg/m²     PHYSICAL EXAM:  Physical Exam  Vitals signs reviewed. Constitutional:       General: He is not in acute distress. Appearance: He is well-developed. He is morbidly obese. HENT:      Head: Normocephalic and atraumatic. Nose: Nose normal.   Eyes:      General: No scleral icterus. Conjunctiva/sclera: Conjunctivae normal.   Neck:      Vascular: No JVD. Trachea: No tracheal deviation. Cardiovascular:      Rate and Rhythm: Normal rate and regular rhythm. Heart sounds: Normal heart sounds. No murmur. Pulmonary:      Effort: Pulmonary effort is normal. No respiratory distress. Breath sounds: Normal breath sounds. No wheezing. Abdominal:      General: Bowel sounds are normal. There is no distension. Palpations: Abdomen is soft. There is no mass. Tenderness: There is no abdominal tenderness. Musculoskeletal: Normal range of motion. General: No tenderness or deformity. Left lower le+ Edema present. Skin:     Findings: No erythema or rash. Neurological:      Mental Status: He is alert and oriented to person, place, and time. Psychiatric:         Thought Content: Thought content normal.       Lab Results   Component Value Date    WBC 7.74 2020    HGB 13.3 (L) 2020    HCT 40.4 2020    MCV 92.2 2020     2020       VISIT DIAGNOSES  1. DVT of deep femoral vein, left ([address]    2. Single subsegmental pulmonary embolism without acute cor pulmonale (HCC)    3. Heterozygous factor V Leiden mutation ([facility]    4. Morbid obesity ([address]    5. Normocytic hypochromic anemia        ASSESSMENT/PLAN:      1. Left lower extremity DVT with right lung pulmonary emboli 2020  2. 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 ?(Recent surgery), Sep 2020, 2020 (subtherapeutic INR), heterozygous factor V Leiden  Left LE DVT acute and IJ catheter related DVT (removed by vascular 11/05/2030)      PRIOR INTERVENTION  · Percutaneous thrombectomy/PulseSpray thrombolysis with AngioJet Zelante catheter  · Left lower extremity venograms after percutaneous thrombectomy and thrombolysis  · Placement of 40 cm infusion length EKOS TPA catheter from the popliteal vein all the way to the distal external iliac vein  · Inferior vena cava filter placement from a right internal jugular vein approach (Bard Heather inferior vena cava filter)  · 9/21/2002-removal of EKOS TPA catheter      He is 1-2+ pitting edema of the right lower extremity with no cellulitis. There are no obvious open lesions-venous ulcers. He will be going to see Espiridion Kanner, APRN when he leaves here for his PT/INR. I discussed issues with postphlebitic syndrome, chronic swelling of the leg, potential venous stasis ulcers that could develop. I also discussed potential peripheral edema that can be associated with Norvasc and he will discuss this further with Espiridion Kanner, APRN this afternoon. He has 2 brothers. He does not have any children. I discussed the implication of his heterozygous factor V Leiden. I have discouraged any testosterone replacement therapy. Magdy 2 with reflex will be requested today    3. Normocytic hypochromic anemia       Labs on 11/6/2020:  Ferritin 714.8  Iron 78  Iron saturation 32%  TIBC at 242  Creatinine 1.0/GFR >60    Hemoglobin today is improved up to 13.3 with MCV 92.2. Orders Placed This Encounter   Procedures    Miscellaneous Sendout 1        Return in about 3 months (around 3/9/2021) for With Candelario Mills.      Kit Milner PA-C  9:53 AM  12/9/2020

## 2020-12-09 ENCOUNTER — HOSPITAL ENCOUNTER (OUTPATIENT)
Dept: INFUSION THERAPY | Age: 40
Discharge: HOME | End: 2020-12-09
Payer: COMMERCIAL

## 2020-12-09 ENCOUNTER — OFFICE VISIT (OUTPATIENT)
Age: 40
End: 2020-12-09

## 2020-12-09 ENCOUNTER — OFFICE VISIT (OUTPATIENT)
Dept: HEMATOLOGY | Age: 40
End: 2020-12-09
Payer: COMMERCIAL

## 2020-12-09 ENCOUNTER — OFFICE VISIT (OUTPATIENT)
Dept: PRIMARY CARE CLINIC | Age: 40
End: 2020-12-09
Payer: COMMERCIAL

## 2020-12-09 VITALS
OXYGEN SATURATION: 96 % | TEMPERATURE: 96.8 F | RESPIRATION RATE: 20 BRPM | WEIGHT: 315 LBS | DIASTOLIC BLOOD PRESSURE: 70 MMHG | SYSTOLIC BLOOD PRESSURE: 132 MMHG | HEART RATE: 81 BPM | HEIGHT: 72 IN | BODY MASS INDEX: 42.66 KG/M2

## 2020-12-09 VITALS
HEIGHT: 72 IN | WEIGHT: 315 LBS | OXYGEN SATURATION: 96 % | HEART RATE: 94 BPM | TEMPERATURE: 97.1 F | SYSTOLIC BLOOD PRESSURE: 154 MMHG | DIASTOLIC BLOOD PRESSURE: 80 MMHG | BODY MASS INDEX: 42.66 KG/M2

## 2020-12-09 VITALS — TEMPERATURE: 98 F | OXYGEN SATURATION: 93 % | HEART RATE: 93 BPM

## 2020-12-09 DIAGNOSIS — Z01.818 PRE-OP TESTING: ICD-10-CM

## 2020-12-09 DIAGNOSIS — I82.412 DVT OF DEEP FEMORAL VEIN, LEFT (HCC): ICD-10-CM

## 2020-12-09 LAB
ANION GAP SERPL CALCULATED.3IONS-SCNC: 10 MMOL/L (ref 7–19)
BASOPHILS ABSOLUTE: 0.06 K/UL (ref 0.01–0.08)
BASOPHILS RELATIVE PERCENT: 0.8 % (ref 0.1–1.2)
BUN BLDV-MCNC: 9 MG/DL (ref 6–20)
CALCIUM SERPL-MCNC: 9.1 MG/DL (ref 8.6–10)
CHLORIDE BLD-SCNC: 103 MMOL/L (ref 98–111)
CO2: 25 MMOL/L (ref 22–29)
CREAT SERPL-MCNC: 0.7 MG/DL (ref 0.5–1.2)
EOSINOPHILS ABSOLUTE: 0.47 K/UL (ref 0.04–0.54)
EOSINOPHILS RELATIVE PERCENT: 6.1 % (ref 0.7–7)
GFR AFRICAN AMERICAN: >59
GFR NON-AFRICAN AMERICAN: >60
GLUCOSE BLD-MCNC: 133 MG/DL (ref 74–109)
HCT VFR BLD CALC: 40.2 % (ref 42–52)
HCT VFR BLD CALC: 40.4 % (ref 40.1–51)
HEMOGLOBIN: 13.1 G/DL (ref 14–18)
HEMOGLOBIN: 13.3 G/DL (ref 13.7–17.5)
INR BLD: 4.83 (ref 0.88–1.18)
INTERNATIONAL NORMALIZATION RATIO, POC: 6.9
LYMPHOCYTES ABSOLUTE: 1.93 K/UL (ref 1.18–3.74)
LYMPHOCYTES RELATIVE PERCENT: 24.9 % (ref 19.3–53.1)
MCH RBC QN AUTO: 29.6 PG (ref 27–31)
MCH RBC QN AUTO: 30.4 PG (ref 25.7–32.2)
MCHC RBC AUTO-ENTMCNC: 32.6 G/DL (ref 33–37)
MCHC RBC AUTO-ENTMCNC: 32.9 G/DL (ref 32.3–36.5)
MCV RBC AUTO: 91 FL (ref 80–94)
MCV RBC AUTO: 92.2 FL (ref 79–92.2)
MONOCYTES ABSOLUTE: 0.67 K/UL (ref 0.24–0.82)
MONOCYTES RELATIVE PERCENT: 8.7 % (ref 4.7–12.5)
NEUTROPHILS ABSOLUTE: 4.61 K/UL (ref 1.56–6.13)
NEUTROPHILS RELATIVE PERCENT: 59.5 % (ref 34–71.1)
PDW BLD-RTO: 15.3 % (ref 11.6–14.4)
PDW BLD-RTO: 15.7 % (ref 11.5–14.5)
PLATELET # BLD: 250 K/UL (ref 163–337)
PLATELET # BLD: 284 K/UL (ref 130–400)
PMV BLD AUTO: 9.3 FL (ref 7.4–10.4)
PMV BLD AUTO: 9.8 FL (ref 9.4–12.4)
POTASSIUM SERPL-SCNC: 4.1 MMOL/L (ref 3.5–5)
PROTHROMBIN TIME, POC: NORMAL
PROTHROMBIN TIME: 47.2 SEC (ref 12–14.6)
RBC # BLD: 4.38 M/UL (ref 4.63–6.08)
RBC # BLD: 4.42 M/UL (ref 4.7–6.1)
SODIUM BLD-SCNC: 138 MMOL/L (ref 136–145)
WBC # BLD: 7.74 K/UL (ref 4.23–9.07)
WBC # BLD: 7.9 K/UL (ref 4.8–10.8)

## 2020-12-09 PROCEDURE — 85025 COMPLETE CBC W/AUTO DIFF WBC: CPT

## 2020-12-09 PROCEDURE — 99213 OFFICE O/P EST LOW 20 MIN: CPT | Performed by: PHYSICIAN ASSISTANT

## 2020-12-09 PROCEDURE — 99214 OFFICE O/P EST MOD 30 MIN: CPT | Performed by: NURSE PRACTITIONER

## 2020-12-09 PROCEDURE — 85610 PROTHROMBIN TIME: CPT | Performed by: NURSE PRACTITIONER

## 2020-12-09 RX ORDER — LISINOPRIL 5 MG/1
TABLET ORAL
COMMUNITY
Start: 2020-12-03 | End: 2020-12-09 | Stop reason: SDUPTHER

## 2020-12-09 RX ORDER — LISINOPRIL 10 MG/1
TABLET ORAL
Qty: 30 TABLET | Refills: 2 | Status: SHIPPED | OUTPATIENT
Start: 2020-12-09 | End: 2021-03-13 | Stop reason: SDUPTHER

## 2020-12-09 ASSESSMENT — ENCOUNTER SYMPTOMS
EYES NEGATIVE: 1
VOICE CHANGE: 0
RESPIRATORY NEGATIVE: 1
BLOOD IN STOOL: 0
CONSTIPATION: 0
BACK PAIN: 0
GASTROINTESTINAL NEGATIVE: 1
ABDOMINAL DISTENTION: 0
EYE ITCHING: 0
WHEEZING: 0
VOMITING: 0
SORE THROAT: 0
PHOTOPHOBIA: 0
EYE DISCHARGE: 0
DIARRHEA: 0
SHORTNESS OF BREATH: 0
NAUSEA: 0
ABDOMINAL PAIN: 0
COUGH: 0
TROUBLE SWALLOWING: 0
COLOR CHANGE: 0

## 2020-12-09 NOTE — PROGRESS NOTES
[address] [facility] CARE  [address] [facility] [address] [address]  Dept: [phone]  Dept Fax: [phone]  Loc: [phone]    Elissa Espitia is a 38 y.o. male who presents today for his medical conditions/complaints as noted below. Elissa Espitia is c/o of Circulatory Problem ( multi blood clots )      Chief Complaint   Patient presents with    Circulatory Problem      multi blood clots        HPI:     HPI  Patient is here for follow up on chronic conditinos including hx DVT, chronic coumadin, and HTN. He has been taking his meds as prescribed. He has not had his INR checked in almost 1 month. He has been taking 12.5mg daily of coumadin. He was in quarantine with his wife due to her having COVID possibly. Patient is also complaining of left foot swelling. This is the same leg that DVT was in. Right leg no swelling is noted.      Past Medical History:   Diagnosis Date    Arthritis     both wrist    Bilateral carpal tunnel syndrome 8/15/2019    Bronchitis     10 yrs ago    Diabetes mellitus ([address]     Disease of blood and blood forming organ     Kidney stone     recurrent    Sleep apnea     untested    Umbilical hernia         Past Surgical History:   Procedure Laterality Date    CARPAL TUNNEL RELEASE Left 8/21/2020    LEFT CARPAL TUNNEL RELEASE performed by Karin Bill MD at [address] [place] [address] Right 7/24/2018    CYSTOSCOPY/ URETEROSCOPY; INSERTION DOUBLE J STENT/  URETERAL performed by Racheal Abad MD at [address] [place] [address]      both wrists    [address] N/A 6/24/2016    HEMORRHOID INCISION AND DRAINAGE performed by Greg Enriquez MD at [facility] CYSTO/URETERO/PYELOSCOPY W/LITHOTRIPSY Right 8/7/2018    URETEROSCOPY LASER LITHOTRIPSY STONE EXTRACTION performed by Racheal Abad MD at [facility] Right 8/7/2018    STENT PLACEMENT performed by Racheal Abad MD at [address] [place] [address]  SD FRAGMENT KIDNEY STONE/ ESWL Right 2/13/2018    ESWL EXTRACORPEAL SHOCK WAVE LITHOTRIPSY performed by Marlon Simmons MD at [address] [facility] ESWL Right 3/13/2018    ESWL [address] LITHOTRIPSY performed by Marlon Simmons MD at [address] [facility] ESWL Right 7/24/2018    ESWL [address] LITHOTRIPSY performed by Marlon Simmons MD at [address], [address] N/A 7/9/3456    HERNIA UMBILICAL REPAIR LAPAROSCOPIC performed by Jaleel Stewart MD at [address] [facility] [address] / [address] Left 9/20/2020     LEFT LOWER EXTREMITY VENOGRAMS; INFERIOR VENA CAVA FILTER PLACEMENT. performed by Breezy Crawford MD at [address]      left wrist.  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[facility]   Substance Use Topics    Alcohol use: Yes     Comment: OCC        Current Outpatient Medications   Medication Sig Dispense Refill    lisinopril (PRINIVIL;ZESTRIL) 10 MG tablet TAKE 1 TABLET BY MOUTH ONCE DAILY FOR 30 DAYS 30 tablet 2    amLODIPine (NORVASC) 5 MG tablet Take 1 tablet by mouth once daily 30 tablet 0    warfarin (COUMADIN) 5 MG tablet TAKE 10 MG daily 61 tablet 1    Homeopathic Products (THERAWORX RELIEF) FOAM Use daily for spasms 1 Can 2    hydrALAZINE (APRESOLINE) 25 MG tablet Take 1 tablet by mouth every 6 hours as needed (SBP>160mmHg) 120 tablet 0    vitamin D (ERGOCALCIFEROL) 1.25 MG (68508 UT) CAPS capsule Take 1 capsule by mouth once a week for 4 doses 4 capsule 0    acetaminophen (APAP EXTRA STRENGTH) 500 MG tablet Take 2 tablets by mouth 3 times daily for 10 days 60 tablet 0    tiZANidine (ZANAFLEX) 4 MG tablet Take 1 tablet by mouth nightly as needed (spasm) 30 tablet 1     No current facility-administered medications for this visit.         No Known Allergies    Family History   Problem Relation Age of Onset    Cancer Mother 46        colon cancer    Cancer Father         luekemia    Diabetes Father     Other Maternal Grandmother         copd    Other Maternal Grandfather         copd    Heart Disease Paternal Grandmother                Subjective:      Review of Systems   Constitutional: Negative. HENT: Negative. Eyes: Negative. Respiratory: Negative. Cardiovascular: Positive for leg swelling. Gastrointestinal: Negative. Endocrine: Negative. Genitourinary: Negative. Musculoskeletal: Negative. Skin: Negative. Neurological: Negative. Hematological: Negative. Psychiatric/Behavioral: Negative. Objective:     Physical Exam  Vitals signs and nursing note reviewed. Constitutional:       Appearance: Normal appearance. He is well-developed. Comments: obese   HENT:      Head: Normocephalic and atraumatic. Right Ear: Hearing, tympanic membrane, ear canal and external ear normal.      Left Ear: Hearing, tympanic membrane, ear canal and external ear normal.      Nose: Nose normal.      Mouth/Throat:      Pharynx: Uvula midline. Eyes:      General: Lids are normal.      Conjunctiva/sclera: Conjunctivae normal.      Pupils: Pupils are equal, round, and reactive to light. Neck:      Musculoskeletal: Normal range of motion and neck supple. Thyroid: No thyroid mass or thyromegaly. Trachea: Trachea normal.   Cardiovascular:      Rate and Rhythm: Normal rate and regular rhythm. Heart sounds: Normal heart sounds. Pulmonary:      Effort: Pulmonary effort is normal.      Breath sounds: Normal breath sounds. Abdominal:      General: Bowel sounds are normal.      Palpations: Abdomen is soft. Musculoskeletal: Normal range of motion. Cervical back: Normal. He exhibits normal range of motion and no tenderness. Thoracic back: Normal. He exhibits normal range of motion and no tenderness.       Lumbar back: Normal. He exhibits normal range of motion and no tenderness. Legs:    Skin:     General: Skin is warm and dry. Neurological:      Mental Status: He is alert and oriented to person, place, and time. Psychiatric:         Speech: Speech normal.         Behavior: Behavior normal.         Thought Content: Thought content normal.         Judgment: Judgment normal.         /70   Pulse 81   Temp 96.8 °F (36 °C) (Temporal)   Resp 20   Ht 6' (1.829 m)   Wt (!) 393 lb (178.3 kg)   SpO2 96%   BMI 53.30 kg/m²     Assessment:      Diagnosis Orders   1. DVT of deep femoral vein, left (HCC)  POCT INR   2. Hypertension, unspecified type     3. Sleep apnea, unspecified type     4. Morbid obesity ([facility]         Results for orders placed or performed in visit on 12/09/20   POCT INR   Result Value Ref Range    INR 6.9     Protime         Plan:     Increasing lisinopril due to HTN  Discussed switching Norvasc to Procardia will wait and monitor swelling. I am checking INR at lab due to POCT being elevated. Will plan to hold Coumadin tonight for sure. Further orders once lab has returned. Return in about 4 weeks (around 1/6/2021) for Video Visit, HTN. Orders Placed This Encounter   Procedures    POCT INR       Orders Placed This Encounter   Medications    lisinopril (PRINIVIL;ZESTRIL) 10 MG tablet     Sig: TAKE 1 TABLET BY MOUTH ONCE DAILY FOR 30 DAYS     Dispense:  30 tablet     Refill:  2        Patient offered educational handouts and has had all questions answered. Patient voices understanding and agrees to plans along with risks and benefits of plan. Patient is instructed to continue prior meds, diet, and exercise plans as instructed. Patient agrees to follow up as instructed and sooner if needed. Patient agrees to go to ER if condition becomes emergent. EMR Dragon/transcription disclaimer: Some of this encounter note is an electronic transcription/translation of spoken language to printed text.  The electronic translation of spoken language may permit erroneous, or at times, nonsensical words or phrases to be inadvertently transcribed.  Although I have reviewed the note for such errors, some may still exist.    Electronically signed by VANDANA Ibanez on 12/9/2020 at 9:18 PM

## 2020-12-10 LAB — SARS-COV-2, NAA: NOT DETECTED

## 2020-12-11 ENCOUNTER — TELEPHONE (OUTPATIENT)
Dept: VASCULAR SURGERY | Age: 40
End: 2020-12-11

## 2020-12-11 ENCOUNTER — PREP FOR PROCEDURE (OUTPATIENT)
Dept: VASCULAR SURGERY | Age: 40
End: 2020-12-11

## 2020-12-11 RX ORDER — SODIUM CHLORIDE 9 MG/ML
INJECTION, SOLUTION INTRAVENOUS CONTINUOUS
Status: CANCELLED | OUTPATIENT
Start: 2020-12-11

## 2020-12-11 RX ORDER — SODIUM CHLORIDE 0.9 % (FLUSH) 0.9 %
10 SYRINGE (ML) INJECTION PRN
Status: CANCELLED | OUTPATIENT
Start: 2020-12-11

## 2020-12-11 NOTE — H&P (VIEW-ONLY)
 DVT femoral (deep venous thrombosis) with thrombophlebitis, left (HCC)     Recurrent deep vein thrombosis ([facility] 11/04/2020    Hyperkalemia 09/21/2020    Morbid obesity ([facility]     DVT of deep femoral vein, left (HCC)     Leg DVT (deep venous thromboembolism), acute, left ([facility] 09/19/2020    Acute pulmonary embolism ([facility] 09/19/2020    S/P carpal tunnel release 09/02/2020    Numbness and tingling in both hands     Prediabetes 08/15/2019    Bilateral carpal tunnel syndrome 08/15/2019    Arthritis 08/15/2019    Transient alteration of awareness 07/08/2019    Hydronephrosis with renal and ureteral calculus obstruction 07/24/2018    Ureteral obstruction, right 02/30/3936    Umbilical hernia without obstruction and without gangrene 05/07/2018    Right ureteral calculus 02/13/2018    Thrombosed external hemorrhoid      Current Outpatient Medications   Medication Sig Dispense Refill    lisinopril (PRINIVIL;ZESTRIL) 10 MG tablet TAKE 1 TABLET BY MOUTH ONCE DAILY FOR 30 DAYS 30 tablet 2    amLODIPine (NORVASC) 5 MG tablet Take 1 tablet by mouth once daily 30 tablet 0    warfarin (COUMADIN) 5 MG tablet TAKE 10 MG daily 61 tablet 1    Homeopathic Products (THERAWORX RELIEF) FOAM Use daily for spasms 1 Can 2    hydrALAZINE (APRESOLINE) 25 MG tablet Take 1 tablet by mouth every 6 hours as needed (SBP>160mmHg) 120 tablet 0    vitamin D (ERGOCALCIFEROL) 1.25 MG (20020 UT) CAPS capsule Take 1 capsule by mouth once a week for 4 doses 4 capsule 0    acetaminophen (APAP EXTRA STRENGTH) 500 MG tablet Take 2 tablets by mouth 3 times daily for 10 days 60 tablet 0    tiZANidine (ZANAFLEX) 4 MG tablet Take 1 tablet by mouth nightly as needed (spasm) 30 tablet 1     No current facility-administered medications for this visit. Allergies: Patient has no known allergies.   Past Medical History:   Diagnosis Date    Arthritis     both wrist    Bilateral carpal tunnel syndrome 8/15/2019    Bronchitis  Heart Disease Paternal Grandmother      Social History     Tobacco Use    Smoking status: Never Smoker    Smokeless tobacco: Never Used    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[facility]   Substance Use Topics    Alcohol use: Yes     Comment: OCC       PHYSICAL EXAMINATION:      Constitutional: [x] Appears well-developed and well-nourished [x] No apparent distress      [] Abnormal-   Mental status  [x] Alert and awake  [x] Oriented to person/place/time [x]Able to follow commands      Eyes:  EOM    [x]  Normal  [] Abnormal-  Sclera  [x]  Normal  [] Abnormal -         Discharge []  None visible  [] Abnormal -    HENT:   [x] Normocephalic, atraumatic.   [] Abnormal   [x] Mouth/Throat: Mucous membranes are moist.     External Ears [] Normal  [] Abnormal-     Neck: [x] No visualized mass     Pulmonary/Chest: [x] Respiratory effort normal.  [x] No visualized signs of difficulty breathing or respiratory distress        [] Abnormal-      Musculoskeletal:   [] Normal gait with no signs of ataxia         [x] Normal range of motion of neck        [] Abnormal-       Neurological:        [x] No Facial Asymmetry (Cranial nerve 7 motor function) (limited exam to video visit)          [x] No gaze palsy        [] Abnormal-         Skin:        [x] No significant exanthematous lesions or discoloration noted on facial skin         [] Abnormal-            Psychiatric:       [x] Normal Affect [x] No Hallucinations        [] Abnormal-     Other pertinent observable physical exam findings-       LLE venous scan -       Impression          There is evidence of acute deep vein thrombosis in the left lower     extremity involving the femoral, popliteal, veins.     Superficial thrombophlebitis is seen in the short saphenous vein of the     left lower extremity(ies).     Evidence of reflux in the deep veins of the left lower extremity.          Signature          ----------------------------------------------------------------  Electronically signed by Dali Rea MD(Interpreting     physician) on 11/05/2020 07:47 AM     ----------------------------------------------------------------         Velocities are measured in cm/s ; Diameters are measured in mm         Right Lower Extremities DVT Study Measurements    Right 2D Measurements    +------------------------------------+----------+---------------+----------+    ! Location                            ! Visualized! Compressibility! Thrombosis! +------------------------------------+----------+---------------+----------+    ! Sapheno Femoral Junction            ! Yes       ! Yes            !None      !    +------------------------------------+----------+---------------+----------+    ! Common Femoral                      ! Yes       ! Yes            !None      !    +------------------------------------+----------+---------------+----------+         Left Lower Extremities DVT Study Measurements    Left 2D Measurements    +------------------------------------+----------+---------------+----------+    ! Location                            ! Visualized! Compressibility! Thrombosis! +------------------------------------+----------+---------------+----------+    ! Sapheno Femoral Junction            ! Yes       ! Yes            !None      !    +------------------------------------+----------+---------------+----------+    ! Common Femoral                      ! Yes       ! Yes            !None      !    +------------------------------------+----------+---------------+----------+    ! Prox Femoral                        ! Yes       ! Yes            !None      !    +------------------------------------+----------+---------------+----------+    ! Mid Femoral                         !Partial   !Partial        !          !    +------------------------------------+----------+---------------+----------+    ! Dist Femoral                        !Partial   !Partial        !Acute     ! +------------------------------------+----------+---------------+----------+    ! Deep Femoral                        ! Yes       ! Yes            !None      !    +------------------------------------+----------+---------------+----------+    ! Popliteal                           ! Yes       !Partial        !          !    +------------------------------------+----------+---------------+----------+    ! SSV                                 ! Yes       !No             !          !    +------------------------------------+----------+---------------+----------+    ! Gastroc                             !Partial   ! Yes            !None      !    +------------------------------------+----------+---------------+----------+    ! PTV                                 !Partial   ! No             !          !    +------------------------------------+----------+---------------+----------+    ! GSV                                 ! Yes       ! Yes            !None      !    +------------------------------------+----------+---------------+----------+    ! ATV                                 !Partial   ! Yes            !None      !    +------------------------------------+----------+---------------+----------+    ! Peroneal                            !No        !               !          !    +------------------------------------+----------+---------------+----------+         Left Doppler Measurements    +----------------------+------+------+-------------------------------------+    ! Location              !Signal!Reflux! Reflux (msec)                        !    +----------------------+------+------+-------------------------------------+    ! Mid Femoral           !      !      !4674                                 !    +----------------------+------+------+-------------------------------------+    ! Dist Femoral          !      !      !1802                                 ! +----------------------+------+------+-------------------------------------+    ! Popliteal             !      !      !7384                                 !    +----------------------+------+------+-------------------------------------+             ASSESSMENT/PLAN:      1. LLE DVT with bilateral PE  2. Acute Right IJV and SCV      Recommend continue compression stockings daily  Continue anticoagulation per Heme/Onc  I will discuss with Dr. Rachel Treadwell regarding IVC filter retrieval. We will call the patient with further recommendations      Options were discussed with the patient including continued medical management versus proceeding with angiography and potential intervention for Retrieval IVC filter. Patient has opted to proceed with Retrieval IVC filter. Risks have been discussed with the patient including but not limited to MI, death, CVA, bleed, nerve injury, infection, contrast nephropathy, possible need for dialysis, and need for further surgery.       Proceed with Retrieval IVC filter

## 2020-12-11 NOTE — H&P
2020    TELEHEALTH EVALUATION -- Audio/Visual (During NXEJN-04 public health emergency)    HPI:    Joon Almodovar (:  1980) has requested an audio/video evaluation for the following concern(s):    Mr. Alex Crooks is a 37 yo male who underwent 1. Ultrasound/duplex guided cannulation of a thrombosed left popliteal vein with 5 [place] and later 8 [place] sheath  2. Left lower extremity venograms all the way to the inferior vena cava  3. Percutaneous thrombectomy/PulseSpray thrombolysis with AngioJet Zelante catheter  4. Left lower extremity venograms after percutaneous thrombectomy and thrombolysis  5. Placement of 40 cm infusion length EKOS TPA catheter from the popliteal vein all the way to the distal external iliac vein  6. Inferior vena cava filter placement from a right internal jugular vein approach ([place] inferior vena cava filter) on 2020 by Dr. Evelia Sumner. He also underwent placement of a Permcath for HD on 2020. Today we are following up for his extensive left leg DVT with floating tail and PE with right sided heart strain. He denies any chest discomfort and SOB at this time. He also reports that his left leg still somewhat swollen. No reports of significant pain. He is on Coumadin 10 mg daily along with and is being followed by his PCP for dosage regulation. He is wearing his compression stockings as directed. He also developed DVT right IJV and SCV. (Subtherapeutic INR) on Coumadin. He has been seen and evaluated by Heme/Onc in the past. He has had a Prothrombotic work showed Factor V Leiden -Heterozygous 1 copy, Protein S 157(H). We are following up today for DVT/PE and to discuss results of venous scan.        Joon Almodovar is a 38 y.o. male with the following history as recorded in [facility]:  Patient Active Problem List    Diagnosis Date Noted    Jugular vein thrombosis, right     Subtherapeutic international normalized ratio (INR)     DVT femoral (deep venous thrombosis) with thrombophlebitis, left (HCC)     Recurrent deep vein thrombosis ([facility] 11/04/2020    Hyperkalemia 09/21/2020    Morbid obesity ([facility]     DVT of deep femoral vein, left (HCC)     Leg DVT (deep venous thromboembolism), acute, left ([facility] 09/19/2020    Acute pulmonary embolism ([facility] 09/19/2020    S/P carpal tunnel release 09/02/2020    Numbness and tingling in both hands     Prediabetes 08/15/2019    Bilateral carpal tunnel syndrome 08/15/2019    Arthritis 08/15/2019    Transient alteration of awareness 07/08/2019    Hydronephrosis with renal and ureteral calculus obstruction 07/24/2018    Ureteral obstruction, right 13/91/0992    Umbilical hernia without obstruction and without gangrene 05/07/2018    Right ureteral calculus 02/13/2018    Thrombosed external hemorrhoid      Current Outpatient Medications   Medication Sig Dispense Refill    lisinopril (PRINIVIL;ZESTRIL) 10 MG tablet TAKE 1 TABLET BY MOUTH ONCE DAILY FOR 30 DAYS 30 tablet 2    amLODIPine (NORVASC) 5 MG tablet Take 1 tablet by mouth once daily 30 tablet 0    warfarin (COUMADIN) 5 MG tablet TAKE 10 MG daily 61 tablet 1    Homeopathic Products (THERAWORX RELIEF) FOAM Use daily for spasms 1 Can 2    hydrALAZINE (APRESOLINE) 25 MG tablet Take 1 tablet by mouth every 6 hours as needed (SBP>160mmHg) 120 tablet 0    vitamin D (ERGOCALCIFEROL) 1.25 MG (43568 UT) CAPS capsule Take 1 capsule by mouth once a week for 4 doses 4 capsule 0    acetaminophen (APAP EXTRA STRENGTH) 500 MG tablet Take 2 tablets by mouth 3 times daily for 10 days 60 tablet 0    tiZANidine (ZANAFLEX) 4 MG tablet Take 1 tablet by mouth nightly as needed (spasm) 30 tablet 1     No current facility-administered medications for this visit. Allergies: Patient has no known allergies.   Past Medical History:   Diagnosis Date    Arthritis     both wrist    Bilateral carpal tunnel syndrome 8/15/2019    Bronchitis     10 yrs ago    Diabetes mellitus ([facility]  Disease of blood and blood forming organ     Kidney stone     recurrent    Sleep apnea     untested    Umbilical hernia      Past Surgical History:   Procedure Laterality Date    CARPAL TUNNEL RELEASE Left 8/21/2020    LEFT CARPAL TUNNEL RELEASE performed by Roberto Carlos Chavez MD at [facility] Right 7/24/2018    CYSTOSCOPY/ URETEROSCOPY; INSERTION DOUBLE J STENT/  URETERAL performed by Megan Rivas MD at [address] [place] [address]      both [address] N/A 6/24/2016    HEMORRHOID INCISION AND DRAINAGE performed by Madan Mann MD at [facility] CYSTO/URETERO/PYELOSCOPY W/LITHOTRIPSY Right 8/7/2018    URETEROSCOPY LASER LITHOTRIPSY STONE EXTRACTION performed by Megan Rivas MD at [address] [facility] Right 8/7/2018    STENT PLACEMENT performed by Megan Rivas MD at [address] [facility] Right 2/13/2018    [facility] [address] LITHOTRIPSY performed by Megan Rivas MD at [address] [facility] Right 3/13/2018    [facility] [address] LITHOTRIPSY performed by Megan Rivas MD at [address] [facility] Right 7/24/2018    [facility] [address] LITHOTRIPSY performed by Megan Rivas MD at [address], [address] N/A 2/9/2335    HERNIA UMBILICAL REPAIR LAPAROSCOPIC performed by Hillary Hooper MD at [address] / [address] Left 9/20/2020     LEFT LOWER EXTREMITY VENOGRAMS; INFERIOR VENA CAVA FILTER PLACEMENT. performed by Juan Diego Hill MD at [address]      left wrist.  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   [] Abnormal   [x] Mouth/Throat: Mucous membranes are moist.     External Ears [] Normal  [] Abnormal-     Neck: [x] No visualized mass     Pulmonary/Chest: [x] Respiratory effort normal.  [x] No visualized signs of difficulty breathing or respiratory distress        [] Abnormal-      Musculoskeletal:   [] Normal gait with no signs of ataxia         [x] Normal range of motion of neck        [] Abnormal-       Neurological:        [x] No Facial Asymmetry (Cranial nerve 7 motor function) (limited exam to video visit)          [x] No gaze palsy        [] Abnormal-         Skin:        [x] No significant exanthematous lesions or discoloration noted on facial skin         [] Abnormal-            Psychiatric:       [x] Normal Affect [x] No Hallucinations        [] Abnormal-     Other pertinent observable physical exam findings-       LLE venous scan -       Impression          There is evidence of acute deep vein thrombosis in the left lower     extremity involving the femoral, popliteal, veins.     Superficial thrombophlebitis is seen in the short saphenous vein of the     left lower extremity(ies).     Evidence of reflux in the deep veins of the left lower extremity.          Signature          ----------------------------------------------------------------     Electronically signed by Andres Perez MD(Interpreting     physician) on 11/05/2020 07:47 AM     ----------------------------------------------------------------         Velocities are measured in cm/s ; Diameters are measured in mm         Right Lower Extremities DVT Study Measurements    Right 2D Measurements    +------------------------------------+----------+---------------+----------+    ! Location                            ! Visualized! Compressibility! Thrombosis! +------------------------------------+----------+---------------+----------+    ! Sapheno Femoral Junction            ! Yes       ! Yes            !None      !    +------------------------------------+----------+---------------+----------+    ! Common Femoral                      ! Yes       ! Yes            !None      !    +------------------------------------+----------+---------------+----------+         Left Lower Extremities DVT Study Measurements    Left 2D Measurements    +------------------------------------+----------+---------------+----------+    ! Location                            ! Visualized! Compressibility! Thrombosis! +------------------------------------+----------+---------------+----------+    ! Sapheno Femoral Junction            ! Yes       ! Yes            !None      !    +------------------------------------+----------+---------------+----------+    ! Common Femoral                      ! Yes       ! Yes            !None      !    +------------------------------------+----------+---------------+----------+    ! Prox Femoral                        ! Yes       ! Yes            !None      !    +------------------------------------+----------+---------------+----------+    ! Mid Femoral                         !Partial   !Partial        !          !    +------------------------------------+----------+---------------+----------+    ! Dist Femoral                        !Partial   !Partial        !Acute     !    +------------------------------------+----------+---------------+----------+    ! Deep Femoral                        ! Yes       ! Yes            !None      !    +------------------------------------+----------+---------------+----------+    ! Popliteal                           ! Yes       !Partial        !          !    +------------------------------------+----------+---------------+----------+    ! SSV                                 ! Yes       !No             !          !    +------------------------------------+----------+---------------+----------+    ! Gastroc                             !Partial   ! Yes            !None      !    +------------------------------------+----------+---------------+----------+    ! PTV                                 !Partial   ! No             !          !    +------------------------------------+----------+---------------+----------+    ! GSV                                 ! Yes       ! Yes            !None      !    +------------------------------------+----------+---------------+----------+    ! ATV                                 !Partial   ! Yes            !None      !    +------------------------------------+----------+---------------+----------+    ! Peroneal                            !No        !               !          !    +------------------------------------+----------+---------------+----------+         Left Doppler Measurements    +----------------------+------+------+-------------------------------------+    ! Location              !Signal!Reflux! Reflux (msec)                        !    +----------------------+------+------+-------------------------------------+    ! Mid Femoral           !      !      !9825                                 !    +----------------------+------+------+-------------------------------------+    ! Dist Femoral          !      !      !0495                                 !    +----------------------+------+------+-------------------------------------+    ! Popliteal             !      !      !7608                                 ! +----------------------+------+------+-------------------------------------+             ASSESSMENT/PLAN:      1. LLE DVT with bilateral PE  2. Acute Right IJV and SCV      Recommend continue compression stockings daily  Continue anticoagulation per Heme/Onc  I will discuss with Dr. Pool Cason regarding IVC filter retrieval. We will call the patient with further recommendations      Options were discussed with the patient including continued medical management versus proceeding with angiography and potential intervention for Retrieval IVC filter. Patient has opted to proceed with Retrieval IVC filter. Risks have been discussed with the patient including but not limited to MI, death, CVA, bleed, nerve injury, infection, contrast nephropathy, possible need for dialysis, and need for further surgery.       Proceed with Retrieval IVC filter

## 2020-12-14 ENCOUNTER — HOSPITAL ENCOUNTER (OUTPATIENT)
Dept: INTERVENTIONAL RADIOLOGY/VASCULAR | Age: 40
Discharge: HOME | End: 2020-12-14
Payer: COMMERCIAL

## 2020-12-14 VITALS
TEMPERATURE: 96.5 F | BODY MASS INDEX: 42.66 KG/M2 | HEART RATE: 75 BPM | SYSTOLIC BLOOD PRESSURE: 118 MMHG | WEIGHT: 315 LBS | DIASTOLIC BLOOD PRESSURE: 79 MMHG | OXYGEN SATURATION: 100 % | RESPIRATION RATE: 13 BRPM | HEIGHT: 72 IN

## 2020-12-14 PROBLEM — Z95.828 PRESENCE OF IVC FILTER: Status: ACTIVE | Noted: 2020-12-14

## 2020-12-14 LAB
INR BLD: 1.29 (ref 0.88–1.18)
PROTHROMBIN TIME: 16.1 SEC (ref 12–14.6)

## 2020-12-14 PROCEDURE — 36415 COLL VENOUS BLD VENIPUNCTURE: CPT

## 2020-12-14 PROCEDURE — 2500000003 HC RX 250 WO HCPCS: Performed by: SURGERY

## 2020-12-14 PROCEDURE — 6360000002 HC RX W HCPCS: Performed by: PHYSICIAN ASSISTANT

## 2020-12-14 PROCEDURE — 36299 UNLISTED PX VASCULAR NJX: CPT | Performed by: SURGERY

## 2020-12-14 PROCEDURE — 6360000002 HC RX W HCPCS: Performed by: SURGERY

## 2020-12-14 PROCEDURE — 85610 PROTHROMBIN TIME: CPT

## 2020-12-14 PROCEDURE — 6360000004 HC RX CONTRAST MEDICATION: Performed by: SURGERY

## 2020-12-14 PROCEDURE — 37193 REM ENDOVAS VENA CAVA FILTER: CPT | Performed by: SURGERY

## 2020-12-14 PROCEDURE — 2580000003 HC RX 258: Performed by: PHYSICIAN ASSISTANT

## 2020-12-14 PROCEDURE — 99153 MOD SED SAME PHYS/QHP EA: CPT | Performed by: SURGERY

## 2020-12-14 PROCEDURE — 99152 MOD SED SAME PHYS/QHP 5/>YRS: CPT | Performed by: SURGERY

## 2020-12-14 PROCEDURE — 75860 VEIN X-RAY NECK: CPT | Performed by: SURGERY

## 2020-12-14 PROCEDURE — 2709999900 IR GUIDED IVC FILTER RETRIEVAL

## 2020-12-14 RX ORDER — HYDROCODONE BITARTRATE AND ACETAMINOPHEN 5; 325 MG/1; MG/1
1 TABLET ORAL EVERY 4 HOURS PRN
Status: DISCONTINUED | OUTPATIENT
Start: 2020-12-14 | End: 2020-12-16 | Stop reason: HOSPADM

## 2020-12-14 RX ORDER — MIDAZOLAM HYDROCHLORIDE 1 MG/ML
INJECTION INTRAMUSCULAR; INTRAVENOUS
Status: COMPLETED | OUTPATIENT
Start: 2020-12-14 | End: 2020-12-14

## 2020-12-14 RX ORDER — HEPARIN SODIUM 5000 [USP'U]/ML
INJECTION, SOLUTION INTRAVENOUS; SUBCUTANEOUS
Status: COMPLETED | OUTPATIENT
Start: 2020-12-14 | End: 2020-12-14

## 2020-12-14 RX ORDER — ONDANSETRON 2 MG/ML
4 INJECTION INTRAMUSCULAR; INTRAVENOUS EVERY 8 HOURS PRN
Status: DISCONTINUED | OUTPATIENT
Start: 2020-12-14 | End: 2020-12-16 | Stop reason: HOSPADM

## 2020-12-14 RX ORDER — SODIUM CHLORIDE 9 MG/ML
INJECTION, SOLUTION INTRAVENOUS CONTINUOUS
Status: DISCONTINUED | OUTPATIENT
Start: 2020-12-14 | End: 2020-12-16 | Stop reason: HOSPADM

## 2020-12-14 RX ORDER — SODIUM CHLORIDE 0.9 % (FLUSH) 0.9 %
10 SYRINGE (ML) INJECTION PRN
Status: DISCONTINUED | OUTPATIENT
Start: 2020-12-14 | End: 2020-12-16 | Stop reason: HOSPADM

## 2020-12-14 RX ORDER — HYDROCODONE BITARTRATE AND ACETAMINOPHEN 5; 325 MG/1; MG/1
2 TABLET ORAL EVERY 4 HOURS PRN
Status: DISCONTINUED | OUTPATIENT
Start: 2020-12-14 | End: 2020-12-16 | Stop reason: HOSPADM

## 2020-12-14 RX ORDER — IODIXANOL 320 MG/ML
INJECTION, SOLUTION INTRAVASCULAR
Status: COMPLETED | OUTPATIENT
Start: 2020-12-14 | End: 2020-12-14

## 2020-12-14 RX ORDER — LIDOCAINE HYDROCHLORIDE 20 MG/ML
INJECTION, SOLUTION INFILTRATION; PERINEURAL
Status: COMPLETED | OUTPATIENT
Start: 2020-12-14 | End: 2020-12-14

## 2020-12-14 RX ORDER — FENTANYL CITRATE 50 UG/ML
INJECTION, SOLUTION INTRAMUSCULAR; INTRAVENOUS
Status: COMPLETED | OUTPATIENT
Start: 2020-12-14 | End: 2020-12-14

## 2020-12-14 RX ORDER — ACETAMINOPHEN 325 MG/1
650 TABLET ORAL EVERY 4 HOURS PRN
Status: DISCONTINUED | OUTPATIENT
Start: 2020-12-14 | End: 2020-12-16 | Stop reason: HOSPADM

## 2020-12-14 RX ADMIN — MIDAZOLAM 0.5 MG: 1 INJECTION INTRAMUSCULAR; INTRAVENOUS at 09:02

## 2020-12-14 RX ADMIN — MIDAZOLAM 1 MG: 1 INJECTION INTRAMUSCULAR; INTRAVENOUS at 08:41

## 2020-12-14 RX ADMIN — LIDOCAINE HYDROCHLORIDE 10 ML: 20 INJECTION, SOLUTION INFILTRATION; PERINEURAL at 08:47

## 2020-12-14 RX ADMIN — FENTANYL CITRATE 25 MCG: 50 INJECTION, SOLUTION INTRAMUSCULAR; INTRAVENOUS at 09:02

## 2020-12-14 RX ADMIN — IODIXANOL 30 ML: 320 INJECTION, SOLUTION INTRAVASCULAR at 09:20

## 2020-12-14 RX ADMIN — SODIUM CHLORIDE: 9 INJECTION, SOLUTION INTRAVENOUS at 07:14

## 2020-12-14 RX ADMIN — FENTANYL CITRATE 25 MCG: 50 INJECTION, SOLUTION INTRAMUSCULAR; INTRAVENOUS at 08:41

## 2020-12-14 RX ADMIN — Medication 2 G: at 08:31

## 2020-12-14 RX ADMIN — HEPARIN SODIUM 5000 UNITS: 5000 INJECTION, SOLUTION INTRAVENOUS; SUBCUTANEOUS at 08:41

## 2020-12-14 RX ADMIN — MIDAZOLAM 0.5 MG: 1 INJECTION INTRAMUSCULAR; INTRAVENOUS at 09:08

## 2020-12-14 NOTE — OP NOTE
Preprocedure diagnosis:  1. History of recurrent deep vein thrombosis  2. History of pulmonary embolism  3. Presence of inferior vena cava filter    Post procedure diagnosis: Same    Procedure:  1. Ultrasound-guided cannulation right internal jugular vein  2. Right internal jugular venograms to the superior vena cava  3. Ultrasound-guided cannulation left internal jugular vein with 12 Bahraini sheath  4. Inferior vena cava venograms  5. Retrieval of Bard inferior vena cava filter (Floyd)  6. Completion inferior vena cava venograms    Surgeon: Leona Rodriguez MD    Anesthesia: Intravenous/moderate sedation plus local    Estimated blood loss: Less than 50 mL    Findings:  1. The right internal jugular vein in the neck is patent. However, the proximal right internal jugular vein is occluded around the clavicular level. 2.  The left internal jugular vein is patent. 3.  The inferior vena cava is patent without any thrombus. 4.  Successful removal of inferior vena cava filter    Procedure in detail:    After the patient was consented and given intravenous antibiotics, he was brought to angiography and placed on the table supine position. Intravenous/moderate sedation was administered and the right neck was prepped and draped in usual sterile procedure. Skin and subcutaneous tissues in the right neck were anesthetized lidocaine. Micropuncture needle was used to cannulate the vein without difficulty. However, I could not pass my wire easily. Seldinger technique was utilized to place a 2.7 [place] microcatheter over the wire. Right internal jugular venograms were performed with the above findings. I could not pass an angled Glidewire through the occlusion. The microcatheter was removed and pressure applied for hemostasis. The left neck was then prepped and draped in usual sterile procedure. Skin and subcutaneous tissues in the left neck were anesthetized lidocaine. Micropuncture needle was used to cannulate the left internal jugular vein under ultrasound guidance. Seldinger technique was utilized to place a stiff angled Glidewire which was placed on into the inferior vena cava under fluoroscopic visualization. A small incision was then made over the wire in order to place a 12 Mauritanian sheath down to the inferior vena cava filter under fluoroscopic visualization without resistance. Inferior vena cava venograms were performed with the above findings. Snare was utilized to grab the hook of the inferior vena cava filter. It was then prolapsed using the 12 Mauritanian sheath and easily removed. It was inspected on the back table intact. Completion inferior vena cava venograms were performed. There is no extravasation of dye. There is no thrombus. The sheath was removed and pressure applied for hemostasis. Sterile dressings were placed. He was brought back to cath holding in good condition.

## 2020-12-14 NOTE — PROGRESS NOTES
Returned post IVC filter removal.  Awake and alert. Puncture site to right and left IJ sites clear. Gauze and tegaderm dressing in place to Left IJ site. Right open to air. Denies any c/o pain.

## 2020-12-15 ENCOUNTER — TELEPHONE (OUTPATIENT)
Dept: VASCULAR SURGERY | Age: 40
End: 2020-12-15

## 2020-12-16 ENCOUNTER — ANTI-COAG VISIT (OUTPATIENT)
Dept: PRIMARY CARE CLINIC | Age: 40
End: 2020-12-16
Payer: COMMERCIAL

## 2020-12-16 LAB — INTERNATIONAL NORMALIZATION RATIO, POC: 1.1

## 2020-12-16 PROCEDURE — 85610 PROTHROMBIN TIME: CPT | Performed by: NURSE PRACTITIONER

## 2020-12-16 PROCEDURE — 93793 ANTICOAG MGMT PT WARFARIN: CPT | Performed by: NURSE PRACTITIONER

## 2020-12-16 NOTE — PROGRESS NOTES
Mr. Irving Dubin was here today. INR today:   Results for orders placed or performed in visit on 12/16/20   POCT INR   Result Value Ref Range    INR 1.1      INR Goal: 2.0-3.0    Dosing Plan  As of 12/16/2020    TTR:  13.4 % (1.9 mo)   Full warfarin instructions:  12/16: 10 mg; Otherwise 7.5 mg every Sun, Tue, Sat; 10 mg all other days            PLAN: TAKE 7.5 MG sUNDAY AND tUESDAY, 10 MG ALL OTHER DAYS. NEXT COUMADIN CLINIC APT IS: 12/22/2020 @10:00    Coumadin Clinic Hours  Tuesday 7:30am - 4:00pm  Wednesday 7:30am - 4:00pm  Thursday 7:30am - 4:00pm    IF IT'S AN EMERGENCY, PLEASE CALL 911 OR GO TO YOUR NEAREST EMERGENCY ROOM. [facility] INTERNAL MEDICINE COUMADIN CLINIC [phone]  IF UNABLE TO REACH COUMADIN CLINIC, [address] [place] [address], [phone].

## 2020-12-22 ENCOUNTER — ANTI-COAG VISIT (OUTPATIENT)
Dept: PRIMARY CARE CLINIC | Age: 40
End: 2020-12-22
Payer: COMMERCIAL

## 2020-12-22 LAB — INTERNATIONAL NORMALIZATION RATIO, POC: 2

## 2020-12-22 PROCEDURE — 85610 PROTHROMBIN TIME: CPT | Performed by: NURSE PRACTITIONER

## 2020-12-22 PROCEDURE — 93793 ANTICOAG MGMT PT WARFARIN: CPT | Performed by: NURSE PRACTITIONER

## 2020-12-22 NOTE — PROGRESS NOTES
Mr. Sunni Brown was here today. INR today:   Results for orders placed or performed in visit on 12/22/20   POCT INR   Result Value Ref Range    INR 2.0      INR Goal: 2.0-3.0    Dosing Plan  As of 12/22/2020    TTR:  12.1 % (2.1 mo)   Full warfarin instructions:  7.5 mg every Sun, Tue, Sat; 10 mg all other days            PLAN: CONTINUE CURRENT DOSE  NEXT COUMADIN CLINIC APT IS: 12/29/2020 @10:00  Coumadin Clinic Hours  Tuesday 7:30am - 4:00pm  Wednesday 7:30am - 4:00pm  Thursday 7:30am - 4:00pm    IF IT'S AN EMERGENCY, PLEASE CALL 911 OR GO TO YOUR NEAREST EMERGENCY ROOM. [facility] INTERNAL MEDICINE COUMADIN CLINIC [phone]  IF UNABLE TO REACH COUMADIN CLINIC, [address] [place] [address], [phone].

## 2020-12-29 ENCOUNTER — ANTI-COAG VISIT (OUTPATIENT)
Dept: PRIMARY CARE CLINIC | Age: 40
End: 2020-12-29
Payer: COMMERCIAL

## 2020-12-29 LAB — INTERNATIONAL NORMALIZATION RATIO, POC: 2.6

## 2020-12-29 PROCEDURE — 85610 PROTHROMBIN TIME: CPT | Performed by: NURSE PRACTITIONER

## 2020-12-29 PROCEDURE — 93793 ANTICOAG MGMT PT WARFARIN: CPT | Performed by: NURSE PRACTITIONER

## 2020-12-29 NOTE — PROGRESS NOTES
Mr. Irving Dubin was here today. INR today:   Results for orders placed or performed in visit on 12/29/20   POCT INR   Result Value Ref Range    INR 2.6      INR Goal: 2.0-3.0    Dosing Plan  As of 12/29/2020    TTR:  21.0 % (2.3 mo)   Full warfarin instructions:  7.5 mg every Sun, Tue, Sat; 10 mg all other days            PLAN: CONTINUE CURRENT DOSE  NEXT COUMADIN CLINIC APT IS: [email or web address]  Coumadin Clinic Hours  Tuesday 7:30am - 4:00pm  Wednesday 7:30am - 4:00pm  Thursday 7:30am - 4:00pm    IF IT'S AN EMERGENCY, PLEASE CALL 911 OR GO TO YOUR NEAREST EMERGENCY ROOM. [facility] INTERNAL MEDICINE COUMADIN CLINIC [phone]  IF UNABLE TO REACH COUMADIN CLINIC, [address] [place] [address], [phone].

## 2021-01-07 ENCOUNTER — VIRTUAL VISIT (OUTPATIENT)
Dept: PRIMARY CARE CLINIC | Age: 41
End: 2021-01-07
Payer: COMMERCIAL

## 2021-01-07 DIAGNOSIS — Z86.718 HX OF DEEP VENOUS THROMBOSIS: Primary | ICD-10-CM

## 2021-01-07 DIAGNOSIS — I10 ESSENTIAL HYPERTENSION: ICD-10-CM

## 2021-01-07 DIAGNOSIS — L03.116 CELLULITIS OF LEFT LOWER LEG: ICD-10-CM

## 2021-01-07 PROCEDURE — 99214 OFFICE O/P EST MOD 30 MIN: CPT | Performed by: NURSE PRACTITIONER

## 2021-01-07 RX ORDER — CLINDAMYCIN HYDROCHLORIDE 300 MG/1
300 CAPSULE ORAL 2 TIMES DAILY
Qty: 20 CAPSULE | Refills: 0 | Status: SHIPPED | OUTPATIENT
Start: 2021-01-07 | End: 2021-01-17

## 2021-01-07 ASSESSMENT — ENCOUNTER SYMPTOMS
GASTROINTESTINAL NEGATIVE: 1
RESPIRATORY NEGATIVE: 1
COLOR CHANGE: 1
EYES NEGATIVE: 1

## 2021-01-07 NOTE — PROGRESS NOTES
[address] [place] [address]     Phone:  [phone]  Fax:  [phone]      2021    TELEHEALTH EVALUATION -- Audio/Visual (During MTNSR-45 public health emergency)    HPI:    Chief Complaint   Patient presents with    Hypertension         Mandy Seymour (:  1980) has requested an audio/video evaluation for the following concern(s): This is a VV for follow up on HTN. He reports that things have been going well. He started back to work as well. He reports BP has been on average 128/60. He denies any symptoms of elevated BP. He is complaining left leg discomfort. He states that the area is red and swollen, more than normal.      Review of Systems   Constitutional: Negative. HENT: Negative. Eyes: Negative. Respiratory: Negative. Cardiovascular: Negative. Gastrointestinal: Negative. Endocrine: Negative. Genitourinary: Negative. Musculoskeletal: Negative. Skin: Positive for color change (left lower leg). Neurological: Negative. Hematological: Negative. Psychiatric/Behavioral: Negative. Prior to Visit Medications    Medication Sig Taking?  Authorizing Provider   clindamycin (CLEOCIN) 300 MG capsule Take 1 capsule by mouth 2 times daily for 10 days Yes VANDANA Jensen   lisinopril (PRINIVIL;ZESTRIL) 10 MG tablet TAKE 1 TABLET BY MOUTH ONCE DAILY FOR 30 DAYS  VANDANA Jensen   amLODIPine (NORVASC) 5 MG tablet Take 1 tablet by mouth once daily  VANDANA Jensen   warfarin (COUMADIN) 5 MG tablet TAKE 10 MG daily  Patient taking differently: Take 7.5 mg by mouth daily TAKE 7.5 MG daily  VANDANA Jensen   Homeopathic Products ([place]) FOAM Use daily for spasms  VANDANA Jensen   hydrALAZINE (APRESOLINE) 25 MG tablet Take 1 tablet by mouth every 6 hours as needed (SBP>160mmHg)  Paola Dougherty MD vitamin D (ERGOCALCIFEROL) 1.25 MG ([address] UT) CAPS capsule Take 1 capsule by mouth once a week for 4 doses  Ami Ramirez MD   acetaminophen (APAP EXTRA STRENGTH) 500 MG tablet Take 2 tablets by mouth 3 times daily for 10 days  Patient taking differently: Take 1,000 mg by mouth every 4 hours as needed   Marian Wallaec MD   tiZANidine (ZANAFLEX) 4 MG tablet Take 1 tablet by mouth nightly as needed (spasm)  VANDANA Sagastume       Social History     Tobacco Use    Smoking status: Never Smoker    Smokeless tobacco: Never Used    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[employer]   Substance Use Topics    Alcohol use: Yes     Comment: [address]    Drug use: No        No Known Allergies,   Past Medical History:   Diagnosis Date    Arthritis     both wrist    Bilateral carpal tunnel syndrome 8/15/2019    Bronchitis     10 yrs ago    Diabetes mellitus ([address]     Disease of blood and blood forming organ     Hx of blood clots     Hypertension     Kidney stone     recurrent    Sleep apnea     untested    Umbilical hernia    ,   Past Surgical History:   Procedure Laterality Date    CARPAL TUNNEL RELEASE Left 8/21/2020    LEFT CARPAL TUNNEL RELEASE performed by Jeferson Mann MD at [facility] Right 7/24/2018    CYSTOSCOPY/ URETEROSCOPY; INSERTION DOUBLE J STENT/  URETERAL performed by Jovan Tanner MD at [facility]      both wrists    [address] N/A 6/24/2016    HEMORRHOID INCISION AND DRAINAGE performed by Jaye Sanabria MD at [facility] CYSTO/URETERO/PYELOSCOPY W/LITHOTRIPSY Right 8/7/2018    URETEROSCOPY LASER LITHOTRIPSY STONE EXTRACTION performed by Jovan Tanner MD at [address] [place] [address] Right 8/7/2018    STENT PLACEMENT performed by Jovan Tanner MD at [facility] Right 2/13/2018    ESWL EXTRACORPEAL SHOCK WAVE LITHOTRIPSY performed by Jovan Tanner MD at [facility]  IN FRAGMENT KIDNEY STONE/ ESWL Right 3/13/2018    ESWL EXTRACORPEAL SHOCK WAVE LITHOTRIPSY performed by Alyssa Mills MD at [address] [facility] Right 7/24/2018    ESWL [address] LITHOTRIPSY performed by Alyssa Mills MD at [address], [address] N/A 7/2/5726    HERNIA UMBILICAL REPAIR LAPAROSCOPIC performed by Dean Castro MD at [address] / [address] Left 9/20/2020     LEFT LOWER EXTREMITY VENOGRAMS; INFERIOR VENA CAVA FILTER PLACEMENT. performed by Joaquín Farrell MD at [address]      left wrist.  Pt was a child   ,   Family History   Problem Relation Age of Onset   [facility] Cancer Mother 46        colon cancer    Cancer Father         luekemia    Diabetes Father     Other Maternal Grandmother         copd    Other Maternal Grandfather         copd    Heart Disease Paternal Grandmother        PHYSICAL EXAMINATION:  [ INSTRUCTIONS:  \"[x]\" Indicates a positive item  \"[]\" Indicates a negative item  -- DELETE ALL ITEMS NOT EXAMINED]  Vital Signs: (As obtained by patient/caregiver at home)        Constitutional: [x] Appears well-developed and well-nourished [x] No apparent distress      [] Abnormal   Mental status  [x] Alert and awake  [x] Oriented to person/place/time [x]Able to follow commands        Eyes:  EOM    [x]  Normal  [] Abnormal-  Sclera  [x]  Normal  [] Abnormal -         Discharge []  None visible  [] Abnormal -    HENT:   [x] Normocephalic, atraumatic.   [] Abnormal   [x] Mouth/Throat: Mucous membranes are moist.     External Ears [x] Normal  [] Abnormal-    Neck: [x] No visualized mass     Pulmonary/Chest: [x] Respiratory effort normal.  [x] No visualized signs of difficulty breathing or respiratory distress        [] Abnormal      Musculoskeletal:   [x] Normal gait with no signs of ataxia         [x] Normal range of motion of neck        [] Abnormal Neurological:        [x] No Facial Asymmetry (Cranial nerve 7 motor function) (limited exam to video visit)          [] No gaze palsy        [] Abnormal         Skin:        [] No significant exanthematous lesions or discoloration noted on facial skin         [x] Abnormal - left leg red and swollen         Psychiatric:       [x] Normal Affect [] Abnormal        [] No Hallucinations    Other pertinent observable physical exam findings:-    Due to this being a TeleHealth encounter, evaluation of the following organ systems is limited: Vitals/Constitutional/EENT/Resp/CV/GI//MS/Neuro/Skin/Heme-Lymph-Imm. ASSESSMENT/PLAN:  1. Hx of deep venous thrombosis      2. Cellulitis of left lower leg    - clindamycin (CLEOCIN) 300 MG capsule; Take 1 capsule by mouth 2 times daily for 10 days  Dispense: 20 capsule; Refill: 0    3. Essential hypertension    Patient is to continue Coumadin therapy for history of DVT. I am starting him on clindamycin for possible cellulitis of left lower leg. If symptoms do not improve he is to call our office for further evaluation. We will continue current medication regimen since blood pressure has been well controlled. Return in about 3 months (around 4/7/2021) for Office Visit, Follow up chronic conditions. An  electronic signature was used to authenticate this note. --VANDANA Regan on 1/7/2021 at 12:24 PM        Pursuant to the emergency declaration under the [facility] waiver authority and the [employer] Act, this Virtual  Visit was conducted, with patient's consent, to reduce the patient's risk of exposure to COVID-19 and provide continuity of care for an established patient. Services were provided through a video synchronous discussion virtually to substitute for in-person clinic visit.

## 2021-01-11 DIAGNOSIS — I10 HYPERTENSION, UNSPECIFIED TYPE: ICD-10-CM

## 2021-01-11 RX ORDER — AMLODIPINE BESYLATE 5 MG/1
TABLET ORAL
Qty: 30 TABLET | Refills: 0 | Status: SHIPPED | OUTPATIENT
Start: 2021-01-11 | End: 2021-03-13 | Stop reason: SDUPTHER

## 2021-01-12 ENCOUNTER — ANTI-COAG VISIT (OUTPATIENT)
Dept: PRIMARY CARE CLINIC | Age: 41
End: 2021-01-12
Payer: COMMERCIAL

## 2021-01-12 DIAGNOSIS — I82.890 JUGULAR VEIN THROMBOSIS, RIGHT: ICD-10-CM

## 2021-01-12 DIAGNOSIS — I82.412 DVT FEMORAL (DEEP VENOUS THROMBOSIS) WITH THROMBOPHLEBITIS, LEFT (HCC): ICD-10-CM

## 2021-01-12 DIAGNOSIS — I82.402 LEG DVT (DEEP VENOUS THROMBOEMBOLISM), ACUTE, LEFT (HCC): ICD-10-CM

## 2021-01-12 LAB — INTERNATIONAL NORMALIZATION RATIO, POC: 2.5

## 2021-01-12 PROCEDURE — 93793 ANTICOAG MGMT PT WARFARIN: CPT | Performed by: NURSE PRACTITIONER

## 2021-01-12 PROCEDURE — 85610 PROTHROMBIN TIME: CPT | Performed by: NURSE PRACTITIONER

## 2021-01-12 NOTE — PROGRESS NOTES
Mr. Les Breaux was here today. INR today:   Results for orders placed or performed in visit on 01/12/21   POCT INR   Result Value Ref Range    INR 2.5      INR Goal: 2.0-3.0    Dosing Plan  As of 1/12/2021    TTR:  34.2 % (2.8 mo)   Full warfarin instructions:  7.5 mg every Sun, Tue, Sat; 10 mg all other days            PLAN: CONTINUE CURRENT DOSE  NEXT COUMADIN CLINIC APT IS: 2/2/2021 @10:00    Coumadin Clinic Hours  Tuesday 7:30am - 4:00pm  Wednesday 7:30am - 4:00pm  Thursday 7:30am - 4:00pm    IF IT'S AN EMERGENCY, PLEASE CALL 911 OR GO TO YOUR NEAREST EMERGENCY ROOM. [facility] INTERNAL MEDICINE COUMADIN CLINIC [phone]  IF UNABLE TO REACH COUMADIN CLINIC, [address] [place] [address], [phone].

## 2021-02-02 ENCOUNTER — ANTI-COAG VISIT (OUTPATIENT)
Dept: PRIMARY CARE CLINIC | Age: 41
End: 2021-02-02
Payer: COMMERCIAL

## 2021-02-02 DIAGNOSIS — I82.402 LEG DVT (DEEP VENOUS THROMBOEMBOLISM), ACUTE, LEFT (HCC): ICD-10-CM

## 2021-02-02 DIAGNOSIS — I82.412 DVT OF DEEP FEMORAL VEIN, LEFT (HCC): ICD-10-CM

## 2021-02-02 DIAGNOSIS — I82.890 JUGULAR VEIN THROMBOSIS, RIGHT: ICD-10-CM

## 2021-02-02 DIAGNOSIS — I82.412 DVT FEMORAL (DEEP VENOUS THROMBOSIS) WITH THROMBOPHLEBITIS, LEFT (HCC): ICD-10-CM

## 2021-02-02 LAB — INTERNATIONAL NORMALIZATION RATIO, POC: 1.9

## 2021-02-02 PROCEDURE — 93793 ANTICOAG MGMT PT WARFARIN: CPT | Performed by: NURSE PRACTITIONER

## 2021-02-02 PROCEDURE — 85610 PROTHROMBIN TIME: CPT | Performed by: NURSE PRACTITIONER

## 2021-02-02 NOTE — PROGRESS NOTES
Mr. Koko Presley was here today. INR today:   Results for orders placed or performed in visit on 02/02/21   POCT INR   Result Value Ref Range    INR 1.9      INR Goal: 2.0-3.0    Dosing Plan  As of 2/2/2021    TTR:  44.0 % (3.5 mo)   Full warfarin instructions:  2/2: 10 mg; Otherwise 7.5 mg every Sun, Tue, Sat; 10 mg all other days            PLAN: INCREASE TONIGHTS DOSE TO 10 MG, THEN CONTINUE CURRENT DOSE  NEXT COUMADIN CLINIC APT IS: [email or web address]    Coumadin Clinic Hours  Tuesday 7:30am - 4:00pm  Wednesday 7:30am - 4:00pm  Thursday 7:30am - 4:00pm    IF IT'S AN EMERGENCY, PLEASE CALL 911 OR GO TO YOUR NEAREST EMERGENCY ROOM. [facility] INTERNAL MEDICINE COUMADIN CLINIC [phone]  IF UNABLE TO REACH COUMADIN CLINIC, [address] [place] [address], [phone].

## 2021-02-23 ENCOUNTER — ANTI-COAG VISIT (OUTPATIENT)
Dept: PRIMARY CARE CLINIC | Age: 41
End: 2021-02-23
Payer: COMMERCIAL

## 2021-02-23 DIAGNOSIS — I82.890 JUGULAR VEIN THROMBOSIS, RIGHT: ICD-10-CM

## 2021-02-23 DIAGNOSIS — I82.402 LEG DVT (DEEP VENOUS THROMBOEMBOLISM), ACUTE, LEFT (HCC): ICD-10-CM

## 2021-02-23 DIAGNOSIS — I82.412 DVT FEMORAL (DEEP VENOUS THROMBOSIS) WITH THROMBOPHLEBITIS, LEFT (HCC): ICD-10-CM

## 2021-02-23 LAB — INTERNATIONAL NORMALIZATION RATIO, POC: 1.9

## 2021-02-23 PROCEDURE — 93793 ANTICOAG MGMT PT WARFARIN: CPT | Performed by: NURSE PRACTITIONER

## 2021-02-23 PROCEDURE — 85610 PROTHROMBIN TIME: CPT | Performed by: NURSE PRACTITIONER

## 2021-02-23 NOTE — PROGRESS NOTES
Mr. Susannah Garcia was here today. INR today:   Results for orders placed or performed in visit on 02/23/21   POCT INR   Result Value Ref Range    INR 1.9      INR Goal: 2.0-3.0    Dosing Plan  As of 2/23/2021    TTR:  36.7 % (4.2 mo)   Full warfarin instructions:  2/23: 10 mg; Otherwise 7.5 mg every Sun, Tue, Sat; 10 mg all other days            PLAN: INCREASE TONIGHT'S DOSE TO 10 MG, THEN CONTINUE CURRENT DOSE  NEXT COUMADIN CLINIC APT IS: [email or web address]    Coumadin Clinic Hours  Tuesday 7:30am - 4:00pm  Wednesday 7:30am - 4:00pm  Thursday 7:30am - 4:00pm    IF IT'S AN EMERGENCY, PLEASE CALL 911 OR GO TO YOUR NEAREST EMERGENCY ROOM. [facility] INTERNAL MEDICINE COUMADIN CLINIC [phone]  IF UNABLE TO REACH COUMADIN CLINIC, [address] [place] [address], [phone].

## 2021-02-25 ENCOUNTER — HOSPITAL ENCOUNTER (EMERGENCY)
Age: 41
Discharge: HOME | End: 2021-02-25
Attending: EMERGENCY MEDICINE
Payer: COMMERCIAL

## 2021-02-25 ENCOUNTER — NURSE TRIAGE (OUTPATIENT)
Dept: OTHER | Facility: CLINIC | Age: 41
End: 2021-02-25

## 2021-02-25 VITALS
RESPIRATION RATE: 20 BRPM | BODY MASS INDEX: 42.66 KG/M2 | DIASTOLIC BLOOD PRESSURE: 67 MMHG | WEIGHT: 315 LBS | TEMPERATURE: 98 F | SYSTOLIC BLOOD PRESSURE: 117 MMHG | HEIGHT: 72 IN | OXYGEN SATURATION: 99 % | HEART RATE: 65 BPM

## 2021-02-25 DIAGNOSIS — M79.89 LEFT LEG SWELLING: Primary | ICD-10-CM

## 2021-02-25 DIAGNOSIS — R79.1 SUBTHERAPEUTIC INTERNATIONAL NORMALIZED RATIO (INR): ICD-10-CM

## 2021-02-25 LAB
ANION GAP SERPL CALCULATED.3IONS-SCNC: 5 MMOL/L (ref 7–19)
BUN BLDV-MCNC: 13 MG/DL (ref 6–20)
CALCIUM SERPL-MCNC: 9.3 MG/DL (ref 8.6–10)
CHLORIDE BLD-SCNC: 104 MMOL/L (ref 98–111)
CO2: 31 MMOL/L (ref 22–29)
CREAT SERPL-MCNC: 0.8 MG/DL (ref 0.5–1.2)
GFR AFRICAN AMERICAN: >59
GFR NON-AFRICAN AMERICAN: >60
GLUCOSE BLD-MCNC: 120 MG/DL (ref 74–109)
HCT VFR BLD CALC: 45.2 % (ref 42–52)
HEMOGLOBIN: 14.1 G/DL (ref 14–18)
INR BLD: 1.81 (ref 0.88–1.18)
MCH RBC QN AUTO: 28.5 PG (ref 27–31)
MCHC RBC AUTO-ENTMCNC: 31.2 G/DL (ref 33–37)
MCV RBC AUTO: 91.5 FL (ref 80–94)
PDW BLD-RTO: 13.3 % (ref 11.5–14.5)
PLATELET # BLD: 221 K/UL (ref 130–400)
PMV BLD AUTO: 9.8 FL (ref 9.4–12.4)
POTASSIUM REFLEX MAGNESIUM: 4.2 MMOL/L (ref 3.5–5)
PROTHROMBIN TIME: 21.2 SEC (ref 12–14.6)
RBC # BLD: 4.94 M/UL (ref 4.7–6.1)
SODIUM BLD-SCNC: 140 MMOL/L (ref 136–145)
WBC # BLD: 7.3 K/UL (ref 4.8–10.8)

## 2021-02-25 PROCEDURE — 93971 EXTREMITY STUDY: CPT

## 2021-02-25 PROCEDURE — 99285 EMERGENCY DEPT VISIT HI MDM: CPT

## 2021-02-25 PROCEDURE — 85027 COMPLETE CBC AUTOMATED: CPT

## 2021-02-25 PROCEDURE — 80048 BASIC METABOLIC PNL TOTAL CA: CPT

## 2021-02-25 PROCEDURE — 85610 PROTHROMBIN TIME: CPT

## 2021-02-25 PROCEDURE — 36415 COLL VENOUS BLD VENIPUNCTURE: CPT

## 2021-02-25 ASSESSMENT — ENCOUNTER SYMPTOMS
DIARRHEA: 0
EYE PAIN: 0
SHORTNESS OF BREATH: 0
VOMITING: 0
ABDOMINAL PAIN: 0

## 2021-02-25 NOTE — ED PROVIDER NOTES
 Bilateral carpal tunnel syndrome 8/15/2019    Bronchitis     10 yrs ago    Diabetes mellitus ([address]     Disease of blood and blood forming organ     Hx of blood clots     Hypertension     Kidney stone     recurrent    Sleep apnea     untested    Umbilical hernia          SURGICAL HISTORY       Past Surgical History:   Procedure Laterality Date    CARPAL TUNNEL RELEASE Left 08/21/2020    LEFT CARPAL TUNNEL RELEASE performed by Zakia Early MD at [facility] Right 07/24/2018    CYSTOSCOPY/ URETEROSCOPY; INSERTION DOUBLE J STENT/  URETERAL performed by Phi Allen MD at [facility]      both wrists    [address] N/A 06/24/2016    HEMORRHOID INCISION AND DRAINAGE performed by Selina Paredes MD at [facility] CYSTO/URETERO/PYELOSCOPY W/LITHOTRIPSY Right 08/07/2018    URETEROSCOPY LASER LITHOTRIPSY STONE EXTRACTION performed by Phi Allen MD at [address] [facility] Right 08/07/2018    STENT PLACEMENT performed by Phi Allen MD at [address] [facility] Right 02/13/2018    ES[facility] [address] LITHOTRIPSY performed by Phi Allen MD at [address] [place] [address] ESWL Right 03/13/2018    ES[facility] [address] LITHOTRIPSY performed by Phi Allen MD at [facility] Right 07/24/2018    ESWL [address] LITHOTRIPSY performed by Phi Allen MD at [address] [place] [address], [facility] N/A 91/65/5062    HERNIA UMBILICAL REPAIR LAPAROSCOPIC performed by Panfilo Rose MD at [address] [facility] [address] / [address] Left 09/20/2020     LEFT LOWER EXTREMITY VENOGRAMS; INFERIOR VENA CAVA FILTER PLACEMENT. performed by Rani Grijalva MD at [facility]  12/14/2020 S[facility].Ultrasound-guided cannulation right internal jugular vein, Right internal jugular venograms to the superior vena cava,Ultrasound-guided cannulation left internal jugular vein with 12 French sheath, Inferior vena cava venograms, Retrieval of Bard inferior vena cava filter Annalise Like), Completion inferior vena cava venograms    WRIST SURGERY      left wrist.  Pt was a child         CURRENT MEDICATIONS       Previous Medications    AMLODIPINE (NORVASC) 5 MG TABLET    Take 1 tablet by mouth once daily    HYDRALAZINE (APRESOLINE) 25 MG TABLET    Take 1 tablet by mouth every 6 hours as needed (SBP>160mmHg)    LISINOPRIL (PRINIVIL;ZESTRIL) 10 MG TABLET    TAKE 1 TABLET BY MOUTH ONCE DAILY FOR 30 DAYS    TIZANIDINE (ZANAFLEX) 4 MG TABLET    Take 1 tablet by mouth nightly as needed (spasm)    VITAMIN D (ERGOCALCIFEROL) 1.25 MG (75133 UT) CAPS CAPSULE    Take 1 capsule by mouth once a week for 4 doses    WARFARIN (COUMADIN) 5 MG TABLET    TAKE 10 MG daily       ALLERGIES     Patient has no known allergies. FAMILY HISTORY       Family History   Problem Relation Age of Onset    Cancer Mother 46        colon cancer    Cancer Father         luekemia    Diabetes Father     Other Maternal Grandmother         copd    Other Maternal Grandfather         copd    Heart Disease Paternal Grandmother           SOCIAL HISTORY       Social History     Socioeconomic History    Marital status:      Spouse name: None    Number of children: None    Years of education: None    Highest education level: None   Occupational History    None   Social Needs    Financial resource strain: None    Food insecurity     Worry: None     Inability: None    Transportation needs     Medical: None     Non-medical: None   Tobacco Use    Smoking status: Never Smoker    Smokeless tobacco: Never Used    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[facility]   Substance and Sexual Activity    Alcohol use: Yes     Comment: OCC    Drug use:  No  Sexual activity: Yes     Partners: Female   Lifestyle    Physical activity     Days per week: None     Minutes per session: None    Stress: None   Relationships    Social connections     Talks on phone: None     Gets together: None     Attends Zoroastrian service: None     Active member of club or organization: None     Attends meetings of clubs or organizations: None     Relationship status: None    Intimate partner violence     Fear of current or ex partner: None     Emotionally abused: None     Physically abused: None     Forced sexual activity: None   Other Topics Concern    None   Social History Narrative    None       SCREENINGS             PHYSICAL EXAM    (up to 7 for level 4, 8 or more for level 5)     ED Triage Vitals [02/25/21 1057]   BP Temp Temp Source Pulse Resp SpO2 Height Weight   (!) 155/97 97.4 °F (36.3 °C) Oral 62 20 100 % 6' (1.829 m) (!) 370 lb (167.8 kg)       Physical Exam  Vitals signs reviewed. Constitutional:       General: He is not in acute distress. Appearance: He is well-developed. He is obese. HENT:      Head: Normocephalic and atraumatic. Eyes:      General: No scleral icterus. Pupils: Pupils are equal, round, and reactive to light. Neck:      Musculoskeletal: Normal range of motion and neck supple. Vascular: No JVD. Cardiovascular:      Rate and Rhythm: Normal rate and regular rhythm. Pulses: Normal pulses. Heart sounds: Normal heart sounds. No murmur. Pulmonary:      Effort: Pulmonary effort is normal. No respiratory distress. Breath sounds: Normal breath sounds. Abdominal:      General: There is no distension. Palpations: Abdomen is soft. Tenderness: There is no abdominal tenderness. Musculoskeletal:         General: No tenderness. Left ankle: He exhibits normal range of motion. No tenderness. Left lower leg: He exhibits no tenderness, no bony tenderness and no deformity. Edema (Pitting edema to lower leg foot and ankle. No warmth or induration. No significant erythema. Some very mild erythema but patient said this is chronic/baseline.) present. Left foot: Normal range of motion and normal capillary refill. Swelling present. No tenderness, bony tenderness, crepitus or deformity. Skin:     General: Skin is warm and dry. Capillary Refill: Capillary refill takes less than 2 seconds. Neurological:      General: No focal deficit present. Mental Status: He is alert and oriented to person, place, and time. Sensory: Sensation is intact. Motor: Motor function is intact. Psychiatric:         Mood and Affect: Mood normal.         Behavior: Behavior normal.         DIAGNOSTIC RESULTS       RADIOLOGY:   Non-plain film images such as CT, Ultrasound and MRI are read by the radiologist. Josef Calle images are visualized and preliminarily interpreted by the emergency physician with the below findings:    Interpretation per the Radiologist below, if available at the time of this note:    VL Extremity Venous Left           Vascular lab preliminary. Left leg venous scan completed. No evidence for DVT, SVT, or reflux noted at this time. Limited visualization of all calf veins due to body habitus.   Final report pending    LABS:  Labs Reviewed   CBC - Abnormal; Notable for the following components:       Result Value    MCHC 31.2 (*)     All other components within normal limits   PROTIME-INR - Abnormal; Notable for the following components:    Protime 21.2 (*)     INR 1.81 (*)     All other components within normal limits   BASIC METABOLIC PANEL W/ REFLEX TO MG FOR LOW K - Abnormal; Notable for the following components:    CO2 31 (*)     Anion Gap 5 (*)     Glucose 120 (*)     All other components within normal limits All other labs were within normal range or not returned as of this dictation. EMERGENCY DEPARTMENT COURSE and DIFFERENTIALDIAGNOSIS/MDM:   Vitals:    Vitals:    02/25/21 1057   BP: (!) 155/97   Pulse: 62   Resp: 20   Temp: 97.4 °F (36.3 °C)   TempSrc: Oral   SpO2: 100%   Weight: (!) 370 lb (167.8 kg)   Height: 6' (1.829 m)       MDM  INR slightly subtherapeutic. Told patient needs to make sure to always take his medication and follow-up for recheck of his INR as scheduled. Vascular study today showed no evidence of DVT but was limited because of his body habitus. Case discussed with Dr. Guillermina Kay, vascular surgeon, who said patient can be discharged and wants patient to follow-up in vascular surgery clinic for further evaluation of his persistent left leg swelling. Patient given these instructions and told return to the ER for change worsening symptoms or new concerns. Patient agreeable plan. CONSULTS:  None    PROCEDURES:  Unless otherwise notedbelow, none     Procedures    FINAL IMPRESSION     1. Left leg swelling    2.  Subtherapeutic international normalized ratio (INR)          DISPOSITION/PLAN   DISPOSITION Decision To Discharge 02/25/2021 01:24:36 PM      PATIENT REFERRED TO:  @FUP@    DISCHARGE MEDICATIONS:  New Prescriptions    No medications on file          (Please note that portions of this note were completed with a voice recognition program.  Efforts were made to edit the dictations butoccasionally words are mis-transcribed.)    Chapin Cervantes MD (electronically signed)  AttendingEmergency Physician         Chapin Cervantes MD  02/25/21 5263

## 2021-02-25 NOTE — TELEPHONE ENCOUNTER
Reason for Disposition   Entire foot is cool or blue in comparison to other side    Answer Assessment - Initial Assessment Questions  1. ONSET: \"When did the swelling start? \" (e.g., minutes, hours, days)      Since he recently had \"blood clot and cellulitis\"    2. LOCATION: \"What part of the leg is swollen? \"  \"Are both legs swollen or just one leg? \"      Left leg-- knee to foot    3. SEVERITY: \"How bad is the swelling? \" (e.g., localized; mild, moderate, severe)   - Localized - small area of swelling localized to one leg   - MILD pedal edema - swelling limited to foot and ankle, pitting edema < 1/4 inch (6 mm) deep, rest and elevation eliminate most or all swelling   - MODERATE edema - swelling of lower leg to knee, pitting edema > 1/4 inch (6 mm) deep, rest and elevation only partially reduce swelling   - SEVERE edema - swelling extends above knee, facial or hand swelling present      moderate    4. REDNESS: \"Does the swelling look red or infected? \"     Red with pitting. 5. PAIN: \"Is the swelling painful to touch? \" If so, ask: \"How painful is it? \"   (Scale 1-10; mild, moderate or severe)     No pain    6. FEVER: \"Do you have a fever? \" If so, ask: \"What is it, how was it measured, and when did it start? \"       No    7. CAUSE: \"What do you think is causing the leg swelling? \"     Blood clot? Cellulitis? 8. MEDICAL HISTORY: \"Do you have a history of heart failure, kidney disease, liver failure, or cancer? \"     HTN, hx of renal failure with temporary dialysis, blood clot in L leg and jugular     9. RECURRENT SYMPTOM: \"Have you had leg swelling before? \" If so, ask: \"When was the last time? \" \"What happened that time? \"      Since blood clot this month. 10. OTHER SYMPTOMS: \"Do you have any other symptoms? \" (e.g., chest pain, difficulty breathing)        Top of left foot cold    11. PREGNANCY: \"Is there any chance you are pregnant? \" \"When was your last menstrual period? \"        no    Protocols used: LEG SWELLING AND EDEMA-ADULT-OH    [facility]  with red flag complaint. Brief description of triage: left lower leg swollen, foot cool. Very recent history of multiple blood clots (femoral, jugular) with cellulitis with hospitalization. Has been swollen but not as badly as today. Triage indicates for patient to go to ED now. Pt and wife agree. Care advice provided, patient verbalizes understanding; denies any other questions or concerns; instructed to call back for any new or worsening symptoms. Attention Provider: Thank you for allowing me to participate in the care of your patient. The patient was connected to triage in response to information provided to the ECC. Please do not respond through this encounter as the response is not directed to a shared pool.

## 2021-02-25 NOTE — PROGRESS NOTES
Vascular lab preliminary. Left leg venous scan completed. No evidence for DVT, SVT, or reflux noted at this time. Limited visualization of all calf veins due to body habitus. Final report pending.

## 2021-03-01 ENCOUNTER — OFFICE VISIT (OUTPATIENT)
Dept: VASCULAR SURGERY | Age: 41
End: 2021-03-01
Payer: COMMERCIAL

## 2021-03-01 VITALS
OXYGEN SATURATION: 98 % | TEMPERATURE: 98.2 F | BODY MASS INDEX: 42.66 KG/M2 | HEART RATE: 75 BPM | HEIGHT: 72 IN | RESPIRATION RATE: 18 BRPM | WEIGHT: 315 LBS | DIASTOLIC BLOOD PRESSURE: 81 MMHG | SYSTOLIC BLOOD PRESSURE: 140 MMHG

## 2021-03-01 DIAGNOSIS — I82.412 DVT OF DEEP FEMORAL VEIN, LEFT (HCC): Primary | ICD-10-CM

## 2021-03-01 DIAGNOSIS — I26.90 ACUTE SEPTIC PULMONARY EMBOLISM, UNSPECIFIED WHETHER ACUTE COR PULMONALE PRESENT (HCC): ICD-10-CM

## 2021-03-01 PROCEDURE — 99212 OFFICE O/P EST SF 10 MIN: CPT | Performed by: PHYSICIAN ASSISTANT

## 2021-03-01 NOTE — PROGRESS NOTES
Patient Care Team:  VANDANA Euceda as PCP - General (Nurse Practitioner Family)  VANDANA Euceda as PCP - [facility]  VANDANA Harrell as Advanced Practice Nurse (Neurology)  Flower Martinez MD as Consulting Physician (Neurosurgery)  Raymundo Romero MD as Consulting Physician (Vascular Surgery)      Margo Rojas (:  1980) is a 42 y.o. male,Established patient, here for evaluation of the following chief complaint(s): HFU leg swelling      SUBJECTIVE/OBJECTIVE:  Mr. Karina Brooks is a 41 yo male who has a history of left leg DVT and PE. He underwent 1. Ultrasound/duplex guided cannulation of a thrombosed left popliteal vein with 5 Western [place] and later 8 Western Mirta sheath  2.  Left lower extremity venograms all the way to the inferior vena cava  3.  Percutaneous thrombectomy/PulseSpray thrombolysis with AngioJet Zelante catheter  4.  Left lower extremity venograms after percutaneous thrombectomy and thrombolysis  5.  Placement of 40 cm infusion length EKOS TPA catheter from the popliteal vein all the way to the distal external iliac vein  6.  Inferior vena cava filter placement from a right internal jugular vein approach (Bard Heather inferior vena cava filter) on 2020. (he underwent surgery for a carpal tunnel release on 20 by Dr. Alanna Wyman. He presented to ER on 2020 with propagation of left leg DVT (INR at that time was subtherapeutic). He underwent a retrieval IVC filter on 2020 by Dr. Perla Blankenship. He is on Coumadin daily. He has been seen and evaluated by Heme/Onc in the past. He has had a Prothrombotic work showed Factor V Leiden -Heterozygous 1 copy, Protein S 157(H). Today he reports BLE swelling. Left is > than the right. He has mild redness on the LLE. No fever. He is not wearing his compression stockings consistently. No SOB or chest pain.        Margo Rojas is a 42 y.o. male with the following history as recorded in [facility]:  Patient Active Problem List Diagnosis Date Noted    Presence of IVC filter 12/14/2020    Jugular vein thrombosis, right     Subtherapeutic international normalized ratio (INR)     DVT femoral (deep venous thrombosis) with thrombophlebitis, left (HCC)     Recurrent deep vein thrombosis ([facility] 11/04/2020    Hyperkalemia 09/21/2020    Morbid obesity ([facility]     DVT of deep femoral vein, left (HCC)     Leg DVT (deep venous thromboembolism), acute, left ([facility] 09/19/2020    Acute pulmonary embolism ([facility] 09/19/2020    S/P carpal tunnel release 09/02/2020    Numbness and tingling in both hands     Prediabetes 08/15/2019    Bilateral carpal tunnel syndrome 08/15/2019    Arthritis 08/15/2019    Transient alteration of awareness 07/08/2019    Hydronephrosis with renal and ureteral calculus obstruction 07/24/2018    Ureteral obstruction, right 22/86/6826    Umbilical hernia without obstruction and without gangrene 05/07/2018    Right ureteral calculus 02/13/2018    Thrombosed external hemorrhoid      Current Outpatient Medications   Medication Sig Dispense Refill    warfarin (COUMADIN) 5 MG tablet TAKE 10 MG daily (Patient taking differently: TAKE 10 MG M,W,TH, F 7.5 MG T, SA,TELLO) 60 tablet 1    amLODIPine (NORVASC) 5 MG tablet Take 1 tablet by mouth once daily 30 tablet 0    lisinopril (PRINIVIL;ZESTRIL) 10 MG tablet TAKE 1 TABLET BY MOUTH ONCE DAILY FOR 30 DAYS 30 tablet 2    tiZANidine (ZANAFLEX) 4 MG tablet Take 1 tablet by mouth nightly as needed (spasm) 30 tablet 1    hydrALAZINE (APRESOLINE) 25 MG tablet Take 1 tablet by mouth every 6 hours as needed (SBP>160mmHg) 120 tablet 0    vitamin D (ERGOCALCIFEROL) 1.25 MG (89524 UT) CAPS capsule Take 1 capsule by mouth once a week for 4 doses 4 capsule 0     No current facility-administered medications for this visit. Allergies: Patient has no known allergies.   Past Medical History:   Diagnosis Date    Arthritis     both wrist    Bilateral carpal tunnel syndrome 8/15/2019    Bronchitis     10 yrs ago    Diabetes mellitus ([address]     Disease of blood and blood forming organ     Hx of blood clots     Hypertension     Kidney stone     recurrent    Sleep apnea     untested    Umbilical hernia      Past Surgical History:   Procedure Laterality Date    CARPAL TUNNEL RELEASE Left 08/21/2020    LEFT CARPAL TUNNEL RELEASE performed by Kayleigh Carpenter MD at [address] [facility] Right 07/24/2018    CYSTOSCOPY/ URETEROSCOPY; INSERTION DOUBLE J STENT/  URETERAL performed by Luis Carlos Dent MD at [address]      both wrists    [address] N/[address] 06/24/2016    HEMORRHOID INCISION AND DRAINAGE performed by Teja Mccoy MD at [address] CYSTO/URETERO/PYELOSCOPY W/LITHOTRIPSY Right 08/07/2018    URETEROSCOPY LASER LITHOTRIPSY STONE EXTRACTION performed by Luis Carlos Dent MD at [address] [facility] Right 08/07/2018    STENT PLACEMENT performed by Luis Carlos Dent MD at [address] [facility] ESWL Right 02/13/2018    ESWL [address] LITHOTRIPSY performed by Luis Carlos Dent MD at [address] [facility] Right 03/13/2018    ESWL EXTRACORPEAL SHOCK WAVE LITHOTRIPSY performed by Luis Carlos Dent MD at [address] [facility] Right 07/24/2018    ESWL [address] LITHOTRIPSY performed by Luis Carlos Dent MD at [facility], [address] N/A 32/65/1632    HERNIA UMBILICAL REPAIR LAPAROSCOPIC performed by Luis Zapata MD at [address] / [address] Left 09/20/2020     LEFT LOWER EXTREMITY VENOGRAMS; INFERIOR VENA CAVA FILTER PLACEMENT. performed by Martha Graf MD at [address] [facility]  12/14/2020    SJS. Ultrasound-guided cannulation right internal jugular vein, Right internal jugular venograms to the superior vena cava,Ultrasound-guided cannulation left internal jugular vein with 12 [place] sheath, Inferior vena cava venograms, Retrieval of Bard inferior vena cava filter Zach Luis), Completion inferior vena cava venograms    WRIST SURGERY      left wrist.  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 Respiratory  no significant shortness of breath,  Cardiovascular  no chest pain or syncope. BLE swelling. Left > than the right. No claudication. (see HPI)  Musculoskeletal  no gait disturbance  Skin  no new wound. Neurologic   No speech difficulty or lateralizing weakness. All other review of systems are negative. Physical Exam    BP (!) 140/81 (Site: Right Upper Arm)   Pulse 75   Temp 98.2 °F (36.8 °C) (Temporal)   Resp 18   Ht 6' (1.829 m)   Wt (!) 370 lb (167.8 kg)   SpO2 98%   BMI 50.18 kg/m²       Neck- no masses noted. Cardiovascular  Regular rate and rhythm. Pulmonary  effort appears normal.  No respiratory distress. Lungs - Breath sounds normal. No wheezes or rales. Extremities  - Radial and brachial pulses are 2+ to palpation bilaterally. DP pulses are palpable. No cyanosis, clubbing,  has significant edema. He has a sharif on the pre tibial aspect of the leg with redness noted. Not cellulitic. No signs atheroembolic event. Neurologic  alert and oriented X 3. Physiologic. Face symmetric. Skin  warm, dry, and intact. no  wound.    Psychiatric  mood, affect, and behavior appear normal.  Judgment and thought processes appear normal.    Risk factors for atherosclerosis of all vascular beds have been reviewed with the patient including:  Family history, tobacco abuse in all forms, elevated cholesterol, hyperlipidemia, and diabetes. Venous Scan: 2/25/21  Impression       Coty Pandya is no evidence of deep venous thrombosis (DVT) in the left lower    extremity(ies).    Due to body habitus, left tibial level veins were not well visualized.        Signature        ----------------------------------------------------------------    Electronically signed by Harriette Gitelman MD(Interpreting    physician) on 02/26/2021 10:25 AM    ----------------------------------------------------------------       Velocities are measured in cm/s ; Diameters are measured in mm       Right Lower Extremities DVT Study Measurements   Right 2D Measurements   +------------------------------------+----------+---------------+----------+   ! Location                            ! Visualized! Compressibility! Thrombosis! +------------------------------------+----------+---------------+----------+   ! Sapheno Femoral Junction            ! Yes       ! Yes            !None      !   +------------------------------------+----------+---------------+----------+   ! Common Femoral                      ! Yes       ! Yes            !None      !   +------------------------------------+----------+---------------+----------+       Left Lower Extremities DVT Study Measurements   Left 2D Measurements   +------------------------------------+----------+---------------+----------+   ! Location                            ! Visualized! Compressibility! Thrombosis! +------------------------------------+----------+---------------+----------+   ! Sapheno Femoral Junction            ! Yes       ! Yes            !None      !   +------------------------------------+----------+---------------+----------+   ! Common Femoral                      ! Yes       ! Yes            !None      ! +------------------------------------+----------+---------------+----------+   ! Prox Femoral                        ! Yes       ! Yes            !None      !   +------------------------------------+----------+---------------+----------+   ! Mid Femoral                         ! Yes       ! Yes            !None      !   +------------------------------------+----------+---------------+----------+   ! Dist Femoral                        ! Yes       ! Yes            !None      !   +------------------------------------+----------+---------------+----------+   ! Deep Femoral                        ! Yes       ! Yes            !None      !   +------------------------------------+----------+---------------+----------+   ! Popliteal                           ! Yes       ! Yes            !None      !   +------------------------------------+----------+---------------+----------+   ! SSV                                 ! Yes       ! Yes            !None      !   +------------------------------------+----------+---------------+----------+   ! Gastroc                             ! Yes       ! Yes            !None      !   +------------------------------------+----------+---------------+----------+   ! PTV                                 !Partial   ! Yes            !None      !   +------------------------------------+----------+---------------+----------+   ! GSV                                 ! Yes       ! Yes            !None      !   +------------------------------------+----------+---------------+----------+   ! ATV                                 !No        !               !          !   +------------------------------------+----------+---------------+----------+   ! Peroneal                            !No        !               !          !   +------------------------------------+----------+---------------+----------+   ! Ponce                              !No        !               !          ! +------------------------------------+----------+---------------+----------+     Individual films reviewed: Yes. Test results were reviewed with the patient  Disease process is improved    Reviewed on this visit: prior studies including previous BLE venous scans for comparison    ASSESSMENT/PLAN:      1. History LLE DVT  2. PE    Continue anticoagulation as directed by Hematology  Recommend continue compression stockings daily  Recommend leg elevation above the level of the heart  We will follow up PRN or sooner if he develops worsening swelling, pain or erythema      An electronic signature was used to authenticate this note.     --Yeyo Griggs PA-C

## 2021-03-09 ENCOUNTER — ANTI-COAG VISIT (OUTPATIENT)
Dept: PRIMARY CARE CLINIC | Age: 41
End: 2021-03-09
Payer: COMMERCIAL

## 2021-03-09 DIAGNOSIS — I82.412 DVT FEMORAL (DEEP VENOUS THROMBOSIS) WITH THROMBOPHLEBITIS, LEFT (HCC): ICD-10-CM

## 2021-03-09 DIAGNOSIS — I82.890 JUGULAR VEIN THROMBOSIS, RIGHT: ICD-10-CM

## 2021-03-09 DIAGNOSIS — I82.402 LEG DVT (DEEP VENOUS THROMBOEMBOLISM), ACUTE, LEFT (HCC): ICD-10-CM

## 2021-03-09 LAB — INTERNATIONAL NORMALIZATION RATIO, POC: 1.7

## 2021-03-09 PROCEDURE — 93793 ANTICOAG MGMT PT WARFARIN: CPT | Performed by: NURSE PRACTITIONER

## 2021-03-09 PROCEDURE — 85610 PROTHROMBIN TIME: CPT | Performed by: NURSE PRACTITIONER

## 2021-03-09 NOTE — PROGRESS NOTES
Progress Note      Pt Name: German Trivedi  YOB: 1980  MRN: [id number]    Date of evaluation: 3/10/2021  History Obtained From:  patient, electronic medical record    CHIEF COMPLAINT:    Chief Complaint   Patient presents with    Follow-up     DVT of deep femoral vein, left ([address]     Current active problems  LLE DVT  Right-sided PE  Heterozygous factor V Leiden  Morbid obesity    HISTORY OF PRESENT ILLNESS:    German Trivedi is a 42 y.o.  male was seen on 2 separate occasions during acute hospitalizations at [address] [place] [address] on 9/20/2020 for a left lower extremity DVT and right pulmonary embolus. He did have thrombolytic treatment of the DVT by Dr. Toy Claude during this initial hospitalization. He was ultimately discharged home on warfarin. He represented on 11/4/2020 with subtherapeutic INR and worsening LLE DVT. Prothrombotic panel did reveal a heterozygous factor V Leiden. Dr. Toy Claude did remove his Bard IVC filter on 12/14/2020. He continues to have mild to moderate edema of the left lower extremity. He did have cellulitis on one occasion that resolved with antibiotics. He denies any open sores on his left leg. He works at [employer] and he is up moving constantly. He has compression stockings ordered but has not received them yet. He has continued to get his PT/INRs followed through the [facility] and is currently on 10 mg of warfarin Monday, Wednesday, Thursday, Friday, 7.5 on Tuesday, Saturday, Sunday. He continues to take Norvasc and Zestril for his blood pressure which is stable overall. He has not had to take any hydralazine. He takes vitamin D weekly.   He has issue with chronic muscle spasms but stable with Zanaflex as needed        HEMATOLOGICAL HISTORY: Left lower extremity DVT with right lung pulmonary emboli 9/19/2020  Lewis Wilkerson was seen in initial hematology consultation on 9/20/2020 as an inpatient at [facility] having been admitted on 9/19/2020 with left lower extremity DVT and PE.  He was placed on heparin as initial management.     Mr. Cayden Simmons had a 5-day history of left leg pain and swelling.  Initially he thought he had pulled a muscle but this did not get better.  When things did not get better, he sought evaluation at the ED at [facility]. Additionally he has nonspecific symptoms of pulmonary embolus including burning in the right side of his chest.  He does not have hemoptysis or dyspnea. Mr. Cayden Simmons does not have a personal history of DVT or PE or hypercoagulability.      Risk features include:   Obesity.    Recent left hand carpal tunnel surgery in August 2020. Family history: His mother had an episode of left lower extremity DVT and PE 15 years ago without recurrence.  A maternal aunt also had DVT of the lower extremities.     Noninvasive venous studies of the lower extremities on 9/19/2020 document:   · Extensive thrombosis (DVT) noted in Lt common femoral vein, Lt SFV (prox, mid and distal), Lt popliteal vein, Lt Gastrocs, Lt posterior tibial veins.  A floating tail is noted in left common femoral vein measuring approximately 4.3cm.     · SVT: thrombus noted in Lt LSV     Pulmonary CTA with contrast on 9/19/2020 documented the following:  · Multiple right-sided acute pulmonary emboli identified involving the right third fourth and fifth ordered pulmonary artery branches extending into the right lower lobe.   · Possible right heart strain  · No left sided pulmonary emboli identified  · Small calcified subcarinal lymph nodes consistent with healed granulomatous disease  · Scattered calcified granulomas in both lungs     He was seen by Dr. Hawa Dupree from vascular surgery (9/20/2020), with recommendations for percutaneous mechanical thrombectomy/pulse spray thrombolysis to try to prevent long-term swelling in this young patient with iliofemoral DVT.  If the result in that procedure is not satisfactory, he would consider placing a TPA thrombolyzes catheter for thrombolysis overnight.  The patient agreed to proceed. Dr. Xiao Hernandez did not feel that he needed TPA thrombolysis of the pulmonary embolism because he is fairly asymptomatic from that standpoint. PRIOR INTERVENTION  · Percutaneous thrombectomy/PulseSpray thrombolysis with AngioJet Zelante catheter  · Left lower extremity venograms after percutaneous thrombectomy and thrombolysis  · Placement of 40 cm infusion length EKOS TPA catheter from the popliteal vein all the way to the distal external iliac vein  · Inferior vena cava filter placement from a right internal jugular vein approach (Bard [place] inferior vena cava filter)  · 9/21/2002removal of EKOS TPA catheter      Prothrombotic work-up on 9/21/2020  Beta 2 glycoprotein IgG and IgM - both negative  Cardiolipin Ab IgG and IgM - both negative  Phospholipid Ab - negative  Protein C antigen95%   Protein S antigen157%(H)  Antithrombin III86%  Prothrombin gene mutationnot detected  Factor V Leidenheterozygous, one copy    DOACS not a good option due to morbid obesity with BMI above 50     He was discharged home on warfarin. The patient was admitted on 11/4/2020 after presenting to the emergency department with complaints of swelling of the left leg and right arm of 3 days duration.  Unfortunately  his INR was subtherapeutic at 1.5. Serology 12/9/2020  HOLLY 2 - V617F negative  CALR mutation - negative   HOLLY 2 Exons 12-15 - negative  MPL - negative    Dr. Perla Hernandez did remove his Bard IVC filter on 12/14/2020.       Past Medical History:   Diagnosis Date    Arthritis     both wrist    Bilateral carpal tunnel syndrome 8/15/2019    Bronchitis     10 yrs ago    Diabetes mellitus ([address]     Disease of blood and blood forming organ     Hx of blood clots     Hypertension     Kidney stone     recurrent    Sleep apnea     untested    Umbilical hernia         Past Surgical History:   Procedure Laterality Date    CARPAL TUNNEL RELEASE Left 08/21/2020    LEFT CARPAL TUNNEL RELEASE performed by Alex Buckley MD at [facility] Right 07/24/2018    CYSTOSCOPY/ URETEROSCOPY; INSERTION DOUBLE J STENT/  URETERAL performed by Melvin Juarez MD at [facility]      both wrists    HEMORRHOID SURGERY N/A 06/24/2016    HEMORRHOID INCISION AND DRAINAGE performed by Charanjit Peng MD at [facility] CYSTO/URETERO/PYELOSCOPY W/LITHOTRIPSY Right 08/07/2018    URETEROSCOPY LASER LITHOTRIPSY STONE EXTRACTION performed by Melvin Juarez MD at [address] Right 08/07/2018    STENT PLACEMENT performed by Melvin Juarez MD at [facility] Right 02/13/2018    ESWL EXTRACORPEAL SHOCK WAVE LITHOTRIPSY performed by Melvin Juarez MD at [facility] Right 03/13/2018    ESWL EXTRACORPEAL SHOCK WAVE LITHOTRIPSY performed by Melvin Juarez MD at [facility] Right 07/24/2018    ESWL [address] LITHOTRIPSY performed by Melvin Juarez MD at [facility] [address] LAP, [address] N/A 01/17/2633    HERNIA UMBILICAL REPAIR LAPAROSCOPIC performed by Omar Covarrubias MD at [address] / [address] Left 09/20/2020     LEFT LOWER EXTREMITY VENOGRAMS; INFERIOR VENA CAVA FILTER PLACEMENT. performed by Abhay Ansari MD at [facility]  12/14/2020    [facility]. Ultrasound-guided cannulation right internal jugular vein, Right internal jugular venograms to the superior vena cava,Ultrasound-guided cannulation left internal jugular vein with 12 [place] sheath, Inferior vena cava venograms, Retrieval of Bard inferior vena cava filter Lorella Jimena), Completion inferior vena cava venograms    WRIST SURGERY      left wrist.  Pt was a child           Current Outpatient Medications:     warfarin (COUMADIN) 5 MG tablet, TAKE 10 MG daily (Patient taking differently: TAKE 10 MG M,W,TH, F 7.5 MG T, SA,TELLO), Disp: 60 tablet, Rfl: 1    amLODIPine (NORVASC) 5 MG tablet, Take 1 tablet by mouth once daily, Disp: 30 tablet, Rfl: 0    lisinopril (PRINIVIL;ZESTRIL) 10 MG tablet, TAKE 1 TABLET BY MOUTH ONCE DAILY FOR 30 DAYS, Disp: 30 tablet, Rfl: 2    tiZANidine (ZANAFLEX) 4 MG tablet, Take 1 tablet by mouth nightly as needed (spasm), Disp: 30 tablet, Rfl: 1    hydrALAZINE (APRESOLINE) 25 MG tablet, Take 1 tablet by mouth every 6 hours as needed (SBP>160mmHg), Disp: 120 tablet, Rfl: 0    vitamin D (ERGOCALCIFEROL) 1.25 MG ([address] UT) CAPS capsule, Take 1 capsule by mouth once a week for 4 doses, Disp: 4 capsule, Rfl: 0     No Known Allergies    Social History     Tobacco Use    Smoking status: Never Smoker    Smokeless tobacco: Never Used    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[employer]   Substance Use Topics    Alcohol use: Yes     Comment: OCC    Drug use: No       Family History   Problem Relation Age of Onset    Cancer Mother 46        colon cancer    Cancer Father         luekemia    Diabetes Father     Other Maternal Grandmother         copd    Other Maternal Grandfather         copd    Heart Disease Paternal Grandmother        Subjective   REVIEW OF SYSTEMS:   Review of Systems   Constitutional: Positive for fatigue. Negative for chills, diaphoresis, fever and unexpected weight change. HENT: Negative for mouth sores, nosebleeds, sore throat, trouble swallowing and voice change. Eyes: Negative for photophobia, discharge and itching. Respiratory: Negative for cough, shortness of breath and wheezing. Cardiovascular: Positive for leg swelling. Negative for chest pain and palpitations. Gastrointestinal: Negative for abdominal distention, abdominal pain, blood in stool, constipation, diarrhea, nausea and vomiting. Endocrine: Negative for cold intolerance, heat intolerance, polydipsia and polyuria.    Genitourinary: Negative for difficulty urinating, dysuria, hematuria and urgency. Musculoskeletal: Positive for myalgias. Negative for arthralgias, back pain and joint swelling. Skin: Negative for color change and rash. Neurological: Negative for dizziness, tremors, seizures, syncope and light-headedness. Hematological: Negative for adenopathy. Does not bruise/bleed easily. Psychiatric/Behavioral: Negative for behavioral problems and suicidal ideas. The patient is not nervous/anxious. All other systems reviewed and are negative. Objective   BP (!) 140/77   Pulse 89   Temp 97.1 °F (36.2 °C) (Temporal)   Ht 6' (1.829 m)   Wt (!) 395 lb 1.6 oz (179.2 kg)   SpO2 96%   BMI 53.59 kg/m²     PHYSICAL EXAM:  Physical Exam  Vitals signs reviewed. Constitutional:       General: He is not in acute distress. Appearance: He is well-developed. He is morbidly obese. HENT:      Head: Normocephalic and atraumatic. Nose: Nose normal.   Eyes:      General: No scleral icterus. Conjunctiva/sclera: Conjunctivae normal.   Neck:      Vascular: No JVD. Trachea: No tracheal deviation. Cardiovascular:      Rate and Rhythm: Normal rate and regular rhythm. Heart sounds: Normal heart sounds. No murmur. Pulmonary:      Effort: Pulmonary effort is normal. No respiratory distress. Breath sounds: Normal breath sounds. No wheezing. Abdominal:      General: Bowel sounds are normal. There is no distension. Palpations: Abdomen is soft. There is no mass. Tenderness: There is no abdominal tenderness. Musculoskeletal: Normal range of motion. General: No tenderness or deformity. Left lower le+ Edema present. Skin:     Findings: No erythema or rash. Neurological:      Mental Status: He is alert and oriented to person, place, and time. Psychiatric:         Thought Content:  Thought content normal.        CBC reviewed by me  Lab Results   Component Value Date    WBC 8.69 03/10/2021    HGB 14.5 03/10/2021    HCT 46.5 03/10/2021    MCV 93.4 (H) 03/10/2021     03/10/2021       VISIT DIAGNOSES  1. DVT of deep femoral vein, left ([address]    2. Single subsegmental pulmonary embolism without acute cor pulmonale (HCC)    3. Heterozygous factor V Leiden mutation ([facility]    4. Morbid obesity ([facility]    5. Normocytic hypochromic anemia        ASSESSMENT/PLAN:      1. Left lower extremity DVT with right pulmonary emboli 9/19/2020  2. 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 ?(Recent surgery), Sep 2020, Nov 2020 (subtherapeutic INR), heterozygous factor V Leiden, Left LE DVT acute and IJ catheter related DVT (removed by vascular 11/05/2020)      PRIOR INTERVENTION  · Percutaneous thrombectomy/PulseSpray thrombolysis with AngioJet Zelante catheter  · Left lower extremity venograms after percutaneous thrombectomy and thrombolysis  · Placement of 40 cm infusion length EKOS TPA catheter from the popliteal vein all the way to the distal external iliac vein  · Inferior vena cava filter placement from a right internal jugular vein approach (Bard [place] inferior vena cava filter)  · 9/21/2002removal of EKOS TPA catheter      Serology 12/9/2020  HOLLY 2 - V617F negative  CALR mutation - negative   HOLLY 2 Exons 12-15 - negative  MPL - negative    Dr. Mc Lovelace did remove his Bard IVC filter on 12/14/2020. I again discussed potential postphlebitic syndrome that can occur with chronic swelling of the legs, venous stasis ulcers. I encouraged him to elevate his legs whenever possible. I encouraged him to wear his compression stockings when they become available. I again discussed working toward a normal BMI. He is 395 pounds today with a BMI of 53.59. He was here 3 months ago when he was 89. He reports that he and his wife are going to start exercising.       3.  Normocytic hypochromic anemia    Labs on 11/6/2020:  Ferritin - 714.8  Iron - 78  Iron saturation - 32%  TIBC - 242  Creatinine 1.0/GFR >60          Hgb is 14.5 with MCV 93.4normal.         Return in about 6 months (around 9/10/2021) for With Rambo Moran.      Char Aguilar PA-C  12:48 PM  3/10/2021

## 2021-03-09 NOTE — PROGRESS NOTES
Mr. Emelia Thomas was here today. INR today:   Results for orders placed or performed in visit on 03/09/21   POCT INR   Result Value Ref Range    INR 1.7      INR Goal: 2.0-3.0    Dosing Plan  As of 3/9/2021    TTR:  33.0 % (4.6 mo)   Full warfarin instructions:  3/9: 10 mg; Otherwise 7.5 mg every Sun, Tue, Sat; 10 mg all other days            PLAN:  Increase dose tonight to 10 mg, then CONTINUE CURRENT DOSE. Patient missed his dose on 3/7/21  NEXT COUMADIN CLINIC APT IS: [email or web address]  Coumadin Clinic Hours  Tuesday 7:30am - 4:00pm  Wednesday 7:30am - 4:00pm  Thursday 7:30am - 4:00pm    IF IT'S AN EMERGENCY, PLEASE CALL 911 OR GO TO YOUR NEAREST EMERGENCY ROOM. [facility] INTERNAL MEDICINE COUMADIN CLINIC [phone]  IF UNABLE TO REACH COUMADIN CLINIC, [address] [place] [address], [phone].

## 2021-03-10 ENCOUNTER — OFFICE VISIT (OUTPATIENT)
Dept: HEMATOLOGY | Age: 41
End: 2021-03-10
Payer: COMMERCIAL

## 2021-03-10 ENCOUNTER — HOSPITAL ENCOUNTER (OUTPATIENT)
Dept: INFUSION THERAPY | Age: 41
Discharge: HOME | End: 2021-03-10
Payer: COMMERCIAL

## 2021-03-10 VITALS
OXYGEN SATURATION: 96 % | TEMPERATURE: 97.1 F | WEIGHT: 315 LBS | HEIGHT: 72 IN | SYSTOLIC BLOOD PRESSURE: 140 MMHG | BODY MASS INDEX: 42.66 KG/M2 | HEART RATE: 89 BPM | DIASTOLIC BLOOD PRESSURE: 77 MMHG

## 2021-03-10 DIAGNOSIS — I82.412 DVT OF DEEP FEMORAL VEIN, LEFT (HCC): Primary | ICD-10-CM

## 2021-03-10 DIAGNOSIS — D50.9 NORMOCYTIC HYPOCHROMIC ANEMIA: ICD-10-CM

## 2021-03-10 DIAGNOSIS — D68.51 HETEROZYGOUS FACTOR V LEIDEN MUTATION (HCC): ICD-10-CM

## 2021-03-10 DIAGNOSIS — I26.93 SINGLE SUBSEGMENTAL PULMONARY EMBOLISM WITHOUT ACUTE COR PULMONALE (HCC): ICD-10-CM

## 2021-03-10 DIAGNOSIS — I26.99 PULMONARY EMBOLISM, UNSPECIFIED CHRONICITY, UNSPECIFIED PULMONARY EMBOLISM TYPE, UNSPECIFIED WHETHER ACUTE COR PULMONALE PRESENT (HCC): ICD-10-CM

## 2021-03-10 DIAGNOSIS — E66.01 MORBID OBESITY (HCC): ICD-10-CM

## 2021-03-10 LAB
BASOPHILS ABSOLUTE: 0.09 K/UL (ref 0.01–0.08)
BASOPHILS RELATIVE PERCENT: 1 % (ref 0.1–1.2)
EOSINOPHILS ABSOLUTE: 0.35 K/UL (ref 0.04–0.54)
EOSINOPHILS RELATIVE PERCENT: 4 % (ref 0.7–7)
HCT VFR BLD CALC: 46.5 % (ref 40.1–51)
HEMOGLOBIN: 14.5 G/DL (ref 13.7–17.5)
LYMPHOCYTES ABSOLUTE: 2.08 K/UL (ref 1.18–3.74)
LYMPHOCYTES RELATIVE PERCENT: 23.9 % (ref 19.3–53.1)
MCH RBC QN AUTO: 29.1 PG (ref 25.7–32.2)
MCHC RBC AUTO-ENTMCNC: 31.2 G/DL (ref 32.3–36.5)
MCV RBC AUTO: 93.4 FL (ref 79–92.2)
MONOCYTES ABSOLUTE: 0.7 K/UL (ref 0.24–0.82)
MONOCYTES RELATIVE PERCENT: 8.1 % (ref 4.7–12.5)
NEUTROPHILS ABSOLUTE: 5.47 K/UL (ref 1.56–6.13)
NEUTROPHILS RELATIVE PERCENT: 63 % (ref 34–71.1)
PDW BLD-RTO: 14 % (ref 11.6–14.4)
PLATELET # BLD: 212 K/UL (ref 163–337)
PMV BLD AUTO: 9.8 FL (ref 7.4–10.4)
RBC # BLD: 4.98 M/UL (ref 4.63–6.08)
WBC # BLD: 8.69 K/UL (ref 4.23–9.07)

## 2021-03-10 PROCEDURE — 99211 OFF/OP EST MAY X REQ PHY/QHP: CPT

## 2021-03-10 PROCEDURE — 99213 OFFICE O/P EST LOW 20 MIN: CPT | Performed by: PHYSICIAN ASSISTANT

## 2021-03-10 PROCEDURE — 85025 COMPLETE CBC W/AUTO DIFF WBC: CPT

## 2021-03-10 ASSESSMENT — ENCOUNTER SYMPTOMS
NAUSEA: 0
CONSTIPATION: 0
EYE ITCHING: 0
VOMITING: 0
PHOTOPHOBIA: 0
SORE THROAT: 0
ABDOMINAL PAIN: 0
BACK PAIN: 0
VOICE CHANGE: 0
ABDOMINAL DISTENTION: 0
DIARRHEA: 0
COUGH: 0
EYE DISCHARGE: 0
TROUBLE SWALLOWING: 0
WHEEZING: 0
COLOR CHANGE: 0
SHORTNESS OF BREATH: 0
BLOOD IN STOOL: 0

## 2021-03-23 ENCOUNTER — ANTI-COAG VISIT (OUTPATIENT)
Dept: PRIMARY CARE CLINIC | Age: 41
End: 2021-03-23
Payer: COMMERCIAL

## 2021-03-23 DIAGNOSIS — I82.412 DVT FEMORAL (DEEP VENOUS THROMBOSIS) WITH THROMBOPHLEBITIS, LEFT (HCC): ICD-10-CM

## 2021-03-23 LAB — INTERNATIONAL NORMALIZATION RATIO, POC: 1.8

## 2021-03-23 PROCEDURE — 85610 PROTHROMBIN TIME: CPT | Performed by: NURSE PRACTITIONER

## 2021-03-23 PROCEDURE — 93793 ANTICOAG MGMT PT WARFARIN: CPT | Performed by: NURSE PRACTITIONER

## 2021-04-06 ENCOUNTER — ANTI-COAG VISIT (OUTPATIENT)
Dept: PRIMARY CARE CLINIC | Age: 41
End: 2021-04-06
Payer: COMMERCIAL

## 2021-04-06 DIAGNOSIS — I82.412 DVT FEMORAL (DEEP VENOUS THROMBOSIS) WITH THROMBOPHLEBITIS, LEFT (HCC): ICD-10-CM

## 2021-04-06 DIAGNOSIS — I82.890 JUGULAR VEIN THROMBOSIS, RIGHT: ICD-10-CM

## 2021-04-06 LAB — INTERNATIONAL NORMALIZATION RATIO, POC: 1.4

## 2021-04-06 PROCEDURE — 93793 ANTICOAG MGMT PT WARFARIN: CPT | Performed by: NURSE PRACTITIONER

## 2021-04-06 PROCEDURE — 85610 PROTHROMBIN TIME: CPT | Performed by: NURSE PRACTITIONER

## 2021-04-06 NOTE — PROGRESS NOTES
Mr. Richard Ga was here today. INR today:   Results for orders placed or performed in visit on 04/06/21   POCT INR   Result Value Ref Range    INR 1.4      INR Goal: 2.0-3.0    Dosing Plan  As of 4/6/2021    TTR:  27.5 % (5.6 mo)   Full warfarin instructions:  4/6: 15 mg; 4/7: 15 mg; Otherwise 7.5 mg every Sun; 10 mg all other days            PLAN: INCREASE DOSE TO 15 MG TONIGHT AND TOMORROW, THEN CONTINUE CURRENT DOSE  NEXT COUMADIN CLINIC APT IS: 4/14/2021 AT VISIT WITH KIEL. Coumadin Clinic Hours  Tuesday 7:30am - 4:00pm  Wednesday 7:30am - 4:00pm  Thursday 7:30am - 4:00pm    IF IT'S AN EMERGENCY, PLEASE CALL 911 OR GO TO YOUR NEAREST EMERGENCY ROOM. [facility] INTERNAL MEDICINE COUMADIN CLINIC [phone]  IF UNABLE TO REACH COUMADIN CLINIC, [address] [place] [address], [phone].

## 2021-04-14 ENCOUNTER — OFFICE VISIT (OUTPATIENT)
Dept: PRIMARY CARE CLINIC | Age: 41
End: 2021-04-14
Payer: COMMERCIAL

## 2021-04-14 ENCOUNTER — ANTI-COAG VISIT (OUTPATIENT)
Dept: PRIMARY CARE CLINIC | Age: 41
End: 2021-04-14
Payer: COMMERCIAL

## 2021-04-14 VITALS
BODY MASS INDEX: 42.66 KG/M2 | WEIGHT: 315 LBS | SYSTOLIC BLOOD PRESSURE: 134 MMHG | HEIGHT: 72 IN | OXYGEN SATURATION: 97 % | RESPIRATION RATE: 18 BRPM | HEART RATE: 75 BPM | TEMPERATURE: 98.6 F | DIASTOLIC BLOOD PRESSURE: 84 MMHG

## 2021-04-14 DIAGNOSIS — S39.012D STRAIN OF LUMBAR REGION, SUBSEQUENT ENCOUNTER: ICD-10-CM

## 2021-04-14 DIAGNOSIS — Z79.01 LONG TERM (CURRENT) USE OF ANTICOAGULANTS: ICD-10-CM

## 2021-04-14 DIAGNOSIS — E66.01 MORBID OBESITY (HCC): Primary | ICD-10-CM

## 2021-04-14 DIAGNOSIS — R53.83 OTHER FATIGUE: ICD-10-CM

## 2021-04-14 DIAGNOSIS — G47.30 SLEEP APNEA, UNSPECIFIED TYPE: ICD-10-CM

## 2021-04-14 DIAGNOSIS — R06.81 WITNESSED EPISODE OF APNEA: ICD-10-CM

## 2021-04-14 LAB — INTERNATIONAL NORMALIZATION RATIO, POC: 2.5

## 2021-04-14 PROCEDURE — 85610 PROTHROMBIN TIME: CPT | Performed by: NURSE PRACTITIONER

## 2021-04-14 PROCEDURE — 93793 ANTICOAG MGMT PT WARFARIN: CPT | Performed by: NURSE PRACTITIONER

## 2021-04-14 PROCEDURE — 99214 OFFICE O/P EST MOD 30 MIN: CPT | Performed by: NURSE PRACTITIONER

## 2021-04-14 RX ORDER — TIZANIDINE 4 MG/1
4 TABLET ORAL NIGHTLY PRN
Qty: 30 TABLET | Refills: 3 | Status: SHIPPED | OUTPATIENT
Start: 2021-04-14 | End: 2022-10-19

## 2021-04-14 ASSESSMENT — ENCOUNTER SYMPTOMS
GASTROINTESTINAL NEGATIVE: 1
EYES NEGATIVE: 1
APNEA: 1

## 2021-04-14 NOTE — PROGRESS NOTES
Mr. Danieal Cole was here today. INR today:   Results for orders placed or performed in visit on 04/14/21   POCT INR   Result Value Ref Range    INR 2.5      INR Goal: 2.0-3.0    Dosing Plan  As of 4/14/2021    TTR:  28.3 % (5.8 mo)   Full warfarin instructions:  4/14: 15 mg; Otherwise 7.5 mg every Sun; 10 mg all other days            PLAN: INCREASE DOSE ON WEDNESDAYS TO 15 MG AND CONTINUE CURRENT DOSE ALL OTHER DAYS. NEXT COUMADIN CLINIC APT IS: [email or web address]  Coumadin Clinic Hours  Tuesday 7:30am - 4:00pm  Wednesday 7:30am - 4:00pm  Thursday 7:30am - 4:00pm    IF IT'S AN EMERGENCY, PLEASE CALL 911 OR GO TO YOUR NEAREST EMERGENCY ROOM. [facility] INTERNAL MEDICINE COUMADIN CLINIC [phone]  IF UNABLE TO REACH COUMADIN CLINIC, [address] [place] [address], [phone].

## 2021-04-14 NOTE — PROGRESS NOTES
[address] [facility] CARE  [id number] [place] [address]  [address] [address]  Dept: [phone]  Dept Fax: [phone]  Loc: [phone]    Magnolia Wiley is a 42 y.o. male who presents today for his medical conditions/complaints as noted below. Magnolia Wiley is c/o of Hypertension (3 month follow up no other issuesPt taking meds as prescribes denies any sx of HTN)      Chief Complaint   Patient presents with    Hypertension     3 month follow up no other issuesPt taking meds as prescribes denies any sx of HTN       HPI:     HPI  Patient here for follow up on chronic conditions including chronic DVT, HTN, and lumbar strain. He still uses zanaflex at night and it does help with his symptoms. He has been monitoring BP at home and it has been doing well with his current medication regimen. He has had follow up with Coumadin clinic and his INR has been stable. He has noticed increased fatigue and drowsiness through the day. He never completed the home sleep study that was ordered from last fall.      Past Medical History:   Diagnosis Date    Arthritis     both wrist    Bilateral carpal tunnel syndrome 8/15/2019    Bronchitis     10 yrs ago    Diabetes mellitus ([address]     Disease of blood and blood forming organ     Hx of blood clots     Hypertension     Kidney stone     recurrent    Sleep apnea     untested    Umbilical hernia         Past Surgical History:   Procedure Laterality Date    CARPAL TUNNEL RELEASE Left 08/21/2020    LEFT CARPAL TUNNEL RELEASE performed by Angela Lambert MD at [address] [place] [address] Right 07/24/2018    CYSTOSCOPY/ URETEROSCOPY; INSERTION DOUBLE J STENT/  URETERAL performed by Manisha Ch MD at [address] [place] [address]      both wrists    HEMORRHOID SURGERY N/A 06/24/2016    HEMORRHOID INCISION AND DRAINAGE performed by David Bynum MD at [facility] CYSTO/URETERO/PYELOSCOPY W/LITHOTRIPSY Right 08/07/2018    URETEROSCOPY LASER LITHOTRIPSY STONE EXTRACTION performed by Angela Langston MD at [facility] Right 08/07/2018    STENT PLACEMENT performed by Angela Langston MD at [address] [place] [address] ESWL Right 02/13/2018    ESWL EXTRACORPEAL SHOCK WAVE LITHOTRIPSY performed by Angela Langston MD at [address] [place] [address] ESWL Right 03/13/2018    ESWL EXTRACORPEAL SHOCK WAVE LITHOTRIPSY performed by Angela Langston MD at [address] [place] [address] ESWL Right 07/24/2018    ESWL [address] LITHOTRIPSY performed by Angela Langston MD at [address], [address] N/A 10/41/7788    HERNIA UMBILICAL REPAIR LAPAROSCOPIC performed by Jesus Bearden MD at [address] / [address] Left 09/20/2020     LEFT LOWER EXTREMITY VENOGRAMS; INFERIOR VENA CAVA FILTER PLACEMENT. performed by Preston Luo MD at [facility]  12/14/2020    [facility]. Ultrasound-guided cannulation right internal jugular vein, Right internal jugular venograms to the superior vena cava,Ultrasound-guided cannulation left internal jugular vein with 12 French sheath, Inferior vena cava venograms, Retrieval of Bard inferior vena cava filter Samantha Loose), Completion inferior vena cava venograms    WRIST SURGERY      left wrist.  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[employer]   Substance Use Topics    Alcohol use: Yes     Comment: OCC        Current Outpatient Medications   Medication Sig Dispense Refill    tiZANidine (ZANAFLEX) 4 MG tablet Take 1 tablet by mouth nightly as needed (spasm) 30 tablet 3    lisinopril (PRINIVIL;ZESTRIL) 10 MG tablet TAKE 1 TABLET BY MOUTH ONCE DAILY FOR 30 DAYS 30 tablet 1    amLODIPine (NORVASC) 5 MG tablet Take 1 tablet by mouth once daily 30 tablet 1    warfarin (COUMADIN) 5 MG tablet TAKE 10 MG daily (Patient taking differently: TAKE 10 MG M,W,TH, F 7.5 MG T, SA,TELLO) 60 tablet 1    hydrALAZINE (APRESOLINE) 25 MG tablet Take 1 tablet by mouth every 6 hours as needed (SBP>160mmHg) 120 tablet 0    vitamin D (ERGOCALCIFEROL) 1.25 MG (96927 UT) CAPS capsule Take 1 capsule by mouth once a week for 4 doses 4 capsule 0     No current facility-administered medications for this visit. No Known Allergies    Family History   Problem Relation Age of Onset    Cancer Mother 46        colon cancer    Cancer Father         luekemia    Diabetes Father     Other Maternal Grandmother         copd    Other Maternal Grandfather         copd    Heart Disease Paternal Grandmother                Subjective:      Review of Systems   Constitutional: Positive for fatigue. HENT: Negative. Eyes: Negative. Respiratory: Positive for apnea (witnessed at night). Cardiovascular: Negative. Gastrointestinal: Negative. Endocrine: Negative. Genitourinary: Negative. Musculoskeletal: Negative. Skin: Negative. Neurological: Negative. Hematological: Negative. Psychiatric/Behavioral: Negative. Objective:     Physical Exam  Vitals signs and nursing note reviewed. Constitutional:       Appearance: Normal appearance. He is well-developed. Comments: obese   HENT:      Head: Normocephalic and atraumatic. Right Ear: Hearing, tympanic membrane, ear canal and external ear normal.      Left Ear: Hearing, tympanic membrane, ear canal and external ear normal.      Nose: Nose normal.      Mouth/Throat:      Pharynx: Uvula midline. Eyes:      General: Lids are normal.      Conjunctiva/sclera: Conjunctivae normal.      Pupils: Pupils are equal, round, and reactive to light. Neck:      Musculoskeletal: Normal range of motion and neck supple. Thyroid: No thyroid mass or thyromegaly.       Trachea: Trachea normal.   Cardiovascular:      Rate and Rhythm: Normal rate and regular rhythm. Heart sounds: Normal heart sounds. Pulmonary:      Effort: Pulmonary effort is normal.      Breath sounds: Normal breath sounds. Abdominal:      General: Bowel sounds are normal.      Palpations: Abdomen is soft. Musculoskeletal: Normal range of motion. Cervical back: Normal. He exhibits normal range of motion and no tenderness. Thoracic back: Normal. He exhibits normal range of motion and no tenderness. Lumbar back: Normal. He exhibits normal range of motion and no tenderness. Skin:     General: Skin is warm and dry. Neurological:      Mental Status: He is alert and oriented to person, place, and time. Psychiatric:         Speech: Speech normal.         Behavior: Behavior normal.         Thought Content: Thought content normal.         Judgment: Judgment normal.         /84   Pulse 75   Temp 98.6 °F (37 °C) (Temporal)   Resp 18   Ht 6' (1.829 m)   Wt (!) 399 lb (181 kg)   SpO2 97%   BMI 54.11 kg/m²     Assessment:      Diagnosis Orders   1. Morbid obesity ([address]  Home Sleep Study   2. Strain of lumbar region, subsequent encounter  tiZANidine (ZANAFLEX) 4 MG tablet   3. Sleep apnea, unspecified type  Home Sleep Study   4. Witnessed episode of apnea     5. Other fatigue         No results found for this visit on 04/14/21. Plan:     Patient is to keep INR checks with coumadin clinic. Home sleep study ordered. Will continue current BP meds. Return in about 3 months (around 7/14/2021) for Office Visit, Follow up chronic conditions, Cpap.     Orders Placed This Encounter   Procedures    Home Sleep Study     Standing Status:   Future     Standing Expiration Date:   4/14/2022     Order Specific Question:   Location For Sleep Study     Answer:   [facility]     Order Specific Question:   [facility]     [facility]       Orders Placed This Encounter   Medications    tiZANidine (ZANAFLEX) 4 MG tablet     Sig: Take 1 tablet by mouth nightly as needed (spasm)     Dispense:  30 tablet     Refill:  3        Patient offered educational handouts and has had all questions answered. Patient voices understanding and agrees to plans along with risks and benefits of plan. Patient is instructed to continue prior meds, diet, and exercise plans as instructed. Patient agrees to follow up as instructed and sooner if needed. Patient agrees to go to ER if condition becomes emergent. EMR Dragon/transcription disclaimer: Some of this encounter note is an electronic transcription/translation of spoken language to printed text. The electronic translation of spoken language may permit erroneous, or at times, nonsensical words or phrases to be inadvertently transcribed.  Although I have reviewed the note for such errors, some may still exist.    Electronically signed by VANDANA Rosales on 4/14/2021 at 1:28 PM

## 2021-04-27 ENCOUNTER — ANTI-COAG VISIT (OUTPATIENT)
Dept: PRIMARY CARE CLINIC | Age: 41
End: 2021-04-27
Payer: COMMERCIAL

## 2021-04-27 DIAGNOSIS — Z79.01 LONG TERM (CURRENT) USE OF ANTICOAGULANTS: ICD-10-CM

## 2021-04-27 LAB — INTERNATIONAL NORMALIZATION RATIO, POC: 1.9

## 2021-04-27 PROCEDURE — 93793 ANTICOAG MGMT PT WARFARIN: CPT | Performed by: NURSE PRACTITIONER

## 2021-04-27 PROCEDURE — 85610 PROTHROMBIN TIME: CPT | Performed by: NURSE PRACTITIONER

## 2021-04-27 NOTE — PROGRESS NOTES
Mr. Daniela Cole was here today. INR today:   Results for orders placed or performed in visit on 04/27/21   POCT INR   Result Value Ref Range    INR 1.9      INR Goal: 2.0-3.0    Dosing Plan  As of 4/27/2021    TTR:  32.1 % (6.3 mo)   Full warfarin instructions:  4/27: 15 mg; Otherwise 7.5 mg every Sun; 10 mg all other days            PLAN: INCREASE TONIGHT'S DOSE TO 15 MG, THEN CONTINUE CURRENT DOSE  NEXT COUMADIN CLINIC APT IS: [email or web address]    Coumadin Clinic Hours  Tuesday 7:30am - 4:00pm  Wednesday 7:30am - 4:00pm  Thursday 7:30am - 4:00pm    IF IT'S AN EMERGENCY, PLEASE CALL 911 OR GO TO YOUR NEAREST EMERGENCY ROOM. [facility] INTERNAL MEDICINE COUMADIN CLINIC [phone]  IF UNABLE TO REACH COUMADIN CLINIC, [address] [place] [address], [phone].

## 2021-05-19 ENCOUNTER — ANTI-COAG VISIT (OUTPATIENT)
Dept: PRIMARY CARE CLINIC | Age: 41
End: 2021-05-19
Payer: COMMERCIAL

## 2021-05-19 DIAGNOSIS — Z79.01 MONITORING FOR LONG-TERM ANTICOAGULANT USE: ICD-10-CM

## 2021-05-19 DIAGNOSIS — Z51.81 MONITORING FOR LONG-TERM ANTICOAGULANT USE: ICD-10-CM

## 2021-05-19 LAB — INTERNATIONAL NORMALIZATION RATIO, POC: 3.2

## 2021-05-19 PROCEDURE — 85610 PROTHROMBIN TIME: CPT | Performed by: NURSE PRACTITIONER

## 2021-05-19 PROCEDURE — 93793 ANTICOAG MGMT PT WARFARIN: CPT | Performed by: NURSE PRACTITIONER

## 2021-05-19 NOTE — PROGRESS NOTES
Mr. Antelmo Hickey was here today. INR today:   Results for orders placed or performed in visit on 05/19/21   POCT INR   Result Value Ref Range    INR 3.2      INR Goal: 2.0-3.0    Dosing Plan  As of 5/19/2021    TTR:  36.8 % (7 mo)   Full warfarin instructions:  5/19: 5 mg; 5/20: 5 mg; Otherwise 7.5 mg every Sun; 10 mg all other days            PLAN: DECREASE DOSE TONIGHT AND TOMORROW NIGHT TO 5 MG, THEN CONTINUE CURRENT DOSE  NEXT COUMADIN CLINIC APT IS: [email or web address]    Coumadin Clinic Hours  Tuesday 7:30am - 4:00pm  Wednesday 7:30am - 4:00pm  Thursday 7:30am - 4:00pm    IF IT'S AN EMERGENCY, PLEASE CALL 911 OR GO TO YOUR NEAREST EMERGENCY ROOM. [facility] INTERNAL MEDICINE COUMADIN CLINIC [phone]  IF UNABLE TO REACH COUMADIN CLINIC, [address] [place] [address], [phone].

## 2021-06-01 ENCOUNTER — ANTI-COAG VISIT (OUTPATIENT)
Dept: PRIMARY CARE CLINIC | Age: 41
End: 2021-06-01
Payer: COMMERCIAL

## 2021-06-01 DIAGNOSIS — Z51.81 MONITORING FOR LONG-TERM ANTICOAGULANT USE: ICD-10-CM

## 2021-06-01 DIAGNOSIS — Z79.01 MONITORING FOR LONG-TERM ANTICOAGULANT USE: ICD-10-CM

## 2021-06-01 LAB — INTERNATIONAL NORMALIZATION RATIO, POC: 3.3

## 2021-06-01 PROCEDURE — 85610 PROTHROMBIN TIME: CPT | Performed by: NURSE PRACTITIONER

## 2021-06-01 PROCEDURE — 93793 ANTICOAG MGMT PT WARFARIN: CPT | Performed by: NURSE PRACTITIONER

## 2021-06-01 NOTE — PROGRESS NOTES
Mr. Michel Peter was here today. INR today:   Results for orders placed or performed in visit on 06/01/21   POCT INR   Result Value Ref Range    INR 3.3      INR Goal: 2.0-3.0    Dosing Plan  As of 6/1/2021    TTR:  34.6 % (7.4 mo)   Full warfarin instructions:  7.5 mg every Sun, Wed; 10 mg all other days            PLAN: Take 7.5 mg on Sunday and Wednesday, 10 mg all other days. NEXT COUMADIN CLINIC APT IS: [email or web address]    Coumadin Clinic Hours  Tuesday 7:30am - 4:00pm  Wednesday 7:30am - 4:00pm  Thursday 7:30am - 4:00pm    IF IT'S AN EMERGENCY, PLEASE CALL 911 OR GO TO YOUR NEAREST EMERGENCY ROOM. [facility] INTERNAL MEDICINE COUMADIN CLINIC [phone]  IF UNABLE TO REACH COUMADIN CLINIC, [address] [place] [address], [phone].

## 2021-06-15 ENCOUNTER — ANTI-COAG VISIT (OUTPATIENT)
Dept: PRIMARY CARE CLINIC | Age: 41
End: 2021-06-15
Payer: COMMERCIAL

## 2021-06-15 DIAGNOSIS — Z51.81 MONITORING FOR LONG-TERM ANTICOAGULANT USE: ICD-10-CM

## 2021-06-15 DIAGNOSIS — Z79.01 MONITORING FOR LONG-TERM ANTICOAGULANT USE: ICD-10-CM

## 2021-06-15 LAB — INTERNATIONAL NORMALIZATION RATIO, POC: 2.8

## 2021-06-15 PROCEDURE — 85610 PROTHROMBIN TIME: CPT | Performed by: NURSE PRACTITIONER

## 2021-06-15 PROCEDURE — 93793 ANTICOAG MGMT PT WARFARIN: CPT | Performed by: NURSE PRACTITIONER

## 2021-07-13 ENCOUNTER — ANTI-COAG VISIT (OUTPATIENT)
Dept: PRIMARY CARE CLINIC | Age: 41
End: 2021-07-13
Payer: COMMERCIAL

## 2021-07-13 DIAGNOSIS — Z51.81 MONITORING FOR LONG-TERM ANTICOAGULANT USE: ICD-10-CM

## 2021-07-13 DIAGNOSIS — I82.890 JUGULAR VEIN THROMBOSIS, RIGHT: ICD-10-CM

## 2021-07-13 DIAGNOSIS — Z79.01 MONITORING FOR LONG-TERM ANTICOAGULANT USE: ICD-10-CM

## 2021-07-13 LAB — INTERNATIONAL NORMALIZATION RATIO, POC: 2.3

## 2021-07-13 PROCEDURE — 85610 PROTHROMBIN TIME: CPT | Performed by: NURSE PRACTITIONER

## 2021-07-13 PROCEDURE — 93793 ANTICOAG MGMT PT WARFARIN: CPT | Performed by: NURSE PRACTITIONER

## 2021-07-13 NOTE — PROGRESS NOTES
Mr. Towanda Paget was here today. INR today:   Results for orders placed or performed in visit on 07/13/21   POCT INR   Result Value Ref Range    INR 2.3      INR Goal: 2.0-3.0    Dosing Plan  As of 7/13/2021    TTR:  41.8 % (8.8 mo)   Full warfarin instructions:  7.5 mg every Sun, Wed; 10 mg all other days            PLAN: CONTINUE CURRENT DOSE  NEXT COUMADIN CLINIC APT IS: [email or web address]    Coumadin Clinic Hours  Tuesday 7:30am - 4:00pm  Wednesday 7:30am - 4:00pm  Thursday 7:30am - 4:00pm    IF IT'S AN EMERGENCY, PLEASE CALL 911 OR GO TO YOUR NEAREST EMERGENCY ROOM. [facility] INTERNAL MEDICINE COUMADIN CLINIC [phone]  IF UNABLE TO REACH COUMADIN CLINIC, [address] [place] [address], [phone].

## 2021-07-20 ENCOUNTER — OFFICE VISIT (OUTPATIENT)
Dept: PRIMARY CARE CLINIC | Age: 41
End: 2021-07-20
Payer: COMMERCIAL

## 2021-07-20 VITALS
OXYGEN SATURATION: 98 % | HEART RATE: 62 BPM | DIASTOLIC BLOOD PRESSURE: 82 MMHG | RESPIRATION RATE: 18 BRPM | WEIGHT: 315 LBS | HEIGHT: 72 IN | SYSTOLIC BLOOD PRESSURE: 124 MMHG | BODY MASS INDEX: 42.66 KG/M2 | TEMPERATURE: 98.6 F

## 2021-07-20 DIAGNOSIS — I10 HYPERTENSION, UNSPECIFIED TYPE: ICD-10-CM

## 2021-07-20 DIAGNOSIS — E11.9 TYPE 2 DIABETES MELLITUS WITHOUT COMPLICATION, WITHOUT LONG-TERM CURRENT USE OF INSULIN (HCC): ICD-10-CM

## 2021-07-20 DIAGNOSIS — R73.09 ELEVATED GLUCOSE: ICD-10-CM

## 2021-07-20 DIAGNOSIS — Z86.718 HX OF DEEP VENOUS THROMBOSIS: ICD-10-CM

## 2021-07-20 DIAGNOSIS — G47.30 SLEEP APNEA, UNSPECIFIED TYPE: Primary | ICD-10-CM

## 2021-07-20 LAB — HBA1C MFR BLD: 8.2 %

## 2021-07-20 PROCEDURE — 3052F HG A1C>EQUAL 8.0%<EQUAL 9.0%: CPT | Performed by: NURSE PRACTITIONER

## 2021-07-20 PROCEDURE — 99214 OFFICE O/P EST MOD 30 MIN: CPT | Performed by: NURSE PRACTITIONER

## 2021-07-20 PROCEDURE — 83036 HEMOGLOBIN GLYCOSYLATED A1C: CPT | Performed by: NURSE PRACTITIONER

## 2021-07-20 SDOH — ECONOMIC STABILITY: FOOD INSECURITY: WITHIN THE PAST 12 MONTHS, YOU WORRIED THAT YOUR FOOD WOULD RUN OUT BEFORE YOU GOT MONEY TO BUY MORE.: NEVER TRUE

## 2021-07-20 SDOH — ECONOMIC STABILITY: FOOD INSECURITY: WITHIN THE PAST 12 MONTHS, THE FOOD YOU BOUGHT JUST DIDN'T LAST AND YOU DIDN'T HAVE MONEY TO GET MORE.: NEVER TRUE

## 2021-07-20 ASSESSMENT — ENCOUNTER SYMPTOMS
GASTROINTESTINAL NEGATIVE: 1
RESPIRATORY NEGATIVE: 1
EYES NEGATIVE: 1

## 2021-07-20 ASSESSMENT — SOCIAL DETERMINANTS OF HEALTH (SDOH): HOW HARD IS IT FOR YOU TO PAY FOR THE VERY BASICS LIKE FOOD, HOUSING, MEDICAL CARE, AND HEATING?: NOT HARD AT ALL

## 2021-07-20 NOTE — PROGRESS NOTES
[address] [facility]  [address] [id number] [address] [address]  Dept: [phone]  Dept Fax: [phone]  Loc: [phone]    Mark Nguyen is a 42 y.o. male who presents today for his medical conditions/complaints as noted below. Mark Nguyen is c/o of Hypertension and Sleep Apnea      Chief Complaint   Patient presents with    Hypertension    Sleep Apnea       HPI:     HPI   Patient returns in office today for 3 month follow up for HTN and Sleep Apnea. Patient reports BP has been running well at home. Patient denies any HA, chest pains or SOB. States kidney specialist increased lisinopril to 20mg to help with kidney function. Patient reports CPAP is working well and he is sleeping better at night. He is using Cpap nightly without any issues. Patient was taking Metformin in the past, but this was stopped in the fall of last year due to CKD. He has had improved numbers since last fall.      Past Medical History:   Diagnosis Date    Arthritis     both wrist    Bilateral carpal tunnel syndrome 8/15/2019    Bronchitis     10 yrs ago    Diabetes mellitus ([address]     Disease of blood and blood forming organ     Hx of blood clots     Hypertension     Kidney stone     recurrent    Sleep apnea     untested    Umbilical hernia         Past Surgical History:   Procedure Laterality Date    CARPAL TUNNEL RELEASE Left 08/21/2020    LEFT CARPAL TUNNEL RELEASE performed by Edilberto Molina MD at [facility] Right 07/24/2018    CYSTOSCOPY/ URETEROSCOPY; INSERTION DOUBLE J STENT/  URETERAL performed by Matteo Valadez MD at [address] [place] [address]      both wrists    [address] N/A 06/24/2016    HEMORRHOID INCISION AND DRAINAGE performed by Connie Torres MD at [facility] CYSTO/URETERO/PYELOSCOPY W/LITHOTRIPSY Right 08/07/2018    URETEROSCOPY LASER LITHOTRIPSY STONE EXTRACTION performed by Matteo Valadez MD at [facility] 43 CYSTOSCOPY,INSERT URETERAL STENT Right 08/07/2018    STENT PLACEMENT performed by Jeannette Sheikh MD at [address] [place] [address] ESWL Right 02/13/2018    ESWL EXTRACORPEAL SHOCK WAVE LITHOTRIPSY performed by Jeannette Sheikh MD at [address] [place] [address] ESWL Right 03/13/2018    ESWL EXTRACORPEAL SHOCK WAVE LITHOTRIPSY performed by Jeannette Sheikh MD at [address] [place] [address] ESWL Right 07/24/2018    ESWL [address] LITHOTRIPSY performed by Jeannette Sheikh MD at [address], [address] N/A 36/76/8985    HERNIA UMBILICAL REPAIR LAPAROSCOPIC performed by Delfino Mart MD at [address] / [address] Left 09/20/2020     LEFT LOWER EXTREMITY VENOGRAMS; INFERIOR VENA CAVA FILTER PLACEMENT. performed by Javier Harding MD at [facility]  12/14/2020    [facility]. Ultrasound-guided cannulation right internal jugular vein, Right internal jugular venograms to the superior vena cava,Ultrasound-guided cannulation left internal jugular vein with 12 French sheath, Inferior vena cava venograms, Retrieval of Bard inferior vena cava filter Bassett Nisa), Completion inferior vena cava venograms    WRIST SURGERY      left wrist.  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[employer]   Substance Use Topics    Alcohol use: Yes     Comment: OCC        Current Outpatient Medications   Medication Sig Dispense Refill    metFORMIN (GLUCOPHAGE) 500 MG tablet Take 1 tablet by mouth 2 times daily (with meals) 60 tablet 5    diclofenac sodium (VOLTAREN) 1 % GEL Apply topically 4 times daily as needed for Pain 150 g 2    warfarin (COUMADIN) 5 MG tablet Take 2 tablets by mouth once daily 60 tablet 5    tiZANidine (ZANAFLEX) 4 MG tablet Take 1 tablet by mouth nightly as needed (spasm) 30 tablet 3    lisinopril (PRINIVIL;ZESTRIL) 10 MG tablet TAKE 1 TABLET BY MOUTH ONCE DAILY FOR 30 DAYS (Patient taking differently: 20 mg TAKE 1 TABLET BY MOUTH ONCE DAILY FOR 30 DAYS) 30 tablet 1    amLODIPine (NORVASC) 5 MG tablet Take 1 tablet by mouth once daily 30 tablet 1    hydrALAZINE (APRESOLINE) 25 MG tablet Take 1 tablet by mouth every 6 hours as needed (SBP>160mmHg) 120 tablet 0    vitamin D (ERGOCALCIFEROL) 1.25 MG (19001 UT) CAPS capsule Take 1 capsule by mouth once a week for 4 doses 4 capsule 0     No current facility-administered medications for this visit. No Known Allergies    Family History   Problem Relation Age of Onset   Jill Horton Cancer Mother 46        colon cancer    Cancer Father         luekemia    Diabetes Father     Other Maternal Grandmother         copd    Other Maternal Grandfather         copd    Heart Disease Paternal Grandmother                Subjective:      Review of Systems   Constitutional: Negative. HENT: Negative. Eyes: Negative. Respiratory: Negative. Cardiovascular: Negative. Gastrointestinal: Negative. Endocrine: Negative. Genitourinary: Negative. Musculoskeletal: Negative. Skin: Negative. Neurological: Negative. Hematological: Negative. Psychiatric/Behavioral: Negative. Objective:     Physical Exam  Vitals and nursing note reviewed. Constitutional:       Appearance: Normal appearance. He is well-developed. Comments: obese   HENT:      Head: Normocephalic and atraumatic. Right Ear: Hearing, tympanic membrane, ear canal and external ear normal.      Left Ear: Hearing, tympanic membrane, ear canal and external ear normal.      Nose: Nose normal.      Mouth/Throat:      Pharynx: Uvula midline. Eyes:      General: Lids are normal.      Conjunctiva/sclera: Conjunctivae normal.      Pupils: Pupils are equal, round, and reactive to light. Neck:      Thyroid: No thyroid mass or thyromegaly.       Trachea: Trachea normal.   Cardiovascular: Rate and Rhythm: Normal rate and regular rhythm. Heart sounds: Normal heart sounds. Pulmonary:      Effort: Pulmonary effort is normal.      Breath sounds: Normal breath sounds. Abdominal:      General: Bowel sounds are normal.      Palpations: Abdomen is soft. Musculoskeletal:         General: Normal range of motion. Cervical back: Normal range of motion and neck supple. No tenderness. Thoracic back: Normal. No tenderness. Normal range of motion. Lumbar back: Normal. No tenderness. Normal range of motion. Skin:     General: Skin is warm and dry. Neurological:      Mental Status: He is alert and oriented to person, place, and time. Psychiatric:         Speech: Speech normal.         Behavior: Behavior normal.         Thought Content: Thought content normal.         Judgment: Judgment normal.         /82   Pulse 62   Temp 98.6 °F (37 °C) (Temporal)   Resp 18   Ht 6' (1.829 m)   Wt (!) 400 lb (181.4 kg)   SpO2 98%   BMI 54.25 kg/m²     Assessment:      Diagnosis Orders   1. Sleep apnea, unspecified type     2. Elevated glucose  POCT glycosylated hemoglobin (Hb A1C)   3. Hypertension, unspecified type  CBC    Comprehensive Metabolic Panel   4. Hx of deep venous thrombosis     5. Type 2 diabetes mellitus without complication, without long-term current use of insulin (HCC)  Lipid, Fasting    TSH WITH REFLEX TO FT4    metFORMIN (GLUCOPHAGE) 500 MG tablet       Results for orders placed or performed in visit on 07/20/21   POCT glycosylated hemoglobin (Hb A1C)   Result Value Ref Range    Hemoglobin A1C 8.2 %       Plan:     Plan to start Ozempic at follow up if a1c has not improvd at follow up. Discussed weight loss. Starting Metformin. Will recheck a1c at follow up appointment. Checking labs -we will call with these results. More than 50% of the time was spent counseling and coordinating care for a total time of 32 mins face to face.     Return in about 6 weeks (around 8/31/2021) for POCT A1c. Orders Placed This Encounter   Procedures    CBC     Standing Status:   Future     Standing Expiration Date:   7/20/2022    Comprehensive Metabolic Panel     Standing Status:   Future     Standing Expiration Date:   7/20/2022    Lipid, Fasting     Standing Status:   Future     Standing Expiration Date:   7/20/2022    TSH WITH REFLEX TO FT4     Standing Status:   Future     Standing Expiration Date:   7/20/2022    POCT glycosylated hemoglobin (Hb A1C)       Orders Placed This Encounter   Medications    metFORMIN (GLUCOPHAGE) 500 MG tablet     Sig: Take 1 tablet by mouth 2 times daily (with meals)     Dispense:  60 tablet     Refill:  5    diclofenac sodium (VOLTAREN) 1 % GEL     Sig: Apply topically 4 times daily as needed for Pain     Dispense:  150 g     Refill:  2            Patient offered educational handouts and has had all questions answered. Patient voices understanding and agrees to plans along with risks and benefits of plan. Patient is instructed to continue prior meds, diet, and exercise plans as instructed. Patient agrees to follow up as instructed and sooner if needed. Patient agrees to go to ER if condition becomes emergent. EMR Dragon/transcription disclaimer: Some of this encounter note is an electronic transcription/translation of spoken language to printed text. The electronic translation of spoken language may permit erroneous, or at times, nonsensical words or phrases to be inadvertently transcribed.  Although I have reviewed the note for such errors, some may still exist.    Electronically signed by VANDANA Shen on 7/20/2021 at 1:03 PM

## 2021-07-29 RX ORDER — LISINOPRIL 10 MG/1
TABLET ORAL
Qty: 30 TABLET | Refills: 1 | Status: SHIPPED | OUTPATIENT
Start: 2021-07-29 | End: 2021-12-15

## 2021-08-10 ENCOUNTER — ANTI-COAG VISIT (OUTPATIENT)
Dept: PRIMARY CARE CLINIC | Age: 41
End: 2021-08-10
Payer: COMMERCIAL

## 2021-08-10 DIAGNOSIS — I82.412 DVT FEMORAL (DEEP VENOUS THROMBOSIS) WITH THROMBOPHLEBITIS, LEFT (HCC): ICD-10-CM

## 2021-08-10 LAB — INTERNATIONAL NORMALIZATION RATIO, POC: 3.1

## 2021-08-10 PROCEDURE — 93793 ANTICOAG MGMT PT WARFARIN: CPT | Performed by: NURSE PRACTITIONER

## 2021-08-10 PROCEDURE — 85610 PROTHROMBIN TIME: CPT | Performed by: NURSE PRACTITIONER

## 2021-08-10 NOTE — PROGRESS NOTES
Mr. Rickie Paul was here today. INR today:   Results for orders placed or performed in visit on 08/10/21   POCT INR   Result Value Ref Range    INR 3.1      INR Goal: 2.0-3.0    Dosing Plan  As of 8/10/2021    TTR:  46.1 % (9.8 mo)   Full warfarin instructions:  8/10: 5 mg; Otherwise 7.5 mg every Sun; 10 mg all other days          he has been eating a lot of fresh tomatoes, will cut back on that. PLAN: REDUCE DOSE TONIGHT ONLY TO 5 MG, THEN CONTINUE CURRENT DOSE  NEXT COUMADIN CLINIC APT IS: [email or web address]  Coumadin Clinic Hours  Tuesday 7:30am - 4:00pm  Wednesday 7:30am - 4:00pm  Thursday 7:30am - 4:00pm    IF IT'S AN EMERGENCY, PLEASE CALL 911 OR GO TO YOUR NEAREST EMERGENCY ROOM. [facility] INTERNAL MEDICINE COUMADIN CLINIC [phone]  IF UNABLE TO REACH COUMADIN CLINIC, [address] [place] [address], [phone].

## 2021-08-17 ENCOUNTER — TELEPHONE (OUTPATIENT)
Dept: PRIMARY CARE CLINIC | Age: 41
End: 2021-08-17

## 2021-08-17 DIAGNOSIS — E78.00 HIGH CHOLESTEROL: Primary | ICD-10-CM

## 2021-08-17 RX ORDER — ROSUVASTATIN CALCIUM 20 MG/1
20 TABLET, COATED ORAL NIGHTLY
Qty: 30 TABLET | Refills: 3 | Status: SHIPPED | OUTPATIENT
Start: 2021-08-17 | End: 2021-12-20

## 2021-08-17 NOTE — TELEPHONE ENCOUNTER
----- Message from VANDANA Horne sent at 8/16/2021 10:17 PM CDT -----  Cholesterol was still up just a touch. I recommend adding a statin if he is willing. Crestor 20 mg daily.   Will need to recheck CMP and lipid panel in 6-8 weeks

## 2021-08-31 ENCOUNTER — ANTI-COAG VISIT (OUTPATIENT)
Dept: PRIMARY CARE CLINIC | Age: 41
End: 2021-08-31
Payer: COMMERCIAL

## 2021-08-31 DIAGNOSIS — I82.412 DVT FEMORAL (DEEP VENOUS THROMBOSIS) WITH THROMBOPHLEBITIS, LEFT (HCC): ICD-10-CM

## 2021-08-31 DIAGNOSIS — I82.409 RECURRENT DEEP VEIN THROMBOSIS (HCC): ICD-10-CM

## 2021-08-31 DIAGNOSIS — I82.890 JUGULAR VEIN THROMBOSIS, RIGHT: ICD-10-CM

## 2021-08-31 LAB — INTERNATIONAL NORMALIZATION RATIO, POC: 3.4

## 2021-08-31 PROCEDURE — 85610 PROTHROMBIN TIME: CPT | Performed by: NURSE PRACTITIONER

## 2021-08-31 PROCEDURE — 93793 ANTICOAG MGMT PT WARFARIN: CPT | Performed by: NURSE PRACTITIONER

## 2021-08-31 NOTE — PROGRESS NOTES
Mr. Franck Blackmon was here today. INR today:   Results for orders placed or performed in visit on 08/31/21   POCT INR   Result Value Ref Range    INR 3.4      INR Goal: 2.0-3.0    Dosing Plan  As of 8/31/2021    TTR:  43.1 % (10.5 mo)   Full warfarin instructions:  8/31: 5 mg; 9/3: 7.5 mg; 9/10: 7.5 mg; Otherwise 7.5 mg every Sun; 10 mg all other days            PLAN: REDUCE DOSE TONIGHT TO 5 MG, ON THE NEXT TWO Friday'S REDUCE DOSE TO 7.5 MG. CALENDER PRINTED FOR PATIENT. NEXT COUMADIN CLINIC APT IS: [email or web address]    Coumadin Clinic Hours  Tuesday 7:30am - 4:00pm  Wednesday 7:30am - 4:00pm  Thursday 7:30am - 4:00pm    IF IT'S AN EMERGENCY, PLEASE CALL 911 OR GO TO YOUR NEAREST EMERGENCY ROOM. [facility] INTERNAL MEDICINE COUMADIN CLINIC [phone]  IF UNABLE TO REACH COUMADIN CLINIC, [address] [place] [address], [phone].

## 2021-09-01 ENCOUNTER — OFFICE VISIT (OUTPATIENT)
Dept: PRIMARY CARE CLINIC | Age: 41
End: 2021-09-01
Payer: COMMERCIAL

## 2021-09-01 VITALS
BODY MASS INDEX: 42.66 KG/M2 | SYSTOLIC BLOOD PRESSURE: 124 MMHG | HEIGHT: 72 IN | TEMPERATURE: 97.2 F | OXYGEN SATURATION: 98 % | WEIGHT: 315 LBS | HEART RATE: 74 BPM | DIASTOLIC BLOOD PRESSURE: 78 MMHG

## 2021-09-01 DIAGNOSIS — E11.9 TYPE 2 DIABETES MELLITUS WITHOUT COMPLICATION, WITHOUT LONG-TERM CURRENT USE OF INSULIN (HCC): Primary | ICD-10-CM

## 2021-09-01 LAB — HBA1C MFR BLD: 8.7 %

## 2021-09-01 PROCEDURE — 83036 HEMOGLOBIN GLYCOSYLATED A1C: CPT | Performed by: NURSE PRACTITIONER

## 2021-09-01 PROCEDURE — 3052F HG A1C>EQUAL 8.0%<EQUAL 9.0%: CPT | Performed by: NURSE PRACTITIONER

## 2021-09-01 PROCEDURE — 99213 OFFICE O/P EST LOW 20 MIN: CPT | Performed by: NURSE PRACTITIONER

## 2021-09-01 RX ORDER — SEMAGLUTIDE 1.34 MG/ML
0.5 INJECTION, SOLUTION SUBCUTANEOUS WEEKLY
Qty: 1 PEN | Refills: 3 | Status: SHIPPED | OUTPATIENT
Start: 2021-09-01 | End: 2022-02-16 | Stop reason: SDUPTHER

## 2021-09-01 NOTE — PROGRESS NOTES
[address] [facility]  [id number] [place] [address] 309  [address] [place] [address]  Dept: [phone]  Dept Fax: [phone]  Loc: [phone]    Brittany Shea is a 42 y.o. male who presents today for his medical conditions/complaints as noted below. Brittany Shea is c/o of Diabetes (Started new meds last visit to help control . Cherl Spinner Cherl Spinner Cherl Spinner A1C check )      Chief Complaint   Patient presents with    Diabetes     Started new meds last visit to help control . Cherl Spinner Cherl Spinner Cherl Spinner A1C check        HPI:     HPI  Patient is in office today to follow up on his diabetes. Last visit we started metformin. Patient has been monitoring his blood sugars and they have been ranging in the 90s. Patient states he plans to get first dose of Covid Monday at [facility], so he can get the [place].        Past Medical History:   Diagnosis Date    Arthritis     both wrist    Bilateral carpal tunnel syndrome 8/15/2019    Bronchitis     10 yrs ago    Diabetes mellitus ([address]     Disease of blood and blood forming organ     Hx of blood clots     Hypertension     Kidney stone     recurrent    Sleep apnea     untested    Umbilical hernia         Past Surgical History:   Procedure Laterality Date    CARPAL TUNNEL RELEASE Left 08/21/2020    LEFT CARPAL TUNNEL RELEASE performed by Avni Garcia MD at [facility] Right 07/24/2018    CYSTOSCOPY/ URETEROSCOPY; INSERTION DOUBLE J STENT/  URETERAL performed by Willian Kelley MD at [address] [place] [address]      both wrists    [address] N/A 06/24/2016    HEMORRHOID INCISION AND DRAINAGE performed by Governor MD Kirk at [facility] CYSTO/URETERO/PYELOSCOPY W/LITHOTRIPSY Right 08/07/2018    URETEROSCOPY LASER LITHOTRIPSY STONE EXTRACTION performed by Willian Kelley MD at [address] [place] [address] Right 08/07/2018    STENT PLACEMENT performed by Willian Kelley MD at [address] [place] [address] ESW Right 02/13/2018 ESWL EXTRACORPEAL SHOCK WAVE LITHOTRIPSY performed by Racheal Abad MD at [address] [place] [address] ESWL Right 03/13/2018    ESWL EXTRACORPEAL SHOCK WAVE LITHOTRIPSY performed by Racheal Abad MD at [address] [place] [address] ESWL Right 07/24/2018    ESWL [address] LITHOTRIPSY performed by Racheal Abad MD at [address] LAP, [address] N/A 48/28/3521    HERNIA UMBILICAL REPAIR LAPAROSCOPIC performed by Tiffany Jorge MD at [address] / [address] Left 09/20/2020     LEFT LOWER EXTREMITY VENOGRAMS; INFERIOR VENA CAVA FILTER PLACEMENT. performed by Christofer Fenton MD at [address]  12/14/2020    SJS. Ultrasound-guided cannulation right internal jugular vein, Right internal jugular venograms to the superior vena cava,Ultrasound-guided cannulation left internal jugular vein with 12 French sheath, Inferior vena cava venograms, Retrieval of Bard inferior vena cava filter Donald Laity), Completion inferior vena cava venograms    WRIST SURGERY      left wrist.  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[place]   Substance Use Topics    Alcohol use: Yes     Comment: OCC        Current Outpatient Medications   Medication Sig Dispense Refill    Semaglutide,0.25 or 0.5MG/DOS, (OZEMPIC, 0.25 OR 0.5 MG/DOSE,) 2 MG/1.5ML SOPN Inject 0.5 mg into the skin once a week 1 pen 3    rosuvastatin (CRESTOR) 20 MG tablet Take 1 tablet by mouth nightly 30 tablet 3    lisinopril (PRINIVIL;ZESTRIL) 10 MG tablet TAKE 1 TABLET BY MOUTH ONCE DAILY FOR 30 DAYS 30 tablet 1    metFORMIN (GLUCOPHAGE) 500 MG tablet Take 1 tablet by mouth 2 times daily (with meals) 60 tablet 5    diclofenac sodium (VOLTAREN) 1 % GEL Apply topically 4 times daily as needed for Pain 150 g 2    warfarin (COUMADIN) 5 MG tablet Take 2 tablets by mouth once daily 60 tablet 5    tiZANidine (ZANAFLEX) 4 MG tablet Take 1 tablet by mouth nightly as needed (spasm) 30 tablet 3    amLODIPine (NORVASC) 5 MG tablet Take 1 tablet by mouth once daily 30 tablet 1    hydrALAZINE (APRESOLINE) 25 MG tablet Take 1 tablet by mouth every 6 hours as needed (SBP>160mmHg) 120 tablet 0    vitamin D (ERGOCALCIFEROL) 1.25 MG (45335 UT) CAPS capsule Take 1 capsule by mouth once a week for 4 doses 4 capsule 0     No current facility-administered medications for this visit. No Known Allergies    Family History   Problem Relation Age of Onset   [employer] Cancer Mother 46        colon cancer    Cancer Father         luekemia    Diabetes Father     Other Maternal Grandmother         copd    Other Maternal Grandfather         copd    Heart Disease Paternal Grandmother                Subjective:      Review of Systems   Constitutional: Negative. HENT: Negative. Eyes: Negative. Respiratory: Negative. Cardiovascular: Negative. Gastrointestinal: Negative. Endocrine: Negative. Genitourinary: Negative. Musculoskeletal: Negative. Skin: Negative. Neurological: Negative. Hematological: Negative. Psychiatric/Behavioral: Negative. Objective:     Physical Exam  Vitals and nursing note reviewed. Constitutional:       Appearance: Normal appearance. He is well-developed. Comments: obese   HENT:      Head: Normocephalic and atraumatic. Right Ear: Hearing, tympanic membrane, ear canal and external ear normal.      Left Ear: Hearing, tympanic membrane, ear canal and external ear normal.      Nose: Nose normal.      Mouth/Throat:      Pharynx: Uvula midline. Eyes:      General: Lids are normal.      Conjunctiva/sclera: Conjunctivae normal.      Pupils: Pupils are equal, round, and reactive to light. Neck:      Thyroid: No thyroid mass or thyromegaly.       Trachea: Trachea normal.   Cardiovascular:      Rate and Rhythm: Normal rate and regular rhythm. Heart sounds: Normal heart sounds. Pulmonary:      Effort: Pulmonary effort is normal.      Breath sounds: Normal breath sounds. Abdominal:      General: Bowel sounds are normal.      Palpations: Abdomen is soft. Musculoskeletal:         General: Normal range of motion. Cervical back: Normal range of motion and neck supple. No tenderness. Thoracic back: Normal. No tenderness. Normal range of motion. Lumbar back: Normal. No tenderness. Normal range of motion. Skin:     General: Skin is warm and dry. Neurological:      Mental Status: He is alert and oriented to person, place, and time. Psychiatric:         Speech: Speech normal.         Behavior: Behavior normal.         Thought Content: Thought content normal.         Judgment: Judgment normal.         /78   Pulse 74   Temp 97.2 °F (36.2 °C)   Ht 6' (1.829 m)   Wt (!) 401 lb 12.8 oz (182.3 kg)   SpO2 98%   BMI 54.49 kg/m²     Assessment:      Diagnosis Orders   1. Type 2 diabetes mellitus without complication, without long-term current use of insulin ([facility])  POCT glycosylated hemoglobin (Hb A1C)    Hemoglobin A1C       No results found for this visit on 09/01/21. Plan:     Starting Ozempic for DM management. Patient to have a1c prior to next appointment. More than 50% of the time was spent counseling and coordinating care for a total time of 23 mins face to face. No follow-ups on file. Orders Placed This Encounter   Procedures    Hemoglobin A1C     Standing Status:   Future     Standing Expiration Date:   9/1/2022    POCT glycosylated hemoglobin (Hb A1C)       Orders Placed This Encounter   Medications    Semaglutide,0.25 or 0.5MG/DOS, (OZEMPIC, 0.25 OR 0.5 MG/DOSE,) 2 MG/1.5ML SOPN     Sig: Inject 0.5 mg into the skin once a week     Dispense:  1 pen     Refill:  3            Patient offered educational handouts and has had all questions answered.   Patient voices understanding and agrees to plans along with risks and benefits of plan. Patient is instructed to continue prior meds, diet, and exercise plans as instructed. Patient agrees to follow up as instructed and sooner if needed. Patient agrees to go to ER if condition becomes emergent. EMR Dragon/transcription disclaimer: Some of this encounter note is an electronic transcription/translation of spoken language to printed text. The electronic translation of spoken language may permit erroneous, or at times, nonsensical words or phrases to be inadvertently transcribed.  Although I have reviewed the note for such errors, some may still exist.    Electronically signed by VANDANA Marina on 9/1/2021 at 11:20 AM

## 2021-09-01 NOTE — PROGRESS NOTES
was placed on heparin as initial management.     Mr. Cassie Merritt had a 5-day history of left leg pain and swelling.  Initially he thought he had pulled a muscle but this did not get better.  When things did not get better, he sought evaluation at the ED at [facility]. Additionally he has nonspecific symptoms of pulmonary embolus including burning in the right side of his chest.  He does not have hemoptysis or dyspnea. Mr. Cassie Merritt does not have a personal history of DVT or PE or hypercoagulability.      Risk features include:   Obesity.    Recent left hand carpal tunnel surgery in August 2020. Family history: His mother had an episode of left lower extremity DVT and PE 15 years ago without recurrence.  A maternal aunt also had DVT of the lower extremities.     Noninvasive venous studies of the lower extremities on 9/19/2020 document:   · Extensive thrombosis (DVT) noted in Lt common femoral vein, Lt SFV (prox, mid and distal), Lt popliteal vein, Lt Gastrocs, Lt posterior tibial veins.  A floating tail is noted in left common femoral vein measuring approximately 4.3cm.     · SVT: thrombus noted in Lt LSV     Pulmonary CTA with contrast on 9/19/2020 documented the following:  · Multiple right-sided acute pulmonary emboli identified involving the right third fourth and fifth ordered pulmonary artery branches extending into the right lower lobe.   · Possible right heart strain  · No left sided pulmonary emboli identified  · Small calcified subcarinal lymph nodes consistent with healed granulomatous disease  · Scattered calcified granulomas in both lungs     He was seen by Dr. Vijay Richmond from vascular surgery (9/20/2020), with recommendations for percutaneous mechanical thrombectomy/pulse spray thrombolysis to try to prevent long-term swelling in this young patient with iliofemoral DVT.  If the result in that procedure is not satisfactory, he would consider placing a TPA thrombolyzes catheter for thrombolysis overnight.  The performed by Pito Elena MD at [address] Right 07/24/2018    CYSTOSCOPY/ URETEROSCOPY; INSERTION DOUBLE J STENT/  URETERAL performed by Viji Becerril MD at [address]      both wrists    HEMORRHOID SURGERY N/A 06/24/2016    HEMORRHOID INCISION AND DRAINAGE performed by Guille Espitia MD at [facility] CYSTO/URETERO/PYELOSCOPY W/LITHOTRIPSY Right 08/07/2018    URETEROSCOPY LASER LITHOTRIPSY STONE EXTRACTION [facility] Right 08/07/2018    STENT PLACEMENT performed by Viji Becerril MD at [address] [facility] ESWL Right 02/13/2018    ESWL EXTRACORPEAL SHOCK WAVE LITHOTRIPSY performed by Viji Becerril MD at [address] [facility] ESWL Right 03/13/2018    ESWL EXTRACORPEAL SHOCK WAVE LITHOTRIPSY performed by Viji Becerril MD at [address] [facility] ESWL Right 07/24/2018    ESWL [address] LITHOTRIPSY performed by Viji Becerril MD at [facility] LAP, [address] N/A 46/00/5277    HERNIA UMBILICAL REPAIR LAPAROSCOPIC performed by Tam Dykes MD at [address] / [address] Left 09/20/2020     LEFT LOWER EXTREMITY VENOGRAMS; INFERIOR VENA CAVA FILTER PLACEMENT. performed by Kevin Huddleston MD at [facility]  12/14/2020    hospitals. Ultrasound-guided cannulation right internal jugular vein, Right internal jugular venograms to the superior vena cava,Ultrasound-guided cannulation left internal jugular vein with 12 Wallisian sheath, Inferior vena cava venograms, Retrieval of Bard inferior vena cava filter Ebb Ami), Completion inferior vena cava venograms    WRIST SURGERY      left wrist.  Pt was a child           Current Outpatient Medications:     Semaglutide,0.25 or 0.5MG/DOS, (OZEMPIC, 0.25 OR 0.5 MG/DOSE,) 2 MG/1.5ML SOPN, Inject 0.5 mg into the skin once a week, Disp: 1 pen, Rfl: 3    rosuvastatin (CRESTOR) 20 MG tablet, Take 1 tablet by mouth nightly, Disp: 30 tablet, Rfl: 3    lisinopril (PRINIVIL;ZESTRIL) 10 MG tablet, TAKE 1 TABLET BY MOUTH ONCE DAILY FOR 30 DAYS, Disp: 30 tablet, Rfl: 1    metFORMIN (GLUCOPHAGE) 500 MG tablet, Take 1 tablet by mouth 2 times daily (with meals), Disp: 60 tablet, Rfl: 5    diclofenac sodium (VOLTAREN) 1 % GEL, Apply topically 4 times daily as needed for Pain, Disp: 150 g, Rfl: 2    warfarin (COUMADIN) 5 MG tablet, Take 2 tablets by mouth once daily, Disp: 60 tablet, Rfl: 5    tiZANidine (ZANAFLEX) 4 MG tablet, Take 1 tablet by mouth nightly as needed (spasm), Disp: 30 tablet, Rfl: 3    amLODIPine (NORVASC) 5 MG tablet, Take 1 tablet by mouth once daily, Disp: 30 tablet, Rfl: 1    hydrALAZINE (APRESOLINE) 25 MG tablet, Take 1 tablet by mouth every 6 hours as needed (SBP>160mmHg), Disp: 120 tablet, Rfl: 0    vitamin D (ERGOCALCIFEROL) 1.25 MG (48437 UT) CAPS capsule, Take 1 capsule by mouth once a week for 4 doses, Disp: 4 capsule, Rfl: 0     No Known Allergies    Social History     Tobacco Use    Smoking status: Never Smoker    Smokeless tobacco: Never Used    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 Negative for chills, diaphoresis, fever and unexpected weight change. HENT: Negative for mouth sores, nosebleeds, sore throat, trouble swallowing and voice change. Eyes: Negative for photophobia, discharge and itching.    Respiratory: Negative for cough, shortness of breath and wheezing. Cardiovascular: Positive for leg swelling. Negative for chest pain and palpitations. Gastrointestinal: Negative for abdominal distention, abdominal pain, blood in stool, constipation, diarrhea, nausea and vomiting. Endocrine: Negative for cold intolerance, heat intolerance, polydipsia and polyuria. Genitourinary: Negative for difficulty urinating, dysuria, hematuria and urgency. Musculoskeletal: Positive for myalgias. Negative for arthralgias, back pain and joint swelling. Skin: Negative for color change and rash. Neurological: Negative for dizziness, tremors, seizures, syncope and light-headedness. Hematological: Negative for adenopathy. Does not bruise/bleed easily. Psychiatric/Behavioral: Negative for behavioral problems and suicidal ideas. The patient is not nervous/anxious. All other systems reviewed and are negative. Objective   /86   Pulse 93   Ht 6' (1.829 m)   Wt (!) 400 lb 3.2 oz (181.5 kg)   SpO2 98%   BMI 54.28 kg/m²     PHYSICAL EXAM:  Physical Exam  Vitals reviewed. Constitutional:       General: He is not in acute distress. Appearance: He is well-developed. He is morbidly obese. HENT:      Head: Normocephalic and atraumatic. Nose: Nose normal.   Eyes:      General: No scleral icterus. Conjunctiva/sclera: Conjunctivae normal.   Neck:      Vascular: No JVD. Trachea: No tracheal deviation. Cardiovascular:      Rate and Rhythm: Normal rate and regular rhythm. Heart sounds: Normal heart sounds. No murmur heard. Pulmonary:      Effort: Pulmonary effort is normal. No respiratory distress. Breath sounds: Normal breath sounds. No wheezing. Abdominal:      General: Bowel sounds are normal. There is no distension. Palpations: Abdomen is soft. There is no mass. Tenderness: There is no abdominal tenderness. Musculoskeletal:         General: No tenderness or deformity.       Left lower le+ Edema present. Comments: Wearing compression stockings. Skin:     Findings: No erythema or rash. Neurological:      Mental Status: He is alert and oriented to person, place, and time. Psychiatric:         Thought Content: Thought content normal.          CBC reviewed by me  Lab Results   Component Value Date    WBC 7.79 2021    HGB 14.8 2021    HCT 48.0 2021    MCV 93.0 (H) 2021     (L) 2021       VISIT DIAGNOSES  1. DVT of deep femoral vein, left ([address]    2. Single subsegmental pulmonary embolism without acute cor pulmonale (HCC)    3. Heterozygous factor V Leiden mutation ([facility]    4. Morbid obesity ([facility]        ASSESSMENT/PLAN:      1. Left lower extremity DVT with right pulmonary emboli 2020  2. 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 ?(Recent surgery), Sep 2020, 2020 (subtherapeutic INR), heterozygous factor V Leiden, Left LE DVT acute and IJ catheter related DVT (removed by vascular 2020)      PRIOR INTERVENTION  · Percutaneous thrombectomy/PulseSpray thrombolysis with AngioJet Zelante catheter  · Left lower extremity venograms after percutaneous thrombectomy and thrombolysis  · Placement of 40 cm infusion length EKOS TPA catheter from the popliteal vein all the way to the distal external iliac vein  · Inferior vena cava filter placement from a right internal jugular vein approach (Bard Heahter inferior vena cava filter)  · 2002removal of EKOS TPA catheter      MPN evaluation was negative      Dr. Antonella Willard did remove his Bard IVC filter on 2020. He is wearing his compression stockings much better. Wt Readings from Last 3 Encounters:   21 (!) 400 lb 3.2 oz (181.5 kg)   21 (!) 401 lb 12.8 oz (182.3 kg)   21 (!) 400 lb (181.4 kg)     I encouraged diet and exercise.     3.  Normocytic hypochromic anemia          Immunization History   Administered Date(s) Administered    COVID-19, Pfizer, SAAD, 30mcg/0.3mL 09/06/2021    Influenza Virus Vaccine 10/06/2014, 10/01/2020            Return in about 6 months (around 3/7/2022) for With Justine Thomas.      Talya Merrill PA-C  8:53 AM  9/7/2021

## 2021-09-07 ENCOUNTER — OFFICE VISIT (OUTPATIENT)
Dept: HEMATOLOGY | Age: 41
End: 2021-09-07
Payer: COMMERCIAL

## 2021-09-07 ENCOUNTER — HOSPITAL ENCOUNTER (OUTPATIENT)
Dept: INFUSION THERAPY | Age: 41
Discharge: HOME | End: 2021-09-07
Payer: COMMERCIAL

## 2021-09-07 VITALS
WEIGHT: 315 LBS | BODY MASS INDEX: 42.66 KG/M2 | HEART RATE: 93 BPM | DIASTOLIC BLOOD PRESSURE: 86 MMHG | HEIGHT: 72 IN | SYSTOLIC BLOOD PRESSURE: 132 MMHG | OXYGEN SATURATION: 98 %

## 2021-09-07 DIAGNOSIS — I26.99 PULMONARY EMBOLISM, UNSPECIFIED CHRONICITY, UNSPECIFIED PULMONARY EMBOLISM TYPE, UNSPECIFIED WHETHER ACUTE COR PULMONALE PRESENT (HCC): ICD-10-CM

## 2021-09-07 DIAGNOSIS — I26.93 SINGLE SUBSEGMENTAL PULMONARY EMBOLISM WITHOUT ACUTE COR PULMONALE (HCC): ICD-10-CM

## 2021-09-07 DIAGNOSIS — E66.01 MORBID OBESITY (HCC): ICD-10-CM

## 2021-09-07 DIAGNOSIS — I82.412 DVT OF DEEP FEMORAL VEIN, LEFT (HCC): Primary | ICD-10-CM

## 2021-09-07 DIAGNOSIS — D68.51 HETEROZYGOUS FACTOR V LEIDEN MUTATION (HCC): ICD-10-CM

## 2021-09-07 LAB
BASOPHILS ABSOLUTE: 0.05 K/UL (ref 0.01–0.08)
BASOPHILS RELATIVE PERCENT: 0.6 % (ref 0.1–1.2)
EOSINOPHILS ABSOLUTE: 0.3 K/UL (ref 0.04–0.54)
EOSINOPHILS RELATIVE PERCENT: 3.9 % (ref 0.7–7)
HCT VFR BLD CALC: 48 % (ref 40.1–51)
HEMOGLOBIN: 14.8 G/DL (ref 13.7–17.5)
LYMPHOCYTES ABSOLUTE: 1.97 K/UL (ref 1.18–3.74)
LYMPHOCYTES RELATIVE PERCENT: 25.3 % (ref 19.3–53.1)
MCH RBC QN AUTO: 28.7 PG (ref 25.7–32.2)
MCHC RBC AUTO-ENTMCNC: 30.8 G/DL (ref 32.3–36.5)
MCV RBC AUTO: 93 FL (ref 79–92.2)
MONOCYTES ABSOLUTE: 0.69 K/UL (ref 0.24–0.82)
MONOCYTES RELATIVE PERCENT: 8.9 % (ref 4.7–12.5)
NEUTROPHILS ABSOLUTE: 4.78 K/UL (ref 1.56–6.13)
NEUTROPHILS RELATIVE PERCENT: 61.3 % (ref 34–71.1)
PDW BLD-RTO: 14.8 % (ref 11.6–14.4)
PLATELET # BLD: 153 K/UL (ref 163–337)
PMV BLD AUTO: 10.5 FL (ref 7.4–10.4)
RBC # BLD: 5.16 M/UL (ref 4.63–6.08)
WBC # BLD: 7.79 K/UL (ref 4.23–9.07)

## 2021-09-07 PROCEDURE — 99213 OFFICE O/P EST LOW 20 MIN: CPT | Performed by: PHYSICIAN ASSISTANT

## 2021-09-07 PROCEDURE — 85025 COMPLETE CBC W/AUTO DIFF WBC: CPT

## 2021-09-07 PROCEDURE — 99211 OFF/OP EST MAY X REQ PHY/QHP: CPT

## 2021-09-07 ASSESSMENT — ENCOUNTER SYMPTOMS
VOICE CHANGE: 0
RESPIRATORY NEGATIVE: 1
WHEEZING: 0
EYE ITCHING: 0
DIARRHEA: 0
EYE DISCHARGE: 0
SHORTNESS OF BREATH: 0
SORE THROAT: 0
CONSTIPATION: 0
VOMITING: 0
PHOTOPHOBIA: 0
BLOOD IN STOOL: 0
GASTROINTESTINAL NEGATIVE: 1
TROUBLE SWALLOWING: 0
BACK PAIN: 0
ABDOMINAL DISTENTION: 0
COUGH: 0
COLOR CHANGE: 0
NAUSEA: 0
ABDOMINAL PAIN: 0
EYES NEGATIVE: 1

## 2021-09-17 ENCOUNTER — VIRTUAL VISIT (OUTPATIENT)
Dept: PRIMARY CARE CLINIC | Age: 41
End: 2021-09-17
Payer: COMMERCIAL

## 2021-09-17 DIAGNOSIS — S39.012A STRAIN OF LUMBAR REGION, INITIAL ENCOUNTER: Primary | ICD-10-CM

## 2021-09-17 PROCEDURE — 99213 OFFICE O/P EST LOW 20 MIN: CPT | Performed by: NURSE PRACTITIONER

## 2021-09-17 RX ORDER — PREDNISONE 10 MG/1
10 TABLET ORAL DAILY
Qty: 7 TABLET | Refills: 0 | Status: SHIPPED | OUTPATIENT
Start: 2021-09-17 | End: 2021-09-24

## 2021-09-17 RX ORDER — TRAMADOL HYDROCHLORIDE 50 MG/1
50 TABLET ORAL EVERY 6 HOURS PRN
Qty: 18 TABLET | Refills: 0 | Status: SHIPPED | OUTPATIENT
Start: 2021-09-17 | End: 2021-09-20

## 2021-09-17 RX ORDER — CYCLOBENZAPRINE HCL 10 MG
10 TABLET ORAL 3 TIMES DAILY PRN
Qty: 21 TABLET | Refills: 0 | Status: SHIPPED | OUTPATIENT
Start: 2021-09-17 | End: 2021-09-24

## 2021-09-17 ASSESSMENT — ENCOUNTER SYMPTOMS
EYES NEGATIVE: 1
GASTROINTESTINAL NEGATIVE: 1
RESPIRATORY NEGATIVE: 1
BACK PAIN: 1

## 2021-09-17 NOTE — LETTER
[facility]  Phone: [phone]  Fax: [address], APRN        September 17, 2021     Patient: Coy Mendoza   YOB: 1980   Date of Visit: 9/17/2021       To Whom It May Concern: It is my medical opinion that [place] should remain out of work until 9/21/2021. If you have any questions or concerns, please don't hesitate to call.     Sincerely,        Dereck Felty, APRN

## 2021-09-17 NOTE — PROGRESS NOTES
[address] [address]     Phone:  [phone]  Fax:  [phone]      2021    TELEHEALTH EVALUATION -- Audio/Visual (During QPYRL-56 public health emergency)    HPI:    Chief Complaint   Patient presents with    Back Pain         Danya Jensen (:  1980) has requested an audio/video evaluation for the following concern(s):    Patient is doing virtual visit today to discuss back pain. He reports he was in his room working on  a fan and twisted the wrong way. Since than episode a few days ago he has had back pain that goes down both legs. He reports that the left side is worse. Patient has been off of work since yesterday due to this pain. He denies loss of bowel or bladder. Review of Systems   Constitutional: Negative. HENT: Negative. Eyes: Negative. Respiratory: Negative. Cardiovascular: Negative. Gastrointestinal: Negative. Endocrine: Negative. Genitourinary: Negative. Musculoskeletal: Positive for back pain and gait problem. Skin: Negative. Hematological: Negative. Psychiatric/Behavioral: Negative. Prior to Visit Medications    Medication Sig Taking? Authorizing Provider   predniSONE (DELTASONE) 10 MG tablet Take 1 tablet by mouth daily for 7 days Yes VANDANA Cordoba   traMADol (ULTRAM) 50 MG tablet Take 1 tablet by mouth every 6 hours as needed for Pain for up to 3 days. Intended supply: 3 days.  Take lowest dose possible to manage pain Yes VANDANA Cordoba   cyclobenzaprine (FLEXERIL) 10 MG tablet Take 1 tablet by mouth 3 times daily as needed for Muscle spasms Yes VANDANA Cordoba   Semaglutide,0.25 or 0.5MG/DOS, (OZEMPIC, 0.25 OR 0.5 MG/DOSE,) 2 MG/1.5ML SOPN Inject 0.5 mg into the skin once a week Yes VANDANA Cordoba   rosuvastatin (CRESTOR) 20 MG tablet Take 1 tablet by mouth nightly Yes VANDANA Cordoba   lisinopril (PRINIVIL;ZESTRIL) 10 MG tablet TAKE 1 TABLET BY MOUTH ONCE DAILY FOR 30 DAYS Yes Semaj Treviño APRN   metFORMIN (GLUCOPHAGE) 500 MG tablet Take 1 tablet by mouth 2 times daily (with meals) Yes VANDANA Vásquez   diclofenac sodium (VOLTAREN) 1 % GEL Apply topically 4 times daily as needed for Pain Yes Semaj Treviño APRN   warfarin (COUMADIN) 5 MG tablet Take 2 tablets by mouth once daily Yes Semaj Treviño APRN   tiZANidine (ZANAFLEX) 4 MG tablet Take 1 tablet by mouth nightly as needed (spasm) Yes VANDANA Vásquez   amLODIPine (NORVASC) 5 MG tablet Take 1 tablet by mouth once daily Yes Semaj Treviño APRN   hydrALAZINE (APRESOLINE) 25 MG tablet Take 1 tablet by mouth every 6 hours as needed (SBP>160mmHg)  Matty Edouard MD   vitamin D (ERGOCALCIFEROL) 1.25 MG (93147 UT) CAPS capsule Take 1 capsule by mouth once a week for 4 doses  Matty Edouard MD       Social History     Tobacco Use    Smoking status: Never Smoker    Smokeless tobacco: Never Used    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   Substance Use Topics    Alcohol use: Yes     Comment: [address]    Drug use: No        No Known Allergies,   Past Medical History:   Diagnosis Date    Arthritis     both wrist    Bilateral carpal tunnel syndrome 8/15/2019    Bronchitis     10 yrs ago    Diabetes mellitus ([facility] [address]     Disease of blood and blood forming organ     Hx of blood clots     Hypertension     Kidney stone     recurrent    Sleep apnea     untested    Umbilical hernia    ,   Past Surgical History:   Procedure Laterality Date    CARPAL TUNNEL RELEASE Left 08/21/2020    LEFT CARPAL TUNNEL RELEASE performed by Bibi Rivas MD at [facility] Right 07/24/2018    CYSTOSCOPY/ URETEROSCOPY; INSERTION DOUBLE J STENT/  URETERAL performed by Diaz Salas MD at [address] [place] [address]      both wrists    HEMORRHOID SURGERY N/A 06/24/2016    HEMORRHOID INCISION AND DRAINAGE performed by Woody Dumont MD at [address] [facility] W/LITHOTRIPSY Right 08/07/2018    URETEROSCOPY LASER LITHOTRIPSY STONE EXTRACTION performed by Moira Saenz MD at [address] Right 08/07/2018    STENT PLACEMENT performed by Moira Saenz MD at [address] [facility] ESWL Right 02/13/2018    ESWL EXTRACORPEAL SHOCK WAVE LITHOTRIPSY performed by Moira Saenz MD at [address] [facility] Right 03/13/2018    ESWL EXTRACORPEAL SHOCK WAVE LITHOTRIPSY performed by Moira Saenz MD at [address] [facility] Right 07/24/2018    ESWL [address] LITHOTRIPSY performed by Moira Saenz MD at [address], [address] N/A 61/18/0406    HERNIA UMBILICAL REPAIR LAPAROSCOPIC performed by Rosi Montanez MD at [address] [facility] [address] / [address] Left 09/20/2020     LEFT LOWER EXTREMITY VENOGRAMS; INFERIOR VENA CAVA FILTER PLACEMENT. performed by Julio César Escudero MD at [address]  12/14/2020    SJS. Ultrasound-guided cannulation right internal jugular vein, Right internal jugular venograms to the superior vena cava,Ultrasound-guided cannulation left internal jugular vein with 12 Monegasque sheath, Inferior vena cava venograms, Retrieval of Bard inferior vena cava filter Michelle Zapata), Completion inferior vena cava venograms    WRIST SURGERY      left wrist.  Pt was a child   ,   Family History   Problem Relation Age of Onset    Cancer Mother 46        colon cancer    Cancer Father         luekemia    Diabetes Father     Other Maternal Grandmother         copd    Other Maternal Grandfather         copd    Heart Disease Paternal Grandmother        PHYSICAL EXAMINATION:  [ INSTRUCTIONS:  \"[x]\" Indicates a positive item  \"[]\" Indicates a negative item  -- DELETE ALL ITEMS NOT EXAMINED]  Vital Signs: (As obtained by patient/caregiver at home)        Constitutional: [x] Appears well-developed and well-nourished to authenticate this note. --VANDANA Horne on 9/17/2021 at 3:40 PM        Pursuant to the emergency declaration under the [address] [place] [address], [employer] [facility] waiver authority and the [employer] Act, this Virtual  Visit was conducted, with patient's consent, to reduce the patient's risk of exposure to COVID-19 and provide continuity of care for an established patient. Services were provided through a video synchronous discussion virtually to substitute for in-person clinic visit.

## 2021-09-19 DIAGNOSIS — I10 HYPERTENSION, UNSPECIFIED TYPE: ICD-10-CM

## 2021-09-19 RX ORDER — AMLODIPINE BESYLATE 5 MG/1
TABLET ORAL
Qty: 30 TABLET | Refills: 1 | Status: SHIPPED | OUTPATIENT
Start: 2021-09-19 | End: 2022-02-03

## 2021-09-20 ENCOUNTER — HOSPITAL ENCOUNTER (OUTPATIENT)
Dept: GENERAL RADIOLOGY | Age: 41
Discharge: HOME | End: 2021-09-20
Payer: COMMERCIAL

## 2021-09-20 DIAGNOSIS — S39.012A STRAIN OF LUMBAR REGION, INITIAL ENCOUNTER: ICD-10-CM

## 2021-09-20 PROCEDURE — 72100 X-RAY EXAM L-S SPINE 2/3 VWS: CPT

## 2021-09-21 ENCOUNTER — TELEPHONE (OUTPATIENT)
Dept: PRIMARY CARE CLINIC | Age: 41
End: 2021-09-21

## 2021-09-21 NOTE — TELEPHONE ENCOUNTER
----- Message from VANDANA Menon sent at 9/20/2021 10:44 PM CDT -----  Please let patient know that xray has returned. It did show degenerative changes and some narrowing in disc spaces. If the pain has continued we can order MRI for further evaluation.

## 2021-09-24 ENCOUNTER — OFFICE VISIT (OUTPATIENT)
Dept: PRIMARY CARE CLINIC | Age: 41
End: 2021-09-24
Payer: COMMERCIAL

## 2021-09-24 VITALS
BODY MASS INDEX: 42.66 KG/M2 | OXYGEN SATURATION: 97 % | SYSTOLIC BLOOD PRESSURE: 126 MMHG | DIASTOLIC BLOOD PRESSURE: 72 MMHG | HEART RATE: 82 BPM | TEMPERATURE: 97.3 F | HEIGHT: 72 IN | WEIGHT: 315 LBS

## 2021-09-24 DIAGNOSIS — M54.50 LUMBAR PAIN: Primary | ICD-10-CM

## 2021-09-24 DIAGNOSIS — M51.36 DDD (DEGENERATIVE DISC DISEASE), LUMBAR: ICD-10-CM

## 2021-09-24 DIAGNOSIS — M99.79 DISC NARROWING: ICD-10-CM

## 2021-09-24 PROCEDURE — 96372 THER/PROPH/DIAG INJ SC/IM: CPT | Performed by: NURSE PRACTITIONER

## 2021-09-24 PROCEDURE — 99213 OFFICE O/P EST LOW 20 MIN: CPT | Performed by: NURSE PRACTITIONER

## 2021-09-24 RX ORDER — CYCLOBENZAPRINE HCL 10 MG
10 TABLET ORAL 3 TIMES DAILY PRN
Qty: 21 TABLET | Refills: 0 | Status: SHIPPED | OUTPATIENT
Start: 2021-09-24 | End: 2022-10-19

## 2021-09-24 RX ORDER — METHYLPREDNISOLONE ACETATE 40 MG/ML
40 INJECTION, SUSPENSION INTRA-ARTICULAR; INTRALESIONAL; INTRAMUSCULAR; SOFT TISSUE ONCE
Status: DISCONTINUED | OUTPATIENT
Start: 2021-09-24 | End: 2021-09-24

## 2021-09-24 RX ORDER — DEXAMETHASONE SODIUM PHOSPHATE 10 MG/ML
10 INJECTION INTRAMUSCULAR; INTRAVENOUS ONCE
Status: COMPLETED | OUTPATIENT
Start: 2021-09-24 | End: 2021-09-24

## 2021-09-24 RX ADMIN — DEXAMETHASONE SODIUM PHOSPHATE 10 MG: 10 INJECTION INTRAMUSCULAR; INTRAVENOUS at 13:10

## 2021-09-24 ASSESSMENT — ENCOUNTER SYMPTOMS
GASTROINTESTINAL NEGATIVE: 1
EYES NEGATIVE: 1
BACK PAIN: 1
RESPIRATORY NEGATIVE: 1

## 2021-09-24 NOTE — PROGRESS NOTES
[address] [facility]  [address] [address]  [address] [facility] [place] [address]  Dept: [phone]  Dept Fax: [phone]  Loc: [phone]    Jens Devi is a 42 y.o. male who presents today for his medical conditions/complaints as noted below. Jens Devi is c/o of Back Pain      Chief Complaint   Patient presents with    Back Pain       HPI:     HPI   Patient is in office today to follow up on recent back pain. He was seen via [facility]V last week and was treated with oral steroids, flexeril, and tramadol. He states its better he can get up and move,  but he is still hurting. He is having difficulties bending over or twisting. He was suppose to return to work this weekend. He is unable to based on his symptoms.       Past Medical History:   Diagnosis Date    Arthritis     both wrist    Bilateral carpal tunnel syndrome 8/15/2019    Bronchitis     10 yrs ago    Diabetes mellitus ([address]     Disease of blood and blood forming organ     Hx of blood clots     Hypertension     Kidney stone     recurrent    Sleep apnea     untested    Umbilical hernia         Past Surgical History:   Procedure Laterality Date    CARPAL TUNNEL RELEASE Left 08/21/2020    LEFT CARPAL TUNNEL RELEASE performed by Juan Ramon Ibarra MD at [address] [place] [address] Right 07/24/2018    CYSTOSCOPY/ URETEROSCOPY; INSERTION DOUBLE J STENT/  URETERAL performed by Melly Laws MD at [address] [place] [address]      both wrists    [address] N/A 06/24/2016    HEMORRHOID INCISION AND DRAINAGE performed by Carmella Camilo MD at [address] CYSTO/URETERO/PYELOSCOPY W/LITHOTRIPSY Right 08/07/2018    URETEROSCOPY LASER LITHOTRIPSY STONE EXTRACTION performed by Melly Laws MD at [address] [facility] Right 08/07/2018    STENT PLACEMENT performed by Melly Laws MD at [address] [place] [address] [place] Right 02/13/2018    ESWL [address] [address] LITHOTRIPSY performed by Kerri Contreras MD at [address] [facility] Right 03/13/2018    ESWL EXTRACORPEAL SHOCK WAVE LITHOTRIPSY performed by Kerri Contreras MD at [address] [facility] Right 07/24/2018    ESWL [address] LITHOTRIPSY performed by Kerri Contreras MD at [address], [address] N/A 14/63/1781    HERNIA UMBILICAL REPAIR LAPAROSCOPIC performed by Teja Catherine MD at [address] [facility] [address] / [address] Left 09/20/2020     LEFT LOWER EXTREMITY VENOGRAMS; INFERIOR VENA CAVA FILTER PLACEMENT. performed by Rogelio Lopez MD at [address] [facility]  12/14/2020    SJS. Ultrasound-guided cannulation right internal jugular vein, Right internal jugular venograms to the superior vena cava,Ultrasound-guided cannulation left internal jugular vein with 12 French sheath, Inferior vena cava venograms, Retrieval of Bard inferior vena cava filter Hien Query), Completion inferior vena cava venograms    WRIST SURGERY      left wrist.  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[facility]   Substance Use Topics    Alcohol use: Yes     Comment: OCC        Current Outpatient Medications   Medication Sig Dispense Refill    cyclobenzaprine (FLEXERIL) 10 MG tablet Take 1 tablet by mouth 3 times daily as needed for Muscle spasms 21 tablet 0    amLODIPine (NORVASC) 5 MG tablet Take 1 tablet by mouth once daily 30 tablet 1    predniSONE (DELTASONE) 10 MG tablet Take 1 tablet by mouth daily for 7 days 7 tablet 0    Semaglutide,0.25 or 0.5MG/DOS, (OZEMPIC, 0.25 OR 0.5 MG/DOSE,) 2 MG/1.5ML SOPN Inject 0.5 mg into the skin once a week 1 pen 3    rosuvastatin (CRESTOR) 20 MG tablet Take 1 tablet by mouth nightly 30 tablet 3    lisinopril (PRINIVIL;ZESTRIL) 10 MG tablet TAKE 1 TABLET BY MOUTH ONCE DAILY FOR 30 DAYS 30 tablet 1    metFORMIN (GLUCOPHAGE) 500 MG tablet Take 1 tablet by mouth 2 times daily (with meals) 60 tablet 5    diclofenac sodium (VOLTAREN) 1 % GEL Apply topically 4 times daily as needed for Pain 150 g 2    warfarin (COUMADIN) 5 MG tablet Take 2 tablets by mouth once daily 60 tablet 5    tiZANidine (ZANAFLEX) 4 MG tablet Take 1 tablet by mouth nightly as needed (spasm) 30 tablet 3    hydrALAZINE (APRESOLINE) 25 MG tablet Take 1 tablet by mouth every 6 hours as needed (SBP>160mmHg) 120 tablet 0    vitamin D (ERGOCALCIFEROL) 1.25 MG (71221 UT) CAPS capsule Take 1 capsule by mouth once a week for 4 doses 4 capsule 0     Current Facility-Administered Medications   Medication Dose Route Frequency Provider Last Rate Last Admin    dexamethasone (DECADRON) injection 10 mg  10 mg IntraMUSCular Once Stevphen VANDANA Jones           No Known Allergies    Family History   Problem Relation Age of Onset    Cancer Mother 46        colon cancer    Cancer Father         luekemia    Diabetes Father     Other Maternal Grandmother         copd    Other Maternal Grandfather         copd    Heart Disease Paternal Grandmother                Subjective:      Review of Systems   Constitutional: Negative. HENT: Negative. Eyes: Negative. Respiratory: Negative. Cardiovascular: Negative. Gastrointestinal: Negative. Endocrine: Negative. Genitourinary: Negative. Musculoskeletal: Positive for back pain and gait problem. Skin: Negative. Hematological: Negative. Psychiatric/Behavioral: Negative. Objective:     Physical Exam  Vitals and nursing note reviewed. Constitutional:       Appearance: Normal appearance. He is well-developed. Comments: obese   HENT:      Head: Normocephalic and atraumatic.       Right Ear: Hearing, tympanic membrane, ear canal and external ear normal.      Left Ear: Hearing, tympanic membrane, ear canal and external ear normal.      Nose: Nose normal. Mouth/Throat:      Pharynx: Uvula midline. Eyes:      General: Lids are normal.      Conjunctiva/sclera: Conjunctivae normal.      Pupils: Pupils are equal, round, and reactive to light. Neck:      Thyroid: No thyroid mass or thyromegaly. Trachea: Trachea normal.   Cardiovascular:      Rate and Rhythm: Normal rate and regular rhythm. Heart sounds: Normal heart sounds. Pulmonary:      Effort: Pulmonary effort is normal.      Breath sounds: Normal breath sounds. Abdominal:      General: Bowel sounds are normal.      Palpations: Abdomen is soft. Musculoskeletal:      Cervical back: Normal range of motion and neck supple. No tenderness. Thoracic back: Normal. No tenderness. Normal range of motion. Lumbar back: Tenderness present. Decreased range of motion. Back:    Skin:     General: Skin is warm and dry. Neurological:      Mental Status: He is alert and oriented to person, place, and time. Psychiatric:         Speech: Speech normal.         Behavior: Behavior normal.         Thought Content: Thought content normal.         Judgment: Judgment normal.         /72   Pulse 82   Temp 97.3 °F (36.3 °C)   Ht 6' (1.829 m)   Wt (!) 394 lb 9.6 oz (179 kg)   SpO2 97%   BMI 53.52 kg/m²     Assessment:      Diagnosis Orders   1. Lumbar pain  MRI LUMBAR SPINE WO CONTRAST   2. DDD (degenerative disc disease), lumbar  MRI LUMBAR SPINE WO CONTRAST   3. Disc narrowing  MRI LUMBAR SPINE WO CONTRAST       No results found for this visit on 09/24/21. Plan:     Out of work until MRI has been completed. Steroid injection in office. Flexeril reordered. Ok to refill Tramadol if needed. More than 50% of the time was spent counseling and coordinating care for a total time of 21 mins face to face. Return if symptoms worsen or fail to improve, for Keep follow up as scheduled.     Orders Placed This Encounter   Procedures    MRI LUMBAR SPINE WO CONTRAST     Standing Status: Future     Standing Expiration Date:   9/24/2022       Orders Placed This Encounter   Medications    DISCONTD: methylPREDNISolone acetate (DEPO-MEDROL) injection 40 mg    cyclobenzaprine (FLEXERIL) 10 MG tablet     Sig: Take 1 tablet by mouth 3 times daily as needed for Muscle spasms     Dispense:  21 tablet     Refill:  0    dexamethasone (DECADRON) injection 10 mg            Patient offered educational handouts and has had all questions answered. Patient voices understanding and agrees to plans along with risks and benefits of plan. Patient is instructed to continue prior meds, diet, and exercise plans as instructed. Patient agrees to follow up as instructed and sooner if needed. Patient agrees to go to ER if condition becomes emergent. EMR Dragon/transcription disclaimer: Some of this encounter note is an electronic transcription/translation of spoken language to printed text. The electronic translation of spoken language may permit erroneous, or at times, nonsensical words or phrases to be inadvertently transcribed.  Although I have reviewed the note for such errors, some may still exist.    Electronically signed by VANDANA Washburn on 9/24/2021 at 12:42 PM

## 2021-09-24 NOTE — PROGRESS NOTES
After obtaining consent, and per orders of VANDANA Gomez, injection of Dexamethasone given in Left upper quad. gluteus by Nani Bush Patient instructed to remain in clinic for 20 minutes afterwards, and to report any adverse reaction to me immediately.

## 2021-09-29 ENCOUNTER — HOSPITAL ENCOUNTER (OUTPATIENT)
Dept: MRI IMAGING | Age: 41
Discharge: HOME | End: 2021-09-29
Payer: COMMERCIAL

## 2021-09-29 DIAGNOSIS — M51.36 DDD (DEGENERATIVE DISC DISEASE), LUMBAR: ICD-10-CM

## 2021-09-29 DIAGNOSIS — M54.50 LUMBAR PAIN: ICD-10-CM

## 2021-09-29 DIAGNOSIS — M99.79 DISC NARROWING: ICD-10-CM

## 2021-09-29 PROCEDURE — 72148 MRI LUMBAR SPINE W/O DYE: CPT

## 2021-09-30 DIAGNOSIS — M51.26 PROTRUSION OF LUMBAR INTERVERTEBRAL DISC: Primary | ICD-10-CM

## 2021-10-01 ENCOUNTER — TELEPHONE (OUTPATIENT)
Dept: PRIMARY CARE CLINIC | Age: 41
End: 2021-10-01

## 2021-10-08 ENCOUNTER — TELEPHONE (OUTPATIENT)
Dept: NEUROSURGERY | Age: 41
End: 2021-10-08

## 2021-10-08 NOTE — TELEPHONE ENCOUNTER
[facility] New Patient Questionnaire    1. Diagnosis/Reason for Referral?    Protrusion of lumbar intervertebral disc    2. Who is completing questionnaire? Patient x Caregiver Family      3. Has the patient had any previous spinal/brain surgeries? no        A. If yes, what is the name of the facility in which the surgery was performed? B. Procedure/Surgery performed? C. Who was the surgeon? D. When was the surgery? MM/YY       E. Did the patient improve after the surgery? 4. Is this a second opinion? If yes, Dr. Daryle Staple would like to review patient first before making the appointment. 5. Have MRI Images been obtain within the last year? Yes x No      XR  CT     If yes, [facility]     If yes, what part of the body? Lumbar x Cervical  Thoracic  Brain     If yes, when was it obtained?      09-    Note: if the scan was performed at a facility other than [facility], the disc will need to be brought to the appointment or we need to reach out to obtain the disc. A. Was the patient instructed to provide the disc? Yes   No x      8. Has the patient had a NCV/EMG within the last year? Yes  No x     If yes, where was it performed and date? MM/YY  Location:      9. Has the patient been to Physical Therapy? Yes  No x     If yes, what location, how long attended, and last visit? Location:        Therapy Lasted:    Date of Last Visit:      10. Has the patient been to Pain Management? Yes  No x     If yes, what location and last visit     Location:   Last Visit:   Is it helping?

## 2021-10-14 ENCOUNTER — OFFICE VISIT (OUTPATIENT)
Dept: NEUROSURGERY | Age: 41
End: 2021-10-14
Payer: COMMERCIAL

## 2021-10-14 VITALS
HEIGHT: 72 IN | BODY MASS INDEX: 42.66 KG/M2 | HEART RATE: 82 BPM | SYSTOLIC BLOOD PRESSURE: 123 MMHG | WEIGHT: 315 LBS | OXYGEN SATURATION: 94 % | DIASTOLIC BLOOD PRESSURE: 79 MMHG

## 2021-10-14 DIAGNOSIS — M51.37 DDD (DEGENERATIVE DISC DISEASE), LUMBOSACRAL: ICD-10-CM

## 2021-10-14 DIAGNOSIS — M54.50 CHRONIC MIDLINE LOW BACK PAIN WITHOUT SCIATICA: ICD-10-CM

## 2021-10-14 DIAGNOSIS — M51.36 DDD (DEGENERATIVE DISC DISEASE), LUMBAR: Primary | ICD-10-CM

## 2021-10-14 DIAGNOSIS — G89.29 CHRONIC MIDLINE LOW BACK PAIN WITHOUT SCIATICA: ICD-10-CM

## 2021-10-14 PROCEDURE — 99204 OFFICE O/P NEW MOD 45 MIN: CPT | Performed by: NEUROLOGICAL SURGERY

## 2021-10-14 ASSESSMENT — ENCOUNTER SYMPTOMS
RESPIRATORY NEGATIVE: 1
EYES NEGATIVE: 1
GASTROINTESTINAL NEGATIVE: 1

## 2021-10-14 NOTE — PROGRESS NOTES
[facility]  Office Visit      Chief Complaint   Patient presents with    Referral - General    Back Pain       HISTORY OF PRESENT ILLNESS:    Juancarlos Almaraz is a 42 y.o. male [employer] employee history of DM and DVT, PE, history of acute renal failure with temporary dialysis now resolved who presents with chronic low back pain (10+ years) that has been more aggravated over the last month. He was moving a fan and developed more intense mid low back paion. The pain does intermittently radiates into the buttocks. His pain is mostly located in the back. The patient denies numbness. Sitting improves the pain some. His pain is worsened when going from a seated to standing position. His pain is worsened with walking. His pain is not changed when lying flat. Overall, indicative that the patient does not have a mechanical nature to their pain. He states that 60% of his pain is located in the back and 40% is leg pain. The patient has underwent a non-operative treatment course that has included:  NSAIDs (cannot take due to coumadin)  Muscle Relaxers (flexeril, tizanidine)  Opiates (tramadol)  Oral Steroids (dose pack and prednisone taper)      Of note he does not use tobacco and does take blood thinning medications (coumadin) history of DVT in lower extremities.                Past Medical History:   Diagnosis Date    Arthritis     both wrist    Bilateral carpal tunnel syndrome 8/15/2019    Bronchitis     10 yrs ago    Diabetes mellitus ([address]     Disease of blood and blood forming organ     Hx of blood clots     Hypertension     Kidney stone     recurrent    Sleep apnea     untested    Umbilical hernia        Past Surgical History:   Procedure Laterality Date    CARPAL TUNNEL RELEASE Left 08/21/2020    LEFT CARPAL TUNNEL RELEASE performed by Vivi Jiménez MD at [facility] Right 07/24/2018    CYSTOSCOPY/ URETEROSCOPY; INSERTION DOUBLE J STENT/  URETERAL performed by Ramez Chiang [place] MD at [address] [place]      both wrists    HEMORRHOID SURGERY N/A 06/24/2016    HEMORRHOID INCISION AND DRAINAGE performed by Mey Quevedo MD at [address] CYSTO/URETERO/PYELOSCOPY W/LITHOTRIPSY Right 08/07/2018    URETEROSCOPY LASER LITHOTRIPSY STONE EXTRACTION performed by Cari Gonzalez MD at [address] [place] Right 08/07/2018    STENT PLACEMENT performed by Cari Gonzalez MD at [facility] ESWL Right 02/13/2018    ESWL EXTRACORPEAL SHOCK WAVE LITHOTRIPSY performed by Cari Gonzalez MD at [facility] Right 03/13/2018    ESWL EXTRACORPEAL SHOCK WAVE LITHOTRIPSY performed by Cari Gonzalez MD at [facility] E[facility] Right 07/24/2018    ESWL EXTRACORPEAL SHOCK WAVE LITHOTRIPSY performed by Cari Gonzalez MD at [address] LAP, [address] N/A 17/70/2470    HERNIA UMBILICAL REPAIR LAPAROSCOPIC performed by Jann Valdes MD at [address] / [address] Left 09/20/2020     LEFT LOWER EXTREMITY VENOGRAMS; INFERIOR VENA CAVA FILTER PLACEMENT. performed by Joie Lofton MD at [facility]  12/14/2020    SJS. Ultrasound-guided cannulation right internal jugular vein, Right internal jugular venograms to the superior vena cava,Ultrasound-guided cannulation left internal jugular vein with 12 Thai sheath, Inferior vena cava venograms, Retrieval of Bard inferior vena cava filter Mal Fret), Completion inferior vena cava venograms    WRIST SURGERY      left wrist.  Pt was a child       Current Outpatient Medications   Medication Sig Dispense Refill    amLODIPine (NORVASC) 5 MG tablet Take 1 tablet by mouth once daily 30 tablet 1    Semaglutide,0.25 or 0.5MG/DOS, (OZEMPIC, 0.25 OR 0.5 MG/DOSE,) 2 MG/1.5ML SOPN Inject 0.5 mg into the skin once a week 1 pen 3    rosuvastatin (CRESTOR) 20 MG tablet Take 1 tablet by mouth nightly 30 tablet 3    lisinopril (PRINIVIL;ZESTRIL) 10 MG tablet TAKE 1 TABLET BY MOUTH ONCE DAILY FOR 30 DAYS 30 tablet 1    metFORMIN (GLUCOPHAGE) 500 MG tablet Take 1 tablet by mouth 2 times daily (with meals) 60 tablet 5    diclofenac sodium (VOLTAREN) 1 % GEL Apply topically 4 times daily as needed for Pain 150 g 2    warfarin (COUMADIN) 5 MG tablet Take 2 tablets by mouth once daily 60 tablet 5    tiZANidine (ZANAFLEX) 4 MG tablet Take 1 tablet by mouth nightly as needed (spasm) 30 tablet 3    hydrALAZINE (APRESOLINE) 25 MG tablet Take 1 tablet by mouth every 6 hours as needed (SBP>160mmHg) 120 tablet 0    vitamin D (ERGOCALCIFEROL) 1.25 MG (11329 UT) CAPS capsule Take 1 capsule by mouth once a week for 4 doses 4 capsule 0     No current facility-administered medications for this visit. Allergies:  Patient has no known allergies. 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 HENT: Negative. Eyes: Negative. Respiratory: Negative. Cardiovascular: Negative. Gastrointestinal: Negative. Genitourinary: Negative. Musculoskeletal: Positive for myalgias and neck pain. Skin: Negative. Neurological: Negative. Endo/Heme/Allergies: Negative. Psychiatric/Behavioral: Negative. PHYSICAL EXAM:  Vitals:    10/14/21 1343   BP: 123/79   Pulse: 82   SpO2: 94%     Constitutional: appears well-developed and well-nourished.    Eyes  conjunctiva normal.  Pupils react to light  Ear, nose, throat - hearing intact to finger rub, No scars, masses, or lesions over external nose or ears, no atrophy oftongue  Neck- symmetric, no masses noted, no jugular vein distension  Respiration- chest wall appears symmetric, good expansion, normal effort without use of accessory muscles  Musculoskeletal  no significant wasting of muscles noted, no bony deformities, gait no gross ataxia  Extremities- no clubbing, cyanosis oredema  Skin  warm, dry, and intact. No rash, erythema, or pallor. Psychiatric  mood, affect, and behavior appear normal.     Neurologic Examination  Awake, Alert and oriented x 4  Normal speech pattern, following commands    Motor:  RIGHT:     iliopsoas 5/5    knee flexor 5/5    knee extension 5/5    EHL/dorsiflexion 5/5    plantar flexion 5/5    LEFT:    iliopsoas 5/5    knee flexor 5/5    knee extension 5/5    EHL/dorsiflexion 5/5    plantar flexion 5/5    No deficits to light touch or pinprick sensation  Reflexes are 2+ and symmetric  No myofacial tenderness to palpation  Antalgic Gait pattern      No calf tenderness noted. DATA and IMAGING:    Nursing/pcp notes, imaging, labs, and vitals reviewed.      PT,OT and/or speech notes reviewed    Lab Results   Component Value Date    WBC 7.79 09/07/2021    HGB 14.8 09/07/2021    HCT 48.0 09/07/2021    MCV 93.0 (H) 09/07/2021     (L) 09/07/2021     Lab Results   Component Value Date     08/10/2021    K 4.1 08/10/2021     08/10/2021    CO2 29 08/10/2021    BUN 9 08/10/2021    CREATININE 0.9 08/10/2021    GLUCOSE 158 (H) 08/10/2021    CALCIUM 9.4 08/10/2021    PROT 7.0 08/10/2021    LABALBU 3.9 08/10/2021    BILITOT 0.3 08/10/2021    ALKPHOS 95 08/10/2021    AST 20 08/10/2021    ALT 24 08/10/2021    LABGLOM >60 08/10/2021    GFRAA >59 08/10/2021    GLOB 3.2 03/07/2017     Lab Results   Component Value Date    INR 3.4 08/31/2021    INR 3.1 08/10/2021    INR 2.3 07/13/2021 PROTIME 21.2 (H) 02/25/2021    PROTIME 16.1 (H) 12/14/2020    PROTIME 47.2 (H) 12/09/2020       Narrative   MRI LUMBAR SPINE WO CONTRAST 9/29/2021 10:24 AM   HISTORY: M54.5   COMPARISON: None    TECHNIQUE: Multiplanar, multisequence MRI of the lumbar spine was   performed without the use of contrast.   FINDINGS:    Alignment: There are presumed to be 5 lumbar-type vertebrae, with the   most inferior being labeled as L5. Normal lumbar lordosis is   maintained. There is no evidence of listhesis or subluxation. There is   mild dextrocurvature of the lumbar spine. There is early degenerative   disc disease at L2-L3 and L4-L5 with disc desiccation on the more   heavily T2 weighted images. Marrow signal: No pathologic marrow infiltrate is demonstrated. The   vertebral body heights and posterior elements are maintained. Cord/Canal: The conus medullaris terminates at the level of L1. The   spinal cord is normal in signal and morphology. Soft tissues: The surrounding soft tissues are unremarkable. Levels:    L1-L2: No disc bulge is present. There is moderate facet and   ligamentous hypertrophy. No evidence of central or foraminal   stenosis. Richard Power L2-L3: Moderate facet and ligamentous hypertrophy is present. There is   mild broad-based bulging of the disc as well as a right lateral   broad-based protrusion. No central stenosis is present. Mild bilateral   foraminal narrowing is present. .    L3-L4: There is moderate facet and ligamentous hypertrophy. There is   mild broad-based bulging of the disc. There is no central stenosis. Mild bilateral neural foraminal narrowing is present. Richard Power L4-L5: There is a small central disc protrusion with mild ventral   indentation of the thecal sac. There is moderate facet and mild   ligamentous hypertrophy. No central stenosis is present. Mild   bilateral neural foraminal narrowing is present. Richard Power L5-S1: There is mild facet and ligamentous hypertrophy.  There is no   bulging of the disc. No central stenosis is present. There is mild   bilateral neural foraminal narrowing. .        Impression   1. No evidence of fracture or listhesis. There is early degenerative   disc disease at L2-L3 and L4-L5. 2. There is bulging of the disc as well as disc protrusions at several   levels as described above as well as facet and ligamentous   hypertrophy. No evidence of central stenosis is present. Foraminal   narrowing is noted at several levels which is mild in nature. Signed by Dr Melly Menezes have personally reviewed these images and my interpretation is: There is DDD throughout the lumbar spine  L5-S1 mild right foraminal stenosis      ASSESSMENT:    Marguerite Sanchez is a 42 y.o. male with low back back pain and mild foraminal stenosis at L5-S1    No diagnosis found. PLAN:  We have discussed and reviewed the results of the MRI lumbar spine with Mr. Johnson Ambrose at length. We explained that he does have some DDD which is likely the culprit of his back pain. We explained that DDD can cause back pain that can be flared up from time to time. There is no major neural element compression that would warrant any neurosurgical intervention.   At this point, we feel that having him undergo some more non-operative treatments such as PT, massage therapy, acupuncture, pain management, etc.  We recommend he keep his lifting to a maximum of 15 lbs and limit excessive bending and twisting.    -Letter provided to remain off work x 4 additional weeks   -Start PT [facility]   -Follow up as needed         Rkylin, DO

## 2021-10-15 ENCOUNTER — OFFICE VISIT (OUTPATIENT)
Dept: PRIMARY CARE CLINIC | Age: 41
End: 2021-10-15
Payer: COMMERCIAL

## 2021-10-15 VITALS
HEART RATE: 101 BPM | BODY MASS INDEX: 42.66 KG/M2 | OXYGEN SATURATION: 97 % | WEIGHT: 315 LBS | SYSTOLIC BLOOD PRESSURE: 134 MMHG | DIASTOLIC BLOOD PRESSURE: 76 MMHG | HEIGHT: 72 IN | TEMPERATURE: 97.6 F

## 2021-10-15 DIAGNOSIS — M51.26 PROTRUSION OF LUMBAR INTERVERTEBRAL DISC: Primary | ICD-10-CM

## 2021-10-15 DIAGNOSIS — M51.36 DDD (DEGENERATIVE DISC DISEASE), LUMBAR: ICD-10-CM

## 2021-10-15 DIAGNOSIS — I82.412 DVT FEMORAL (DEEP VENOUS THROMBOSIS) WITH THROMBOPHLEBITIS, LEFT (HCC): ICD-10-CM

## 2021-10-15 DIAGNOSIS — M54.50 LUMBAR PAIN: ICD-10-CM

## 2021-10-15 PROCEDURE — 99214 OFFICE O/P EST MOD 30 MIN: CPT | Performed by: NURSE PRACTITIONER

## 2021-10-15 ASSESSMENT — ENCOUNTER SYMPTOMS
RESPIRATORY NEGATIVE: 1
GASTROINTESTINAL NEGATIVE: 1
EYES NEGATIVE: 1
BACK PAIN: 1

## 2021-10-15 NOTE — PROGRESS NOTES
[address] [facility]  [address] [address]  [address] [address]  Dept: [phone]  Dept Fax: [phone]  Loc: [phone]    Karen Britt is a 42 y.o. male who presents today for his medical conditions/complaints as noted below. Karen Britt is c/o of Back Pain (Follow up)      Chief Complaint   Patient presents with    Back Pain     Follow up       HPI:     HPI  Patient is in office today to follow up on back pain. Patient did see Melissa Noel. He states it is slowing getting better and he is now going to PT. Dr. Patrick Garcias did recommend patient remaining off of work for 4 additional weeks to complete physical therapy. He is to keep maximum lifting of weight to a limit of 15 pounds.       Past Medical History:   Diagnosis Date    Arthritis     both wrist    Bilateral carpal tunnel syndrome 8/15/2019    Bronchitis     10 yrs ago    Diabetes mellitus ([address]     Disease of blood and blood forming organ     Hx of blood clots     Hypertension     Kidney stone     recurrent    Sleep apnea     untested    Umbilical hernia         Past Surgical History:   Procedure Laterality Date    CARPAL TUNNEL RELEASE Left 08/21/2020    LEFT CARPAL TUNNEL RELEASE performed by Aren Ragland MD at [address] [place] [address] Right 07/24/2018    CYSTOSCOPY/ URETEROSCOPY; INSERTION DOUBLE J STENT/  URETERAL performed by Vinnie Vivar MD at [address] [place] [address] Dr      both wrists    . [address] N/A 06/24/2016    HEMORRHOID INCISION AND DRAINAGE performed by Alonso Vazquez MD at [facility] CYSTO/URETERO/PYELOSCOPY W/LITHOTRIPSY Right 08/07/2018    URETEROSCOPY LASER LITHOTRIPSY STONE EXTRACTION performed by Vinnie Vivar MD at [address] [place] [address] Right 08/07/2018    STENT PLACEMENT performed by Vinnie Vivar MD at [facility] Right 02/13/2018    ESWL [address]  LITHOTRIPSY performed by Angelique Reece MD at [facility] Right 03/13/2018    ESWL EXTRACORPEAL SHOCK WAVE LITHOTRIPSY performed by Angelique Reece MD at [facility] Right 07/24/2018    ESWL [address] LITHOTRIPSY performed by Angelique Reece MD at [address] [facility], [address] N/A 01/46/0034    HERNIA UMBILICAL REPAIR LAPAROSCOPIC performed by Cassidy De Luna MD at [address] / [address] Left 09/20/2020     LEFT LOWER EXTREMITY VENOGRAMS; INFERIOR VENA CAVA FILTER PLACEMENT. performed by Nereida Pelyao MD at [address] [facility] [address]  12/14/2020    SJS. Ultrasound-guided cannulation right internal jugular vein, Right internal jugular venograms to the superior vena cava,Ultrasound-guided cannulation left internal jugular vein with 12 French sheath, Inferior vena cava venograms, Retrieval of Bard inferior vena cava filter Sahil Vazquez), Completion inferior vena cava venograms    WRIST SURGERY      left wrist.  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[employer]   Substance Use Topics    Alcohol use: Yes     Comment: OCC        Current Outpatient Medications   Medication Sig Dispense Refill    amLODIPine (NORVASC) 5 MG tablet Take 1 tablet by mouth once daily 30 tablet 1    Semaglutide,0.25 or 0.5MG/DOS, (OZEMPIC, 0.25 OR 0.5 MG/DOSE,) 2 MG/1.5ML SOPN Inject 0.5 mg into the skin once a week 1 pen 3    rosuvastatin (CRESTOR) 20 MG tablet Take 1 tablet by mouth nightly 30 tablet 3    lisinopril (PRINIVIL;ZESTRIL) 10 MG tablet TAKE 1 TABLET BY MOUTH ONCE DAILY FOR 30 DAYS 30 tablet 1    metFORMIN (GLUCOPHAGE) 500 MG tablet Take 1 tablet by mouth 2 times daily (with meals) 60 tablet 5    diclofenac sodium (VOLTAREN) 1 % GEL Apply topically 4 times daily as needed for Pain 150 g 2    warfarin (COUMADIN) 5 MG tablet Take 2 tablets by mouth once daily 60 tablet 5    tiZANidine (ZANAFLEX) 4 MG tablet Take 1 tablet by mouth nightly as needed (spasm) 30 tablet 3    hydrALAZINE (APRESOLINE) 25 MG tablet Take 1 tablet by mouth every 6 hours as needed (SBP>160mmHg) 120 tablet 0    vitamin D (ERGOCALCIFEROL) 1.25 MG (33091 UT) CAPS capsule Take 1 capsule by mouth once a week for 4 doses 4 capsule 0     No current facility-administered medications for this visit. No Known Allergies    Family History   Problem Relation Age of Onset   [employer] Cancer Mother 46        colon cancer    Cancer Father         luekemia    Diabetes Father     Other Maternal Grandmother         copd    Other Maternal Grandfather         copd    Heart Disease Paternal Grandmother                Subjective:      Review of Systems   Constitutional: Negative. HENT: Negative. Eyes: Negative. Respiratory: Negative. Cardiovascular: Negative. Gastrointestinal: Negative. Endocrine: Negative. Genitourinary: Negative. Musculoskeletal: Positive for arthralgias and back pain. Skin: Negative. Neurological: Negative. Hematological: Negative. Psychiatric/Behavioral: Negative. Objective:     Physical Exam  Vitals and nursing note reviewed. Constitutional:       Appearance: Normal appearance. He is well-developed. Comments: obese   HENT:      Head: Normocephalic and atraumatic. Right Ear: Hearing, tympanic membrane, ear canal and external ear normal.      Left Ear: Hearing, tympanic membrane, ear canal and external ear normal.      Nose: Nose normal.      Mouth/Throat:      Pharynx: Uvula midline. Eyes:      General: Lids are normal.      Conjunctiva/sclera: Conjunctivae normal.      Pupils: Pupils are equal, round, and reactive to light. Neck:      Thyroid: No thyroid mass or thyromegaly. Trachea: Trachea normal.   Cardiovascular:      Rate and Rhythm: Normal rate and regular rhythm. Heart sounds: Normal heart sounds. Pulmonary:      Effort: Pulmonary effort is normal.      Breath sounds: Normal breath sounds. Abdominal:      General: Bowel sounds are normal.      Palpations: Abdomen is soft. Musculoskeletal:      Cervical back: Normal range of motion and neck supple. No tenderness. Thoracic back: Normal. No tenderness. Normal range of motion. Lumbar back: Tenderness present. Decreased range of motion. Back:    Skin:     General: Skin is warm and dry. Neurological:      Mental Status: He is alert and oriented to person, place, and time. Psychiatric:         Speech: Speech normal.         Behavior: Behavior normal.         Thought Content: Thought content normal.         Judgment: Judgment normal.         /76   Pulse 101   Temp 97.6 °F (36.4 °C)   Ht 6' (1.829 m)   Wt (!) 387 lb (175.5 kg)   SpO2 97%   BMI 52.49 kg/m²     Assessment:      Diagnosis Orders   1. Protrusion of lumbar intervertebral disc     2. Lumbar pain     3. DDD (degenerative disc disease), lumbar     4. DVT femoral (deep venous thrombosis) with thrombophlebitis, left ([address]  Protime-INR       No results found for this visit on 10/15/21. Plan: We will fill out forms for disability short-term. Okay to return to work likely in 4 weeks after physical therapy. Patient is needing to have INR checked. Will plan to restart INR her Coumadin clinic next week. More than 50% of the time was spent counseling and coordinating care for a total time of 31 mins face to face. Return in about 4 weeks (around 11/12/2021) for work release. Orders Placed This Encounter   Procedures    Protime-INR     Standing Status:   Future     Standing Expiration Date:   10/15/2022     Order Specific Question:   Daily Coumadin Dose? Answer:   10 mg daily on SUnday 7.5 mg       No orders of the defined types were placed in this encounter.            Patient offered educational handouts and has had all questions answered. Patient voices understanding and agrees to plans along with risks and benefits of plan. Patient is instructed to continue prior meds, diet, and exercise plans as instructed. Patient agrees to follow up as instructed and sooner if needed. Patient agrees to go to ER if condition becomes emergent. EMR Dragon/transcription disclaimer: Some of this encounter note is an electronic transcription/translation of spoken language to printed text. The electronic translation of spoken language may permit erroneous, or at times, nonsensical words or phrases to be inadvertently transcribed.  Although I have reviewed the note for such errors, some may still exist.    Electronically signed by VANDANA Jimenez on 10/15/2021 at 3:11 PM

## 2021-10-19 ENCOUNTER — TELEPHONE (OUTPATIENT)
Dept: PRIMARY CARE CLINIC | Age: 41
End: 2021-10-19

## 2021-10-19 ENCOUNTER — ANTI-COAG VISIT (OUTPATIENT)
Dept: PRIMARY CARE CLINIC | Age: 41
End: 2021-10-19
Payer: COMMERCIAL

## 2021-10-19 DIAGNOSIS — Z51.81 MONITORING FOR LONG-TERM ANTICOAGULANT USE: ICD-10-CM

## 2021-10-19 DIAGNOSIS — E78.00 HIGH CHOLESTEROL: ICD-10-CM

## 2021-10-19 DIAGNOSIS — E11.9 TYPE 2 DIABETES MELLITUS WITHOUT COMPLICATION, WITHOUT LONG-TERM CURRENT USE OF INSULIN (HCC): ICD-10-CM

## 2021-10-19 DIAGNOSIS — I82.412 DVT FEMORAL (DEEP VENOUS THROMBOSIS) WITH THROMBOPHLEBITIS, LEFT (HCC): ICD-10-CM

## 2021-10-19 DIAGNOSIS — Z79.01 MONITORING FOR LONG-TERM ANTICOAGULANT USE: ICD-10-CM

## 2021-10-19 LAB
ALBUMIN SERPL-MCNC: 4.2 G/DL (ref 3.5–5.2)
ALP BLD-CCNC: 95 U/L (ref 40–130)
ALT SERPL-CCNC: 21 U/L (ref 5–41)
ANION GAP SERPL CALCULATED.3IONS-SCNC: 11 MMOL/L (ref 7–19)
AST SERPL-CCNC: 15 U/L (ref 5–40)
BILIRUB SERPL-MCNC: 0.3 MG/DL (ref 0.2–1.2)
BUN BLDV-MCNC: 9 MG/DL (ref 6–20)
CALCIUM SERPL-MCNC: 9.4 MG/DL (ref 8.6–10)
CHLORIDE BLD-SCNC: 104 MMOL/L (ref 98–111)
CHOLESTEROL, FASTING: 119 MG/DL (ref 160–199)
CO2: 26 MMOL/L (ref 22–29)
CREAT SERPL-MCNC: 0.8 MG/DL (ref 0.5–1.2)
GFR AFRICAN AMERICAN: >59
GFR NON-AFRICAN AMERICAN: >60
GLUCOSE BLD-MCNC: 130 MG/DL (ref 74–109)
HBA1C MFR BLD: 8 % (ref 4–6)
HDLC SERPL-MCNC: 35 MG/DL (ref 55–121)
INR BLD: 3.05 (ref 0.88–1.18)
LDL CHOLESTEROL CALCULATED: 55 MG/DL
POTASSIUM SERPL-SCNC: 4.3 MMOL/L (ref 3.5–5)
PROTHROMBIN TIME: 31 SEC (ref 12–14.6)
SODIUM BLD-SCNC: 141 MMOL/L (ref 136–145)
TOTAL PROTEIN: 7.2 G/DL (ref 6.6–8.7)
TRIGLYCERIDE, FASTING: 143 MG/DL (ref 0–149)

## 2021-10-19 PROCEDURE — 93793 ANTICOAG MGMT PT WARFARIN: CPT | Performed by: NURSE PRACTITIONER

## 2021-10-19 NOTE — TELEPHONE ENCOUNTER
----- Message from VANDANA Aguayo sent at 10/19/2021  3:53 PM CDT -----  Please let patient know that lab results were stable. Lipid panel has improved a lot since last check. Will continue current regimen. Thanks!

## 2021-10-29 ENCOUNTER — TELEPHONE (OUTPATIENT)
Dept: PRIMARY CARE CLINIC | Age: 41
End: 2021-10-29

## 2021-10-29 NOTE — TELEPHONE ENCOUNTER
I called patient to let him know that it can take up to 30 days to send referral in. I told him we would try to work on it next week and once we get everything faxed over to PT, they will call him to schedule an appointment. Patient thanked me and ANABEL.

## 2021-10-29 NOTE — TELEPHONE ENCOUNTER
----- Message from Diana Reyes sent at 10/26/2021  1:59 PM CDT -----  Subject: Message to Provider    QUESTIONS  Information for Provider? Pt was referred to physical therapy and hasn't   received anything from them to schedule his appt. Pt would like to know if   an appt can just be set up for him or if someone will be reaching out to   him or if he can have the contact information for the physical therapy   session to begin.  ---------------------------------------------------------------------------  --------------  [address]  What is the best way for the office to contact you? OK to leave message on   voicemail  Preferred Call Back Phone Number? [phone]  ---------------------------------------------------------------------------  --------------  SCRIPT ANSWERS  Relationship to Patient?  Self

## 2021-11-02 ENCOUNTER — ANTI-COAG VISIT (OUTPATIENT)
Dept: PRIMARY CARE CLINIC | Age: 41
End: 2021-11-02
Payer: COMMERCIAL

## 2021-11-02 DIAGNOSIS — Z79.01 MONITORING FOR LONG-TERM ANTICOAGULANT USE: ICD-10-CM

## 2021-11-02 DIAGNOSIS — Z51.81 MONITORING FOR LONG-TERM ANTICOAGULANT USE: ICD-10-CM

## 2021-11-02 LAB — INTERNATIONAL NORMALIZATION RATIO, POC: 3

## 2021-11-02 PROCEDURE — 85610 PROTHROMBIN TIME: CPT | Performed by: NURSE PRACTITIONER

## 2021-11-02 PROCEDURE — 93793 ANTICOAG MGMT PT WARFARIN: CPT | Performed by: NURSE PRACTITIONER

## 2021-11-02 NOTE — PROGRESS NOTES
Mr. Erik Fernandez was here today. INR today:   Results for orders placed or performed in visit on 11/02/21   POCT INR   Result Value Ref Range    INR 3.0      INR Goal: 2.0-3.0    Dosing Plan  As of 11/2/2021    TTR:  35.9 % (1 y)   Full warfarin instructions:  7.5 mg every Sun; 10 mg all other days            PLAN: CONTINUE CURRENT DOSE  NEXT COUMADIN CLINIC APT IS: [email or web address]    Coumadin Clinic Hours  Tuesday 7:30am - 4:00pm  Wednesday 7:30am - 4:00pm  Thursday 7:30am - 4:00pm    IF IT'S AN EMERGENCY, PLEASE CALL 911 OR GO TO YOUR NEAREST EMERGENCY ROOM. [facility] INTERNAL [facility]IC[facility] COUMADIN CLINIC [phone]  IF UNABLE TO REACH COUMADIN CLINIC, [address] [place] [address], [phone].

## 2021-11-09 RX ORDER — WARFARIN SODIUM 5 MG/1
TABLET ORAL
Qty: 60 TABLET | Refills: 5 | Status: SHIPPED | OUTPATIENT
Start: 2021-11-09 | End: 2022-06-02

## 2021-11-12 ENCOUNTER — OFFICE VISIT (OUTPATIENT)
Dept: PRIMARY CARE CLINIC | Age: 41
End: 2021-11-12
Payer: COMMERCIAL

## 2021-11-12 VITALS
TEMPERATURE: 97.7 F | OXYGEN SATURATION: 97 % | WEIGHT: 315 LBS | HEART RATE: 80 BPM | BODY MASS INDEX: 41.75 KG/M2 | HEIGHT: 73 IN | DIASTOLIC BLOOD PRESSURE: 74 MMHG | SYSTOLIC BLOOD PRESSURE: 136 MMHG

## 2021-11-12 DIAGNOSIS — S39.012A STRAIN OF LUMBAR REGION, INITIAL ENCOUNTER: ICD-10-CM

## 2021-11-12 DIAGNOSIS — M51.36 DDD (DEGENERATIVE DISC DISEASE), LUMBAR: Primary | ICD-10-CM

## 2021-11-12 PROCEDURE — 99213 OFFICE O/P EST LOW 20 MIN: CPT | Performed by: NURSE PRACTITIONER

## 2021-11-12 ASSESSMENT — ENCOUNTER SYMPTOMS
EYES NEGATIVE: 1
GASTROINTESTINAL NEGATIVE: 1
RESPIRATORY NEGATIVE: 1

## 2021-11-12 NOTE — PROGRESS NOTES
[address] [facility]  [address] [facility] [address] [address]  Dept: [phone]  Dept Fax: [phone]  Loc: [phone]    Rodrick Ramirez is a 42 y.o. male who presents today for his medical conditions/complaints as noted below. Greenlandic Font is c/o of 1 Month Follow-Up      Chief Complaint   Patient presents with    1 Month Follow-Up       HPI:     HPI   Patient is in office today for follow-up on lumbar strain. Patient has been out of work for the last month due to the significance of the strain. Today patient is able to move without difficulties and is okay to be released back to work.     Past Medical History:   Diagnosis Date    Arthritis     both wrist    Bilateral carpal tunnel syndrome 8/15/2019    Bronchitis     10 yrs ago    Diabetes mellitus ([address]     Disease of blood and blood forming organ     Hx of blood clots     Hypertension     Kidney stone     recurrent    Sleep apnea     untested    Umbilical hernia         Past Surgical History:   Procedure Laterality Date    CARPAL TUNNEL RELEASE Left 08/21/2020    LEFT CARPAL TUNNEL RELEASE performed by Sekou [place] MD at [facility] Right 07/24/2018    CYSTOSCOPY/ URETEROSCOPY; INSERTION DOUBLE J STENT/  URETERAL performed by Angelique Reece MD at [address] [place] [address]      both wrists    [address] N/A 06/24/2016    HEMORRHOID INCISION AND DRAINAGE performed by Gloria Sanchez MD at [facility] 43 CYSTO/URETERO/PYELOSCOPY W/LITHOTRIPSY Right 08/07/2018    URETEROSCOPY LASER LITHOTRIPSY STONE EXTRACTION performed by Angelique Reece MD at [address] [place] [address] Right 08/07/2018    STENT PLACEMENT performed by Angelique Reece MD at [address] [place] [address] ESWL Right 02/13/2018    ESWL EXTRACORPEAL SHOCK WAVE LITHOTRIPSY performed by Angelique Reece MD at [address] [place] [address] ESWL Right 03/13/2018 ESWL EXTRACORPEAL SHOCK WAVE LITHOTRIPSY performed by Demetrio Brush MD at [address] ESWL Right 07/24/2018    ESWL [address] LITHOTRIPSY performed by Demetrio Brush MD at [address], [address] N/A 62/00/3449    HERNIA UMBILICAL REPAIR LAPAROSCOPIC performed by Milton Chen MD at [address] / [address] Left 09/20/2020     LEFT LOWER EXTREMITY VENOGRAMS; INFERIOR VENA CAVA FILTER PLACEMENT. performed by Blayne Bates MD at [address]  12/14/2020    SJS. Ultrasound-guided cannulation right internal jugular vein, Right internal jugular venograms to the superior vena cava,Ultrasound-guided cannulation left internal jugular vein with 12 French sheath, Inferior vena cava venograms, Retrieval of Bard inferior vena cava filter Tae Sarmiento), Completion inferior vena cava venograms    WRIST SURGERY      left wrist.  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[facility]   Substance Use Topics    Alcohol use: Yes     Comment: OCC        Current Outpatient Medications   Medication Sig Dispense Refill    warfarin (COUMADIN) 5 MG tablet Take 2 tablets by mouth once daily 60 tablet 5    amLODIPine (NORVASC) 5 MG tablet Take 1 tablet by mouth once daily 30 tablet 1    Semaglutide,0.25 or 0.5MG/DOS, (OZEMPIC, 0.25 OR 0.5 MG/DOSE,) 2 MG/1.5ML SOPN Inject 0.5 mg into the skin once a week 1 pen 3    rosuvastatin (CRESTOR) 20 MG tablet Take 1 tablet by mouth nightly 30 tablet 3    lisinopril (PRINIVIL;ZESTRIL) 10 MG tablet TAKE 1 TABLET BY MOUTH ONCE DAILY FOR 30 DAYS 30 tablet 1    metFORMIN (GLUCOPHAGE) 500 MG tablet Take 1 tablet by mouth 2 times daily (with meals) 60 tablet 5    diclofenac sodium (VOLTAREN) 1 % GEL Apply topically 4 times daily as needed for Pain 150 g 2    tiZANidine (ZANAFLEX) 4 MG tablet Take 1 tablet by mouth nightly as needed (spasm) 30 tablet 3    hydrALAZINE (APRESOLINE) 25 MG tablet Take 1 tablet by mouth every 6 hours as needed (SBP>160mmHg) 120 tablet 0    vitamin D (ERGOCALCIFEROL) 1.25 MG (54525 UT) CAPS capsule Take 1 capsule by mouth once a week for 4 doses 4 capsule 0     No current facility-administered medications for this visit. No Known Allergies    Family History   Problem Relation Age of Onset   [employer] Cancer Mother 46        colon cancer    Cancer Father         luekemia    Diabetes Father     Other Maternal Grandmother         copd    Other Maternal Grandfather         copd    Heart Disease Paternal Grandmother                Subjective:      Review of Systems   Constitutional: Negative. HENT: Negative. Eyes: Negative. Respiratory: Negative. Cardiovascular: Negative. Gastrointestinal: Negative. Endocrine: Negative. Genitourinary: Negative. Musculoskeletal: Negative. Skin: Negative. Neurological: Negative. Hematological: Negative. Psychiatric/Behavioral: Negative. Objective:     Physical Exam  Vitals and nursing note reviewed. Constitutional:       Appearance: Normal appearance. He is well-developed. Comments: obese   HENT:      Head: Normocephalic and atraumatic. Right Ear: Hearing, tympanic membrane, ear canal and external ear normal.      Left Ear: Hearing, tympanic membrane, ear canal and external ear normal.      Nose: Nose normal.      Mouth/Throat:      Pharynx: Uvula midline. Eyes:      General: Lids are normal.      Conjunctiva/sclera: Conjunctivae normal.      Pupils: Pupils are equal, round, and reactive to light. Neck:      Thyroid: No thyroid mass or thyromegaly. Trachea: Trachea normal.   Cardiovascular:      Rate and Rhythm: Normal rate and regular rhythm. Heart sounds: Normal heart sounds.    Pulmonary:      Effort: Pulmonary effort is normal.      Breath sounds: Normal breath sounds. Abdominal:      General: Bowel sounds are normal.      Palpations: Abdomen is soft. Musculoskeletal:         General: Normal range of motion. Cervical back: Normal range of motion and neck supple. No tenderness. Thoracic back: Normal. No tenderness. Normal range of motion. Lumbar back: Normal. No tenderness. Normal range of motion. Skin:     General: Skin is warm and dry. Neurological:      Mental Status: He is alert and oriented to person, place, and time. Psychiatric:         Speech: Speech normal.         Behavior: Behavior normal.         Thought Content: Thought content normal.         Judgment: Judgment normal.         /74   Pulse 80   Temp 97.7 °F (36.5 °C)   Ht 6' 1\" (1.854 m)   Wt (!) 394 lb (178.7 kg)   SpO2 97%   BMI 51.98 kg/m²     Assessment:      Diagnosis Orders   1. DDD (degenerative disc disease), lumbar     2. Strain of lumbar region, initial encounter         No results found for this visit on 11/12/21. Plan:     I am releasing patient back to work without any restrictions. Forms have been filled out and he can start work tomorrow. More than 50% of the time was spent counseling and coordinating care for a total time of 21 mins face to face. Return in about 3 months (around 2/12/2022) for Follow up chronic conditions. No orders of the defined types were placed in this encounter. No orders of the defined types were placed in this encounter. Patient offered educational handouts and has had all questions answered. Patient voices understanding and agrees to plans along with risks and benefits of plan. Patient is instructed to continue prior meds, diet, and exercise plans as instructed. Patient agrees to follow up as instructed and sooner if needed. Patient agrees to go to ER if condition becomes emergent.       EMR Dragon/transcription disclaimer: Some of this encounter note is an electronic transcription/translation of spoken language to printed text. The electronic translation of spoken language may permit erroneous, or at times, nonsensical words or phrases to be inadvertently transcribed.  Although I have reviewed the note for such errors, some may still exist.    Electronically signed by VANDANA Caceres on 11/12/2021 at 10:12 AM

## 2021-12-01 ENCOUNTER — ANTI-COAG VISIT (OUTPATIENT)
Dept: PRIMARY CARE CLINIC | Age: 41
End: 2021-12-01
Payer: COMMERCIAL

## 2021-12-01 DIAGNOSIS — Z51.81 MONITORING FOR LONG-TERM ANTICOAGULANT USE: ICD-10-CM

## 2021-12-01 DIAGNOSIS — Z79.01 MONITORING FOR LONG-TERM ANTICOAGULANT USE: ICD-10-CM

## 2021-12-01 LAB — INTERNATIONAL NORMALIZATION RATIO, POC: 3.9

## 2021-12-01 PROCEDURE — 85610 PROTHROMBIN TIME: CPT | Performed by: NURSE PRACTITIONER

## 2021-12-01 PROCEDURE — 93793 ANTICOAG MGMT PT WARFARIN: CPT | Performed by: NURSE PRACTITIONER

## 2021-12-01 NOTE — PROGRESS NOTES
Mr. Vilma Lockhart was here today. INR today:   Results for orders placed or performed in visit on 12/01/21   POCT INR   Result Value Ref Range    INR 3.9      INR Goal: 2.0-3.0    Dosing Plan  As of 12/1/2021    TTR:  33.3 % (1.1 y)   Full warfarin instructions:  12/1: 5 mg; 12/2: 5 mg; Otherwise 7.5 mg every Sun; 10 mg all other days            PLAN: REDUCE DOSE TONIGHT AND TOMORROW TO 5 MG, THEN CONTINUE CURRENT DOSE  NEXT COUMADIN CLINIC APT IS: [email or web address]    Coumadin Clinic Hours  Tuesday 7:30am - 4:00pm  Wednesday 7:30am - 4:00pm  Thursday 7:30am - 4:00pm    IF IT'S AN EMERGENCY, PLEASE CALL 911 OR GO TO YOUR NEAREST EMERGENCY ROOM. [facility] INTERNAL MEDICINE COUMADIN CLINIC [phone]  IF UNABLE TO REACH COUMADIN CLINIC, [address] [place] [address], [phone].

## 2021-12-15 ENCOUNTER — ANTI-COAG VISIT (OUTPATIENT)
Dept: PRIMARY CARE CLINIC | Age: 41
End: 2021-12-15
Payer: COMMERCIAL

## 2021-12-15 DIAGNOSIS — Z79.01 MONITORING FOR LONG-TERM ANTICOAGULANT USE: ICD-10-CM

## 2021-12-15 DIAGNOSIS — Z51.81 MONITORING FOR LONG-TERM ANTICOAGULANT USE: ICD-10-CM

## 2021-12-15 LAB — INTERNATIONAL NORMALIZATION RATIO, POC: 2.2

## 2021-12-15 PROCEDURE — 93793 ANTICOAG MGMT PT WARFARIN: CPT | Performed by: NURSE PRACTITIONER

## 2021-12-15 PROCEDURE — 85610 PROTHROMBIN TIME: CPT | Performed by: NURSE PRACTITIONER

## 2021-12-20 RX ORDER — WARFARIN SODIUM 5 MG/1
TABLET ORAL
Qty: 60 TABLET | Refills: 0 | OUTPATIENT
Start: 2021-12-20

## 2021-12-20 RX ORDER — ROSUVASTATIN CALCIUM 20 MG/1
20 TABLET, COATED ORAL NIGHTLY
Qty: 30 TABLET | Refills: 5 | Status: SHIPPED | OUTPATIENT
Start: 2021-12-20 | End: 2022-02-03

## 2021-12-20 NOTE — TELEPHONE ENCOUNTER
Elayne Pino called to request a refill on his medication.       Last office visit : 11/12/2021   Next office visit : 1/12/2022     Requested Prescriptions     Signed Prescriptions Disp Refills    rosuvastatin (CRESTOR) 20 MG tablet 30 tablet 5     Sig: Take 1 tablet by mouth nightly     Authorizing Provider: Malgorzata Velarde     Ordering User: Sinai Mead     Refused Prescriptions Disp Refills    warfarin (COUMADIN) 5 MG tablet [Pharmacy Med Name: Warfarin Sodium 5 MG Oral Tablet] 60 tablet 0     Sig: Take 2 tablets by mouth once daily     Refused By: Sinai Mead     Reason for Refusal: Refill not appropriate            Savannah Elkins MA

## 2022-01-13 ENCOUNTER — ANTI-COAG VISIT (OUTPATIENT)
Dept: PRIMARY CARE CLINIC | Age: 42
End: 2022-01-13
Payer: COMMERCIAL

## 2022-01-13 DIAGNOSIS — I82.409 RECURRENT DEEP VEIN THROMBOSIS (HCC): ICD-10-CM

## 2022-01-13 LAB — INTERNATIONAL NORMALIZATION RATIO, POC: 1.6

## 2022-01-13 PROCEDURE — 93793 ANTICOAG MGMT PT WARFARIN: CPT | Performed by: NURSE PRACTITIONER

## 2022-01-13 PROCEDURE — 85610 PROTHROMBIN TIME: CPT | Performed by: NURSE PRACTITIONER

## 2022-01-13 NOTE — PROGRESS NOTES
Mr. Sakina Rocha was here today. INR today:   Results for orders placed or performed in visit on 01/13/22   POCT INR   Result Value Ref Range    INR 1.6      INR Goal: 2.0-3.0    Dosing Plan  As of 1/13/2022    TTR:  33.7 % (1.2 y)   Full warfarin instructions:  1/13: 15 mg; 1/16: 10 mg; Otherwise 7.5 mg every Sun; 10 mg all other days            PLAN: INCREASE DOSE TONIGHT TO 15 MG, INCREASE DOSE Sunday TO 10 MG. THEN CONTINUE CURRENT DOSE  NEXT COUMADIN CLINIC APT IS: [email or web address]    Coumadin Clinic Hours  Tuesday 7:30am - 4:00pm  Wednesday 7:30am - 4:00pm  Thursday 7:30am - 4:00pm    IF IT'S AN EMERGENCY, PLEASE CALL 911 OR GO TO YOUR NEAREST EMERGENCY ROOM. [facility] INTERNAL MEDICINE COUMADIN CLINIC [phone]  IF UNABLE TO REACH COUMADIN CLINIC, [address] [place] [address], [phone].

## 2022-01-26 ENCOUNTER — ANTI-COAG VISIT (OUTPATIENT)
Dept: PRIMARY CARE CLINIC | Age: 42
End: 2022-01-26
Payer: COMMERCIAL

## 2022-01-26 DIAGNOSIS — Z51.81 MONITORING FOR LONG-TERM ANTICOAGULANT USE: ICD-10-CM

## 2022-01-26 DIAGNOSIS — Z79.01 MONITORING FOR LONG-TERM ANTICOAGULANT USE: ICD-10-CM

## 2022-01-26 LAB — INTERNATIONAL NORMALIZATION RATIO, POC: 2.6

## 2022-01-26 PROCEDURE — 93793 ANTICOAG MGMT PT WARFARIN: CPT | Performed by: NURSE PRACTITIONER

## 2022-01-26 PROCEDURE — 85610 PROTHROMBIN TIME: CPT | Performed by: NURSE PRACTITIONER

## 2022-01-26 NOTE — PROGRESS NOTES
Mr. Gayatri Delgado was here today. INR today:   Results for orders placed or performed in visit on 01/26/22   POCT INR   Result Value Ref Range    INR 2.6      INR Goal: 2.0-3.0    Dosing Plan  As of 1/26/2022    TTR:  34.5 % (1.3 y)   Full warfarin instructions:  7.5 mg every Sun; 10 mg all other days            PLAN: CONTINUE CURRENT DOSE  NEXT COUMADIN CLINIC APT IS: 2/16/2022 at appt with     Coumadin Clinic Hours  Tuesday 7:30am - 4:00pm  Wednesday 7:30am - 4:00pm  Thursday 7:30am - 4:00pm    IF IT'S AN EMERGENCY, PLEASE CALL 911 OR GO TO YOUR NEAREST EMERGENCY ROOM. [facility] INTERNAL MEDICINE COUMADIN CLINIC [phone]  IF UNABLE TO REACH COUMADIN CLINIC, [address] [place] [address] Rd, [phone].

## 2022-02-01 DIAGNOSIS — I10 HYPERTENSION, UNSPECIFIED TYPE: ICD-10-CM

## 2022-02-03 RX ORDER — AMLODIPINE BESYLATE 5 MG/1
TABLET ORAL
Qty: 30 TABLET | Refills: 0 | Status: SHIPPED | OUTPATIENT
Start: 2022-02-03 | End: 2022-02-16 | Stop reason: SDUPTHER

## 2022-02-03 RX ORDER — ROSUVASTATIN CALCIUM 20 MG/1
20 TABLET, COATED ORAL NIGHTLY
Qty: 30 TABLET | Refills: 0 | Status: SHIPPED | OUTPATIENT
Start: 2022-02-03 | End: 2022-02-16 | Stop reason: SDUPTHER

## 2022-02-16 ENCOUNTER — OFFICE VISIT (OUTPATIENT)
Dept: PRIMARY CARE CLINIC | Age: 42
End: 2022-02-16
Payer: COMMERCIAL

## 2022-02-16 VITALS
SYSTOLIC BLOOD PRESSURE: 125 MMHG | HEART RATE: 65 BPM | BODY MASS INDEX: 42.66 KG/M2 | HEIGHT: 72 IN | TEMPERATURE: 97.2 F | WEIGHT: 315 LBS | DIASTOLIC BLOOD PRESSURE: 70 MMHG | OXYGEN SATURATION: 97 %

## 2022-02-16 DIAGNOSIS — Z51.81 MONITORING FOR LONG-TERM ANTICOAGULANT USE: ICD-10-CM

## 2022-02-16 DIAGNOSIS — Z79.01 MONITORING FOR LONG-TERM ANTICOAGULANT USE: ICD-10-CM

## 2022-02-16 DIAGNOSIS — I10 HYPERTENSION, UNSPECIFIED TYPE: ICD-10-CM

## 2022-02-16 DIAGNOSIS — M25.511 CHRONIC RIGHT SHOULDER PAIN: ICD-10-CM

## 2022-02-16 DIAGNOSIS — E11.9 TYPE 2 DIABETES MELLITUS WITHOUT COMPLICATION, WITHOUT LONG-TERM CURRENT USE OF INSULIN (HCC): Primary | ICD-10-CM

## 2022-02-16 DIAGNOSIS — G89.29 CHRONIC RIGHT SHOULDER PAIN: ICD-10-CM

## 2022-02-16 DIAGNOSIS — E66.01 MORBID OBESITY (HCC): ICD-10-CM

## 2022-02-16 LAB
HBA1C MFR BLD: 6.7 %
INTERNATIONAL NORMALIZATION RATIO, POC: 1.2
INTERNATIONAL NORMALIZATION RATIO, POC: 1.2
PROTHROMBIN TIME, POC: NORMAL

## 2022-02-16 PROCEDURE — 85610 PROTHROMBIN TIME: CPT | Performed by: NURSE PRACTITIONER

## 2022-02-16 PROCEDURE — 99214 OFFICE O/P EST MOD 30 MIN: CPT | Performed by: NURSE PRACTITIONER

## 2022-02-16 PROCEDURE — 83036 HEMOGLOBIN GLYCOSYLATED A1C: CPT | Performed by: NURSE PRACTITIONER

## 2022-02-16 RX ORDER — ROSUVASTATIN CALCIUM 20 MG/1
20 TABLET, COATED ORAL NIGHTLY
Qty: 30 TABLET | Refills: 11 | Status: SHIPPED | OUTPATIENT
Start: 2022-02-16

## 2022-02-16 RX ORDER — LISINOPRIL 10 MG/1
TABLET ORAL
Qty: 30 TABLET | Refills: 11 | Status: SHIPPED | OUTPATIENT
Start: 2022-02-16

## 2022-02-16 RX ORDER — AMLODIPINE BESYLATE 5 MG/1
TABLET ORAL
Qty: 30 TABLET | Refills: 11 | Status: SHIPPED | OUTPATIENT
Start: 2022-02-16 | End: 2022-03-02

## 2022-02-16 RX ORDER — SEMAGLUTIDE 1.34 MG/ML
0.5 INJECTION, SOLUTION SUBCUTANEOUS WEEKLY
Qty: 1 PEN | Refills: 11 | Status: SHIPPED | OUTPATIENT
Start: 2022-02-16

## 2022-02-16 ASSESSMENT — PATIENT HEALTH QUESTIONNAIRE - PHQ9
SUM OF ALL RESPONSES TO PHQ QUESTIONS 1-9: 0
1. LITTLE INTEREST OR PLEASURE IN DOING THINGS: 0
SUM OF ALL RESPONSES TO PHQ QUESTIONS 1-9: 0
SUM OF ALL RESPONSES TO PHQ QUESTIONS 1-9: 0
2. FEELING DOWN, DEPRESSED OR HOPELESS: 0
SUM OF ALL RESPONSES TO PHQ9 QUESTIONS 1 & 2: 0
SUM OF ALL RESPONSES TO PHQ QUESTIONS 1-9: 0

## 2022-02-16 ASSESSMENT — ENCOUNTER SYMPTOMS
GASTROINTESTINAL NEGATIVE: 1
EYES NEGATIVE: 1
RESPIRATORY NEGATIVE: 1

## 2022-02-16 NOTE — PROGRESS NOTES
[address] [facility]  [address] [place] [address]  [address] [facility] [address]  Dept: [phone]  Dept Fax: [phone]  Loc: [phone]    Jose Novak is a 36 y.o. male who presents today for his medical conditions/complaints as noted below. Jose Novak is c/o of Back Pain (ddd)      Chief Complaint   Patient presents with    Back Pain     ddd       HPI:     HPI  Pt is here for chronic conditions including DM, HTN, and  follow up with no complaints. He has not been monitoring blood sugars daily. But when he does check them, the levels have improved. He reports that the back pain has improved. He has had some intermittent shoulder pain, but relates it to the weather. He continues to monitor his INR through Coumadin clinic as well.         Past Medical History:   Diagnosis Date    Arthritis     both wrist    Bilateral carpal tunnel syndrome 8/15/2019    Bronchitis     10 yrs ago    Diabetes mellitus ([address]     Disease of blood and blood forming organ     Hx of blood clots     Hypertension     Kidney stone     recurrent    Sleep apnea     untested    Umbilical hernia         Past Surgical History:   Procedure Laterality Date    CARPAL TUNNEL RELEASE Left 08/21/2020    LEFT CARPAL TUNNEL RELEASE performed by Cortney Hancock MD at [facility] Right 07/24/2018    CYSTOSCOPY/ URETEROSCOPY; INSERTION DOUBLE J STENT/  URETERAL performed by Felipe Vasquez MD at [address]      both wrists    [address] N/A 06/24/2016    HEMORRHOID INCISION AND DRAINAGE performed by Tomas Thakkar MD at [facility] CYSTO/URETERO/PYELOSCOPY W/LITHOTRIPSY Right 08/07/2018    URETEROSCOPY LASER LITHOTRIPSY STONE EXTRACTION performed by Felipe Vasquez MD at [address] [facility] Right 08/07/2018    STENT PLACEMENT performed by Felipe Vasquez MD at [address] [place] [address] ESWL Right 02/13/2018 ESWL EXTRACORPEAL SHOCK WAVE LITHOTRIPSY performed by Dagmar Cantrell MD at [address] [facility] ESWL Right 03/13/2018    ESWL EXTRACORPEAL SHOCK WAVE LITHOTRIPSY performed by Dagmar Cantrell MD at [address] [facility] ESWL Right 07/24/2018    ESWL [address] LITHOTRIPSY performed by Dagmar Cantrell MD at [address], [address] N/A 81/94/6982    HERNIA UMBILICAL REPAIR LAPAROSCOPIC performed by Hebert Culp MD at [address] / [address] Left 09/20/2020     LEFT LOWER EXTREMITY VENOGRAMS; INFERIOR VENA CAVA FILTER PLACEMENT. performed by Iris Alfaro MD at [address]  12/14/2020    SJS. Ultrasound-guided cannulation right internal jugular vein, Right internal jugular venograms to the superior vena cava,Ultrasound-guided cannulation left internal jugular vein with 12 Iranian sheath, Inferior vena cava venograms, Retrieval of Bard inferior vena cava filter Sandee Nearing), Completion inferior vena cava venograms    WRIST SURGERY      left wrist.  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[address] [facility]   Substance Use Topics    Alcohol use: Yes     Comment: OCC        Current Outpatient Medications   Medication Sig Dispense Refill    Semaglutide,0.25 or 0.5MG/DOS, (OZEMPIC, 0.25 OR 0.5 MG/DOSE,) 2 MG/1.5ML SOPN Inject 0.5 mg into the skin once a week 1 pen 11    amLODIPine (NORVASC) 5 MG tablet Take 1 tablet by mouth once daily 30 tablet 11    rosuvastatin (CRESTOR) 20 MG tablet Take 1 tablet by mouth nightly 30 tablet 11    lisinopril (PRINIVIL;ZESTRIL) 10 MG tablet Take 1 tablet by mouth once daily for 30 days 30 tablet 11    metFORMIN (GLUCOPHAGE) 500 MG tablet Take 1 tablet by mouth 2 times daily (with meals) 60 tablet 11    warfarin (COUMADIN) 5 MG tablet Take 2 tablets by mouth once daily 60 tablet 5    diclofenac sodium (VOLTAREN) 1 % GEL Apply topically 4 times daily as needed for Pain 150 g 2    tiZANidine (ZANAFLEX) 4 MG tablet Take 1 tablet by mouth nightly as needed (spasm) 30 tablet 3    hydrALAZINE (APRESOLINE) 25 MG tablet Take 1 tablet by mouth every 6 hours as needed (SBP>160mmHg) 120 tablet 0    vitamin D (ERGOCALCIFEROL) 1.25 MG (98527 UT) CAPS capsule Take 1 capsule by mouth once a week for 4 doses 4 capsule 0     No current facility-administered medications for this visit. No Known Allergies    Family History   Problem Relation Age of Onset   Yuriy Lundberg Cancer Mother 46        colon cancer    Cancer Father         luekemia    Diabetes Father     Other Maternal Grandmother         copd    Other Maternal Grandfather         copd    Heart Disease Paternal Grandmother                Subjective:      Review of Systems   Constitutional: Negative. HENT: Negative. Eyes: Negative. Respiratory: Negative. Cardiovascular: Negative. Gastrointestinal: Negative. Endocrine: Negative. Genitourinary: Negative. Musculoskeletal: Positive for arthralgias (shoulder pain intermittent). Skin: Negative. Neurological: Negative. Hematological: Negative. Psychiatric/Behavioral: Negative. Objective:     Physical Exam  Vitals and nursing note reviewed. Constitutional:       Appearance: Normal appearance. He is well-developed. Comments: obese   HENT:      Head: Normocephalic and atraumatic. Right Ear: Hearing, tympanic membrane, ear canal and external ear normal.      Left Ear: Hearing, tympanic membrane, ear canal and external ear normal.      Nose: Nose normal.      Mouth/Throat:      Pharynx: Uvula midline. Eyes:      General: Lids are normal.      Conjunctiva/sclera: Conjunctivae normal.      Pupils: Pupils are equal, round, and reactive to light. Neck:      Thyroid: No thyroid mass or thyromegaly.       Trachea: Trachea normal. Cardiovascular:      Rate and Rhythm: Normal rate and regular rhythm. Heart sounds: Normal heart sounds. Pulmonary:      Effort: Pulmonary effort is normal.      Breath sounds: Normal breath sounds. Abdominal:      General: Bowel sounds are normal.      Palpations: Abdomen is soft. Musculoskeletal:      Right shoulder: Decreased range of motion. Cervical back: Normal range of motion and neck supple. No tenderness. Thoracic back: Normal. No tenderness. Normal range of motion. Lumbar back: Normal. No tenderness. Normal range of motion. Skin:     General: Skin is warm and dry. Neurological:      Mental Status: He is alert and oriented to person, place, and time. Psychiatric:         Speech: Speech normal.         Behavior: Behavior normal.         Thought Content: Thought content normal.         Judgment: Judgment normal.         /70 (Site: Left Upper Arm)   Pulse 65   Temp 97.2 °F (36.2 °C)   Ht 6' (1.829 m)   Wt (!) 389 lb (176.4 kg)   SpO2 97%   BMI 52.76 kg/m²     Assessment:      Diagnosis Orders   1. Type 2 diabetes mellitus without complication, without long-term current use of insulin (HCC)  Semaglutide,0.25 or 0.5MG/DOS, (OZEMPIC, 0.25 OR 0.5 MG/DOSE,) 2 MG/1.5ML SOPN    metFORMIN (GLUCOPHAGE) 500 MG tablet    POCT glycosylated hemoglobin (Hb A1C)    POCT INR   2. Hypertension, unspecified type  amLODIPine (NORVASC) 5 MG tablet    POCT INR   3. Monitoring for long-term anticoagulant use  POCT INR    POCT INR   4. Morbid obesity ([address]     5. Chronic right shoulder pain         Results for orders placed or performed in visit on 02/16/22   POCT INR   Result Value Ref Range    INR 1.2     Protime     POCT glycosylated hemoglobin (Hb A1C)   Result Value Ref Range    Hemoglobin A1C 6.7 %   POCT INR   Result Value Ref Range    INR 1.2        Plan:     A1c has improved since last visit from 8.0% down to 6.7%. We will continue Ozempic at this time.     He is to continue treatment with Coumadin clinic for INR levels. Level was low today will increase dosing. Discussed weight loss. Patient has lost approximately 10-15 pounds since starting Ozempic. More than 50% of the time was spent counseling and coordinating care for a total time of 34 mins face to face. Return in about 4 months (around 6/16/2022) for Follow up chronic conditions, with labs. Orders Placed This Encounter   Procedures    POCT INR    POCT glycosylated hemoglobin (Hb A1C)    POCT INR     This external order was created through the results console. Orders Placed This Encounter   Medications    Semaglutide,0.25 or 0.5MG/DOS, (OZEMPIC, 0.25 OR 0.5 MG/DOSE,) 2 MG/1.5ML SOPN     Sig: Inject 0.5 mg into the skin once a week     Dispense:  1 pen     Refill:  11    amLODIPine (NORVASC) 5 MG tablet     Sig: Take 1 tablet by mouth once daily     Dispense:  30 tablet     Refill:  11    rosuvastatin (CRESTOR) 20 MG tablet     Sig: Take 1 tablet by mouth nightly     Dispense:  30 tablet     Refill:  11    lisinopril (PRINIVIL;ZESTRIL) 10 MG tablet     Sig: Take 1 tablet by mouth once daily for 30 days     Dispense:  30 tablet     Refill:  11    metFORMIN (GLUCOPHAGE) 500 MG tablet     Sig: Take 1 tablet by mouth 2 times daily (with meals)     Dispense:  60 tablet     Refill:  11            Patient offered educational handouts and has had all questions answered. Patient voices understanding and agrees to plans along with risks and benefits of plan. Patient is instructed to continue prior meds, diet, and exercise plans as instructed. Patient agrees to follow up as instructed and sooner if needed. Patient agrees to go to ER if condition becomes emergent. EMR Dragon/transcription disclaimer: Some of this encounter note is an electronic transcription/translation of spoken language to printed text.  The electronic translation of spoken language may permit erroneous, or at times, nonsensical words or phrases to be inadvertently transcribed.  Although I have reviewed the note for such errors, some may still exist.    Electronically signed by VANDANA Ricci on 2/16/2022 at 10:21 AM

## 2022-02-16 NOTE — PROGRESS NOTES
Mr. Ruiz Henderson was here today. INR today:   Results for orders placed or performed in visit on 02/16/22   POCT INR   Result Value Ref Range    INR 1.2     Protime     POCT glycosylated hemoglobin (Hb A1C)   Result Value Ref Range    Hemoglobin A1C 6.7 %   POCT INR   Result Value Ref Range    INR 1.2      INR Goal: 2.0-3.0    Dosing Plan  As of 2/16/2022    TTR:  34.8 % (1.3 y)   Full warfarin instructions:  2/16: 15 mg; 2/17: 15 mg; Otherwise 7.5 mg every Sun; 10 mg all other days            PLAN: increase dose to 15 mg tonight and tomorrow, then CONTINUE CURRENT DOSE  NEXT COUMADIN CLINIC APT IS: [email or web address]    Coumadin Clinic Hours  Tuesday 7:30am - 4:00pm  Wednesday 7:30am - 4:00pm  Thursday 7:30am - 4:00pm    IF IT'S AN EMERGENCY, PLEASE CALL 911 OR GO TO YOUR NEAREST EMERGENCY ROOM. [facility] INTERNAL MEDICINE COUMADIN CLINIC [phone]  IF UNABLE TO REACH COUMADIN CLINIC, [address] [place] [address], [phone].

## 2022-03-02 ENCOUNTER — ANTI-COAG VISIT (OUTPATIENT)
Dept: PRIMARY CARE CLINIC | Age: 42
End: 2022-03-02
Payer: COMMERCIAL

## 2022-03-02 DIAGNOSIS — I10 HYPERTENSION, UNSPECIFIED TYPE: ICD-10-CM

## 2022-03-02 DIAGNOSIS — I82.409 RECURRENT DEEP VEIN THROMBOSIS (HCC): ICD-10-CM

## 2022-03-02 LAB — INTERNATIONAL NORMALIZATION RATIO, POC: 2.6

## 2022-03-02 PROCEDURE — 85610 PROTHROMBIN TIME: CPT | Performed by: NURSE PRACTITIONER

## 2022-03-02 PROCEDURE — 93793 ANTICOAG MGMT PT WARFARIN: CPT | Performed by: NURSE PRACTITIONER

## 2022-03-02 RX ORDER — AMLODIPINE BESYLATE 5 MG/1
TABLET ORAL
Qty: 30 TABLET | Refills: 0 | Status: SHIPPED | OUTPATIENT
Start: 2022-03-02

## 2022-03-02 NOTE — PROGRESS NOTES
Mr. Sarahy Bolden was here today. INR today:   Results for orders placed or performed in visit on 03/02/22   POCT INR   Result Value Ref Range    INR 2.6      INR Goal: 2.0-3.0    Dosing Plan  As of 3/2/2022    TTR:  35.1 % (1.4 y)   Full warfarin instructions:  7.5 mg every Sun; 10 mg all other days            PLAN: CONTINUE CURRENT DOSE  NEXT COUMADIN CLINIC APT IS: [email or web address]    Coumadin Clinic Hours  Monday           8:00am - 4:00pm  Tuesday 8:00am - 4:00pm  Wednesday  10:00am - 4:00pm  Thursday 8:00am - 4:00pm    IF IT'S AN EMERGENCY, PLEASE CALL 911 OR GO TO YOUR NEAREST EMERGENCY ROOM. [address] [phone] (FAITH'S DIRECT LINE)  IF UNABLE TO REACH COUMADIN CLINIC, [address] [place] [address],     INTERNAL  MEDICINE [phone].     PRIMARY CARE  [phone]    FAMILY MEDICINE  [phone]

## 2022-03-04 DIAGNOSIS — I26.93 SINGLE SUBSEGMENTAL PULMONARY EMBOLISM WITHOUT ACUTE COR PULMONALE (HCC): ICD-10-CM

## 2022-03-04 DIAGNOSIS — I82.412 DVT OF DEEP FEMORAL VEIN, LEFT (HCC): Primary | ICD-10-CM

## 2022-03-17 NOTE — PROGRESS NOTES
Progress Note      Pt Name: Sergio Bonilla  YOB: 1980  MRN: [id number]    Date of evaluation: 3/22/2022  History Obtained From:  patient, electronic medical record    CHIEF COMPLAINT:    Chief Complaint   Patient presents with    Follow-up     DVT of deep femoral vein, left ([address]     Current active problems  LLE DVT  Right-sided PE  Heterozygous factor V Leiden  Morbid obesity    HISTORY OF PRESENT ILLNESS:    Sergio Bonilla is a 36 y.o.  male was seen on 2 separate occasions during acute hospitalizations at [address] [place] [address] on 9/20/2020 for a left lower extremity DVT and right pulmonary embolus. He did have thrombolytic treatment of the DVT by Dr. Flor Shahid during this initial hospitalization. He was ultimately discharged home on warfarin. He represented on 11/4/2020 with subtherapeutic INR and worsening LLE DVT. Prothrombotic panel did reveal a heterozygous factor V Leiden. Dr. Flor Shahid placed a IVC filter during that hospitalization and then subsequently removed his Bard IVC filter on 12/14/2020. Currently he is on 10 mg of warfarin every day except 7.5 on Sunday. His most recent PSA on 3/2/2022 was 2.6. He has been doing fairly well having a stable INR overall. His left lower extremity continues to swell at times. He continues to wear his compression stockings. He does have intermittent mild redness of the leg but nothing that is persistent. He has not had to be on any antibiotics. He denies have any open wounds. His BMI is greater than 50. He has been able to lose a little bit of weight. He is on Ozempic for his blood sugarspreviously 8.7 with ANC down to 6.7 now. He continues to take Norvasc, Zestril, as well as hydralazine as needed for his blood pressure which is stable overall. He takes vitamin D weekly.   He has issue with chronic muscle spasms but stable with Zanaflex as needed        HEMATOLOGICAL HISTORY: Left lower extremity DVT with right lung pulmonary emboli 9/19/2020  Astrid Vital was seen in initial hematology consultation on 9/20/2020 as an inpatient at [facility] having been admitted on 9/19/2020 with left lower extremity DVT and PE.  He was placed on heparin as initial management.     Mr. Hayden Choe had a 5-day history of left leg pain and swelling.  Initially he thought he had pulled a muscle but this did not get better.  When things did not get better, he sought evaluation at the ED at [facility]. Additionally he has nonspecific symptoms of pulmonary embolus including burning in the right side of his chest.  He does not have hemoptysis or dyspnea. Mr. Hayden Choe does not have a personal history of DVT or PE or hypercoagulability.      Risk features include:   Obesity.    Recent left hand carpal tunnel surgery in August 2020. Family history: His mother had an episode of left lower extremity DVT and PE 15 years ago without recurrence.  A maternal aunt also had DVT of the lower extremities.     Noninvasive venous studies of the lower extremities on 9/19/2020 document:   · Extensive thrombosis (DVT) noted in Lt common femoral vein, Lt SFV (prox, mid and distal), Lt popliteal vein, Lt Gastrocs, Lt posterior tibial veins.  A floating tail is noted in left common femoral vein measuring approximately 4.3cm.     · SVT: thrombus noted in Lt LSV     Pulmonary CTA with contrast on 9/19/2020 documented the following:  · Multiple right-sided acute pulmonary emboli identified involving the right third fourth and fifth ordered pulmonary artery branches extending into the right lower lobe.   · Possible right heart strain  · No left sided pulmonary emboli identified  · Small calcified subcarinal lymph nodes consistent with healed granulomatous disease  · Scattered calcified granulomas in both lungs     He was seen by Dr. Yimi Vail from vascular surgery (9/20/2020), with recommendations for percutaneous mechanical thrombectomy/pulse spray thrombolysis to try to prevent long-term swelling in this young patient with iliofemoral DVT.  If the result in that procedure is not satisfactory, he would consider placing a TPA thrombolyzes catheter for thrombolysis overnight.  The patient agreed to proceed. Dr. Omid Baltazar did not feel that he needed TPA thrombolysis of the pulmonary embolism because he is fairly asymptomatic from that standpoint. PRIOR INTERVENTION  · Percutaneous thrombectomy/PulseSpray thrombolysis with AngioJet Zelante catheter  · Left lower extremity venograms after percutaneous thrombectomy and thrombolysis  · Placement of 40 cm infusion length EKOS TPA catheter from the popliteal vein all the way to the distal external iliac vein  · Inferior vena cava filter placement from a right internal jugular vein approach (Bard [place] inferior vena cava filter)  · 9/21/2002removal of EKOS TPA catheter      Prothrombotic work-up on 9/21/2020  Beta 2 glycoprotein IgG and IgM - both negative  Cardiolipin Ab IgG and IgM - both negative  Phospholipid Ab - negative  Protein C antigen95%   Protein S antigen157%(H)  Antithrombin III86%  Prothrombin gene mutationnot detected  Factor V Leidenheterozygous, one copy    DOACS not a good option due to morbid obesity with BMI above 50     He was discharged home on warfarin. The patient was admitted on 11/4/2020 after presenting to the emergency department with complaints of swelling of the left leg and right arm of 3 days duration.  Unfortunately  his INR was subtherapeutic at 1.5. Serology 12/9/2020  HOLLY 2 - [id number] negative  CALR mutation - negative   HOLLY 2 Exons 12-15 - negative  MPL - negative    Dr. Antonieta Sutherland did remove his Bard IVC filter on 12/14/2020.       Past Medical History:   Diagnosis Date    Arthritis     both wrist    Bilateral carpal tunnel syndrome 8/15/2019    Bronchitis     10 yrs ago    Diabetes mellitus ([facility]     Disease of blood and blood forming organ     Hx of blood clots     Hypertension     Kidney stone recurrent    Sleep apnea     untested    Umbilical hernia         Past Surgical History:   Procedure Laterality Date    CARPAL TUNNEL RELEASE Left 08/21/2020    LEFT CARPAL TUNNEL RELEASE performed by Smita Rosenthal MD at [facility] Right 07/24/2018    CYSTOSCOPY/ URETEROSCOPY; INSERTION DOUBLE J STENT/  URETERAL performed by Fabian Veloz MD at [facility]      both wrists    HEMORRHOID SURGERY N/A 06/24/2016    HEMORRHOID INCISION AND DRAINAGE performed by Radha Harkins MD at [facility] CYSTO/URETERO/PYELOSCOPY W/LITHOTRIPSY Right 08/07/2018    URETEROSCOPY LASER LITHOTRIPSY STONE EXTRACTION performed by Fabian Veloz MD at [address] Right 08/07/2018    STENT PLACEMENT performed by Fabian Veloz MD at [facility] Right 02/13/2018    ESWL EXTRACORPEAL SHOCK WAVE LITHOTRIPSY performed by Fabian Veloz MD at [facility] Right 03/13/2018    ESWL EXTRACORPEAL SHOCK WAVE LITHOTRIPSY performed by Fabian Veloz MD at [facility] Right 07/24/2018    ESWL EXTRACORPEAL SHOCK WAVE LITHOTRIPSY performed by Fabian Veloz MD at [address], [address] N/A 73/44/0316    HERNIA UMBILICAL REPAIR LAPAROSCOPIC performed by Mandi Sandoval MD at [address] / [facility] Left 09/20/2020     LEFT LOWER EXTREMITY VENOGRAMS; INFERIOR VENA CAVA FILTER PLACEMENT. performed by Wilfredo Cary MD at [facility]  12/14/2020    SJS. Ultrasound-guided cannulation right internal jugular vein, Right internal jugular venograms to the superior vena cava,Ultrasound-guided cannulation left internal jugular vein with 12 [place] sheath, Inferior vena cava venograms, Retrieval of Bard inferior vena cava filter [facility]), Completion inferior vena cava venograms    WRIST SURGERY      left wrist.  Pt was a child           Current Outpatient Medications:     amLODIPine (NORVASC) 5 MG tablet, Take 1 tablet by mouth once daily, Disp: 30 tablet, Rfl: 0    Semaglutide,0.25 or 0.5MG/DOS, (OZEMPIC, 0.25 OR 0.5 MG/DOSE,) 2 MG/1.5ML SOPN, Inject 0.5 mg into the skin once a week, Disp: 1 pen, Rfl: 11    rosuvastatin (CRESTOR) 20 MG tablet, Take 1 tablet by mouth nightly, Disp: 30 tablet, Rfl: 11    lisinopril (PRINIVIL;ZESTRIL) 10 MG tablet, Take 1 tablet by mouth once daily for 30 days, Disp: 30 tablet, Rfl: 11    metFORMIN (GLUCOPHAGE) 500 MG tablet, Take 1 tablet by mouth 2 times daily (with meals), Disp: 60 tablet, Rfl: 11    warfarin (COUMADIN) 5 MG tablet, Take 2 tablets by mouth once daily, Disp: 60 tablet, Rfl: 5    diclofenac sodium (VOLTAREN) 1 % GEL, Apply topically 4 times daily as needed for Pain, Disp: 150 g, Rfl: 2    tiZANidine (ZANAFLEX) 4 MG tablet, Take 1 tablet by mouth nightly as needed (spasm), Disp: 30 tablet, Rfl: 3    vitamin D (ERGOCALCIFEROL) 1.25 MG (24985 UT) CAPS capsule, Take 1 capsule by mouth once a week for 4 doses, Disp: 4 capsule, Rfl: 0    hydrALAZINE (APRESOLINE) 25 MG tablet, Take 1 tablet by mouth every 6 hours as needed (SBP>160mmHg), Disp: 120 tablet, Rfl: 0     No Known Allergies    Social History     Tobacco Use    Smoking status: Never Smoker    Smokeless tobacco: Never Used    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[employer] Use: Never used   Substance Use Topics    Alcohol use: Yes     Comment: OCC    Drug use: No       Family History   Problem Relation Age of Onset    Cancer Mother 46        colon cancer    Cancer Father         luekemia    Diabetes Father     Other Maternal Grandmother         copd    Other Maternal Grandfather         copd    Heart Disease Paternal Grandmother        Subjective   REVIEW OF SYSTEMS:   Review of Systems   Constitutional: Positive for fatigue.  Negative for chills, diaphoresis, fever and unexpected weight change. HENT: Negative for mouth sores, nosebleeds, sore throat, trouble swallowing and voice change. Eyes: Negative for photophobia, discharge and itching. Respiratory: Negative for cough, shortness of breath and wheezing. Cardiovascular: Positive for leg swelling. Negative for chest pain and palpitations. Gastrointestinal: Negative for abdominal distention, abdominal pain, blood in stool, constipation, diarrhea, nausea and vomiting. Endocrine: Negative for cold intolerance, heat intolerance, polydipsia and polyuria. Genitourinary: Negative for difficulty urinating, dysuria, hematuria and urgency. Musculoskeletal: Positive for myalgias. Negative for arthralgias, back pain and joint swelling. Skin: Negative for color change and rash. Neurological: Negative for dizziness, tremors, seizures, syncope and light-headedness. Hematological: Negative for adenopathy. Does not bruise/bleed easily. Psychiatric/Behavioral: Negative for behavioral problems and suicidal ideas. The patient is not nervous/anxious. All other systems reviewed and are negative. Objective   BP (!) 136/94   Pulse 74   Ht 6' (1.829 m)   Wt (!) 383 lb (173.7 kg)   SpO2 99%   BMI 51.94 kg/m²     PHYSICAL EXAM:  Physical Exam  Vitals reviewed. Constitutional:       General: He is not in acute distress. Appearance: He is well-developed. He is morbidly obese. HENT:      Head: Normocephalic and atraumatic. Nose: Nose normal.   Eyes:      General: No scleral icterus. Conjunctiva/sclera: Conjunctivae normal.   Neck:      Vascular: No JVD. Trachea: No tracheal deviation. Cardiovascular:      Rate and Rhythm: Normal rate and regular rhythm. Heart sounds: Normal heart sounds. No murmur heard. Pulmonary:      Effort: Pulmonary effort is normal. No respiratory distress. Breath sounds: Normal breath sounds. No wheezing.    Abdominal:      General: Bowel sounds are normal. There is no distension. Palpations: Abdomen is soft. There is no mass. Tenderness: There is no abdominal tenderness. Musculoskeletal:         General: No tenderness or deformity. Left lower le+ Edema present. Comments: Wearing compression stockings. Skin:     Findings: No erythema or rash. Neurological:      Mental Status: He is alert and oriented to person, place, and time. Psychiatric:         Thought Content: Thought content normal.          CBC reviewed by me  Lab Results   Component Value Date    WBC 7.90 2022    HGB 15.3 2022    HCT 50.2 2022    MCV 88.2 2022     2022       VISIT DIAGNOSES  1. DVT of deep femoral vein, left ([facility]    2. Single subsegmental pulmonary embolism without acute cor pulmonale (HCC)    3. Heterozygous factor V Leiden mutation ([facility]    4. Morbid obesity ([facility]    5. Normocytic hypochromic anemia        ASSESSMENT/PLAN:      1. Left lower extremity DVT with right pulmonary emboli 2020  2. 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 ?(Recent surgery), Sep 2020, 2020 (subtherapeutic INR), heterozygous factor V Leiden, Left LE DVT acute and IJ catheter related DVT (removed by vascular 2020)      PRIOR INTERVENTION  · Percutaneous thrombectomy/PulseSpray thrombolysis with AngioJet Zelante catheter  · Left lower extremity venograms after percutaneous thrombectomy and thrombolysis  · Placement of 40 cm infusion length EKOS TPA catheter from the popliteal vein all the way to the distal external iliac vein  · Inferior vena cava filter placement from a right internal jugular vein approach (Bard [place] inferior vena cava filter)  · 2002removal of EKOS TPA catheter      MPN evaluation was negative    Dr. Sandy Rios did remove his Bard IVC filter on 2020. He is much more complicit with wearing his compression stockings.   He has erythema of the left leg minimally on occasion but no overt cellulitis. He has not had any open wounds on his leg. Wt Readings from Last 3 Encounters:   03/22/22 (!) 383 lb (173.7 kg)   02/16/22 (!) 389 lb (176.4 kg)   11/12/21 (!) 394 lb (178.7 kg)     CBC today reveals a WBC of 7.9, Hgb 15.3, ,000 which is stable. He has lost 17 pounds since he was here 9/7/2021. I encouraged him to continue to work on his weight loss. If he would want to be an ideal weight before I believe he could safely come off or consider coming off of anticoagulation. I encouraged him to stay away from any medications that could increase his risk of clotting, especially testosterone etc.    3.  Prior normocytic hypochromic anemia          His Hgb is stable. Immunization History   Administered Date(s) Administered    COVID-19, Pfizer Purple top, DILUTE for use, 12+ yrs, 30mcg/0.3mL dose 09/06/2021, 09/27/2021            No follow-ups on file.      Margo Ochoa PA-C  9:04 AM  3/22/2022

## 2022-03-22 ENCOUNTER — HOSPITAL ENCOUNTER (OUTPATIENT)
Dept: INFUSION THERAPY | Age: 42
Discharge: HOME | End: 2022-03-22
Payer: COMMERCIAL

## 2022-03-22 ENCOUNTER — OFFICE VISIT (OUTPATIENT)
Dept: HEMATOLOGY | Age: 42
End: 2022-03-22
Payer: COMMERCIAL

## 2022-03-22 VITALS
WEIGHT: 315 LBS | OXYGEN SATURATION: 99 % | HEART RATE: 74 BPM | DIASTOLIC BLOOD PRESSURE: 94 MMHG | BODY MASS INDEX: 42.66 KG/M2 | SYSTOLIC BLOOD PRESSURE: 136 MMHG | HEIGHT: 72 IN

## 2022-03-22 DIAGNOSIS — E66.01 MORBID OBESITY (HCC): ICD-10-CM

## 2022-03-22 DIAGNOSIS — D68.51 HETEROZYGOUS FACTOR V LEIDEN MUTATION (HCC): ICD-10-CM

## 2022-03-22 DIAGNOSIS — I26.93 SINGLE SUBSEGMENTAL PULMONARY EMBOLISM WITHOUT ACUTE COR PULMONALE (HCC): ICD-10-CM

## 2022-03-22 DIAGNOSIS — D50.9 NORMOCYTIC HYPOCHROMIC ANEMIA: ICD-10-CM

## 2022-03-22 DIAGNOSIS — I82.412 DVT OF DEEP FEMORAL VEIN, LEFT (HCC): ICD-10-CM

## 2022-03-22 DIAGNOSIS — I82.412 DVT OF DEEP FEMORAL VEIN, LEFT (HCC): Primary | ICD-10-CM

## 2022-03-22 LAB
HCT VFR BLD CALC: 50.2 % (ref 40.1–51)
HEMOGLOBIN: 15.3 G/DL (ref 13.7–17.5)
MCH RBC QN AUTO: 26.9 PG (ref 25.7–32.2)
MCHC RBC AUTO-ENTMCNC: 30.5 G/DL (ref 32.3–36.5)
MCV RBC AUTO: 88.2 FL (ref 79–92.2)
PDW BLD-RTO: 14.5 % (ref 11.6–14.4)
PLATELET # BLD: 192 K/UL (ref 163–337)
PMV BLD AUTO: 9.4 FL (ref 7.4–10.4)
RBC # BLD: 5.69 M/UL (ref 4.63–6.08)
WBC # BLD: 7.9 K/UL (ref 4.23–9.07)

## 2022-03-22 PROCEDURE — 99211 OFF/OP EST MAY X REQ PHY/QHP: CPT

## 2022-03-22 PROCEDURE — 99213 OFFICE O/P EST LOW 20 MIN: CPT | Performed by: PHYSICIAN ASSISTANT

## 2022-03-22 PROCEDURE — 36415 COLL VENOUS BLD VENIPUNCTURE: CPT

## 2022-03-22 PROCEDURE — 85027 COMPLETE CBC AUTOMATED: CPT

## 2022-03-22 ASSESSMENT — ENCOUNTER SYMPTOMS
TROUBLE SWALLOWING: 0
VOMITING: 0
DIARRHEA: 0
BACK PAIN: 0
WHEEZING: 0
SHORTNESS OF BREATH: 0
EYE DISCHARGE: 0
BLOOD IN STOOL: 0
VOICE CHANGE: 0
SORE THROAT: 0
COUGH: 0
EYE ITCHING: 0
ABDOMINAL PAIN: 0
NAUSEA: 0
CONSTIPATION: 0
PHOTOPHOBIA: 0
ABDOMINAL DISTENTION: 0
COLOR CHANGE: 0

## 2022-03-29 ENCOUNTER — ANTI-COAG VISIT (OUTPATIENT)
Dept: PRIMARY CARE CLINIC | Age: 42
End: 2022-03-29
Payer: COMMERCIAL

## 2022-03-29 DIAGNOSIS — Z51.81 MONITORING FOR LONG-TERM ANTICOAGULANT USE: ICD-10-CM

## 2022-03-29 DIAGNOSIS — Z79.01 MONITORING FOR LONG-TERM ANTICOAGULANT USE: ICD-10-CM

## 2022-03-29 LAB — INTERNATIONAL NORMALIZATION RATIO, POC: 3.5

## 2022-03-29 PROCEDURE — 85610 PROTHROMBIN TIME: CPT | Performed by: NURSE PRACTITIONER

## 2022-03-29 PROCEDURE — 93793 ANTICOAG MGMT PT WARFARIN: CPT | Performed by: NURSE PRACTITIONER

## 2022-03-29 NOTE — PROGRESS NOTES
Mr. Lety Tejada was here today. INR today:   Results for orders placed or performed in visit on 03/29/22   POCT INR   Result Value Ref Range    INR 3.5      INR Goal: 2.0-3.0    Dosing Plan  As of 3/29/2022    TTR:  35.6 % (1.4 y)   Full warfarin instructions:  3/29: 7.5 mg; Otherwise 7.5 mg every Sun; 10 mg all other days            PLAN: reduce tonight's dose to 7.5 mg, then CONTINUE CURRENT DOSE  NEXT COUMADIN CLINIC APT IS: [email or web address]    Coumadin Clinic Hours  Monday           8:00am - 4:00pm  Tuesday 8:00am - 4:00pm  Wednesday  10:00am - 4:00pm  Thursday 8:00am - 4:00pm    IF IT'S AN EMERGENCY, PLEASE CALL 911 OR GO TO YOUR NEAREST EMERGENCY ROOM. [address] [phone] (FAITH'S DIRECT LINE)  IF UNABLE TO REACH COUMADIN CLINIC, [facility],     INTERNAL  MEDICINE [phone].     PRIMARY CARE  [phone]    FAMILY MEDICINE  [phone]

## 2022-04-05 ENCOUNTER — TELEPHONE (OUTPATIENT)
Dept: PRIMARY CARE CLINIC | Age: 42
End: 2022-04-05

## 2022-04-05 NOTE — TELEPHONE ENCOUNTER
Contacted patient to advise PCP will be moving to [address] [facility]. Provided patient with options of following provider or changing to new PCP in office. Patient chose to follow provider to [facility].

## 2022-04-27 ENCOUNTER — ANTI-COAG VISIT (OUTPATIENT)
Dept: PRIMARY CARE CLINIC | Age: 42
End: 2022-04-27
Payer: COMMERCIAL

## 2022-04-27 DIAGNOSIS — Z79.01 MONITORING FOR LONG-TERM ANTICOAGULANT USE: ICD-10-CM

## 2022-04-27 DIAGNOSIS — Z51.81 MONITORING FOR LONG-TERM ANTICOAGULANT USE: ICD-10-CM

## 2022-04-27 LAB — INTERNATIONAL NORMALIZATION RATIO, POC: 2.4

## 2022-04-27 PROCEDURE — 85610 PROTHROMBIN TIME: CPT | Performed by: NURSE PRACTITIONER

## 2022-04-27 PROCEDURE — 93793 ANTICOAG MGMT PT WARFARIN: CPT | Performed by: NURSE PRACTITIONER

## 2022-04-27 NOTE — PROGRESS NOTES
Mr. Erika Elkins was here today. INR today:   Results for orders placed or performed in visit on 04/27/22   POCT INR   Result Value Ref Range    INR 2.4      INR Goal: 2.0-3.0    Dosing Plan  As of 4/27/2022    TTR:  36.6 % (1.5 y)   Full warfarin instructions:  7.5 mg every Sun; 10 mg all other days            PLAN: CONTINUE CURRENT DOSE  Will contact him when we know how the [facility] does INR's    Coumadin Clinic Hours  Monday           8:00am - 4:00pm  Tuesday 8:00am - 4:00pm  Wednesday  10:00am - 4:00pm  Thursday 8:00am - 4:00pm    IF IT'S AN EMERGENCY, PLEASE CALL 911 OR GO TO YOUR NEAREST EMERGENCY ROOM. [address] [phone] (FAITH'S DIRECT LINE)  IF UNABLE TO REACH COUMADIN CLINIC, [facility] [address],     INTERNAL  MEDICINE [phone].     PRIMARY CARE  [phone]    FAMILY MEDICINE  [phone]

## 2022-06-02 RX ORDER — WARFARIN SODIUM 5 MG/1
TABLET ORAL
Qty: 60 TABLET | Refills: 0 | Status: SHIPPED | OUTPATIENT
Start: 2022-06-02 | End: 2022-07-01

## 2022-06-15 ENCOUNTER — OFFICE VISIT (OUTPATIENT)
Dept: FAMILY MEDICINE CLINIC | Age: 42
End: 2022-06-15
Payer: COMMERCIAL

## 2022-06-15 VITALS
WEIGHT: 315 LBS | HEART RATE: 80 BPM | TEMPERATURE: 97.8 F | BODY MASS INDEX: 53.03 KG/M2 | OXYGEN SATURATION: 96 % | SYSTOLIC BLOOD PRESSURE: 106 MMHG | DIASTOLIC BLOOD PRESSURE: 68 MMHG

## 2022-06-15 DIAGNOSIS — Z79.01 ENCOUNTER FOR MONITORING COUMADIN THERAPY: ICD-10-CM

## 2022-06-15 DIAGNOSIS — Z86.718 HISTORY OF DVT (DEEP VEIN THROMBOSIS): ICD-10-CM

## 2022-06-15 DIAGNOSIS — Z51.81 ENCOUNTER FOR MONITORING COUMADIN THERAPY: ICD-10-CM

## 2022-06-15 DIAGNOSIS — N18.9 CHRONIC KIDNEY DISEASE, UNSPECIFIED CKD STAGE: ICD-10-CM

## 2022-06-15 DIAGNOSIS — E11.59 TYPE 2 DIABETES MELLITUS WITH OTHER CIRCULATORY COMPLICATION, WITHOUT LONG-TERM CURRENT USE OF INSULIN (HCC): Primary | ICD-10-CM

## 2022-06-15 LAB — INR BLD: 2.7 (ref 0.9–1.1)

## 2022-06-15 PROCEDURE — 99214 OFFICE O/P EST MOD 30 MIN: CPT | Performed by: NURSE PRACTITIONER

## 2022-06-15 PROCEDURE — 3044F HG A1C LEVEL LT 7.0%: CPT | Performed by: NURSE PRACTITIONER

## 2022-06-15 PROCEDURE — 85610 PROTHROMBIN TIME: CPT | Performed by: NURSE PRACTITIONER

## 2022-06-15 ASSESSMENT — ENCOUNTER SYMPTOMS
EYES NEGATIVE: 1
RESPIRATORY NEGATIVE: 1
GASTROINTESTINAL NEGATIVE: 1

## 2022-06-15 NOTE — PROGRESS NOTES
Formerly [facility] PHYSICIAN SERVICES  [facility] [place]  [address] [address]  Dept: [phone]  Dept Fax: [phone]  Loc: [phone]    Brandi Mead is a 36 y.o. male who presents today for his medical conditions/complaints as noted below. Brandi Mead is c/o of Diabetes Care Management      Chief Complaint   Patient presents with    Diabetes Care Management       HPI:     HPI   Patient is here for follow-up on chronic conditions including diabetes, hx of DVTs, and HLD. Patient has been taking his meds as prescribed. He has not had his INR checked in over 1 month.   When he does check his glucose levels readings are normal.     Past Medical History:   Diagnosis Date    Arthritis     both wrist    Bilateral carpal tunnel syndrome 8/15/2019    Bronchitis     10 yrs ago    Diabetes mellitus ([address]     Disease of blood and blood forming organ     Hx of blood clots     Hypertension     Kidney stone     recurrent    Sleep apnea     untested    Umbilical hernia         Past Surgical History:   Procedure Laterality Date    CARPAL TUNNEL RELEASE Left 08/21/2020    LEFT CARPAL TUNNEL RELEASE performed by Urszula Almanzar MD at [facility] Right 07/24/2018    CYSTOSCOPY/ URETEROSCOPY; INSERTION DOUBLE J STENT/  URETERAL performed by Neda Verduzco MD at [address] [place] [address]      both wrists    [address] N/A 06/24/2016    HEMORRHOID INCISION AND DRAINAGE performed by Amanda Washington MD at [facility] CYSTO/URETERO/PYELOSCOPY W/LITHOTRIPSY Right 08/07/2018    URETEROSCOPY LASER LITHOTRIPSY STONE EXTRACTION performed by Neda Verduzco MD at [address] [place] [address] Right 08/07/2018    STENT PLACEMENT performed by Neda Verduzco MD at [address] [place] [address] ESWL Right 02/13/2018    ESWL EXTRACORPEAL SHOCK WAVE LITHOTRIPSY performed by Neda Verduzco MD at [address] [place] [address] ESWL Right 03/13/2018    ESWL EXTRACORPEAL SHOCK WAVE LITHOTRIPSY performed by Gustavo Louise MD at [address] [facility] ESWL Right 07/24/2018    ESWL EXTRACORPEAL SHOCK WAVE LITHOTRIPSY performed by Gustavo Louise MD at [address], [address] N/A 37/62/5271    HERNIA UMBILICAL REPAIR LAPAROSCOPIC performed by Rufina Michelle MD at [address] / [address] Left 09/20/2020     LEFT LOWER EXTREMITY VENOGRAMS; INFERIOR VENA CAVA FILTER PLACEMENT. performed by Sherice Juan MD at [address]  12/14/2020    SJS. Ultrasound-guided cannulation right internal jugular vein, Right internal jugular venograms to the superior vena cava,Ultrasound-guided cannulation left internal jugular vein with 12 French sheath, Inferior vena cava venograms, Retrieval of Bard inferior vena cava filter Kaye Sosa), Completion inferior vena cava venograms    WRIST SURGERY      left wrist.  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[facility]   Substance Use Topics    Alcohol use: Yes     Comment: OCC        Current Outpatient Medications   Medication Sig Dispense Refill    warfarin (COUMADIN) 5 MG tablet Take 2 tablets by mouth once daily 60 tablet 0    amLODIPine (NORVASC) 5 MG tablet Take 1 tablet by mouth once daily 30 tablet 0    Semaglutide,0.25 or 0.5MG/DOS, (OZEMPIC, 0.25 OR 0.5 MG/DOSE,) 2 MG/1.5ML SOPN Inject 0.5 mg into the skin once a week 1 pen 11    rosuvastatin (CRESTOR) 20 MG tablet Take 1 tablet by mouth nightly 30 tablet 11    lisinopril (PRINIVIL;ZESTRIL) 10 MG tablet Take 1 tablet by mouth once daily for 30 days 30 tablet 11    metFORMIN (GLUCOPHAGE) 500 MG tablet Take 1 tablet by mouth 2 times daily (with meals) 60 tablet 11    diclofenac sodium (VOLTAREN) 1 % GEL Apply topically 4 times daily as needed for Pain 150 g 2    tiZANidine (ZANAFLEX) 4 MG tablet Take 1 tablet by mouth nightly as needed (spasm) 30 tablet 3     No current facility-administered medications for this visit. No Known Allergies    Family History   Problem Relation Age of Onset   Dalton Toure Cancer Mother 46        colon cancer    Cancer Father         luekemia    Diabetes Father     Other Maternal Grandmother         copd    Other Maternal Grandfather         copd    Heart Disease Paternal Grandmother                Subjective:      Review of Systems   Constitutional: Negative. HENT: Negative. Eyes: Negative. Respiratory: Negative. Cardiovascular: Negative. Gastrointestinal: Negative. Endocrine: Negative. Genitourinary: Negative. Musculoskeletal: Negative. Skin: Negative. Neurological: Negative. Hematological: Negative. Psychiatric/Behavioral: Negative. Objective:     Physical Exam  Vitals and nursing note reviewed. Constitutional:       Appearance: Normal appearance. He is well-developed. Comments: obese   HENT:      Head: Normocephalic and atraumatic. Right Ear: Hearing, tympanic membrane, ear canal and external ear normal.      Left Ear: Hearing, tympanic membrane, ear canal and external ear normal.      Nose: Nose normal.      Mouth/Throat:      Pharynx: Uvula midline. Eyes:      General: Lids are normal.      Conjunctiva/sclera: Conjunctivae normal.      Pupils: Pupils are equal, round, and reactive to light. Neck:      Thyroid: No thyroid mass or thyromegaly. Trachea: Trachea normal.   Cardiovascular:      Rate and Rhythm: Normal rate and regular rhythm. Heart sounds: Normal heart sounds. Pulmonary:      Effort: Pulmonary effort is normal.      Breath sounds: Normal breath sounds. Abdominal:      General: Bowel sounds are normal.      Palpations: Abdomen is soft. Musculoskeletal:         General: Normal range of motion. Cervical back: Normal range of motion and neck supple.  No tenderness. Thoracic back: Normal. No tenderness. Normal range of motion. Lumbar back: Normal. No tenderness. Normal range of motion. Skin:     General: Skin is warm and dry. Neurological:      Mental Status: He is alert and oriented to person, place, and time. Psychiatric:         Speech: Speech normal.         Behavior: Behavior normal.         Thought Content: Thought content normal.         Judgment: Judgment normal.         /68 (Site: Left Upper Arm)   Pulse 80   Temp 97.8 °F (36.6 °C)   Wt (!) 391 lb (177.4 kg)   SpO2 96%   BMI 53.03 kg/m²     Assessment:      Diagnosis Orders   1. Type 2 diabetes mellitus with other circulatory complication, without long-term current use of insulin (HCC)  Hemoglobin A1C    Lipid, Fasting   2. History of DVT (deep vein thrombosis)  CBC    Comprehensive Metabolic Panel    POCT INR   3. Encounter for monitoring Coumadin therapy  POCT INR   4. Chronic kidney disease, unspecified CKD stage         No results found for this visit on 06/15/22. Plan:     1. Type 2 diabetes mellitus with other circulatory complication, without long-term current use of insulin (HCC)  We will continue current medication regimen. Rechecking labs we will call these results once completed. - Hemoglobin A1C; Future  - Lipid, Fasting; Future    2. History of DVT (deep vein thrombosis)  Continue Coumadin. INR today was 2.7 this is in therapeutic range. Patient is to recheck in 2 weeks. - CBC; Future  - Comprehensive Metabolic Panel; Future  - POCT INR; Standing    3. Encounter for monitoring Coumadin therapy    - POCT INR; Standing    4. Chronic kidney disease, unspecified CKD stage  Follow-up with nephrology. Return in about 6 months (around 12/15/2022), or if symptoms worsen or fail to improve, for Follow up chronic conditions.     Orders Placed This Encounter   Procedures    CBC     Standing Status:   Future     Standing Expiration Date:   6/15/2023   Vivien See Comprehensive Metabolic Panel     Standing Status:   Future     Standing Expiration Date:   6/15/2023    Hemoglobin A1C     Standing Status:   Future     Standing Expiration Date:   6/15/2023    Lipid, Fasting     Standing Status:   Future     Standing Expiration Date:   6/15/2023    POCT INR     Standing Status:   Standing     Number of Occurrences:   99     Standing Expiration Date:   12/15/2023       No orders of the defined types were placed in this encounter. Patient offered educational handouts and has had all questions answered. Patient voices understanding and agrees to plans along with risks and benefits of plan. Patient is instructed to continue prior meds, diet, and exercise plans as instructed. Patient agrees to follow up as instructed and sooner if needed. Patient agrees to go to ER if condition becomes emergent. EMR Dragon/transcription disclaimer: Some of this encounter note is an electronic transcription/translation of spoken language to printed text. The electronic translation of spoken language may permit erroneous, or at times, nonsensical words or phrases to be inadvertently transcribed.  Although I have reviewed the note for such errors, some may still exist.    Electronically signed by VANDANA Belcher on 6/15/2022 at 2:27 PM

## 2022-06-27 DIAGNOSIS — E11.59 TYPE 2 DIABETES MELLITUS WITH OTHER CIRCULATORY COMPLICATION, WITHOUT LONG-TERM CURRENT USE OF INSULIN (HCC): ICD-10-CM

## 2022-06-27 DIAGNOSIS — Z86.718 HISTORY OF DVT (DEEP VEIN THROMBOSIS): ICD-10-CM

## 2022-06-27 LAB
ALBUMIN SERPL-MCNC: 3.8 G/DL (ref 3.5–5.2)
ALP BLD-CCNC: 86 U/L (ref 40–130)
ALT SERPL-CCNC: 20 U/L (ref 5–41)
ANION GAP SERPL CALCULATED.3IONS-SCNC: 13 MMOL/L (ref 7–19)
AST SERPL-CCNC: 16 U/L (ref 5–40)
BILIRUB SERPL-MCNC: 0.3 MG/DL (ref 0.2–1.2)
BUN BLDV-MCNC: 9 MG/DL (ref 6–20)
CALCIUM SERPL-MCNC: 8.8 MG/DL (ref 8.6–10)
CHLORIDE BLD-SCNC: 105 MMOL/L (ref 98–111)
CHOLESTEROL, FASTING: 108 MG/DL (ref 160–199)
CO2: 24 MMOL/L (ref 22–29)
CREAT SERPL-MCNC: 0.7 MG/DL (ref 0.5–1.2)
GFR AFRICAN AMERICAN: >59
GFR NON-AFRICAN AMERICAN: >60
GLUCOSE BLD-MCNC: 116 MG/DL (ref 74–109)
HBA1C MFR BLD: 6.9 % (ref 4–6)
HCT VFR BLD CALC: 48 % (ref 42–52)
HDLC SERPL-MCNC: 38 MG/DL (ref 55–121)
HEMOGLOBIN: 14.6 G/DL (ref 14–18)
LDL CHOLESTEROL CALCULATED: 44 MG/DL
MCH RBC QN AUTO: 27.5 PG (ref 27–31)
MCHC RBC AUTO-ENTMCNC: 30.4 G/DL (ref 33–37)
MCV RBC AUTO: 90.4 FL (ref 80–94)
PDW BLD-RTO: 15.4 % (ref 11.5–14.5)
PLATELET # BLD: 220 K/UL (ref 130–400)
PMV BLD AUTO: 10.3 FL (ref 9.4–12.4)
POTASSIUM SERPL-SCNC: 4.1 MMOL/L (ref 3.5–5)
RBC # BLD: 5.31 M/UL (ref 4.7–6.1)
SODIUM BLD-SCNC: 142 MMOL/L (ref 136–145)
TOTAL PROTEIN: 6.9 G/DL (ref 6.6–8.7)
TRIGLYCERIDE, FASTING: 130 MG/DL (ref 0–149)
WBC # BLD: 7.5 K/UL (ref 4.8–10.8)

## 2022-07-01 RX ORDER — WARFARIN SODIUM 5 MG/1
TABLET ORAL
Qty: 60 TABLET | Refills: 0 | Status: SHIPPED | OUTPATIENT
Start: 2022-07-01 | End: 2022-08-10

## 2022-07-26 ENCOUNTER — ANTI-COAG VISIT (OUTPATIENT)
Dept: FAMILY MEDICINE CLINIC | Age: 42
End: 2022-07-26

## 2022-07-26 DIAGNOSIS — Z86.718 HISTORY OF DVT (DEEP VEIN THROMBOSIS): Primary | ICD-10-CM

## 2022-07-26 DIAGNOSIS — Z86.718 HISTORY OF DVT (DEEP VEIN THROMBOSIS): ICD-10-CM

## 2022-07-26 LAB — INR BLD: 3.8 (ref 0.9–1.1)

## 2022-08-02 DIAGNOSIS — Z51.81 ENCOUNTER FOR MONITORING COUMADIN THERAPY: ICD-10-CM

## 2022-08-02 DIAGNOSIS — Z86.718 HISTORY OF DVT (DEEP VEIN THROMBOSIS): ICD-10-CM

## 2022-08-02 DIAGNOSIS — Z79.01 ENCOUNTER FOR MONITORING COUMADIN THERAPY: ICD-10-CM

## 2022-08-02 LAB
INR BLD: 1.56 (ref 0.88–1.18)
PROTHROMBIN TIME: 18.9 SEC (ref 12–14.6)

## 2022-08-02 PROCEDURE — 85610 PROTHROMBIN TIME: CPT | Performed by: NURSE PRACTITIONER

## 2022-08-04 ENCOUNTER — ANTI-COAG VISIT (OUTPATIENT)
Dept: FAMILY MEDICINE CLINIC | Age: 42
End: 2022-08-04

## 2022-08-04 NOTE — PROGRESS NOTES
INR 1.56    INR too low, take an extra 2.5mg tonight and stay the same all other days. Recheck in 1 week.

## 2022-08-10 RX ORDER — WARFARIN SODIUM 5 MG/1
TABLET ORAL
Qty: 60 TABLET | Refills: 0 | Status: SHIPPED | OUTPATIENT
Start: 2022-08-10 | End: 2022-08-11 | Stop reason: SDUPTHER

## 2022-08-10 NOTE — TELEPHONE ENCOUNTER
Last OV 2/16/2022  Next OV Visit date not found      Requested Prescriptions     Pending Prescriptions Disp Refills    warfarin (COUMADIN) 5 MG tablet [Pharmacy Med Name: Warfarin Sodium 5 MG Oral Tablet] 60 tablet 0     Sig: Take 2 tablets by mouth once daily

## 2022-08-11 ENCOUNTER — ANTI-COAG VISIT (OUTPATIENT)
Dept: FAMILY MEDICINE CLINIC | Age: 42
End: 2022-08-11

## 2022-08-11 LAB — INR BLD: 2.7 (ref 0.9–1.1)

## 2022-08-11 RX ORDER — WARFARIN SODIUM 5 MG/1
TABLET ORAL
Qty: 60 TABLET | Refills: 5 | Status: SHIPPED | OUTPATIENT
Start: 2022-08-11

## 2022-08-11 RX ORDER — WARFARIN SODIUM 5 MG/1
TABLET ORAL
Qty: 60 TABLET | Refills: 0 | OUTPATIENT
Start: 2022-08-11

## 2022-09-23 NOTE — PROGRESS NOTES
Progress Note      Pt Name: Karen Britt  YOB: 1980  MRN: [id number]    Date of evaluation: 9/27/2022  History Obtained From:  patient, electronic medical record    CHIEF COMPLAINT:    Chief Complaint   Patient presents with    Follow-up     DVT of deep femoral vein, left ([address]     Current active problems  LLE DVT  Right-sided PE  Heterozygous factor V Leiden  Morbid obesity    HISTORY OF PRESENT ILLNESS:    Karen Britt is a 36 y.o.  male was seen on 2 separate occasions during acute hospitalizations at [address] [place] [address] on 9/20/2020 for a left lower extremity DVT and right pulmonary embolus. He did have thrombolytic treatment of the DVT by Dr. Sarah Osman during this initial hospitalization. Prothrombotic panel did reveal a heterozygous factor V Leiden. Dr. Sarah Osman placed a IVC filter during that hospitalization and then subsequently removed his Bard IVC filter on 12/14/2020. He is currently on warfarin 10 mg every day except 7.5 on Sunday. He wears his compression stockings religiously. He denies any new DVT, PE. He has mild erythema of his legs on occasion but no overt cellulitis. He does have hyperpigmentation. He denies any open wounds on his legs. He is diabetic with most recent A1c 6.9 on 6/27/2022. He is on Glucophage 500 twice daily and Ozempic weekly. Blood pressure has been stable on Norvasc 5 mg daily, lisinopril 10 mg daily. He has issue with chronic muscle spasms but stable with Zanaflex as needed        HEMATOLOGICAL HISTORY: Left lower extremity DVT with right lung pulmonary emboli 9/19/2020, heterozygous factor V Leiden  Audree Rubinstein was seen in initial hematology consultation on 9/20/2020 as an inpatient at [facility] having been admitted on 9/19/2020 with left lower extremity DVT and PE. He was placed on heparin as initial management. Mr. Juan Lundberg had a 5-day history of left leg pain and swelling.   Initially he thought he had pulled a muscle but this did not get better. When things did not get better, he sought [facility] [place]. Additionally he has nonspecific symptoms of pulmonary embolus including burning in the right side of his chest.  He does not have hemoptysis or dyspnea. Mr. Chiquita Cano does not have a personal history of DVT or PE or hypercoagulability. Risk features include:   Obesity. Recent left hand carpal tunnel surgery in August 2020. Family history: His mother had an episode of left lower extremity DVT and PE 15 years ago without recurrence. A maternal aunt also had DVT of the lower extremities. Noninvasive venous studies of the lower extremities on 9/19/2020 document:   Extensive thrombosis (DVT) noted in Lt common femoral vein, Lt SFV (prox, mid and distal), Lt popliteal vein, Lt Gastrocs, Lt posterior tibial veins. A floating tail is noted in left common femoral vein measuring approximately 4.3cm. SVT: thrombus noted in Lt LSV     Pulmonary CTA with contrast on 9/19/2020 documented the following:  Multiple right-sided acute pulmonary emboli identified involving the right third fourth and fifth ordered pulmonary artery branches extending into the right lower lobe. Possible right heart strain  No left sided pulmonary emboli identified  Small calcified subcarinal lymph nodes consistent with healed granulomatous disease  Scattered calcified granulomas in both lungs     He was seen by Dr. Erick Sheets from vascular surgery (9/20/2020), with recommendations for percutaneous mechanical thrombectomy/pulse spray thrombolysis to try to prevent long-term swelling in this young patient with iliofemoral DVT. If the result in that procedure is not satisfactory, he would consider placing a TPA thrombolyzes catheter for thrombolysis overnight. The patient agreed to proceed. Dr. Erick Sheets did not feel that he needed TPA thrombolysis of the pulmonary embolism because he is fairly asymptomatic from that standpoint.     PRIOR INTERVENTION  Percutaneous thrombectomy/PulseSpray thrombolysis with AngioJet Zelante catheter  Left lower extremity venograms after percutaneous thrombectomy and thrombolysis  Placement of 40 cm infusion length EKOS TPA catheter from the popliteal vein all the way to the distal external iliac vein  Inferior vena cava filter placement from a right internal jugular vein approach (Bard [place] inferior vena cava filter)  9/21/2002-removal of EKOS TPA catheter      Prothrombotic work-up on 9/21/2020  Beta 2 glycoprotein IgG and IgM - both negative  Cardiolipin Ab IgG and IgM - both negative  Phospholipid Ab - negative  Protein C antigen-95%   Protein S antigen-157%(H)  Antithrombin III-86%  Prothrombin gene mutation-not detected  Factor V Leiden-heterozygous, one copy    DOACS not a good option due to morbid obesity with BMI above 50     He was discharged home on warfarin. The patient was admitted on 11/4/2020 after presenting to the emergency department with complaints of swelling of the left leg and right arm of 3 days duration. Unfortunately  his INR was subtherapeutic at 1.5. Serology 12/9/2020  HOLLY 2 - [id number] negative  CALR mutation - negative   HOLLY 2 Exons 12-15 - negative  MPL - negative    Dr. Gabriel Lopez did remove his Bard IVC filter on 12/14/2020.       Past Medical History:   Diagnosis Date    Arthritis     both wrist    Bilateral carpal tunnel syndrome 8/15/2019    Bronchitis     10 yrs ago    Diabetes mellitus ([address]     Disease of blood and blood forming organ     Hx of blood clots     Hypertension     Kidney stone     recurrent    Sleep apnea     untested    Umbilical hernia         Past Surgical History:   Procedure Laterality Date    CARPAL TUNNEL RELEASE Left 08/21/2020    LEFT CARPAL TUNNEL RELEASE performed by Jessica Ivory MD at [address] [place] [address] Right 07/24/2018    CYSTOSCOPY/ URETEROSCOPY; INSERTION DOUBLE J STENT/  URETERAL performed by Dorothy Hinson MD at [address] [place] [address] both wrists    HEMORRHOID SURGERY N/A 06/24/2016    HEMORRHOID INCISION AND DRAINAGE performed by Cristobal Jackson MD at [facility] CYSTO/URETERO/PYELOSCOPY W/LITHOTRIPSY Right 08/07/2018    URETEROSCOPY LASER LITHOTRIPSY STONE EXTRACTION performed by Elvis Flores MD at [facility] Right 08/07/2018    STENT PLACEMENT performed by Elvis Flores MD at [place] [address] ESWL Right 02/13/2018    ESWL EXTRACORPEAL SHOCK WAVE LITHOTRIPSY performed by Elvis Flores MD at [facility] ESWL Right 03/13/2018    ESWL EXTRACORPEAL SHOCK WAVE LITHOTRIPSY performed by Elvis Flores MD at [place] [address] ESWL Right 07/24/2018    ESWL EXTRACORPEAL SHOCK WAVE LITHOTRIPSY performed by Elvis Flores MD at [address] [place] [address] LAP, VENTRAL HERNIA REPAIR,REDUCIBLE N/A 55/23/3582    HERNIA UMBILICAL REPAIR LAPAROSCOPIC performed by Franko Marx MD at [facility] / EMBOLECTOMY FEMORAL Left 09/20/2020     LEFT LOWER EXTREMITY VENOGRAMS; INFERIOR VENA CAVA FILTER PLACEMENT. performed by Karyle Molly, MD at [address] [place] [address]  12/14/2020    [facility]. Ultrasound-guided cannulation right internal jugular vein, Right internal jugular venograms to the superior vena cava,Ultrasound-guided cannulation left internal jugular vein with 12 [place]ron[place] sheath, Inferior vena cava venograms, Retrieval of Bard inferior vena cava filter Rolando Bernal), Completion inferior vena cava venograms    WRIST SURGERY      left wrist.  Pt was a child           Current Outpatient Medications:     warfarin (COUMADIN) 5 MG tablet, Take 2 tablets by mouth once daily, or as indicated per INR results. , Disp: 60 tablet, Rfl: 5    amLODIPine (NORVASC) 5 MG tablet, Take 1 tablet by mouth once daily, Disp: 30 tablet, Rfl: 0    Semaglutide,0.25 or 0.5MG/DOS, (OZEMPIC, 0.25 OR 0.5 MG/DOSE,) 2 MG/1.5ML SOPN, Inject 0.5 mg into the skin once a week, Disp: 1 pen, Rfl: 11    rosuvastatin (CRESTOR) 20 MG tablet, Take 1 tablet by mouth nightly, Disp: 30 tablet, Rfl: 11    lisinopril (PRINIVIL;ZESTRIL) 10 MG tablet, Take 1 tablet by mouth once daily for 30 days, Disp: 30 tablet, Rfl: 11    metFORMIN (GLUCOPHAGE) 500 MG tablet, Take 1 tablet by mouth 2 times daily (with meals), Disp: 60 tablet, Rfl: 11    diclofenac sodium (VOLTAREN) 1 % GEL, Apply topically 4 times daily as needed for Pain, Disp: 150 g, Rfl: 2    tiZANidine (ZANAFLEX) 4 MG tablet, Take 1 tablet by mouth nightly as needed (spasm), Disp: 30 tablet, Rfl: 3     No Known Allergies    Social History     Tobacco Use    Smoking status: Never    Smokeless tobacco: Never    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 Negative for chills, diaphoresis, fever and unexpected weight change. HENT:  Negative for mouth sores, nosebleeds, sore throat, trouble swallowing and voice change. Eyes:  Negative for photophobia, discharge and itching. Respiratory:  Negative for cough, shortness of breath and wheezing. Cardiovascular:  Positive for leg swelling (Chronic-stable overall. ). Negative for chest pain and palpitations. Gastrointestinal:  Negative for abdominal distention, abdominal pain, blood in stool, constipation, diarrhea, nausea and vomiting. Endocrine: Negative for cold intolerance, heat intolerance, polydipsia and polyuria.    Genitourinary:  Negative for difficulty urinating, dysuria, hematuria and urgency. Musculoskeletal:  Positive for myalgias. Negative for arthralgias, back pain and joint swelling. Skin:  Positive for color change (Hyperpigmentation of the lower legs, no overt cellulitis). Negative for rash. Neurological:  Negative for dizziness, tremors, seizures, syncope and light-headedness. Hematological:  Negative for adenopathy. Does not bruise/bleed easily. Psychiatric/Behavioral:  Negative for behavioral problems and suicidal ideas. The patient is not nervous/anxious. All other systems reviewed and are negative. Objective   /68   Pulse 82   Wt (!) 391 lb 8 oz (177.6 kg)   SpO2 97%   BMI 53.10 kg/m²     PHYSICAL EXAM:  Physical Exam  Vitals reviewed. Constitutional:       General: He is not in acute distress. Appearance: He is well-developed. He is morbidly obese. HENT:      Head: Normocephalic and atraumatic. Nose: Nose normal.   Eyes:      General: No scleral icterus. Conjunctiva/sclera: Conjunctivae normal.   Neck:      Vascular: No JVD. Trachea: No tracheal deviation. Cardiovascular:      Rate and Rhythm: Normal rate and regular rhythm. Heart sounds: Normal heart sounds. No murmur heard. Pulmonary:      Effort: Pulmonary effort is normal. No respiratory distress. Breath sounds: Normal breath sounds. No wheezing. Abdominal:      General: Bowel sounds are normal. There is no distension. Palpations: Abdomen is soft. There is no mass. Tenderness: There is no abdominal tenderness. Musculoskeletal:         General: No tenderness or deformity. Left lower le+ Edema present. Comments: Wearing compression stockings. Skin:     Findings: No erythema or rash. Neurological:      Mental Status: He is alert and oriented to person, place, and time. Psychiatric:         Thought Content:  Thought content normal.        CBC reviewed by me  Lab Results   Component Value Date    WBC 6.89 2022    HGB 14.6 2022 HCT 48.7 09/27/2022    MCV 92.1 09/27/2022     09/27/2022       VISIT DIAGNOSES  1. DVT of deep femoral vein, left ([facility]    2. Single subsegmental pulmonary embolism without acute cor pulmonale (HCC)    3. Heterozygous factor V Leiden mutation ([facility]    4. Morbid obesity ([facility]    5. Normocytic hypochromic anemia        ASSESSMENT/PLAN:      1. Left lower extremity DVT with right pulmonary emboli 9/19/2020  2. 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 ?(Recent surgery), Sep 2020, Nov 2020 (subtherapeutic INR), heterozygous factor V Leiden, Left LE DVT acute and IJ catheter related DVT (removed by vascular 11/05/2020)      PRIOR INTERVENTION  Percutaneous thrombectomy/PulseSpray thrombolysis with AngioJet Zelante catheter  Left lower extremity venograms after percutaneous thrombectomy and thrombolysis  Placement of 40 cm infusion length EKOS TPA catheter from the popliteal vein all the way to the distal external iliac vein  Inferior vena cava filter placement from a right internal jugular vein approach (Bard [place] inferior vena cava filter)  9/21/2002-removal of EKOS TPA catheter      MPN evaluation was negative    Dr. Pan Venegas did remove his Bard IVC filter on 12/14/2020. Wt Readings from Last 3 Encounters:   09/27/22 (!) 391 lb 8 oz (177.6 kg)   06/15/22 (!) 391 lb (177.4 kg)   03/22/22 (!) 383 lb (173.7 kg)      He has been losing weight unfortunately he has gained weight since March. He and his wife are going to get back on a diet regimen. I discussed dietary issues with warfarin. His PT/INR has been doing well on his current dose of warfarin 10 mg every day except 7.5 on Sunday. I encouraged him to continue wearing his compression stockings. 3.  Prior normocytic hypochromic anemia    CBC today is completely normal with a WBC of 6.89 with 60.7% PMN, 20.2% lymphocyte, 5.8% monocyte, 4.4% eosinophil, 0.6% basophil, Hgb 14 6, MCV 92.1, ,000.       Immunization History   Administered Date(s) Administered    COVID-19, Pfizer Purple top, DILUTE for use, 12+ yrs, 30mcg/0.3mL dose 09/06/2021, 09/27/2021            Return in about 1 year (around 9/27/2023) for With Kenia Nelson.      Kathy Garrido PA-C  11:57 AM  9/27/2022

## 2022-09-27 ENCOUNTER — OFFICE VISIT (OUTPATIENT)
Dept: HEMATOLOGY | Age: 42
End: 2022-09-27
Payer: COMMERCIAL

## 2022-09-27 ENCOUNTER — HOSPITAL ENCOUNTER (OUTPATIENT)
Dept: INFUSION THERAPY | Age: 42
Discharge: HOME | End: 2022-09-27
Payer: COMMERCIAL

## 2022-09-27 VITALS
HEART RATE: 82 BPM | BODY MASS INDEX: 53.1 KG/M2 | DIASTOLIC BLOOD PRESSURE: 68 MMHG | SYSTOLIC BLOOD PRESSURE: 132 MMHG | WEIGHT: 315 LBS | OXYGEN SATURATION: 97 %

## 2022-09-27 DIAGNOSIS — D68.51 HETEROZYGOUS FACTOR V LEIDEN MUTATION (HCC): ICD-10-CM

## 2022-09-27 DIAGNOSIS — D50.9 NORMOCYTIC HYPOCHROMIC ANEMIA: ICD-10-CM

## 2022-09-27 DIAGNOSIS — I26.93 SINGLE SUBSEGMENTAL PULMONARY EMBOLISM WITHOUT ACUTE COR PULMONALE (HCC): ICD-10-CM

## 2022-09-27 DIAGNOSIS — E66.01 MORBID OBESITY (HCC): ICD-10-CM

## 2022-09-27 DIAGNOSIS — I82.412 DVT OF DEEP FEMORAL VEIN, LEFT (HCC): Primary | ICD-10-CM

## 2022-09-27 DIAGNOSIS — I82.412 DVT OF DEEP FEMORAL VEIN, LEFT (HCC): ICD-10-CM

## 2022-09-27 LAB
BASOPHILS ABSOLUTE: 0.04 K/UL (ref 0.01–0.08)
BASOPHILS RELATIVE PERCENT: 0.6 % (ref 0.1–1.2)
EOSINOPHILS ABSOLUTE: 0.3 K/UL (ref 0.04–0.54)
EOSINOPHILS RELATIVE PERCENT: 4.4 % (ref 0.7–7)
HCT VFR BLD CALC: 48.7 % (ref 40.1–51)
HEMOGLOBIN: 14.6 G/DL (ref 13.7–17.5)
LYMPHOCYTES ABSOLUTE: 1.94 K/UL (ref 1.18–3.74)
LYMPHOCYTES RELATIVE PERCENT: 28.2 % (ref 19.3–53.1)
MCH RBC QN AUTO: 27.6 PG (ref 25.7–32.2)
MCHC RBC AUTO-ENTMCNC: 30 G/DL (ref 32.3–36.5)
MCV RBC AUTO: 92.1 FL (ref 79–92.2)
MONOCYTES ABSOLUTE: 0.4 K/UL (ref 0.24–0.82)
MONOCYTES RELATIVE PERCENT: 5.8 % (ref 4.7–12.5)
NEUTROPHILS ABSOLUTE: 4.19 K/UL (ref 1.56–6.13)
NEUTROPHILS RELATIVE PERCENT: 60.7 % (ref 34–71.1)
PDW BLD-RTO: 15.4 % (ref 11.6–14.4)
PLATELET # BLD: 193 K/UL (ref 163–337)
PMV BLD AUTO: 9.6 FL (ref 7.4–10.4)
RBC # BLD: 5.29 M/UL (ref 4.63–6.08)
WBC # BLD: 6.89 K/UL (ref 4.23–9.07)

## 2022-09-27 PROCEDURE — 99211 OFF/OP EST MAY X REQ PHY/QHP: CPT

## 2022-09-27 PROCEDURE — 85025 COMPLETE CBC W/AUTO DIFF WBC: CPT

## 2022-09-27 PROCEDURE — 99213 OFFICE O/P EST LOW 20 MIN: CPT | Performed by: PHYSICIAN ASSISTANT

## 2022-09-27 ASSESSMENT — ENCOUNTER SYMPTOMS
TROUBLE SWALLOWING: 0
SHORTNESS OF BREATH: 0
VOMITING: 0
CONSTIPATION: 0
DIARRHEA: 0
COLOR CHANGE: 1
SORE THROAT: 0
BACK PAIN: 0
VOICE CHANGE: 0
BLOOD IN STOOL: 0
ABDOMINAL DISTENTION: 0
WHEEZING: 0
NAUSEA: 0
EYE DISCHARGE: 0
ABDOMINAL PAIN: 0
EYE ITCHING: 0
COUGH: 0
PHOTOPHOBIA: 0

## 2022-10-18 DIAGNOSIS — S39.012D STRAIN OF LUMBAR REGION, SUBSEQUENT ENCOUNTER: ICD-10-CM

## 2022-10-19 RX ORDER — CYCLOBENZAPRINE HCL 10 MG
TABLET ORAL
Qty: 21 TABLET | Refills: 0 | Status: SHIPPED | OUTPATIENT
Start: 2022-10-19

## 2022-10-19 RX ORDER — TIZANIDINE 4 MG/1
TABLET ORAL
Qty: 30 TABLET | Refills: 0 | Status: SHIPPED | OUTPATIENT
Start: 2022-10-19

## 2022-11-08 ENCOUNTER — OFFICE VISIT (OUTPATIENT)
Dept: FAMILY MEDICINE CLINIC | Age: 42
End: 2022-11-08
Payer: COMMERCIAL

## 2022-11-08 VITALS
SYSTOLIC BLOOD PRESSURE: 126 MMHG | WEIGHT: 315 LBS | BODY MASS INDEX: 42.66 KG/M2 | TEMPERATURE: 97.9 F | OXYGEN SATURATION: 97 % | HEART RATE: 76 BPM | HEIGHT: 72 IN | DIASTOLIC BLOOD PRESSURE: 78 MMHG

## 2022-11-08 DIAGNOSIS — E11.59 TYPE 2 DIABETES MELLITUS WITH OTHER CIRCULATORY COMPLICATION, WITHOUT LONG-TERM CURRENT USE OF INSULIN (HCC): ICD-10-CM

## 2022-11-08 DIAGNOSIS — Z86.718 HISTORY OF DVT (DEEP VEIN THROMBOSIS): Primary | ICD-10-CM

## 2022-11-08 DIAGNOSIS — N18.9 CHRONIC KIDNEY DISEASE, UNSPECIFIED CKD STAGE: ICD-10-CM

## 2022-11-08 DIAGNOSIS — S39.012A STRAIN OF LUMBAR REGION, INITIAL ENCOUNTER: ICD-10-CM

## 2022-11-08 DIAGNOSIS — Z86.718 HISTORY OF DVT (DEEP VEIN THROMBOSIS): ICD-10-CM

## 2022-11-08 LAB
ALBUMIN SERPL-MCNC: 3.9 G/DL (ref 3.5–5.2)
ALP BLD-CCNC: 88 U/L (ref 40–130)
ALT SERPL-CCNC: 19 U/L (ref 5–41)
ANION GAP SERPL CALCULATED.3IONS-SCNC: 10 MMOL/L (ref 7–19)
AST SERPL-CCNC: 19 U/L (ref 5–40)
BASOPHILS ABSOLUTE: 0.1 K/UL (ref 0–0.2)
BASOPHILS RELATIVE PERCENT: 0.8 % (ref 0–1)
BILIRUB SERPL-MCNC: 0.3 MG/DL (ref 0.2–1.2)
BUN BLDV-MCNC: 9 MG/DL (ref 6–20)
CALCIUM SERPL-MCNC: 9.2 MG/DL (ref 8.6–10)
CHLORIDE BLD-SCNC: 103 MMOL/L (ref 98–111)
CO2: 26 MMOL/L (ref 22–29)
CREAT SERPL-MCNC: 0.7 MG/DL (ref 0.5–1.2)
EOSINOPHILS ABSOLUTE: 0.4 K/UL (ref 0–0.6)
EOSINOPHILS RELATIVE PERCENT: 3.8 % (ref 0–5)
GFR SERPL CREATININE-BSD FRML MDRD: >60 ML/MIN/{1.73_M2}
GLUCOSE BLD-MCNC: 97 MG/DL (ref 74–109)
HBA1C MFR BLD: 6.6 % (ref 4–6)
HCT VFR BLD CALC: 49.7 % (ref 42–52)
HEMOGLOBIN: 15 G/DL (ref 14–18)
IMMATURE GRANULOCYTES #: 0 K/UL
INR BLD: 2.6 (ref 0.9–1.1)
LYMPHOCYTES ABSOLUTE: 2.1 K/UL (ref 1.1–4.5)
LYMPHOCYTES RELATIVE PERCENT: 22.5 % (ref 20–40)
MCH RBC QN AUTO: 27.3 PG (ref 27–31)
MCHC RBC AUTO-ENTMCNC: 30.2 G/DL (ref 33–37)
MCV RBC AUTO: 90.4 FL (ref 80–94)
MONOCYTES ABSOLUTE: 0.7 K/UL (ref 0–0.9)
MONOCYTES RELATIVE PERCENT: 7.9 % (ref 0–10)
NEUTROPHILS ABSOLUTE: 5.9 K/UL (ref 1.5–7.5)
NEUTROPHILS RELATIVE PERCENT: 64.7 % (ref 50–65)
PDW BLD-RTO: 15 % (ref 11.5–14.5)
PLATELET # BLD: 232 K/UL (ref 130–400)
PMV BLD AUTO: 9.6 FL (ref 9.4–12.4)
POTASSIUM SERPL-SCNC: 4.5 MMOL/L (ref 3.5–5)
RBC # BLD: 5.5 M/UL (ref 4.7–6.1)
SODIUM BLD-SCNC: 139 MMOL/L (ref 136–145)
TOTAL PROTEIN: 6.9 G/DL (ref 6.6–8.7)
WBC # BLD: 9.1 K/UL (ref 4.8–10.8)

## 2022-11-08 PROCEDURE — 99214 OFFICE O/P EST MOD 30 MIN: CPT | Performed by: NURSE PRACTITIONER

## 2022-11-08 PROCEDURE — 3044F HG A1C LEVEL LT 7.0%: CPT | Performed by: NURSE PRACTITIONER

## 2022-11-08 RX ORDER — DICLOFENAC EPOLAMINE 0.01 G/1
1 SYSTEM TOPICAL 2 TIMES DAILY
Qty: 60 PATCH | Refills: 1 | Status: SHIPPED | OUTPATIENT
Start: 2022-11-08

## 2022-11-08 RX ORDER — METHYLPREDNISOLONE 4 MG/1
TABLET ORAL
Qty: 1 KIT | Refills: 0 | Status: SHIPPED | OUTPATIENT
Start: 2022-11-08

## 2022-11-08 ASSESSMENT — ENCOUNTER SYMPTOMS
BACK PAIN: 1
GASTROINTESTINAL NEGATIVE: 1
EYES NEGATIVE: 1
RESPIRATORY NEGATIVE: 1

## 2022-11-08 NOTE — PROGRESS NOTES
[facility] PHYSICIAN SERVICES  [place]  [address] [address]  Dept: [phone]  Dept Fax: [phone]  Loc: [phone]    Nick Jara is a 36 y.o. male who presents today for his medical conditions/complaints as noted below. Nick Jara is c/o of Follow-up Chronic Condition and Lower Back Pain      Chief Complaint   Patient presents with    Follow-up Chronic Condition    Lower Back Pain       HPI:     HPI  Patient presents today for follow up of chronic conditions. Has complaints of continued lumbar back pain. It is on his right side. This is a different pain from last year. He reports that the pain is worse when he lays down. When he tries to move side to side the pain is worse. The pain has been greater than 1 week now. After about 1 hour out of bed the pain does improve.       Past Medical History:   Diagnosis Date    Arthritis     both wrist    Bilateral carpal tunnel syndrome 8/15/2019    Bronchitis     10 yrs ago    Diabetes mellitus ([address]     Disease of blood and blood forming organ     Hx of blood clots     Hypertension     Kidney stone     recurrent    Sleep apnea     untested    Umbilical hernia         Past Surgical History:   Procedure Laterality Date    CARPAL TUNNEL RELEASE Left 08/21/2020    LEFT CARPAL TUNNEL RELEASE performed by Angelique Tamayo MD at [facility] Right 07/24/2018    CYSTOSCOPY/ URETEROSCOPY; INSERTION DOUBLE J STENT/  URETERAL performed by Edison Hernandez MD at [facility]      both wrists    HEMORRHOID SURGERY N/A 06/24/2016    HEMORRHOID INCISION AND DRAINAGE performed by Anastacia Almeida MD at [address] CYSTO/URETERO/PYELOSCOPY W/LITHOTRIPSY Right 08/07/2018    URETEROSCOPY LASER LITHOTRIPSY STONE EXTRACTION performed by Edison Hernandez MD at [address] [facility] Right 08/07/2018    STENT PLACEMENT performed by Edison Hernandez MD at [facility] STONE/ ESWL Right 02/13/2018    ESWL EXTRACORPEAL SHOCK WAVE LITHOTRIPSY performed by Binta David MD at [place] [address] ESWL Right 03/13/2018    ESWL EXTRACORPEAL SHOCK WAVE LITHOTRIPSY performed by Binta David MD at [facility] [address] ESWL Right 07/24/2018    ESWL EXTRACORPEAL SHOCK WAVE LITHOTRIPSY performed by Binta David MD at [address] [facility] LAP, VENTRAL HERNIA REPAIR,REDUCIBLE N/A 26/18/7420    HERNIA UMBILICAL REPAIR LAPAROSCOPIC performed by Tiara Pinzon MD at [facility] / EMBOLECTOMY FEMORAL Left 09/20/2020     LEFT LOWER EXTREMITY VENOGRAMS; INFERIOR VENA CAVA FILTER PLACEMENT. performed by Nelly Breaux MD at [address] [place] [address]  12/14/2020    [facility]. Ultrasound-guided cannulation right internal jugular vein, Right internal jugular venograms to the superior vena cava,Ultrasound-guided cannulation left internal jugular vein with 12 French sheath, Inferior vena cava venograms, Retrieval of Bard inferior vena cava filter [place]), Completion inferior vena cava venograms    WRIST SURGERY      left wrist.  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 1 kit 0    tiZANidine (ZANAFLEX) 4 MG tablet TAKE 1 TABLET BY MOUTH NIGHTLY AS NEEDED FOR  SPASM 30 tablet 0    cyclobenzaprine (FLEXERIL) 10 MG tablet Take 1 tablet by mouth three times daily as needed for muscle spasm 21 tablet 0    warfarin (COUMADIN) 5 MG tablet Take 2 tablets by mouth once daily, or as indicated per INR results.  60 tablet 5    amLODIPine (NORVASC) 5 MG tablet Take 1 tablet by mouth once daily 30 tablet 0    Semaglutide,0.25 or 0.5MG/DOS, (OZEMPIC, 0.25 OR 0.5 MG/DOSE,) 2 MG/1.5ML SOPN Inject 0.5 mg into the skin once a week 1 pen 11    rosuvastatin (CRESTOR) 20 MG tablet Take 1 tablet by mouth nightly 30 tablet 11    lisinopril (PRINIVIL;ZESTRIL) 10 MG tablet Take 1 tablet by mouth once daily for 30 days 30 tablet 11    metFORMIN (GLUCOPHAGE) 500 MG tablet Take 1 tablet by mouth 2 times daily (with meals) 60 tablet 11    diclofenac sodium (VOLTAREN) 1 % GEL Apply topically 4 times daily as needed for Pain 150 g 2     No current facility-administered medications for this visit. No Known Allergies    Family History   Problem Relation Age of Onset    Cancer Mother 46        colon cancer    Cancer Father         luekemia    Diabetes Father     Other Maternal Grandmother         copd    Other Maternal Grandfather         copd    Heart Disease Paternal Grandmother                Subjective:      Review of Systems   Constitutional: Negative. HENT: Negative. Eyes: Negative. Respiratory: Negative. Cardiovascular: Negative. Gastrointestinal: Negative. Endocrine: Negative. Genitourinary: Negative. Musculoskeletal:  Positive for back pain (right side). Skin: Negative. Neurological: Negative. Hematological: Negative. Psychiatric/Behavioral: Negative. Objective:     Physical Exam  Vitals and nursing note reviewed. Constitutional:       Appearance: Normal appearance. He is well-developed. He is obese. HENT:      Head: Normocephalic and atraumatic. Right Ear: Hearing, tympanic membrane, ear canal and external ear normal.      Left Ear: Hearing, tympanic membrane, ear canal and external ear normal.      Nose: Nose normal.      Mouth/Throat:      Lips: No lesions. Pharynx: Uvula midline. Eyes:      General: Lids are normal.      Conjunctiva/sclera: Conjunctivae normal.      Pupils: Pupils are equal, round, and reactive to light. Neck:      Thyroid: No thyroid mass or thyromegaly. Trachea: Trachea normal.   Cardiovascular:      Rate and Rhythm: Normal rate and regular rhythm. Pulses: Normal pulses. Heart sounds: Normal heart sounds. Pulmonary:      Effort: Pulmonary effort is normal.      Breath sounds: Normal breath sounds and air entry. Abdominal:      General: Bowel sounds are normal.      Palpations: Abdomen is soft. Musculoskeletal:      Cervical back: Normal, normal range of motion and neck supple. No tenderness. Thoracic back: Tenderness present. Decreased range of motion. Lumbar back: No tenderness. Decreased range of motion. Back:    Skin:     General: Skin is warm and dry. Capillary Refill: Capillary refill takes less than 2 seconds. Neurological:      Mental Status: He is alert and oriented to person, place, and time. Psychiatric:         Attention and Perception: Attention normal.         Mood and Affect: Mood and affect normal.         Speech: Speech normal.         Behavior: Behavior normal.         Thought Content: Thought content normal.         Cognition and Memory: Cognition normal.         Judgment: Judgment normal.       /78   Pulse 76   Temp 97.9 °F (36.6 °C)   Ht 6' (1.829 m)   Wt (!) 385 lb (174.6 kg)   SpO2 97%   BMI 52.22 kg/m²     Assessment:      Diagnosis Orders   1. History of DVT (deep vein thrombosis)  Protime-INR      2. Type 2 diabetes mellitus with other circulatory complication, without long-term current use of insulin ([facility])  Hemoglobin A1C      3. Chronic kidney disease, unspecified CKD stage  Comprehensive Metabolic Panel    CBC with Auto Differential      4. Strain of lumbar region, initial encounter  diclofenac (FLECTOR) 1.3 % PTCH patch    methylPREDNISolone (MEDROL DOSEPACK) 4 MG tablet          No results found for this visit on 11/08/22. Plan:     1. History of DVT (deep vein thrombosis)  Checking INR level.   Will adjust warfarin based on results. - Protime-INR; Future    2. Type 2 diabetes mellitus with other circulatory complication, without long-term current use of insulin (HCC)  Checking level. May need to increase Ozempic to next dose up based on results. - Hemoglobin A1C; Future    3. Chronic kidney disease, unspecified CKD stage    - Comprehensive Metabolic Panel; Future  - CBC with Auto Differential; Future    4. Strain of lumbar region, initial encounter  Diclofenac patch due to daily consumption of warfarin will use patch. Medrol dose pack. Patient is to monitor glucose readings and if they begin to elevate then will stop steroids. - diclofenac (FLECTOR) 1.3 % PTCH patch; Place 1 patch onto the skin 2 times daily  Dispense: 60 patch; Refill: 1  - methylPREDNISolone (MEDROL DOSEPACK) 4 MG tablet; Take by mouth. Dispense: 1 kit; Refill: 0     Return in about 3 months (around 2/8/2023) for Follow up chronic conditions. Orders Placed This Encounter   Procedures    Hemoglobin A1C     Standing Status:   Future     Number of Occurrences:   1     Standing Expiration Date:   11/8/2023    Protime-INR     Standing Status:   Future     Number of Occurrences:   1     Standing Expiration Date:   11/8/2023     Order Specific Question:   Daily Coumadin Dose? Answer:   5 mg    Comprehensive Metabolic Panel     Standing Status:   Future     Number of Occurrences:   1     Standing Expiration Date:   11/8/2023    CBC with Auto Differential     Standing Status:   Future     Number of Occurrences:   1     Standing Expiration Date:   11/8/2023       Orders Placed This Encounter   Medications    diclofenac (FLECTOR) 1.3 % PTCH patch     Sig: Place 1 patch onto the skin 2 times daily     Dispense:  60 patch     Refill:  1    methylPREDNISolone (MEDROL DOSEPACK) 4 MG tablet     Sig: Take by mouth. Dispense:  1 kit     Refill:  0              Patient offered educational handouts and has had all questions answered.   Patient voices understanding and agrees to plans along with risks and benefits of plan. Patient is instructed to continue prior meds, diet, and exercise plans as instructed. Patient agrees to follow up as instructed and sooner if needed. Patient agrees to go to ER if condition becomes emergent. EMR Dragon/transcription disclaimer: Some of this encounter note is an electronic transcription/translation of spoken language to printed text. The electronic translation of spoken language may permit erroneous, or at times, nonsensical words or phrases to be inadvertently transcribed.  Although I have reviewed the note for such errors, some may still exist.    Electronically signed by VANDANA Viramontes on 11/8/2022 at 2:57 PM

## 2022-12-10 NOTE — TELEPHONE ENCOUNTER
Received a VM from [employer] with [facility] prior authorization department. She said the patient's MRI of spine was not approved by insurance.  You can do a peer to peer by calling [employer] at [phone]
Yes

## 2023-01-17 ENCOUNTER — ANTI-COAG VISIT (OUTPATIENT)
Dept: PRIMARY CARE CLINIC | Age: 43
End: 2023-01-17

## 2023-01-26 DIAGNOSIS — S39.012D STRAIN OF LUMBAR REGION, SUBSEQUENT ENCOUNTER: ICD-10-CM

## 2023-01-27 RX ORDER — TIZANIDINE 4 MG/1
TABLET ORAL
Qty: 30 TABLET | Refills: 0 | Status: SHIPPED | OUTPATIENT
Start: 2023-01-27

## 2023-01-27 RX ORDER — CYCLOBENZAPRINE HCL 10 MG
TABLET ORAL
Qty: 21 TABLET | Refills: 0 | Status: SHIPPED | OUTPATIENT
Start: 2023-01-27

## 2023-01-27 NOTE — TELEPHONE ENCOUNTER
Liz Treadwell called requesting a refill of the below medication which has been pended for you:     Requested Prescriptions     Pending Prescriptions Disp Refills    cyclobenzaprine (FLEXERIL) 10 MG tablet [Pharmacy Med Name: Cyclobenzaprine HCl 10 MG Oral Tablet] 21 tablet 0     Sig: Take 1 tablet by mouth three times daily as needed for muscle spasm    tiZANidine (ZANAFLEX) 4 MG tablet [Pharmacy Med Name: tiZANidine HCl 4 MG Oral Tablet] 30 tablet 0     Sig: TAKE 1 TABLET BY MOUTH NIGHTLY AS NEEDED       Last Appointment Date: 11/8/2022  Next Appointment Date: Visit date not found    No Known Allergies

## 2023-02-08 ENCOUNTER — OFFICE VISIT (OUTPATIENT)
Dept: FAMILY MEDICINE CLINIC | Age: 43
End: 2023-02-08
Payer: COMMERCIAL

## 2023-02-08 VITALS
SYSTOLIC BLOOD PRESSURE: 128 MMHG | DIASTOLIC BLOOD PRESSURE: 80 MMHG | OXYGEN SATURATION: 98 % | TEMPERATURE: 97 F | HEART RATE: 78 BPM | HEIGHT: 72 IN | WEIGHT: 315 LBS | BODY MASS INDEX: 42.66 KG/M2

## 2023-02-08 DIAGNOSIS — I10 HYPERTENSION, UNSPECIFIED TYPE: ICD-10-CM

## 2023-02-08 DIAGNOSIS — E11.59 TYPE 2 DIABETES MELLITUS WITH OTHER CIRCULATORY COMPLICATION, WITHOUT LONG-TERM CURRENT USE OF INSULIN (HCC): Primary | ICD-10-CM

## 2023-02-08 DIAGNOSIS — Z86.718 HISTORY OF DVT (DEEP VEIN THROMBOSIS): ICD-10-CM

## 2023-02-08 DIAGNOSIS — E78.2 MIXED HYPERLIPIDEMIA: ICD-10-CM

## 2023-02-08 DIAGNOSIS — N18.9 CHRONIC KIDNEY DISEASE, UNSPECIFIED CKD STAGE: ICD-10-CM

## 2023-02-08 PROCEDURE — 3079F DIAST BP 80-89 MM HG: CPT | Performed by: NURSE PRACTITIONER

## 2023-02-08 PROCEDURE — 99214 OFFICE O/P EST MOD 30 MIN: CPT | Performed by: NURSE PRACTITIONER

## 2023-02-08 PROCEDURE — 3051F HG A1C>EQUAL 7.0%<8.0%: CPT | Performed by: NURSE PRACTITIONER

## 2023-02-08 PROCEDURE — 3074F SYST BP LT 130 MM HG: CPT | Performed by: NURSE PRACTITIONER

## 2023-02-08 RX ORDER — ROSUVASTATIN CALCIUM 20 MG/1
20 TABLET, COATED ORAL NIGHTLY
Qty: 30 TABLET | Refills: 11 | Status: SHIPPED | OUTPATIENT
Start: 2023-02-08

## 2023-02-08 RX ORDER — AMLODIPINE BESYLATE 5 MG/1
TABLET ORAL
Qty: 30 TABLET | Refills: 11 | Status: SHIPPED | OUTPATIENT
Start: 2023-02-08

## 2023-02-08 RX ORDER — LISINOPRIL 10 MG/1
TABLET ORAL
Qty: 30 TABLET | Refills: 11 | Status: SHIPPED | OUTPATIENT
Start: 2023-02-08

## 2023-02-08 SDOH — ECONOMIC STABILITY: TRANSPORTATION INSECURITY
IN THE PAST 12 MONTHS, HAS LACK OF TRANSPORTATION KEPT YOU FROM MEETINGS, WORK, OR FROM GETTING THINGS NEEDED FOR DAILY LIVING?: NO

## 2023-02-08 SDOH — ECONOMIC STABILITY: FOOD INSECURITY: WITHIN THE PAST 12 MONTHS, THE FOOD YOU BOUGHT JUST DIDN'T LAST AND YOU DIDN'T HAVE MONEY TO GET MORE.: NEVER TRUE

## 2023-02-08 SDOH — ECONOMIC STABILITY: INCOME INSECURITY: HOW HARD IS IT FOR YOU TO PAY FOR THE VERY BASICS LIKE FOOD, HOUSING, MEDICAL CARE, AND HEATING?: NOT HARD AT ALL

## 2023-02-08 SDOH — ECONOMIC STABILITY: FOOD INSECURITY: WITHIN THE PAST 12 MONTHS, YOU WORRIED THAT YOUR FOOD WOULD RUN OUT BEFORE YOU GOT MONEY TO BUY MORE.: NEVER TRUE

## 2023-02-08 SDOH — ECONOMIC STABILITY: HOUSING INSECURITY
IN THE LAST 12 MONTHS, WAS THERE A TIME WHEN YOU DID NOT HAVE A STEADY PLACE TO SLEEP OR SLEPT IN A SHELTER (INCLUDING NOW)?: NO

## 2023-02-08 ASSESSMENT — PATIENT HEALTH QUESTIONNAIRE - PHQ9
SUM OF ALL RESPONSES TO PHQ QUESTIONS 1-9: 0
2. FEELING DOWN, DEPRESSED OR HOPELESS: 0
SUM OF ALL RESPONSES TO PHQ QUESTIONS 1-9: 0
1. LITTLE INTEREST OR PLEASURE IN DOING THINGS: 0
SUM OF ALL RESPONSES TO PHQ QUESTIONS 1-9: 0
SUM OF ALL RESPONSES TO PHQ9 QUESTIONS 1 & 2: 0
SUM OF ALL RESPONSES TO PHQ QUESTIONS 1-9: 0

## 2023-02-08 ASSESSMENT — ENCOUNTER SYMPTOMS
EYES NEGATIVE: 1
GASTROINTESTINAL NEGATIVE: 1
RESPIRATORY NEGATIVE: 1

## 2023-02-08 NOTE — PROGRESS NOTES
[facility] PHYSICIAN SERVICES  [facility] [place]  [address] [address]  Dept: [phone]  Dept Fax: [phone]  Loc: [phone]    [place] John is a 39 y.o. male who presents today for his medical conditions/complaints as noted below. [place] John is c/o of Follow-up Chronic Condition      Chief Complaint   Patient presents with    Follow-up Chronic Condition       HPI:     HPI  Patient presents today for routine follow up of chronic conditions including history of DVT on coumadin, DM, and HLD. He states that he is doing well overall. He would like to discuss alt medication to [facility] because of the cost. He has been taking all of his meds as prescribed. He reports that glucose readings have been pretty stable when he does check them about 120.       Past Medical History:   Diagnosis Date    Arthritis     both wrist    Bilateral carpal tunnel syndrome 8/15/2019    Bronchitis     10 yrs ago    Diabetes mellitus ([address]     Disease of blood and blood forming organ     Hx of blood clots     Hypertension     Kidney stone     recurrent    Sleep apnea     untested    Umbilical hernia         Past Surgical History:   Procedure Laterality Date    CARPAL TUNNEL RELEASE Left 08/21/2020    LEFT CARPAL TUNNEL RELEASE performed by Saeed Reyes MD at [address] [facility] Right 07/24/2018    CYSTOSCOPY/ URETEROSCOPY; INSERTION DOUBLE J STENT/  URETERAL performed by Brandon Galeana MD at [facility]      both wrists    HEMORRHOID SURGERY N/A 06/24/2016    HEMORRHOID INCISION AND DRAINAGE performed by Jemal Coles MD at [facility] W/INSERT URETERAL STENT Right 08/07/2018    STENT PLACEMENT performed by Brandon Galeana MD at [address] [facility] W/URETEROSCOPY W/LITHOTRIPSY Right 08/07/2018    URETEROSCOPY LASER LITHOTRIPSY STONE EXTRACTION performed by Brandon Galeana MD at [facility] INCAL/INGUN REDUCIBLE N/A 05/07/2018    HERNIA UMBILICAL REPAIR LAPAROSCOPIC performed by Brittaney Alarcon MD at [facility] Right 02/13/2018    ESWL EXTRACORPEAL SHOCK WAVE LITHOTRIPSY performed by Pat Mckeon MD at [facility] Right 03/13/2018    ESWL EXTRACORPEAL SHOCK WAVE LITHOTRIPSY performed by Pat Mckeon MD at [address] [place] [address] Right 07/24/2018    ESWL [address] LITHOTRIPSY performed by Pat Mckeon MD at [place] / EMBOLECTOMY FEMORAL Left 09/20/2020     LEFT LOWER EXTREMITY VENOGRAMS; INFERIOR VENA CAVA FILTER PLACEMENT. performed by Marco Houston MD at [address] [place] [address]  12/14/2020    [facility]. Ultrasound-guided cannulation right internal jugular vein, Right internal jugular venograms to the superior vena cava,Ultrasound-guided cannulation left internal jugular vein with 12 French sheath, Inferior vena cava venograms, Retrieval of Bard inferior vena cava filter [place]), Completion inferior vena cava venograms    WRIST SURGERY      left wrist.  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[employer]   Substance Use Topics    Alcohol use: Yes     Comment: OCC        Current Outpatient Medications   Medication Sig Dispense Refill    amLODIPine (NORVASC) 5 MG tablet Take 1 tablet by mouth once daily 30 tablet 11    rosuvastatin (CRESTOR) 20 MG tablet Take 1 tablet by mouth nightly 30 tablet 11    lisinopril (PRINIVIL;ZESTRIL) 10 MG tablet Take 1 tablet by mouth once daily for 30 days 30 tablet 11    metFORMIN (GLUCOPHAGE) 500 MG tablet Take 1 tablet by mouth 2 times daily (with meals) 60 tablet 11    cyclobenzaprine (FLEXERIL) 10 MG tablet Take 1 tablet by mouth three times daily as needed for muscle spasm 21 tablet 0    tiZANidine (ZANAFLEX) 4 MG tablet TAKE 1 TABLET BY MOUTH NIGHTLY AS NEEDED 30 tablet 0 diclofenac (FLECTOR) 1.3 % PTCH patch Place 1 patch onto the skin 2 times daily 60 patch 1    warfarin (COUMADIN) 5 MG tablet Take 2 tablets by mouth once daily, or as indicated per INR results. 60 tablet 5    Semaglutide,0.25 or 0.5MG/DOS, (OZEMPIC, 0.25 OR 0.5 MG/DOSE,) 2 MG/1.5ML SOPN Inject 0.5 mg into the skin once a week 1 pen 11    diclofenac sodium (VOLTAREN) 1 % GEL Apply topically 4 times daily as needed for Pain 150 g 2     No current facility-administered medications for this visit. No Known Allergies    Family History   Problem Relation Age of Onset    Cancer Mother 46        colon cancer    Cancer Father         luekemia    Diabetes Father     Other Maternal Grandmother         copd    Other Maternal Grandfather         copd    Heart Disease Paternal Grandmother                Subjective:      Review of Systems   Constitutional: Negative. HENT: Negative. Eyes: Negative. Respiratory: Negative. Cardiovascular: Negative. Gastrointestinal: Negative. Endocrine: Negative. Genitourinary: Negative. Musculoskeletal: Negative. Skin: Negative. Neurological: Negative. Hematological: Negative. Psychiatric/Behavioral: Negative. Objective:     Physical Exam  Vitals and nursing note reviewed. Constitutional:       Appearance: Normal appearance. He is well-developed. He is obese. HENT:      Head: Normocephalic and atraumatic. Right Ear: Hearing, tympanic membrane, ear canal and external ear normal.      Left Ear: Hearing, tympanic membrane, ear canal and external ear normal.      Nose: Nose normal.      Mouth/Throat:      Lips: No lesions. Pharynx: Uvula midline. Eyes:      General: Lids are normal.      Conjunctiva/sclera: Conjunctivae normal.      Pupils: Pupils are equal, round, and reactive to light. Neck:      Thyroid: No thyroid mass or thyromegaly.       Trachea: Trachea normal.   Cardiovascular:      Rate and Rhythm: Normal rate and regular rhythm. Pulses: Normal pulses. Heart sounds: Normal heart sounds. Pulmonary:      Effort: Pulmonary effort is normal.      Breath sounds: Normal breath sounds and air entry. Abdominal:      General: Bowel sounds are normal.      Palpations: Abdomen is soft. Musculoskeletal:         General: Normal range of motion. Cervical back: Normal, normal range of motion and neck supple. No tenderness. Thoracic back: Normal. No tenderness. Normal range of motion. Lumbar back: Normal. No tenderness. Normal range of motion. Skin:     General: Skin is warm and dry. Capillary Refill: Capillary refill takes less than 2 seconds. Neurological:      Mental Status: He is alert and oriented to person, place, and time. Psychiatric:         Attention and Perception: Attention normal.         Mood and Affect: Mood and affect normal.         Speech: Speech normal.         Behavior: Behavior normal.         Thought Content: Thought content normal.         Cognition and Memory: Cognition normal.         Judgment: Judgment normal.       /80   Pulse 78   Temp 97 °F (36.1 °C)   Ht 6' (1.829 m)   Wt (!) 384 lb (174.2 kg)   SpO2 98%   BMI 52.08 kg/m²     Assessment:      Diagnosis Orders   1. Type 2 diabetes mellitus with other circulatory complication, without long-term current use of insulin ([facility])  Hemoglobin A1C    metFORMIN (GLUCOPHAGE) 500 MG tablet      2. Chronic kidney disease, unspecified CKD stage  CBC with Auto Differential    Comprehensive Metabolic Panel      3. History of DVT (deep vein thrombosis)  Protime-INR      4. Hypertension, unspecified type  amLODIPine (NORVASC) 5 MG tablet    lisinopril (PRINIVIL;ZESTRIL) 10 MG tablet      5. Mixed hyperlipidemia  rosuvastatin (CRESTOR) 20 MG tablet          No results found for this visit on 02/08/23. Plan:     1.  Type 2 diabetes mellitus with other circulatory complication, without long-term current use of insulin ([address]  Checking labs today. Refill on Metformin. Patient is to call ins to figure out which GLP1 they will cover better, Trulicity, Rybelsus, or BCise.     - Hemoglobin A1C; Future  - metFORMIN (GLUCOPHAGE) 500 MG tablet; Take 1 tablet by mouth 2 times daily (with meals)  Dispense: 60 tablet; Refill: 11    2. Chronic kidney disease, unspecified CKD stage    - CBC with Auto Differential; Future  - Comprehensive Metabolic Panel; Future    3. History of DVT (deep vein thrombosis)  Checking INR today. Patient is aware he needs to monitor this morne frequently. - Protime-INR; Future    4. Hypertension, unspecified type  Stable. Refill on all BP meds. - amLODIPine (NORVASC) 5 MG tablet; Take 1 tablet by mouth once daily  Dispense: 30 tablet; Refill: 11  - lisinopril (PRINIVIL;ZESTRIL) 10 MG tablet; Take 1 tablet by mouth once daily for 30 days  Dispense: 30 tablet; Refill: 11    5. Mixed hyperlipidemia  Continue Crestor.    - rosuvastatin (CRESTOR) 20 MG tablet; Take 1 tablet by mouth nightly  Dispense: 30 tablet; Refill: 11     Denies any bleeding or issues with Coumadin at all. Return in about 3 months (around 5/8/2023) for Diabetic Follow up, INR. Orders Placed This Encounter   Procedures    CBC with Auto Differential     Standing Status:   Future     Number of Occurrences:   1     Standing Expiration Date:   2/8/2024    Comprehensive Metabolic Panel     Standing Status:   Future     Number of Occurrences:   1     Standing Expiration Date:   2/8/2024    Hemoglobin A1C     Standing Status:   Future     Number of Occurrences:   1     Standing Expiration Date:   2/8/2024    Protime-INR     Standing Status:   Future     Number of Occurrences:   1     Standing Expiration Date:   2/8/2024     Order Specific Question:   Daily Coumadin Dose?      Answer:   5 mg       Orders Placed This Encounter   Medications    amLODIPine (NORVASC) 5 MG tablet     Sig: Take 1 tablet by mouth once daily     Dispense:  30 tablet     Refill:  11 rosuvastatin (CRESTOR) 20 MG tablet     Sig: Take 1 tablet by mouth nightly     Dispense:  30 tablet     Refill:  11    lisinopril (PRINIVIL;ZESTRIL) 10 MG tablet     Sig: Take 1 tablet by mouth once daily for 30 days     Dispense:  30 tablet     Refill:  11    metFORMIN (GLUCOPHAGE) 500 MG tablet     Sig: Take 1 tablet by mouth 2 times daily (with meals)     Dispense:  60 tablet     Refill:  11            Patient offered educational handouts and has had all questions answered. Patient voices understanding and agrees to plans along with risks and benefits of plan. Patient is instructed to continue prior meds, diet, and exercise plans as instructed. Patient agrees to follow up as instructed and sooner if needed. Patient agrees to go to ER if condition becomes emergent. EMR Dragon/transcription disclaimer: Some of this encounter note is an electronic transcription/translation of spoken language to printed text. The electronic translation of spoken language may permit erroneous, or at times, nonsensical words or phrases to be inadvertently transcribed.  Although I have reviewed the note for such errors, some may still exist.    Electronically signed by VANDANA Sloan on 2/8/2023 at 9:47 PM

## 2023-02-09 ENCOUNTER — TELEPHONE (OUTPATIENT)
Dept: FAMILY MEDICINE CLINIC | Age: 43
End: 2023-02-09

## 2023-02-09 NOTE — TELEPHONE ENCOUNTER
----- Message from VANDANA Stephen sent at 2/8/2023 10:26 PM CST -----  Please let patient know that labs have returned. INR was in range. Continue current regimen and recheck INR in 4 weeks. Hgb a1c has worsened. He needs to call ins and figure out which medication they will cover so we can get that started! Will need to closely monitor glucose readings and recheck a1c in 12 weeks.

## 2023-05-11 ENCOUNTER — PATIENT MESSAGE (OUTPATIENT)
Dept: FAMILY MEDICINE CLINIC | Age: 43
End: 2023-05-11

## 2023-05-11 ENCOUNTER — TELEPHONE (OUTPATIENT)
Dept: FAMILY MEDICINE CLINIC | Age: 43
End: 2023-05-11

## 2023-05-11 ENCOUNTER — OFFICE VISIT (OUTPATIENT)
Dept: FAMILY MEDICINE CLINIC | Age: 43
End: 2023-05-11
Payer: COMMERCIAL

## 2023-05-11 ENCOUNTER — ANTI-COAG VISIT (OUTPATIENT)
Dept: FAMILY MEDICINE CLINIC | Age: 43
End: 2023-05-11

## 2023-05-11 VITALS
SYSTOLIC BLOOD PRESSURE: 132 MMHG | WEIGHT: 315 LBS | DIASTOLIC BLOOD PRESSURE: 83 MMHG | BODY MASS INDEX: 51.81 KG/M2 | TEMPERATURE: 97.4 F | OXYGEN SATURATION: 96 % | HEART RATE: 80 BPM

## 2023-05-11 DIAGNOSIS — N18.9 CHRONIC KIDNEY DISEASE, UNSPECIFIED CKD STAGE: ICD-10-CM

## 2023-05-11 DIAGNOSIS — E11.59 TYPE 2 DIABETES MELLITUS WITH OTHER CIRCULATORY COMPLICATION, WITHOUT LONG-TERM CURRENT USE OF INSULIN (HCC): Primary | ICD-10-CM

## 2023-05-11 DIAGNOSIS — Z86.718 HISTORY OF DVT (DEEP VEIN THROMBOSIS): ICD-10-CM

## 2023-05-11 DIAGNOSIS — Z86.718 HISTORY OF DVT (DEEP VEIN THROMBOSIS): Primary | ICD-10-CM

## 2023-05-11 DIAGNOSIS — E78.2 MIXED HYPERLIPIDEMIA: ICD-10-CM

## 2023-05-11 DIAGNOSIS — I10 HYPERTENSION, UNSPECIFIED TYPE: ICD-10-CM

## 2023-05-11 DIAGNOSIS — E11.59 TYPE 2 DIABETES MELLITUS WITH OTHER CIRCULATORY COMPLICATION, WITHOUT LONG-TERM CURRENT USE OF INSULIN (HCC): ICD-10-CM

## 2023-05-11 LAB
ALBUMIN SERPL-MCNC: 4 G/DL (ref 3.5–5.2)
ALP SERPL-CCNC: 88 U/L (ref 40–130)
ALT SERPL-CCNC: 21 U/L (ref 5–41)
ANION GAP SERPL CALCULATED.3IONS-SCNC: 9 MMOL/L (ref 7–19)
AST SERPL-CCNC: 16 U/L (ref 5–40)
BASOPHILS # BLD: 0.1 K/UL (ref 0–0.2)
BASOPHILS NFR BLD: 0.9 % (ref 0–1)
BILIRUB SERPL-MCNC: 0.3 MG/DL (ref 0.2–1.2)
BUN SERPL-MCNC: 8 MG/DL (ref 6–20)
CALCIUM SERPL-MCNC: 9 MG/DL (ref 8.6–10)
CHLORIDE SERPL-SCNC: 106 MMOL/L (ref 98–111)
CHOLEST SERPL-MCNC: 138 MG/DL (ref 160–199)
CO2 SERPL-SCNC: 26 MMOL/L (ref 22–29)
CREAT SERPL-MCNC: 0.7 MG/DL (ref 0.5–1.2)
EOSINOPHIL # BLD: 0.2 K/UL (ref 0–0.6)
EOSINOPHIL NFR BLD: 3.5 % (ref 0–5)
ERYTHROCYTE [DISTWIDTH] IN BLOOD BY AUTOMATED COUNT: 14.8 % (ref 11.5–14.5)
GLUCOSE SERPL-MCNC: 175 MG/DL (ref 74–109)
HBA1C MFR BLD: 7.8 % (ref 4–6)
HCT VFR BLD AUTO: 49.8 % (ref 42–52)
HDLC SERPL-MCNC: 36 MG/DL (ref 55–121)
HGB BLD-MCNC: 15.3 G/DL (ref 14–18)
IMM GRANULOCYTES # BLD: 0 K/UL
INR PPP: 1.37 (ref 0.88–1.18)
LDLC SERPL CALC-MCNC: 69 MG/DL
LYMPHOCYTES # BLD: 1.7 K/UL (ref 1.1–4.5)
LYMPHOCYTES NFR BLD: 26 % (ref 20–40)
MCH RBC QN AUTO: 27.6 PG (ref 27–31)
MCHC RBC AUTO-ENTMCNC: 30.7 G/DL (ref 33–37)
MCV RBC AUTO: 89.9 FL (ref 80–94)
MONOCYTES # BLD: 0.5 K/UL (ref 0–0.9)
MONOCYTES NFR BLD: 8.1 % (ref 0–10)
NEUTROPHILS # BLD: 4 K/UL (ref 1.5–7.5)
NEUTS SEG NFR BLD: 61.2 % (ref 50–65)
PLATELET # BLD AUTO: 210 K/UL (ref 130–400)
PMV BLD AUTO: 10 FL (ref 9.4–12.4)
POTASSIUM SERPL-SCNC: 4.1 MMOL/L (ref 3.5–5)
PROT SERPL-MCNC: 6.9 G/DL (ref 6.6–8.7)
PROTHROMBIN TIME: 16.4 SEC (ref 12–14.6)
RBC # BLD AUTO: 5.54 M/UL (ref 4.7–6.1)
SODIUM SERPL-SCNC: 141 MMOL/L (ref 136–145)
TRIGL SERPL-MCNC: 165 MG/DL (ref 0–149)
TSH SERPL DL<=0.005 MIU/L-ACNC: 1.64 UIU/ML (ref 0.35–5.5)
WBC # BLD AUTO: 6.6 K/UL (ref 4.8–10.8)

## 2023-05-11 PROCEDURE — 3051F HG A1C>EQUAL 7.0%<8.0%: CPT | Performed by: NURSE PRACTITIONER

## 2023-05-11 PROCEDURE — 3079F DIAST BP 80-89 MM HG: CPT | Performed by: NURSE PRACTITIONER

## 2023-05-11 PROCEDURE — 99214 OFFICE O/P EST MOD 30 MIN: CPT | Performed by: NURSE PRACTITIONER

## 2023-05-11 PROCEDURE — 3075F SYST BP GE 130 - 139MM HG: CPT | Performed by: NURSE PRACTITIONER

## 2023-05-11 RX ORDER — ORAL SEMAGLUTIDE 7 MG/1
7 TABLET ORAL DAILY
Qty: 30 TABLET | Refills: 2 | Status: SHIPPED | OUTPATIENT
Start: 2023-05-11

## 2023-05-11 ASSESSMENT — ENCOUNTER SYMPTOMS
EYES NEGATIVE: 1
RESPIRATORY NEGATIVE: 1
GASTROINTESTINAL NEGATIVE: 1

## 2023-05-11 NOTE — PROGRESS NOTES
86/58/4099    HERNIA UMBILICAL REPAIR LAPAROSCOPIC performed by Sade Cali MD at [place] [address] Right 02/13/2018    ESWL EXTRACORPEAL SHOCK WAVE LITHOTRIPSY performed by Anthony Wilkins MD at [place] [address] Right 03/13/2018    ESWL EXTRACORPEAL SHOCK WAVE LITHOTRIPSY performed by Anthony Wilkins MD at [place] [address] Right 07/24/2018    ESWL [address] LITHOTRIPSY [facility] EMB[facility]AL Left 09/20/2020     LEFT LOWER EXTREMITY VENOGRAMS; INFERIOR VENA CAVA FILTER PLACEMENT. performed by Samara Lu MD at [address] [place] [address]  12/14/2020    S. Ultrasound-guided cannulation right internal jugular vein, Right internal jugular venograms to the superior vena cava,Ultrasound-guided cannulation left internal jugular vein with 12 Dominican sheath, Inferior vena cava venograms, Retrieval of Bard inferior vena cava filter Marea Fontan), Completion inferior vena cava venograms    WRIST SURGERY      left wrist.  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[employer]   Substance Use Topics    Alcohol use: Yes     Comment: OCC        Current Outpatient Medications   Medication Sig Dispense Refill    Semaglutide (RYBELSUS) 7 MG TABS Take 7 mg by mouth daily 30 tablet 2    warfarin (COUMADIN) 5 MG tablet TAKE 2 TABLETS BY MOUTH ONCE DAILY OR  AS  INDICATED  PER  INR  RESULTS 60 tablet 0    amLODIPine (NORVASC) 5 MG tablet Take 1 tablet by mouth once daily 30 tablet 11    rosuvastatin (CRESTOR) 20 MG tablet Take 1 tablet by mouth nightly 30 tablet 11    lisinopril (PRINIVIL;ZESTRIL) 10 MG tablet Take 1 tablet by mouth once daily for 30 days 30 tablet 11    metFORMIN (GLUCOPHAGE) 500 MG tablet Take 1 tablet by mouth 2 times daily (with meals) 60 tablet 11

## 2023-05-11 NOTE — PROGRESS NOTES
Called patient, spoke with: Patient regarding the results of the patients most recent PT/INR. I advised Patient of Radha Lim recommendations. Patient did voice understanding      ----- Message from VANDANA Zabala sent at 5/11/2023  2:11 PM CDT -----  Please let patient know that labs have returned. A1c was not any better. It is 7.8%  start the Rybelsus that was given  INR was too low. Increase dose. Please verify what he is currently taking. Will want to double that tonight and will adjust tomorrow dose based on what he is currently taking       Breana Alexander MA Medical Assistant Signed     2:28 PM     Copy     Called patient and he understands results. He is taking 10 mg now and is going to double that for tonight. VANDANA Zabala Nurse Practitioner Signed     4:05 PM     Copy     Have patient take 15 mg tomorrow night then go back to his normal 10 mg.  Recheck INR next week.

## 2023-05-11 NOTE — TELEPHONE ENCOUNTER
From: Juanito Mead  To: Yasmin Tafoya  Sent: 5/11/2023 11:55 AM CDT  Subject: Question regarding PROTIME-INR    Do I need to alter my dosage of warfarin

## 2023-05-11 NOTE — TELEPHONE ENCOUNTER
Called patient and he understands results. He is taking 10 mg now and is going to double that for tonight.

## 2023-05-11 NOTE — TELEPHONE ENCOUNTER
----- Message from VANDANA Sanchez sent at 5/11/2023  2:11 PM CDT -----  Please let patient know that labs have returned. A1c was not any better. It is 7.8%  start the Rybelsus that was given  INR was too low. Increase dose. Please verify what he is currently taking.   Will want to double that tonight and will adjust tomorrow dose based on what he is currently taking

## 2023-05-15 NOTE — TELEPHONE ENCOUNTER
Praveen Epp called requesting a refill of the below medication which has been pended for you:     Requested Prescriptions     Pending Prescriptions Disp Refills    warfarin (COUMADIN) 5 MG tablet 60 tablet 0       Last Appointment Date: 5/11/2023  Next Appointment Date: 8/8/2023    No Known Allergies

## 2023-05-16 RX ORDER — WARFARIN SODIUM 5 MG/1
TABLET ORAL
Qty: 60 TABLET | Refills: 0 | Status: SHIPPED | OUTPATIENT
Start: 2023-05-16

## 2023-05-17 ENCOUNTER — ANTI-COAG VISIT (OUTPATIENT)
Dept: FAMILY MEDICINE CLINIC | Age: 43
End: 2023-05-17

## 2023-05-17 ENCOUNTER — TELEPHONE (OUTPATIENT)
Dept: FAMILY MEDICINE CLINIC | Age: 43
End: 2023-05-17

## 2023-05-17 DIAGNOSIS — Z51.81 ENCOUNTER FOR MONITORING COUMADIN THERAPY: ICD-10-CM

## 2023-05-17 DIAGNOSIS — Z86.718 HISTORY OF DVT (DEEP VEIN THROMBOSIS): ICD-10-CM

## 2023-05-17 DIAGNOSIS — Z79.01 ENCOUNTER FOR MONITORING COUMADIN THERAPY: ICD-10-CM

## 2023-05-17 DIAGNOSIS — E11.59 TYPE 2 DIABETES MELLITUS WITH OTHER CIRCULATORY COMPLICATION, WITHOUT LONG-TERM CURRENT USE OF INSULIN (HCC): ICD-10-CM

## 2023-05-17 LAB
CREAT UR-MCNC: 139 MG/DL (ref 4.2–622)
INR PPP: 2.48 (ref 0.88–1.18)
MICROALBUMIN UR-MCNC: 1.7 MG/DL (ref 0–19)
MICROALBUMIN/CREAT UR-RTO: 12.2 MG/G
PROTHROMBIN TIME: 26 SEC (ref 12–14.6)

## 2023-05-17 NOTE — PROGRESS NOTES
----- Message from VANDANA Boykin sent at 5/17/2023  3:34 PM CDT -----  Please let patient know that INR was 2.48. Conitnue current regimen and recheck again in 1 week.      Pt aware

## 2023-05-17 NOTE — TELEPHONE ENCOUNTER
----- Message from VANDANA Welch sent at 5/17/2023  3:34 PM CDT -----  Please let patient know that INR was 2.48. Conitnue current regimen and recheck again in 1 week.

## 2023-06-21 RX ORDER — WARFARIN SODIUM 5 MG/1
TABLET ORAL
Qty: 60 TABLET | Refills: 0 | Status: SHIPPED | OUTPATIENT
Start: 2023-06-21

## 2023-06-21 NOTE — TELEPHONE ENCOUNTER
Fco Fernandez called requesting a refill of the below medication which has been pended for you:     Requested Prescriptions     Pending Prescriptions Disp Refills    warfarin (COUMADIN) 5 MG tablet [Pharmacy Med Name: Warfarin Sodium 5 MG Oral Tablet] 60 tablet 0     Sig: TAKE 2 TABLETS BY MOUTH ONCE DAILY OR  AS  INDICATED  PER  INR  INSTRUCTIONS       Last Appointment Date: 5/11/2023  Next Appointment Date: 8/8/2023    No Known Allergies

## 2023-07-21 RX ORDER — WARFARIN SODIUM 5 MG/1
TABLET ORAL
Qty: 60 TABLET | Refills: 0 | Status: SHIPPED | OUTPATIENT
Start: 2023-07-21

## 2023-08-08 ENCOUNTER — OFFICE VISIT (OUTPATIENT)
Dept: FAMILY MEDICINE CLINIC | Age: 43
End: 2023-08-08
Payer: COMMERCIAL

## 2023-08-08 ENCOUNTER — ANTI-COAG VISIT (OUTPATIENT)
Dept: FAMILY MEDICINE CLINIC | Age: 43
End: 2023-08-08

## 2023-08-08 VITALS
HEIGHT: 72 IN | TEMPERATURE: 97.8 F | HEART RATE: 68 BPM | WEIGHT: 315 LBS | OXYGEN SATURATION: 96 % | DIASTOLIC BLOOD PRESSURE: 70 MMHG | BODY MASS INDEX: 42.66 KG/M2 | SYSTOLIC BLOOD PRESSURE: 128 MMHG

## 2023-08-08 DIAGNOSIS — E11.59 TYPE 2 DIABETES MELLITUS WITH OTHER CIRCULATORY COMPLICATION, WITHOUT LONG-TERM CURRENT USE OF INSULIN (HCC): Primary | ICD-10-CM

## 2023-08-08 DIAGNOSIS — Z86.718 HISTORY OF DVT (DEEP VEIN THROMBOSIS): ICD-10-CM

## 2023-08-08 DIAGNOSIS — I10 PRIMARY HYPERTENSION: ICD-10-CM

## 2023-08-08 LAB
HBA1C MFR BLD: 7.8 %
INR PPP: 2.65 (ref 0.88–1.18)
PROTHROMBIN TIME: 27.6 SEC (ref 12–14.6)

## 2023-08-08 PROCEDURE — 3074F SYST BP LT 130 MM HG: CPT | Performed by: NURSE PRACTITIONER

## 2023-08-08 PROCEDURE — 3078F DIAST BP <80 MM HG: CPT | Performed by: NURSE PRACTITIONER

## 2023-08-08 PROCEDURE — 99214 OFFICE O/P EST MOD 30 MIN: CPT | Performed by: NURSE PRACTITIONER

## 2023-08-08 PROCEDURE — 3051F HG A1C>EQUAL 7.0%<8.0%: CPT | Performed by: NURSE PRACTITIONER

## 2023-08-08 RX ORDER — ORAL SEMAGLUTIDE 14 MG/1
14 TABLET ORAL DAILY
Qty: 30 TABLET | Refills: 5 | Status: SHIPPED | OUTPATIENT
Start: 2023-08-08

## 2023-08-08 ASSESSMENT — ENCOUNTER SYMPTOMS
EYES NEGATIVE: 1
RESPIRATORY NEGATIVE: 1
GASTROINTESTINAL NEGATIVE: 1

## 2023-08-08 NOTE — PROGRESS NOTES
Called patient, spoke with: Patient regarding the results of the patients most recent PT/INR. I advised Patient of Cynthia Murillo recommendations. Patient did voice understanding      VANDANA Lara  P [address] [facility] Staff  INR was great.   Continue current regimen and recheck in 1 month

## 2023-08-08 NOTE — PROGRESS NOTES
[facility] PHYSICIAN SERVICES  [facility]  [address] [address]  Dept: [phone]  Dept Fax: [phone]  Loc: [phone]    Bryan Kaufman is a 39 y.o. male who presents today for his medical conditions/complaints as noted below. Bryan Kaufman is c/o of Follow-up Chronic Condition      Chief Complaint   Patient presents with    Follow-up Chronic Condition       HPI:     HPI  Patient presents today for routine follow up of chronic conditions including DM, HTN, and need for anticoagulation. He has no new complaints to discuss today. He has been taking his Rybelsus and does feel like this medication is working. He like this better than the Ozempic. He has been taking the Warfarin without missing dosages.      Past Medical History:   Diagnosis Date    Arthritis     both wrist    Bilateral carpal tunnel syndrome 8/15/2019    Bronchitis     10 yrs ago    Diabetes mellitus ([address])     Disease of blood and blood forming organ     Hx of blood clots     Hypertension     Kidney stone     recurrent    Sleep apnea     untested    Umbilical hernia         Past Surgical History:   Procedure Laterality Date    CARPAL TUNNEL RELEASE Left 08/21/2020    LEFT CARPAL TUNNEL RELEASE performed by Michele Coulter MD at [address] Right 07/24/2018    CYSTOSCOPY/ URETEROSCOPY; INSERTION DOUBLE J STENT/  URETERAL performed by Sourav Hawk MD at [address]      both wrists    HEMORRHOID SURGERY N/A 06/24/2016    HEMORRHOID INCISION AND DRAINAGE performed by Shonna Ricketts MD at [facility] W/INSERT URETERAL STENT Right 08/07/2018    STENT PLACEMENT performed by Sourav Hawk MD at [facility] W/URETEROSCOPY W/LITHOTRIPSY Right 08/07/2018    URETEROSCOPY LASER LITHOTRIPSY STONE EXTRACTION performed by Sourav Hawk MD at [address] INCAL/INGUN REDUCIBLE N/A 57/61/9540    HERNIA UMBILICAL REPAIR LAPAROSCOPIC performed

## 2023-08-24 RX ORDER — WARFARIN SODIUM 5 MG/1
TABLET ORAL
Qty: 60 TABLET | Refills: 0 | Status: SHIPPED | OUTPATIENT
Start: 2023-08-24

## 2023-08-24 NOTE — TELEPHONE ENCOUNTER
Received fax from pharmacy requesting refill on pts medication(s). Pt was last seen in office on 8/8/2023  and has a follow up scheduled for 11/8/2023. Will send request to  [employer]  for authorization.      Requested Prescriptions     Pending Prescriptions Disp Refills    warfarin (COUMADIN) 5 MG tablet [Pharmacy Med Name: Warfarin Sodium 5 MG Oral Tablet] 60 tablet 0     Sig: TAKE 2 TABLETS BY MOUTH ONCE DAILY OR  AS  DIRECTED  PER  INR  INSTRUCTIONS

## 2023-09-26 DIAGNOSIS — I82.412 DVT OF DEEP FEMORAL VEIN, LEFT (HCC): Primary | ICD-10-CM

## 2023-09-26 DIAGNOSIS — D68.51 HETEROZYGOUS FACTOR V LEIDEN MUTATION (HCC): ICD-10-CM

## 2023-09-27 ENCOUNTER — OFFICE VISIT (OUTPATIENT)
Dept: HEMATOLOGY | Age: 43
End: 2023-09-27
Payer: COMMERCIAL

## 2023-09-27 ENCOUNTER — HOSPITAL ENCOUNTER (OUTPATIENT)
Dept: INFUSION THERAPY | Age: 43
Discharge: HOME | End: 2023-09-27
Payer: COMMERCIAL

## 2023-09-27 VITALS
SYSTOLIC BLOOD PRESSURE: 120 MMHG | WEIGHT: 315 LBS | HEART RATE: 78 BPM | BODY MASS INDEX: 42.66 KG/M2 | HEIGHT: 72 IN | DIASTOLIC BLOOD PRESSURE: 84 MMHG | OXYGEN SATURATION: 95 %

## 2023-09-27 DIAGNOSIS — I82.412 DVT OF DEEP FEMORAL VEIN, LEFT (HCC): ICD-10-CM

## 2023-09-27 DIAGNOSIS — E66.01 MORBID OBESITY (HCC): ICD-10-CM

## 2023-09-27 DIAGNOSIS — D50.9 NORMOCYTIC HYPOCHROMIC ANEMIA: ICD-10-CM

## 2023-09-27 DIAGNOSIS — I26.93 SINGLE SUBSEGMENTAL PULMONARY EMBOLISM WITHOUT ACUTE COR PULMONALE (HCC): ICD-10-CM

## 2023-09-27 DIAGNOSIS — I82.412 DVT OF DEEP FEMORAL VEIN, LEFT (HCC): Primary | ICD-10-CM

## 2023-09-27 DIAGNOSIS — D68.51 HETEROZYGOUS FACTOR V LEIDEN MUTATION (HCC): ICD-10-CM

## 2023-09-27 LAB
BASOPHILS # BLD: 0.05 K/UL (ref 0.01–0.08)
BASOPHILS NFR BLD: 0.6 % (ref 0.1–1.2)
EOSINOPHIL # BLD: 0.22 K/UL (ref 0.04–0.54)
EOSINOPHIL NFR BLD: 2.7 % (ref 0.7–7)
ERYTHROCYTE [DISTWIDTH] IN BLOOD BY AUTOMATED COUNT: 14.7 % (ref 11.6–14.4)
FERRITIN SERPL-MCNC: 108.8 NG/ML (ref 30–400)
FOLATE SERPL-MCNC: 6.9 NG/ML (ref 4.5–32.2)
HCT VFR BLD AUTO: 45.5 % (ref 40.1–51)
HGB BLD-MCNC: 15.2 G/DL (ref 13.7–17.5)
IRON SATN MFR SERPL: 13 % (ref 14–50)
IRON SERPL-MCNC: 46 UG/DL (ref 59–158)
LYMPHOCYTES # BLD: 1.72 K/UL (ref 1.18–3.74)
LYMPHOCYTES NFR BLD: 21.2 % (ref 19.3–53.1)
MCH RBC QN AUTO: 28.3 PG (ref 25.7–32.2)
MCHC RBC AUTO-ENTMCNC: 33.4 G/DL (ref 32.3–36.5)
MCV RBC AUTO: 84.7 FL (ref 79–92.2)
MONOCYTES # BLD: 0.51 K/UL (ref 0.24–0.82)
MONOCYTES NFR BLD: 6.3 % (ref 4.7–12.5)
NEUTROPHILS # BLD: 5.59 K/UL (ref 1.56–6.13)
NEUTS SEG NFR BLD: 69 % (ref 34–71.1)
PLATELET # BLD AUTO: 195 K/UL (ref 163–337)
PMV BLD AUTO: 9.1 FL (ref 7.4–10.4)
RBC # BLD AUTO: 5.37 M/UL (ref 4.63–6.08)
TIBC SERPL-MCNC: 349 UG/DL (ref 250–400)
VIT B12 SERPL-MCNC: 300 PG/ML (ref 211–946)
WBC # BLD AUTO: 8.11 K/UL (ref 4.23–9.07)

## 2023-09-27 PROCEDURE — 85025 COMPLETE CBC W/AUTO DIFF WBC: CPT

## 2023-09-27 PROCEDURE — 36415 COLL VENOUS BLD VENIPUNCTURE: CPT

## 2023-09-27 PROCEDURE — 99213 OFFICE O/P EST LOW 20 MIN: CPT | Performed by: PHYSICIAN ASSISTANT

## 2023-09-27 PROCEDURE — 99212 OFFICE O/P EST SF 10 MIN: CPT

## 2023-09-27 ASSESSMENT — ENCOUNTER SYMPTOMS
VOMITING: 0
DIARRHEA: 0
TROUBLE SWALLOWING: 0
ABDOMINAL DISTENTION: 0
NAUSEA: 0
PHOTOPHOBIA: 0
WHEEZING: 0
COLOR CHANGE: 1
SORE THROAT: 0
SHORTNESS OF BREATH: 0
EYE DISCHARGE: 0
CONSTIPATION: 0
EYE ITCHING: 0
VOICE CHANGE: 0
ABDOMINAL PAIN: 0
BLOOD IN STOOL: 0
BACK PAIN: 0
COUGH: 0

## 2023-09-27 NOTE — PROGRESS NOTES
Progress Note      Pt Name: Shilpi Gonzalez  YOB: 1980  MRN: [id number]    Date of evaluation: 9/27/2023  History Obtained From:  patient, electronic medical record    CHIEF COMPLAINT:    Chief Complaint   Patient presents with    Follow-up     DVT     Current active problems  LLE DVT  Right-sided PE  Heterozygous factor V Leiden  Morbid obesity    HISTORY OF PRESENT ILLNESS:    Shilpi Gonzalez is a 39 y.o.  male was seen on 2 separate occasions during acute hospitalizations at [address] [place] [address] on 9/20/2020 for a left lower extremity DVT and right pulmonary embolus. He did have thrombolytic treatment of the DVT by Dr. Lilian Walker during this initial hospitalization. Prothrombotic panel did reveal a heterozygous factor V Leiden. Dr. Lilian Walker placed a IVC filter during that hospitalization and then subsequently removed his Bard IVC filter on 12/14/2020. He continues on warfarin with INR running in the 10.6 range. He has chronic edema of the left lower extremity and does wear compression stockings religiously. He denies any wounds on his legs. He denies any new shortness of breath. He is diabetic, A1c 7.8 on 8/8/2023. He is on Glucophage 500 twice daily and Rybelsus weekly. Blood pressure has been stable on amlodipine 5 mg daily, lisinopril 10 mg daily. Continues on rosuvastatin 20 at night. HEMATOLOGICAL HISTORY: Left lower extremity DVT with right lung pulmonary emboli 9/19/2020, heterozygous factor V Leiden  Adilene Nolasco was seen in initial hematology consultation on 9/20/2020 as an inpatient at [facility] having been admitted on 9/19/2020 with left lower extremity DVT and PE. He was placed on heparin as initial management. Mr. Lorna He had a 5-day history of left leg pain and swelling. Initially he thought he had pulled a muscle but this did not get better. When things did not get better, he sought evaluation at the ED at [address]   Additionally he has nonspecific symptoms of

## 2023-09-28 ENCOUNTER — TELEPHONE (OUTPATIENT)
Dept: HEMATOLOGY | Age: 43
End: 2023-09-28

## 2023-09-28 DIAGNOSIS — D50.8 OTHER IRON DEFICIENCY ANEMIA: Primary | ICD-10-CM

## 2023-09-28 DIAGNOSIS — D50.9 NORMOCYTIC HYPOCHROMIC ANEMIA: ICD-10-CM

## 2023-09-28 DIAGNOSIS — R31.9 HEMATURIA, UNSPECIFIED TYPE: Primary | ICD-10-CM

## 2023-09-28 RX ORDER — FERROUS SULFATE 325(65) MG
325 TABLET ORAL
Qty: 30 TABLET | Refills: 1 | Status: SHIPPED | OUTPATIENT
Start: 2023-09-28

## 2023-09-28 NOTE — TELEPHONE ENCOUNTER
----- Message from Betzaida Mcclelland PA-C sent at 9/28/2023 10:28 AM CDT -----  Let him know that his iron level. Start him on ferrous sulfate 1 capsule daily. He needs FOBT x3 and urinalysis to rule out hematuria.

## 2023-09-28 NOTE — TELEPHONE ENCOUNTER
----- Message from Manjit Gutierrez PA-C sent at 9/28/2023 10:28 AM CDT -----  Let him know that his iron level. Start him on ferrous sulfate 1 capsule daily. He needs FOBT x3 and urinalysis to rule out hematuria.

## 2023-10-02 RX ORDER — WARFARIN SODIUM 5 MG/1
TABLET ORAL
Qty: 60 TABLET | Refills: 0 | Status: SHIPPED | OUTPATIENT
Start: 2023-10-02

## 2023-10-02 NOTE — TELEPHONE ENCOUNTER
Katejessie Odalis called to request a refill on his medication.       Last office visit : 8/8/2023   Next office visit : 11/8/2023     Requested Prescriptions     Pending Prescriptions Disp Refills    warfarin (COUMADIN) 5 MG tablet [Pharmacy Med Name: Warfarin Sodium 5 MG Oral Tablet] 60 tablet 0     Sig: TAKE 2 TABLETS BY MOUTH ONCE DAILY AS DIRECTED            Kush Wiggins MA

## 2023-10-03 DIAGNOSIS — D50.9 NORMOCYTIC HYPOCHROMIC ANEMIA: ICD-10-CM

## 2023-10-03 DIAGNOSIS — R31.9 HEMATURIA, UNSPECIFIED TYPE: ICD-10-CM

## 2023-10-03 LAB
BACTERIA URNS QL MICRO: ABNORMAL /HPF
BILIRUB UR QL STRIP: NEGATIVE
CLARITY UR: CLEAR
COLOR UR: YELLOW
CRYSTALS URNS MICRO: ABNORMAL /HPF
EPI CELLS #/AREA URNS AUTO: 0 /HPF (ref 0–5)
GLUCOSE UR STRIP.AUTO-MCNC: NEGATIVE MG/DL
HGB UR STRIP.AUTO-MCNC: ABNORMAL MG/L
HYALINE CASTS #/AREA URNS AUTO: 22 /HPF (ref 0–8)
KETONES UR STRIP.AUTO-MCNC: NEGATIVE MG/DL
LEUKOCYTE ESTERASE UR QL STRIP.AUTO: ABNORMAL
NITRITE UR QL STRIP.AUTO: NEGATIVE
PH UR STRIP.AUTO: 6 [PH] (ref 5–8)
PROT UR STRIP.AUTO-MCNC: ABNORMAL MG/DL
RBC #/AREA URNS AUTO: 3 /HPF (ref 0–4)
SP GR UR STRIP.AUTO: 1.03 (ref 1–1.03)
UROBILINOGEN UR STRIP.AUTO-MCNC: 0.2 E.U./DL
WBC #/AREA URNS AUTO: 81 /HPF (ref 0–5)

## 2023-10-05 ENCOUNTER — TELEPHONE (OUTPATIENT)
Dept: INFUSION THERAPY | Age: 43
End: 2023-10-05

## 2023-10-05 DIAGNOSIS — R82.79 URINE CULTURE POSITIVE: Primary | ICD-10-CM

## 2023-10-05 RX ORDER — NITROFURANTOIN 25; 75 MG/1; MG/1
100 CAPSULE ORAL 2 TIMES DAILY
Qty: 20 CAPSULE | Refills: 0 | Status: SHIPPED | OUTPATIENT
Start: 2023-10-05 | End: 2023-10-15

## 2023-10-05 NOTE — TELEPHONE ENCOUNTER
----- Message from Leonardo Julian PA-C sent at 10/5/2023  3:10 PM CDT -----  Macrobid 100 p.o. twice daily for 10 days

## 2023-10-06 LAB
BACTERIA UR CULT: ABNORMAL
ORGANISM: ABNORMAL
ORGANISM: ABNORMAL

## 2023-10-11 DIAGNOSIS — D50.9 NORMOCYTIC HYPOCHROMIC ANEMIA: ICD-10-CM

## 2023-10-11 LAB
HEMOCCULT SP1 STL QL: NORMAL
HEMOCCULT SP2 STL QL: NORMAL
HEMOCCULT SP3 STL QL: NORMAL

## 2023-11-08 ENCOUNTER — OFFICE VISIT (OUTPATIENT)
Dept: FAMILY MEDICINE CLINIC | Age: 43
End: 2023-11-08
Payer: COMMERCIAL

## 2023-11-08 VITALS
TEMPERATURE: 98 F | WEIGHT: 315 LBS | BODY MASS INDEX: 42.66 KG/M2 | HEIGHT: 72 IN | HEART RATE: 70 BPM | SYSTOLIC BLOOD PRESSURE: 120 MMHG | DIASTOLIC BLOOD PRESSURE: 80 MMHG | OXYGEN SATURATION: 98 %

## 2023-11-08 DIAGNOSIS — I10 PRIMARY HYPERTENSION: ICD-10-CM

## 2023-11-08 DIAGNOSIS — Z86.718 HISTORY OF DVT (DEEP VEIN THROMBOSIS): ICD-10-CM

## 2023-11-08 DIAGNOSIS — N18.9 CHRONIC KIDNEY DISEASE, UNSPECIFIED CKD STAGE: ICD-10-CM

## 2023-11-08 DIAGNOSIS — E11.59 TYPE 2 DIABETES MELLITUS WITH OTHER CIRCULATORY COMPLICATION, WITHOUT LONG-TERM CURRENT USE OF INSULIN (HCC): Primary | ICD-10-CM

## 2023-11-08 DIAGNOSIS — E11.59 TYPE 2 DIABETES MELLITUS WITH OTHER CIRCULATORY COMPLICATION, WITHOUT LONG-TERM CURRENT USE OF INSULIN (HCC): ICD-10-CM

## 2023-11-08 LAB
ERYTHROCYTE [DISTWIDTH] IN BLOOD BY AUTOMATED COUNT: 15 % (ref 11.5–14.5)
HBA1C MFR BLD: 6.7 % (ref 4–6)
HCT VFR BLD AUTO: 49.3 % (ref 42–52)
HGB BLD-MCNC: 15.1 G/DL (ref 14–18)
INR PPP: 1.83 (ref 0.88–1.18)
MCH RBC QN AUTO: 28.1 PG (ref 27–31)
MCHC RBC AUTO-ENTMCNC: 30.6 G/DL (ref 33–37)
MCV RBC AUTO: 91.6 FL (ref 80–94)
PLATELET # BLD AUTO: 219 K/UL (ref 130–400)
PMV BLD AUTO: 9.9 FL (ref 9.4–12.4)
PROTHROMBIN TIME: 20.7 SEC (ref 12–14.6)
RBC # BLD AUTO: 5.38 M/UL (ref 4.7–6.1)
WBC # BLD AUTO: 7.8 K/UL (ref 4.8–10.8)

## 2023-11-08 PROCEDURE — 3079F DIAST BP 80-89 MM HG: CPT | Performed by: NURSE PRACTITIONER

## 2023-11-08 PROCEDURE — 3074F SYST BP LT 130 MM HG: CPT | Performed by: NURSE PRACTITIONER

## 2023-11-08 PROCEDURE — 3051F HG A1C>EQUAL 7.0%<8.0%: CPT | Performed by: NURSE PRACTITIONER

## 2023-11-08 PROCEDURE — 99214 OFFICE O/P EST MOD 30 MIN: CPT | Performed by: NURSE PRACTITIONER

## 2023-11-08 RX ORDER — WARFARIN SODIUM 5 MG/1
TABLET ORAL
Qty: 60 TABLET | Refills: 5 | Status: SHIPPED | OUTPATIENT
Start: 2023-11-08

## 2023-11-08 ASSESSMENT — ENCOUNTER SYMPTOMS
GASTROINTESTINAL NEGATIVE: 1
EYES NEGATIVE: 1
RESPIRATORY NEGATIVE: 1

## 2023-11-08 NOTE — PROGRESS NOTES
[facility] PHYSICIAN SERVICES  [facility]  [address] [address]  Dept: [phone]  Dept Fax: [phone]  Loc: [phone]    Bryan Kaufman is a 39 y.o. male who presents today for his medical conditions/complaints as noted below. Bryan Kaufman is c/o of Follow-up Chronic Condition      Chief Complaint   Patient presents with    Follow-up Chronic Condition       HPI:     HPI  Patient presents today for routine follow up of chronic conditions including chronic anticoagulation, history of DVT/PE, diabetes. Patient has been taking medications as prescribed. During last visit we did increase Rybelsus up to 14 mg daily. Patient has not been monitoring blood sugars at home. He denies any side effects however with the increase of the Rybelsus. Does not monitor blood pressure at home either. He is taking his Coumadin daily without missing a dose other than one time last week.     Past Medical History:   Diagnosis Date    Arthritis     both wrist    Bilateral carpal tunnel syndrome 8/15/2019    Bronchitis     10 yrs ago    Diabetes mellitus ([address] )     Disease of blood and blood forming organ     Hx of blood clots     Hypertension     Kidney stone     recurrent    Sleep apnea     untested    Umbilical hernia         Past Surgical History:   Procedure Laterality Date    CARPAL TUNNEL RELEASE Left 08/21/2020    LEFT CARPAL TUNNEL RELEASE performed by Michele Coulter MD at [address] [facility] Right 07/24/2018    CYSTOSCOPY/ URETEROSCOPY; INSERTION DOUBLE J STENT/  URETERAL performed by Sourav Hawk MD at [address]      both wrists    HEMORRHOID SURGERY N/A 06/24/2016    HEMORRHOID INCISION AND DRAINAGE performed by Shonna Ricketts MD at [facility] W/INSERT URETERAL STENT Right 08/07/2018    STENT PLACEMENT performed by Sourav Hawk MD at [facility] W/URETEROSCOPY W/LITHOTRIPSY Right 08/07/2018    URETEROSCOPY LASER LITHOTRIPSY STONE

## 2023-11-09 ENCOUNTER — ANTI-COAG VISIT (OUTPATIENT)
Dept: FAMILY MEDICINE CLINIC | Age: 43
End: 2023-11-09

## 2023-11-09 ENCOUNTER — TELEPHONE (OUTPATIENT)
Dept: FAMILY MEDICINE CLINIC | Age: 43
End: 2023-11-09

## 2023-11-09 LAB
ALBUMIN SERPL-MCNC: 3.8 G/DL (ref 3.5–5.2)
ALP SERPL-CCNC: 83 U/L (ref 40–130)
ALT SERPL-CCNC: 24 U/L (ref 5–41)
ANION GAP SERPL CALCULATED.3IONS-SCNC: 10 MMOL/L (ref 7–19)
AST SERPL-CCNC: 21 U/L (ref 5–40)
BILIRUB SERPL-MCNC: 0.3 MG/DL (ref 0.2–1.2)
BUN SERPL-MCNC: 11 MG/DL (ref 6–20)
CALCIUM SERPL-MCNC: 9 MG/DL (ref 8.6–10)
CHLORIDE SERPL-SCNC: 106 MMOL/L (ref 98–111)
CO2 SERPL-SCNC: 25 MMOL/L (ref 22–29)
CREAT SERPL-MCNC: 0.6 MG/DL (ref 0.5–1.2)
GLUCOSE SERPL-MCNC: 91 MG/DL (ref 74–109)
POTASSIUM SERPL-SCNC: 4.8 MMOL/L (ref 3.5–5)
PROT SERPL-MCNC: 6.6 G/DL (ref 6.6–8.7)
SODIUM SERPL-SCNC: 141 MMOL/L (ref 136–145)

## 2023-11-09 NOTE — PROGRESS NOTES
A1c has improved to 6.7%! From the 7.8% last time!!   CBC was stable   INR was too thick. Tell him to take 5 additional mg tonight so a total of 15 mg tonight and then 10 mg all other days. (Make sure 10 mg daily is what he was taking)   Recheck INR in 1 week and let him know he has to have this done! Patient aware and verified he is currently taking 10mg.

## 2023-11-09 NOTE — TELEPHONE ENCOUNTER
----- Message from VANDANA Sapp sent at 11/9/2023  7:18 AM CST -----  A1c has improved to 6.7%! From the 7.8% last time!!  CBC was stable  INR was too thick. Tell him to take 5 additional mg tonight so a total of 15 mg tonight and then 10 mg all other days. (Make sure 10 mg daily is what he was taking)  Recheck INR in 1 week and let him know he has to have this done!

## 2023-11-09 NOTE — TELEPHONE ENCOUNTER
Called patient, spoke with: Patient regarding the results of the patients most recent labs. I advised Patient of Joe Harmon recommendations.    Patient did voice understanding

## 2023-11-10 ENCOUNTER — TELEPHONE (OUTPATIENT)
Dept: FAMILY MEDICINE CLINIC | Age: 43
End: 2023-11-10

## 2023-11-10 NOTE — TELEPHONE ENCOUNTER
Called patient, spoke with: Patient regarding the results of the patients most recent labs. I advised Patient of Eboni Quinones recommendations.    Patient did voice understanding

## 2023-11-10 NOTE — TELEPHONE ENCOUNTER
----- Message from VANDANA Lara sent at 11/10/2023  9:27 AM CST -----  CMP - electrolytes and renal function was normal.

## 2023-11-14 ENCOUNTER — ANTI-COAG VISIT (OUTPATIENT)
Dept: FAMILY MEDICINE CLINIC | Age: 43
End: 2023-11-14

## 2023-11-14 DIAGNOSIS — Z86.718 HISTORY OF DVT (DEEP VEIN THROMBOSIS): ICD-10-CM

## 2023-11-14 DIAGNOSIS — Z51.81 ENCOUNTER FOR MONITORING COUMADIN THERAPY: ICD-10-CM

## 2023-11-14 DIAGNOSIS — Z79.01 ENCOUNTER FOR MONITORING COUMADIN THERAPY: ICD-10-CM

## 2023-11-14 LAB
INR PPP: 2.87 (ref 0.88–1.18)
PROTHROMBIN TIME: 29.4 SEC (ref 12–14.6)

## 2023-11-14 NOTE — PLAN OF CARE
Problem: Falls - Risk of:  Goal: Will remain free from falls  Description: Will remain free from falls  Outcome: Ongoing  Goal: Absence of physical injury  Description: Absence of physical injury  Outcome: Ongoing     Problem: Pain:  Goal: Pain level will decrease  Description: Pain level will decrease  Outcome: Ongoing  Goal: Control of acute pain  Description: Control of acute pain  Outcome: Ongoing  Goal: Control of chronic pain  Description: Control of chronic pain  Outcome: Ongoing     Problem: Skin Integrity:  Goal: Will show no infection signs and symptoms  Description: Will show no infection signs and symptoms  Outcome: Ongoing  Goal: Absence of new skin breakdown  Description: Absence of new skin breakdown  Outcome: Ongoing yes

## 2024-01-28 NOTE — PROGRESS NOTES
Dalia Dallas, [facility] Staff  INR is therapeutic continue current regimen recheck in 2 weeks.      Pt aware
Yes

## 2024-02-06 ENCOUNTER — ANTI-COAG VISIT (OUTPATIENT)
Dept: FAMILY MEDICINE CLINIC | Age: 44
End: 2024-02-06

## 2024-02-06 ENCOUNTER — OFFICE VISIT (OUTPATIENT)
Dept: FAMILY MEDICINE CLINIC | Age: 44
End: 2024-02-06
Payer: COMMERCIAL

## 2024-02-06 VITALS
HEIGHT: 72 IN | BODY MASS INDEX: 42.66 KG/M2 | DIASTOLIC BLOOD PRESSURE: 70 MMHG | HEART RATE: 82 BPM | TEMPERATURE: 97.9 F | WEIGHT: 315 LBS | OXYGEN SATURATION: 98 % | SYSTOLIC BLOOD PRESSURE: 128 MMHG

## 2024-02-06 DIAGNOSIS — Z86.718 HISTORY OF DVT (DEEP VEIN THROMBOSIS): ICD-10-CM

## 2024-02-06 DIAGNOSIS — E11.59 TYPE 2 DIABETES MELLITUS WITH OTHER CIRCULATORY COMPLICATION, WITHOUT LONG-TERM CURRENT USE OF INSULIN (HCC): ICD-10-CM

## 2024-02-06 DIAGNOSIS — I10 PRIMARY HYPERTENSION: ICD-10-CM

## 2024-02-06 DIAGNOSIS — E78.2 MIXED HYPERLIPIDEMIA: ICD-10-CM

## 2024-02-06 DIAGNOSIS — E11.59 TYPE 2 DIABETES MELLITUS WITH OTHER CIRCULATORY COMPLICATION, WITHOUT LONG-TERM CURRENT USE OF INSULIN (HCC): Primary | ICD-10-CM

## 2024-02-06 LAB
ALBUMIN SERPL-MCNC: 3.9 G/DL (ref 3.5–5.2)
ALP SERPL-CCNC: 91 U/L (ref 40–130)
ALT SERPL-CCNC: 21 U/L (ref 5–41)
ANION GAP SERPL CALCULATED.3IONS-SCNC: 13 MMOL/L (ref 7–19)
AST SERPL-CCNC: 16 U/L (ref 5–40)
BASOPHILS # BLD: 0.1 K/UL (ref 0–0.2)
BASOPHILS NFR BLD: 0.9 % (ref 0–1)
BILIRUB SERPL-MCNC: <0.2 MG/DL (ref 0.2–1.2)
BUN SERPL-MCNC: 8 MG/DL (ref 6–20)
CALCIUM SERPL-MCNC: 9 MG/DL (ref 8.6–10)
CHLORIDE SERPL-SCNC: 107 MMOL/L (ref 98–111)
CHOLEST SERPL-MCNC: 106 MG/DL (ref 160–199)
CO2 SERPL-SCNC: 23 MMOL/L (ref 22–29)
CREAT SERPL-MCNC: 0.6 MG/DL (ref 0.5–1.2)
EOSINOPHIL # BLD: 0.2 K/UL (ref 0–0.6)
EOSINOPHIL NFR BLD: 2.9 % (ref 0–5)
ERYTHROCYTE [DISTWIDTH] IN BLOOD BY AUTOMATED COUNT: 14.8 % (ref 11.5–14.5)
GLUCOSE SERPL-MCNC: 122 MG/DL (ref 74–109)
HBA1C MFR BLD: 6.9 % (ref 4–6)
HCT VFR BLD AUTO: 48.1 % (ref 42–52)
HDLC SERPL-MCNC: 38 MG/DL (ref 55–121)
HGB BLD-MCNC: 14.9 G/DL (ref 14–18)
IMM GRANULOCYTES # BLD: 0 K/UL
INR PPP: 2.37 (ref 0.88–1.18)
LDLC SERPL CALC-MCNC: 46 MG/DL
LYMPHOCYTES # BLD: 1.6 K/UL (ref 1.1–4.5)
LYMPHOCYTES NFR BLD: 23.6 % (ref 20–40)
MCH RBC QN AUTO: 27.4 PG (ref 27–31)
MCHC RBC AUTO-ENTMCNC: 31 G/DL (ref 33–37)
MCV RBC AUTO: 88.4 FL (ref 80–94)
MONOCYTES # BLD: 0.5 K/UL (ref 0–0.9)
MONOCYTES NFR BLD: 7.8 % (ref 0–10)
NEUTROPHILS # BLD: 4.5 K/UL (ref 1.5–7.5)
NEUTS SEG NFR BLD: 64.5 % (ref 50–65)
PLATELET # BLD AUTO: 210 K/UL (ref 130–400)
PMV BLD AUTO: 10.2 FL (ref 9.4–12.4)
POTASSIUM SERPL-SCNC: 4.2 MMOL/L (ref 3.5–5)
PROT SERPL-MCNC: 7 G/DL (ref 6.6–8.7)
PROTHROMBIN TIME: 25.3 SEC (ref 12–14.6)
RBC # BLD AUTO: 5.44 M/UL (ref 4.7–6.1)
SODIUM SERPL-SCNC: 143 MMOL/L (ref 136–145)
TRIGL SERPL-MCNC: 108 MG/DL (ref 0–149)
WBC # BLD AUTO: 6.9 K/UL (ref 4.8–10.8)

## 2024-02-06 PROCEDURE — 3074F SYST BP LT 130 MM HG: CPT | Performed by: NURSE PRACTITIONER

## 2024-02-06 PROCEDURE — 99214 OFFICE O/P EST MOD 30 MIN: CPT | Performed by: NURSE PRACTITIONER

## 2024-02-06 PROCEDURE — 3078F DIAST BP <80 MM HG: CPT | Performed by: NURSE PRACTITIONER

## 2024-02-06 RX ORDER — ROSUVASTATIN CALCIUM 20 MG/1
20 TABLET, COATED ORAL NIGHTLY
Qty: 30 TABLET | Refills: 11 | Status: SHIPPED | OUTPATIENT
Start: 2024-02-06

## 2024-02-06 RX ORDER — AMLODIPINE BESYLATE 5 MG/1
TABLET ORAL
Qty: 30 TABLET | Refills: 11 | Status: SHIPPED | OUTPATIENT
Start: 2024-02-06

## 2024-02-06 RX ORDER — LISINOPRIL 10 MG/1
TABLET ORAL
Qty: 30 TABLET | Refills: 11 | Status: SHIPPED | OUTPATIENT
Start: 2024-02-06

## 2024-02-06 RX ORDER — ORAL SEMAGLUTIDE 14 MG/1
14 TABLET ORAL DAILY
Qty: 30 TABLET | Refills: 11 | Status: SHIPPED | OUTPATIENT
Start: 2024-02-06

## 2024-02-06 RX ORDER — WARFARIN SODIUM 5 MG/1
TABLET ORAL
Qty: 60 TABLET | Refills: 11 | Status: SHIPPED | OUTPATIENT
Start: 2024-02-06

## 2024-02-06 ASSESSMENT — PATIENT HEALTH QUESTIONNAIRE - PHQ9
SUM OF ALL RESPONSES TO PHQ9 QUESTIONS 1 & 2: 0
1. LITTLE INTEREST OR PLEASURE IN DOING THINGS: 0
2. FEELING DOWN, DEPRESSED OR HOPELESS: 0
SUM OF ALL RESPONSES TO PHQ QUESTIONS 1-9: 0

## 2024-02-06 ASSESSMENT — ENCOUNTER SYMPTOMS
GASTROINTESTINAL NEGATIVE: 1
RESPIRATORY NEGATIVE: 1
EYES NEGATIVE: 1

## 2024-02-06 NOTE — PROGRESS NOTES
Called patient, spoke with: Patient regarding the results of the patients most recent labs.  I advised Patient of Kiki Miller recommendations.   Patient did voice understanding

## 2024-02-06 NOTE — PROGRESS NOTES
effort is normal.      Breath sounds: Normal breath sounds and air entry.   Abdominal:      General: Bowel sounds are normal.      Palpations: Abdomen is soft.   Musculoskeletal:         General: Normal range of motion.      Cervical back: Normal, normal range of motion and neck supple. No tenderness.      Thoracic back: Normal. No tenderness. Normal range of motion.      Lumbar back: Normal. No tenderness. Normal range of motion.   Skin:     General: Skin is warm and dry.      Capillary Refill: Capillary refill takes less than 2 seconds.   Neurological:      Mental Status: He is alert and oriented to person, place, and time.   Psychiatric:         Attention and Perception: Attention normal.         Mood and Affect: Mood and affect normal.         Speech: Speech normal.         Behavior: Behavior normal.         Thought Content: Thought content normal.         Cognition and Memory: Cognition normal.         Judgment: Judgment normal.         /70   Pulse 82   Temp 97.9 °F (36.6 °C)   Ht 1.829 m (6')   Wt (!) 175.5 kg (387 lb)   SpO2 98%   BMI 52.49 kg/m²     Assessment:       ICD-10-CM    1. Type 2 diabetes mellitus with other circulatory complication, without long-term current use of insulin (HCC)  E11.59 Hemoglobin A1C      DIABETES FOOT EXAM     metFORMIN (GLUCOPHAGE) 500 MG tablet     Semaglutide (RYBELSUS) 14 MG TABS      2. History of DVT (deep vein thrombosis)  Z86.718 Protime-INR     warfarin (COUMADIN) 5 MG tablet      3. Primary hypertension  I10 CBC with Auto Differential     Comprehensive Metabolic Panel     amLODIPine (NORVASC) 5 MG tablet     lisinopril (PRINIVIL;ZESTRIL) 10 MG tablet      4. Mixed hyperlipidemia  E78.2 Lipid, Fasting     rosuvastatin (CRESTOR) 20 MG tablet           No results found for this visit on 02/06/24.    Plan:     1. Type 2 diabetes mellitus with other circulatory complication, without long-term current use of insulin (HCC)  Rechecking levels - we will call with

## 2024-02-06 NOTE — PROGRESS NOTES
Kiki Miller APRN [facility] Clinical Staff  Labs returned  A1c was up a touch 6.9% - recommend monitoring diet and attempt to adjust  Lipid panel was great!  INR was in range!  Continue current regimen  CBC and CMP was good

## 2024-04-05 ENCOUNTER — NURSE ONLY (OUTPATIENT)
Dept: FAMILY MEDICINE CLINIC | Age: 44
End: 2024-04-05

## 2024-04-05 SDOH — ECONOMIC STABILITY: FOOD INSECURITY: WITHIN THE PAST 12 MONTHS, THE FOOD YOU BOUGHT JUST DIDN'T LAST AND YOU DIDN'T HAVE MONEY TO GET MORE.: NEVER TRUE

## 2024-04-05 SDOH — ECONOMIC STABILITY: INCOME INSECURITY: HOW HARD IS IT FOR YOU TO PAY FOR THE VERY BASICS LIKE FOOD, HOUSING, MEDICAL CARE, AND HEATING?: NOT HARD AT ALL

## 2024-04-05 SDOH — ECONOMIC STABILITY: FOOD INSECURITY: WITHIN THE PAST 12 MONTHS, YOU WORRIED THAT YOUR FOOD WOULD RUN OUT BEFORE YOU GOT MONEY TO BUY MORE.: NEVER TRUE

## 2024-04-05 NOTE — PROGRESS NOTES
Question3/30/2024  8:22 AM CDT - Filed by Umang hard is it for you to pay for the very basics like food, housing, medical care, and heating?Not hard at allWithin the past 12 months, you worried that your food would run out before you got the money to buy more.Never trueWithin the past 12 months, the food you bought just didn’t last and you didn’t have money to get more.Never trueIn the past 12 months, has lack of transportation kept you from meetings, work, or from getting things needed for daily living?NoIn the last 12 months, was there a time when you did not have a steady place to sleep or slept in a shelter (including now)?NoWould you like resources for any of these topics?No, thank you

## 2024-05-03 ENCOUNTER — OFFICE VISIT (OUTPATIENT)
Dept: FAMILY MEDICINE CLINIC | Age: 44
End: 2024-05-03
Payer: COMMERCIAL

## 2024-05-03 VITALS
TEMPERATURE: 97.8 F | HEIGHT: 72 IN | BODY MASS INDEX: 42.66 KG/M2 | SYSTOLIC BLOOD PRESSURE: 118 MMHG | WEIGHT: 315 LBS | DIASTOLIC BLOOD PRESSURE: 76 MMHG | HEART RATE: 99 BPM | OXYGEN SATURATION: 94 %

## 2024-05-03 DIAGNOSIS — M54.41 ACUTE RIGHT-SIDED LOW BACK PAIN WITH RIGHT-SIDED SCIATICA: Primary | ICD-10-CM

## 2024-05-03 DIAGNOSIS — E11.59 TYPE 2 DIABETES MELLITUS WITH OTHER CIRCULATORY COMPLICATION, WITHOUT LONG-TERM CURRENT USE OF INSULIN (HCC): ICD-10-CM

## 2024-05-03 PROCEDURE — 3044F HG A1C LEVEL LT 7.0%: CPT | Performed by: NURSE PRACTITIONER

## 2024-05-03 PROCEDURE — 99213 OFFICE O/P EST LOW 20 MIN: CPT | Performed by: NURSE PRACTITIONER

## 2024-05-03 RX ORDER — METHYLPREDNISOLONE 4 MG/1
TABLET ORAL
Qty: 1 KIT | Refills: 0 | Status: SHIPPED | OUTPATIENT
Start: 2024-05-03 | End: 2024-05-09

## 2024-05-03 SDOH — ECONOMIC STABILITY: INCOME INSECURITY: HOW HARD IS IT FOR YOU TO PAY FOR THE VERY BASICS LIKE FOOD, HOUSING, MEDICAL CARE, AND HEATING?: NOT HARD AT ALL

## 2024-05-03 SDOH — ECONOMIC STABILITY: FOOD INSECURITY: WITHIN THE PAST 12 MONTHS, THE FOOD YOU BOUGHT JUST DIDN'T LAST AND YOU DIDN'T HAVE MONEY TO GET MORE.: NEVER TRUE

## 2024-05-03 SDOH — ECONOMIC STABILITY: FOOD INSECURITY: WITHIN THE PAST 12 MONTHS, YOU WORRIED THAT YOUR FOOD WOULD RUN OUT BEFORE YOU GOT MONEY TO BUY MORE.: NEVER TRUE

## 2024-05-03 ASSESSMENT — ENCOUNTER SYMPTOMS
ABDOMINAL PAIN: 0
CONSTIPATION: 0
RHINORRHEA: 0
COUGH: 0
SHORTNESS OF BREATH: 0
VOMITING: 0
TROUBLE SWALLOWING: 0
DIARRHEA: 0
BACK PAIN: 1
NAUSEA: 0
SORE THROAT: 0

## 2024-05-03 ASSESSMENT — PATIENT HEALTH QUESTIONNAIRE - PHQ9
6. FEELING BAD ABOUT YOURSELF - OR THAT YOU ARE A FAILURE OR HAVE LET YOURSELF OR YOUR FAMILY DOWN: NOT AT ALL
8. MOVING OR SPEAKING SO SLOWLY THAT OTHER PEOPLE COULD HAVE NOTICED. OR THE OPPOSITE, BEING SO FIGETY OR RESTLESS THAT YOU HAVE BEEN MOVING AROUND A LOT MORE THAN USUAL: NOT AT ALL
SUM OF ALL RESPONSES TO PHQ QUESTIONS 1-9: 2
SUM OF ALL RESPONSES TO PHQ9 QUESTIONS 1 & 2: 0
5. POOR APPETITE OR OVEREATING: SEVERAL DAYS
9. THOUGHTS THAT YOU WOULD BE BETTER OFF DEAD, OR OF HURTING YOURSELF: NOT AT ALL
SUM OF ALL RESPONSES TO PHQ QUESTIONS 1-9: 2
10. IF YOU CHECKED OFF ANY PROBLEMS, HOW DIFFICULT HAVE THESE PROBLEMS MADE IT FOR YOU TO DO YOUR WORK, TAKE CARE OF THINGS AT HOME, OR GET ALONG WITH OTHER PEOPLE: NOT DIFFICULT AT ALL
SUM OF ALL RESPONSES TO PHQ QUESTIONS 1-9: 2
SUM OF ALL RESPONSES TO PHQ QUESTIONS 1-9: 2
1. LITTLE INTEREST OR PLEASURE IN DOING THINGS: NOT AT ALL
2. FEELING DOWN, DEPRESSED OR HOPELESS: NOT AT ALL
4. FEELING TIRED OR HAVING LITTLE ENERGY: SEVERAL DAYS
7. TROUBLE CONCENTRATING ON THINGS, SUCH AS READING THE NEWSPAPER OR WATCHING TELEVISION: NOT AT ALL
3. TROUBLE FALLING OR STAYING ASLEEP: NOT AT ALL

## 2024-05-03 NOTE — PROGRESS NOTES
Juan Bolden (:  1980) is a 44 y.o. male,Established patient, here for evaluation of the following chief complaint(s):  Muscle Pain (Hurts across low back, shoots down both sides of buttock. Right sided leg pain. Started wed morning around 9am. Was bending over and felt back pop. Has tried tylenol and muscle relax(flexeril and zanaflex)  he had at home. Sitting is better. Laying flat on back helps it. Walking causes pain. )      ASSESSMENT/PLAN:    ICD-10-CM    1. Acute right-sided low back pain with right-sided sciatica  M54.41 methylPREDNISolone (MEDROL DOSEPACK) 4 MG tablet    He is going to treat conservatively. Discussed physical therapy. Wants to wait at this time.     Explained sugars may be up over next several days due to steroids. He has pain medicine and flexeril.        2. Type 2 diabetes mellitus with other circulatory complication, without long-term current use of insulin (HCC)  E11.59           Return if symptoms worsen or fail to improve.    SUBJECTIVE/OBJECTIVE:  HPI  Here for lower back pain   Onset Wednesday morning  Have hx of back problems have done this before.   This is 3rd time hurt this bad.   Denies any injury. All I did was bend over.   Pain across lower back and down right leg. At first went down both legs and now mainly the right.   Pain stops around the gluteus.   Last time was told irritated sciatic.   Treated with muscle relaxants, steroids and tramadol.   He has been taking flexeril and tramadol for the pain.   I am limited what can take due to coumadin due to hx of clots.   Never done therapy.   Pain is worse with standing and walking. Get relief with sitting  He used some heat the first day it happened.   No loss of bowel or bladder control.   Pain max 10. Pain best 2/10 - currently 2-310    He is diabetic  Doesn't check sugars as much as should.   Hemoglobin A1C   Date Value Ref Range Status   2024 6.9 (H) 4.0 - 6.0 % Final     Comment:     HbA1c levels >6%

## 2024-05-03 NOTE — PATIENT INSTRUCTIONS
Ice 20 minutes every couple of hours can follow with some heat.   Biofreeze topically     Avoid painful motion, lifting, pulling or tugging.     Increase water intake to help with sugars being up from steroids and hydration.     Keep follow up with Kiki on 05/07/2024

## 2024-05-07 ENCOUNTER — ANTI-COAG VISIT (OUTPATIENT)
Dept: FAMILY MEDICINE CLINIC | Age: 44
End: 2024-05-07

## 2024-05-07 ENCOUNTER — OFFICE VISIT (OUTPATIENT)
Dept: FAMILY MEDICINE CLINIC | Age: 44
End: 2024-05-07

## 2024-05-07 VITALS
DIASTOLIC BLOOD PRESSURE: 82 MMHG | OXYGEN SATURATION: 98 % | HEIGHT: 72 IN | HEART RATE: 78 BPM | WEIGHT: 315 LBS | BODY MASS INDEX: 42.66 KG/M2 | TEMPERATURE: 97.7 F | SYSTOLIC BLOOD PRESSURE: 124 MMHG

## 2024-05-07 DIAGNOSIS — Z86.718 HISTORY OF DVT (DEEP VEIN THROMBOSIS): ICD-10-CM

## 2024-05-07 DIAGNOSIS — E11.59 TYPE 2 DIABETES MELLITUS WITH OTHER CIRCULATORY COMPLICATION, WITHOUT LONG-TERM CURRENT USE OF INSULIN (HCC): ICD-10-CM

## 2024-05-07 DIAGNOSIS — Z71.89 ACP (ADVANCE CARE PLANNING): ICD-10-CM

## 2024-05-07 DIAGNOSIS — Z00.00 ENCOUNTER FOR WELL ADULT EXAM WITHOUT ABNORMAL FINDINGS: Primary | ICD-10-CM

## 2024-05-07 LAB
ALBUMIN SERPL-MCNC: 3.8 G/DL (ref 3.5–5.2)
ALP SERPL-CCNC: 94 U/L (ref 40–130)
ALT SERPL-CCNC: 21 U/L (ref 5–41)
ANION GAP SERPL CALCULATED.3IONS-SCNC: 14 MMOL/L (ref 7–19)
AST SERPL-CCNC: 15 U/L (ref 5–40)
BASOPHILS # BLD: 0.1 K/UL (ref 0–0.2)
BASOPHILS NFR BLD: 0.5 % (ref 0–1)
BILIRUB SERPL-MCNC: 0.3 MG/DL (ref 0.2–1.2)
BUN SERPL-MCNC: 11 MG/DL (ref 6–20)
CALCIUM SERPL-MCNC: 9 MG/DL (ref 8.6–10)
CHLORIDE SERPL-SCNC: 100 MMOL/L (ref 98–111)
CO2 SERPL-SCNC: 22 MMOL/L (ref 22–29)
CREAT SERPL-MCNC: 0.7 MG/DL (ref 0.5–1.2)
CREAT UR-MCNC: 150.3 MG/DL (ref 39–259)
EOSINOPHIL # BLD: 0.1 K/UL (ref 0–0.6)
EOSINOPHIL NFR BLD: 0.4 % (ref 0–5)
ERYTHROCYTE [DISTWIDTH] IN BLOOD BY AUTOMATED COUNT: 15.4 % (ref 11.5–14.5)
GLUCOSE SERPL-MCNC: 103 MG/DL (ref 74–109)
HBA1C MFR BLD: 7.4 % (ref 4–6)
HCT VFR BLD AUTO: 50.2 % (ref 42–52)
HGB BLD-MCNC: 15.3 G/DL (ref 14–18)
IMM GRANULOCYTES # BLD: 0.1 K/UL
INR PPP: 2.58 (ref 0.88–1.18)
LYMPHOCYTES # BLD: 2.1 K/UL (ref 1.1–4.5)
LYMPHOCYTES NFR BLD: 18.6 % (ref 20–40)
MCH RBC QN AUTO: 27.3 PG (ref 27–31)
MCHC RBC AUTO-ENTMCNC: 30.5 G/DL (ref 33–37)
MCV RBC AUTO: 89.6 FL (ref 80–94)
MICROALBUMIN UR-MCNC: 1.2 MG/DL (ref 0–19)
MICROALBUMIN/CREAT UR-RTO: 8 MG/G
MONOCYTES # BLD: 0.8 K/UL (ref 0–0.9)
MONOCYTES NFR BLD: 7.3 % (ref 0–10)
NEUTROPHILS # BLD: 8.3 K/UL (ref 1.5–7.5)
NEUTS SEG NFR BLD: 72.8 % (ref 50–65)
PLATELET # BLD AUTO: 248 K/UL (ref 130–400)
PMV BLD AUTO: 10 FL (ref 9.4–12.4)
POTASSIUM SERPL-SCNC: 4.4 MMOL/L (ref 3.5–5)
PROT SERPL-MCNC: 7.2 G/DL (ref 6.6–8.7)
PROTHROMBIN TIME: 27 SEC (ref 12–14.6)
RBC # BLD AUTO: 5.6 M/UL (ref 4.7–6.1)
SODIUM SERPL-SCNC: 136 MMOL/L (ref 136–145)
WBC # BLD AUTO: 11.4 K/UL (ref 4.8–10.8)

## 2024-05-07 SDOH — ECONOMIC STABILITY: FOOD INSECURITY: WITHIN THE PAST 12 MONTHS, YOU WORRIED THAT YOUR FOOD WOULD RUN OUT BEFORE YOU GOT MONEY TO BUY MORE.: NEVER TRUE

## 2024-05-07 SDOH — ECONOMIC STABILITY: FOOD INSECURITY: WITHIN THE PAST 12 MONTHS, THE FOOD YOU BOUGHT JUST DIDN'T LAST AND YOU DIDN'T HAVE MONEY TO GET MORE.: NEVER TRUE

## 2024-05-07 SDOH — ECONOMIC STABILITY: INCOME INSECURITY: HOW HARD IS IT FOR YOU TO PAY FOR THE VERY BASICS LIKE FOOD, HOUSING, MEDICAL CARE, AND HEATING?: NOT HARD AT ALL

## 2024-05-07 ASSESSMENT — PATIENT HEALTH QUESTIONNAIRE - PHQ9
SUM OF ALL RESPONSES TO PHQ QUESTIONS 1-9: 0
SUM OF ALL RESPONSES TO PHQ QUESTIONS 1-9: 0
1. LITTLE INTEREST OR PLEASURE IN DOING THINGS: NOT AT ALL
2. FEELING DOWN, DEPRESSED OR HOPELESS: NOT AT ALL
SUM OF ALL RESPONSES TO PHQ QUESTIONS 1-9: 0
SUM OF ALL RESPONSES TO PHQ9 QUESTIONS 1 & 2: 0
SUM OF ALL RESPONSES TO PHQ QUESTIONS 1-9: 0

## 2024-05-07 ASSESSMENT — ENCOUNTER SYMPTOMS
GASTROINTESTINAL NEGATIVE: 1
RESPIRATORY NEGATIVE: 1
EYES NEGATIVE: 1

## 2024-05-07 NOTE — PROGRESS NOTES
electronic translation of spoken language may permit erroneous, or at times, nonsensical words or phrases to be inadvertently transcribed. Although I have reviewed the note for such errors, some may still exist.    Electronically signed by VANDANA Schroeder on 5/7/2024 at 9:40 AM               Advance Care Planning   Discussed the patient’s choices for care and treatment preferences in case of a health event that adversely affects decision-making abilities or is life-limiting. Recommended the patient document care preferences in state-specific advance directives. Also reviewed the process of designating a trusted capable adult as an Agent (or Health Care Power of ) to make health care decisions for the patient if the patient becomes unable due to incapacity. Patient was asked to complete advance directive forms, if not already done, and to provide a dated, signed and witnessed (or notarized) copy, per the forms' requirements, to the practice office.         Obesity Counseling: Assessed behavioral health risks and factors affecting choice of behavior. Suggested weight control approaches, including dietary changes behavioral modification and follow up plan. Provided educational and support documentation.  Time spent (minutes): 15

## 2024-05-07 NOTE — PROGRESS NOTES
Called patient, spoke with: Patient regarding the results of the patients most recent labs.  I advised Patient of Kiki Miller recommendations.   Patient did voice understanding    Kiki Miller APRN [facility] Clinical Staff  A1c has increased.  Recommend increasing Metformin up to 1000 mg BID.  Will need to recheck at follow up visit and if no improvement will need to start other medicaitons  INR was therapeutic  CBC, CMP were stable

## 2024-05-07 NOTE — PATIENT INSTRUCTIONS
add vegetables to sandwiches or add fruit to cereal.    Drink water when you are thirsty. Limit soda, juice, and sports drinks.    Try to exercise most days. Aim for at least 2½ hours of exercise each week.    Keep moving. Work in the garden or take your dog on a walk. Use the stairs instead of the elevator.    If you use tobacco or nicotine, try to quit. Ask your doctor about programs and medicines to help you quit.    Limit alcohol. Men should have no more than 2 drinks a day. Women should have no more than 1. For some people, no alcohol is the best choice.  Follow-up care is a key part of your treatment and safety. Be sure to make and go to all appointments, and call your doctor if you are having problems. It's also a good idea to know your test results and keep a list of the medicines you take.  Where can you learn more?  Go to https://www.moka5.net/patientEd and enter U807 to learn more about \"A Healthy Lifestyle: Care Instructions.\"  Current as of: August 6, 2023               Content Version: 14.0  © 2006-2024 [employer].   Care instructions adapted under license by [employer]. If you have questions about a medical condition or this instruction, always ask your healthcare professional. [employer] disclaims any warranty or liability for your use of this information.

## 2024-05-14 ENCOUNTER — TELEPHONE (OUTPATIENT)
Dept: FAMILY MEDICINE CLINIC | Age: 44
End: 2024-05-14

## 2024-05-14 NOTE — TELEPHONE ENCOUNTER
Received call from pt wanting to know if SHANEKA ROSS received forms from  [employer]. Send msg to Aga on teams but no answer as she was probably rooming a pt.     Will send this to Aga to have her call pt back.

## 2024-05-16 ENCOUNTER — TELEPHONE (OUTPATIENT)
Dept: FAMILY MEDICINE CLINIC | Age: 44
End: 2024-05-16

## 2024-05-16 NOTE — TELEPHONE ENCOUNTER
Pt called to see if we had gotten his forms from [facility]. I didn't see anything in [employer] or in his chart. Have you seen these anywhere? If not, pt states he has a copy that he can bring up

## 2024-05-17 NOTE — TELEPHONE ENCOUNTER
We have still not received these forms.  I did attempt to call patient back, but was unable to leave message

## 2024-06-12 LAB
ALBUMIN SERPL-MCNC: 3.9 G/DL (ref 3.5–5.2)
ALP SERPL-CCNC: 93 U/L (ref 40–130)
ALT SERPL-CCNC: 17 U/L (ref 5–41)
ANION GAP SERPL CALCULATED.3IONS-SCNC: 11 MMOL/L (ref 7–19)
AST SERPL-CCNC: 13 U/L (ref 5–40)
BACTERIA URNS QL MICRO: NEGATIVE /HPF
BASOPHILS # BLD: 0.1 K/UL (ref 0–0.2)
BASOPHILS NFR BLD: 0.6 % (ref 0–1)
BILIRUB SERPL-MCNC: 0.3 MG/DL (ref 0.2–1.2)
BILIRUB UR QL STRIP: NEGATIVE
BUN SERPL-MCNC: 9 MG/DL (ref 6–20)
CALCIUM SERPL-MCNC: 8.9 MG/DL (ref 8.6–10)
CHLORIDE SERPL-SCNC: 104 MMOL/L (ref 98–111)
CLARITY UR: CLEAR
CO2 SERPL-SCNC: 26 MMOL/L (ref 22–29)
COLOR UR: YELLOW
CREAT SERPL-MCNC: 0.7 MG/DL (ref 0.5–1.2)
CREAT UR-MCNC: 149.6 MG/DL (ref 39–259)
CRYSTALS URNS MICRO: ABNORMAL /HPF
EOSINOPHIL # BLD: 0.3 K/UL (ref 0–0.6)
EOSINOPHIL NFR BLD: 3.5 % (ref 0–5)
EPI CELLS #/AREA URNS AUTO: 1 /HPF (ref 0–5)
ERYTHROCYTE [DISTWIDTH] IN BLOOD BY AUTOMATED COUNT: 15.3 % (ref 11.5–14.5)
GLUCOSE SERPL-MCNC: 119 MG/DL (ref 74–109)
GLUCOSE UR STRIP.AUTO-MCNC: NEGATIVE MG/DL
HCT VFR BLD AUTO: 46.2 % (ref 42–52)
HGB BLD-MCNC: 14.3 G/DL (ref 14–18)
HGB UR STRIP.AUTO-MCNC: NEGATIVE MG/L
HYALINE CASTS #/AREA URNS AUTO: 7 /HPF (ref 0–8)
IMM GRANULOCYTES # BLD: 0 K/UL
KETONES UR STRIP.AUTO-MCNC: NEGATIVE MG/DL
LEUKOCYTE ESTERASE UR QL STRIP.AUTO: ABNORMAL
LYMPHOCYTES # BLD: 1.9 K/UL (ref 1.1–4.5)
LYMPHOCYTES NFR BLD: 24.2 % (ref 20–40)
MCH RBC QN AUTO: 26.7 PG (ref 27–31)
MCHC RBC AUTO-ENTMCNC: 31 G/DL (ref 33–37)
MCV RBC AUTO: 86.4 FL (ref 80–94)
MONOCYTES # BLD: 0.6 K/UL (ref 0–0.9)
MONOCYTES NFR BLD: 8.1 % (ref 0–10)
NEUTROPHILS # BLD: 5 K/UL (ref 1.5–7.5)
NEUTS SEG NFR BLD: 63.3 % (ref 50–65)
NITRITE UR QL STRIP.AUTO: NEGATIVE
PH UR STRIP.AUTO: 6.5 [PH] (ref 5–8)
PLATELET # BLD AUTO: 220 K/UL (ref 130–400)
PMV BLD AUTO: 9.2 FL (ref 9.4–12.4)
POTASSIUM SERPL-SCNC: 4.3 MMOL/L (ref 3.5–5)
PROT SERPL-MCNC: 7.1 G/DL (ref 6.6–8.7)
PROT UR STRIP.AUTO-MCNC: ABNORMAL MG/DL
PROT UR-MCNC: 25 MG/DL (ref 15–45)
RBC # BLD AUTO: 5.35 M/UL (ref 4.7–6.1)
RBC #/AREA URNS AUTO: 1 /HPF (ref 0–4)
SODIUM SERPL-SCNC: 141 MMOL/L (ref 136–145)
SP GR UR STRIP.AUTO: 1.02 (ref 1–1.03)
UROBILINOGEN UR STRIP.AUTO-MCNC: 0.2 E.U./DL
WBC # BLD AUTO: 7.9 K/UL (ref 4.8–10.8)
WBC #/AREA URNS AUTO: 38 /HPF (ref 0–5)

## 2024-07-03 ENCOUNTER — TELEPHONE (OUTPATIENT)
Dept: FAMILY MEDICINE CLINIC | Age: 44
End: 2024-07-03

## 2024-07-03 DIAGNOSIS — I82.409 RECURRENT DEEP VEIN THROMBOSIS (HCC): ICD-10-CM

## 2024-07-03 DIAGNOSIS — I82.409 RECURRENT DEEP VEIN THROMBOSIS (HCC): Primary | ICD-10-CM

## 2024-07-03 LAB
INR PPP: 2.44 (ref 0.88–1.18)
PROTHROMBIN TIME: 25.9 SEC (ref 12–14.6)

## 2024-07-03 NOTE — TELEPHONE ENCOUNTER
----- Message from VANDANA Schroeder sent at 7/3/2024  1:08 PM CDT -----  INR was stable continue current regimen recheck in 1 month.

## 2024-07-05 ENCOUNTER — ANTI-COAG VISIT (OUTPATIENT)
Dept: FAMILY MEDICINE CLINIC | Age: 44
End: 2024-07-05

## 2024-07-05 NOTE — PROGRESS NOTES
Left message on patient's voicemail to call back for results.      ----- Message from VANDANA Schroeder sent at 7/3/2024  1:08 PM CDT -----  INR was stable continue current regimen recheck in 1 month.

## 2024-07-08 NOTE — PROGRESS NOTES
Spoke with: Patient regarding the results of the patients most recent PT/INR.  I advised Patient of Kiki Miller recommendations.   Patient did voice understanding

## 2024-08-06 ENCOUNTER — OFFICE VISIT (OUTPATIENT)
Dept: FAMILY MEDICINE CLINIC | Age: 44
End: 2024-08-06
Payer: COMMERCIAL

## 2024-08-06 ENCOUNTER — TELEPHONE (OUTPATIENT)
Dept: FAMILY MEDICINE CLINIC | Age: 44
End: 2024-08-06

## 2024-08-06 VITALS
HEART RATE: 62 BPM | HEIGHT: 72 IN | DIASTOLIC BLOOD PRESSURE: 80 MMHG | TEMPERATURE: 97.8 F | BODY MASS INDEX: 42.66 KG/M2 | WEIGHT: 315 LBS | OXYGEN SATURATION: 96 % | SYSTOLIC BLOOD PRESSURE: 128 MMHG

## 2024-08-06 DIAGNOSIS — E11.59 TYPE 2 DIABETES MELLITUS WITH OTHER CIRCULATORY COMPLICATION, WITHOUT LONG-TERM CURRENT USE OF INSULIN (HCC): Primary | ICD-10-CM

## 2024-08-06 DIAGNOSIS — E11.59 TYPE 2 DIABETES MELLITUS WITH OTHER CIRCULATORY COMPLICATION, WITHOUT LONG-TERM CURRENT USE OF INSULIN (HCC): ICD-10-CM

## 2024-08-06 DIAGNOSIS — Z86.718 HISTORY OF DVT (DEEP VEIN THROMBOSIS): ICD-10-CM

## 2024-08-06 LAB
ALBUMIN SERPL-MCNC: 3.9 G/DL (ref 3.5–5.2)
ALP SERPL-CCNC: 94 U/L (ref 40–130)
ALT SERPL-CCNC: 21 U/L (ref 5–41)
ANION GAP SERPL CALCULATED.3IONS-SCNC: 11 MMOL/L (ref 7–19)
AST SERPL-CCNC: 17 U/L (ref 5–40)
BASOPHILS # BLD: 0.1 K/UL (ref 0–0.2)
BASOPHILS NFR BLD: 0.9 % (ref 0–1)
BILIRUB SERPL-MCNC: 0.2 MG/DL (ref 0.2–1.2)
BUN SERPL-MCNC: 10 MG/DL (ref 6–20)
CALCIUM SERPL-MCNC: 8.1 MG/DL (ref 8.6–10)
CHLORIDE SERPL-SCNC: 106 MMOL/L (ref 98–111)
CHOLEST SERPL-MCNC: 108 MG/DL (ref 160–199)
CO2 SERPL-SCNC: 26 MMOL/L (ref 22–29)
CREAT SERPL-MCNC: 0.7 MG/DL (ref 0.5–1.2)
EOSINOPHIL # BLD: 0.3 K/UL (ref 0–0.6)
EOSINOPHIL NFR BLD: 3.4 % (ref 0–5)
ERYTHROCYTE [DISTWIDTH] IN BLOOD BY AUTOMATED COUNT: 15.3 % (ref 11.5–14.5)
GLUCOSE SERPL-MCNC: 98 MG/DL (ref 74–109)
HBA1C MFR BLD: 7.1 % (ref 4–6)
HCT VFR BLD AUTO: 46.8 % (ref 42–52)
HDLC SERPL-MCNC: 37 MG/DL (ref 55–121)
HGB BLD-MCNC: 14.4 G/DL (ref 14–18)
IMM GRANULOCYTES # BLD: 0 K/UL
LDLC SERPL CALC-MCNC: 45 MG/DL
LYMPHOCYTES # BLD: 2.2 K/UL (ref 1.1–4.5)
LYMPHOCYTES NFR BLD: 27.5 % (ref 20–40)
MCH RBC QN AUTO: 27.4 PG (ref 27–31)
MCHC RBC AUTO-ENTMCNC: 30.8 G/DL (ref 33–37)
MCV RBC AUTO: 89 FL (ref 80–94)
MONOCYTES # BLD: 0.6 K/UL (ref 0–0.9)
MONOCYTES NFR BLD: 7.8 % (ref 0–10)
NEUTROPHILS # BLD: 4.8 K/UL (ref 1.5–7.5)
NEUTS SEG NFR BLD: 59.9 % (ref 50–65)
PLATELET # BLD AUTO: 222 K/UL (ref 130–400)
PMV BLD AUTO: 9.4 FL (ref 9.4–12.4)
POTASSIUM SERPL-SCNC: 4.2 MMOL/L (ref 3.5–5)
PROT SERPL-MCNC: 6.9 G/DL (ref 6.6–8.7)
RBC # BLD AUTO: 5.26 M/UL (ref 4.7–6.1)
SODIUM SERPL-SCNC: 143 MMOL/L (ref 136–145)
TRIGL SERPL-MCNC: 132 MG/DL (ref 0–149)
WBC # BLD AUTO: 7.9 K/UL (ref 4.8–10.8)

## 2024-08-06 PROCEDURE — 99214 OFFICE O/P EST MOD 30 MIN: CPT | Performed by: NURSE PRACTITIONER

## 2024-08-06 PROCEDURE — 3051F HG A1C>EQUAL 7.0%<8.0%: CPT | Performed by: NURSE PRACTITIONER

## 2024-08-06 SDOH — ECONOMIC STABILITY: FOOD INSECURITY: WITHIN THE PAST 12 MONTHS, YOU WORRIED THAT YOUR FOOD WOULD RUN OUT BEFORE YOU GOT MONEY TO BUY MORE.: NEVER TRUE

## 2024-08-06 SDOH — ECONOMIC STABILITY: INCOME INSECURITY: HOW HARD IS IT FOR YOU TO PAY FOR THE VERY BASICS LIKE FOOD, HOUSING, MEDICAL CARE, AND HEATING?: NOT HARD AT ALL

## 2024-08-06 SDOH — ECONOMIC STABILITY: FOOD INSECURITY: WITHIN THE PAST 12 MONTHS, THE FOOD YOU BOUGHT JUST DIDN'T LAST AND YOU DIDN'T HAVE MONEY TO GET MORE.: NEVER TRUE

## 2024-08-06 ASSESSMENT — ENCOUNTER SYMPTOMS
EYES NEGATIVE: 1
RESPIRATORY NEGATIVE: 1
GASTROINTESTINAL NEGATIVE: 1

## 2024-08-06 NOTE — TELEPHONE ENCOUNTER
----- Message from VANDANA Schroeder sent at 8/6/2024  4:34 PM CDT -----  Please let patient know that labs have returned.  CBC did show normal hemoglobin hematocrit.  No issues with platelets or white count.  A1c had slight improvement from 7.4 down to 7.1.  Would recommend patient to continue to monitor diet and will recheck again in 3 months.  Cholesterol level was great.  I do not see an INR and he was to have this drawn today please see if they aditi the correct lab for this if not patient will have to have completed again

## 2024-08-06 NOTE — TELEPHONE ENCOUNTER
Called patient, spoke with: Patient regarding the results of the patients most recent labs.  I advised Patient of Kiki Miller recommendations.   Patient did voice understanding      They are unable to use blood that was drawn for INR- he was advised to get this done asap

## 2024-08-06 NOTE — PROGRESS NOTES
[facility]  [address] [place] [address]  Dept: [phone]  Dept Fax: [phone]  Loc: [phone]    Juan Bolden is a 44 y.o. male who presents today for his medical conditions/complaints as noted below.  Juan Bolden is c/o of Follow-up Chronic Condition      Chief Complaint   Patient presents with    Follow-up Chronic Condition       HPI:     HPI  Patient presents today for routine follow up of chronic conditions including DM and anticoagulation for history of clots.  He has no new complaints to discuss today. He has been taking his meds as prescribed. Overall he feels like things are going well.  He has not been monitoring blood sugars.      Past Medical History:   Diagnosis Date    Arthritis     both wrist    Bilateral carpal tunnel syndrome 8/15/2019    Bronchitis     10 yrs ago    Diabetes mellitus (HCC)     Disease of blood and blood forming organ     Hx of blood clots     Hypertension     Kidney stone     recurrent    Sleep apnea     untested    Umbilical hernia         Past Surgical History:   Procedure Laterality Date    CARPAL TUNNEL RELEASE Left 08/21/2020    LEFT CARPAL TUNNEL RELEASE performed by Ron Samson MD at [facility] OR    CYSTOSCOPY Right 07/24/2018    CYSTOSCOPY/ URETEROSCOPY; INSERTION DOUBLE J STENT/  URETERAL performed by Ayden Machado MD at [facility] OR    FRACTURE SURGERY      both wrists    HEMORRHOID SURGERY N/A 06/24/2016    HEMORRHOID INCISION AND DRAINAGE performed by Linda Arreola MD at [facility] OR    WA CYSTO W/INSERT URETERAL STENT Right 08/07/2018    STENT PLACEMENT performed by Ayden Machado MD at [facility] OR    WA CYSTO W/URETEROSCOPY W/LITHOTRIPSY Right 08/07/2018    URETEROSCOPY LASER LITHOTRIPSY STONE EXTRACTION performed by Ayden Machado MD at [facility] OR    WA LAPS REPAIR HERNIA EXCEPT INCAL/INGUN REDUCIBLE N/A 05/07/2018    HERNIA UMBILICAL REPAIR LAPAROSCOPIC performed by Scott Samaniego MD at [facility] OR    [place]

## 2024-11-12 ENCOUNTER — ANTI-COAG VISIT (OUTPATIENT)
Dept: FAMILY MEDICINE CLINIC | Age: 44
End: 2024-11-12

## 2024-11-12 ENCOUNTER — OFFICE VISIT (OUTPATIENT)
Dept: FAMILY MEDICINE CLINIC | Age: 44
End: 2024-11-12
Payer: COMMERCIAL

## 2024-11-12 VITALS
TEMPERATURE: 97 F | OXYGEN SATURATION: 98 % | HEART RATE: 74 BPM | SYSTOLIC BLOOD PRESSURE: 128 MMHG | DIASTOLIC BLOOD PRESSURE: 82 MMHG | WEIGHT: 315 LBS | BODY MASS INDEX: 42.66 KG/M2 | HEIGHT: 72 IN

## 2024-11-12 DIAGNOSIS — Z92.29 HISTORY OF COUMADIN THERAPY: ICD-10-CM

## 2024-11-12 DIAGNOSIS — K04.7 DENTAL ABSCESS: Primary | ICD-10-CM

## 2024-11-12 DIAGNOSIS — Z86.718 HISTORY OF DVT (DEEP VEIN THROMBOSIS): ICD-10-CM

## 2024-11-12 LAB — INR BLD: 4.6 (ref 0.9–1.1)

## 2024-11-12 PROCEDURE — 99214 OFFICE O/P EST MOD 30 MIN: CPT | Performed by: NURSE PRACTITIONER

## 2024-11-12 RX ORDER — CLINDAMYCIN HYDROCHLORIDE 300 MG/1
300 CAPSULE ORAL 3 TIMES DAILY
Qty: 30 CAPSULE | Refills: 0 | Status: SHIPPED | OUTPATIENT
Start: 2024-11-12 | End: 2024-11-22

## 2024-11-12 ASSESSMENT — ENCOUNTER SYMPTOMS
EYES NEGATIVE: 1
GASTROINTESTINAL NEGATIVE: 1
RESPIRATORY NEGATIVE: 1
FACIAL SWELLING: 1

## 2024-11-12 NOTE — PROGRESS NOTES
[facility]  [address] [place] [address]  Dept: [phone]  Dept Fax: [phone]  Loc: [phone]    Juan Bolden is a 44 y.o. male who presents today for his medical conditions/complaints as noted below.  Juan Bolden is c/o of Ear Pain, Jaw Pain, and Headache      Chief Complaint   Patient presents with    Ear Pain    Jaw Pain    Headache       HPI:     HPI  Patient presents today with complaints of right ear pain, jaw pain, and headache.  He states that he had to leave work early Saturday and call in Sunday due to symptoms.  He checked BP at home yesterday and it was WNL.  These symptoms have been present since the end of last week, but is getting worse.     Past Medical History:   Diagnosis Date    Arthritis     both wrist    Bilateral carpal tunnel syndrome 8/15/2019    Bronchitis     10 yrs ago    Diabetes mellitus (HCC)     Disease of blood and blood forming organ     Hx of blood clots     Hypertension     Kidney stone     recurrent    Sleep apnea     untested    Umbilical hernia         Past Surgical History:   Procedure Laterality Date    CARPAL TUNNEL RELEASE Left 08/21/2020    LEFT CARPAL TUNNEL RELEASE performed by Ron Samson MD at [facility] OR    CYSTOSCOPY Right 07/24/2018    CYSTOSCOPY/ URETEROSCOPY; INSERTION DOUBLE J STENT/  URETERAL performed by Ayden Machado MD at [facility] OR    FRACTURE SURGERY      both wrists    HEMORRHOID SURGERY N/A 06/24/2016    HEMORRHOID INCISION AND DRAINAGE performed by Linda Arreola MD at [facility] OR    AL CYSTO W/INSERT URETERAL STENT Right 08/07/2018    STENT PLACEMENT performed by Ayden Machado MD at [facility] OR    AL CYSTO W/URETEROSCOPY W/LITHOTRIPSY Right 08/07/2018    URETEROSCOPY LASER LITHOTRIPSY STONE EXTRACTION performed by Ayden Machado MD at [facility] OR    AL LAPS REPAIR HERNIA EXCEPT INCAL/INGUN REDUCIBLE N/A 05/07/2018    HERNIA UMBILICAL REPAIR LAPAROSCOPIC performed by Scott Samaniego MD

## 2024-11-12 NOTE — PROGRESS NOTES
Called patient, spoke with: Patient regarding the results of the patients most recent PT/INR.  I advised Patient of Kiki Miller recommendations.   Patient did voice understanding      Kiki Miller APRN [facility] Clinical Staff  Please let patient know that INR was too high (4.6).  Please hold coumadin tonight and take half dose tomorrow only 5 mg.  Then recheck Friday

## 2024-11-18 ENCOUNTER — ANTI-COAG VISIT (OUTPATIENT)
Dept: FAMILY MEDICINE CLINIC | Age: 44
End: 2024-11-18

## 2024-11-18 DIAGNOSIS — Z86.718 HISTORY OF DVT (DEEP VEIN THROMBOSIS): ICD-10-CM

## 2024-11-18 LAB — INR BLD: 1.9 (ref 0.9–1.1)

## 2024-11-18 NOTE — PROGRESS NOTES
Spoke to patient with INR results. He is currently on an antibiotic and Kiki did tell him this could effect his INR. He stated he takes 10mg every day except for Sunday, he takes 7.5mg.  He is aware to recheck INR on Friday, 11/22    Please call patient and let them know results.   Blood is slightly too thick.  Continue current Coumadin dosage and repeat pro time in 3 days

## 2024-11-22 ENCOUNTER — ANTI-COAG VISIT (OUTPATIENT)
Dept: FAMILY MEDICINE CLINIC | Age: 44
End: 2024-11-22

## 2024-11-22 LAB — INR BLD: 2.2 (ref 0.9–1.1)

## 2024-11-22 NOTE — PROGRESS NOTES
Spoke to patient. He is aware to recheck INR in 2 weeks.    Steffanie Garcia APRN [facility] Clinical Staff  INR is therapeutic.  Continue current dose of Coumadin.  Due to recent instability recommend rechecking INR in 2 weeks to ensure stability

## 2024-12-03 ENCOUNTER — ANTI-COAG VISIT (OUTPATIENT)
Dept: FAMILY MEDICINE CLINIC | Age: 44
End: 2024-12-03

## 2024-12-03 ENCOUNTER — OFFICE VISIT (OUTPATIENT)
Dept: FAMILY MEDICINE CLINIC | Age: 44
End: 2024-12-03
Payer: COMMERCIAL

## 2024-12-03 VITALS
HEIGHT: 72 IN | HEART RATE: 78 BPM | BODY MASS INDEX: 42.66 KG/M2 | OXYGEN SATURATION: 97 % | SYSTOLIC BLOOD PRESSURE: 126 MMHG | TEMPERATURE: 97.7 F | DIASTOLIC BLOOD PRESSURE: 82 MMHG | WEIGHT: 315 LBS

## 2024-12-03 DIAGNOSIS — I10 PRIMARY HYPERTENSION: ICD-10-CM

## 2024-12-03 DIAGNOSIS — Z92.29 HISTORY OF COUMADIN THERAPY: ICD-10-CM

## 2024-12-03 DIAGNOSIS — Z86.718 HISTORY OF DVT (DEEP VEIN THROMBOSIS): ICD-10-CM

## 2024-12-03 DIAGNOSIS — E11.59 TYPE 2 DIABETES MELLITUS WITH OTHER CIRCULATORY COMPLICATION, WITHOUT LONG-TERM CURRENT USE OF INSULIN (HCC): ICD-10-CM

## 2024-12-03 DIAGNOSIS — E11.59 TYPE 2 DIABETES MELLITUS WITH OTHER CIRCULATORY COMPLICATION, WITHOUT LONG-TERM CURRENT USE OF INSULIN (HCC): Primary | ICD-10-CM

## 2024-12-03 LAB
ALBUMIN SERPL-MCNC: 3.8 G/DL (ref 3.5–5.2)
ALP SERPL-CCNC: 89 U/L (ref 40–129)
ALT SERPL-CCNC: 24 U/L (ref 5–41)
ANION GAP SERPL CALCULATED.3IONS-SCNC: 7 MMOL/L (ref 7–19)
AST SERPL-CCNC: 19 U/L (ref 5–40)
BASOPHILS # BLD: 0.1 K/UL (ref 0–0.2)
BASOPHILS NFR BLD: 0.8 % (ref 0–1)
BILIRUB SERPL-MCNC: 0.3 MG/DL (ref 0.2–1.2)
BUN SERPL-MCNC: 8 MG/DL (ref 6–20)
CALCIUM SERPL-MCNC: 8.9 MG/DL (ref 8.6–10)
CHLORIDE SERPL-SCNC: 103 MMOL/L (ref 98–111)
CO2 SERPL-SCNC: 29 MMOL/L (ref 22–29)
CREAT SERPL-MCNC: 0.7 MG/DL (ref 0.7–1.2)
EOSINOPHIL # BLD: 0.3 K/UL (ref 0–0.6)
EOSINOPHIL NFR BLD: 3.9 % (ref 0–5)
ERYTHROCYTE [DISTWIDTH] IN BLOOD BY AUTOMATED COUNT: 15.2 % (ref 11.5–14.5)
GLUCOSE SERPL-MCNC: 121 MG/DL (ref 70–99)
HBA1C MFR BLD: 7.1 % (ref 4–5.6)
HCT VFR BLD AUTO: 48.4 % (ref 42–52)
HGB BLD-MCNC: 14.7 G/DL (ref 14–18)
IMM GRANULOCYTES # BLD: 0 K/UL
INR PPP: 1.85 (ref 0.88–1.18)
LYMPHOCYTES # BLD: 1.9 K/UL (ref 1.1–4.5)
LYMPHOCYTES NFR BLD: 25.9 % (ref 20–40)
MCH RBC QN AUTO: 27.6 PG (ref 27–31)
MCHC RBC AUTO-ENTMCNC: 30.4 G/DL (ref 33–37)
MCV RBC AUTO: 91 FL (ref 80–94)
MONOCYTES # BLD: 0.5 K/UL (ref 0–0.9)
MONOCYTES NFR BLD: 7.3 % (ref 0–10)
NEUTROPHILS # BLD: 4.6 K/UL (ref 1.5–7.5)
NEUTS SEG NFR BLD: 61.8 % (ref 50–65)
PLATELET # BLD AUTO: 231 K/UL (ref 130–400)
PMV BLD AUTO: 10 FL (ref 9.4–12.4)
POTASSIUM SERPL-SCNC: 4.1 MMOL/L (ref 3.5–5)
PROT SERPL-MCNC: 6.8 G/DL (ref 6.4–8.3)
PROTHROMBIN TIME: 20.9 SEC (ref 12–14.6)
RBC # BLD AUTO: 5.32 M/UL (ref 4.7–6.1)
SODIUM SERPL-SCNC: 139 MMOL/L (ref 136–145)
WBC # BLD AUTO: 7.4 K/UL (ref 4.8–10.8)

## 2024-12-03 PROCEDURE — 3074F SYST BP LT 130 MM HG: CPT | Performed by: NURSE PRACTITIONER

## 2024-12-03 PROCEDURE — 3079F DIAST BP 80-89 MM HG: CPT | Performed by: NURSE PRACTITIONER

## 2024-12-03 PROCEDURE — 3051F HG A1C>EQUAL 7.0%<8.0%: CPT | Performed by: NURSE PRACTITIONER

## 2024-12-03 PROCEDURE — 99214 OFFICE O/P EST MOD 30 MIN: CPT | Performed by: NURSE PRACTITIONER

## 2024-12-03 ASSESSMENT — ENCOUNTER SYMPTOMS
EYES NEGATIVE: 1
RESPIRATORY NEGATIVE: 1
GASTROINTESTINAL NEGATIVE: 1

## 2024-12-03 NOTE — PROGRESS NOTES
Left message on patient's voicemail to call back for results.    Kiki Miller APRN [facility] Clinical Staff  Please let patient know that labs have returned.  A1c was stable at 7.1%.  CMP did show normal liver function, renal function and electrolytes.  CBC did not show any concerns with anemia.  Platelet count white count were normal.  INR was slightly decreased.  Please verify that patient is taking 10 mg Monday through Saturday and then 7.5 mg on Sunday.  If this is accurate have him take 12.5 mg today and then continue normal regimen recheck INR in 1 week

## 2024-12-03 NOTE — PROGRESS NOTES
daily 30 tablet 11    warfarin (COUMADIN) 5 MG tablet TAKE 2 TABLETS BY MOUTH ONCE DAILY AS DIRECTED (Patient taking differently: Indications: 10mg m-sat and sunday 7.5mg TAKE 2 TABLETS BY MOUTH ONCE DAILY AS DIRECTED) 60 tablet 11    ferrous sulfate (IRON 325) 325 (65 Fe) MG tablet Take 1 tablet by mouth daily (with breakfast) 30 tablet 1    cyclobenzaprine (FLEXERIL) 10 MG tablet Take 1 tablet by mouth three times daily as needed for muscle spasm 21 tablet 0     No current facility-administered medications for this visit.       No Known Allergies    Family History   Problem Relation Age of Onset    Cancer Mother 51        colon cancer    Cancer Father         luekemia    Diabetes Father     Other Maternal Grandmother         copd    Other Maternal Grandfather         copd    Heart Disease Paternal Grandmother              Subjective:      Review of Systems   Constitutional: Negative.    HENT: Negative.     Eyes: Negative.    Respiratory: Negative.     Cardiovascular: Negative.    Gastrointestinal: Negative.    Endocrine: Negative.    Genitourinary: Negative.    Musculoskeletal: Negative.    Skin: Negative.    Neurological: Negative.    Hematological: Negative.    Psychiatric/Behavioral: Negative.         Objective:     Physical Exam  Vitals and nursing note reviewed.   Constitutional:       Appearance: Normal appearance. He is well-developed. He is obese.   HENT:      Head: Normocephalic and atraumatic.      Right Ear: Hearing, tympanic membrane, ear canal and external ear normal.      Left Ear: Hearing, tympanic membrane, ear canal and external ear normal.      Nose: Nose normal.      Mouth/Throat:      Lips: No lesions.      Pharynx: Uvula midline.   Eyes:      General: Lids are normal.      Conjunctiva/sclera: Conjunctivae normal.      Pupils: Pupils are equal, round, and reactive to light.   Neck:      Thyroid: No thyroid mass or thyromegaly.      Trachea: Trachea normal.   Cardiovascular:      Rate and

## 2024-12-03 NOTE — PROGRESS NOTES
Called patient, spoke with: Patient regarding the results of the patients most recent PT/INR.  I advised Patient of Kiki Miller recommendations.   Patient did voice understanding    Kiki Miller APRN [facility] Clinical Staff  Please let patient know that labs have returned.  A1c was stable at 7.1%.  CMP did show normal liver function, renal function and electrolytes.  CBC did not show any concerns with anemia.  Platelet count white count were normal.  INR was slightly decreased.  Please verify that patient is taking 10 mg Monday through Saturday and then 7.5 mg on Sunday.  If this is accurate have him take 12.5 mg today and then continue normal regimen recheck INR in 1 week    Pt is still on this regimen. Will take the 12.5 tonight and go back to regular regimen tomorrow.

## 2025-03-02 DIAGNOSIS — E11.59 TYPE 2 DIABETES MELLITUS WITH OTHER CIRCULATORY COMPLICATION, WITHOUT LONG-TERM CURRENT USE OF INSULIN (HCC): ICD-10-CM

## 2025-03-03 NOTE — TELEPHONE ENCOUNTER
[facility] called requesting a refill of the below medication which has been pended for you:     Requested Prescriptions     Pending Prescriptions Disp Refills    RYBELSUS 14 MG TABS [Pharmacy Med Name: Rybelsus 14 MG Oral Tablet] 30 tablet 0     Sig: Take 1 tablet by mouth once daily       Last Appointment Date: 12/3/2024  Next Appointment Date: Visit date not found    No Known Allergies

## 2025-03-04 RX ORDER — ORAL SEMAGLUTIDE 14 MG/1
1 TABLET ORAL DAILY
Qty: 30 TABLET | Refills: 5 | Status: SHIPPED | OUTPATIENT
Start: 2025-03-04

## 2025-03-05 DIAGNOSIS — E11.59 TYPE 2 DIABETES MELLITUS WITH OTHER CIRCULATORY COMPLICATION, WITHOUT LONG-TERM CURRENT USE OF INSULIN (HCC): ICD-10-CM

## 2025-03-05 DIAGNOSIS — I10 PRIMARY HYPERTENSION: ICD-10-CM

## 2025-03-05 DIAGNOSIS — E78.2 MIXED HYPERLIPIDEMIA: ICD-10-CM

## 2025-03-05 RX ORDER — ROSUVASTATIN CALCIUM 20 MG/1
20 TABLET, COATED ORAL NIGHTLY
Qty: 30 TABLET | Refills: 0 | Status: SHIPPED | OUTPATIENT
Start: 2025-03-05

## 2025-03-05 RX ORDER — AMLODIPINE BESYLATE 5 MG/1
TABLET ORAL
Qty: 30 TABLET | Refills: 0 | Status: SHIPPED | OUTPATIENT
Start: 2025-03-05

## 2025-03-05 NOTE — TELEPHONE ENCOUNTER
Received fax from pharmacy requesting refill on pts medication(s). Pt was last seen in office on 12/3/2024  and has a follow up scheduled for 4/1/25. Will send request to  Kiki Miller  for patient.     Requested Prescriptions     Pending Prescriptions Disp Refills    amLODIPine (NORVASC) 5 MG tablet [Pharmacy Med Name: amLODIPine Besylate 5 MG Oral Tablet] 30 tablet 0     Sig: Take 1 tablet by mouth once daily    metFORMIN (GLUCOPHAGE) 500 MG tablet [Pharmacy Med Name: metFORMIN HCl 500 MG Oral Tablet] 60 tablet 0     Sig: TAKE 1 TABLET BY MOUTH TWICE DAILY WITH MEALS    rosuvastatin (CRESTOR) 20 MG tablet [Pharmacy Med Name: Rosuvastatin Calcium 20 MG Oral Tablet] 30 tablet 0     Sig: Take 1 tablet by mouth nightly

## 2025-03-19 DIAGNOSIS — Z86.718 HISTORY OF DVT (DEEP VEIN THROMBOSIS): ICD-10-CM

## 2025-03-19 DIAGNOSIS — I10 PRIMARY HYPERTENSION: ICD-10-CM

## 2025-03-19 RX ORDER — WARFARIN SODIUM 5 MG/1
TABLET ORAL
Qty: 180 TABLET | Refills: 2 | Status: SHIPPED | OUTPATIENT
Start: 2025-03-19

## 2025-03-19 RX ORDER — LISINOPRIL 10 MG/1
10 TABLET ORAL DAILY
Qty: 30 TABLET | Refills: 11 | Status: SHIPPED | OUTPATIENT
Start: 2025-03-19

## 2025-03-19 NOTE — TELEPHONE ENCOUNTER
Received fax from pharmacy requesting refill on pts medication(s). Pt was last seen in office on 12/3/2024  and has a follow up scheduled for Visit date not found. Will send request to  Kiki Miller  for authorization.     Requested Prescriptions     Pending Prescriptions Disp Refills    lisinopril (PRINIVIL;ZESTRIL) 10 MG tablet [Pharmacy Med Name: Lisinopril 10 MG Oral Tablet] 30 tablet 0     Sig: Take 1 tablet by mouth once daily    warfarin (COUMADIN) 5 MG tablet [Pharmacy Med Name: Warfarin Sodium 5 MG Oral Tablet] 180 tablet 0     Sig: TAKE 2 TABLETS BY MOUTH ONCE DAILY AS DIRECTED

## 2025-03-30 DIAGNOSIS — E78.2 MIXED HYPERLIPIDEMIA: ICD-10-CM

## 2025-03-30 DIAGNOSIS — E11.59 TYPE 2 DIABETES MELLITUS WITH OTHER CIRCULATORY COMPLICATION, WITHOUT LONG-TERM CURRENT USE OF INSULIN: ICD-10-CM

## 2025-03-30 DIAGNOSIS — I10 PRIMARY HYPERTENSION: ICD-10-CM

## 2025-03-31 SDOH — ECONOMIC STABILITY: INCOME INSECURITY: IN THE LAST 12 MONTHS, WAS THERE A TIME WHEN YOU WERE NOT ABLE TO PAY THE MORTGAGE OR RENT ON TIME?: NO

## 2025-03-31 SDOH — ECONOMIC STABILITY: FOOD INSECURITY: WITHIN THE PAST 12 MONTHS, YOU WORRIED THAT YOUR FOOD WOULD RUN OUT BEFORE YOU GOT MONEY TO BUY MORE.: NEVER TRUE

## 2025-03-31 SDOH — ECONOMIC STABILITY: TRANSPORTATION INSECURITY
IN THE PAST 12 MONTHS, HAS THE LACK OF TRANSPORTATION KEPT YOU FROM MEDICAL APPOINTMENTS OR FROM GETTING MEDICATIONS?: NO

## 2025-03-31 SDOH — ECONOMIC STABILITY: FOOD INSECURITY: WITHIN THE PAST 12 MONTHS, THE FOOD YOU BOUGHT JUST DIDN'T LAST AND YOU DIDN'T HAVE MONEY TO GET MORE.: NEVER TRUE

## 2025-03-31 ASSESSMENT — PATIENT HEALTH QUESTIONNAIRE - PHQ9
SUM OF ALL RESPONSES TO PHQ QUESTIONS 1-9: 0
2. FEELING DOWN, DEPRESSED OR HOPELESS: NOT AT ALL
1. LITTLE INTEREST OR PLEASURE IN DOING THINGS: NOT AT ALL
2. FEELING DOWN, DEPRESSED OR HOPELESS: NOT AT ALL
1. LITTLE INTEREST OR PLEASURE IN DOING THINGS: NOT AT ALL
SUM OF ALL RESPONSES TO PHQ9 QUESTIONS 1 & 2: 0
SUM OF ALL RESPONSES TO PHQ QUESTIONS 1-9: 0

## 2025-03-31 NOTE — TELEPHONE ENCOUNTER
[facility] called requesting a refill of the below medication which has been pended for you:     Requested Prescriptions     Pending Prescriptions Disp Refills    amLODIPine (NORVASC) 5 MG tablet [Pharmacy Med Name: amLODIPine Besylate 5 MG Oral Tablet] 30 tablet 0     Sig: Take 1 tablet by mouth once daily    metFORMIN (GLUCOPHAGE) 500 MG tablet [Pharmacy Med Name: metFORMIN HCl 500 MG Oral Tablet] 60 tablet 0     Sig: TAKE 1 TABLET BY MOUTH TWICE DAILY WITH MEALS    rosuvastatin (CRESTOR) 20 MG tablet [Pharmacy Med Name: Rosuvastatin Calcium 20 MG Oral Tablet] 30 tablet 0     Sig: Take 1 tablet by mouth nightly       Last Appointment Date: 12/3/2024  Next Appointment Date: Visit date not found    No Known Allergies

## 2025-04-01 ENCOUNTER — RESULTS FOLLOW-UP (OUTPATIENT)
Age: 45
End: 2025-04-01

## 2025-04-01 ENCOUNTER — OFFICE VISIT (OUTPATIENT)
Age: 45
End: 2025-04-01
Payer: COMMERCIAL

## 2025-04-01 VITALS
DIASTOLIC BLOOD PRESSURE: 80 MMHG | HEART RATE: 88 BPM | TEMPERATURE: 97.4 F | SYSTOLIC BLOOD PRESSURE: 132 MMHG | OXYGEN SATURATION: 98 % | WEIGHT: 315 LBS | BODY MASS INDEX: 42.66 KG/M2 | HEIGHT: 72 IN

## 2025-04-01 DIAGNOSIS — I10 PRIMARY HYPERTENSION: ICD-10-CM

## 2025-04-01 DIAGNOSIS — Z92.29 HISTORY OF COUMADIN THERAPY: ICD-10-CM

## 2025-04-01 DIAGNOSIS — D50.8 IRON DEFICIENCY ANEMIA SECONDARY TO INADEQUATE DIETARY IRON INTAKE: ICD-10-CM

## 2025-04-01 DIAGNOSIS — I10 PRIMARY HYPERTENSION: Primary | ICD-10-CM

## 2025-04-01 DIAGNOSIS — E78.2 MIXED HYPERLIPIDEMIA: ICD-10-CM

## 2025-04-01 DIAGNOSIS — E11.59 TYPE 2 DIABETES MELLITUS WITH OTHER CIRCULATORY COMPLICATION, WITHOUT LONG-TERM CURRENT USE OF INSULIN: ICD-10-CM

## 2025-04-01 LAB
ALBUMIN SERPL-MCNC: 3.7 G/DL (ref 3.5–5.2)
ALP SERPL-CCNC: 95 U/L (ref 40–129)
ALT SERPL-CCNC: 24 U/L (ref 10–50)
ANION GAP SERPL CALCULATED.3IONS-SCNC: 14 MMOL/L (ref 8–16)
AST SERPL-CCNC: 22 U/L (ref 10–50)
BILIRUB SERPL-MCNC: 0.2 MG/DL (ref 0.2–1.2)
BUN SERPL-MCNC: 9 MG/DL (ref 6–20)
CALCIUM SERPL-MCNC: 9.4 MG/DL (ref 8.6–10)
CHLORIDE SERPL-SCNC: 104 MMOL/L (ref 98–107)
CO2 SERPL-SCNC: 20 MMOL/L (ref 22–29)
CREAT SERPL-MCNC: 0.7 MG/DL (ref 0.7–1.2)
ERYTHROCYTE [DISTWIDTH] IN BLOOD BY AUTOMATED COUNT: 14.6 % (ref 11.5–14.5)
GLUCOSE SERPL-MCNC: 199 MG/DL (ref 70–99)
HBA1C MFR BLD: 9.7 % (ref 4–5.6)
HCT VFR BLD AUTO: 47.5 % (ref 42–52)
HGB BLD-MCNC: 14.8 G/DL (ref 14–18)
INR PPP: 2.17 (ref 0.88–1.18)
IRON SATN MFR SERPL: 15 % (ref 20–50)
IRON SERPL-MCNC: 47 UG/DL (ref 59–158)
MCH RBC QN AUTO: 27.6 PG (ref 27–31)
MCHC RBC AUTO-ENTMCNC: 31.2 G/DL (ref 33–37)
MCV RBC AUTO: 88.6 FL (ref 80–94)
PLATELET # BLD AUTO: 214 K/UL (ref 130–400)
PMV BLD AUTO: 9.9 FL (ref 9.4–12.4)
POTASSIUM SERPL-SCNC: 4.1 MMOL/L (ref 3.5–5.1)
PROT SERPL-MCNC: 6.5 G/DL (ref 6.4–8.3)
PROTHROMBIN TIME: 23.7 SEC (ref 12–14.6)
RBC # BLD AUTO: 5.36 M/UL (ref 4.7–6.1)
SODIUM SERPL-SCNC: 138 MMOL/L (ref 136–145)
TIBC SERPL-MCNC: 318 UG/DL (ref 250–400)
TSH SERPL DL<=0.005 MIU/L-ACNC: 1.66 UIU/ML (ref 0.27–4.2)
WBC # BLD AUTO: 7 K/UL (ref 4.8–10.8)

## 2025-04-01 PROCEDURE — 99214 OFFICE O/P EST MOD 30 MIN: CPT | Performed by: NURSE PRACTITIONER

## 2025-04-01 PROCEDURE — 3075F SYST BP GE 130 - 139MM HG: CPT | Performed by: NURSE PRACTITIONER

## 2025-04-01 PROCEDURE — 3079F DIAST BP 80-89 MM HG: CPT | Performed by: NURSE PRACTITIONER

## 2025-04-01 RX ORDER — ROSUVASTATIN CALCIUM 20 MG/1
20 TABLET, COATED ORAL NIGHTLY
Qty: 30 TABLET | Refills: 0 | Status: SHIPPED | OUTPATIENT
Start: 2025-04-01

## 2025-04-01 RX ORDER — AMLODIPINE BESYLATE 5 MG/1
5 TABLET ORAL DAILY
Qty: 30 TABLET | Refills: 0 | Status: SHIPPED | OUTPATIENT
Start: 2025-04-01

## 2025-04-01 ASSESSMENT — ENCOUNTER SYMPTOMS
EYES NEGATIVE: 1
RESPIRATORY NEGATIVE: 1
GASTROINTESTINAL NEGATIVE: 1

## 2025-04-01 NOTE — PROGRESS NOTES
Juan Bolden (:  1980) is a 45 y.o. male, Established patient, here for evaluation of the following chief complaint(s):  Diabetes and Hypertension         Assessment & Plan  1. DM   - Advised to undergo an annual ophthalmological examination due to diabetes and history of deep vein thrombosis (DVT).  - Family history of colon cancer noted; colonoscopy recommended.  - Comprehensive set of laboratory tests ordered, including A1c and INR.    2. HTN   - monitor BP levels at home    3. HLD   - continue meds for hyperlipidemia      Follow-up  - Follow-up scheduled in 4 months; adjustments to treatment plan will be made based on lab results if necessary.    Results    No results found for this visit on 25.    ICD-10-CM    1. Primary hypertension  I10 CBC     Comprehensive Metabolic Panel      2. Type 2 diabetes mellitus with other circulatory complication, without long-term current use of insulin  E11.59 TSH     Hemoglobin A1C      3. History of Coumadin therapy  Z92.29 Protime-INR      4. Mixed hyperlipidemia  E78.2       5. Iron deficiency anemia secondary to inadequate dietary iron intake  D50.8 Iron and TIBC         Return in about 4 months (around 2025) for Diabetic Follow up.       Subjective   History of Present Illness  The patient presents for a routine checkup on DM, HTN, and chronic coumadin use for history of clots.    Medication Adherence  No adverse reactions to current medications, including Coumadin, are reported. INR levels have been monitored regularly. Rybelsus 14 mg and ferrous sulfate are being taken as prescribed.  - Alleviating/Aggravating Factors: Regular monitoring of INR levels.  - Timing: Medications taken as prescribed.  - Severity: No adverse reactions reported.    Blood Glucose Levels  Blood glucose levels are checked occasionally at home and have remained within normal limits.  - Timing: Checked occasionally at home.  - Severity: Within normal

## 2025-04-16 DIAGNOSIS — Z13.9 SCREENING DUE: ICD-10-CM

## 2025-04-16 DIAGNOSIS — E11.59 TYPE 2 DIABETES MELLITUS WITH OTHER CIRCULATORY COMPLICATION, WITHOUT LONG-TERM CURRENT USE OF INSULIN: Primary | ICD-10-CM

## 2025-05-02 DIAGNOSIS — I10 PRIMARY HYPERTENSION: ICD-10-CM

## 2025-05-02 DIAGNOSIS — E78.2 MIXED HYPERLIPIDEMIA: ICD-10-CM

## 2025-05-02 DIAGNOSIS — E11.59 TYPE 2 DIABETES MELLITUS WITH OTHER CIRCULATORY COMPLICATION, WITHOUT LONG-TERM CURRENT USE OF INSULIN (HCC): ICD-10-CM

## 2025-05-02 RX ORDER — AMLODIPINE BESYLATE 5 MG/1
5 TABLET ORAL DAILY
Qty: 30 TABLET | Refills: 11 | Status: SHIPPED | OUTPATIENT
Start: 2025-05-02

## 2025-05-02 RX ORDER — ROSUVASTATIN CALCIUM 20 MG/1
20 TABLET, COATED ORAL NIGHTLY
Qty: 30 TABLET | Refills: 11 | Status: SHIPPED | OUTPATIENT
Start: 2025-05-02

## 2025-05-02 NOTE — TELEPHONE ENCOUNTER
Received fax from pharmacy requesting refill on pts medication(s). Pt was last seen in office on 4/1/2025  and has a follow up scheduled for 8/5/2025. Will send request to  Kiki Miller  for authorization.     Requested Prescriptions     Pending Prescriptions Disp Refills    amLODIPine (NORVASC) 5 MG tablet [Pharmacy Med Name: amLODIPine Besylate 5 MG Oral Tablet] 30 tablet 11     Sig: Take 1 tablet by mouth once daily    metFORMIN (GLUCOPHAGE) 500 MG tablet [Pharmacy Med Name: metFORMIN HCl 500 MG Oral Tablet] 60 tablet 11     Sig: TAKE 1 TABLET BY MOUTH TWICE DAILY WITH MEALS    rosuvastatin (CRESTOR) 20 MG tablet [Pharmacy Med Name: Rosuvastatin Calcium 20 MG Oral Tablet] 30 tablet 11     Sig: Take 1 tablet by mouth nightly

## 2025-08-05 ENCOUNTER — OFFICE VISIT (OUTPATIENT)
Age: 45
End: 2025-08-05
Payer: COMMERCIAL

## 2025-08-05 ENCOUNTER — ANTI-COAG VISIT (OUTPATIENT)
Age: 45
End: 2025-08-05

## 2025-08-05 VITALS
HEART RATE: 70 BPM | SYSTOLIC BLOOD PRESSURE: 126 MMHG | HEIGHT: 72 IN | OXYGEN SATURATION: 98 % | DIASTOLIC BLOOD PRESSURE: 78 MMHG | BODY MASS INDEX: 42.66 KG/M2 | WEIGHT: 315 LBS | TEMPERATURE: 97 F

## 2025-08-05 DIAGNOSIS — Z92.29 HISTORY OF COUMADIN THERAPY: ICD-10-CM

## 2025-08-05 DIAGNOSIS — Z12.11 COLON CANCER SCREENING: ICD-10-CM

## 2025-08-05 DIAGNOSIS — Z86.718 HISTORY OF DVT (DEEP VEIN THROMBOSIS): ICD-10-CM

## 2025-08-05 DIAGNOSIS — I10 PRIMARY HYPERTENSION: ICD-10-CM

## 2025-08-05 DIAGNOSIS — E11.59 TYPE 2 DIABETES MELLITUS WITH OTHER CIRCULATORY COMPLICATION, WITHOUT LONG-TERM CURRENT USE OF INSULIN (HCC): Primary | ICD-10-CM

## 2025-08-05 LAB
HBA1C MFR BLD: 7.4 %
INR BLD: 4 (ref 0.9–1.1)

## 2025-08-05 PROCEDURE — 3051F HG A1C>EQUAL 7.0%<8.0%: CPT | Performed by: NURSE PRACTITIONER

## 2025-08-05 PROCEDURE — 3078F DIAST BP <80 MM HG: CPT | Performed by: NURSE PRACTITIONER

## 2025-08-05 PROCEDURE — 99214 OFFICE O/P EST MOD 30 MIN: CPT | Performed by: NURSE PRACTITIONER

## 2025-08-05 PROCEDURE — 83036 HEMOGLOBIN GLYCOSYLATED A1C: CPT | Performed by: NURSE PRACTITIONER

## 2025-08-05 PROCEDURE — 3074F SYST BP LT 130 MM HG: CPT | Performed by: NURSE PRACTITIONER

## 2025-08-05 ASSESSMENT — ENCOUNTER SYMPTOMS
GASTROINTESTINAL NEGATIVE: 1
RESPIRATORY NEGATIVE: 1
EYES NEGATIVE: 1

## 2025-08-23 LAB — NONINV COLON CA DNA+OCC BLD SCRN STL QL: NEGATIVE

## 2025-08-31 DIAGNOSIS — E11.59 TYPE 2 DIABETES MELLITUS WITH OTHER CIRCULATORY COMPLICATION, WITHOUT LONG-TERM CURRENT USE OF INSULIN (HCC): ICD-10-CM

## 2025-09-02 RX ORDER — ORAL SEMAGLUTIDE 14 MG/1
1 TABLET ORAL DAILY
Qty: 30 TABLET | Refills: 5 | Status: SHIPPED | OUTPATIENT
Start: 2025-09-02

## (undated) DEVICE — C-ARM: Brand: UNBRANDED

## (undated) DEVICE — PATIENT RETURN ELECTRODE, SINGLE-USE, CONTACT QUALITY MONITORING, ADULT, WITH 9FT CORD, FOR PATIENTS WEIGING OVER 33LBS. (15KG): Brand: MEGADYNE

## (undated) DEVICE — GLOVE SURG SZ 75 L12IN FNGR THK79MIL GRN LTX FREE

## (undated) DEVICE — PAD,NON-ADHERENT,3X8,STERILE,LF,1/PK: Brand: MEDLINE

## (undated) DEVICE — BLANKET WRM W29.9XL79.1IN UP BODY FORC AIR MISTRAL-AIR

## (undated) DEVICE — UNIV MI, 5 FR, 7CM, ANL,: Brand: MICROEZ INTRODUCER

## (undated) DEVICE — COVER LT HNDL PLAS RIG 2 PER PK

## (undated) DEVICE — THROMBECTOMY SET: Brand: ANGIOJET™ ZELANTEDVT™

## (undated) DEVICE — TOWEL,OR,DSP,ST,BLUE,DLX,4/PK,20PK/CS: Brand: MEDLINE

## (undated) DEVICE — BALLOON DILATATION CATHETER: Brand: UROMAX ULTRA

## (undated) DEVICE — GLOVE SURG SZ 7 L12IN FNGR THK94MIL TRNSLUC YEL LTX HYDRGEL

## (undated) DEVICE — GUIDEWIRE ENDOSCP L150CM DIA0.035IN TIP 3CM PTFE NIT

## (undated) DEVICE — SUTURE VCRL SZ 3-0 L18IN ABSRB UD L26MM SH 1/2 CIR J864D

## (undated) DEVICE — SOLUTION IV 1000ML 0.9% SOD CHL PH 5 INJ USP VIAFLX PLAS

## (undated) DEVICE — PAD,ARMBOARD,CONV,FOAM,2X8X20",12PR/CS: Brand: MEDLINE

## (undated) DEVICE — PINNACLE INTRODUCER SHEATH: Brand: PINNACLE

## (undated) DEVICE — CATHETER KIT 5 FR 21 GAX7 CM MICROINTRODUCER GUIDEWIRE STIFF

## (undated) DEVICE — DRAPE KEYBOARD W26XL36IN ADH

## (undated) DEVICE — NITINOL STONE RETRIEVAL DEVICE: Brand: DAKOTA

## (undated) DEVICE — DEVICE TORQ DIA0.018-0.038IN GRN ERGO 1 HND FOR PTFE GWIRE

## (undated) DEVICE — PAD,EYE,1-5/8X2 5/8,STERILE,LF,1/PK: Brand: MEDLINE

## (undated) DEVICE — SET IV LVP AS 10 DROP 4 SMARTSITES 4-WAY STOPCOCK

## (undated) DEVICE — AMBU AURA-I U SIZE 5, DISPOSABLE LARYNGEAL MASK: Brand: AURA-I

## (undated) DEVICE — CHLORAPREP 26ML ORANGE

## (undated) DEVICE — RADIFOCUS GLIDEWIRE: Brand: GLIDEWIRE

## (undated) DEVICE — DRESSING PETROLATUM 2X2IN NON ADH ABSORB

## (undated) DEVICE — SOLUTION IV IRRIG POUR BRL 0.9% SODIUM CHL 2F7124

## (undated) DEVICE — Z DUPLICATE USE 2392649 LARYNGOSCOPE VID TI SPECTRM LOPRO S4 GLIDESCOPE

## (undated) DEVICE — GENERAL LAP CDS

## (undated) DEVICE — BANDAGE COMPR W4INXL15FT BGE E SGL LAYERED CLP CLSR

## (undated) DEVICE — BAG DRNGE COMB PK

## (undated) DEVICE — SUTURE VCRL SZ 0 L27IN ABSRB VLT L36MM UR-5 5/8 CIR J376H

## (undated) DEVICE — DECANTER FLD 9IN ST BG FOR ASEP TRNSF OF FLD

## (undated) DEVICE — CATHETERIZATION KIT 7 FRX16 CM 3L

## (undated) DEVICE — DECANTER VI VENT W/ VLV FOR ASEP TRNSF OF FLD

## (undated) DEVICE — SURGICAL PROCEDURE PACK CYTOSCOPY

## (undated) DEVICE — ANGIOGRAPHY PK

## (undated) DEVICE — GLOVE SURG L12IN SZ 65FNGR THK94MIL TRNSLUC YEL LTX

## (undated) DEVICE — CONTRAST IOTHALAMATE MEGLUMINE 60% 50 ML INJ CONRAY 60

## (undated) DEVICE — AEGIS 1" DISK 4MM HOLE, PEEL OPEN: Brand: MEDLINE

## (undated) DEVICE — CATHETER URET 5FR L70CM OPN END SGL LUMN INJ HUB FLEXIMA

## (undated) DEVICE — NEEDLE SPNL INJ 18 GAX1.5 IN

## (undated) DEVICE — Z INACTIVE USE 2660664 SOLUTION IRRIG 3000ML 0.9% SOD CHL USP UROMATIC PLAS CONT

## (undated) DEVICE — TROCAR: Brand: KII SHIELDED BLADED ACCESS SYSTEM

## (undated) DEVICE — MINOR CDS: Brand: MEDLINE INDUSTRIES, INC.

## (undated) DEVICE — RADIFOCUS GLIDECATH: Brand: GLIDECATH

## (undated) DEVICE — SET ADMIN 25ML L117IN PMP MOD CK VLV RLER CLMP 2 SMRTSITE

## (undated) DEVICE — GOWN,PREVENTION PLUS,XL,ST,24/CS: Brand: MEDLINE

## (undated) DEVICE — BANDAGE,GAUZE,CONFORMING,3"X75",STRL,LF: Brand: MEDLINE

## (undated) DEVICE — SOLUTION IV 250ML 0.9% SOD CHL PH 5 INJ USP VIAFLX PLAS

## (undated) DEVICE — SYRINGE MED 10ML LUERLOCK TIP W/O SFTY DISP

## (undated) DEVICE — FLEXOR, CHECK-FLO, INTRODUCER RAABE MODIFICATION: Brand: FLEXOR

## (undated) DEVICE — DENALI® VENA CAVA FILTER FEMORAL
Type: IMPLANTABLE DEVICE | Status: NON-FUNCTIONAL
Brand: DENALI® VENA CAVA FILTER

## (undated) DEVICE — COVER US PRB W15XL120CM W/ GEL RUBBERBAND TAPE STRP FLD GEN

## (undated) DEVICE — DRAPE,U/ SHT,SPLIT,PLAS,STERIL: Brand: MEDLINE

## (undated) DEVICE — GLIDESHEATH BASIC HYDROPHILIC COATED INTRODUCER SHEATH: Brand: GLIDESHEATH

## (undated) DEVICE — 3M™ IOBAN™ 2 ANTIMICROBIAL INCISE DRAPE 6650EZ: Brand: IOBAN™ 2

## (undated) DEVICE — STERILE LATEX POWDER FREE SURGICAL GLOVES WITH HYDROGEL COATING: Brand: PROTEXIS

## (undated) DEVICE — ORAL ENDOTRACHEAL TUBE FASTENER: Brand: ANCHORFAST

## (undated) DEVICE — SUTURE ETHLN SZ 3-0 L18IN NONABSORBABLE BLK FS-1 L24MM 3/8 663H

## (undated) DEVICE — TOTAL TRAY, 16FR 10ML SIL FOLEY, URN: Brand: MEDLINE

## (undated) DEVICE — SOLUTION IV IRRIG WATER 1000ML POUR BRL 2F7114

## (undated) DEVICE — STAPLER INT DIA5MM 25 ABSRB STRP FIX DISP FOR HERN MESH

## (undated) DEVICE — TUBE ET 8MM NSL ORAL BASIC CUF INTMED MURPHY EYE RADPQ MRK

## (undated) DEVICE — NG KIT, 21 GA, 10 PACK: Brand: SITE-RITE

## (undated) DEVICE — CORD BPLR L12FT DISP

## (undated) DEVICE — GLOVE SURG SZ 8 L12IN FNGR THK79MIL GRN LTX FREE

## (undated) DEVICE — GLOVE SURG SZ 7 L12IN FNGR THK79MIL GRN LTX FREE

## (undated) DEVICE — SUTURE PROL SZ 0 L30IN NONABSORBABLE BLU L26MM CT-2 1/2 CIR 8412H

## (undated) DEVICE — TRAY PREP DRY W/ PREM GLV 2 APPL 6 SPNG 2 UNDPD 1 OVERWRAP

## (undated) DEVICE — ADHESIVE SKIN CLSR 0.7ML TOP DERMBND ADV

## (undated) DEVICE — SOLUTION IV 500ML 0.9% SOD CHL PH 5 INJ USP VIAFLX PLAS

## (undated) DEVICE — GAUZE,SPONGE,FLUFF,6"X6.75",STRL,10/TRAY: Brand: MEDLINE

## (undated) DEVICE — GLOVE SURG SZ 75 L12IN FNGR THK94MIL TRNSLUC YEL LTX

## (undated) DEVICE — GLOVE SURG SZ 8 L12IN FNGR THK94MIL TRNSLUC YEL LTX HYDRGEL

## (undated) DEVICE — GLOVE SURG SZ 85 L12IN FNGR THK94MIL TRNSLUC YEL LTX

## (undated) DEVICE — SUTURE PDS + SZ 0 L27IN ABSRB VLT L36MM CT 1 1 2 CIR PDP340H

## (undated) DEVICE — SEAL ENDO INSTR SELF SEAL UROLOGY

## (undated) DEVICE — DRAPE,EXTREMITY,89X128,STERILE: Brand: MEDLINE

## (undated) DEVICE — Device

## (undated) DEVICE — MEDIA CONTRAST INJ VISIPAQUE 150ML 320MG

## (undated) DEVICE — GAUZE,SPONGE,4"X4",8PLY,STRL,LF,10/TRAY: Brand: MEDLINE

## (undated) DEVICE — DRESSING TRNSPAR W5XL4.5IN FLM SHT SEMIPERMEABLE WIND

## (undated) DEVICE — PACK,UNIVERSAL,NO GOWNS: Brand: MEDLINE

## (undated) DEVICE — SUTURE MCRYL + SZ 4-0 L18IN ABSRB UD L19MM PS-2 3/8 CIR MCP496G

## (undated) DEVICE — INSERT CUSHION HEAD PRONEVIEW

## (undated) DEVICE — INFLATION DEVICE: Brand: ENCORE™ 26

## (undated) DEVICE — FLEXOR, URETERAL ACCESS SHEATH WITH AQ, HYDROPHILIC COATING: Brand: FLEXOR